# Patient Record
Sex: FEMALE | Race: WHITE | NOT HISPANIC OR LATINO | URBAN - METROPOLITAN AREA
[De-identification: names, ages, dates, MRNs, and addresses within clinical notes are randomized per-mention and may not be internally consistent; named-entity substitution may affect disease eponyms.]

---

## 2017-05-03 ENCOUNTER — OUTPATIENT (OUTPATIENT)
Dept: OUTPATIENT SERVICES | Facility: HOSPITAL | Age: 77
LOS: 1 days | Discharge: HOME | End: 2017-05-03

## 2017-06-28 DIAGNOSIS — N83.299 OTHER OVARIAN CYST, UNSPECIFIED SIDE: ICD-10-CM

## 2017-07-07 ENCOUNTER — APPOINTMENT (OUTPATIENT)
Dept: GYNECOLOGIC ONCOLOGY | Facility: CLINIC | Age: 77
End: 2017-07-07

## 2018-11-05 ENCOUNTER — OUTPATIENT (OUTPATIENT)
Dept: OUTPATIENT SERVICES | Facility: HOSPITAL | Age: 78
LOS: 1 days | Discharge: HOME | End: 2018-11-05

## 2018-11-05 DIAGNOSIS — E55.9 VITAMIN D DEFICIENCY, UNSPECIFIED: ICD-10-CM

## 2018-12-25 ENCOUNTER — OUTPATIENT (OUTPATIENT)
Dept: OUTPATIENT SERVICES | Facility: HOSPITAL | Age: 78
LOS: 1 days | Discharge: HOME | End: 2018-12-25

## 2018-12-26 DIAGNOSIS — D64.9 ANEMIA, UNSPECIFIED: ICD-10-CM

## 2018-12-31 ENCOUNTER — OUTPATIENT (OUTPATIENT)
Dept: OUTPATIENT SERVICES | Facility: HOSPITAL | Age: 78
LOS: 1 days | Discharge: HOME | End: 2018-12-31

## 2018-12-31 DIAGNOSIS — E55.9 VITAMIN D DEFICIENCY, UNSPECIFIED: ICD-10-CM

## 2019-03-06 ENCOUNTER — INPATIENT (INPATIENT)
Facility: HOSPITAL | Age: 79
LOS: 12 days | Discharge: HOME | End: 2019-03-19
Attending: INTERNAL MEDICINE | Admitting: INTERNAL MEDICINE
Payer: MEDICARE

## 2019-03-06 VITALS
OXYGEN SATURATION: 97 % | DIASTOLIC BLOOD PRESSURE: 65 MMHG | HEART RATE: 87 BPM | SYSTOLIC BLOOD PRESSURE: 122 MMHG | TEMPERATURE: 98 F | RESPIRATION RATE: 18 BRPM

## 2019-03-06 LAB
ALBUMIN SERPL ELPH-MCNC: 4.2 G/DL — SIGNIFICANT CHANGE UP (ref 3.5–5.2)
ALP SERPL-CCNC: 68 U/L — SIGNIFICANT CHANGE UP (ref 30–115)
ALT FLD-CCNC: 5 U/L — SIGNIFICANT CHANGE UP (ref 0–41)
ANION GAP SERPL CALC-SCNC: 13 MMOL/L — SIGNIFICANT CHANGE UP (ref 7–14)
APTT BLD: 21.9 SEC — CRITICAL LOW (ref 27–39.2)
AST SERPL-CCNC: 19 U/L — SIGNIFICANT CHANGE UP (ref 0–41)
BASE EXCESS BLDV CALC-SCNC: 0.3 MMOL/L — SIGNIFICANT CHANGE UP (ref -2–2)
BASOPHILS # BLD AUTO: 0.05 K/UL — SIGNIFICANT CHANGE UP (ref 0–0.2)
BASOPHILS NFR BLD AUTO: 0.4 % — SIGNIFICANT CHANGE UP (ref 0–1)
BILIRUB SERPL-MCNC: 0.2 MG/DL — SIGNIFICANT CHANGE UP (ref 0.2–1.2)
BUN SERPL-MCNC: 28 MG/DL — HIGH (ref 10–20)
CA-I SERPL-SCNC: 1.2 MMOL/L — SIGNIFICANT CHANGE UP (ref 1.12–1.3)
CALCIUM SERPL-MCNC: 10.2 MG/DL — HIGH (ref 8.5–10.1)
CHLORIDE SERPL-SCNC: 97 MMOL/L — LOW (ref 98–110)
CO2 SERPL-SCNC: 25 MMOL/L — SIGNIFICANT CHANGE UP (ref 17–32)
CREAT SERPL-MCNC: 1.4 MG/DL — SIGNIFICANT CHANGE UP (ref 0.7–1.5)
EOSINOPHIL # BLD AUTO: 0.23 K/UL — SIGNIFICANT CHANGE UP (ref 0–0.7)
EOSINOPHIL NFR BLD AUTO: 1.7 % — SIGNIFICANT CHANGE UP (ref 0–8)
GAS PNL BLDV: 136 MMOL/L — SIGNIFICANT CHANGE UP (ref 136–145)
GAS PNL BLDV: SIGNIFICANT CHANGE UP
GLUCOSE SERPL-MCNC: 104 MG/DL — HIGH (ref 70–99)
HCO3 BLDV-SCNC: 26 MMOL/L — SIGNIFICANT CHANGE UP (ref 22–29)
HCT VFR BLD CALC: 40.6 % — SIGNIFICANT CHANGE UP (ref 37–47)
HCT VFR BLDA CALC: 38 % — SIGNIFICANT CHANGE UP (ref 34–44)
HGB BLD CALC-MCNC: 12.4 G/DL — LOW (ref 14–18)
HGB BLD-MCNC: 13 G/DL — SIGNIFICANT CHANGE UP (ref 12–16)
IMM GRANULOCYTES NFR BLD AUTO: 0.4 % — HIGH (ref 0.1–0.3)
INR BLD: 0.88 RATIO — SIGNIFICANT CHANGE UP (ref 0.65–1.3)
LACTATE BLDV-MCNC: 1.3 MMOL/L — SIGNIFICANT CHANGE UP (ref 0.5–1.6)
LACTATE SERPL-SCNC: 2.7 MMOL/L — HIGH (ref 0.5–2.2)
LIDOCAIN IGE QN: 29 U/L — SIGNIFICANT CHANGE UP (ref 7–60)
LYMPHOCYTES # BLD AUTO: 14.5 % — LOW (ref 20.5–51.1)
LYMPHOCYTES # BLD AUTO: 2.02 K/UL — SIGNIFICANT CHANGE UP (ref 1.2–3.4)
MCHC RBC-ENTMCNC: 30.4 PG — SIGNIFICANT CHANGE UP (ref 27–31)
MCHC RBC-ENTMCNC: 32 G/DL — SIGNIFICANT CHANGE UP (ref 32–37)
MCV RBC AUTO: 94.9 FL — SIGNIFICANT CHANGE UP (ref 81–99)
MONOCYTES # BLD AUTO: 0.78 K/UL — HIGH (ref 0.1–0.6)
MONOCYTES NFR BLD AUTO: 5.6 % — SIGNIFICANT CHANGE UP (ref 1.7–9.3)
NEUTROPHILS # BLD AUTO: 10.76 K/UL — HIGH (ref 1.4–6.5)
NEUTROPHILS NFR BLD AUTO: 77.4 % — HIGH (ref 42.2–75.2)
NRBC # BLD: 0 /100 WBCS — SIGNIFICANT CHANGE UP (ref 0–0)
PCO2 BLDV: 45 MMHG — SIGNIFICANT CHANGE UP (ref 41–51)
PH BLDV: 7.37 — SIGNIFICANT CHANGE UP (ref 7.26–7.43)
PLATELET # BLD AUTO: 300 K/UL — SIGNIFICANT CHANGE UP (ref 130–400)
PO2 BLDV: 36 MMHG — SIGNIFICANT CHANGE UP (ref 20–40)
POTASSIUM BLDV-SCNC: 4.6 MMOL/L — SIGNIFICANT CHANGE UP (ref 3.3–5.6)
POTASSIUM SERPL-MCNC: 6.1 MMOL/L — CRITICAL HIGH (ref 3.5–5)
POTASSIUM SERPL-SCNC: 6.1 MMOL/L — CRITICAL HIGH (ref 3.5–5)
PROT SERPL-MCNC: 7.2 G/DL — SIGNIFICANT CHANGE UP (ref 6–8)
PROTHROM AB SERPL-ACNC: 10.1 SEC — SIGNIFICANT CHANGE UP (ref 9.95–12.87)
RBC # BLD: 4.28 M/UL — SIGNIFICANT CHANGE UP (ref 4.2–5.4)
RBC # FLD: 14 % — SIGNIFICANT CHANGE UP (ref 11.5–14.5)
SAO2 % BLDV: 63 % — SIGNIFICANT CHANGE UP
SODIUM SERPL-SCNC: 135 MMOL/L — SIGNIFICANT CHANGE UP (ref 135–146)
TROPONIN T SERPL-MCNC: <0.01 NG/ML — SIGNIFICANT CHANGE UP
WBC # BLD: 13.89 K/UL — HIGH (ref 4.8–10.8)
WBC # FLD AUTO: 13.89 K/UL — HIGH (ref 4.8–10.8)

## 2019-03-06 RX ORDER — IOHEXOL 300 MG/ML
30 INJECTION, SOLUTION INTRAVENOUS ONCE
Qty: 0 | Refills: 0 | Status: COMPLETED | OUTPATIENT
Start: 2019-03-06 | End: 2019-03-06

## 2019-03-06 RX ORDER — ONDANSETRON 8 MG/1
4 TABLET, FILM COATED ORAL ONCE
Qty: 0 | Refills: 0 | Status: COMPLETED | OUTPATIENT
Start: 2019-03-06 | End: 2019-03-06

## 2019-03-06 RX ORDER — SODIUM CHLORIDE 9 MG/ML
1000 INJECTION INTRAMUSCULAR; INTRAVENOUS; SUBCUTANEOUS ONCE
Qty: 0 | Refills: 0 | Status: COMPLETED | OUTPATIENT
Start: 2019-03-06 | End: 2019-03-06

## 2019-03-06 RX ORDER — SODIUM CHLORIDE 9 MG/ML
3 INJECTION INTRAMUSCULAR; INTRAVENOUS; SUBCUTANEOUS ONCE
Qty: 0 | Refills: 0 | Status: COMPLETED | OUTPATIENT
Start: 2019-03-06 | End: 2019-03-06

## 2019-03-06 RX ADMIN — SODIUM CHLORIDE 1000 MILLILITER(S): 9 INJECTION INTRAMUSCULAR; INTRAVENOUS; SUBCUTANEOUS at 16:39

## 2019-03-06 RX ADMIN — SODIUM CHLORIDE 3 MILLILITER(S): 9 INJECTION INTRAMUSCULAR; INTRAVENOUS; SUBCUTANEOUS at 16:07

## 2019-03-06 RX ADMIN — ONDANSETRON 4 MILLIGRAM(S): 8 TABLET, FILM COATED ORAL at 16:07

## 2019-03-06 RX ADMIN — IOHEXOL 30 MILLILITER(S): 300 INJECTION, SOLUTION INTRAVENOUS at 16:07

## 2019-03-06 NOTE — ED ADULT NURSE NOTE - OBJECTIVE STATEMENT
Pt c/o abdominal pain, distention, and nausea x1 day. Pt states she has a hx of chronic constipation and hx of colon resection 2 years ago. Denies fevers, chills, vomiting, diarrhea, chest pain, shortness of breath, back pain, or urinary symptoms.

## 2019-03-06 NOTE — ED PROVIDER NOTE - PHYSICAL EXAMINATION
GEN: Alert & Oriented x 2 at baseline, No acute distress. Calm, appropriate.  HEENT: normocephalic, atraumatic.  Eyes: PERRL. No conjunctival injection. No scleral icterus.   RESP: Lungs clear to auscult bilat. no wheezes, rhonchi or rales. No retractions. Equal air entry.  CARDIO: regular rate and rhythm, no murmurs, rubs or gallops. Normal S1, S2. Radial pulses 2+ bilaterally. No lower extremity edema.  ABD: Soft, distended. hypoactive BS +4Q. No rebound tenderness/guarding. No pulsatile mass. Tenderness with palpation RLQ> LLQ. No Tenderness with palpation RUQ/LUQ.   SKIN: no rashes/lesions, no petechiae, no ecchymosis.  NEURO: CN II-XII grossly intact. Speech and cognition normal.

## 2019-03-06 NOTE — ED PROVIDER NOTE - ATTENDING CONTRIBUTION TO CARE
77 y/o female NHR, with h/o dementia, R breast CA ~ 20 yrs ago, s/p R mastectomy/chemo/radiation, partial colon resection ~ 2 yrs ago secondary to large polyp (per son was pre-cancerous), sent to ER for eval of abd pain.  started last night, crampy type pain, abd seemed bloated earlier, per son is better now.  +N, no vomiting.  + chronic constipation, received laxative last night at NH, passed some stool today.  no cp/sob.  no f/c.  no ha/dizziness/loc.    PE - nad, nc/at, eomi, perrl, op - clear, cta b/l, no w/r/r, rrr, abd- soft, mild diffuse tenderness, small open wound sup to umbilicus (per son, has been present since surgery 2 yrs ago, no recent change), no le tenderness or swelling, awake and alert, follow commands, moves all extremities.

## 2019-03-06 NOTE — ED PROVIDER NOTE - PROGRESS NOTE DETAILS
I have personally evaluated the patient myself and agree with fellow's plan. pt seen and eval by surgery, they do not feel pt is obstructed.  no vomiting in ER initially, but now with V x 1.  to place NGT.  per surgery, can admit pt to medicine and they will follow.

## 2019-03-06 NOTE — ED PROVIDER NOTE - CLINICAL SUMMARY MEDICAL DECISION MAKING FREE TEXT BOX
77 y/o female NHR, s/p R mastectomy for breast CA 20 yrs ago, s/p partial small bowel resection 2 yrs ago for precancerous polyp, in ER for abd pain and N x 1 day.  given laxative last night by NH, had BM today.  labs reviewed, K+ hemolyzed, ok on vbg.  initial lactate 2.7, was 1.3 on VBG.  CT scan as above - dilated SB loops, possible ileitis with assoc partial SBO.  V x 1 in ER.  seen by surgery, do not feel pt has SBO.  NGT placed, and pt admitted to medicine for continued evaluation.  surgery will follow.

## 2019-03-06 NOTE — ED PROVIDER NOTE - OBJECTIVE STATEMENT
The patient is a 78y Female with PMH of Dementia, hx breast Ca and hx of colon resection 2yrs ago is presenting to ED from [facility] with abd pain x 1d. She describes it as generalized with associated nausea and abdominal distension. She has a hx of chronic constipation and states received a laxative yesterday and passed stool this am. She denies f/c/v/d, dysuria, urinary urgency/frequency, back pain, cp, sob.

## 2019-03-06 NOTE — ED ADULT NURSE NOTE - NS ED NOTE ABUSE SUSPICION NEGLECT YN
Verbal order and read back per Michael Torres NP  The INR is stable and therapeutic. Continue same dose of coumadin and recheck in 1 month.   Continue Coumadin 5mg daily except 2.5mg on Tues, Thur, and Sat  Patient informed of instructions, read back and verbalized understanding Verena Mcclure LPN
No

## 2019-03-06 NOTE — ED PROVIDER NOTE - NS ED ROS FT
GEN: (-) fever, (-) chills, (-) malaise  HEENT: (-) vision changes, (-) HA  CV: (-) chest pain, (-) palpitations, (-) edema  PULM: (-) cough, (-) wheezing, (-) dyspnea, (-) orthopnea, (-) hemoptysis   GI: (+) abdominal pain,(+) Nausea, (-) Vomiting, (-) Diarrhea, (-) Melena  NEURO: (-) weakness, (-) paresthesias, (-) syncope  : (-) dysuria, (-) frequency, (-) urgency, (-) incontinence   MS: (-) back pain, (-) joint pain, (-)myalgias, (-) swelling  SKIN: (-) rashes, (-) new lesions, (-) pruritus, (-) jaundice  HEME: (-) bleeding, (-) ecchymosis

## 2019-03-07 DIAGNOSIS — Z90.49 ACQUIRED ABSENCE OF OTHER SPECIFIED PARTS OF DIGESTIVE TRACT: Chronic | ICD-10-CM

## 2019-03-07 LAB
ALBUMIN SERPL ELPH-MCNC: 3.5 G/DL — SIGNIFICANT CHANGE UP (ref 3.5–5.2)
ALP SERPL-CCNC: 58 U/L — SIGNIFICANT CHANGE UP (ref 30–115)
ALT FLD-CCNC: 5 U/L — SIGNIFICANT CHANGE UP (ref 0–41)
ANION GAP SERPL CALC-SCNC: 11 MMOL/L — SIGNIFICANT CHANGE UP (ref 7–14)
AST SERPL-CCNC: 12 U/L — SIGNIFICANT CHANGE UP (ref 0–41)
BASOPHILS # BLD AUTO: 0.04 K/UL — SIGNIFICANT CHANGE UP (ref 0–0.2)
BASOPHILS NFR BLD AUTO: 0.3 % — SIGNIFICANT CHANGE UP (ref 0–1)
BILIRUB SERPL-MCNC: 0.3 MG/DL — SIGNIFICANT CHANGE UP (ref 0.2–1.2)
BUN SERPL-MCNC: 29 MG/DL — HIGH (ref 10–20)
CALCIUM SERPL-MCNC: 8.9 MG/DL — SIGNIFICANT CHANGE UP (ref 8.5–10.1)
CHLORIDE SERPL-SCNC: 102 MMOL/L — SIGNIFICANT CHANGE UP (ref 98–110)
CO2 SERPL-SCNC: 24 MMOL/L — SIGNIFICANT CHANGE UP (ref 17–32)
CREAT SERPL-MCNC: 1.4 MG/DL — SIGNIFICANT CHANGE UP (ref 0.7–1.5)
EOSINOPHIL # BLD AUTO: 0.11 K/UL — SIGNIFICANT CHANGE UP (ref 0–0.7)
EOSINOPHIL NFR BLD AUTO: 0.9 % — SIGNIFICANT CHANGE UP (ref 0–8)
GLUCOSE BLDC GLUCOMTR-MCNC: 104 MG/DL — HIGH (ref 70–99)
GLUCOSE BLDC GLUCOMTR-MCNC: 99 MG/DL — SIGNIFICANT CHANGE UP (ref 70–99)
GLUCOSE SERPL-MCNC: 99 MG/DL — SIGNIFICANT CHANGE UP (ref 70–99)
HCT VFR BLD CALC: 37.1 % — SIGNIFICANT CHANGE UP (ref 37–47)
HGB BLD-MCNC: 11.7 G/DL — LOW (ref 12–16)
IMM GRANULOCYTES NFR BLD AUTO: 0.3 % — SIGNIFICANT CHANGE UP (ref 0.1–0.3)
LYMPHOCYTES # BLD AUTO: 19 % — LOW (ref 20.5–51.1)
LYMPHOCYTES # BLD AUTO: 2.38 K/UL — SIGNIFICANT CHANGE UP (ref 1.2–3.4)
MCHC RBC-ENTMCNC: 30.5 PG — SIGNIFICANT CHANGE UP (ref 27–31)
MCHC RBC-ENTMCNC: 31.5 G/DL — LOW (ref 32–37)
MCV RBC AUTO: 96.6 FL — SIGNIFICANT CHANGE UP (ref 81–99)
MONOCYTES # BLD AUTO: 1.11 K/UL — HIGH (ref 0.1–0.6)
MONOCYTES NFR BLD AUTO: 8.9 % — SIGNIFICANT CHANGE UP (ref 1.7–9.3)
NEUTROPHILS # BLD AUTO: 8.82 K/UL — HIGH (ref 1.4–6.5)
NEUTROPHILS NFR BLD AUTO: 70.6 % — SIGNIFICANT CHANGE UP (ref 42.2–75.2)
NRBC # BLD: 0 /100 WBCS — SIGNIFICANT CHANGE UP (ref 0–0)
PLATELET # BLD AUTO: 273 K/UL — SIGNIFICANT CHANGE UP (ref 130–400)
POTASSIUM SERPL-MCNC: 4.6 MMOL/L — SIGNIFICANT CHANGE UP (ref 3.5–5)
POTASSIUM SERPL-SCNC: 4.6 MMOL/L — SIGNIFICANT CHANGE UP (ref 3.5–5)
PROT SERPL-MCNC: 5.9 G/DL — LOW (ref 6–8)
RBC # BLD: 3.84 M/UL — LOW (ref 4.2–5.4)
RBC # FLD: 14.1 % — SIGNIFICANT CHANGE UP (ref 11.5–14.5)
SODIUM SERPL-SCNC: 137 MMOL/L — SIGNIFICANT CHANGE UP (ref 135–146)
WBC # BLD: 12.5 K/UL — HIGH (ref 4.8–10.8)
WBC # FLD AUTO: 12.5 K/UL — HIGH (ref 4.8–10.8)

## 2019-03-07 PROCEDURE — 99233 SBSQ HOSP IP/OBS HIGH 50: CPT

## 2019-03-07 RX ORDER — SODIUM CHLORIDE 9 MG/ML
1000 INJECTION INTRAMUSCULAR; INTRAVENOUS; SUBCUTANEOUS
Qty: 0 | Refills: 0 | Status: DISCONTINUED | OUTPATIENT
Start: 2019-03-07 | End: 2019-03-10

## 2019-03-07 RX ORDER — LACTULOSE 10 G/15ML
200 SOLUTION ORAL THREE TIMES A DAY
Qty: 0 | Refills: 0 | Status: DISCONTINUED | OUTPATIENT
Start: 2019-03-07 | End: 2019-03-08

## 2019-03-07 RX ORDER — SERTRALINE 25 MG/1
75 TABLET, FILM COATED ORAL DAILY
Qty: 0 | Refills: 0 | Status: DISCONTINUED | OUTPATIENT
Start: 2019-03-07 | End: 2019-03-19

## 2019-03-07 RX ORDER — GABAPENTIN 400 MG/1
600 CAPSULE ORAL THREE TIMES A DAY
Qty: 0 | Refills: 0 | Status: DISCONTINUED | OUTPATIENT
Start: 2019-03-07 | End: 2019-03-19

## 2019-03-07 RX ORDER — HEPARIN SODIUM 5000 [USP'U]/ML
5000 INJECTION INTRAVENOUS; SUBCUTANEOUS EVERY 8 HOURS
Qty: 0 | Refills: 0 | Status: DISCONTINUED | OUTPATIENT
Start: 2019-03-07 | End: 2019-03-19

## 2019-03-07 RX ORDER — ATORVASTATIN CALCIUM 80 MG/1
10 TABLET, FILM COATED ORAL AT BEDTIME
Qty: 0 | Refills: 0 | Status: DISCONTINUED | OUTPATIENT
Start: 2019-03-07 | End: 2019-03-19

## 2019-03-07 RX ORDER — LACTULOSE 10 G/15ML
200 SOLUTION ORAL ONCE
Qty: 0 | Refills: 0 | Status: COMPLETED | OUTPATIENT
Start: 2019-03-07 | End: 2019-03-07

## 2019-03-07 RX ORDER — BENZOCAINE 10 %
1 GEL (GRAM) MUCOUS MEMBRANE ONCE
Qty: 0 | Refills: 0 | Status: COMPLETED | OUTPATIENT
Start: 2019-03-07 | End: 2019-03-07

## 2019-03-07 RX ORDER — TIMOLOL 0.5 %
1 DROPS OPHTHALMIC (EYE)
Qty: 0 | Refills: 0 | Status: DISCONTINUED | OUTPATIENT
Start: 2019-03-07 | End: 2019-03-19

## 2019-03-07 RX ORDER — LEVOTHYROXINE SODIUM 125 MCG
50 TABLET ORAL DAILY
Qty: 0 | Refills: 0 | Status: DISCONTINUED | OUTPATIENT
Start: 2019-03-07 | End: 2019-03-19

## 2019-03-07 RX ORDER — LATANOPROST 0.05 MG/ML
1 SOLUTION/ DROPS OPHTHALMIC; TOPICAL AT BEDTIME
Qty: 0 | Refills: 0 | Status: DISCONTINUED | OUTPATIENT
Start: 2019-03-07 | End: 2019-03-19

## 2019-03-07 RX ORDER — ASPIRIN/CALCIUM CARB/MAGNESIUM 324 MG
81 TABLET ORAL DAILY
Qty: 0 | Refills: 0 | Status: DISCONTINUED | OUTPATIENT
Start: 2019-03-07 | End: 2019-03-19

## 2019-03-07 RX ORDER — CARBIDOPA AND LEVODOPA 25; 100 MG/1; MG/1
1 TABLET ORAL THREE TIMES A DAY
Qty: 0 | Refills: 0 | Status: DISCONTINUED | OUTPATIENT
Start: 2019-03-07 | End: 2019-03-19

## 2019-03-07 RX ADMIN — Medication 81 MILLIGRAM(S): at 11:21

## 2019-03-07 RX ADMIN — CARBIDOPA AND LEVODOPA 1 TABLET(S): 25; 100 TABLET ORAL at 22:45

## 2019-03-07 RX ADMIN — SERTRALINE 75 MILLIGRAM(S): 25 TABLET, FILM COATED ORAL at 11:21

## 2019-03-07 RX ADMIN — GABAPENTIN 600 MILLIGRAM(S): 400 CAPSULE ORAL at 14:15

## 2019-03-07 RX ADMIN — HEPARIN SODIUM 5000 UNIT(S): 5000 INJECTION INTRAVENOUS; SUBCUTANEOUS at 14:15

## 2019-03-07 RX ADMIN — HEPARIN SODIUM 5000 UNIT(S): 5000 INJECTION INTRAVENOUS; SUBCUTANEOUS at 22:45

## 2019-03-07 RX ADMIN — Medication 1 SPRAY(S): at 02:43

## 2019-03-07 RX ADMIN — GABAPENTIN 600 MILLIGRAM(S): 400 CAPSULE ORAL at 22:45

## 2019-03-07 RX ADMIN — ATORVASTATIN CALCIUM 10 MILLIGRAM(S): 80 TABLET, FILM COATED ORAL at 22:43

## 2019-03-07 RX ADMIN — CARBIDOPA AND LEVODOPA 1 TABLET(S): 25; 100 TABLET ORAL at 14:15

## 2019-03-07 RX ADMIN — LACTULOSE 200 GRAM(S): 10 SOLUTION ORAL at 10:49

## 2019-03-07 RX ADMIN — SODIUM CHLORIDE 75 MILLILITER(S): 9 INJECTION INTRAMUSCULAR; INTRAVENOUS; SUBCUTANEOUS at 11:22

## 2019-03-07 NOTE — H&P ADULT - ASSESSMENT
The patient is a 78y Female with PMH of Lewy Body Dementia, hx breast Ca and hx of colon resection 2yrs ago is presenting to ED from [facility] with abd pain x 1d. She describes it as generalized with associated nausea and abdominal distension. She has a hx of chronic constipation and states received a laxative yesterday and passed stool this am. She denies dysuria, urinary urgency/frequency, back pain, cp, sob. Patient had episode of vomiting in ED and an NG tube was placed.     1) Abdominal Pain Likely secondary to SBO   Surgery team evaluated and do not think this is SBO   NG tube in place draining 400 cc   Patients  at bedside states she does not want any surgical procedure to be done even if a candidate.  NPO FOR NOW   Continue with IV Fluids.     2) Lewy Body Dementia   Stable at this time     3) DVT Prophylaxis   Heparin Sub q     DISPOSITION   Patient to be discharged once medically stable and no further workup required.

## 2019-03-07 NOTE — H&P ADULT - NSHPLABSRESULTS_GEN_ALL_CORE
13.0   13.89 )-----------( 300      ( 06 Mar 2019 15:30 )             40.6       03-06    135  |  97<L>  |  28<H>  ----------------------------<  104<H>  6.1<HH>   |  25  |  1.4    Ca    10.2<H>      06 Mar 2019 15:30    TPro  7.2  /  Alb  4.2  /  TBili  0.2  /  DBili  x   /  AST  19  /  ALT  5   /  AlkPhos  68  03-06      CARDIAC MARKERS ( 06 Mar 2019 15:30 )  x     / <0.01 ng/mL / x     / x     / x          < from: CT Abdomen and Pelvis w/ Oral Cont and w/ IV Cont (03.06.19 @ 21:33) >    IMPRESSION:     Dilated small bowel to 3.7 cm with distally collapsed ileal and colonic   loops. While complete bowel obstruction is not excluded, findings are   favored to reflect ileitis with associated partial small bowel   obstruction. Trace left lower quadrant ascites. No pneumoperitoneum or   pneumatosis.    Interval enlargement of left ovarian cyst, now 7.8 cm. Continued   outpatient ultrasound follow-up can be obtained.    < end of copied text >

## 2019-03-07 NOTE — PROGRESS NOTE ADULT - SUBJECTIVE AND OBJECTIVE BOX
IMELDA ROLLE  78y, Female  Allergy: antichlolinergics (Unknown)  benzodiazepines (Unknown)  ciprofloxacin (Unknown)  Originally Entered as [Unknown] reaction to [green pepper] (Unknown)  Originally Entered as [Unknown] reaction to [neuroleptics] (Unknown)  Originally Entered as [Unknown] reaction to [pepper] (Unknown)  Originally Entered as [Unknown] reaction to [red pepper] (Unknown)  penicillin (Anaphylaxis)    Hospital Day:     Patient seen and examined earlier today.     PMH/PSH:  PAST MEDICAL & SURGICAL HISTORY:  Lewy body dementia without behavioral disturbance  Constipation  History of breast cancer  Dementia  History of colon resection      LAST 24-Hr EVENTS:    VITALS:  T(F): 97.6 (03-07-19 @ 16:41), Max: 98.1 (03-07-19 @ 07:44)  HR: 94 (03-07-19 @ 16:41)  BP: 119/55 (03-07-19 @ 16:41) (119/55 - 132/73)  RR: 19 (03-07-19 @ 16:41)  SpO2: 98% (03-07-19 @ 16:41)    TESTS & MEASUREMENTS:  Weight (Kg):                             11.7   12.50 )-----------( 273      ( 07 Mar 2019 08:03 )             37.1     PT/INR - ( 06 Mar 2019 15:30 )   PT: 10.10 sec;   INR: 0.88 ratio         PTT - ( 06 Mar 2019 15:30 )  PTT:21.9 sec  03-07    137  |  102  |  29<H>  ----------------------------<  99  4.6   |  24  |  1.4    Ca    8.9      07 Mar 2019 08:03    TPro  5.9<L>  /  Alb  3.5  /  TBili  0.3  /  DBili  x   /  AST  12  /  ALT  5   /  AlkPhos  58  03-07    LIVER FUNCTIONS - ( 07 Mar 2019 08:03 )  Alb: 3.5 g/dL / Pro: 5.9 g/dL / ALK PHOS: 58 U/L / ALT: 5 U/L / AST: 12 U/L / GGT: x           CARDIAC MARKERS ( 06 Mar 2019 15:30 )  x     / <0.01 ng/mL / x     / x     / x          RADIOLOGY & ADDITIONAL TESTS:  < from: CT Abdomen and Pelvis w/ Oral Cont and w/ IV Cont (03.06.19 @ 21:33) >  IMPRESSION:     Dilated small bowel to 3.7 cm with distally collapsed ileal and colonic   loops. While complete bowel obstruction is not excluded, findings are   favored to reflect ileitis with associated partial small bowel   obstruction. Trace left lowerquadrant ascites. No pneumoperitoneum or   pneumatosis.    Interval enlargement of left ovarian cyst, now 7.8 cm. Continued   outpatient ultrasound follow-up can be obtained.    < end of copied text >      MEDICATIONS:  MEDICATIONS  (STANDING):  aspirin  chewable 81 milliGRAM(s) Oral daily  atorvastatin 10 milliGRAM(s) Oral at bedtime  carbidopa/levodopa  25/100 1 Tablet(s) Oral three times a day  gabapentin 600 milliGRAM(s) Oral three times a day  heparin  Injectable 5000 Unit(s) SubCutaneous every 8 hours  lactulose Retention Enema 200 Gram(s) Rectal three times a day  latanoprost 0.005% Ophthalmic Solution 1 Drop(s) Both EYES at bedtime  levothyroxine 50 MICROGram(s) Oral daily  sertraline 75 milliGRAM(s) Oral daily  sodium chloride 0.9%. 1000 milliLiter(s) (75 mL/Hr) IV Continuous <Continuous>  timolol 0.5% Solution 1 Drop(s) Both EYES two times a day    MEDICATIONS  (PRN):      HOME MEDICATIONS:      PHYSICAL EXAM:  GENERAL: A&O x3,  in NAD/P  HEENT: No Swelling, dry MM  CHEST/LUNG: Good air entry, No wheezing  HEART: RRR, No murmurs  ABDOMEN: distended, +bs, no further vomiting  EXTREMITIES:  No clubbing, trace edema  neuro:  non-focal

## 2019-03-07 NOTE — H&P ADULT - PMH
Constipation    Dementia    History of breast cancer Constipation    Dementia    History of breast cancer    Lewy body dementia without behavioral disturbance

## 2019-03-07 NOTE — H&P ADULT - HISTORY OF PRESENT ILLNESS
The patient is a 78y Female with PMH of Dementia, hx breast Ca and hx of colon resection 2yrs ago is presenting to ED from [facility] with abd pain x 1d. She describes it as generalized with associated nausea and abdominal distension. She has a hx of chronic constipation and states received a laxative yesterday and passed stool this am. She denies dysuria, urinary urgency/frequency, back pain, cp, sob. Patient had episode of vomitting in ED and an NG tube was placed.

## 2019-03-07 NOTE — CHART NOTE - NSCHARTNOTEFT_GEN_A_CORE
Senior Note  I have personally examined and evaluated the patient  Pt is passing gas having bm  bowel regiment   hydration   correct electrolytes  I agree with the above plan and note  Surgical Attending aware and agrees with plan

## 2019-03-07 NOTE — H&P ADULT - NSHPPHYSICALEXAM_GEN_ALL_CORE
PHYSICAL EXAM:  GENERAL: NAD, speaks in full sentences, no signs of respiratory distress  HEAD:  Atraumatic, Normocephalic  EYES: EOMI, PERRLA, conjunctiva and sclera clear  NECK: Supple, No JVD  CHEST/LUNG: Clear to auscultation bilaterally; No wheeze; No crackles; No accessory muscles used  HEART: Regular rate and rhythm; No murmurs;   ABDOMEN: Soft, Nontender, Nondistended; Bowel sounds present; No guarding  EXTREMITIES:  2+ Peripheral Pulses, No cyanosis or edema  PSYCH: AAOx3  NEUROLOGY: non-focal  SKIN: No rashes or lesions PHYSICAL EXAM:  GENERAL: NAD  HEAD:  Atraumatic, Normocephalic  EYES: EOMI, PERRLA, conjunctiva and sclera clear  NECK: Supple, No JVD  CHEST/LUNG: Clear to auscultation bilaterally; No wheeze; No crackles; No accessory muscles used  HEART: Regular rate and rhythm; No murmurs;   ABDOMEN: Soft, Nontender, Nondistended; Bowel sounds present; No guarding  EXTREMITIES:  2+ Peripheral Pulses, No cyanosis or edema  PSYCH: AAOX2   NEUROLOGY: non-focal

## 2019-03-07 NOTE — CONSULT NOTE ADULT - ASSESSMENT
Assessment: 78F with abdominal distension, CT shows possible SBO v. ileus but also a large stool burden and given her history of constipation and laxative use it si likely that she is suffering from constipation and will benefit from IV hydration and enemas to relieve her stool burden     Plan:  - NPO, IVF  - Will likely benefit from enema   - If she vomits, place an NGT  - No surgical intervention at this time

## 2019-03-07 NOTE — CONSULT NOTE ADULT - ATTENDING COMMENTS
no clinical evidence of perforation or peritonitis   no surgical intervention \continue current care

## 2019-03-07 NOTE — CONSULT NOTE ADULT - SUBJECTIVE AND OBJECTIVE BOX
IMELDA ROLLE [id number]  78y Female    HPI:  Patient is a 78 year old female with a PMH of Lewy body dementia c/b autonomic dysfunction, s/p colon resection for large polyp 2 years ago. She presented to the ED after being sent in by her NH with increasing abdominal distention and pain along with decreased BMs. patient states that she does have irregular BMs at baseline due to her autonomic dysfunction and intermittently requires laxatives to go. She got a laxative yesterday and did have a small BM but it did not relieve her distention. The nursing home obtained a KUB that was inconclusive and sent her in for evaluation.     PAST MEDICAL & SURGICAL HISTORY:  Constipation  History of breast cancer  Dementia    Allergies  antichlolinergics (Unknown)  benzodiazepines (Unknown)  ciprofloxacin (Unknown)  Originally Entered as [Unknown] reaction to [green pepper] (Unknown)  Originally Entered as [Unknown] reaction to [neuroleptics] (Unknown)  Originally Entered as [Unknown] reaction to [pepper] (Unknown)  Originally Entered as [Unknown] reaction to [red pepper] (Unknown)  penicillin (Anaphylaxis)    REVIEW OF SYSTEMS    [x] A ten-point review of systems was otherwise negative except as noted.    Vital Signs Last 24 Hrs  T(C): 36.1 (06 Mar 2019 17:04), Max: 36.7 (06 Mar 2019 13:18)  T(F): 96.9 (06 Mar 2019 17:04), Max: 98 (06 Mar 2019 13:18)  HR: 88 (06 Mar 2019 17:04) (87 - 88)  BP: 112/59 (06 Mar 2019 17:04) (112/59 - 122/65)  BP(mean): --  RR: 19 (06 Mar 2019 17:04) (18 - 19)  SpO2: 97% (06 Mar 2019 13:18) (97% - 97%)    PHYSICAL EXAM:  GENERAL: NAD, well-appearing, difficultly answering some questions at baseline due to dementia   CHEST/LUNG: Clear to auscultation bilaterally  HEART: Regular rate and rhythm  ABDOMEN: Soft, mildly tender, distended;     LABS:                     13.0   13.89 )-----------( 300      ( 06 Mar 2019 15:30 )             40.6       Auto Immature Granulocyte %: 0.4 % (03-06-19 @ 15:30)  Auto Neutrophil %: 77.4 % (03-06-19 @ 15:30)    03-06    135  |  97<L>  |  28<H>  ----------------------------<  104<H>  6.1<HH>   |  25  |  1.4      Calcium, Total Serum: 10.2 mg/dL (03-06-19 @ 15:30)      LFTs:             7.2  | 0.2  | 19       ------------------[68      ( 06 Mar 2019 15:30 )  4.2  | x    | 5           Lipase:29         Blood Gas Venous - Lactate: 1.3 mmoL/L (03-06-19 @ 20:20)  Lactate, Blood: 2.7 mmol/L (03-06-19 @ 15:30)      Coags:  10.10  ----< 0.88    ( 06 Mar 2019 15:30 )     21.9      CARDIAC MARKERS ( 06 Mar 2019 15:30 )  x     / <0.01 ng/mL / x     / x     / x        RADIOLOGY & ADDITIONAL STUDIES:    < from: Xray Chest 1 View- PORTABLE-Urgent (03.06.19 @ 15:08) >  Impression:      No airspace consolidation, effusion or pneumothorax.    < end of copied text >    < from: CT Abdomen and Pelvis w/ Oral Cont and w/ IV Cont (03.06.19 @ 21:33) >  IMPRESSION:     Dilated small bowel to 3.7 cm with distally collapsed ileal and colonic   loops. While complete bowel obstruction is not excluded, findings are   favored to reflect ileitis with associated partial small bowel   obstruction. Trace left lowerquadrant ascites. No pneumoperitoneum or   pneumatosis.    Interval enlargement of left ovarian cyst, now 7.8 cm. Continued   outpatient ultrasound follow-up can be obtained.    < end of copied text >

## 2019-03-07 NOTE — PROGRESS NOTE ADULT - ASSESSMENT
The patient is a 78y Female with PMH of Lewy Body Dementia, hx breast Ca and hx of colon resection 2yrs ago is presenting to ED from [facility] with abd pain x 1d. She describes it as generalized with associated nausea and abdominal distension. She has a hx of chronic constipation and states received a laxative yesterday and passed stool this am. She denies dysuria, urinary urgency/frequency, back pain, cp, sob. Patient had episode of vomiting in ED and an NG tube was placed.     #Abdominal Pain Likely secondary to pSBO   s/p NGT placement with 400cc out; pt pulled out NGT, no further vomiting and she feels better  enema given without much result  surgery following, no surgery at this time  continue IVF, trial of clears  serial abd exams  enemas    #hypothyroidism   #Lewy Body Dementia     resume home meds when able to take PO    #PPx - HSQ

## 2019-03-08 ENCOUNTER — TRANSCRIPTION ENCOUNTER (OUTPATIENT)
Age: 79
End: 2019-03-08

## 2019-03-08 LAB
ANION GAP SERPL CALC-SCNC: 12 MMOL/L — SIGNIFICANT CHANGE UP (ref 7–14)
BASOPHILS # BLD AUTO: 0.03 K/UL — SIGNIFICANT CHANGE UP (ref 0–0.2)
BASOPHILS NFR BLD AUTO: 0.3 % — SIGNIFICANT CHANGE UP (ref 0–1)
BUN SERPL-MCNC: 22 MG/DL — HIGH (ref 10–20)
CALCIUM SERPL-MCNC: 8.5 MG/DL — SIGNIFICANT CHANGE UP (ref 8.5–10.1)
CHLORIDE SERPL-SCNC: 106 MMOL/L — SIGNIFICANT CHANGE UP (ref 98–110)
CO2 SERPL-SCNC: 22 MMOL/L — SIGNIFICANT CHANGE UP (ref 17–32)
CREAT SERPL-MCNC: 0.9 MG/DL — SIGNIFICANT CHANGE UP (ref 0.7–1.5)
EOSINOPHIL # BLD AUTO: 0.34 K/UL — SIGNIFICANT CHANGE UP (ref 0–0.7)
EOSINOPHIL NFR BLD AUTO: 3.8 % — SIGNIFICANT CHANGE UP (ref 0–8)
GLUCOSE BLDC GLUCOMTR-MCNC: 88 MG/DL — SIGNIFICANT CHANGE UP (ref 70–99)
GLUCOSE BLDC GLUCOMTR-MCNC: 90 MG/DL — SIGNIFICANT CHANGE UP (ref 70–99)
GLUCOSE BLDC GLUCOMTR-MCNC: 92 MG/DL — SIGNIFICANT CHANGE UP (ref 70–99)
GLUCOSE BLDC GLUCOMTR-MCNC: 98 MG/DL — SIGNIFICANT CHANGE UP (ref 70–99)
GLUCOSE SERPL-MCNC: 93 MG/DL — SIGNIFICANT CHANGE UP (ref 70–99)
HCT VFR BLD CALC: 34.1 % — LOW (ref 37–47)
HGB BLD-MCNC: 10.6 G/DL — LOW (ref 12–16)
IMM GRANULOCYTES NFR BLD AUTO: 0.4 % — HIGH (ref 0.1–0.3)
LYMPHOCYTES # BLD AUTO: 1.67 K/UL — SIGNIFICANT CHANGE UP (ref 1.2–3.4)
LYMPHOCYTES # BLD AUTO: 18.5 % — LOW (ref 20.5–51.1)
MCHC RBC-ENTMCNC: 30.4 PG — SIGNIFICANT CHANGE UP (ref 27–31)
MCHC RBC-ENTMCNC: 31.1 G/DL — LOW (ref 32–37)
MCV RBC AUTO: 97.7 FL — SIGNIFICANT CHANGE UP (ref 81–99)
MONOCYTES # BLD AUTO: 0.65 K/UL — HIGH (ref 0.1–0.6)
MONOCYTES NFR BLD AUTO: 7.2 % — SIGNIFICANT CHANGE UP (ref 1.7–9.3)
NEUTROPHILS # BLD AUTO: 6.32 K/UL — SIGNIFICANT CHANGE UP (ref 1.4–6.5)
NEUTROPHILS NFR BLD AUTO: 69.8 % — SIGNIFICANT CHANGE UP (ref 42.2–75.2)
NRBC # BLD: 0 /100 WBCS — SIGNIFICANT CHANGE UP (ref 0–0)
PLATELET # BLD AUTO: 222 K/UL — SIGNIFICANT CHANGE UP (ref 130–400)
POTASSIUM SERPL-MCNC: 4.4 MMOL/L — SIGNIFICANT CHANGE UP (ref 3.5–5)
POTASSIUM SERPL-SCNC: 4.4 MMOL/L — SIGNIFICANT CHANGE UP (ref 3.5–5)
RBC # BLD: 3.49 M/UL — LOW (ref 4.2–5.4)
RBC # FLD: 14.3 % — SIGNIFICANT CHANGE UP (ref 11.5–14.5)
SODIUM SERPL-SCNC: 140 MMOL/L — SIGNIFICANT CHANGE UP (ref 135–146)
WBC # BLD: 9.05 K/UL — SIGNIFICANT CHANGE UP (ref 4.8–10.8)
WBC # FLD AUTO: 9.05 K/UL — SIGNIFICANT CHANGE UP (ref 4.8–10.8)

## 2019-03-08 PROCEDURE — 99233 SBSQ HOSP IP/OBS HIGH 50: CPT

## 2019-03-08 RX ORDER — DOCUSATE SODIUM 100 MG
1 CAPSULE ORAL
Qty: 0 | Refills: 0 | DISCHARGE
Start: 2019-03-08

## 2019-03-08 RX ORDER — POLYETHYLENE GLYCOL 3350 17 G/17G
17 POWDER, FOR SOLUTION ORAL DAILY
Qty: 0 | Refills: 0 | Status: DISCONTINUED | OUTPATIENT
Start: 2019-03-08 | End: 2019-03-19

## 2019-03-08 RX ORDER — DOCUSATE SODIUM 100 MG
100 CAPSULE ORAL
Qty: 0 | Refills: 0 | Status: DISCONTINUED | OUTPATIENT
Start: 2019-03-08 | End: 2019-03-14

## 2019-03-08 RX ORDER — ACETAMINOPHEN 500 MG
650 TABLET ORAL EVERY 6 HOURS
Qty: 0 | Refills: 0 | Status: DISCONTINUED | OUTPATIENT
Start: 2019-03-08 | End: 2019-03-19

## 2019-03-08 RX ORDER — POLYETHYLENE GLYCOL 3350 17 G/17G
17 POWDER, FOR SOLUTION ORAL
Qty: 0 | Refills: 0 | DISCHARGE
Start: 2019-03-08

## 2019-03-08 RX ADMIN — HEPARIN SODIUM 5000 UNIT(S): 5000 INJECTION INTRAVENOUS; SUBCUTANEOUS at 05:58

## 2019-03-08 RX ADMIN — Medication 81 MILLIGRAM(S): at 13:30

## 2019-03-08 RX ADMIN — GABAPENTIN 600 MILLIGRAM(S): 400 CAPSULE ORAL at 05:57

## 2019-03-08 RX ADMIN — CARBIDOPA AND LEVODOPA 1 TABLET(S): 25; 100 TABLET ORAL at 05:57

## 2019-03-08 RX ADMIN — Medication 100 MILLIGRAM(S): at 17:37

## 2019-03-08 RX ADMIN — LACTULOSE 200 GRAM(S): 10 SOLUTION ORAL at 01:43

## 2019-03-08 RX ADMIN — Medication 1 DROP(S): at 05:58

## 2019-03-08 RX ADMIN — Medication 50 MICROGRAM(S): at 05:57

## 2019-03-08 RX ADMIN — HEPARIN SODIUM 5000 UNIT(S): 5000 INJECTION INTRAVENOUS; SUBCUTANEOUS at 13:31

## 2019-03-08 RX ADMIN — Medication 1 DROP(S): at 17:37

## 2019-03-08 RX ADMIN — SERTRALINE 75 MILLIGRAM(S): 25 TABLET, FILM COATED ORAL at 13:30

## 2019-03-08 RX ADMIN — CARBIDOPA AND LEVODOPA 1 TABLET(S): 25; 100 TABLET ORAL at 13:30

## 2019-03-08 RX ADMIN — LATANOPROST 1 DROP(S): 0.05 SOLUTION/ DROPS OPHTHALMIC; TOPICAL at 01:22

## 2019-03-08 RX ADMIN — GABAPENTIN 600 MILLIGRAM(S): 400 CAPSULE ORAL at 13:30

## 2019-03-08 NOTE — PROGRESS NOTE ADULT - SUBJECTIVE AND OBJECTIVE BOX
SUBJECTIVE:    Patient is a 78y old Female who presents with a chief complaint of Abdominal pain with associated nausea and vomiting. (08 Mar 2019 09:13)    Currently admitted to medicine with the primary diagnosis of Abdominal pain     Today is hospital day 1d. Patient and  spoken to at bedside. Patient feeling well today (though sleepy but easily arousable). Had one large bowel movement after second enema yesterday. Has not tried any Clear liquids     PAST MEDICAL & SURGICAL HISTORY  Lewy body dementia without behavioral disturbance  Constipation  History of breast cancer  Dementia  History of colon resection    SOCIAL HISTORY:  Negative for smoking/alcohol/drug use.     ALLERGIES:  antichlolinergics (Unknown)  benzodiazepines (Unknown)  ciprofloxacin (Unknown)  Originally Entered as [Unknown] reaction to [green pepper] (Unknown)  Originally Entered as [Unknown] reaction to [neuroleptics] (Unknown)  Originally Entered as [Unknown] reaction to [pepper] (Unknown)  Originally Entered as [Unknown] reaction to [red pepper] (Unknown)  penicillin (Anaphylaxis)    MEDICATIONS:  STANDING MEDICATIONS  aspirin  chewable 81 milliGRAM(s) Oral daily  atorvastatin 10 milliGRAM(s) Oral at bedtime  carbidopa/levodopa  25/100 1 Tablet(s) Oral three times a day  gabapentin 600 milliGRAM(s) Oral three times a day  heparin  Injectable 5000 Unit(s) SubCutaneous every 8 hours  lactulose Retention Enema 200 Gram(s) Rectal three times a day  latanoprost 0.005% Ophthalmic Solution 1 Drop(s) Both EYES at bedtime  levothyroxine 50 MICROGram(s) Oral daily  sertraline 75 milliGRAM(s) Oral daily  sodium chloride 0.9%. 1000 milliLiter(s) IV Continuous <Continuous>  timolol 0.5% Solution 1 Drop(s) Both EYES two times a day    PRN MEDICATIONS    VITALS:   T(F): 96.1  HR: 87  BP: 111/55  RR: 18  SpO2: 96%    General: well developed, well nourished, NAD  Neuro: alert and oriented, no focal deficits, moves all extremities spontaneously  HEENT: NCAT, EOMI, anicteric, mucosa moist  Respiratory: CTABL  CVS: regular rate and rhythm  Abdomen: soft, nontender, distended (obese), +BS  Extremities: no edema, sensation and movement grossly intact  Skin: warm, dry, appropriate color    LABS:                        10.6   9.05  )-----------( 222      ( 08 Mar 2019 08:58 )             34.1     03-08    140  |  106  |  22<H>  ----------------------------<  93  4.4   |  22  |  0.9    Ca    8.5      08 Mar 2019 08:58    TPro  5.9<L>  /  Alb  3.5  /  TBili  0.3  /  DBili  x   /  AST  12  /  ALT  5   /  AlkPhos  58  03-07    PT/INR - ( 06 Mar 2019 15:30 )   PT: 10.10 sec;   INR: 0.88 ratio         PTT - ( 06 Mar 2019 15:30 )  PTT:21.9 sec          CARDIAC MARKERS ( 06 Mar 2019 15:30 )  x     / <0.01 ng/mL / x     / x     / x          RADIOLOGY:    < from: CT Abdomen and Pelvis w/ Oral Cont and w/ IV Cont (03.06.19 @ 21:33) >  IMPRESSION:     Dilated small bowel to 3.7 cm with distally collapsed ileal and colonic   loops. While complete bowel obstruction is not excluded, findings are   favored to reflect ileitis with associated partial small bowel   obstruction. Trace left lowerquadrant ascites. No pneumoperitoneum or   pneumatosis.    Interval enlargement of left ovarian cyst, now 7.8 cm. Continued   outpatient ultrasound follow-up can be obtained.    < end of copied text >

## 2019-03-08 NOTE — PROGRESS NOTE ADULT - ASSESSMENT
Assessment:  78y Female patient admitted S/P admitted with constipation vs ileus    Plan:  -home meds  -pain control  -GI/DVT ppx  -encourage ambulation  -incentive spirometer  -advance diet as tolerated  -continue enemas, bowl regimen     Date/Time: 03-08-19 @ 09:13

## 2019-03-08 NOTE — DISCHARGE NOTE ADULT - MEDICATION SUMMARY - MEDICATIONS TO TAKE
I will START or STAY ON the medications listed below when I get home from the hospital:    acetaminophen 325 mg oral tablet  -- 2 tab(s) by mouth every 12 hours  -- Indication: For Pain Control     aspirin 81 mg oral tablet, chewable  -- 1 tab(s) by mouth once a day  -- Indication: For Prophylaxis     gabapentin 600 mg oral tablet  -- 1 tab(s) by mouth 3 times a day  -- Indication: For Neuropathic Pain     sertraline 50 mg oral tablet  -- 1.5 tab(s) by mouth once a day  -- Indication: For Dementia    atorvastatin 10 mg oral tablet  -- 1 tab(s) by mouth Monday, Wednesday, and Friday at bedtime   -- Indication: For Cholesterol     carbidopa-levodopa 25 mg-100 mg oral tablet  -- 1 tab(s) by mouth 2 times a day  -- Indication: For Lewy body dementia without behavioral disturbance    lidocaine 5% topical film  -- Apply on skin to affected area once a day (to left big toe)   -- Indication: For Pain Control     Colace 100 mg oral capsule  -- 1 cap(s) by mouth 2 times a day  -- Indication: For Constipation    polyethylene glycol 3350 oral powder for reconstitution  -- 17 gram(s) by mouth once a day  -- Indication: For Constipation    lactulose 10 g/15 mL oral syrup  -- 45 milliliter(s) by mouth 4 times a week on Monday, Wednesday, Friday, and Saturday at bedtime  -- Indication: For Constipation    Senna 8.6 mg oral tablet  -- 2 tab(s) by mouth once a day (at bedtime)  -- Indication: For Constipation    latanoprost 0.005% ophthalmic solution  -- 1 drop(s) to each affected eye once a day (at bedtime)  -- Indication: For Eye Health    Artificial Tears ophthalmic solution  -- 1 drop(s) to each affected eye 3 times a day  -- Indication: For Eye Health    timolol hemihydrate 0.5% ophthalmic solution  -- 1 drop(s) to each affected eye 3 times a day  -- Indication: For Eye Health     omeprazole 20 mg oral delayed release capsule  -- 1 cap(s) by mouth once a day  -- Indication: For GERD    levothyroxine 50 mcg (0.05 mg) oral tablet  -- 1 tab(s) by mouth once a day  -- Indication: For Hypthyroidism     Vitamin C 500 mg oral tablet  -- 1 tab(s) by mouth 2 times a day  -- Indication: For Vitamin     Vitamin D3 1000 intl units oral tablet  -- 2 tab(s) by mouth once a day  -- Indication: For Vitamin I will START or STAY ON the medications listed below when I get home from the hospital:    acetaminophen 325 mg oral tablet  -- 2 tab(s) by mouth every 12 hours  -- Indication: For Pain Control     aspirin 81 mg oral tablet, chewable  -- 1 tab(s) by mouth once a day  -- Indication: For Prophylaxis     gabapentin 600 mg oral tablet  -- 1 tab(s) by mouth 3 times a day  -- Indication: For Neuropathic Pain     sertraline 50 mg oral tablet  -- 1.5 tab(s) by mouth once a day  -- Indication: For Dementia    atorvastatin 10 mg oral tablet  -- 1 tab(s) by mouth Monday, Wednesday, and Friday at bedtime   -- Indication: For Cholesterol     carbidopa-levodopa 25 mg-100 mg oral tablet  -- 1 tab(s) by mouth 2 times a day  -- Indication: For Lewy body dementia without behavioral disturbance    lidocaine 5% topical film  -- Apply on skin to affected area once a day (to left big toe)   -- Indication: For Pain Control     Senna 8.6 mg oral tablet  -- 2 tab(s) by mouth once a day (at bedtime)  -- Indication: For Constipation    lactulose 10 g/15 mL oral syrup  -- 45 milliliter(s) by mouth 4 times a week on Monday, Wednesday, Friday, and Saturday at bedtime  -- Indication: For Constipation    Colace 100 mg oral capsule  -- 1 cap(s) by mouth 2 times a day  -- Indication: For Constipation    polyethylene glycol 3350 oral powder for reconstitution  -- 17 gram(s) by mouth once a day  -- Indication: For Constipation    magnesium oxide 400 mg (241.3 mg elemental magnesium) oral tablet  -- 1 tab(s) by mouth 3 times a day (with meals)  -- Indication: For supplement    potassium chloride 20 mEq oral tablet, extended release  -- 1 tab(s) by mouth once a day  -- Indication: For supplement    timolol hemihydrate 0.5% ophthalmic solution  -- 1 drop(s) to each affected eye 3 times a day  -- Indication: For Eye Health     latanoprost 0.005% ophthalmic solution  -- 1 drop(s) to each affected eye once a day (at bedtime)  -- Indication: For Eye Health    Artificial Tears ophthalmic solution  -- 1 drop(s) to each affected eye 3 times a day  -- Indication: For Eye Health    omeprazole 20 mg oral delayed release capsule  -- 1 cap(s) by mouth once a day  -- Indication: For GERD    levothyroxine 50 mcg (0.05 mg) oral tablet  -- 1 tab(s) by mouth once a day  -- Indication: For Hypthyroidism     Vitamin C 500 mg oral tablet  -- 1 tab(s) by mouth 2 times a day  -- Indication: For Vitamin     Vitamin D3 1000 intl units oral tablet  -- 2 tab(s) by mouth once a day  -- Indication: For Vitamin

## 2019-03-08 NOTE — PROGRESS NOTE ADULT - ASSESSMENT
The patient is a 78y Female with PMH of Lewy Body Dementia, hx breast Ca and hx of colon resection 2yrs ago is presenting to ED from [facility] with abd pain x 1d. CT scan of the abdomen revealed partial small bowel obstruction     #) Abdominal Pain Likely secondary to SBO   - Patient with NG tube insertion on admission, with 400cc of output  - Tube pulled out yesterday AM but patient had no additional output, no vomiting  - Trial of CLD (patient was advanced yesterday but did not eat much)  - Serial Abdominal examination  - Enemas ATC for today     #) Lewy Body Dementia   - Stable  - Cont with Sinemet      #) Hypothyroidism  - Cont with Levothyroxine     #Diet: CLD, advance as tolerated   #Activity: OOBTC  #DVT PPx: Heparin subQ  #GI PPx: Protonix PO  #Dispo: Pending, per facility can go back on Sunday     DISPOSITION   Patient to be discharged once medically stable and no further workup required. The patient is a 78y Female with PMH of Lewy Body Dementia, hx breast Ca and hx of colon resection 2yrs ago is presenting to ED from [facility] with abd pain x 1d. CT scan of the abdomen revealed partial small bowel obstruction     #) Abdominal Pain Likely secondary to pSBO   - Patient with NG tube insertion on admission, with 400cc of output  - Tube pulled out yesterday AM but patient had no additional output, no vomiting  - Trial of CLD (patient was advanced yesterday but did not eat much)  - Serial Abdominal examination  - Enemas ATC for today     #) Lewy Body Dementia   - Stable  - Cont with Sinemet      #) Hypothyroidism  - Cont with Levothyroxine     #Diet: CLD, advance as tolerated   #Activity: OOBTC  #DVT PPx: Heparin subQ  #GI PPx: Protonix PO  #Dispo: Pending, per facility can go back on Sunday     DISPOSITION   Patient to be discharged once medically stable and no further workup required.

## 2019-03-08 NOTE — PROGRESS NOTE ADULT - SUBJECTIVE AND OBJECTIVE BOX
Progress Note: General Surgery  Patient: IMELDA ROLLE , 78y (1940)Female   MRN: [id number]  Location: [facility] 044 A  Visit: 03-07-19 Inpatient  Date: 03-08-19 @ 09:13  Hospital Day: 2  Post-op Day:     Procedure/Diagnosis: admitted with constipation vs ileus  Events over 24h: Patient removed NG tube yesterday, was started on clears, states she is tolerating with no nausea or vomiting.  Patient states she is passing gas and BM.  Denies fever, chills, chest pain, shortness of breath, abdominal pain.    Vitals: T(F): 96.1 (03-08-19 @ 07:22), Max: 98 (03-08-19 @ 05:53)  HR: 87 (03-08-19 @ 07:22)  BP: 111/55 (03-08-19 @ 07:22) (106/57 - 133/55)  RR: 18 (03-08-19 @ 07:22)  SpO2: 96% (03-08-19 @ 07:22)      Diet: Diet, Clear Liquid (03-07-19 @ 18:09)    IV Fluids: yes , Type: sodium chloride 0.9%. 1000 milliLiter(s) (75 mL/Hr) IV Continuous <Continuous>    Physical Examination:  General Appearance: NAD, alert and cooperative  HEENT: NCAT, WNL  Heart: S1 and S2. No murmurs. Rhythm is regular  Lungs: Clear to auscultation BL   Abdomen:  Positive bowel sounds. Soft, distended, nontender    Medications: [Standing]  aspirin  chewable 81 milliGRAM(s) Oral daily  atorvastatin 10 milliGRAM(s) Oral at bedtime  carbidopa/levodopa  25/100 1 Tablet(s) Oral three times a day  gabapentin 600 milliGRAM(s) Oral three times a day  heparin  Injectable 5000 Unit(s) SubCutaneous every 8 hours  lactulose Retention Enema 200 Gram(s) Rectal three times a day  latanoprost 0.005% Ophthalmic Solution 1 Drop(s) Both EYES at bedtime  levothyroxine 50 MICROGram(s) Oral daily  sertraline 75 milliGRAM(s) Oral daily  sodium chloride 0.9%. 1000 milliLiter(s) (75 mL/Hr) IV Continuous <Continuous>  timolol 0.5% Solution 1 Drop(s) Both EYES two times a day    DVT Prophylaxis: heparin  Injectable 5000 Unit(s) SubCutaneous every 8 hours    GI Prophylaxis:   Antibiotics:   Anticoagulation:   Medications:[PRN]    Labs:                        11.7   12.50 )-----------( 273      ( 07 Mar 2019 08:03 )             37.1     03-07    137  |  102  |  29<H>  ----------------------------<  99  4.6   |  24  |  1.4    Ca    8.9      07 Mar 2019 08:03    TPro  5.9<L>  /  Alb  3.5  /  TBili  0.3  /  DBili  x   /  AST  12  /  ALT  5   /  AlkPhos  58  03-07    LIVER FUNCTIONS - ( 07 Mar 2019 08:03 )  Alb: 3.5 g/dL / Pro: 5.9 g/dL / ALK PHOS: 58 U/L / ALT: 5 U/L / AST: 12 U/L / GGT: x           PT/INR - ( 06 Mar 2019 15:30 )   PT: 10.10 sec;   INR: 0.88 ratio         PTT - ( 06 Mar 2019 15:30 )  PTT:21.9 sec    CARDIAC MARKERS ( 06 Mar 2019 15:30 )  x     / <0.01 ng/mL / x     / x     / x

## 2019-03-08 NOTE — DISCHARGE NOTE ADULT - PLAN OF CARE
Monitor and treat adequately Please ensure you continue an aggressive bowel regimen to make sure you have adequately bowel movements

## 2019-03-08 NOTE — DISCHARGE NOTE ADULT - CARE PLAN
Principal Discharge DX:	Partial small bowel obstruction  Goal:	Monitor and treat adequately  Assessment and plan of treatment:	Please ensure you continue an aggressive bowel regimen to make sure you have adequately bowel movements

## 2019-03-08 NOTE — DISCHARGE NOTE ADULT - PATIENT PORTAL LINK FT
You can access the [facility] Patient Portal, offered by [facility], by registering with the following website: [email or web address]

## 2019-03-08 NOTE — DISCHARGE NOTE ADULT - HOSPITAL COURSE
Patient is a 78y Female with PMH of Lewy Body Dementia, hx breast Ca and hx of colon resection 2yrs ago is presenting to ED from [facility] with abd pain x 1d. CT scan of the abdomen revealed partial small bowel obstruction. NG Tube was placed on admission and Patient is a 78y Female with PMH of Lewy Body Dementia, hx breast Ca and hx of colon resection 2yrs ago is presenting to ED from [facility] with abd pain x 1d. CT scan of the abdomen revealed partial small bowel obstruction. NG Tube was placed on admission and drained 400cc of fluid. No additional output noted and patient eventually pulled NG tube out. Patient was monitored and had no further vomiting. Abdominal pain improved. She was advanced to Clear liquid diet. Patient is a 78y Female with PMH of Lewy Body Dementia, hx breast Ca and hx of colon resection 2yrs ago is presenting to ED from [facility] with abd pain x 1d. CT scan of the abdomen revealed partial small bowel obstruction. Patient is not a candidate for surgery. NG Tube was placed on admission and drained 400cc of fluid, output continued to be high for about a liter daily. NGT output improved,  diet was advanced and she tolerated.   Patient is stable for d/c Patient is a 78y Female with PMH of Lewy Body Dementia, hx breast Ca and hx of colon resection 2yrs ago is presenting to ED from [facility] with abd pain x 1d. CT scan of the abdomen revealed partial small bowel obstruction. Patient is not a candidate for surgery. NG Tube was placed on admission and drained 400cc of fluid, output continued to be high for about a liter daily. NGT output improved,  diet was advanced and she tolerated well. Now patient  is having regular BM and passing flatus.   Patient is stable for d/c back to nursing facility.    Attending Attestation:  Patient was seen independently. Latest vital signs and labs were reviewed. Case was discussed with resident in morning rounds for assessment and plan.  Patient is medically stable for discharge to home. About 36 mins spent on discharge disposition

## 2019-03-09 LAB
ANION GAP SERPL CALC-SCNC: 12 MMOL/L — SIGNIFICANT CHANGE UP (ref 7–14)
BUN SERPL-MCNC: 16 MG/DL — SIGNIFICANT CHANGE UP (ref 10–20)
CALCIUM SERPL-MCNC: 8.6 MG/DL — SIGNIFICANT CHANGE UP (ref 8.5–10.1)
CHLORIDE SERPL-SCNC: 104 MMOL/L — SIGNIFICANT CHANGE UP (ref 98–110)
CO2 SERPL-SCNC: 24 MMOL/L — SIGNIFICANT CHANGE UP (ref 17–32)
CREAT SERPL-MCNC: 0.8 MG/DL — SIGNIFICANT CHANGE UP (ref 0.7–1.5)
GLUCOSE BLDC GLUCOMTR-MCNC: 91 MG/DL — SIGNIFICANT CHANGE UP (ref 70–99)
GLUCOSE BLDC GLUCOMTR-MCNC: 98 MG/DL — SIGNIFICANT CHANGE UP (ref 70–99)
GLUCOSE SERPL-MCNC: 88 MG/DL — SIGNIFICANT CHANGE UP (ref 70–99)
HCT VFR BLD CALC: 32.8 % — LOW (ref 37–47)
HCT VFR BLD CALC: 33.2 % — LOW (ref 37–47)
HGB BLD-MCNC: 10.3 G/DL — LOW (ref 12–16)
HGB BLD-MCNC: 10.4 G/DL — LOW (ref 12–16)
MAGNESIUM SERPL-MCNC: 1.9 MG/DL — SIGNIFICANT CHANGE UP (ref 1.8–2.4)
MCHC RBC-ENTMCNC: 30.1 PG — SIGNIFICANT CHANGE UP (ref 27–31)
MCHC RBC-ENTMCNC: 30.2 PG — SIGNIFICANT CHANGE UP (ref 27–31)
MCHC RBC-ENTMCNC: 31.3 G/DL — LOW (ref 32–37)
MCHC RBC-ENTMCNC: 31.4 G/DL — LOW (ref 32–37)
MCV RBC AUTO: 95.9 FL — SIGNIFICANT CHANGE UP (ref 81–99)
MCV RBC AUTO: 96.5 FL — SIGNIFICANT CHANGE UP (ref 81–99)
NRBC # BLD: 0 /100 WBCS — SIGNIFICANT CHANGE UP (ref 0–0)
NRBC # BLD: 0 /100 WBCS — SIGNIFICANT CHANGE UP (ref 0–0)
PLATELET # BLD AUTO: 226 K/UL — SIGNIFICANT CHANGE UP (ref 130–400)
PLATELET # BLD AUTO: 233 K/UL — SIGNIFICANT CHANGE UP (ref 130–400)
POTASSIUM SERPL-MCNC: 4 MMOL/L — SIGNIFICANT CHANGE UP (ref 3.5–5)
POTASSIUM SERPL-SCNC: 4 MMOL/L — SIGNIFICANT CHANGE UP (ref 3.5–5)
RBC # BLD: 3.42 M/UL — LOW (ref 4.2–5.4)
RBC # BLD: 3.44 M/UL — LOW (ref 4.2–5.4)
RBC # FLD: 13.5 % — SIGNIFICANT CHANGE UP (ref 11.5–14.5)
RBC # FLD: 13.6 % — SIGNIFICANT CHANGE UP (ref 11.5–14.5)
SODIUM SERPL-SCNC: 140 MMOL/L — SIGNIFICANT CHANGE UP (ref 135–146)
TSH SERPL-MCNC: 3.26 UIU/ML — SIGNIFICANT CHANGE UP (ref 0.27–4.2)
WBC # BLD: 9.22 K/UL — SIGNIFICANT CHANGE UP (ref 4.8–10.8)
WBC # BLD: 9.52 K/UL — SIGNIFICANT CHANGE UP (ref 4.8–10.8)
WBC # FLD AUTO: 9.22 K/UL — SIGNIFICANT CHANGE UP (ref 4.8–10.8)
WBC # FLD AUTO: 9.52 K/UL — SIGNIFICANT CHANGE UP (ref 4.8–10.8)

## 2019-03-09 RX ORDER — ACETAMINOPHEN 500 MG
650 TABLET ORAL EVERY 6 HOURS
Qty: 0 | Refills: 0 | Status: DISCONTINUED | OUTPATIENT
Start: 2019-03-09 | End: 2019-03-19

## 2019-03-09 RX ORDER — ONDANSETRON 8 MG/1
4 TABLET, FILM COATED ORAL EVERY 6 HOURS
Qty: 0 | Refills: 0 | Status: DISCONTINUED | OUTPATIENT
Start: 2019-03-09 | End: 2019-03-19

## 2019-03-09 RX ADMIN — Medication 1 DROP(S): at 17:42

## 2019-03-09 RX ADMIN — CARBIDOPA AND LEVODOPA 1 TABLET(S): 25; 100 TABLET ORAL at 21:56

## 2019-03-09 RX ADMIN — ONDANSETRON 4 MILLIGRAM(S): 8 TABLET, FILM COATED ORAL at 08:07

## 2019-03-09 RX ADMIN — Medication 650 MILLIGRAM(S): at 01:00

## 2019-03-09 RX ADMIN — HEPARIN SODIUM 5000 UNIT(S): 5000 INJECTION INTRAVENOUS; SUBCUTANEOUS at 05:37

## 2019-03-09 RX ADMIN — Medication 1 DROP(S): at 05:38

## 2019-03-09 RX ADMIN — SODIUM CHLORIDE 75 MILLILITER(S): 9 INJECTION INTRAMUSCULAR; INTRAVENOUS; SUBCUTANEOUS at 05:38

## 2019-03-09 RX ADMIN — Medication 650 MILLIGRAM(S): at 00:37

## 2019-03-09 RX ADMIN — HEPARIN SODIUM 5000 UNIT(S): 5000 INJECTION INTRAVENOUS; SUBCUTANEOUS at 21:55

## 2019-03-09 RX ADMIN — GABAPENTIN 600 MILLIGRAM(S): 400 CAPSULE ORAL at 14:26

## 2019-03-09 RX ADMIN — CARBIDOPA AND LEVODOPA 1 TABLET(S): 25; 100 TABLET ORAL at 14:26

## 2019-03-09 RX ADMIN — ATORVASTATIN CALCIUM 10 MILLIGRAM(S): 80 TABLET, FILM COATED ORAL at 21:55

## 2019-03-09 RX ADMIN — LATANOPROST 1 DROP(S): 0.05 SOLUTION/ DROPS OPHTHALMIC; TOPICAL at 21:55

## 2019-03-09 RX ADMIN — GABAPENTIN 600 MILLIGRAM(S): 400 CAPSULE ORAL at 21:55

## 2019-03-09 RX ADMIN — HEPARIN SODIUM 5000 UNIT(S): 5000 INJECTION INTRAVENOUS; SUBCUTANEOUS at 14:26

## 2019-03-09 NOTE — PROGRESS NOTE ADULT - SUBJECTIVE AND OBJECTIVE BOX
FLOIMELDA LUJAN  78y, Female  Allergy: antichlolinergics (Unknown)  benzodiazepines (Unknown)  ciprofloxacin (Unknown)  Originally Entered as [Unknown] reaction to [green pepper] (Unknown)  Originally Entered as [Unknown] reaction to [neuroleptics] (Unknown)  Originally Entered as [Unknown] reaction to [pepper] (Unknown)  Originally Entered as [Unknown] reaction to [red pepper] (Unknown)  penicillin (Anaphylaxis)    Hospital Day: 2d    Patient seen and examined earlier today.  unfortunately around 5am, pt started having nausea and vomiting multiple times.  Pt getting prepped for NGT placement.    PMH/PSH:  PAST MEDICAL & SURGICAL HISTORY:  Lewy body dementia without behavioral disturbance  Constipation  History of breast cancer  Dementia  History of colon resection      VITALS:  T(F): 97.9 (03-09-19 @ 08:18), Max: 98.7 (03-09-19 @ 04:00)  HR: 82 (03-09-19 @ 08:18)  BP: 149/68 (03-09-19 @ 08:18) (125/64 - 149/72)  RR: 18 (03-09-19 @ 08:18)  SpO2: --    TESTS & MEASUREMENTS:  Weight (Kg): 98.5 (03-08-19 @ 16:50)  BMI (kg/m2): 35 (03-08)    03-08-19 @ 07:01  -  03-09-19 @ 07:00  --------------------------------------------------------  IN: 225 mL / OUT: 0 mL / NET: 225 mL                            10.6   9.05  )-----------( 222      ( 08 Mar 2019 08:58 )             34.1       03-08    140  |  106  |  22<H>  ----------------------------<  93  4.4   |  22  |  0.9    Ca    8.5      08 Mar 2019 08:58      RADIOLOGY & ADDITIONAL TESTS:  < from: CT Abdomen and Pelvis w/ Oral Cont and w/ IV Cont (03.06.19 @ 21:33) >  IMPRESSION:     Dilated small bowel to 3.7 cm with distally collapsed ileal and colonic   loops. While complete bowel obstruction is not excluded, findings are   favored to reflect ileitis with associated partial small bowel   obstruction. Trace left lowerquadrant ascites. No pneumoperitoneum or   pneumatosis.    Interval enlargement of left ovarian cyst, now 7.8 cm. Continued   outpatient ultrasound follow-up can be obtained.    < end of copied text >      MEDICATIONS:  MEDICATIONS  (STANDING):  aspirin  chewable 81 milliGRAM(s) Oral daily  atorvastatin 10 milliGRAM(s) Oral at bedtime  carbidopa/levodopa  25/100 1 Tablet(s) Oral three times a day  docusate sodium 100 milliGRAM(s) Oral two times a day  gabapentin 600 milliGRAM(s) Oral three times a day  heparin  Injectable 5000 Unit(s) SubCutaneous every 8 hours  latanoprost 0.005% Ophthalmic Solution 1 Drop(s) Both EYES at bedtime  levothyroxine 50 MICROGram(s) Oral daily  polyethylene glycol 3350 17 Gram(s) Oral daily  sertraline 75 milliGRAM(s) Oral daily  sodium chloride 0.9%. 1000 milliLiter(s) (75 mL/Hr) IV Continuous <Continuous>  timolol 0.5% Solution 1 Drop(s) Both EYES two times a day    MEDICATIONS  (PRN):  acetaminophen   Tablet .. 650 milliGRAM(s) Oral every 6 hours PRN Mild Pain (1 - 3)  ondansetron Injectable 4 milliGRAM(s) IV Push every 6 hours PRN Nausea and/or Vomiting      HOME MEDICATIONS:  acetaminophen 325 mg oral tablet (03-08)  Artificial Tears ophthalmic solution (03-08)  aspirin 81 mg oral tablet, chewable (03-08)  atorvastatin 10 mg oral tablet (03-08)  carbidopa-levodopa 25 mg-100 mg oral tablet (03-08)  Colace 100 mg oral capsule (03-08)  gabapentin 600 mg oral tablet (03-08)  lactulose 10 g/15 mL oral syrup (03-08)  latanoprost 0.005% ophthalmic solution (03-08)  levothyroxine 50 mcg (0.05 mg) oral tablet (03-08)  lidocaine 5% topical film (03-08)  omeprazole 20 mg oral delayed release capsule (03-08)  polyethylene glycol 3350 oral powder for reconstitution (03-08)  Senna 8.6 mg oral tablet (03-08)  sertraline 50 mg oral tablet (03-08)  timolol hemihydrate 0.5% ophthalmic solution (03-08)  Vitamin C 500 mg oral tablet (03-08)  Vitamin D3 1000 intl units oral tablet (03-08)      PHYSICAL EXAM:  GENERAL: mild distress and dry heaving after vomiting  HEENT:  dry MM  CHEST/LUNG: Good air entry, No wheezing, no rales  HEART: RRR, No murmurs  ABDOMEN: +distended, high pitched bs on Rt, +small dressing from non healed wound from prior surgery  EXTREMITIES:  No clubbing, no edema  neuro:  awake, alert

## 2019-03-09 NOTE — PROGRESS NOTE ADULT - SUBJECTIVE AND OBJECTIVE BOX
patient see and examined   vomited few times overnight  abdomen still distended   passing flatus and having BM     Rec NPO ,NGT   IV hydration   repeat flat abdominal plate   will f/u

## 2019-03-09 NOTE — PROGRESS NOTE ADULT - ASSESSMENT
The patient is a 78y Female with PMH of Lewy Body Dementia, hx breast Ca and hx of colon resection 2yrs ago is presenting to ED from [facility] with abd pain x 1d. She describes it as generalized with associated nausea and abdominal distension. She has a hx of chronic constipation and states received a laxative yesterday and passed stool this am. She denies dysuria, urinary urgency/frequency, back pain, cp, sob. Patient had episode of vomiting in ED and an NG tube was placed.     #Abdominal Pain Likely secondary to pSBO   started vomiting again, place NGT  check AXR  NPO, IVF  serial abdominal exams  suppositories/enemas  surgery f/u appreciated    #hypothyroidism - check TSH, continue synthroid  #Lewy Body Dementia - resume home meds when able to take PO    #PPx - HSQ    Progress Note Handoff  Pending:  clinical improvement  Patient/Family discussion: d/w patient  Disposition: [facility] when clinically improves

## 2019-03-09 NOTE — PROGRESS NOTE ADULT - SUBJECTIVE AND OBJECTIVE BOX
SUBJECTIVE:    Patient is a 78y old Female who presents with a chief complaint of Abdominal pain with associated nausea and vomiting. (08 Mar 2019 13:49)    This morning patient had multiple episodes of vomiting. Currently patient denies abdominal pain, SOB, chest pain. Endorse mild nausea.    PAST MEDICAL & SURGICAL HISTORY  Lewy body dementia without behavioral disturbance  Constipation  History of breast cancer  Dementia  History of colon resection       ALLERGIES:  antichlolinergics (Unknown)  benzodiazepines (Unknown)  ciprofloxacin (Unknown)  Originally Entered as [Unknown] reaction to [green pepper] (Unknown)  Originally Entered as [Unknown] reaction to [neuroleptics] (Unknown)  Originally Entered as [Unknown] reaction to [pepper] (Unknown)  Originally Entered as [Unknown] reaction to [red pepper] (Unknown)  penicillin (Anaphylaxis)    MEDICATIONS:  STANDING MEDICATIONS  aspirin  chewable 81 milliGRAM(s) Oral daily  atorvastatin 10 milliGRAM(s) Oral at bedtime  carbidopa/levodopa  25/100 1 Tablet(s) Oral three times a day  docusate sodium 100 milliGRAM(s) Oral two times a day  gabapentin 600 milliGRAM(s) Oral three times a day  heparin  Injectable 5000 Unit(s) SubCutaneous every 8 hours  latanoprost 0.005% Ophthalmic Solution 1 Drop(s) Both EYES at bedtime  levothyroxine 50 MICROGram(s) Oral daily  polyethylene glycol 3350 17 Gram(s) Oral daily  sertraline 75 milliGRAM(s) Oral daily  sodium chloride 0.9%. 1000 milliLiter(s) IV Continuous <Continuous>  timolol 0.5% Solution 1 Drop(s) Both EYES two times a day    PRN MEDICATIONS  acetaminophen   Tablet .. 650 milliGRAM(s) Oral every 6 hours PRN  ondansetron Injectable 4 milliGRAM(s) IV Push every 6 hours PRN    VITALS:   T(F): 97.9  HR: 82  BP: 149/68  RR: 18  SpO2: --    LABS:                        10.6   9.05  )-----------( 222      ( 08 Mar 2019 08:58 )             34.1     03-08    140  |  106  |  22<H>  ----------------------------<  93  4.4   |  22  |  0.9    Ca    8.5      08 Mar 2019 08:58                        03-08-19 @ 07:01  -  03-09-19 @ 07:00  --------------------------------------------------------  IN: 225 mL / OUT: 0 mL / NET: 225 mL          PHYSICAL EXAM:  GEN: NAD, comfortable  LUNGS: CTAB, no w/r/r  HEART: RRR, no m/r/g  ABD: hyperactive bowel sounds, grossly distended, non tender to palpation  EXT: no edema  NEURO: awake and alert, patient responds appropriately

## 2019-03-09 NOTE — PROGRESS NOTE ADULT - ASSESSMENT
#) Abdominal Pain Likely secondary to pSBO   - Previously had NG tube but patient removed and diet was advanced to soft diet however seems patient not tolerating due to vomiting  - Will put NG tube back in  - Serial Abdominal examination    #) Lewy Body Dementia   - Stable  - Cont with Sinemet      #) Hypothyroidism  - Cont with Levothyroxine     #Diet: CLD, advance as tolerated   #Activity: OOBTC  #DVT PPx: Heparin subQ  #GI PPx: Protonix PO  #Dispo: Pending, per facility can go back on Sunday     DISPOSITION   Patient to be discharged once medically stable and no further workup required.

## 2019-03-10 LAB
ANION GAP SERPL CALC-SCNC: 14 MMOL/L — SIGNIFICANT CHANGE UP (ref 7–14)
BASOPHILS # BLD AUTO: 0.03 K/UL — SIGNIFICANT CHANGE UP (ref 0–0.2)
BASOPHILS NFR BLD AUTO: 0.3 % — SIGNIFICANT CHANGE UP (ref 0–1)
BUN SERPL-MCNC: 15 MG/DL — SIGNIFICANT CHANGE UP (ref 10–20)
CALCIUM SERPL-MCNC: 8.7 MG/DL — SIGNIFICANT CHANGE UP (ref 8.5–10.1)
CHLORIDE SERPL-SCNC: 104 MMOL/L — SIGNIFICANT CHANGE UP (ref 98–110)
CO2 SERPL-SCNC: 22 MMOL/L — SIGNIFICANT CHANGE UP (ref 17–32)
CREAT SERPL-MCNC: 0.8 MG/DL — SIGNIFICANT CHANGE UP (ref 0.7–1.5)
EOSINOPHIL # BLD AUTO: 0.24 K/UL — SIGNIFICANT CHANGE UP (ref 0–0.7)
EOSINOPHIL NFR BLD AUTO: 2.6 % — SIGNIFICANT CHANGE UP (ref 0–8)
GLUCOSE BLDC GLUCOMTR-MCNC: 74 MG/DL — SIGNIFICANT CHANGE UP (ref 70–99)
GLUCOSE BLDC GLUCOMTR-MCNC: 80 MG/DL — SIGNIFICANT CHANGE UP (ref 70–99)
GLUCOSE BLDC GLUCOMTR-MCNC: 84 MG/DL — SIGNIFICANT CHANGE UP (ref 70–99)
GLUCOSE SERPL-MCNC: 76 MG/DL — SIGNIFICANT CHANGE UP (ref 70–99)
HCT VFR BLD CALC: 34.2 % — LOW (ref 37–47)
HGB BLD-MCNC: 10.8 G/DL — LOW (ref 12–16)
IMM GRANULOCYTES NFR BLD AUTO: 0.4 % — HIGH (ref 0.1–0.3)
LYMPHOCYTES # BLD AUTO: 2.34 K/UL — SIGNIFICANT CHANGE UP (ref 1.2–3.4)
LYMPHOCYTES # BLD AUTO: 25.3 % — SIGNIFICANT CHANGE UP (ref 20.5–51.1)
MAGNESIUM SERPL-MCNC: 1.9 MG/DL — SIGNIFICANT CHANGE UP (ref 1.8–2.4)
MCHC RBC-ENTMCNC: 30.3 PG — SIGNIFICANT CHANGE UP (ref 27–31)
MCHC RBC-ENTMCNC: 31.6 G/DL — LOW (ref 32–37)
MCV RBC AUTO: 96.1 FL — SIGNIFICANT CHANGE UP (ref 81–99)
MONOCYTES # BLD AUTO: 0.64 K/UL — HIGH (ref 0.1–0.6)
MONOCYTES NFR BLD AUTO: 6.9 % — SIGNIFICANT CHANGE UP (ref 1.7–9.3)
NEUTROPHILS # BLD AUTO: 5.97 K/UL — SIGNIFICANT CHANGE UP (ref 1.4–6.5)
NEUTROPHILS NFR BLD AUTO: 64.5 % — SIGNIFICANT CHANGE UP (ref 42.2–75.2)
NRBC # BLD: 0 /100 WBCS — SIGNIFICANT CHANGE UP (ref 0–0)
PLATELET # BLD AUTO: 235 K/UL — SIGNIFICANT CHANGE UP (ref 130–400)
POTASSIUM SERPL-MCNC: 4 MMOL/L — SIGNIFICANT CHANGE UP (ref 3.5–5)
POTASSIUM SERPL-SCNC: 4 MMOL/L — SIGNIFICANT CHANGE UP (ref 3.5–5)
RBC # BLD: 3.56 M/UL — LOW (ref 4.2–5.4)
RBC # FLD: 13.5 % — SIGNIFICANT CHANGE UP (ref 11.5–14.5)
SODIUM SERPL-SCNC: 140 MMOL/L — SIGNIFICANT CHANGE UP (ref 135–146)
WBC # BLD: 9.26 K/UL — SIGNIFICANT CHANGE UP (ref 4.8–10.8)
WBC # FLD AUTO: 9.26 K/UL — SIGNIFICANT CHANGE UP (ref 4.8–10.8)

## 2019-03-10 RX ORDER — SODIUM CHLORIDE 9 MG/ML
1000 INJECTION, SOLUTION INTRAVENOUS
Qty: 0 | Refills: 0 | Status: DISCONTINUED | OUTPATIENT
Start: 2019-03-10 | End: 2019-03-17

## 2019-03-10 RX ORDER — LACTULOSE 10 G/15ML
200 SOLUTION ORAL ONCE
Qty: 0 | Refills: 0 | Status: COMPLETED | OUTPATIENT
Start: 2019-03-10 | End: 2019-03-10

## 2019-03-10 RX ADMIN — SODIUM CHLORIDE 75 MILLILITER(S): 9 INJECTION, SOLUTION INTRAVENOUS at 19:16

## 2019-03-10 RX ADMIN — GABAPENTIN 600 MILLIGRAM(S): 400 CAPSULE ORAL at 22:54

## 2019-03-10 RX ADMIN — CARBIDOPA AND LEVODOPA 1 TABLET(S): 25; 100 TABLET ORAL at 22:54

## 2019-03-10 RX ADMIN — LATANOPROST 1 DROP(S): 0.05 SOLUTION/ DROPS OPHTHALMIC; TOPICAL at 22:54

## 2019-03-10 RX ADMIN — ATORVASTATIN CALCIUM 10 MILLIGRAM(S): 80 TABLET, FILM COATED ORAL at 22:54

## 2019-03-10 RX ADMIN — Medication 1 DROP(S): at 06:00

## 2019-03-10 RX ADMIN — HEPARIN SODIUM 5000 UNIT(S): 5000 INJECTION INTRAVENOUS; SUBCUTANEOUS at 14:57

## 2019-03-10 RX ADMIN — Medication 1 DROP(S): at 05:58

## 2019-03-10 RX ADMIN — LACTULOSE 200 GRAM(S): 10 SOLUTION ORAL at 19:16

## 2019-03-10 RX ADMIN — GABAPENTIN 600 MILLIGRAM(S): 400 CAPSULE ORAL at 05:57

## 2019-03-10 RX ADMIN — CARBIDOPA AND LEVODOPA 1 TABLET(S): 25; 100 TABLET ORAL at 05:57

## 2019-03-10 RX ADMIN — HEPARIN SODIUM 5000 UNIT(S): 5000 INJECTION INTRAVENOUS; SUBCUTANEOUS at 22:53

## 2019-03-10 RX ADMIN — Medication 50 MICROGRAM(S): at 05:57

## 2019-03-10 RX ADMIN — HEPARIN SODIUM 5000 UNIT(S): 5000 INJECTION INTRAVENOUS; SUBCUTANEOUS at 05:57

## 2019-03-10 RX ADMIN — Medication 1 DROP(S): at 18:24

## 2019-03-10 NOTE — PROGRESS NOTE ADULT - ASSESSMENT
The patient is a 78y Female with PMH of Lewy Body Dementia, hx breast Ca and hx of colon resection 2yrs ago is presenting to ED from [facility] with abd pain x 1d. She describes it as generalized with associated nausea and abdominal distension. She has a hx of chronic constipation and states received a laxative yesterday and passed stool this am. She denies dysuria, urinary urgency/frequency, back pain, cp, sob. Patient had episode of vomiting in ED and an NG tube was placed.     #Abdominal Pain Likely secondary to pSBO/ileus  NGT placed for second time on 3/9 for vomiting  AXR non-obstructive   surgical f/u  continue NPO, IVF  serial abdominal exams  suppositories/enemas    #hypothyroidism - TSH wnl, continue synthroid  #Lewy Body Dementia - resume home meds when able to take PO    #PPx - HSQ    Progress Note Handoff  Pending:  clinical improvement  Patient/Family discussion: d/w patient  Disposition: [facility] when clinically improves

## 2019-03-10 NOTE — PROGRESS NOTE ADULT - SUBJECTIVE AND OBJECTIVE BOX
FLOIMELDA LUJAN  78y, Female  Allergy: antichlolinergics (Unknown)  benzodiazepines (Unknown)  ciprofloxacin (Unknown)  Originally Entered as [Unknown] reaction to [green pepper] (Unknown)  Originally Entered as [Unknown] reaction to [neuroleptics] (Unknown)  Originally Entered as [Unknown] reaction to [pepper] (Unknown)  Originally Entered as [Unknown] reaction to [red pepper] (Unknown)  penicillin (Anaphylaxis)    Hospital Day: 3d    Patient seen and examined earlier today.  she is much more awake and alert.   at bedside.  she had about  1200 out of her NGT since yesterday morning.  per RN, no BM    PMH/PSH:  PAST MEDICAL & SURGICAL HISTORY:  Lewy body dementia without behavioral disturbance  Constipation  History of breast cancer  Dementia  History of colon resection    VITALS:  T(F): 97.8 (03-10-19 @ 08:00), Max: 100.3 (03-09-19 @ 17:00)  HR: 78 (03-10-19 @ 08:00)  BP: 118/56 (03-10-19 @ 08:00) (118/56 - 150/69)  RR: 18 (03-10-19 @ 08:00)  SpO2: 94% (03-09-19 @ 17:48)    TESTS & MEASUREMENTS:  Weight (Kg): 98.5 (03-08-19 @ 16:50)  BMI (kg/m2): 35 (03-08)    03-08-19 @ 07:01  -  03-09-19 @ 07:00  --------------------------------------------------------  IN: 225 mL / OUT: 0 mL / NET: 225 mL    03-09-19 @ 06:01  -  03-10-19 @ 07:00  --------------------------------------------------------  IN: 1500 mL / OUT: 1200 mL / NET: 300 mL    03-10-19 @ 07:01  -  03-10-19 @ 11:48  --------------------------------------------------------  IN: 225 mL / OUT: 0 mL / NET: 225 mL                            10.8   9.26  )-----------( 235      ( 10 Mar 2019 07:04 )             34.2       03-10    140  |  104  |  15  ----------------------------<  76  4.0   |  22  |  0.8    Ca    8.7      10 Mar 2019 07:04  Mg     1.9     03-10        RADIOLOGY & ADDITIONAL TESTS:    < from: Xray Abdomen 1 View Portable, IMMEDIATE (03.09.19 @ 12:57) >    Findings:  There is no evidence of small bowel dilatation. Air and stool is seen   throughout the colon. Contrast is seen within the rectum. Coarse pelvic   calcification corresponds to a degenerating fibroid. There are   degenerative changes of the spine.    Impression:  Nonobstructive bowel gas pattern.    < end of copied text >  < from: Xray Abdomen 1 View Portable, IMMEDIATE (03.09.19 @ 12:57) >    MEDICATIONS:  MEDICATIONS  (STANDING):  aspirin  chewable 81 milliGRAM(s) Oral daily  atorvastatin 10 milliGRAM(s) Oral at bedtime  carbidopa/levodopa  25/100 1 Tablet(s) Oral three times a day  docusate sodium 100 milliGRAM(s) Oral two times a day  gabapentin 600 milliGRAM(s) Oral three times a day  heparin  Injectable 5000 Unit(s) SubCutaneous every 8 hours  lactulose Retention Enema 200 Gram(s) Rectal once  latanoprost 0.005% Ophthalmic Solution 1 Drop(s) Both EYES at bedtime  levothyroxine 50 MICROGram(s) Oral daily  polyethylene glycol 3350 17 Gram(s) Oral daily  sertraline 75 milliGRAM(s) Oral daily  sodium chloride 0.9%. 1000 milliLiter(s) (75 mL/Hr) IV Continuous <Continuous>  timolol 0.5% Solution 1 Drop(s) Both EYES two times a day    MEDICATIONS  (PRN):  acetaminophen   Tablet .. 650 milliGRAM(s) Oral every 6 hours PRN Mild Pain (1 - 3)  acetaminophen   Tablet .. 650 milliGRAM(s) Oral every 6 hours PRN Temp greater or equal to 38C (100.4F)  ondansetron Injectable 4 milliGRAM(s) IV Push every 6 hours PRN Nausea and/or Vomiting      HOME MEDICATIONS:  acetaminophen 325 mg oral tablet (03-08)  Artificial Tears ophthalmic solution (03-08)  aspirin 81 mg oral tablet, chewable (03-08)  atorvastatin 10 mg oral tablet (03-08)  carbidopa-levodopa 25 mg-100 mg oral tablet (03-08)  Colace 100 mg oral capsule (03-08)  gabapentin 600 mg oral tablet (03-08)  lactulose 10 g/15 mL oral syrup (03-08)  latanoprost 0.005% ophthalmic solution (03-08)  levothyroxine 50 mcg (0.05 mg) oral tablet (03-08)  lidocaine 5% topical film (03-08)  omeprazole 20 mg oral delayed release capsule (03-08)  polyethylene glycol 3350 oral powder for reconstitution (03-08)  Senna 8.6 mg oral tablet (03-08)  sertraline 50 mg oral tablet (03-08)  timolol hemihydrate 0.5% ophthalmic solution (03-08)  Vitamin C 500 mg oral tablet (03-08)  Vitamin D3 1000 intl units oral tablet (03-08)      PHYSICAL EXAM:  GENERAL: A&O,  in NAD/P  HEENT:  NGT in place  CHEST/LUNG: Good air entry, No wheezing  HEART: RRR, No murmurs  ABDOMEN: belly much softer, hypoactive bowel sounds  EXTREMITIES:  No clubbing, no edema

## 2019-03-11 LAB
ALBUMIN SERPL ELPH-MCNC: 3.5 G/DL — SIGNIFICANT CHANGE UP (ref 3.5–5.2)
ALP SERPL-CCNC: 58 U/L — SIGNIFICANT CHANGE UP (ref 30–115)
ALT FLD-CCNC: 10 U/L — SIGNIFICANT CHANGE UP (ref 0–41)
ANION GAP SERPL CALC-SCNC: 16 MMOL/L — HIGH (ref 7–14)
AST SERPL-CCNC: 20 U/L — SIGNIFICANT CHANGE UP (ref 0–41)
BASOPHILS # BLD AUTO: 0.03 K/UL — SIGNIFICANT CHANGE UP (ref 0–0.2)
BASOPHILS NFR BLD AUTO: 0.3 % — SIGNIFICANT CHANGE UP (ref 0–1)
BILIRUB SERPL-MCNC: 0.4 MG/DL — SIGNIFICANT CHANGE UP (ref 0.2–1.2)
BUN SERPL-MCNC: 14 MG/DL — SIGNIFICANT CHANGE UP (ref 10–20)
CALCIUM SERPL-MCNC: 8.7 MG/DL — SIGNIFICANT CHANGE UP (ref 8.5–10.1)
CHLORIDE SERPL-SCNC: 100 MMOL/L — SIGNIFICANT CHANGE UP (ref 98–110)
CO2 SERPL-SCNC: 24 MMOL/L — SIGNIFICANT CHANGE UP (ref 17–32)
CREAT SERPL-MCNC: 0.8 MG/DL — SIGNIFICANT CHANGE UP (ref 0.7–1.5)
EOSINOPHIL # BLD AUTO: 0.27 K/UL — SIGNIFICANT CHANGE UP (ref 0–0.7)
EOSINOPHIL NFR BLD AUTO: 2.6 % — SIGNIFICANT CHANGE UP (ref 0–8)
GLUCOSE BLDC GLUCOMTR-MCNC: 102 MG/DL — HIGH (ref 70–99)
GLUCOSE BLDC GLUCOMTR-MCNC: 107 MG/DL — HIGH (ref 70–99)
GLUCOSE BLDC GLUCOMTR-MCNC: 114 MG/DL — HIGH (ref 70–99)
GLUCOSE SERPL-MCNC: 102 MG/DL — HIGH (ref 70–99)
HCT VFR BLD CALC: 35.8 % — LOW (ref 37–47)
HGB BLD-MCNC: 11.4 G/DL — LOW (ref 12–16)
IMM GRANULOCYTES NFR BLD AUTO: 0.4 % — HIGH (ref 0.1–0.3)
LYMPHOCYTES # BLD AUTO: 2.11 K/UL — SIGNIFICANT CHANGE UP (ref 1.2–3.4)
LYMPHOCYTES # BLD AUTO: 20.2 % — LOW (ref 20.5–51.1)
MAGNESIUM SERPL-MCNC: 1.7 MG/DL — LOW (ref 1.8–2.4)
MCHC RBC-ENTMCNC: 30 PG — SIGNIFICANT CHANGE UP (ref 27–31)
MCHC RBC-ENTMCNC: 31.8 G/DL — LOW (ref 32–37)
MCV RBC AUTO: 94.2 FL — SIGNIFICANT CHANGE UP (ref 81–99)
MONOCYTES # BLD AUTO: 0.83 K/UL — HIGH (ref 0.1–0.6)
MONOCYTES NFR BLD AUTO: 7.9 % — SIGNIFICANT CHANGE UP (ref 1.7–9.3)
NEUTROPHILS # BLD AUTO: 7.18 K/UL — HIGH (ref 1.4–6.5)
NEUTROPHILS NFR BLD AUTO: 68.6 % — SIGNIFICANT CHANGE UP (ref 42.2–75.2)
NRBC # BLD: 0 /100 WBCS — SIGNIFICANT CHANGE UP (ref 0–0)
PLATELET # BLD AUTO: 229 K/UL — SIGNIFICANT CHANGE UP (ref 130–400)
POTASSIUM SERPL-MCNC: 3.5 MMOL/L — SIGNIFICANT CHANGE UP (ref 3.5–5)
POTASSIUM SERPL-SCNC: 3.5 MMOL/L — SIGNIFICANT CHANGE UP (ref 3.5–5)
PROT SERPL-MCNC: 6 G/DL — SIGNIFICANT CHANGE UP (ref 6–8)
RBC # BLD: 3.8 M/UL — LOW (ref 4.2–5.4)
RBC # FLD: 13.2 % — SIGNIFICANT CHANGE UP (ref 11.5–14.5)
SODIUM SERPL-SCNC: 140 MMOL/L — SIGNIFICANT CHANGE UP (ref 135–146)
WBC # BLD: 10.46 K/UL — SIGNIFICANT CHANGE UP (ref 4.8–10.8)
WBC # FLD AUTO: 10.46 K/UL — SIGNIFICANT CHANGE UP (ref 4.8–10.8)

## 2019-03-11 RX ORDER — MAGNESIUM SULFATE 500 MG/ML
2 VIAL (ML) INJECTION ONCE
Qty: 0 | Refills: 0 | Status: COMPLETED | OUTPATIENT
Start: 2019-03-11 | End: 2019-03-11

## 2019-03-11 RX ADMIN — Medication 50 MICROGRAM(S): at 05:47

## 2019-03-11 RX ADMIN — GABAPENTIN 600 MILLIGRAM(S): 400 CAPSULE ORAL at 05:46

## 2019-03-11 RX ADMIN — Medication 1 DROP(S): at 05:47

## 2019-03-11 RX ADMIN — LATANOPROST 1 DROP(S): 0.05 SOLUTION/ DROPS OPHTHALMIC; TOPICAL at 21:18

## 2019-03-11 RX ADMIN — ATORVASTATIN CALCIUM 10 MILLIGRAM(S): 80 TABLET, FILM COATED ORAL at 21:19

## 2019-03-11 RX ADMIN — Medication 81 MILLIGRAM(S): at 12:23

## 2019-03-11 RX ADMIN — Medication 1 DROP(S): at 17:44

## 2019-03-11 RX ADMIN — CARBIDOPA AND LEVODOPA 1 TABLET(S): 25; 100 TABLET ORAL at 21:19

## 2019-03-11 RX ADMIN — GABAPENTIN 600 MILLIGRAM(S): 400 CAPSULE ORAL at 13:54

## 2019-03-11 RX ADMIN — CARBIDOPA AND LEVODOPA 1 TABLET(S): 25; 100 TABLET ORAL at 14:18

## 2019-03-11 RX ADMIN — Medication 50 GRAM(S): at 14:18

## 2019-03-11 RX ADMIN — HEPARIN SODIUM 5000 UNIT(S): 5000 INJECTION INTRAVENOUS; SUBCUTANEOUS at 05:46

## 2019-03-11 RX ADMIN — GABAPENTIN 600 MILLIGRAM(S): 400 CAPSULE ORAL at 21:17

## 2019-03-11 RX ADMIN — HEPARIN SODIUM 5000 UNIT(S): 5000 INJECTION INTRAVENOUS; SUBCUTANEOUS at 13:54

## 2019-03-11 RX ADMIN — HEPARIN SODIUM 5000 UNIT(S): 5000 INJECTION INTRAVENOUS; SUBCUTANEOUS at 21:18

## 2019-03-11 RX ADMIN — CARBIDOPA AND LEVODOPA 1 TABLET(S): 25; 100 TABLET ORAL at 05:47

## 2019-03-11 RX ADMIN — SERTRALINE 75 MILLIGRAM(S): 25 TABLET, FILM COATED ORAL at 12:24

## 2019-03-11 NOTE — PROGRESS NOTE ADULT - SUBJECTIVE AND OBJECTIVE BOX
Patient is a 78y old  Female who presents with a chief complaint of Abdominal pain with associated nausea and vomiting. (10 Mar 2019 11:48)  Found to have Partial SBO, patient refusing Surgical intervention     Interval events: None    Today is hop day 5  Patient is resting comfortably in bed, with NGT in place  No vomiting for 2 days, Abdominal pain Resolved  Passing Gas and having BM  Plan: get patient eating again before discharge to NH    PAST MEDICAL & SURGICAL HISTORY:  Lewy body dementia without behavioral disturbance  Constipation  History of breast cancer  Dementia  History of colon resection      MEDICATIONS  (STANDING):  aspirin  chewable 81 milliGRAM(s) Oral daily  atorvastatin 10 milliGRAM(s) Oral at bedtime  carbidopa/levodopa  25/100 1 Tablet(s) Oral three times a day  dextrose 5% + sodium chloride 0.9%. 1000 milliLiter(s) (75 mL/Hr) IV Continuous <Continuous>  docusate sodium 100 milliGRAM(s) Oral two times a day  gabapentin 600 milliGRAM(s) Oral three times a day  heparin  Injectable 5000 Unit(s) SubCutaneous every 8 hours  latanoprost 0.005% Ophthalmic Solution 1 Drop(s) Both EYES at bedtime  levothyroxine 50 MICROGram(s) Oral daily  polyethylene glycol 3350 17 Gram(s) Oral daily  sertraline 75 milliGRAM(s) Oral daily  timolol 0.5% Solution 1 Drop(s) Both EYES two times a day    MEDICATIONS  (PRN):  acetaminophen   Tablet .. 650 milliGRAM(s) Oral every 6 hours PRN Mild Pain (1 - 3)  acetaminophen   Tablet .. 650 milliGRAM(s) Oral every 6 hours PRN Temp greater or equal to 38C (100.4F)  bisacodyl Suppository 10 milliGRAM(s) Rectal daily PRN Constipation  ondansetron Injectable 4 milliGRAM(s) IV Push every 6 hours PRN Nausea and/or Vomiting          Vital Signs Last 24 Hrs  T(C): 35.8 (11 Mar 2019 11:00), Max: 37.2 (11 Mar 2019 01:00)  T(F): 96.5 (11 Mar 2019 11:00), Max: 99 (11 Mar 2019 03:00)  HR: 70 (11 Mar 2019 11:00) (69 - 86)  BP: 142/68 (11 Mar 2019 11:00) (118/58 - 158/67)  BP(mean): --  RR: 18 (11 Mar 2019 11:00) (18 - 20)  SpO2: --  CAPILLARY BLOOD GLUCOSE      POCT Blood Glucose.: 114 mg/dL (11 Mar 2019 12:07)  POCT Blood Glucose.: 102 mg/dL (11 Mar 2019 06:01)  POCT Blood Glucose.: 107 mg/dL (11 Mar 2019 00:52)  POCT Blood Glucose.: 74 mg/dL (10 Mar 2019 18:33)    I&O's Summary    10 Mar 2019 07:01  -  11 Mar 2019 07:00  --------------------------------------------------------  IN: 375 mL / OUT: 600 mL / NET: -225 mL        Physical Exam:    -     General : NAD, NGT in Place draining bilious material    -      Cardiac: S1/S2 appreciated RRR    -      Pulm: CTA b/L, no adventitious sounds    -      GI: Soft, Mildly Distended, + bowel sounds    -      Musculoskeletal: Atraumatic, No edema, no skin color changes    -      Neuro:        Labs:                        11.4   10.46 )-----------( 229      ( 11 Mar 2019 05:47 )             35.8             03-11    140  |  100  |  14  ----------------------------<  102<H>  3.5   |  24  |  0.8    Ca    8.7      11 Mar 2019 05:47  Mg     1.7     03-11    TPro  6.0  /  Alb  3.5  /  TBili  0.4  /  DBili  x   /  AST  20  /  ALT  10  /  AlkPhos  58  03-11    LIVER FUNCTIONS - ( 11 Mar 2019 05:47 )  Alb: 3.5 g/dL / Pro: 6.0 g/dL / ALK PHOS: 58 U/L / ALT: 10 U/L / AST: 20 U/L / GGT: x                     Culture - Blood (collected 09 Mar 2019 22:20)  Source: .Blood None  Preliminary Report (11 Mar 2019 09:01):    No growth to date.      Imaging:  < from: CT Abdomen and Pelvis w/ Oral Cont and w/ IV Cont (03.06.19 @ 21:33) >  Dilated small bowel to 3.7 cm with distally collapsed ileal and colonic   loops. While complete bowel obstruction is not excluded, findings are   favored to reflect ileitis with associated partial small bowel   obstruction. Trace left lowerquadrant ascites. No pneumoperitoneum or   pneumatosis.    Interval enlargement of left ovarian cyst, now 7.8 cm. Continued   outpatient ultrasound follow-up can be obtained.      < end of copied text >      < from: Xray Abdomen 1 View Portable, IMMEDIATE (03.09.19 @ 12:57) >  Impression:  Nonobstructive bowel gas pattern.        < end of copied text >    ECG:

## 2019-03-11 NOTE — DIETITIAN INITIAL EVALUATION ADULT. - OTHER INFO
p/w abd pain a/w with n/v. hx of colon resection 2 years ago came from [facility]. suspecting 2/2 pSBO/ ileus. NGT place for the 2nd time on 3/9 for vomiting. C/w NPO- IVF, serial abd exam. suppositories and enemas.

## 2019-03-11 NOTE — DIETITIAN INITIAL EVALUATION ADULT. - ORAL INTAKE PTA
good/PTA at NH pt on a Regular diet with thins and 1500 calories for weight maintenance + Prune juice for constipation but pt doesn't drink that. Pt on Vitamin C and Vitamin D. Allergic to ALL peppers (vegetables). Gets stomach ache and vomit

## 2019-03-11 NOTE — DIETITIAN INITIAL EVALUATION ADULT. - PHYSICAL APPEARANCE
BMI is 35.1 (pt admits to weight gain recently because she has been sedentary and likely with underlying medical condition a/w constipation vs other etiologies./obese

## 2019-03-11 NOTE — PROGRESS NOTE ADULT - ASSESSMENT
The patient is a 78y Female with PMH of Lewy Body Dementia, hx breast Ca and hx of colon resection 2yrs ago, Hx of chronic constipation  is presenting to ED from [facility] with abd pain x 1d.  She h Patient had episode of vomiting in ED and an NG tube was placed.     Abdominal Pain Likely secondary to Partial SBO/ileus  -CT A/P: Dilated small bowel, ileitis with associated Partial SBO, complete SBO is not excluded   -Patient pulled out NGT 3/8: Diet was tried but pt kept vomiting   -NGT placed again on 3/9, No episode of vomiting for 2 days  -Patient placed on Restraints: given dementia and pulling out NGT  -Abdominal X-ray from 3/9/19: showed  non-obstructive gas pattern  -c/w NPO, IVF for now: will try feeding again  -serial abdominal exams, suppositories/enemas    Interval Enlargement Of Left ovarian Cyst  -Outpatient follow-up    Hypothyriosism  -TSH WNL, c/w synthroid    Lewy Body Dementia  -C/W sinemet    DVT PPx: Heparin subQ    GI PPx: Protonix     Dispo: From NH  -D/C when able to tolerate diet

## 2019-03-11 NOTE — DIETITIAN INITIAL EVALUATION ADULT. - PT NOT SOURCE
other (specify)/NPo/Clear - pt is from NH. NH note reviewed. No edema. wound to skin. Constipation chronically. LBM PTA. No oral issue, but  noted that pt with lewy body parkinson's and sometimes affect chewing ability.

## 2019-03-12 LAB
ALBUMIN SERPL ELPH-MCNC: 3.6 G/DL — SIGNIFICANT CHANGE UP (ref 3.5–5.2)
ALP SERPL-CCNC: 62 U/L — SIGNIFICANT CHANGE UP (ref 30–115)
ALT FLD-CCNC: 10 U/L — SIGNIFICANT CHANGE UP (ref 0–41)
ANION GAP SERPL CALC-SCNC: 14 MMOL/L — SIGNIFICANT CHANGE UP (ref 7–14)
AST SERPL-CCNC: 21 U/L — SIGNIFICANT CHANGE UP (ref 0–41)
BASOPHILS # BLD AUTO: 0.04 K/UL — SIGNIFICANT CHANGE UP (ref 0–0.2)
BASOPHILS NFR BLD AUTO: 0.4 % — SIGNIFICANT CHANGE UP (ref 0–1)
BILIRUB SERPL-MCNC: 0.4 MG/DL — SIGNIFICANT CHANGE UP (ref 0.2–1.2)
BUN SERPL-MCNC: 10 MG/DL — SIGNIFICANT CHANGE UP (ref 10–20)
CALCIUM SERPL-MCNC: 8.6 MG/DL — SIGNIFICANT CHANGE UP (ref 8.5–10.1)
CHLORIDE SERPL-SCNC: 101 MMOL/L — SIGNIFICANT CHANGE UP (ref 98–110)
CO2 SERPL-SCNC: 25 MMOL/L — SIGNIFICANT CHANGE UP (ref 17–32)
CREAT SERPL-MCNC: 0.7 MG/DL — SIGNIFICANT CHANGE UP (ref 0.7–1.5)
EOSINOPHIL # BLD AUTO: 0.42 K/UL — SIGNIFICANT CHANGE UP (ref 0–0.7)
EOSINOPHIL NFR BLD AUTO: 3.9 % — SIGNIFICANT CHANGE UP (ref 0–8)
GLUCOSE BLDC GLUCOMTR-MCNC: 103 MG/DL — HIGH (ref 70–99)
GLUCOSE SERPL-MCNC: 100 MG/DL — HIGH (ref 70–99)
HCT VFR BLD CALC: 36.5 % — LOW (ref 37–47)
HGB BLD-MCNC: 11.7 G/DL — LOW (ref 12–16)
IMM GRANULOCYTES NFR BLD AUTO: 0.6 % — HIGH (ref 0.1–0.3)
LYMPHOCYTES # BLD AUTO: 2.42 K/UL — SIGNIFICANT CHANGE UP (ref 1.2–3.4)
LYMPHOCYTES # BLD AUTO: 22.3 % — SIGNIFICANT CHANGE UP (ref 20.5–51.1)
MAGNESIUM SERPL-MCNC: 2.1 MG/DL — SIGNIFICANT CHANGE UP (ref 1.8–2.4)
MCHC RBC-ENTMCNC: 30.3 PG — SIGNIFICANT CHANGE UP (ref 27–31)
MCHC RBC-ENTMCNC: 32.1 G/DL — SIGNIFICANT CHANGE UP (ref 32–37)
MCV RBC AUTO: 94.6 FL — SIGNIFICANT CHANGE UP (ref 81–99)
MONOCYTES # BLD AUTO: 0.91 K/UL — HIGH (ref 0.1–0.6)
MONOCYTES NFR BLD AUTO: 8.4 % — SIGNIFICANT CHANGE UP (ref 1.7–9.3)
NEUTROPHILS # BLD AUTO: 7 K/UL — HIGH (ref 1.4–6.5)
NEUTROPHILS NFR BLD AUTO: 64.4 % — SIGNIFICANT CHANGE UP (ref 42.2–75.2)
NRBC # BLD: 0 /100 WBCS — SIGNIFICANT CHANGE UP (ref 0–0)
PHOSPHATE SERPL-MCNC: 3 MG/DL — SIGNIFICANT CHANGE UP (ref 2.1–4.9)
PLATELET # BLD AUTO: 240 K/UL — SIGNIFICANT CHANGE UP (ref 130–400)
POTASSIUM SERPL-MCNC: 3.2 MMOL/L — LOW (ref 3.5–5)
POTASSIUM SERPL-SCNC: 3.2 MMOL/L — LOW (ref 3.5–5)
PROT SERPL-MCNC: 6 G/DL — SIGNIFICANT CHANGE UP (ref 6–8)
RBC # BLD: 3.86 M/UL — LOW (ref 4.2–5.4)
RBC # FLD: 13.4 % — SIGNIFICANT CHANGE UP (ref 11.5–14.5)
SODIUM SERPL-SCNC: 140 MMOL/L — SIGNIFICANT CHANGE UP (ref 135–146)
WBC # BLD: 10.85 K/UL — HIGH (ref 4.8–10.8)
WBC # FLD AUTO: 10.85 K/UL — HIGH (ref 4.8–10.8)

## 2019-03-12 RX ORDER — POTASSIUM CHLORIDE 20 MEQ
20 PACKET (EA) ORAL ONCE
Qty: 0 | Refills: 0 | Status: COMPLETED | OUTPATIENT
Start: 2019-03-12 | End: 2019-03-12

## 2019-03-12 RX ADMIN — LATANOPROST 1 DROP(S): 0.05 SOLUTION/ DROPS OPHTHALMIC; TOPICAL at 21:18

## 2019-03-12 RX ADMIN — ATORVASTATIN CALCIUM 10 MILLIGRAM(S): 80 TABLET, FILM COATED ORAL at 21:18

## 2019-03-12 RX ADMIN — Medication 1 DROP(S): at 07:00

## 2019-03-12 RX ADMIN — HEPARIN SODIUM 5000 UNIT(S): 5000 INJECTION INTRAVENOUS; SUBCUTANEOUS at 07:00

## 2019-03-12 RX ADMIN — Medication 50 MICROGRAM(S): at 06:59

## 2019-03-12 RX ADMIN — CARBIDOPA AND LEVODOPA 1 TABLET(S): 25; 100 TABLET ORAL at 06:59

## 2019-03-12 RX ADMIN — GABAPENTIN 600 MILLIGRAM(S): 400 CAPSULE ORAL at 21:18

## 2019-03-12 RX ADMIN — Medication 100 MILLIGRAM(S): at 06:59

## 2019-03-12 RX ADMIN — Medication 1 DROP(S): at 17:18

## 2019-03-12 RX ADMIN — Medication 50 MILLIEQUIVALENT(S): at 16:30

## 2019-03-12 RX ADMIN — CARBIDOPA AND LEVODOPA 1 TABLET(S): 25; 100 TABLET ORAL at 21:18

## 2019-03-12 RX ADMIN — SERTRALINE 75 MILLIGRAM(S): 25 TABLET, FILM COATED ORAL at 12:33

## 2019-03-12 RX ADMIN — GABAPENTIN 600 MILLIGRAM(S): 400 CAPSULE ORAL at 13:19

## 2019-03-12 RX ADMIN — SODIUM CHLORIDE 75 MILLILITER(S): 9 INJECTION, SOLUTION INTRAVENOUS at 21:15

## 2019-03-12 RX ADMIN — Medication 81 MILLIGRAM(S): at 12:33

## 2019-03-12 RX ADMIN — CARBIDOPA AND LEVODOPA 1 TABLET(S): 25; 100 TABLET ORAL at 13:19

## 2019-03-12 RX ADMIN — GABAPENTIN 600 MILLIGRAM(S): 400 CAPSULE ORAL at 06:59

## 2019-03-12 RX ADMIN — HEPARIN SODIUM 5000 UNIT(S): 5000 INJECTION INTRAVENOUS; SUBCUTANEOUS at 21:18

## 2019-03-12 RX ADMIN — HEPARIN SODIUM 5000 UNIT(S): 5000 INJECTION INTRAVENOUS; SUBCUTANEOUS at 13:19

## 2019-03-12 NOTE — PROVIDER CONTACT NOTE (OTHER) - ASSESSMENT
md cagle [id number] made aware upon hourly rounding and assessment pt with ng tube out. pt complaining of pain to lue with iv access in place. Arm swolllen and red. arm elevated and heating pack in place. will continue to monitor.

## 2019-03-12 NOTE — PROGRESS NOTE ADULT - ASSESSMENT
The patient is a 78y Female with PMH of Lewy Body Dementia, hx breast Ca and hx of colon resection 2yrs ago, Hx of chronic constipation  is presenting to ED from [facility] with abd pain x 1d.  She h Patient had episode of vomiting in ED and an NG tube was placed.     Abdominal Pain Likely secondary to Partial SBO/ileus  -CT A/P: Dilated small bowel, ileitis with associated Partial SBO, complete SBO is not excluded   -Patient pulled out NGT 3/8: Diet was tried but pt kept vomiting   -NGT placed again on 3/9, No episode of vomiting for 2 days  -Patient placed on Restraints: given dementia and pulling out NGT  -Abdominal X-ray from 3/9/19: showed  non-obstructive gas pattern  -F/Up New ABD x-ray  -c/w NPO, IVF for now: will try feeding when NGT output decreases  -serial abdominal exams, suppositories/enemas    Electrolyte abnormalities 2/2 NPO status  Hypomagnesemia repleted 3/11/19  Hypokalemia: 3/12/19: repleted    Interval Enlargement Of Left ovarian Cyst  -Outpatient follow-up    Hypothyroidism  -TSH WNL, c/w synthroid    Lewy Body Dementia  -C/W sinemet    DVT PPx: Heparin subQ    GI PPx: Protonix     Dispo: From NH  -D/C when able to tolerate diet

## 2019-03-12 NOTE — PROGRESS NOTE ADULT - SUBJECTIVE AND OBJECTIVE BOX
Patient is a 78y old  Female who presents with a chief complaint of Abdominal pain with associated nausea and vomiting. (11 Mar 2019 13:03)  Found to have Partial SBO, patient refusing Surgical intervention     Interval events: none    Today is hop day 6  Patient is resting comfortably in bed, with NGT in place  No vomiting for 3 days, denies Abdominal pain  Bowel sounds present, no BM since yesterday: suppository ordered  Plan: Another KUB Ordered, will try feeding tomorrow, as NGT output is high  -Get patient eating again before discharge to NH            PAST MEDICAL & SURGICAL HISTORY:  Lewy body dementia without behavioral disturbance  Constipation  History of breast cancer  Dementia  History of colon resection      MEDICATIONS  (STANDING):  aspirin  chewable 81 milliGRAM(s) Oral daily  atorvastatin 10 milliGRAM(s) Oral at bedtime  carbidopa/levodopa  25/100 1 Tablet(s) Oral three times a day  dextrose 5% + sodium chloride 0.9%. 1000 milliLiter(s) (75 mL/Hr) IV Continuous <Continuous>  docusate sodium 100 milliGRAM(s) Oral two times a day  gabapentin 600 milliGRAM(s) Oral three times a day  heparin  Injectable 5000 Unit(s) SubCutaneous every 8 hours  latanoprost 0.005% Ophthalmic Solution 1 Drop(s) Both EYES at bedtime  levothyroxine 50 MICROGram(s) Oral daily  polyethylene glycol 3350 17 Gram(s) Oral daily  sertraline 75 milliGRAM(s) Oral daily  timolol 0.5% Solution 1 Drop(s) Both EYES two times a day    MEDICATIONS  (PRN):  acetaminophen   Tablet .. 650 milliGRAM(s) Oral every 6 hours PRN Mild Pain (1 - 3)  acetaminophen   Tablet .. 650 milliGRAM(s) Oral every 6 hours PRN Temp greater or equal to 38C (100.4F)  bisacodyl Suppository 10 milliGRAM(s) Rectal daily PRN Constipation  ondansetron Injectable 4 milliGRAM(s) IV Push every 6 hours PRN Nausea and/or Vomiting          Vital Signs Last 24 Hrs  T(C): 36 (12 Mar 2019 10:08), Max: 37 (11 Mar 2019 21:00)  T(F): 96.8 (12 Mar 2019 10:08), Max: 98.6 (11 Mar 2019 21:00)  HR: 75 (12 Mar 2019 10:08) (67 - 88)  BP: 153/67 (12 Mar 2019 10:08) (119/58 - 166/74)  BP(mean): --  RR: 18 (12 Mar 2019 08:00) (18 - 20)  SpO2: 96% (11 Mar 2019 13:00) (96% - 96%)  CAPILLARY BLOOD GLUCOSE      POCT Blood Glucose.: 103 mg/dL (12 Mar 2019 01:14)  POCT Blood Glucose.: 114 mg/dL (11 Mar 2019 12:07)    I&O's Summary    11 Mar 2019 07:01  -  12 Mar 2019 07:00  --------------------------------------------------------  IN: 600 mL / OUT: 1150 mL / NET: -550 mL        Physical Exam:    -        General : NAD, NGT in Place draining clear mucus material    -      Cardiac: S1/S2 appreciated RRR    -      Pulm: CTA b/L, no adventitious sounds    -      GI: Soft, Mildly Distended, + bowel sounds    -      Musculoskeletal: Atraumatic, No edema, no skin color changes    -      Neuro: AOx3    Labs:                        11.7   10.85 )-----------( 240      ( 12 Mar 2019 05:24 )             36.5             03-12    140  |  101  |  10  ----------------------------<  100<H>  3.2<L>   |  25  |  0.7    Ca    8.6      12 Mar 2019 05:24  Phos  3.0     03-12  Mg     2.1     03-12    TPro  6.0  /  Alb  3.6  /  TBili  0.4  /  DBili  x   /  AST  21  /  ALT  10  /  AlkPhos  62  03-12    LIVER FUNCTIONS - ( 12 Mar 2019 05:24 )  Alb: 3.6 g/dL / Pro: 6.0 g/dL / ALK PHOS: 62 U/L / ALT: 10 U/L / AST: 21 U/L / GGT: x               Culture - Blood (collected 09 Mar 2019 22:20)  Source: .Blood None  Preliminary Report (11 Mar 2019 09:01):    No growth to date.        Imaging:    ECG: Patient is a 78y old  Female who presents with a chief complaint of Abdominal pain with associated nausea and vomiting. (11 Mar 2019 13:03)  Found to have Partial SBO, patient refusing Surgical intervention     Interval events: none    Today is hop day 6  Patient is resting comfortably in bed, with NGT in place  No vomiting for 3 days, denies Abdominal pain  Bowel sounds present, no BM since yesterday: suppository ordered  Plan: Another KUB Ordered, will try feeding tomorrow, as NGT output is still high  -Get patient eating again before discharge to NH            PAST MEDICAL & SURGICAL HISTORY:  Lewy body dementia without behavioral disturbance  Constipation  History of breast cancer  Dementia  History of colon resection      MEDICATIONS  (STANDING):  aspirin  chewable 81 milliGRAM(s) Oral daily  atorvastatin 10 milliGRAM(s) Oral at bedtime  carbidopa/levodopa  25/100 1 Tablet(s) Oral three times a day  dextrose 5% + sodium chloride 0.9%. 1000 milliLiter(s) (75 mL/Hr) IV Continuous <Continuous>  docusate sodium 100 milliGRAM(s) Oral two times a day  gabapentin 600 milliGRAM(s) Oral three times a day  heparin  Injectable 5000 Unit(s) SubCutaneous every 8 hours  latanoprost 0.005% Ophthalmic Solution 1 Drop(s) Both EYES at bedtime  levothyroxine 50 MICROGram(s) Oral daily  polyethylene glycol 3350 17 Gram(s) Oral daily  sertraline 75 milliGRAM(s) Oral daily  timolol 0.5% Solution 1 Drop(s) Both EYES two times a day    MEDICATIONS  (PRN):  acetaminophen   Tablet .. 650 milliGRAM(s) Oral every 6 hours PRN Mild Pain (1 - 3)  acetaminophen   Tablet .. 650 milliGRAM(s) Oral every 6 hours PRN Temp greater or equal to 38C (100.4F)  bisacodyl Suppository 10 milliGRAM(s) Rectal daily PRN Constipation  ondansetron Injectable 4 milliGRAM(s) IV Push every 6 hours PRN Nausea and/or Vomiting          Vital Signs Last 24 Hrs  T(C): 36 (12 Mar 2019 10:08), Max: 37 (11 Mar 2019 21:00)  T(F): 96.8 (12 Mar 2019 10:08), Max: 98.6 (11 Mar 2019 21:00)  HR: 75 (12 Mar 2019 10:08) (67 - 88)  BP: 153/67 (12 Mar 2019 10:08) (119/58 - 166/74)  BP(mean): --  RR: 18 (12 Mar 2019 08:00) (18 - 20)  SpO2: 96% (11 Mar 2019 13:00) (96% - 96%)  CAPILLARY BLOOD GLUCOSE      POCT Blood Glucose.: 103 mg/dL (12 Mar 2019 01:14)  POCT Blood Glucose.: 114 mg/dL (11 Mar 2019 12:07)    I&O's Summary    11 Mar 2019 07:01  -  12 Mar 2019 07:00  --------------------------------------------------------  IN: 600 mL / OUT: 1150 mL / NET: -550 mL        Physical Exam:    -        General : NAD, NGT in Place draining clear mucus material    -      Cardiac: S1/S2 appreciated RRR    -      Pulm: CTA b/L, no adventitious sounds    -      GI: Soft, Mildly Distended, + bowel sounds tympanitic     -      Musculoskeletal: Atraumatic, No edema, no skin color changes    -      Neuro: AOx3    Labs:                        11.7   10.85 )-----------( 240      ( 12 Mar 2019 05:24 )             36.5             03-12    140  |  101  |  10  ----------------------------<  100<H>  3.2<L>   |  25  |  0.7    Ca    8.6      12 Mar 2019 05:24  Phos  3.0     03-12  Mg     2.1     03-12    TPro  6.0  /  Alb  3.6  /  TBili  0.4  /  DBili  x   /  AST  21  /  ALT  10  /  AlkPhos  62  03-12    LIVER FUNCTIONS - ( 12 Mar 2019 05:24 )  Alb: 3.6 g/dL / Pro: 6.0 g/dL / ALK PHOS: 62 U/L / ALT: 10 U/L / AST: 21 U/L / GGT: x               Culture - Blood (collected 09 Mar 2019 22:20)  Source: .Blood None  Preliminary Report (11 Mar 2019 09:01):    No growth to date.

## 2019-03-13 LAB
GLUCOSE BLDC GLUCOMTR-MCNC: 91 MG/DL — SIGNIFICANT CHANGE UP (ref 70–99)
GLUCOSE BLDC GLUCOMTR-MCNC: 98 MG/DL — SIGNIFICANT CHANGE UP (ref 70–99)

## 2019-03-13 PROCEDURE — 93971 EXTREMITY STUDY: CPT | Mod: 26

## 2019-03-13 RX ADMIN — CARBIDOPA AND LEVODOPA 1 TABLET(S): 25; 100 TABLET ORAL at 21:35

## 2019-03-13 RX ADMIN — HEPARIN SODIUM 5000 UNIT(S): 5000 INJECTION INTRAVENOUS; SUBCUTANEOUS at 21:35

## 2019-03-13 RX ADMIN — LATANOPROST 1 DROP(S): 0.05 SOLUTION/ DROPS OPHTHALMIC; TOPICAL at 21:34

## 2019-03-13 RX ADMIN — Medication 81 MILLIGRAM(S): at 12:25

## 2019-03-13 RX ADMIN — POLYETHYLENE GLYCOL 3350 17 GRAM(S): 17 POWDER, FOR SOLUTION ORAL at 12:25

## 2019-03-13 RX ADMIN — ATORVASTATIN CALCIUM 10 MILLIGRAM(S): 80 TABLET, FILM COATED ORAL at 21:35

## 2019-03-13 RX ADMIN — SODIUM CHLORIDE 75 MILLILITER(S): 9 INJECTION, SOLUTION INTRAVENOUS at 16:26

## 2019-03-13 RX ADMIN — CARBIDOPA AND LEVODOPA 1 TABLET(S): 25; 100 TABLET ORAL at 13:28

## 2019-03-13 RX ADMIN — GABAPENTIN 600 MILLIGRAM(S): 400 CAPSULE ORAL at 21:35

## 2019-03-13 RX ADMIN — GABAPENTIN 600 MILLIGRAM(S): 400 CAPSULE ORAL at 13:28

## 2019-03-13 RX ADMIN — HEPARIN SODIUM 5000 UNIT(S): 5000 INJECTION INTRAVENOUS; SUBCUTANEOUS at 13:28

## 2019-03-13 RX ADMIN — Medication 100 MILLIGRAM(S): at 17:59

## 2019-03-13 RX ADMIN — Medication 1 DROP(S): at 05:08

## 2019-03-13 RX ADMIN — SERTRALINE 75 MILLIGRAM(S): 25 TABLET, FILM COATED ORAL at 12:25

## 2019-03-13 RX ADMIN — CARBIDOPA AND LEVODOPA 1 TABLET(S): 25; 100 TABLET ORAL at 05:07

## 2019-03-13 RX ADMIN — Medication 1 DROP(S): at 17:59

## 2019-03-13 RX ADMIN — Medication 50 MICROGRAM(S): at 05:07

## 2019-03-13 RX ADMIN — Medication 100 MILLIGRAM(S): at 05:07

## 2019-03-13 RX ADMIN — HEPARIN SODIUM 5000 UNIT(S): 5000 INJECTION INTRAVENOUS; SUBCUTANEOUS at 05:08

## 2019-03-13 RX ADMIN — GABAPENTIN 600 MILLIGRAM(S): 400 CAPSULE ORAL at 05:07

## 2019-03-13 NOTE — PROGRESS NOTE ADULT - ASSESSMENT
The patient is a 78y Female with PMH of Lewy Body Dementia, hx breast Ca and hx of colon resection 2yrs ago, Hx of chronic constipation  is presenting to ED from [facility] with abd pain x 1d.  She h Patient had episode of vomiting in ED and an NG tube was placed.     Abdominal Pain Likely secondary to Partial SBO/ileus  -CT A/P: Dilated small bowel, ileitis with associated Partial SBO, complete SBO is not excluded   -Patient pulled out NGT 3/8: Diet was tried but pt kept vomiting   -NGT placed again on 3/9, No episode of vomiting for 4 days  -Patient placed on Restraints: given dementia and pulling out NGT  -Abdominal X-ray from 3/9/19: showed  non-obstructive gas pattern  -F/Up New ABD x-ray:  performed not Read  -c/w NPO, IVF for now: will try feeding when NGT output decreases: still at 1L/24hrs  -will follow-up with surgery  -serial abdominal exams, suppositories/enemas    Electrolyte abnormalities 2/2 NPO status  Hypomagnesemia repleted 3/11/19  Hypokalemia: 3/12/19: repleted    Interval Enlargement Of Left ovarian Cyst  -Outpatient follow-up    Hypothyroidism  -TSH WNL, c/w synthroid    Lewy Body Dementia  -C/W sinemet    DVT PPx: Heparin subQ    GI PPx: Protonix     Dispo: From NH  -D/C when able to tolerate diet The patient is a 78y Female with PMH of Lewy Body Dementia, hx breast Ca and hx of colon resection 2yrs ago, Hx of chronic constipation  is presenting to ED from [facility] with abd pain x 1d.  She h Patient had episode of vomiting in ED and an NG tube was placed.     Abdominal Pain Likely secondary to Partial SBO/ileus  -CT A/P: Dilated small bowel, ileitis with associated Partial SBO, complete SBO is not excluded   -Patient pulled out NGT 3/8: Diet was tried but pt kept vomiting   -NGT placed again on 3/9, No episode of vomiting for 4 days  -Patient placed on Restraints: given dementia and pulling out NGT  -Abdominal X-ray from 3/9/19: showed  non-obstructive gas pattern  -F/Up New ABD x-ray:  performed not Read  -c/w NPO, IVF for now: will try feeding when NGT output decreases: still at 1L/24hrs  -will follow-up with surgery  -serial abdominal exams, suppositories/enemas    Electrolyte abnormalities 2/2 NPO status  Hypomagnesemia repleted 3/11/19  Hypokalemia: 3/12/19: repleted    Interval Enlargement Of Left ovarian Cyst  -Outpatient follow-up    Hypothyroidism  -TSH WNL, c/w synthroid    Lewy Body Dementia  -C/W sinemet    DVT PPx: Heparin subQ    GI PPx: Protonix     Dispo: From NH  -D/C when able to tolerate diet, still significant out put ia the NGT.

## 2019-03-13 NOTE — PROGRESS NOTE ADULT - SUBJECTIVE AND OBJECTIVE BOX
Patient is a 78y old  Female who presents with a chief complaint of Abdominal pain with associated nausea and vomiting. (12 Mar 2019 10:48)  Found to have Partial SBO, patient refusing Surgical intervention     Interval events: Pulled out vs  Sneezed out NGT    Today is hop day 7  Patient is resting comfortably in bed, with NGT in place  No vomiting for 4 days, denies Abdominal pain  Bowel sounds present, still no BM with ordered regimen  Plan: f/up with surgery recs      PAST MEDICAL & SURGICAL HISTORY:  Lewy body dementia without behavioral disturbance  Constipation  History of breast cancer  Dementia  History of colon resection      MEDICATIONS  (STANDING):  aspirin  chewable 81 milliGRAM(s) Oral daily  atorvastatin 10 milliGRAM(s) Oral at bedtime  carbidopa/levodopa  25/100 1 Tablet(s) Oral three times a day  dextrose 5% + sodium chloride 0.9%. 1000 milliLiter(s) (75 mL/Hr) IV Continuous <Continuous>  docusate sodium 100 milliGRAM(s) Oral two times a day  gabapentin 600 milliGRAM(s) Oral three times a day  heparin  Injectable 5000 Unit(s) SubCutaneous every 8 hours  latanoprost 0.005% Ophthalmic Solution 1 Drop(s) Both EYES at bedtime  levothyroxine 50 MICROGram(s) Oral daily  polyethylene glycol 3350 17 Gram(s) Oral daily  sertraline 75 milliGRAM(s) Oral daily  timolol 0.5% Solution 1 Drop(s) Both EYES two times a day    MEDICATIONS  (PRN):  acetaminophen   Tablet .. 650 milliGRAM(s) Oral every 6 hours PRN Mild Pain (1 - 3)  acetaminophen   Tablet .. 650 milliGRAM(s) Oral every 6 hours PRN Temp greater or equal to 38C (100.4F)  bisacodyl Suppository 10 milliGRAM(s) Rectal daily PRN Constipation  ondansetron Injectable 4 milliGRAM(s) IV Push every 6 hours PRN Nausea and/or Vomiting          Vital Signs Last 24 Hrs  T(C): 36.7 (13 Mar 2019 08:58), Max: 36.8 (13 Mar 2019 01:30)  T(F): 98 (13 Mar 2019 08:58), Max: 98.3 (13 Mar 2019 01:30)  HR: 84 (13 Mar 2019 08:58) (74 - 89)  BP: 138/66 (13 Mar 2019 08:58) (129/63 - 152/61)  BP(mean): --  RR: 18 (13 Mar 2019 08:58) (18 - 18)  SpO2: --  CAPILLARY BLOOD GLUCOSE        I&O's Summary    12 Mar 2019 07:01  -  13 Mar 2019 07:00  --------------------------------------------------------  IN: 675 mL / OUT: 1250 mL / NET: -575 mL    13 Mar 2019 07:01  -  13 Mar 2019 10:50  --------------------------------------------------------  IN: 0 mL / OUT: 0 mL / NET: 0 mL        Physical Exam:    -    General : NAD, NGT in Place draining mixed mucousy/billous  material    -      Cardiac: S1/S2 appreciated RRR    -      Pulm: CTA b/L, no adventitious sounds    -      GI: Soft, Mildly Distended, + bowel sounds tympanitic     -      Musculoskeletal: Atraumatic, No edema, no skin color changes    -      Neuro: AOx3        Labs:                        11.7   10.85 )-----------( 240      ( 12 Mar 2019 05:24 )             36.5             03-12    140  |  101  |  10  ----------------------------<  100<H>  3.2<L>   |  25  |  0.7    Ca    8.6      12 Mar 2019 05:24  Phos  3.0     03-12  Mg     2.1     03-12    TPro  6.0  /  Alb  3.6  /  TBili  0.4  /  DBili  x   /  AST  21  /  ALT  10  /  AlkPhos  62  03-12    LIVER FUNCTIONS - ( 12 Mar 2019 05:24 )  Alb: 3.6 g/dL / Pro: 6.0 g/dL / ALK PHOS: 62 U/L / ALT: 10 U/L / AST: 21 U/L / GGT: x                   Imaging:    ECG: Patient is a 78y old  Female who presents with a chief complaint of Abdominal pain with associated nausea and vomiting. (12 Mar 2019 10:48)  Found to have Partial SBO, patient refusing Surgical intervention     Interval events: Pulled out vs  Sneezed out NGT    Today is hop day 7  Patient is resting comfortably in bed, with NGT in place  No vomiting for 4 days, denies Abdominal pain  Bowel sounds present, still no BM with ordered regimen  Plan: f/up with surgery recs    ADDENDUM: 2: 40pm  -Positive DVT ob Left Brachial davon Per TEch  -Official read pending    PAST MEDICAL & SURGICAL HISTORY:  Lewy body dementia without behavioral disturbance  Constipation  History of breast cancer  Dementia  History of colon resection      MEDICATIONS  (STANDING):  aspirin  chewable 81 milliGRAM(s) Oral daily  atorvastatin 10 milliGRAM(s) Oral at bedtime  carbidopa/levodopa  25/100 1 Tablet(s) Oral three times a day  dextrose 5% + sodium chloride 0.9%. 1000 milliLiter(s) (75 mL/Hr) IV Continuous <Continuous>  docusate sodium 100 milliGRAM(s) Oral two times a day  gabapentin 600 milliGRAM(s) Oral three times a day  heparin  Injectable 5000 Unit(s) SubCutaneous every 8 hours  latanoprost 0.005% Ophthalmic Solution 1 Drop(s) Both EYES at bedtime  levothyroxine 50 MICROGram(s) Oral daily  polyethylene glycol 3350 17 Gram(s) Oral daily  sertraline 75 milliGRAM(s) Oral daily  timolol 0.5% Solution 1 Drop(s) Both EYES two times a day    MEDICATIONS  (PRN):  acetaminophen   Tablet .. 650 milliGRAM(s) Oral every 6 hours PRN Mild Pain (1 - 3)  acetaminophen   Tablet .. 650 milliGRAM(s) Oral every 6 hours PRN Temp greater or equal to 38C (100.4F)  bisacodyl Suppository 10 milliGRAM(s) Rectal daily PRN Constipation  ondansetron Injectable 4 milliGRAM(s) IV Push every 6 hours PRN Nausea and/or Vomiting          Vital Signs Last 24 Hrs  T(C): 36.7 (13 Mar 2019 08:58), Max: 36.8 (13 Mar 2019 01:30)  T(F): 98 (13 Mar 2019 08:58), Max: 98.3 (13 Mar 2019 01:30)  HR: 84 (13 Mar 2019 08:58) (74 - 89)  BP: 138/66 (13 Mar 2019 08:58) (129/63 - 152/61)  BP(mean): --  RR: 18 (13 Mar 2019 08:58) (18 - 18)  SpO2: --  CAPILLARY BLOOD GLUCOSE        I&O's Summary    12 Mar 2019 07:01  -  13 Mar 2019 07:00  --------------------------------------------------------  IN: 675 mL / OUT: 1250 mL / NET: -575 mL    13 Mar 2019 07:01  -  13 Mar 2019 10:50  --------------------------------------------------------  IN: 0 mL / OUT: 0 mL / NET: 0 mL        Physical Exam:    -    General : NAD, NGT in Place draining mixed mucousy/billous  material    -      Cardiac: S1/S2 appreciated RRR    -      Pulm: CTA b/L, no adventitious sounds    -      GI: Soft, Mildly Distended, + bowel sounds tympanitic     -      Musculoskeletal: Atraumatic, No edema, no skin color changes    -      Neuro: AOx3        Labs:                        11.7   10.85 )-----------( 240      ( 12 Mar 2019 05:24 )             36.5             03-12    140  |  101  |  10  ----------------------------<  100<H>  3.2<L>   |  25  |  0.7    Ca    8.6      12 Mar 2019 05:24  Phos  3.0     03-12  Mg     2.1     03-12    TPro  6.0  /  Alb  3.6  /  TBili  0.4  /  DBili  x   /  AST  21  /  ALT  10  /  AlkPhos  62  03-12    LIVER FUNCTIONS - ( 12 Mar 2019 05:24 )  Alb: 3.6 g/dL / Pro: 6.0 g/dL / ALK PHOS: 62 U/L / ALT: 10 U/L / AST: 21 U/L / GGT: x                   Imaging:    ECG:

## 2019-03-14 LAB
ANION GAP SERPL CALC-SCNC: 13 MMOL/L — SIGNIFICANT CHANGE UP (ref 7–14)
BASOPHILS # BLD AUTO: 0.04 K/UL — SIGNIFICANT CHANGE UP (ref 0–0.2)
BASOPHILS NFR BLD AUTO: 0.4 % — SIGNIFICANT CHANGE UP (ref 0–1)
BUN SERPL-MCNC: 15 MG/DL — SIGNIFICANT CHANGE UP (ref 10–20)
CALCIUM SERPL-MCNC: 9.2 MG/DL — SIGNIFICANT CHANGE UP (ref 8.5–10.1)
CHLORIDE SERPL-SCNC: 101 MMOL/L — SIGNIFICANT CHANGE UP (ref 98–110)
CO2 SERPL-SCNC: 30 MMOL/L — SIGNIFICANT CHANGE UP (ref 17–32)
CREAT SERPL-MCNC: 1 MG/DL — SIGNIFICANT CHANGE UP (ref 0.7–1.5)
EOSINOPHIL # BLD AUTO: 0.34 K/UL — SIGNIFICANT CHANGE UP (ref 0–0.7)
EOSINOPHIL NFR BLD AUTO: 3.2 % — SIGNIFICANT CHANGE UP (ref 0–8)
GLUCOSE BLDC GLUCOMTR-MCNC: 102 MG/DL — HIGH (ref 70–99)
GLUCOSE BLDC GLUCOMTR-MCNC: 104 MG/DL — HIGH (ref 70–99)
GLUCOSE BLDC GLUCOMTR-MCNC: 108 MG/DL — HIGH (ref 70–99)
GLUCOSE BLDC GLUCOMTR-MCNC: 115 MG/DL — HIGH (ref 70–99)
GLUCOSE BLDC GLUCOMTR-MCNC: 132 MG/DL — HIGH (ref 70–99)
GLUCOSE SERPL-MCNC: 111 MG/DL — HIGH (ref 70–99)
HCT VFR BLD CALC: 38.6 % — SIGNIFICANT CHANGE UP (ref 37–47)
HGB BLD-MCNC: 12 G/DL — SIGNIFICANT CHANGE UP (ref 12–16)
IMM GRANULOCYTES NFR BLD AUTO: 0.4 % — HIGH (ref 0.1–0.3)
LYMPHOCYTES # BLD AUTO: 2.22 K/UL — SIGNIFICANT CHANGE UP (ref 1.2–3.4)
LYMPHOCYTES # BLD AUTO: 21 % — SIGNIFICANT CHANGE UP (ref 20.5–51.1)
MAGNESIUM SERPL-MCNC: 2 MG/DL — SIGNIFICANT CHANGE UP (ref 1.8–2.4)
MCHC RBC-ENTMCNC: 30.4 PG — SIGNIFICANT CHANGE UP (ref 27–31)
MCHC RBC-ENTMCNC: 31.1 G/DL — LOW (ref 32–37)
MCV RBC AUTO: 97.7 FL — SIGNIFICANT CHANGE UP (ref 81–99)
MONOCYTES # BLD AUTO: 0.89 K/UL — HIGH (ref 0.1–0.6)
MONOCYTES NFR BLD AUTO: 8.4 % — SIGNIFICANT CHANGE UP (ref 1.7–9.3)
NEUTROPHILS # BLD AUTO: 7.03 K/UL — HIGH (ref 1.4–6.5)
NEUTROPHILS NFR BLD AUTO: 66.6 % — SIGNIFICANT CHANGE UP (ref 42.2–75.2)
NRBC # BLD: 0 /100 WBCS — SIGNIFICANT CHANGE UP (ref 0–0)
PLATELET # BLD AUTO: 261 K/UL — SIGNIFICANT CHANGE UP (ref 130–400)
POTASSIUM SERPL-MCNC: 3.3 MMOL/L — LOW (ref 3.5–5)
POTASSIUM SERPL-SCNC: 3.3 MMOL/L — LOW (ref 3.5–5)
RBC # BLD: 3.95 M/UL — LOW (ref 4.2–5.4)
RBC # FLD: 13.3 % — SIGNIFICANT CHANGE UP (ref 11.5–14.5)
SODIUM SERPL-SCNC: 144 MMOL/L — SIGNIFICANT CHANGE UP (ref 135–146)
WBC # BLD: 10.56 K/UL — SIGNIFICANT CHANGE UP (ref 4.8–10.8)
WBC # FLD AUTO: 10.56 K/UL — SIGNIFICANT CHANGE UP (ref 4.8–10.8)

## 2019-03-14 RX ORDER — DOCUSATE SODIUM 100 MG
100 CAPSULE ORAL
Qty: 0 | Refills: 0 | Status: DISCONTINUED | OUTPATIENT
Start: 2019-03-14 | End: 2019-03-19

## 2019-03-14 RX ORDER — POTASSIUM CHLORIDE 20 MEQ
40 PACKET (EA) ORAL ONCE
Qty: 0 | Refills: 0 | Status: COMPLETED | OUTPATIENT
Start: 2019-03-14 | End: 2019-03-14

## 2019-03-14 RX ORDER — POTASSIUM CHLORIDE 20 MEQ
20 PACKET (EA) ORAL ONCE
Qty: 0 | Refills: 0 | Status: COMPLETED | OUTPATIENT
Start: 2019-03-14 | End: 2019-03-14

## 2019-03-14 RX ADMIN — SODIUM CHLORIDE 75 MILLILITER(S): 9 INJECTION, SOLUTION INTRAVENOUS at 16:10

## 2019-03-14 RX ADMIN — CARBIDOPA AND LEVODOPA 1 TABLET(S): 25; 100 TABLET ORAL at 05:40

## 2019-03-14 RX ADMIN — LATANOPROST 1 DROP(S): 0.05 SOLUTION/ DROPS OPHTHALMIC; TOPICAL at 21:15

## 2019-03-14 RX ADMIN — Medication 100 MILLIGRAM(S): at 08:46

## 2019-03-14 RX ADMIN — GABAPENTIN 600 MILLIGRAM(S): 400 CAPSULE ORAL at 14:13

## 2019-03-14 RX ADMIN — HEPARIN SODIUM 5000 UNIT(S): 5000 INJECTION INTRAVENOUS; SUBCUTANEOUS at 05:40

## 2019-03-14 RX ADMIN — Medication 50 MICROGRAM(S): at 05:40

## 2019-03-14 RX ADMIN — HEPARIN SODIUM 5000 UNIT(S): 5000 INJECTION INTRAVENOUS; SUBCUTANEOUS at 21:14

## 2019-03-14 RX ADMIN — Medication 100 MILLIGRAM(S): at 17:32

## 2019-03-14 RX ADMIN — POLYETHYLENE GLYCOL 3350 17 GRAM(S): 17 POWDER, FOR SOLUTION ORAL at 14:16

## 2019-03-14 RX ADMIN — Medication 50 MILLIEQUIVALENT(S): at 16:09

## 2019-03-14 RX ADMIN — ATORVASTATIN CALCIUM 10 MILLIGRAM(S): 80 TABLET, FILM COATED ORAL at 21:16

## 2019-03-14 RX ADMIN — SERTRALINE 75 MILLIGRAM(S): 25 TABLET, FILM COATED ORAL at 12:13

## 2019-03-14 RX ADMIN — Medication 40 MILLIEQUIVALENT(S): at 15:10

## 2019-03-14 RX ADMIN — CARBIDOPA AND LEVODOPA 1 TABLET(S): 25; 100 TABLET ORAL at 21:15

## 2019-03-14 RX ADMIN — GABAPENTIN 600 MILLIGRAM(S): 400 CAPSULE ORAL at 05:40

## 2019-03-14 RX ADMIN — GABAPENTIN 600 MILLIGRAM(S): 400 CAPSULE ORAL at 21:15

## 2019-03-14 RX ADMIN — Medication 1 DROP(S): at 05:41

## 2019-03-14 RX ADMIN — HEPARIN SODIUM 5000 UNIT(S): 5000 INJECTION INTRAVENOUS; SUBCUTANEOUS at 14:13

## 2019-03-14 RX ADMIN — CARBIDOPA AND LEVODOPA 1 TABLET(S): 25; 100 TABLET ORAL at 14:13

## 2019-03-14 RX ADMIN — Medication 81 MILLIGRAM(S): at 12:13

## 2019-03-14 RX ADMIN — Medication 1 DROP(S): at 17:32

## 2019-03-14 NOTE — CHART NOTE - NSCHARTNOTEFT_GEN_A_CORE
Registered Dietitian Follow-Up (F4-18a)    ***Scroll to the bottom for RD recommendation***    Patient Profile Reviewed                           Yes [x]   No []  Nutrition History Previously Obtained        Yes [x]  No []  -  at bedside. Pt self pulled or sneezed out NGT but reinserted, still NPO, no surgical intervention, able to drink some juices. Will contact surgery for adding Ensure Clear and ProSource Gelatin plus q12hr.       PERTINENT MEDICAL INFORMATIONS:  (1) p/w abd pain w/ a/w n/v. Pulled out vs sneezed out NGT 3/13. Pt refusing surgical intervention for partial SBO/ileus.  (2) c/w NPO. IVF. Monitoring output. serial abd exam. suppository/ enemas.  (3) Hypokalemia, hypomag repleted       PERTINENT SUBJECTIVE INFORMATION:  (1) see above      DIET ORDER:  NPO        ANTHROPOMETRICS:  - Ht.  167.64cm  - Wt.  (3/8): 98.5kg - no new weight since  - BMI. 35.1  - IBW. 59.1kg       PERTINENT LAB DATA:  3/14: RBC 3.95, K 3.3, glucose 111  PERTINENT MEDS:  aspirin, heparin, d5w, docusate, bisacodyl, acetaminophen, ondansetron, Miralax, acetaminophen, atorvastatin, levothyroxine, timolol       PHYSICAL FINDINGS  - APPEARANCE:        alert and oriented. No edema.  - GI FUNCTION:        LBM NONE still, per pt, but has flatus  - TUBES:                     NGT for suction   - ORAL/MOUTH:      NPO  - SKIN:                        ecchymosis         NUTRITION REQUIREMENTS  WEIGHT USED:                           ABW is 98.5kg, IBW is 59.1kg  ESTIMATED ENERGY NEEDS:       CONTINUE [ x ]      ADJUST [  ] - from admission note    ESTIMATED ENERGY NEEDS:         6475-5692 kcal/day (MSJ x 1.0-1.2) BMI considered  ESTIMATED PROTEIN NEEDS:        59-70 g/day (1.0-1.2 g/kg of IBW)  ESTIMATED FLUID NEEDS:             1ml/kcal    CURRENT NUTRIENT NEEDS:    NPO          [ x ] PREVIOUS NUTRITION DIAGNOSIS:    (1)inadequate protein-energy intake - remains            [ x ] ONGOING        [  ] RESOLVED        PATIENT INTERVENTION:    [ x ] ORAL        [ ] EN/TF     GOAL/EXPECTED OUTCOME:     pt to consume and tolerate >50% of estimated kcal/protein when diet advances upon f/u in 4 days.   INDICATOR/MONITORING:       RD to monitor diet order, energy intake, body composition, nutrition focused physical findings  NUTRITION INTERVENTION:        Meals and snacks. medical food supplement    RECS: (1) If patient to be allowed to drink/eat clear liquid items, please order ENSURE CLEAR q12hr and ProSource Gelatin Plus q12hr to go along with. (2) if patient to be expected to be NPO for 2-3 days, please consult NST for temporary PPN/TPN. Registered Dietitian Follow-Up (F4-18a)    ***Scroll to the bottom for RD recommendation***    Patient Profile Reviewed                           Yes [x]   No []  Nutrition History Previously Obtained        Yes [x]  No []  -  at bedside. Pt self pulled or sneezed out NGT but reinserted, still NPO, no surgical intervention, able to drink some juices. Will contact surgery for adding Ensure Clear and ProSource Gelatin plus q12hr.       PERTINENT MEDICAL INFORMATIONS:  (1) p/w abd pain w/ a/w n/v. Pulled out vs sneezed out NGT 3/13. Pt refusing surgical intervention for partial SBO/ileus.  (2) c/w NPO. IVF. Monitoring output. serial abd exam. suppository/ enemas.  (3) Hypokalemia, hypomag repleted       PERTINENT SUBJECTIVE INFORMATION:  (1) see above      DIET ORDER:  NPO        ANTHROPOMETRICS:  - Ht.  167.64cm  - Wt.  (3/8): 98.5kg - no new weight since, pt to have 1+   - BMI. 35.1  - IBW. 59.1kg       PERTINENT LAB DATA:  3/14: RBC 3.95, K 3.3, glucose 111  PERTINENT MEDS:  aspirin, heparin, d5w, docusate, bisacodyl, acetaminophen, ondansetron, Miralax, acetaminophen, atorvastatin, levothyroxine, timolol       PHYSICAL FINDINGS  - APPEARANCE:        alert and oriented. 1+ edema to L arm.  - GI FUNCTION:        LBM NONE still, per pt, but has flatus  - TUBES:                     NGT for suction   - ORAL/MOUTH:      NPO  - SKIN:                        ecchymosis         NUTRITION REQUIREMENTS  WEIGHT USED:                           ABW is 98.5kg, IBW is 59.1kg  ESTIMATED ENERGY NEEDS:       CONTINUE [ x ]      ADJUST [  ] - from admission note    ESTIMATED ENERGY NEEDS:         6628-6112 kcal/day (MSJ x 1.0-1.2) BMI considered  ESTIMATED PROTEIN NEEDS:        59-70 g/day (1.0-1.2 g/kg of IBW)  ESTIMATED FLUID NEEDS:             1ml/kcal    CURRENT NUTRIENT NEEDS:    NPO          [ x ] PREVIOUS NUTRITION DIAGNOSIS:    (1)inadequate protein-energy intake - remains            [ x ] ONGOING        [  ] RESOLVED        PATIENT INTERVENTION:    [ x ] ORAL        [ ] EN/TF     GOAL/EXPECTED OUTCOME:     pt to consume and tolerate >50% of estimated kcal/protein when diet advances upon f/u in 4 days.   INDICATOR/MONITORING:       RD to monitor diet order, energy intake, body composition, nutrition focused physical findings  NUTRITION INTERVENTION:        Meals and snacks. medical food supplement    RECS: (1) If patient to be allowed to drink/eat clear liquid items, please order ENSURE CLEAR q12hr and ProSource Gelatin Plus q12hr to go along with. (2) if patient to be expected to be NPO for 2-3 days, please consult NST for temporary PPN/TPN.

## 2019-03-14 NOTE — PROGRESS NOTE ADULT - ASSESSMENT
The patient is a 78y Female with PMH of Lewy Body Dementia, hx breast Ca and hx of colon resection 2yrs ago, Hx of chronic constipation  is presenting to ED from [facility] with abd pain x 1d.  She h Patient had episode of vomiting in ED and an NG tube was placed.     Abdominal Pain Likely secondary to Partial SBO/ileus  -CT A/P: Dilated small bowel, ileitis with associated Partial SBO, complete SBO is not excluded   -Patient pulled out NGT 3/8: Diet was tried but pt kept vomiting   -NGT placed again on 3/9, No episode of vomiting for 5 days  -Patient placed on Restraints: given dementia and pulling out NGT  -Abdominal X-ray from 3/9/19: showed  non-obstructive gas pattern  -F/Up New ABD x-ray:  performed not Read  -c/w NPO, IVF for now: will try feeding when NGT output decreases:   -serial abdominal exams, suppositories/enemas  -  -PT asking to drink, tolerated 2 cups for apple juice  -will advance diet to clears    Electrolyte abnormalities 2/2 NPO status  Hypomagnesemia repleted 3/11/19  Hypokalemia: 3/12/ and , 3/14 : repleted    Interval Enlargement Of Left ovarian Cyst  -Outpatient follow-up    Hypothyroidism  -TSH WNL, c/w synthroid    Lewy Body Dementia  -C/W sinemet    DVT PPx: Heparin subQ    GI PPx: Protonix     Dispo: From NH  -D/C when able to tolerate diet, still significant out put ia the NGT. The patient is a 78y Female with PMH of Lewy Body Dementia, hx breast Ca and hx of colon resection 2yrs ago, Hx of chronic constipation  is presenting to ED from [facility] with abd pain x 1d.  She h Patient had episode of vomiting in ED and an NG tube was placed.     Abdominal Pain Likely secondary to Partial SBO/ileus  -CT A/P: Dilated small bowel, ileitis with associated Partial SBO, complete SBO is not excluded   -Patient pulled out NGT 3/8: Diet was tried but pt kept vomiting   -NGT placed again on 3/9, No episode of vomiting for 5 days  -Patient placed on Restraints: given dementia and pulling out NGT  -Abdominal X-ray from 3/9/19: showed  non-obstructive gas pattern  -F/Up New ABD x-ray:  performed not Read  -c/w NPO, IVF for now: will try feeding when NGT output decreases:   -serial abdominal exams, suppositories/enemas  -  -PT asking to drink, tolerated 2 cups for apple juice  -will advance diet to clears    Electrolyte abnormalities 2/2 NPO status  Hypomagnesemia repleted 3/11/19  Hypokalemia: 3/12/ and , 3/14 : repleted    Interval Enlargement Of Left ovarian Cyst  -Outpatient follow-up    Hypothyroidism  -TSH WNL, c/w synthroid    Lewy Body Dementia  -C/W sinemet    DVT PPx: Heparin subQ    GI PPx: Protonix     Dispo: From NH  -D/C when able to tolerate diet, still significant out put ia the NGT.       #Progress Note Handoff  Pending : Still acute  Disposition:  NH when stable   Discussion: Discussed with patient &  on bedside The patient is a 78y Female with PMH of Lewy Body Dementia, hx breast Ca and hx of colon resection 2yrs ago, Hx of chronic constipation  is presenting to ED from [facility] with abd pain x 1d.  She h Patient had episode of vomiting in ED and an NG tube was placed.     Abdominal Pain Likely secondary to Partial SBO/ileus  -CT A/P: Dilated small bowel, ileitis with associated Partial SBO, complete SBO is not excluded   -Patient pulled out NGT 3/8: Diet was tried but pt kept vomiting   -NGT placed again on 3/9, No episode of vomiting for 5 days  -Patient placed on Restraints: given dementia and pulling out NGT  -Abdominal X-ray from 3/9/19: showed  non-obstructive gas pattern  -F/Up New ABD x-ray: 3/12:              Dilated left mid abdominal small bowel loop, measuring up to 4.2 cm.                  Small bowel obstruction is a consideration in appropriate clinical                   setting  -PT asking to drink, tolerated 2 cups for apple juice  -will advance diet to clears  -serial abdominal exams, suppositories/enemas  -No documented BM    Electrolyte abnormalities 2/2 NPO status  Hypomagnesemia repleted 3/11/19  Hypokalemia: 3/12/ and , 3/14 : repleted    Interval Enlargement Of Left ovarian Cyst  -Outpatient follow-up    Hypothyroidism  -TSH WNL, c/w synthroid    Lewy Body Dementia  -C/W sinemet    DVT PPx: Heparin subQ    GI PPx: Protonix     Dispo: From NH  -D/C when able to tolerate diet, still significant out put ia the NGT.       #Progress Note Handoff  Pending : Still acute  Disposition:  NH when stable   Discussion: Discussed with patient &  on bedside

## 2019-03-14 NOTE — PROGRESS NOTE ADULT - SUBJECTIVE AND OBJECTIVE BOX
Patient is a 78y old  Female who presents with a chief complaint of Abdominal pain with associated nausea and vomiting. (13 Mar 2019 10:49)  Found to have Partial SBO, patient refusing Surgical intervention     Interval events: Positive  Brachial DVT: Prelim, Official read pending    Today is hop day 8  Patient is resting comfortably in bed, with NGT in place  No vomiting for 5 days, denies Abdominal pain  Bowel sounds present, still no BM with ordered regimen  Patient was asking to drink this AM, Tolerated two cups of apple juice  will advance to liquid diet today   Plan: D/C back to NH when pt ia able to tolerate Diet      PAST MEDICAL & SURGICAL HISTORY:  Lewy body dementia without behavioral disturbance  Constipation  History of breast cancer  Dementia  History of colon resection      MEDICATIONS  (STANDING):  aspirin  chewable 81 milliGRAM(s) Oral daily  atorvastatin 10 milliGRAM(s) Oral at bedtime  carbidopa/levodopa  25/100 1 Tablet(s) Oral three times a day  dextrose 5% + sodium chloride 0.9%. 1000 milliLiter(s) (75 mL/Hr) IV Continuous <Continuous>  docusate sodium Liquid 100 milliGRAM(s) Oral two times a day  gabapentin 600 milliGRAM(s) Oral three times a day  heparin  Injectable 5000 Unit(s) SubCutaneous every 8 hours  latanoprost 0.005% Ophthalmic Solution 1 Drop(s) Both EYES at bedtime  levothyroxine 50 MICROGram(s) Oral daily  polyethylene glycol 3350 17 Gram(s) Oral daily  sertraline 75 milliGRAM(s) Oral daily  timolol 0.5% Solution 1 Drop(s) Both EYES two times a day    MEDICATIONS  (PRN):  acetaminophen   Tablet .. 650 milliGRAM(s) Oral every 6 hours PRN Mild Pain (1 - 3)  acetaminophen   Tablet .. 650 milliGRAM(s) Oral every 6 hours PRN Temp greater or equal to 38C (100.4F)  bisacodyl Suppository 10 milliGRAM(s) Rectal daily PRN Constipation  ondansetron Injectable 4 milliGRAM(s) IV Push every 6 hours PRN Nausea and/or Vomiting          Vital Signs Last 24 Hrs  T(C): 36.4 (14 Mar 2019 10:05), Max: 36.8 (13 Mar 2019 17:41)  T(F): 97.5 (14 Mar 2019 10:05), Max: 98.2 (13 Mar 2019 17:41)  HR: 79 (14 Mar 2019 10:05) (64 - 91)  BP: 147/74 (14 Mar 2019 10:05) (127/62 - 181/77)  BP(mean): --  RR: 18 (14 Mar 2019 10:05) (18 - 18)  SpO2: --  CAPILLARY BLOOD GLUCOSE      POCT Blood Glucose.: 104 mg/dL (14 Mar 2019 02:23)  POCT Blood Glucose.: 98 mg/dL (13 Mar 2019 17:36)  POCT Blood Glucose.: 91 mg/dL (13 Mar 2019 11:44)    I&O's Summary    13 Mar 2019 07:01  -  14 Mar 2019 07:00  --------------------------------------------------------  IN: 0 mL / OUT: 1200 mL / NET: -1200 mL    14 Mar 2019 07:01  -  14 Mar 2019 11:27  --------------------------------------------------------  IN: 0 mL / OUT: 1 mL / NET: -1 mL        Physical Exam:    -   General : NAD, NGT in Place draining clear/mucosy  material    -      Cardiac: S1/S2 appreciated RRR    -      Pulm: CTA b/L, no adventitious sounds    -      GI: Soft, Distension improved, + bowel sounds tympanitic     -      Musculoskeletal: Atraumatic, No edema, no skin color changes    -      Neuro: AOx3      Labs:                        12.0   10.56 )-----------( 261      ( 14 Mar 2019 06:51 )             38.6             03-14    144  |  101  |  15  ----------------------------<  111<H>  3.3<L>   |  30  |  1.0    Ca    9.2      14 Mar 2019 06:51  Mg     2.0     03-14                            Imaging:    ECG: Patient is a 78y old  Female who presents with a chief complaint of Abdominal pain with associated nausea and vomiting. (13 Mar 2019 10:49)  Found to have Partial SBO, patient refusing Surgical intervention     Interval events: Positive  Brachial DVT: Prelim, Official read pending    Today is hop day 8  Patient is resting comfortably in bed, with NGT in place  No vomiting for 5 days, denies Abdominal pain  Bowel sounds present, still no BM with ordered regimen  Patient was asking to drink this AM, Tolerated two cups of apple juice  will advance to liquid diet today   Plan: D/C back to NH when pt ia able to tolerate Diet      Addendum: 1pm  -NO DVT, superficial Brachial thombophlebitisis  -Patent cephalic davon    PAST MEDICAL & SURGICAL HISTORY:  Lewy body dementia without behavioral disturbance  Constipation  History of breast cancer  Dementia  History of colon resection      MEDICATIONS  (STANDING):  aspirin  chewable 81 milliGRAM(s) Oral daily  atorvastatin 10 milliGRAM(s) Oral at bedtime  carbidopa/levodopa  25/100 1 Tablet(s) Oral three times a day  dextrose 5% + sodium chloride 0.9%. 1000 milliLiter(s) (75 mL/Hr) IV Continuous <Continuous>  docusate sodium Liquid 100 milliGRAM(s) Oral two times a day  gabapentin 600 milliGRAM(s) Oral three times a day  heparin  Injectable 5000 Unit(s) SubCutaneous every 8 hours  latanoprost 0.005% Ophthalmic Solution 1 Drop(s) Both EYES at bedtime  levothyroxine 50 MICROGram(s) Oral daily  polyethylene glycol 3350 17 Gram(s) Oral daily  sertraline 75 milliGRAM(s) Oral daily  timolol 0.5% Solution 1 Drop(s) Both EYES two times a day    MEDICATIONS  (PRN):  acetaminophen   Tablet .. 650 milliGRAM(s) Oral every 6 hours PRN Mild Pain (1 - 3)  acetaminophen   Tablet .. 650 milliGRAM(s) Oral every 6 hours PRN Temp greater or equal to 38C (100.4F)  bisacodyl Suppository 10 milliGRAM(s) Rectal daily PRN Constipation  ondansetron Injectable 4 milliGRAM(s) IV Push every 6 hours PRN Nausea and/or Vomiting          Vital Signs Last 24 Hrs  T(C): 36.4 (14 Mar 2019 10:05), Max: 36.8 (13 Mar 2019 17:41)  T(F): 97.5 (14 Mar 2019 10:05), Max: 98.2 (13 Mar 2019 17:41)  HR: 79 (14 Mar 2019 10:05) (64 - 91)  BP: 147/74 (14 Mar 2019 10:05) (127/62 - 181/77)  BP(mean): --  RR: 18 (14 Mar 2019 10:05) (18 - 18)  SpO2: --  CAPILLARY BLOOD GLUCOSE      POCT Blood Glucose.: 104 mg/dL (14 Mar 2019 02:23)  POCT Blood Glucose.: 98 mg/dL (13 Mar 2019 17:36)  POCT Blood Glucose.: 91 mg/dL (13 Mar 2019 11:44)    I&O's Summary    13 Mar 2019 07:01  -  14 Mar 2019 07:00  --------------------------------------------------------  IN: 0 mL / OUT: 1200 mL / NET: -1200 mL    14 Mar 2019 07:01  -  14 Mar 2019 11:27  --------------------------------------------------------  IN: 0 mL / OUT: 1 mL / NET: -1 mL        Physical Exam:    -   General : NAD, NGT in Place draining clear/mucosy  material    -      Cardiac: S1/S2 appreciated RRR    -      Pulm: CTA b/L, no adventitious sounds    -      GI: Soft, Distension improved, + bowel sounds tympanitic     -      Musculoskeletal: Atraumatic, No edema, no skin color changes    -      Neuro: AOx3      Labs:                        12.0   10.56 )-----------( 261      ( 14 Mar 2019 06:51 )             38.6             03-14    144  |  101  |  15  ----------------------------<  111<H>  3.3<L>   |  30  |  1.0    Ca    9.2      14 Mar 2019 06:51  Mg     2.0     03-14                            Imaging:    ECG:

## 2019-03-15 LAB
ANION GAP SERPL CALC-SCNC: 15 MMOL/L — HIGH (ref 7–14)
BASOPHILS # BLD AUTO: 0.05 K/UL — SIGNIFICANT CHANGE UP (ref 0–0.2)
BASOPHILS NFR BLD AUTO: 0.5 % — SIGNIFICANT CHANGE UP (ref 0–1)
BUN SERPL-MCNC: 15 MG/DL — SIGNIFICANT CHANGE UP (ref 10–20)
CALCIUM SERPL-MCNC: 8.8 MG/DL — SIGNIFICANT CHANGE UP (ref 8.5–10.1)
CHLORIDE SERPL-SCNC: 104 MMOL/L — SIGNIFICANT CHANGE UP (ref 98–110)
CO2 SERPL-SCNC: 26 MMOL/L — SIGNIFICANT CHANGE UP (ref 17–32)
CREAT SERPL-MCNC: 0.9 MG/DL — SIGNIFICANT CHANGE UP (ref 0.7–1.5)
CULTURE RESULTS: SIGNIFICANT CHANGE UP
EOSINOPHIL # BLD AUTO: 0.46 K/UL — SIGNIFICANT CHANGE UP (ref 0–0.7)
EOSINOPHIL NFR BLD AUTO: 4.4 % — SIGNIFICANT CHANGE UP (ref 0–8)
GLUCOSE BLDC GLUCOMTR-MCNC: 115 MG/DL — HIGH (ref 70–99)
GLUCOSE BLDC GLUCOMTR-MCNC: 117 MG/DL — HIGH (ref 70–99)
GLUCOSE BLDC GLUCOMTR-MCNC: 93 MG/DL — SIGNIFICANT CHANGE UP (ref 70–99)
GLUCOSE SERPL-MCNC: 104 MG/DL — HIGH (ref 70–99)
HCT VFR BLD CALC: 36.4 % — LOW (ref 37–47)
HGB BLD-MCNC: 11.2 G/DL — LOW (ref 12–16)
IMM GRANULOCYTES NFR BLD AUTO: 0.5 % — HIGH (ref 0.1–0.3)
LYMPHOCYTES # BLD AUTO: 2.86 K/UL — SIGNIFICANT CHANGE UP (ref 1.2–3.4)
LYMPHOCYTES # BLD AUTO: 27.2 % — SIGNIFICANT CHANGE UP (ref 20.5–51.1)
MAGNESIUM SERPL-MCNC: 1.8 MG/DL — SIGNIFICANT CHANGE UP (ref 1.8–2.4)
MCHC RBC-ENTMCNC: 30.1 PG — SIGNIFICANT CHANGE UP (ref 27–31)
MCHC RBC-ENTMCNC: 30.8 G/DL — LOW (ref 32–37)
MCV RBC AUTO: 97.8 FL — SIGNIFICANT CHANGE UP (ref 81–99)
MONOCYTES # BLD AUTO: 0.94 K/UL — HIGH (ref 0.1–0.6)
MONOCYTES NFR BLD AUTO: 8.9 % — SIGNIFICANT CHANGE UP (ref 1.7–9.3)
NEUTROPHILS # BLD AUTO: 6.15 K/UL — SIGNIFICANT CHANGE UP (ref 1.4–6.5)
NEUTROPHILS NFR BLD AUTO: 58.5 % — SIGNIFICANT CHANGE UP (ref 42.2–75.2)
NRBC # BLD: 0 /100 WBCS — SIGNIFICANT CHANGE UP (ref 0–0)
PLATELET # BLD AUTO: 261 K/UL — SIGNIFICANT CHANGE UP (ref 130–400)
POTASSIUM SERPL-MCNC: 3.8 MMOL/L — SIGNIFICANT CHANGE UP (ref 3.5–5)
POTASSIUM SERPL-SCNC: 3.8 MMOL/L — SIGNIFICANT CHANGE UP (ref 3.5–5)
RBC # BLD: 3.72 M/UL — LOW (ref 4.2–5.4)
RBC # FLD: 13.7 % — SIGNIFICANT CHANGE UP (ref 11.5–14.5)
SODIUM SERPL-SCNC: 145 MMOL/L — SIGNIFICANT CHANGE UP (ref 135–146)
SPECIMEN SOURCE: SIGNIFICANT CHANGE UP
WBC # BLD: 10.51 K/UL — SIGNIFICANT CHANGE UP (ref 4.8–10.8)
WBC # FLD AUTO: 10.51 K/UL — SIGNIFICANT CHANGE UP (ref 4.8–10.8)

## 2019-03-15 RX ADMIN — ATORVASTATIN CALCIUM 10 MILLIGRAM(S): 80 TABLET, FILM COATED ORAL at 21:40

## 2019-03-15 RX ADMIN — GABAPENTIN 600 MILLIGRAM(S): 400 CAPSULE ORAL at 21:40

## 2019-03-15 RX ADMIN — POLYETHYLENE GLYCOL 3350 17 GRAM(S): 17 POWDER, FOR SOLUTION ORAL at 12:09

## 2019-03-15 RX ADMIN — HEPARIN SODIUM 5000 UNIT(S): 5000 INJECTION INTRAVENOUS; SUBCUTANEOUS at 21:41

## 2019-03-15 RX ADMIN — CARBIDOPA AND LEVODOPA 1 TABLET(S): 25; 100 TABLET ORAL at 14:14

## 2019-03-15 RX ADMIN — LATANOPROST 1 DROP(S): 0.05 SOLUTION/ DROPS OPHTHALMIC; TOPICAL at 21:41

## 2019-03-15 RX ADMIN — CARBIDOPA AND LEVODOPA 1 TABLET(S): 25; 100 TABLET ORAL at 21:40

## 2019-03-15 RX ADMIN — Medication 50 MICROGRAM(S): at 05:20

## 2019-03-15 RX ADMIN — SODIUM CHLORIDE 75 MILLILITER(S): 9 INJECTION, SOLUTION INTRAVENOUS at 21:56

## 2019-03-15 RX ADMIN — Medication 100 MILLIGRAM(S): at 05:16

## 2019-03-15 RX ADMIN — SODIUM CHLORIDE 75 MILLILITER(S): 9 INJECTION, SOLUTION INTRAVENOUS at 05:16

## 2019-03-15 RX ADMIN — Medication 81 MILLIGRAM(S): at 12:09

## 2019-03-15 RX ADMIN — GABAPENTIN 600 MILLIGRAM(S): 400 CAPSULE ORAL at 14:14

## 2019-03-15 RX ADMIN — CARBIDOPA AND LEVODOPA 1 TABLET(S): 25; 100 TABLET ORAL at 05:19

## 2019-03-15 RX ADMIN — Medication 1 DROP(S): at 05:18

## 2019-03-15 RX ADMIN — HEPARIN SODIUM 5000 UNIT(S): 5000 INJECTION INTRAVENOUS; SUBCUTANEOUS at 05:18

## 2019-03-15 RX ADMIN — HEPARIN SODIUM 5000 UNIT(S): 5000 INJECTION INTRAVENOUS; SUBCUTANEOUS at 14:14

## 2019-03-15 RX ADMIN — GABAPENTIN 600 MILLIGRAM(S): 400 CAPSULE ORAL at 05:16

## 2019-03-15 RX ADMIN — SERTRALINE 75 MILLIGRAM(S): 25 TABLET, FILM COATED ORAL at 12:09

## 2019-03-15 NOTE — PROGRESS NOTE ADULT - ASSESSMENT
The patient is a 78y Female with PMH of Lewy Body Dementia, hx breast Ca and hx of colon resection 2yrs ago, Hx of chronic constipation  is presenting to ED from [facility] with abd pain x 1d.  She h Patient had episode of vomiting in ED and an NG tube was placed.     Abdominal Pain Likely secondary to Partial SBO/ileus  -CT A/P: Dilated small bowel, ileitis with associated Partial SBO, complete SBO is not excluded   -Patient pulled out NGT 3/8: Diet was tried but pt kept vomiting   -NGT placed again on 3/9, No episode of vomiting for 6 days  -Patient placed on Restraints: given dementia and pulling out NGT  -Tolerated Clears, Advanced to full liquids 3/15/19  -Passing gas, no BM yet  -Abdominal X-ray from 3/9/19: showed  non-obstructive gas pattern  -F/Up New ABD x-ray: 3/12              Dilated left mid abdominal small bowel loop, measuring up to 4.2 cm.                  Small bowel obstruction is a consideration in appropriate clinical                   setting  -F/Up KUB from 3/15/19  -PT asking to drink, tolerated 2 cups for apple juice  -will advance diet to clears  -serial abdominal exams, suppositories/enemas  -No documented BM    Electrolyte abnormalities 2/2 NPO status  Hypomagnesemia repleted 3/11/19  Hypokalemia: 3/12/ and , 3/14 : repleted    Interval Enlargement Of Left ovarian Cyst  -Outpatient follow-up    Hypothyroidism  -TSH WNL, c/w synthroid    Lewy Body Dementia  -C/W sinemet    DVT PPx: Heparin subQ    GI PPx: Protonix     Dispo: From NH  -D/C when able to tolerate diet  -NGT output significantly decreased The patient is a 78y Female with PMH of Lewy Body Dementia, hx breast Ca and hx of colon resection 2yrs ago, Hx of chronic constipation  is presenting to ED from [facility] with abd pain x 1d.  She h Patient had episode of vomiting in ED and an NG tube was placed.     Abdominal Pain Likely secondary to Partial SBO/ileus  -CT A/P: Dilated small bowel, ileitis with associated Partial SBO, complete SBO is not excluded   -Patient pulled out NGT 3/8: Diet was tried but pt kept vomiting   -NGT placed again on 3/9, No episode of vomiting for 6 days  -Patient placed on Restraints: given dementia and pulling out NGT  -Tolerated Clears, Advanced to full liquids 3/15/19  -Passing gas, no BM yet  -Abdominal X-ray from 3/9/19: showed  non-obstructive gas pattern  -F/Up New ABD x-ray: 3/12              Dilated left mid abdominal small bowel loop, measuring up to 4.2 cm.                  Small bowel obstruction is a consideration in appropriate clinical                   setting  -F/Up KUB from 3/15/19  -PT asking to drink, tolerated 2 cups for apple juice  -will advance diet to clears  -serial abdominal exams, suppositories/enemas  -No documented BM    Electrolyte abnormalities 2/2 NPO status  Hypomagnesemia repleted 3/11/19  Hypokalemia: 3/12/ and , 3/14 : repleted    Interval Enlargement Of Left ovarian Cyst  -Outpatient follow-up    Hypothyroidism  -TSH WNL, c/w synthroid    Lewy Body Dementia  -C/W sinemet    DVT PPx: Heparin subQ    GI PPx: Protonix     Dispo: From NH  -D/C when able to tolerate diet  -NGT output significantly decreased    #Progress Note Handoff  Pending : Still acute  Disposition:  NH when stable   Discussion: Discussed with patient &  on bedside

## 2019-03-15 NOTE — PROGRESS NOTE ADULT - ATTENDING COMMENTS
#Progress Note Handoff    Pending (specify):  Advancing diet   Family discussion:    Disposition: SNF
remains distended   but soft   passing flatus and ? BM   tolerating clears   avoid narcotics   continue stool softener   will f/u
Patient was seen independently. Latest vital signs and labs were reviewed. Case was discussed with resident in morning rounds.  Agree with resident's assessment & plan which was reviewed by me and modified where necessary.
Patient was seen independently. Latest vital signs and labs were reviewed. Case was discussed with resident in morning rounds.  Agree with resident's assessment & plan which was reviewed by me and modified where necessary.
Pending (specify): Still NGT for the SBO  d/w  and explained. understands   Disposition: Unknown at this time
pt seen and examined independently.      pt sleepy this morning, pt's  at bedside  she denies any abdominal pain, no vomiting overnight  pt had BM yesterday after 2nd enema given     HEENT:  dry MM  abd:  soft +bs +small dressing on abdomen from non-healing wound from colon resection 2 years ago    #Abdominal Pain Likely secondary to pSBO   d/c enemas  start on bowel regimen  advance diet to soft  continue IVF    #hypothyroidism - check TSH, continue synthroid  #Lewy Body Dementia - resume home meds    #PPx - HSQ    Progress Note Handoff  Pending:  advance diet  Patient/Family discussion: d/w pt's  and housestaff  Disposition: [facility], hopefully by tomorrow
Pending (specify): Significant out put via the NGT. Recalled surgery if any suggestions   Family discussion:  aware and discussed with     Disposition : SNF

## 2019-03-15 NOTE — PROGRESS NOTE ADULT - SUBJECTIVE AND OBJECTIVE BOX
Patient is a 78y old  Female who presents with a chief complaint of Abdominal pain with associated nausea and vomiting. (14 Mar 2019 11:27)  Found to have Partial SBO, patient refusing Surgical intervention     Interval events: Tolerated clear liquid diet    Today is hop day 9  Patient is resting comfortably in bed, with NGT in place  No vomiting for 6 days, denies Abdominal pain  Bowel sounds present, passing gas, still no BM with ordered regimen  Tolerated clears, will advance to full liquid diet  Plan: D/C back to NH when pt ia able to tolerate Diet      PAST MEDICAL & SURGICAL HISTORY:  Lewy body dementia without behavioral disturbance  Constipation  History of breast cancer  Dementia  History of colon resection      MEDICATIONS  (STANDING):  aspirin  chewable 81 milliGRAM(s) Oral daily  atorvastatin 10 milliGRAM(s) Oral at bedtime  carbidopa/levodopa  25/100 1 Tablet(s) Oral three times a day  dextrose 5% + sodium chloride 0.9%. 1000 milliLiter(s) (75 mL/Hr) IV Continuous <Continuous>  docusate sodium Liquid 100 milliGRAM(s) Oral two times a day  gabapentin 600 milliGRAM(s) Oral three times a day  heparin  Injectable 5000 Unit(s) SubCutaneous every 8 hours  latanoprost 0.005% Ophthalmic Solution 1 Drop(s) Both EYES at bedtime  levothyroxine 50 MICROGram(s) Oral daily  polyethylene glycol 3350 17 Gram(s) Oral daily  sertraline 75 milliGRAM(s) Oral daily  timolol 0.5% Solution 1 Drop(s) Both EYES two times a day    MEDICATIONS  (PRN):  acetaminophen   Tablet .. 650 milliGRAM(s) Oral every 6 hours PRN Mild Pain (1 - 3)  acetaminophen   Tablet .. 650 milliGRAM(s) Oral every 6 hours PRN Temp greater or equal to 38C (100.4F)  bisacodyl Suppository 10 milliGRAM(s) Rectal daily PRN Constipation  ondansetron Injectable 4 milliGRAM(s) IV Push every 6 hours PRN Nausea and/or Vomiting          Vital Signs Last 24 Hrs  T(C): 36.2 (15 Mar 2019 09:00), Max: 36.3 (14 Mar 2019 13:16)  T(F): 97.2 (15 Mar 2019 09:00), Max: 97.4 (14 Mar 2019 13:16)  HR: 79 (15 Mar 2019 09:00) (68 - 79)  BP: 146/63 (15 Mar 2019 09:00) (119/58 - 146/63)  BP(mean): --  RR: 18 (15 Mar 2019 09:00) (18 - 18)  SpO2: --  CAPILLARY BLOOD GLUCOSE      POCT Blood Glucose.: 108 mg/dL (14 Mar 2019 23:38)  POCT Blood Glucose.: 102 mg/dL (14 Mar 2019 20:56)  POCT Blood Glucose.: 115 mg/dL (14 Mar 2019 16:51)  POCT Blood Glucose.: 132 mg/dL (14 Mar 2019 11:36)    I&O's Summary    14 Mar 2019 07:01  -  15 Mar 2019 07:00  --------------------------------------------------------  IN: 1200 mL / OUT: 201 mL / NET: 999 mL    15 Mar 2019 07:01  -  15 Mar 2019 10:14  --------------------------------------------------------  IN: 120 mL / OUT: 0 mL / NET: 120 mL        Physical Exam:    -      General : NAD, NGT in Place draining clear/mucosy  material    -      Cardiac: S1/S2 appreciated RRR    -      Pulm: CTA b/L, no adventitious sounds    -      GI: Soft, Distension improving, + bowel sounds borborygmi    -      Musculoskeletal: Atraumatic, No edema, no skin color changes    -      Neuro: AOx3        Labs:                        11.2   10.51 )-----------( 261      ( 15 Mar 2019 05:22 )             36.4             03-15    145  |  104  |  15  ----------------------------<  104<H>  3.8   |  26  |  0.9    Ca    8.8      15 Mar 2019 05:22  Mg     1.8     03-15                            Imaging:    ECG: Patient is a 78y old  Female who presents with a chief complaint of Abdominal pain with associated nausea and vomiting. (14 Mar 2019 11:27)  Found to have Partial SBO, patient refusing Surgical intervention     Interval events: Tolerated clear liquid diet    Today is hop day 9  Patient is resting comfortably in bed, with NGT in place  No vomiting for 6 days, denies Abdominal pain  Bowel sounds present, passing gas, still no BM with ordered regimen  Tolerated clears, will advance to full liquid diet    PAST MEDICAL & SURGICAL HISTORY:  Lewy body dementia without behavioral disturbance  Constipation  History of breast cancer  Dementia  History of colon resection      MEDICATIONS  (STANDING):  aspirin  chewable 81 milliGRAM(s) Oral daily  atorvastatin 10 milliGRAM(s) Oral at bedtime  carbidopa/levodopa  25/100 1 Tablet(s) Oral three times a day  dextrose 5% + sodium chloride 0.9%. 1000 milliLiter(s) (75 mL/Hr) IV Continuous <Continuous>  docusate sodium Liquid 100 milliGRAM(s) Oral two times a day  gabapentin 600 milliGRAM(s) Oral three times a day  heparin  Injectable 5000 Unit(s) SubCutaneous every 8 hours  latanoprost 0.005% Ophthalmic Solution 1 Drop(s) Both EYES at bedtime  levothyroxine 50 MICROGram(s) Oral daily  polyethylene glycol 3350 17 Gram(s) Oral daily  sertraline 75 milliGRAM(s) Oral daily  timolol 0.5% Solution 1 Drop(s) Both EYES two times a day    MEDICATIONS  (PRN):  acetaminophen   Tablet .. 650 milliGRAM(s) Oral every 6 hours PRN Mild Pain (1 - 3)  acetaminophen   Tablet .. 650 milliGRAM(s) Oral every 6 hours PRN Temp greater or equal to 38C (100.4F)  bisacodyl Suppository 10 milliGRAM(s) Rectal daily PRN Constipation  ondansetron Injectable 4 milliGRAM(s) IV Push every 6 hours PRN Nausea and/or Vomiting          Vital Signs Last 24 Hrs  T(C): 36.2 (15 Mar 2019 09:00), Max: 36.3 (14 Mar 2019 13:16)  T(F): 97.2 (15 Mar 2019 09:00), Max: 97.4 (14 Mar 2019 13:16)  HR: 79 (15 Mar 2019 09:00) (68 - 79)  BP: 146/63 (15 Mar 2019 09:00) (119/58 - 146/63)  BP(mean): --  RR: 18 (15 Mar 2019 09:00) (18 - 18)  SpO2: --  CAPILLARY BLOOD GLUCOSE      POCT Blood Glucose.: 108 mg/dL (14 Mar 2019 23:38)  POCT Blood Glucose.: 102 mg/dL (14 Mar 2019 20:56)  POCT Blood Glucose.: 115 mg/dL (14 Mar 2019 16:51)  POCT Blood Glucose.: 132 mg/dL (14 Mar 2019 11:36)    I&O's Summary    14 Mar 2019 07:01  -  15 Mar 2019 07:00  --------------------------------------------------------  IN: 1200 mL / OUT: 201 mL / NET: 999 mL    15 Mar 2019 07:01  -  15 Mar 2019 10:14  --------------------------------------------------------  IN: 120 mL / OUT: 0 mL / NET: 120 mL        Physical Exam:    -      General : NAD, NGT in Place draining clear/mucosy  material    -      Cardiac: S1/S2 appreciated RRR    -      Pulm: CTA b/L, no adventitious sounds    -      GI: Soft, Distension improving, + bowel sounds borborygmi    -      Musculoskeletal: Atraumatic, No edema, no skin color changes    -      Neuro: AOx3        Labs:                        11.2   10.51 )-----------( 261      ( 15 Mar 2019 05:22 )             36.4             03-15    145  |  104  |  15  ----------------------------<  104<H>  3.8   |  26  |  0.9    Ca    8.8      15 Mar 2019 05:22  Mg     1.8     03-15                            Imaging:    ECG:

## 2019-03-15 NOTE — PROVIDER CONTACT NOTE (MEDICATION) - SITUATION
patient is refusing colace despite teaching and education about SBO and refused timolol eye drops despite teaching and education and encouragement

## 2019-03-16 LAB
ANION GAP SERPL CALC-SCNC: 11 MMOL/L — SIGNIFICANT CHANGE UP (ref 7–14)
BASOPHILS # BLD AUTO: 0.04 K/UL — SIGNIFICANT CHANGE UP (ref 0–0.2)
BASOPHILS NFR BLD AUTO: 0.4 % — SIGNIFICANT CHANGE UP (ref 0–1)
BUN SERPL-MCNC: 15 MG/DL — SIGNIFICANT CHANGE UP (ref 10–20)
CALCIUM SERPL-MCNC: 8.5 MG/DL — SIGNIFICANT CHANGE UP (ref 8.5–10.1)
CHLORIDE SERPL-SCNC: 106 MMOL/L — SIGNIFICANT CHANGE UP (ref 98–110)
CO2 SERPL-SCNC: 24 MMOL/L — SIGNIFICANT CHANGE UP (ref 17–32)
CREAT SERPL-MCNC: 0.8 MG/DL — SIGNIFICANT CHANGE UP (ref 0.7–1.5)
EOSINOPHIL # BLD AUTO: 0.45 K/UL — SIGNIFICANT CHANGE UP (ref 0–0.7)
EOSINOPHIL NFR BLD AUTO: 4.6 % — SIGNIFICANT CHANGE UP (ref 0–8)
GLUCOSE BLDC GLUCOMTR-MCNC: 102 MG/DL — HIGH (ref 70–99)
GLUCOSE BLDC GLUCOMTR-MCNC: 115 MG/DL — HIGH (ref 70–99)
GLUCOSE SERPL-MCNC: 93 MG/DL — SIGNIFICANT CHANGE UP (ref 70–99)
HCT VFR BLD CALC: 34.2 % — LOW (ref 37–47)
HGB BLD-MCNC: 10.4 G/DL — LOW (ref 12–16)
IMM GRANULOCYTES NFR BLD AUTO: 0.4 % — HIGH (ref 0.1–0.3)
LYMPHOCYTES # BLD AUTO: 2.88 K/UL — SIGNIFICANT CHANGE UP (ref 1.2–3.4)
LYMPHOCYTES # BLD AUTO: 29.1 % — SIGNIFICANT CHANGE UP (ref 20.5–51.1)
MAGNESIUM SERPL-MCNC: 1.7 MG/DL — LOW (ref 1.8–2.4)
MCHC RBC-ENTMCNC: 30 PG — SIGNIFICANT CHANGE UP (ref 27–31)
MCHC RBC-ENTMCNC: 30.4 G/DL — LOW (ref 32–37)
MCV RBC AUTO: 98.6 FL — SIGNIFICANT CHANGE UP (ref 81–99)
MONOCYTES # BLD AUTO: 0.71 K/UL — HIGH (ref 0.1–0.6)
MONOCYTES NFR BLD AUTO: 7.2 % — SIGNIFICANT CHANGE UP (ref 1.7–9.3)
NEUTROPHILS # BLD AUTO: 5.76 K/UL — SIGNIFICANT CHANGE UP (ref 1.4–6.5)
NEUTROPHILS NFR BLD AUTO: 58.3 % — SIGNIFICANT CHANGE UP (ref 42.2–75.2)
NRBC # BLD: 0 /100 WBCS — SIGNIFICANT CHANGE UP (ref 0–0)
PLATELET # BLD AUTO: 257 K/UL — SIGNIFICANT CHANGE UP (ref 130–400)
POTASSIUM SERPL-MCNC: 3.5 MMOL/L — SIGNIFICANT CHANGE UP (ref 3.5–5)
POTASSIUM SERPL-SCNC: 3.5 MMOL/L — SIGNIFICANT CHANGE UP (ref 3.5–5)
RBC # BLD: 3.47 M/UL — LOW (ref 4.2–5.4)
RBC # FLD: 13.7 % — SIGNIFICANT CHANGE UP (ref 11.5–14.5)
SODIUM SERPL-SCNC: 141 MMOL/L — SIGNIFICANT CHANGE UP (ref 135–146)
WBC # BLD: 9.88 K/UL — SIGNIFICANT CHANGE UP (ref 4.8–10.8)
WBC # FLD AUTO: 9.88 K/UL — SIGNIFICANT CHANGE UP (ref 4.8–10.8)

## 2019-03-16 RX ORDER — LACTULOSE 10 G/15ML
SOLUTION ORAL
Qty: 0 | Refills: 0 | Status: DISCONTINUED | OUTPATIENT
Start: 2019-03-16 | End: 2019-03-19

## 2019-03-16 RX ORDER — LACTULOSE 10 G/15ML
15 SOLUTION ORAL ONCE
Qty: 0 | Refills: 0 | Status: COMPLETED | OUTPATIENT
Start: 2019-03-16 | End: 2019-03-16

## 2019-03-16 RX ORDER — LACTULOSE 10 G/15ML
10 SOLUTION ORAL AT BEDTIME
Qty: 0 | Refills: 0 | Status: DISCONTINUED | OUTPATIENT
Start: 2019-03-16 | End: 2019-03-19

## 2019-03-16 RX ADMIN — Medication 100 MILLIGRAM(S): at 05:22

## 2019-03-16 RX ADMIN — LATANOPROST 1 DROP(S): 0.05 SOLUTION/ DROPS OPHTHALMIC; TOPICAL at 21:20

## 2019-03-16 RX ADMIN — CARBIDOPA AND LEVODOPA 1 TABLET(S): 25; 100 TABLET ORAL at 14:07

## 2019-03-16 RX ADMIN — Medication 50 MICROGRAM(S): at 05:22

## 2019-03-16 RX ADMIN — HEPARIN SODIUM 5000 UNIT(S): 5000 INJECTION INTRAVENOUS; SUBCUTANEOUS at 21:20

## 2019-03-16 RX ADMIN — HEPARIN SODIUM 5000 UNIT(S): 5000 INJECTION INTRAVENOUS; SUBCUTANEOUS at 14:08

## 2019-03-16 RX ADMIN — CARBIDOPA AND LEVODOPA 1 TABLET(S): 25; 100 TABLET ORAL at 05:22

## 2019-03-16 RX ADMIN — LACTULOSE 15 GRAM(S): 10 SOLUTION ORAL at 14:07

## 2019-03-16 RX ADMIN — GABAPENTIN 600 MILLIGRAM(S): 400 CAPSULE ORAL at 05:22

## 2019-03-16 RX ADMIN — Medication 100 MILLIGRAM(S): at 17:29

## 2019-03-16 RX ADMIN — ATORVASTATIN CALCIUM 10 MILLIGRAM(S): 80 TABLET, FILM COATED ORAL at 21:20

## 2019-03-16 RX ADMIN — LACTULOSE 10 GRAM(S): 10 SOLUTION ORAL at 21:20

## 2019-03-16 RX ADMIN — Medication 1 DROP(S): at 05:23

## 2019-03-16 RX ADMIN — GABAPENTIN 600 MILLIGRAM(S): 400 CAPSULE ORAL at 14:08

## 2019-03-16 RX ADMIN — Medication 10 MILLIGRAM(S): at 14:07

## 2019-03-16 RX ADMIN — Medication 81 MILLIGRAM(S): at 12:08

## 2019-03-16 RX ADMIN — CARBIDOPA AND LEVODOPA 1 TABLET(S): 25; 100 TABLET ORAL at 21:20

## 2019-03-16 RX ADMIN — SODIUM CHLORIDE 75 MILLILITER(S): 9 INJECTION, SOLUTION INTRAVENOUS at 21:21

## 2019-03-16 RX ADMIN — SERTRALINE 75 MILLIGRAM(S): 25 TABLET, FILM COATED ORAL at 12:08

## 2019-03-16 RX ADMIN — Medication 1 DROP(S): at 17:27

## 2019-03-16 RX ADMIN — GABAPENTIN 600 MILLIGRAM(S): 400 CAPSULE ORAL at 21:20

## 2019-03-16 RX ADMIN — HEPARIN SODIUM 5000 UNIT(S): 5000 INJECTION INTRAVENOUS; SUBCUTANEOUS at 05:22

## 2019-03-16 NOTE — PROVIDER CONTACT NOTE (OTHER) - ACTION/TREATMENT ORDERED:
MD Mcclain was made aware, Duplex will be ordered. Safety and comfort maintained, will continue to monitor.
MD cagle notified and to place IV with ultrasound  Pt to be on bedrest. will place order and will renew b/l hand restraints order
MD here to assess patient. will order an xray
MD montero.
MD to come and place IV.
MD to renew.
Ok
Ok, I will be there soon. TY
Provider stated will put in actvity order
Provider stated he will change the fluids.

## 2019-03-16 NOTE — PROVIDER CONTACT NOTE (OTHER) - SITUATION
pt present with ng tube which is now out and upper arm swelling.
MD notified that pts IV is no longer patent. RN unable to place another IV.
Pt  requesting update on plan of care.
Pt assessed, noted IV infiltrated, IVF discontinued, IV removed, arm elevated, warm compresses applied,
Pt due for Miralax, unable to give because pt is NPO and pt would have to drink 8 oz of water with it
Pt has no IV access/  Left hand IV infiltrated overnight and is swollen and arm weeping  NGT intact  Pt has no activity order, can you place order?
Pt has no activity order and no order for SQS or foot pumps but has heparin for DVT proph.
Pt is NPO and has NGT, pt's FS is 74.
Pt received lactulose and an suppository had a very loose bowel movement.
Requested that b/l wrist restraint order be renewed.

## 2019-03-16 NOTE — PROGRESS NOTE ADULT - SUBJECTIVE AND OBJECTIVE BOX
Patient is a 78y old  Female who presents with a chief complaint of Abdominal pain with associated nausea and vomiting. (15 Mar 2019 10:14)  Found to have Partial SBO, patient refusing Surgical intervention   Interval events: NGT removed, Diet Advanced    Today is hop day 10  Patient is resting comfortably in bed  Bowel sounds present, passing gas, still no BM with ordered regimen  Tolerated clears and  full liquid diet, but Abdomen is less soft and more distended  PLan: will monitor over the weekend    PAST MEDICAL & SURGICAL HISTORY:  Lewy body dementia without behavioral disturbance  Constipation  History of breast cancer  Dementia  History of colon resection      MEDICATIONS  (STANDING):  aspirin  chewable 81 milliGRAM(s) Oral daily  atorvastatin 10 milliGRAM(s) Oral at bedtime  carbidopa/levodopa  25/100 1 Tablet(s) Oral three times a day  dextrose 5% + sodium chloride 0.9%. 1000 milliLiter(s) (75 mL/Hr) IV Continuous <Continuous>  docusate sodium Liquid 100 milliGRAM(s) Oral two times a day  gabapentin 600 milliGRAM(s) Oral three times a day  heparin  Injectable 5000 Unit(s) SubCutaneous every 8 hours  latanoprost 0.005% Ophthalmic Solution 1 Drop(s) Both EYES at bedtime  levothyroxine 50 MICROGram(s) Oral daily  polyethylene glycol 3350 17 Gram(s) Oral daily  sertraline 75 milliGRAM(s) Oral daily  timolol 0.5% Solution 1 Drop(s) Both EYES two times a day    MEDICATIONS  (PRN):  acetaminophen   Tablet .. 650 milliGRAM(s) Oral every 6 hours PRN Mild Pain (1 - 3)  acetaminophen   Tablet .. 650 milliGRAM(s) Oral every 6 hours PRN Temp greater or equal to 38C (100.4F)  bisacodyl Suppository 10 milliGRAM(s) Rectal daily PRN Constipation  ondansetron Injectable 4 milliGRAM(s) IV Push every 6 hours PRN Nausea and/or Vomiting          Vital Signs Last 24 Hrs  T(C): 35.9 (16 Mar 2019 07:25), Max: 36 (16 Mar 2019 00:00)  T(F): 96.7 (16 Mar 2019 07:25), Max: 96.8 (16 Mar 2019 00:00)  HR: 76 (16 Mar 2019 07:25) (70 - 80)  BP: 120/58 (16 Mar 2019 07:25) (120/58 - 136/70)  BP(mean): --  RR: 18 (16 Mar 2019 07:25) (16 - 18)  SpO2: --  CAPILLARY BLOOD GLUCOSE      POCT Blood Glucose.: 102 mg/dL (16 Mar 2019 07:18)  POCT Blood Glucose.: 93 mg/dL (15 Mar 2019 23:18)  POCT Blood Glucose.: 117 mg/dL (15 Mar 2019 18:27)  POCT Blood Glucose.: 115 mg/dL (15 Mar 2019 11:54)    I&O's Summary    15 Mar 2019 07:01  -  16 Mar 2019 07:00  --------------------------------------------------------  IN: 1020 mL / OUT: 0 mL / NET: 1020 mL    16 Mar 2019 07:01  -  16 Mar 2019 11:06  --------------------------------------------------------  IN: 210 mL / OUT: 0 mL / NET: 210 mL        Physical Exam:   General : NAD, NGT in Place draining clear/mucosy  material    -      Cardiac: S1/S2 appreciated RRR    -      Pulm: CTA b/L, no adventitious sounds    -      GI:  less Soft but not rigid , Mildly distended, + bowel sounds     -      Musculoskeletal: Atraumatic, No edema, no skin color changes    -      Neuro: AOx 2          Labs:                        10.4   9.88  )-----------( 257      ( 16 Mar 2019 05:42 )             34.2             03-16    141  |  106  |  15  ----------------------------<  93  3.5   |  24  |  0.8    Ca    8.5      16 Mar 2019 05:42  Mg     1.7     03-16                            Imaging:    ECG:

## 2019-03-16 NOTE — PROGRESS NOTE ADULT - ASSESSMENT
The patient is a 78y Female with PMH of Lewy Body Dementia, hx breast Ca and hx of colon resection 2yrs ago, Hx of chronic constipation  is presenting to ED from [facility] with abd pain x 1d.  She h Patient had episode of vomiting in ED and an NG tube was placed.     Abdominal Pain Likely secondary to Partial SBO/ileus  -CT A/P: Dilated small bowel, ileitis with associated Partial SBO, complete SBO is not excluded   -Patient pulled out NGT 3/8: Diet was tried but pt kept vomiting   -NGT placed again on 3/9, No episode of vomiting for 6 days  -Patient placed on Restraints: given dementia and pulling out NGT  -Tolerated Clears, and full liquids , but abdomen got more distended  -Passing gas, no BM yet  -Abdominal X-ray from 3/9/19: showed  non-obstructive gas pattern  -F/Up New ABD x-ray: 3/12              Dilated left mid abdominal small bowel loop, measuring up to 4.2 cm.                  Small bowel obstruction is a consideration in appropriate clinical                   setting  -KUB from 3/15/19: < from: Xray Kidney Ureter Bladder (03.15.19 @ 13:11) >  No significant interval change in diffuse gaseous distended large and   small bowel; may represent ileus or partial small bowel obstruction.  -serial abdominal exams, suppositories/enemas  -No documented BM    Electrolyte abnormalities 2/2 NPO status  Hypomagnesemia repleted 3/11/19, repleted  again: 3/16/19  Hypokalemia: 3/12/ and , 3/14 : repleted    Interval Enlargement Of Left ovarian Cyst  -Outpatient follow-up    Hypothyroidism  -TSH WNL, c/w synthroid    Lewy Body Dementia  -C/W sinemet    DVT PPx: Heparin subQ    GI PPx: Protonix     Dispo: From NH  -D/C when able to tolerate diet  Tolerated diet but Abdomen got more distended  will monitor over the weekend The patient is a 78y Female with PMH of Lewy Body Dementia, hx breast Ca and hx of colon resection 2yrs ago, Hx of chronic constipation  is presenting to ED from [facility] with abd pain x 1d.  She h Patient had episode of vomiting in ED and an NG tube was placed.     Abdominal Pain Likely secondary to Partial SBO/ileus  -CT A/P: Dilated small bowel, ileitis with associated Partial SBO, complete SBO is not excluded   -Patient pulled out NGT 3/8: Diet was tried but pt kept vomiting   -NGT placed again on 3/9, No episode of vomiting for 6 days  -Patient placed on Restraints: given dementia and pulling out NGT  -Tolerated Clears, and full liquids , but abdomen got more distended  -Passing gas, no BM yet  -Abdominal X-ray from 3/9/19: showed  non-obstructive gas pattern  -F/Up New ABD x-ray: 3/12              Dilated left mid abdominal small bowel loop, measuring up to 4.2 cm.                  Small bowel obstruction is a consideration in appropriate clinical                   setting  -KUB from 3/15/19: < from: Xray Kidney Ureter Bladder (03.15.19 @ 13:11) >  No significant interval change in diffuse gaseous distended large and   small bowel; may represent ileus or partial small bowel obstruction.  -serial abdominal exams, suppositories/enemas  -No documented BM    Electrolyte abnormalities 2/2 NPO status  Hypomagnesemia repleted 3/11/19, repleted  again: 3/16/19  Hypokalemia: 3/12/ and , 3/14 : repleted    Interval Enlargement Of Left ovarian Cyst  -Outpatient follow-up    Hypothyroidism  -TSH WNL, c/w synthroid    Lewy Body Dementia  -C/W sinemet    DVT PPx: Heparin subQ    GI PPx: Protonix     Dispo: From NH  -D/C when able to tolerate diet  Tolerated diet but Abdomen got more distended  will monitor over the weekend      #Progress Note Handoff  Pending : Still acute  Disposition:  NH when stable   Discussion: Discussed with patient &  on bedside

## 2019-03-17 LAB
ANION GAP SERPL CALC-SCNC: 13 MMOL/L — SIGNIFICANT CHANGE UP (ref 7–14)
BASOPHILS # BLD AUTO: 0.04 K/UL — SIGNIFICANT CHANGE UP (ref 0–0.2)
BASOPHILS NFR BLD AUTO: 0.4 % — SIGNIFICANT CHANGE UP (ref 0–1)
BUN SERPL-MCNC: 9 MG/DL — LOW (ref 10–20)
CALCIUM SERPL-MCNC: 8.3 MG/DL — LOW (ref 8.5–10.1)
CHLORIDE SERPL-SCNC: 108 MMOL/L — SIGNIFICANT CHANGE UP (ref 98–110)
CO2 SERPL-SCNC: 22 MMOL/L — SIGNIFICANT CHANGE UP (ref 17–32)
CREAT SERPL-MCNC: 0.8 MG/DL — SIGNIFICANT CHANGE UP (ref 0.7–1.5)
EOSINOPHIL # BLD AUTO: 0.48 K/UL — SIGNIFICANT CHANGE UP (ref 0–0.7)
EOSINOPHIL NFR BLD AUTO: 5.2 % — SIGNIFICANT CHANGE UP (ref 0–8)
GLUCOSE BLDC GLUCOMTR-MCNC: 93 MG/DL — SIGNIFICANT CHANGE UP (ref 70–99)
GLUCOSE BLDC GLUCOMTR-MCNC: 98 MG/DL — SIGNIFICANT CHANGE UP (ref 70–99)
GLUCOSE SERPL-MCNC: 96 MG/DL — SIGNIFICANT CHANGE UP (ref 70–99)
HCT VFR BLD CALC: 32.7 % — LOW (ref 37–47)
HGB BLD-MCNC: 10.2 G/DL — LOW (ref 12–16)
IMM GRANULOCYTES NFR BLD AUTO: 0.4 % — HIGH (ref 0.1–0.3)
LYMPHOCYTES # BLD AUTO: 2.22 K/UL — SIGNIFICANT CHANGE UP (ref 1.2–3.4)
LYMPHOCYTES # BLD AUTO: 24.2 % — SIGNIFICANT CHANGE UP (ref 20.5–51.1)
MAGNESIUM SERPL-MCNC: 1.5 MG/DL — LOW (ref 1.8–2.4)
MCHC RBC-ENTMCNC: 30.3 PG — SIGNIFICANT CHANGE UP (ref 27–31)
MCHC RBC-ENTMCNC: 31.2 G/DL — LOW (ref 32–37)
MCV RBC AUTO: 97 FL — SIGNIFICANT CHANGE UP (ref 81–99)
MONOCYTES # BLD AUTO: 0.63 K/UL — HIGH (ref 0.1–0.6)
MONOCYTES NFR BLD AUTO: 6.9 % — SIGNIFICANT CHANGE UP (ref 1.7–9.3)
NEUTROPHILS # BLD AUTO: 5.77 K/UL — SIGNIFICANT CHANGE UP (ref 1.4–6.5)
NEUTROPHILS NFR BLD AUTO: 62.9 % — SIGNIFICANT CHANGE UP (ref 42.2–75.2)
NRBC # BLD: 0 /100 WBCS — SIGNIFICANT CHANGE UP (ref 0–0)
PHOSPHATE SERPL-MCNC: 3.3 MG/DL — SIGNIFICANT CHANGE UP (ref 2.1–4.9)
PLATELET # BLD AUTO: 255 K/UL — SIGNIFICANT CHANGE UP (ref 130–400)
POTASSIUM SERPL-MCNC: 3.4 MMOL/L — LOW (ref 3.5–5)
POTASSIUM SERPL-SCNC: 3.4 MMOL/L — LOW (ref 3.5–5)
RBC # BLD: 3.37 M/UL — LOW (ref 4.2–5.4)
RBC # FLD: 13.5 % — SIGNIFICANT CHANGE UP (ref 11.5–14.5)
SODIUM SERPL-SCNC: 143 MMOL/L — SIGNIFICANT CHANGE UP (ref 135–146)
WBC # BLD: 9.18 K/UL — SIGNIFICANT CHANGE UP (ref 4.8–10.8)
WBC # FLD AUTO: 9.18 K/UL — SIGNIFICANT CHANGE UP (ref 4.8–10.8)

## 2019-03-17 RX ORDER — MAGNESIUM SULFATE 500 MG/ML
2 VIAL (ML) INJECTION ONCE
Qty: 0 | Refills: 0 | Status: COMPLETED | OUTPATIENT
Start: 2019-03-17 | End: 2019-03-17

## 2019-03-17 RX ADMIN — CARBIDOPA AND LEVODOPA 1 TABLET(S): 25; 100 TABLET ORAL at 14:34

## 2019-03-17 RX ADMIN — Medication 81 MILLIGRAM(S): at 11:24

## 2019-03-17 RX ADMIN — LACTULOSE 10 GRAM(S): 10 SOLUTION ORAL at 22:06

## 2019-03-17 RX ADMIN — POLYETHYLENE GLYCOL 3350 17 GRAM(S): 17 POWDER, FOR SOLUTION ORAL at 11:24

## 2019-03-17 RX ADMIN — SERTRALINE 75 MILLIGRAM(S): 25 TABLET, FILM COATED ORAL at 11:24

## 2019-03-17 RX ADMIN — Medication 50 GRAM(S): at 10:17

## 2019-03-17 RX ADMIN — GABAPENTIN 600 MILLIGRAM(S): 400 CAPSULE ORAL at 05:22

## 2019-03-17 RX ADMIN — GABAPENTIN 600 MILLIGRAM(S): 400 CAPSULE ORAL at 13:08

## 2019-03-17 RX ADMIN — HEPARIN SODIUM 5000 UNIT(S): 5000 INJECTION INTRAVENOUS; SUBCUTANEOUS at 22:07

## 2019-03-17 RX ADMIN — Medication 100 MILLIGRAM(S): at 17:07

## 2019-03-17 RX ADMIN — CARBIDOPA AND LEVODOPA 1 TABLET(S): 25; 100 TABLET ORAL at 22:06

## 2019-03-17 RX ADMIN — HEPARIN SODIUM 5000 UNIT(S): 5000 INJECTION INTRAVENOUS; SUBCUTANEOUS at 05:23

## 2019-03-17 RX ADMIN — HEPARIN SODIUM 5000 UNIT(S): 5000 INJECTION INTRAVENOUS; SUBCUTANEOUS at 13:08

## 2019-03-17 RX ADMIN — ATORVASTATIN CALCIUM 10 MILLIGRAM(S): 80 TABLET, FILM COATED ORAL at 22:06

## 2019-03-17 RX ADMIN — CARBIDOPA AND LEVODOPA 1 TABLET(S): 25; 100 TABLET ORAL at 05:22

## 2019-03-17 RX ADMIN — Medication 1 DROP(S): at 05:23

## 2019-03-17 RX ADMIN — LATANOPROST 1 DROP(S): 0.05 SOLUTION/ DROPS OPHTHALMIC; TOPICAL at 22:07

## 2019-03-17 RX ADMIN — GABAPENTIN 600 MILLIGRAM(S): 400 CAPSULE ORAL at 22:06

## 2019-03-17 RX ADMIN — Medication 100 MILLIGRAM(S): at 05:22

## 2019-03-17 RX ADMIN — Medication 1 DROP(S): at 17:07

## 2019-03-17 RX ADMIN — Medication 50 MICROGRAM(S): at 05:22

## 2019-03-17 NOTE — PROGRESS NOTE ADULT - SUBJECTIVE AND OBJECTIVE BOX
Progress Note:  Provider Speciality                            Hospitalist      IMELDA ROLLE [id number] 78y Female     CHIEF PRESENTING COMPLAINT:  Patient is a 78y old  Female who presents with a chief complaint of Abdominal pain with associated nausea and vomiting. (16 Mar 2019 11:06)        SUBJECTIVE:  Patient was seen and examined at bedside. One reported BM last night.  No significant overnight events reported.     HISTORY OF PRESENTING ILLNESS:  HPI:  The patient is a 78y Female with PMH of Dementia, hx breast Ca and hx of colon resection 2yrs ago is presenting to ED from [facility] with abd pain x 1d. She describes it as generalized with associated nausea and abdominal distension. She has a hx of chronic constipation and states received a laxative yesterday and passed stool this am. She denies dysuria, urinary urgency/frequency, back pain, cp, sob. Patient had episode of vomitting in ED and an NG tube was placed. (07 Mar 2019 01:57)      PAST MEDICAL & SURGICAL HISTORY:  PAST MEDICAL & SURGICAL HISTORY:  Lewy body dementia without behavioral disturbance  Constipation  History of breast cancer  Dementia  History of colon resection      VITAL SIGNS:  Vital Signs Last 24 Hrs  T(C): 35.8 (17 Mar 2019 07:58), Max: 36.7 (16 Mar 2019 23:45)  T(F): 96.4 (17 Mar 2019 07:58), Max: 98.1 (16 Mar 2019 23:45)  HR: 91 (17 Mar 2019 07:58) (75 - 91)  BP: 142/65 (17 Mar 2019 07:58) (142/65 - 158/70)  BP(mean): --  RR: 18 (17 Mar 2019 07:58) (18 - 18)  SpO2: --    PHYSICAL EXAMINATION:  Not in acute distress  General: No pallor, or icterus, afebrile  HEENT:  LUCIA, EOMI, no JVD, no Bruit.  Heart: S1+S2 audible, no murmur  Lungs: bilateral  fair air entry, no wheezing, no crepitations.  Abdomen: Soft, non-tender, +distended , no  rigidity or guarding.  CNS: dementia,  AAOx2, CN  grossly intact.  Extremities:  No edema          REVIEW OF SYSTEMS:  Patient denies any headache, any vision complaints, runny nose, fever, chills, sore throat. Denies chest pain, shortness of breath, palpitation. Denies nausea, vomiting, abdominal pain, diarrhoea, Denies urinary burning, urgency, frequency, dysuria. Denies weakness in any part of the body or numbness.   At least 10 systems were reviewed in ROS. All systems reviewed  are within normal limits except for the complaints as described in Subjective.    CONSULTS:  Consultant(s) Notes Reviewed by me.   Care Discussed with Consultants/Other Providers where required.        MEDICATIONS:  MEDICATIONS  (STANDING):  aspirin  chewable 81 milliGRAM(s) Oral daily  atorvastatin 10 milliGRAM(s) Oral at bedtime  carbidopa/levodopa  25/100 1 Tablet(s) Oral three times a day  docusate sodium Liquid 100 milliGRAM(s) Oral two times a day  gabapentin 600 milliGRAM(s) Oral three times a day  heparin  Injectable 5000 Unit(s) SubCutaneous every 8 hours  lactulose Syrup      lactulose Syrup 10 Gram(s) Oral at bedtime  latanoprost 0.005% Ophthalmic Solution 1 Drop(s) Both EYES at bedtime  levothyroxine 50 MICROGram(s) Oral daily  polyethylene glycol 3350 17 Gram(s) Oral daily  sertraline 75 milliGRAM(s) Oral daily  timolol 0.5% Solution 1 Drop(s) Both EYES two times a day    MEDICATIONS  (PRN):  acetaminophen   Tablet .. 650 milliGRAM(s) Oral every 6 hours PRN Mild Pain (1 - 3)  acetaminophen   Tablet .. 650 milliGRAM(s) Oral every 6 hours PRN Temp greater or equal to 38C (100.4F)  bisacodyl Suppository 10 milliGRAM(s) Rectal daily PRN Constipation  ondansetron Injectable 4 milliGRAM(s) IV Push every 6 hours PRN Nausea and/or Vomiting      [facility]/I & O's:                        10.2   9.18  )-----------( 255      ( 17 Mar 2019 05:45 )             32.7     03-17    143  |  108  |  9<L>  ----------------------------<  96  3.4<L>   |  22  |  0.8    Ca    8.3<L>      17 Mar 2019 05:45  Phos  3.3     03-17  Mg     1.5     03-17          CAPILLARY BLOOD GLUCOSE      POCT Blood Glucose.: 93 mg/dL (17 Mar 2019 06:39)  POCT Blood Glucose.: 98 mg/dL (17 Mar 2019 00:46)  POCT Blood Glucose.: 115 mg/dL (16 Mar 2019 11:58)                03-16-19 @ 07:01  -  03-17-19 @ 07:00  --------------------------------------------------------  IN: 450 mL / OUT: 0 mL / NET: 450 mL              ASSESSMENT:    The patient is a 78y Female with PMH of Lewy Body Dementia, hx breast Ca and hx of colon resection 2yrs ago, Hx of chronic constipation  is presenting to ED from [facility] with abd pain x 1d.  She h Patient had episode of vomiting in ED and an NG tube was placed.     Partial SBO/ileus  One documented BM last night.  Adfvance diet to soft, currently tolerating full liquids  Electrolyte abnormalities-Electrolyte supplementation and follow up with BMP. Keep K+>4, Mg>2   Interval Enlargement Of Left ovarian Cyst-Outpatient follow-up  Hypothyroidism-TSH WNL, c/w synthroid  Lewy Body Dementia-C/W sinemet  DVT PPx: Heparin subQ  GI PPx: Protonix     #Progress Note Handoff  Pending : Still acute  Disposition:  NH when stable   Discussion: Discussed with patient &

## 2019-03-18 LAB
GLUCOSE BLDC GLUCOMTR-MCNC: 103 MG/DL — HIGH (ref 70–99)
GLUCOSE BLDC GLUCOMTR-MCNC: 105 MG/DL — HIGH (ref 70–99)
GLUCOSE BLDC GLUCOMTR-MCNC: 116 MG/DL — HIGH (ref 70–99)
GLUCOSE BLDC GLUCOMTR-MCNC: 139 MG/DL — HIGH (ref 70–99)

## 2019-03-18 RX ORDER — POTASSIUM CHLORIDE 20 MEQ
20 PACKET (EA) ORAL DAILY
Qty: 0 | Refills: 0 | Status: DISCONTINUED | OUTPATIENT
Start: 2019-03-18 | End: 2019-03-19

## 2019-03-18 RX ORDER — MAGNESIUM OXIDE 400 MG ORAL TABLET 241.3 MG
400 TABLET ORAL
Qty: 0 | Refills: 0 | Status: DISCONTINUED | OUTPATIENT
Start: 2019-03-18 | End: 2019-03-19

## 2019-03-18 RX ADMIN — MAGNESIUM OXIDE 400 MG ORAL TABLET 400 MILLIGRAM(S): 241.3 TABLET ORAL at 13:34

## 2019-03-18 RX ADMIN — MAGNESIUM OXIDE 400 MG ORAL TABLET 400 MILLIGRAM(S): 241.3 TABLET ORAL at 11:17

## 2019-03-18 RX ADMIN — Medication 100 MILLIGRAM(S): at 05:22

## 2019-03-18 RX ADMIN — CARBIDOPA AND LEVODOPA 1 TABLET(S): 25; 100 TABLET ORAL at 21:39

## 2019-03-18 RX ADMIN — Medication 50 MICROGRAM(S): at 05:22

## 2019-03-18 RX ADMIN — HEPARIN SODIUM 5000 UNIT(S): 5000 INJECTION INTRAVENOUS; SUBCUTANEOUS at 13:17

## 2019-03-18 RX ADMIN — ATORVASTATIN CALCIUM 10 MILLIGRAM(S): 80 TABLET, FILM COATED ORAL at 21:39

## 2019-03-18 RX ADMIN — Medication 20 MILLIEQUIVALENT(S): at 17:05

## 2019-03-18 RX ADMIN — Medication 81 MILLIGRAM(S): at 11:18

## 2019-03-18 RX ADMIN — MAGNESIUM OXIDE 400 MG ORAL TABLET 400 MILLIGRAM(S): 241.3 TABLET ORAL at 17:05

## 2019-03-18 RX ADMIN — HEPARIN SODIUM 5000 UNIT(S): 5000 INJECTION INTRAVENOUS; SUBCUTANEOUS at 05:22

## 2019-03-18 RX ADMIN — POLYETHYLENE GLYCOL 3350 17 GRAM(S): 17 POWDER, FOR SOLUTION ORAL at 11:18

## 2019-03-18 RX ADMIN — CARBIDOPA AND LEVODOPA 1 TABLET(S): 25; 100 TABLET ORAL at 13:17

## 2019-03-18 RX ADMIN — Medication 1 DROP(S): at 17:06

## 2019-03-18 RX ADMIN — Medication 100 MILLIGRAM(S): at 17:06

## 2019-03-18 RX ADMIN — SERTRALINE 75 MILLIGRAM(S): 25 TABLET, FILM COATED ORAL at 11:18

## 2019-03-18 RX ADMIN — LACTULOSE 10 GRAM(S): 10 SOLUTION ORAL at 21:40

## 2019-03-18 RX ADMIN — GABAPENTIN 600 MILLIGRAM(S): 400 CAPSULE ORAL at 13:18

## 2019-03-18 RX ADMIN — CARBIDOPA AND LEVODOPA 1 TABLET(S): 25; 100 TABLET ORAL at 05:21

## 2019-03-18 RX ADMIN — GABAPENTIN 600 MILLIGRAM(S): 400 CAPSULE ORAL at 05:22

## 2019-03-18 RX ADMIN — Medication 1 DROP(S): at 05:22

## 2019-03-18 RX ADMIN — HEPARIN SODIUM 5000 UNIT(S): 5000 INJECTION INTRAVENOUS; SUBCUTANEOUS at 21:41

## 2019-03-18 RX ADMIN — GABAPENTIN 600 MILLIGRAM(S): 400 CAPSULE ORAL at 21:39

## 2019-03-18 RX ADMIN — LATANOPROST 1 DROP(S): 0.05 SOLUTION/ DROPS OPHTHALMIC; TOPICAL at 21:40

## 2019-03-18 NOTE — PROGRESS NOTE ADULT - SUBJECTIVE AND OBJECTIVE BOX
Patient is a 78y old  Female who presents with a chief complaint of Abdominal pain with associated nausea and vomiting. (18 Mar 2019 11:01)  Found to have Partial SBO, patient is not a surgical candidate     Interval events: Advanced to Regular diet today    Today is hop day 12  Patient is resting comfortably in bed  Abdominal pain, Nausea, Vomiting resolved  Bowel sounds present, passing gas, Constipation resolved over the weekend  Tolerated clears and  full liquid diet, advanced to regular diet today  Plan:  D/C to NH if Regular diet is tolerated    PAST MEDICAL & SURGICAL HISTORY:  Lewy body dementia without behavioral disturbance  Constipation  History of breast cancer  Dementia  History of colon resection      MEDICATIONS  (STANDING):  aspirin  chewable 81 milliGRAM(s) Oral daily  atorvastatin 10 milliGRAM(s) Oral at bedtime  carbidopa/levodopa  25/100 1 Tablet(s) Oral three times a day  docusate sodium Liquid 100 milliGRAM(s) Oral two times a day  gabapentin 600 milliGRAM(s) Oral three times a day  heparin  Injectable 5000 Unit(s) SubCutaneous every 8 hours  lactulose Syrup      lactulose Syrup 10 Gram(s) Oral at bedtime  latanoprost 0.005% Ophthalmic Solution 1 Drop(s) Both EYES at bedtime  levothyroxine 50 MICROGram(s) Oral daily  magnesium oxide 400 milliGRAM(s) Oral three times a day with meals  polyethylene glycol 3350 17 Gram(s) Oral daily  potassium chloride    Tablet ER 20 milliEquivalent(s) Oral daily  sertraline 75 milliGRAM(s) Oral daily  timolol 0.5% Solution 1 Drop(s) Both EYES two times a day    MEDICATIONS  (PRN):  acetaminophen   Tablet .. 650 milliGRAM(s) Oral every 6 hours PRN Mild Pain (1 - 3)  acetaminophen   Tablet .. 650 milliGRAM(s) Oral every 6 hours PRN Temp greater or equal to 38C (100.4F)  bisacodyl Suppository 10 milliGRAM(s) Rectal daily PRN Constipation  ondansetron Injectable 4 milliGRAM(s) IV Push every 6 hours PRN Nausea and/or Vomiting          Vital Signs Last 24 Hrs  T(C): 36.3 (18 Mar 2019 07:02), Max: 36.7 (17 Mar 2019 15:34)  T(F): 97.3 (18 Mar 2019 07:02), Max: 98 (17 Mar 2019 15:34)  HR: 74 (18 Mar 2019 07:02) (74 - 95)  BP: 145/65 (18 Mar 2019 07:02) (119/59 - 145/65)  BP(mean): --  RR: 18 (18 Mar 2019 07:02) (18 - 18)  SpO2: --  CAPILLARY BLOOD GLUCOSE      POCT Blood Glucose.: 103 mg/dL (18 Mar 2019 07:52)    I&O's Summary      Physical Exam:    -      General : NAD, Resting comfortably in bed    -      Cardiac: S1/S2 appreciated RRR    -      Pulm: CTA b/L, no adventitious sounds    -      GI:   Soft, distension improving, + bowel sounds     -      Musculoskeletal: Atraumatic, No edema, no skin color changes    -      Neuro: AOx 2        Labs:                        10.2   9.18  )-----------( 255      ( 17 Mar 2019 05:45 )             32.7             03-17    143  |  108  |  9<L>  ----------------------------<  96  3.4<L>   |  22  |  0.8    Ca    8.3<L>      17 Mar 2019 05:45  Phos  3.3     03-17  Mg     1.5     03-17                            Imaging:    ECG:

## 2019-03-18 NOTE — PROGRESS NOTE ADULT - ASSESSMENT
The patient is a 78y Female with PMH of Lewy Body Dementia, hx breast Ca and hx of colon resection 2yrs ago, Hx of chronic constipation  is presenting to ED from [facility] with abd pain x 1d.  She h Patient had episode of vomiting in ED and an NG tube was placed.     Abdominal Pain Likely secondary to Partial SBO/ileus  -CT A/P: Dilated small bowel, ileitis with associated Partial SBO, complete SBO is not excluded    -Abdominal X-ray from 3/9/19: showed  non-obstructive gas pattern  -F/Up New ABD x-ray: 3/12 : Dilated left mid abdominal small bowel loop, measuring up to 4.2 cm.                  Small bowel obstruction is a consideration in appropriate clinical setting  -KUB from 3/15/19: No significant interval change in diffuse gaseous distended large and   small bowel; may represent ileus or partial small bowel obstruction.  -Tolerated Clears, and full liquids, Advanced to Regular diet today  -Constipation Resolved over the weekend  -serial abdominal exams    Electrolyte abnormalities   Hypomagnesemia repleted 3/11/19, 3/16/19,   Hypokalemia: repeleted  3/12/ and 3/14  -PO mag and potasium started daily    Interval Enlargement Of Left ovarian Cyst  -Outpatient follow-up    Hypothyroidism  -TSH WNL, c/w synthroid    Lewy Body Dementia  -C/W sinemet    DVT PPx: Heparin subQ    GI PPx: Protonix     Dispo: From NH  -D/C when able to tolerate regular  diet

## 2019-03-18 NOTE — PROGRESS NOTE ADULT - SUBJECTIVE AND OBJECTIVE BOX
Progress Note:  Provider Speciality                            Hospitalist      IMELDA ROLLE MRN-[id number] 78y Female     CHIEF PRESENTING COMPLAINT:  Patient is a 78y old  Female who presents with a chief complaint of Abdominal pain with associated nausea and vomiting. (17 Mar 2019 11:33)        SUBJECTIVE:  Patient was seen and examined at bedside. Reported BM last yesterday. No significant overnight events reported.     HISTORY OF PRESENTING ILLNESS:  HPI:  The patient is a 78y Female with PMH of Dementia, hx breast Ca and hx of colon resection 2yrs ago is presenting to ED from [facility] with abd pain x 1d. She describes it as generalized with associated nausea and abdominal distension. She has a hx of chronic constipation and states received a laxative yesterday and passed stool this am. She denies dysuria, urinary urgency/frequency, back pain, cp, sob. Patient had episode of vomitting in ED and an NG tube was placed. (07 Mar 2019 01:57)      PAST MEDICAL & SURGICAL HISTORY:  PAST MEDICAL & SURGICAL HISTORY:  Lewy body dementia without behavioral disturbance  Constipation  History of breast cancer  Dementia  History of colon resection      VITAL SIGNS:  Vital Signs Last 24 Hrs  T(C): 36.3 (18 Mar 2019 07:02), Max: 36.7 (17 Mar 2019 15:34)  T(F): 97.3 (18 Mar 2019 07:02), Max: 98 (17 Mar 2019 15:34)  HR: 74 (18 Mar 2019 07:02) (74 - 95)  BP: 145/65 (18 Mar 2019 07:02) (119/59 - 145/65)  BP(mean): --  RR: 18 (18 Mar 2019 07:02) (18 - 18)  SpO2: --    PHYSICAL EXAMINATION:  Not in acute distress  General: No pallor, or icterus, afebrile  HEENT:  LUCIA, EOMI, no JVD, no Bruit.  Heart: S1+S2 audible, no murmur  Lungs: bilateral  fair air entry, no wheezing, no crepitations.  Abdomen: Soft, non-tender, distended , no  rigidity or guarding.  CNS: AAOx2, dementia, CN  grossly intact.  Extremities:  No edema          REVIEW OF SYSTEMS:  Patient denies any headache, any vision complaints, runny nose, fever, chills, sore throat. Denies chest pain, shortness of breath, palpitation. Denies nausea, vomiting, abdominal pain, diarrhoea, Denies urinary burning, urgency, frequency, dysuria. Denies weakness in any part of the body or numbness.   At least 10 systems were reviewed in ROS. All systems reviewed  are within normal limits except for the complaints as described in Subjective.    CONSULTS:  Consultant(s) Notes Reviewed by me.   Care Discussed with Consultants/Other Providers where required.        MEDICATIONS:  MEDICATIONS  (STANDING):  aspirin  chewable 81 milliGRAM(s) Oral daily  atorvastatin 10 milliGRAM(s) Oral at bedtime  carbidopa/levodopa  25/100 1 Tablet(s) Oral three times a day  docusate sodium Liquid 100 milliGRAM(s) Oral two times a day  gabapentin 600 milliGRAM(s) Oral three times a day  heparin  Injectable 5000 Unit(s) SubCutaneous every 8 hours  lactulose Syrup      lactulose Syrup 10 Gram(s) Oral at bedtime  latanoprost 0.005% Ophthalmic Solution 1 Drop(s) Both EYES at bedtime  levothyroxine 50 MICROGram(s) Oral daily  magnesium oxide 400 milliGRAM(s) Oral three times a day with meals  polyethylene glycol 3350 17 Gram(s) Oral daily  sertraline 75 milliGRAM(s) Oral daily  timolol 0.5% Solution 1 Drop(s) Both EYES two times a day    MEDICATIONS  (PRN):  acetaminophen   Tablet .. 650 milliGRAM(s) Oral every 6 hours PRN Mild Pain (1 - 3)  acetaminophen   Tablet .. 650 milliGRAM(s) Oral every 6 hours PRN Temp greater or equal to 38C (100.4F)  bisacodyl Suppository 10 milliGRAM(s) Rectal daily PRN Constipation  ondansetron Injectable 4 milliGRAM(s) IV Push every 6 hours PRN Nausea and/or Vomiting      LABOROTORY DATA/MICROBIOLOGY/I & O's:                        10.2   9.18  )-----------( 255      ( 17 Mar 2019 05:45 )             32.7     03-17    143  |  108  |  9<L>  ----------------------------<  96  3.4<L>   |  22  |  0.8    Ca    8.3<L>      17 Mar 2019 05:45  Phos  3.3     03-17  Mg     1.5     03-17          CAPILLARY BLOOD GLUCOSE      POCT Blood Glucose.: 103 mg/dL (18 Mar 2019 07:52)                        ASSESSMENT:      The patient is a 78y Female with PMH of Lewy Body Dementia, hx breast Ca and hx of colon resection 2yrs ago, Hx of chronic constipation  is presenting to ED from [facility] with abd pain x 1d.  She h Patient had episode of vomiting in ED and an NG tube was placed.     Partial SBO/ileus  Pt reported to be having BM  Adfvance diet to regular  Electrolyte abnormalities-Electrolyte supplementation and follow up with BMP. Keep K+>4, Mg>2   Interval Enlargement Of Left ovarian Cyst-Outpatient follow-up  Hypothyroidism-TSH WNL, c/w synthroid  Lewy Body Dementia-C/W sinemet  DVT PPx: Heparin subQ  GI PPx: Protonix     #Progress Note Handoff  Pending : Resolving SBO  Disposition:  NH when stable , anticipate for tomorrow  Discussion: Discussed with patient &

## 2019-03-18 NOTE — CHART NOTE - NSCHARTNOTEFT_GEN_A_CORE
Registered Dietitian Follow-Up (F4-10a)    ***Scroll to the bottom for RD recommendation***    Patient Profile Reviewed                           Yes [x]   No []  Nutrition History Previously Obtained        Yes [x]  No []  -  at bedside. pt's with lewy body dementia. Pt has poor appetite at this time. Not consuming much of the soft diet.       PERTINENT MEDICAL INFORMATIONS:  (1) p/w abd pain w/ a/w n/v. Pulled out vs sneezed out NGT 3/13. Pt refusing surgical intervention for partial SBO/ileus.  (2) adv diet to soft, toelrated full liquids. electrolytes abnormalities- repleting. c/w home meds  (3) NH when stable      PERTINENT SUBJECTIVE INFORMATION:  (1) see above      DIET ORDER:  SOFT + Prosource gelatin plus q12hr + Ensure Clear q12hr        ANTHROPOMETRICS:  - Ht.  167.64cm  - Wt.  (3/8): 98.5kg - no new weight (had edema previously)    - BMI. 35.1  - IBW. 59.1kg       PERTINENT LAB DATA:  3/17: /h 10.2/32.7, K 3.4, BUN 9, Ca 8.3, mag 1.5  PERTINENT MEDS:  aspirin, heparin, lactulose, ondansetron, acetaminophen, miralax, atorvastatin, levothyroxine, timolol       PHYSICAL FINDINGS  - APPEARANCE:        alert and oriented.   - GI FUNCTION:        LBM 3/17  - TUBES:                       - ORAL/MOUTH:      NPO  - SKIN:                        ecchymosis         NUTRITION REQUIREMENTS  WEIGHT USED:                           ABW is 98.5kg, IBW is 59.1kg  ESTIMATED ENERGY NEEDS:       CONTINUE [ x ]      ADJUST [  ] - from admission note    ESTIMATED ENERGY NEEDS:         0840-0403 kcal/day (MSJ x 1.0-1.2) BMI considered  ESTIMATED PROTEIN NEEDS:        59-70 g/day (1.0-1.2 g/kg of IBW)  ESTIMATED FLUID NEEDS:             1ml/kcal    CURRENT NUTRIENT NEEDS:    not meeting low appetite          [ x ] PREVIOUS NUTRITION DIAGNOSIS:    (1)inadequate protein-energy intake - remains            [ x ] ONGOING        [  ] RESOLVED        PATIENT INTERVENTION:    [ x ] ORAL        [ ] EN/TF     GOAL/EXPECTED OUTCOME:     pt to consume and tolerate >75% of all meals and snacks and supplements upon f/u in 3 days.   INDICATOR/MONITORING:       RD to monitor diet order, energy intake, body composition, nutrition focused physical findings (appetite)  NUTRITION INTERVENTION:        Meals and snacks.    RECS: (1) Continue current SOFT diet with supplements. no other intervention

## 2019-03-19 VITALS
HEART RATE: 77 BPM | DIASTOLIC BLOOD PRESSURE: 57 MMHG | TEMPERATURE: 97 F | RESPIRATION RATE: 17 BRPM | SYSTOLIC BLOOD PRESSURE: 111 MMHG

## 2019-03-19 LAB
ANION GAP SERPL CALC-SCNC: 13 MMOL/L — SIGNIFICANT CHANGE UP (ref 7–14)
BUN SERPL-MCNC: 16 MG/DL — SIGNIFICANT CHANGE UP (ref 10–20)
CALCIUM SERPL-MCNC: 9 MG/DL — SIGNIFICANT CHANGE UP (ref 8.5–10.1)
CHLORIDE SERPL-SCNC: 106 MMOL/L — SIGNIFICANT CHANGE UP (ref 98–110)
CO2 SERPL-SCNC: 24 MMOL/L — SIGNIFICANT CHANGE UP (ref 17–32)
CREAT SERPL-MCNC: 0.9 MG/DL — SIGNIFICANT CHANGE UP (ref 0.7–1.5)
GLUCOSE BLDC GLUCOMTR-MCNC: 160 MG/DL — HIGH (ref 70–99)
GLUCOSE BLDC GLUCOMTR-MCNC: 89 MG/DL — SIGNIFICANT CHANGE UP (ref 70–99)
GLUCOSE SERPL-MCNC: 168 MG/DL — HIGH (ref 70–99)
HCT VFR BLD CALC: 36.4 % — LOW (ref 37–47)
HGB BLD-MCNC: 11.3 G/DL — LOW (ref 12–16)
MAGNESIUM SERPL-MCNC: 1.7 MG/DL — LOW (ref 1.8–2.4)
MCHC RBC-ENTMCNC: 30.5 PG — SIGNIFICANT CHANGE UP (ref 27–31)
MCHC RBC-ENTMCNC: 31 G/DL — LOW (ref 32–37)
MCV RBC AUTO: 98.1 FL — SIGNIFICANT CHANGE UP (ref 81–99)
PLATELET # BLD AUTO: 129 K/UL — LOW (ref 130–400)
POTASSIUM SERPL-MCNC: 3.7 MMOL/L — SIGNIFICANT CHANGE UP (ref 3.5–5)
POTASSIUM SERPL-SCNC: 3.7 MMOL/L — SIGNIFICANT CHANGE UP (ref 3.5–5)
RBC # BLD: 3.71 M/UL — LOW (ref 4.2–5.4)
RBC # FLD: 13.6 % — SIGNIFICANT CHANGE UP (ref 11.5–14.5)
SODIUM SERPL-SCNC: 143 MMOL/L — SIGNIFICANT CHANGE UP (ref 135–146)
WBC # BLD: 9.4 K/UL — SIGNIFICANT CHANGE UP (ref 4.8–10.8)
WBC # FLD AUTO: 9.4 K/UL — SIGNIFICANT CHANGE UP (ref 4.8–10.8)

## 2019-03-19 RX ORDER — POTASSIUM CHLORIDE 20 MEQ
1 PACKET (EA) ORAL
Qty: 0 | Refills: 0 | DISCHARGE
Start: 2019-03-19

## 2019-03-19 RX ORDER — MAGNESIUM OXIDE 400 MG ORAL TABLET 241.3 MG
1 TABLET ORAL
Qty: 0 | Refills: 0 | DISCHARGE
Start: 2019-03-19

## 2019-03-19 RX ADMIN — Medication 1 DROP(S): at 05:27

## 2019-03-19 RX ADMIN — Medication 20 MILLIEQUIVALENT(S): at 11:43

## 2019-03-19 RX ADMIN — CARBIDOPA AND LEVODOPA 1 TABLET(S): 25; 100 TABLET ORAL at 05:27

## 2019-03-19 RX ADMIN — GABAPENTIN 600 MILLIGRAM(S): 400 CAPSULE ORAL at 13:08

## 2019-03-19 RX ADMIN — POLYETHYLENE GLYCOL 3350 17 GRAM(S): 17 POWDER, FOR SOLUTION ORAL at 11:43

## 2019-03-19 RX ADMIN — Medication 100 MILLIGRAM(S): at 05:27

## 2019-03-19 RX ADMIN — HEPARIN SODIUM 5000 UNIT(S): 5000 INJECTION INTRAVENOUS; SUBCUTANEOUS at 13:08

## 2019-03-19 RX ADMIN — CARBIDOPA AND LEVODOPA 1 TABLET(S): 25; 100 TABLET ORAL at 13:08

## 2019-03-19 RX ADMIN — MAGNESIUM OXIDE 400 MG ORAL TABLET 400 MILLIGRAM(S): 241.3 TABLET ORAL at 11:42

## 2019-03-19 RX ADMIN — Medication 50 MICROGRAM(S): at 05:27

## 2019-03-19 RX ADMIN — Medication 81 MILLIGRAM(S): at 11:43

## 2019-03-19 RX ADMIN — HEPARIN SODIUM 5000 UNIT(S): 5000 INJECTION INTRAVENOUS; SUBCUTANEOUS at 05:28

## 2019-03-19 RX ADMIN — GABAPENTIN 600 MILLIGRAM(S): 400 CAPSULE ORAL at 05:27

## 2019-03-19 RX ADMIN — SERTRALINE 75 MILLIGRAM(S): 25 TABLET, FILM COATED ORAL at 11:44

## 2019-03-19 NOTE — PROGRESS NOTE ADULT - PROVIDER SPECIALTY LIST ADULT
Hospitalist
Internal Medicine
Surgery
Trauma Surgery
Internal Medicine
Internal Medicine

## 2019-03-19 NOTE — PROGRESS NOTE ADULT - SUBJECTIVE AND OBJECTIVE BOX
Patient is a 78y old  Female who presents with a chief complaint of Abdominal pain with associated nausea and vomiting. (18 Mar 2019 11:26)  Found to have Partial SBO, patient is not a surgical candidate     Interval events: none    Today is hop day 13  Patient is resting comfortably in bed  Abdominal pain, Nausea, Vomiting resolved  Bowel sounds present, passing gas, Constipation resolved over the weekend  Tolerated clears,  full liquid and regular diet today   D/C to [facility] today      PAST MEDICAL & SURGICAL HISTORY:  Lewy body dementia without behavioral disturbance  Constipation  History of breast cancer  Dementia  History of colon resection      MEDICATIONS  (STANDING):  aspirin  chewable 81 milliGRAM(s) Oral daily  atorvastatin 10 milliGRAM(s) Oral at bedtime  carbidopa/levodopa  25/100 1 Tablet(s) Oral three times a day  docusate sodium Liquid 100 milliGRAM(s) Oral two times a day  gabapentin 600 milliGRAM(s) Oral three times a day  heparin  Injectable 5000 Unit(s) SubCutaneous every 8 hours  lactulose Syrup      lactulose Syrup 10 Gram(s) Oral at bedtime  latanoprost 0.005% Ophthalmic Solution 1 Drop(s) Both EYES at bedtime  levothyroxine 50 MICROGram(s) Oral daily  magnesium oxide 400 milliGRAM(s) Oral three times a day with meals  polyethylene glycol 3350 17 Gram(s) Oral daily  potassium chloride    Tablet ER 20 milliEquivalent(s) Oral daily  sertraline 75 milliGRAM(s) Oral daily  timolol 0.5% Solution 1 Drop(s) Both EYES two times a day    MEDICATIONS  (PRN):  acetaminophen   Tablet .. 650 milliGRAM(s) Oral every 6 hours PRN Mild Pain (1 - 3)  acetaminophen   Tablet .. 650 milliGRAM(s) Oral every 6 hours PRN Temp greater or equal to 38C (100.4F)  bisacodyl Suppository 10 milliGRAM(s) Rectal daily PRN Constipation  ondansetron Injectable 4 milliGRAM(s) IV Push every 6 hours PRN Nausea and/or Vomiting          Vital Signs Last 24 Hrs  T(C): 36.4 (18 Mar 2019 23:30), Max: 36.4 (18 Mar 2019 23:30)  T(F): 97.5 (18 Mar 2019 23:30), Max: 97.5 (18 Mar 2019 23:30)  HR: 74 (18 Mar 2019 23:30) (74 - 78)  BP: 123/67 (18 Mar 2019 23:30) (123/67 - 138/72)  BP(mean): --  RR: 18 (18 Mar 2019 23:30) (18 - 18)  SpO2: --  CAPILLARY BLOOD GLUCOSE      POCT Blood Glucose.: 89 mg/dL (19 Mar 2019 07:14)  POCT Blood Glucose.: 116 mg/dL (18 Mar 2019 20:54)  POCT Blood Glucose.: 105 mg/dL (18 Mar 2019 17:08)  POCT Blood Glucose.: 139 mg/dL (18 Mar 2019 11:53)    I&O's Summary    18 Mar 2019 07:01  -  19 Mar 2019 07:00  --------------------------------------------------------  IN: 1320 mL / OUT: 0 mL / NET: 1320 mL  Physical Exam:    -     General : NAD, Resting comfortably in bed    -      Cardiac: S1/S2 appreciated RRR    -      Pulm: CTA b/L, no adventitious sounds    -      GI:   Soft, distension improved, + bowel sounds     -      Musculoskeletal: Atraumatic, No edema, no skin color changes    -      Neuro: AOx 2         Labs:            Imaging:    ECG:

## 2019-03-19 NOTE — PROGRESS NOTE ADULT - ASSESSMENT
The patient is a 78y Female with PMH of Lewy Body Dementia, hx breast Ca and hx of colon resection 2yrs ago, Hx of chronic constipation  is presenting to ED from [facility] with abd pain x 1d.  She h Patient had episode of vomiting in ED and an NG tube was placed.     Abdominal Pain Likely secondary to Partial SBO/ileus  -CT A/P: Dilated small bowel, ileitis with associated Partial SBO, complete SBO is not excluded    -Abdominal X-ray from 3/9/19: showed  non-obstructive gas pattern  -F/Up New ABD x-ray: 3/12  and 3/15: Dilated small bowel may represent ileus or partial small bowel obstruction.  -tolerated Clears, full liquids, and Regular diet   -Constipation Resolved over the weekend  -Abdomen remains soft  -Stable for d/c today     Electrolyte abnormalities   Hypomagnesemia repleted 3/11/19, 3/16/19,   Hypokalemia: repeleted  3/12/ and 3/14  -PO mag and potasium started daily    Interval Enlargement Of Left ovarian Cyst  -Outpatient follow-up    Hypothyroidism  -TSH WNL, c/w synthroid    Lewy Body Dementia  -C/W sinemet    DVT PPx: Heparin subQ    GI PPx: Protonix     Dispo: From NH  -D/C back to NH today

## 2019-03-19 NOTE — PROGRESS NOTE ADULT - REASON FOR ADMISSION
Abdominal pain with associated nausea and vomiting.

## 2019-03-20 ENCOUNTER — OUTPATIENT (OUTPATIENT)
Dept: OUTPATIENT SERVICES | Facility: HOSPITAL | Age: 79
LOS: 1 days | Discharge: HOME | End: 2019-03-20

## 2019-03-20 DIAGNOSIS — Z79.4 LONG TERM (CURRENT) USE OF INSULIN: ICD-10-CM

## 2019-03-20 DIAGNOSIS — Z90.49 ACQUIRED ABSENCE OF OTHER SPECIFIED PARTS OF DIGESTIVE TRACT: Chronic | ICD-10-CM

## 2019-03-20 DIAGNOSIS — D64.9 ANEMIA, UNSPECIFIED: ICD-10-CM

## 2019-03-20 PROBLEM — F03.90 UNSPECIFIED DEMENTIA, UNSPECIFIED SEVERITY, WITHOUT BEHAVIORAL DISTURBANCE, PSYCHOTIC DISTURBANCE, MOOD DISTURBANCE, AND ANXIETY: Chronic | Status: ACTIVE | Noted: 2019-03-06

## 2019-03-20 PROBLEM — K59.00 CONSTIPATION, UNSPECIFIED: Chronic | Status: ACTIVE | Noted: 2019-03-06

## 2019-03-20 PROBLEM — G31.83 NEUROCOGNITIVE DISORDER WITH LEWY BODIES: Chronic | Status: ACTIVE | Noted: 2019-03-07

## 2019-03-20 PROBLEM — F03.90 UNSPECIFIED DEMENTIA WITHOUT BEHAVIORAL DISTURBANCE: Chronic | Status: ACTIVE | Noted: 2019-03-06

## 2019-03-20 PROBLEM — Z85.3 PERSONAL HISTORY OF MALIGNANT NEOPLASM OF BREAST: Chronic | Status: ACTIVE | Noted: 2019-03-06

## 2019-03-25 DIAGNOSIS — Z78.1 PHYSICAL RESTRAINT STATUS: ICD-10-CM

## 2019-03-25 DIAGNOSIS — Z92.3 PERSONAL HISTORY OF IRRADIATION: ICD-10-CM

## 2019-03-25 DIAGNOSIS — K56.600 PARTIAL INTESTINAL OBSTRUCTION, UNSPECIFIED AS TO CAUSE: ICD-10-CM

## 2019-03-25 DIAGNOSIS — K52.9 NONINFECTIVE GASTROENTERITIS AND COLITIS, UNSPECIFIED: ICD-10-CM

## 2019-03-25 DIAGNOSIS — E83.42 HYPOMAGNESEMIA: ICD-10-CM

## 2019-03-25 DIAGNOSIS — Z88.0 ALLERGY STATUS TO PENICILLIN: ICD-10-CM

## 2019-03-25 DIAGNOSIS — E03.9 HYPOTHYROIDISM, UNSPECIFIED: ICD-10-CM

## 2019-03-25 DIAGNOSIS — E87.6 HYPOKALEMIA: ICD-10-CM

## 2019-03-25 DIAGNOSIS — Z90.49 ACQUIRED ABSENCE OF OTHER SPECIFIED PARTS OF DIGESTIVE TRACT: ICD-10-CM

## 2019-03-25 DIAGNOSIS — G31.83 NEUROCOGNITIVE DISORDER WITH LEWY BODIES: ICD-10-CM

## 2019-03-25 DIAGNOSIS — Z85.3 PERSONAL HISTORY OF MALIGNANT NEOPLASM OF BREAST: ICD-10-CM

## 2019-03-25 DIAGNOSIS — Z90.11 ACQUIRED ABSENCE OF RIGHT BREAST AND NIPPLE: ICD-10-CM

## 2019-03-25 DIAGNOSIS — I80.8 PHLEBITIS AND THROMBOPHLEBITIS OF OTHER SITES: ICD-10-CM

## 2019-03-25 DIAGNOSIS — Z88.8 ALLERGY STATUS TO OTHER DRUGS, MEDICAMENTS AND BIOLOGICAL SUBSTANCES: ICD-10-CM

## 2019-03-25 DIAGNOSIS — Z92.21 PERSONAL HISTORY OF ANTINEOPLASTIC CHEMOTHERAPY: ICD-10-CM

## 2019-03-25 DIAGNOSIS — Z88.1 ALLERGY STATUS TO OTHER ANTIBIOTIC AGENTS STATUS: ICD-10-CM

## 2019-03-25 DIAGNOSIS — F02.80 DEMENTIA IN OTHER DISEASES CLASSIFIED ELSEWHERE, UNSPECIFIED SEVERITY, WITHOUT BEHAVIORAL DISTURBANCE, PSYCHOTIC DISTURBANCE, MOOD DISTURBANCE, AND ANXIETY: ICD-10-CM

## 2019-06-26 ENCOUNTER — OUTPATIENT (OUTPATIENT)
Dept: OUTPATIENT SERVICES | Facility: HOSPITAL | Age: 79
LOS: 1 days | Discharge: HOME | End: 2019-06-26

## 2019-06-26 DIAGNOSIS — Z90.49 ACQUIRED ABSENCE OF OTHER SPECIFIED PARTS OF DIGESTIVE TRACT: Chronic | ICD-10-CM

## 2019-06-26 DIAGNOSIS — D64.9 ANEMIA, UNSPECIFIED: ICD-10-CM

## 2019-06-26 DIAGNOSIS — Z79.4 LONG TERM (CURRENT) USE OF INSULIN: ICD-10-CM

## 2019-07-03 ENCOUNTER — INPATIENT (INPATIENT)
Facility: HOSPITAL | Age: 79
LOS: 6 days | Discharge: NURSING, REHAB OR LONG-TERM CARE | End: 2019-07-10
Attending: SURGERY | Admitting: SURGERY
Payer: MEDICARE

## 2019-07-03 VITALS
TEMPERATURE: 98 F | DIASTOLIC BLOOD PRESSURE: 88 MMHG | HEART RATE: 65 BPM | RESPIRATION RATE: 19 BRPM | OXYGEN SATURATION: 97 % | SYSTOLIC BLOOD PRESSURE: 145 MMHG

## 2019-07-03 DIAGNOSIS — Z90.49 ACQUIRED ABSENCE OF OTHER SPECIFIED PARTS OF DIGESTIVE TRACT: Chronic | ICD-10-CM

## 2019-07-03 DIAGNOSIS — Z90.11 ACQUIRED ABSENCE OF RIGHT BREAST AND NIPPLE: Chronic | ICD-10-CM

## 2019-07-03 LAB
ALBUMIN SERPL ELPH-MCNC: 3.9 G/DL — SIGNIFICANT CHANGE UP (ref 3.5–5.2)
ALP SERPL-CCNC: 65 U/L — SIGNIFICANT CHANGE UP (ref 30–115)
ALT FLD-CCNC: 7 U/L — SIGNIFICANT CHANGE UP (ref 0–41)
ANION GAP SERPL CALC-SCNC: 15 MMOL/L — HIGH (ref 7–14)
APPEARANCE UR: ABNORMAL
AST SERPL-CCNC: 15 U/L — SIGNIFICANT CHANGE UP (ref 0–41)
BACTERIA # UR AUTO: ABNORMAL /HPF
BASOPHILS # BLD AUTO: 0.07 K/UL — SIGNIFICANT CHANGE UP (ref 0–0.2)
BASOPHILS NFR BLD AUTO: 0.7 % — SIGNIFICANT CHANGE UP (ref 0–1)
BILIRUB SERPL-MCNC: 0.3 MG/DL — SIGNIFICANT CHANGE UP (ref 0.2–1.2)
BILIRUB UR-MCNC: NEGATIVE — SIGNIFICANT CHANGE UP
BUN SERPL-MCNC: 14 MG/DL — SIGNIFICANT CHANGE UP (ref 10–20)
CALCIUM SERPL-MCNC: 9.6 MG/DL — SIGNIFICANT CHANGE UP (ref 8.5–10.1)
CHLORIDE SERPL-SCNC: 99 MMOL/L — SIGNIFICANT CHANGE UP (ref 98–110)
CO2 SERPL-SCNC: 22 MMOL/L — SIGNIFICANT CHANGE UP (ref 17–32)
COLOR SPEC: YELLOW — SIGNIFICANT CHANGE UP
CREAT SERPL-MCNC: 0.9 MG/DL — SIGNIFICANT CHANGE UP (ref 0.7–1.5)
DIFF PNL FLD: ABNORMAL
EOSINOPHIL # BLD AUTO: 0.2 K/UL — SIGNIFICANT CHANGE UP (ref 0–0.7)
EOSINOPHIL NFR BLD AUTO: 2 % — SIGNIFICANT CHANGE UP (ref 0–8)
EPI CELLS # UR: ABNORMAL /HPF
GLUCOSE SERPL-MCNC: 102 MG/DL — HIGH (ref 70–99)
GLUCOSE UR QL: NEGATIVE — SIGNIFICANT CHANGE UP
HCT VFR BLD CALC: 36.8 % — LOW (ref 37–47)
HGB BLD-MCNC: 11.9 G/DL — LOW (ref 12–16)
IMM GRANULOCYTES NFR BLD AUTO: 0.6 % — HIGH (ref 0.1–0.3)
KETONES UR-MCNC: NEGATIVE — SIGNIFICANT CHANGE UP
LACTATE SERPL-SCNC: 1.5 MMOL/L — SIGNIFICANT CHANGE UP (ref 0.5–2.2)
LEUKOCYTE ESTERASE UR-ACNC: ABNORMAL
LIDOCAIN IGE QN: 26 U/L — SIGNIFICANT CHANGE UP (ref 7–60)
LYMPHOCYTES # BLD AUTO: 2.43 K/UL — SIGNIFICANT CHANGE UP (ref 1.2–3.4)
LYMPHOCYTES # BLD AUTO: 24 % — SIGNIFICANT CHANGE UP (ref 20.5–51.1)
MCHC RBC-ENTMCNC: 30.9 PG — SIGNIFICANT CHANGE UP (ref 27–31)
MCHC RBC-ENTMCNC: 32.3 G/DL — SIGNIFICANT CHANGE UP (ref 32–37)
MCV RBC AUTO: 95.6 FL — SIGNIFICANT CHANGE UP (ref 81–99)
MONOCYTES # BLD AUTO: 0.83 K/UL — HIGH (ref 0.1–0.6)
MONOCYTES NFR BLD AUTO: 8.2 % — SIGNIFICANT CHANGE UP (ref 1.7–9.3)
NEUTROPHILS # BLD AUTO: 6.52 K/UL — HIGH (ref 1.4–6.5)
NEUTROPHILS NFR BLD AUTO: 64.5 % — SIGNIFICANT CHANGE UP (ref 42.2–75.2)
NITRITE UR-MCNC: NEGATIVE — SIGNIFICANT CHANGE UP
NRBC # BLD: 0 /100 WBCS — SIGNIFICANT CHANGE UP (ref 0–0)
PH UR: 6.5 — SIGNIFICANT CHANGE UP (ref 5–8)
PLATELET # BLD AUTO: 291 K/UL — SIGNIFICANT CHANGE UP (ref 130–400)
POTASSIUM SERPL-MCNC: 4.3 MMOL/L — SIGNIFICANT CHANGE UP (ref 3.5–5)
POTASSIUM SERPL-SCNC: 4.3 MMOL/L — SIGNIFICANT CHANGE UP (ref 3.5–5)
PROT SERPL-MCNC: 6.8 G/DL — SIGNIFICANT CHANGE UP (ref 6–8)
PROT UR-MCNC: NEGATIVE — SIGNIFICANT CHANGE UP
RBC # BLD: 3.85 M/UL — LOW (ref 4.2–5.4)
RBC # FLD: 13.5 % — SIGNIFICANT CHANGE UP (ref 11.5–14.5)
RBC CASTS # UR COMP ASSIST: >50 /HPF
SODIUM SERPL-SCNC: 136 MMOL/L — SIGNIFICANT CHANGE UP (ref 135–146)
SP GR SPEC: >=1.03 — SIGNIFICANT CHANGE UP (ref 1.01–1.03)
UROBILINOGEN FLD QL: 0.2 — SIGNIFICANT CHANGE UP (ref 0.2–0.2)
WBC # BLD: 10.11 K/UL — SIGNIFICANT CHANGE UP (ref 4.8–10.8)
WBC # FLD AUTO: 10.11 K/UL — SIGNIFICANT CHANGE UP (ref 4.8–10.8)
WBC UR QL: >50 /HPF

## 2019-07-03 PROCEDURE — 74019 RADEX ABDOMEN 2 VIEWS: CPT

## 2019-07-03 PROCEDURE — 74018 RADEX ABDOMEN 1 VIEW: CPT | Mod: 26

## 2019-07-03 PROCEDURE — 99285 EMERGENCY DEPT VISIT HI MDM: CPT

## 2019-07-03 PROCEDURE — 74177 CT ABD & PELVIS W/CONTRAST: CPT | Mod: 26

## 2019-07-03 RX ORDER — POTASSIUM CHLORIDE 20 MEQ
20 PACKET (EA) ORAL DAILY
Refills: 0 | Status: DISCONTINUED | OUTPATIENT
Start: 2019-07-03 | End: 2019-07-04

## 2019-07-03 RX ORDER — LEVOTHYROXINE SODIUM 125 MCG
50 TABLET ORAL DAILY
Refills: 0 | Status: DISCONTINUED | OUTPATIENT
Start: 2019-07-03 | End: 2019-07-10

## 2019-07-03 RX ORDER — MAGNESIUM OXIDE 400 MG ORAL TABLET 241.3 MG
400 TABLET ORAL
Refills: 0 | Status: DISCONTINUED | OUTPATIENT
Start: 2019-07-03 | End: 2019-07-04

## 2019-07-03 RX ORDER — ASPIRIN/CALCIUM CARB/MAGNESIUM 324 MG
81 TABLET ORAL DAILY
Refills: 0 | Status: DISCONTINUED | OUTPATIENT
Start: 2019-07-03 | End: 2019-07-10

## 2019-07-03 RX ORDER — IOHEXOL 300 MG/ML
30 INJECTION, SOLUTION INTRAVENOUS ONCE
Refills: 0 | Status: COMPLETED | OUTPATIENT
Start: 2019-07-03 | End: 2019-07-03

## 2019-07-03 RX ORDER — SERTRALINE 25 MG/1
50 TABLET, FILM COATED ORAL DAILY
Refills: 0 | Status: DISCONTINUED | OUTPATIENT
Start: 2019-07-03 | End: 2019-07-10

## 2019-07-03 RX ORDER — SODIUM CHLORIDE 9 MG/ML
1000 INJECTION INTRAMUSCULAR; INTRAVENOUS; SUBCUTANEOUS ONCE
Refills: 0 | Status: COMPLETED | OUTPATIENT
Start: 2019-07-03 | End: 2019-07-03

## 2019-07-03 RX ORDER — ATORVASTATIN CALCIUM 80 MG/1
10 TABLET, FILM COATED ORAL AT BEDTIME
Refills: 0 | Status: DISCONTINUED | OUTPATIENT
Start: 2019-07-03 | End: 2019-07-10

## 2019-07-03 RX ORDER — CARBIDOPA AND LEVODOPA 25; 100 MG/1; MG/1
1 TABLET ORAL ONCE
Refills: 0 | Status: COMPLETED | OUTPATIENT
Start: 2019-07-03 | End: 2019-07-03

## 2019-07-03 RX ORDER — PANTOPRAZOLE SODIUM 20 MG/1
40 TABLET, DELAYED RELEASE ORAL
Refills: 0 | Status: DISCONTINUED | OUTPATIENT
Start: 2019-07-03 | End: 2019-07-10

## 2019-07-03 RX ORDER — AZTREONAM 2 G
1000 VIAL (EA) INJECTION ONCE
Refills: 0 | Status: COMPLETED | OUTPATIENT
Start: 2019-07-03 | End: 2019-07-03

## 2019-07-03 RX ORDER — ASCORBIC ACID 60 MG
500 TABLET,CHEWABLE ORAL
Refills: 0 | Status: DISCONTINUED | OUTPATIENT
Start: 2019-07-03 | End: 2019-07-04

## 2019-07-03 RX ORDER — GABAPENTIN 400 MG/1
600 CAPSULE ORAL THREE TIMES A DAY
Refills: 0 | Status: DISCONTINUED | OUTPATIENT
Start: 2019-07-03 | End: 2019-07-10

## 2019-07-03 RX ORDER — CHOLECALCIFEROL (VITAMIN D3) 125 MCG
1000 CAPSULE ORAL DAILY
Refills: 0 | Status: DISCONTINUED | OUTPATIENT
Start: 2019-07-03 | End: 2019-07-04

## 2019-07-03 RX ADMIN — SODIUM CHLORIDE 1000 MILLILITER(S): 9 INJECTION INTRAMUSCULAR; INTRAVENOUS; SUBCUTANEOUS at 14:29

## 2019-07-03 RX ADMIN — IOHEXOL 30 MILLILITER(S): 300 INJECTION, SOLUTION INTRAVENOUS at 14:29

## 2019-07-03 NOTE — H&P ADULT - HISTORY OF PRESENT ILLNESS
AMPARO SIMPSON [id number]  78y Female    HPI:  Amparo Simpson is a 79 yo F w/PMHx of colitis, colon polyp s/p colectomy who presents from a nursing home to the ED w/2 days of abdominal distension and one day of nausea and vomiting w/fever. Per patient's , her last bowel movement was yesterday. Patient had a recent hospitalization with similar presentation of abdominal distension. NG tube was placed and symptoms resolved. Patient is poor historian, history obtained from . 78F PMHx below notable for colitis, and colectomy 2/2 unresectable colonic polyp (from CT scan appears to be right hemicolectomy with ileocolic anastomosis) presents from a nursing home with  to the ED w/2 days of abdominal distension and one day of nausea and vomiting w/ possible fever. Per patient and , her last bowel movement was yesterday, however questionable reliability due to lewy body dementia. Patient had a hospitalization with similar presentation of abdominal distension resolved with non operative management, NG tube was placed and symptoms resolved. CT scan performed in ED with full results below showed signs of SBO vs ileitis

## 2019-07-03 NOTE — ED ADULT TRIAGE NOTE - CHIEF COMPLAINT QUOTE
Patient BIBA from [facility] for evaluation of abdominal distention. Patient has dementia and is alert and oriented x 2 at baseline.

## 2019-07-03 NOTE — ED PROVIDER NOTE - PHYSICAL EXAMINATION
CONST: Well appearing in NAD  EYES: PERRL, EOMI, Sclera and conjunctiva clear.   ENT: No nasal discharge. TM's clear. Oropharynx normal appearing, no erythema or exudates.   NECK: Non-tender, no meningeal signs. normal ROM. supple   CARD: Normal S1 S2; Normal rate and rhythm  RESP: Equal BS B/L, No wheezes, rhonchi or rales. No distress  GI:  abdomen distended. no cva tenderness. n  MS: Normal ROM in all extremities. No midline spinal tenderness. pulses 2 +. no calf tenderness or swelling  SKIN: Warm, dry, no acute rashes. Good turgor  NEURO: No focal deficits. Strength 5/5 with no sensory deficits.

## 2019-07-03 NOTE — CONSULT NOTE ADULT - ASSESSMENT
77 yo F w/PMHx of colitis, colon polyp s/p colectomy who presents to the ED with abdominal distension with imaging suspicious for SBO vs ileitis     Recommendations:   -NG tube placement for decompression   -Admission for close observation and conservative management   -Strict I&Os   -NPO Patient admitted to surgical service Please see H&P

## 2019-07-03 NOTE — CONSULT NOTE ADULT - SUBJECTIVE AND OBJECTIVE BOX
AMPARO SIMPSON [id number]  78y Female    HPI:    Amparo Simpson is a 79 yo F w/PMHx of colitis, colon polyp s/p colectomy who presents from nursing home to the ED w/two days abdominal distension and one day of nausea and vomiting w/fever. Per patient's , her last bowel movement was yesterday and was tolerating PO. Patient has recent hospitalization with similar presentation, NG tube was placed and no surgical intervention was necessary. Patient is poor historian, history obtained from .     PAST MEDICAL HX:    Lewy body dementia without behavioral disturbance  History of breast cancer  History of colon polyp   Constipation    PAST SURGICAL HX:   -Colectomy   -R Mastectomy w/TRAM flap  -D&C     MEDICATIONS  (STANDING):  aztreonam  IVPB 1000 milliGRAM(s) IV Intermittent once    Allergies    antichlolinergics (Unknown)  benzodiazepines (Unknown)  ciprofloxacin (Unknown)  Originally Entered as [Unknown] reaction to [green pepper] (Unknown)  Originally Entered as [Unknown] reaction to [neuroleptics] (Unknown)  Originally Entered as [Unknown] reaction to [pepper] (Unknown)  Originally Entered as [Unknown] reaction to [red pepper] (Unknown)  penicillin (Anaphylaxis)      Vital Signs Last 24 Hrs  T(C): 36.2 (03 Jul 2019 17:05), Max: 36.8 (03 Jul 2019 12:49)  T(F): 97.2 (03 Jul 2019 17:05), Max: 98.2 (03 Jul 2019 12:49)  HR: 82 (03 Jul 2019 17:05) (65 - 82)  BP: 133/69 (03 Jul 2019 17:05) (133/69 - 145/88)  RR: 18 (03 Jul 2019 17:05) (18 - 19)  SpO2: 96% (03 Jul 2019 17:05) (96% - 97%)    PHYSICAL EXAM:  GENERAL: NAD, well-appearing  CHEST/LUNG: Clear to auscultation bilaterally  HEART: Regular rate and rhythm  ABDOMEN: Abdomen distended, no tenderness to palpation   EXTREMITIES:  No clubbing, cyanosis, or edema      LABS:               11.9   10.11 )-----------( 291      ( 03 Jul 2019 14:28 )             36.8       Auto Neutrophil %: 64.5 % (07-03-19 @ 14:28)  Auto Immature Granulocyte %: 0.6 % (07-03-19 @ 14:28)    07-03    136  |  99  |  14  ----------------------------<  102<H>  4.3   |  22  |  0.9      Calcium, Total Serum: 9.6 mg/dL (07-03-19 @ 14:28)      LFTs:             6.8  | 0.3  | 15       ------------------[65      ( 03 Jul 2019 14:28 )  3.9  | x    | 7           Lipase:26       Lactate, Blood: 1.5 mmol/L (07-03-19 @ 14:28)    Urinalysis Basic - ( 03 Jul 2019 19:04 )    Color: Yellow / Appearance: Cloudy / SG: >=1.030 / pH: x  Gluc: x / Ketone: Negative  / Bili: Negative / Urobili: 0.2   Blood: x / Protein: Negative / Nitrite: Negative   Leuk Esterase: Moderate / RBC: >50 /HPF / WBC >50 /HPF   Sq Epi: x / Non Sq Epi: Few /HPF / Bacteria: Few /HPF      RADIOLOGY & ADDITIONAL STUDIES:    < from: CT Abdomen and Pelvis w/ Oral Cont and w/ IV Cont (07.03.19 @ 17:13) >  Multiple dilated loops of small bowel in the left lower quadrant without   discrete transition point. Findings suggestive of localized ileitis   versus small bowel obstruction.    Right ovarian cyst measuring up to 7.8 cm. Consider   nonemergent/outpatient pelvic ultrasound for follow-up to demonstrate   stability.     < from: Xray Abdomen 1 View Portable, IMMEDIATE (07.03.19 @ 14:53) >  Dilated loops of large and small bowel, may represent ileus   pattern. Recommend follow-up CT scan abdomen and pelvis for complete   evaluation    No free intraperitoneal air

## 2019-07-03 NOTE — H&P ADULT - NSHPPHYSICALEXAM_GEN_ALL_CORE
PHYSICAL EXAM:  GENERAL: NAD, well-appearing  CHEST/LUNG: Clear to auscultation bilaterally  HEART: Regular rate and rhythm  ABDOMEN: Abdomen distended, no tenderness to palpation   EXTREMITIES:  No clubbing, cyanosis, or edema

## 2019-07-03 NOTE — ED PROVIDER NOTE - ATTENDING CONTRIBUTION TO CARE
79 yo f with pmh of lewy body dementia, colitis with colon resection, hld, sent in by NH for abd distention.  pt is poor historian.   told PA that pt had vomiting last night.  NH has been giving laxatives to pt.  exam: nad, ncat, perrl, eomi, mmm, rrr, ctab, +distended, nontender. aox1,  imp; pt with abd distention with vomiting last night, r/o sbo, labs, xr, ct,

## 2019-07-03 NOTE — ED PROVIDER NOTE - OBJECTIVE STATEMENT
78 year old female with pmhx of dementia, colon resection, colitis, and HLD sent in by NH for abdominal distention over last few days. as per pts , pt spiked fever last night and has two episodes of vomiting. pt has been having normal BM, because NH has been giving laxatives to pt. decreased PO in last 24 hours.

## 2019-07-03 NOTE — ED ADULT NURSE NOTE - OBJECTIVE STATEMENT
pt sent in from [facility] for abdominal distension and pain to touch. pts  unsure of when last bm was

## 2019-07-03 NOTE — H&P ADULT - ASSESSMENT
77 yo F w/PMHx of colitis, colon polyp s/p colectomy who presents to the ED with abdominal distension with imaging suspicious for SBO vs ileitis     Recommendations:   -NG tube placement for decompression   -Admission for close observation and conservative management   -Strict I&Os   -NPO except for home meds 79 yo F w/PMHx of colitis, colon polyp s/p colectomy who presents to the ED with abdominal distension with imaging concerning for SBO vs ileitis     Plan:  Admit to surgical service  NPO, IVF  NG tube placement for decompression if nausea worsens   Strict I&Os   Resume home medications  Monitor for signs of bowel function return  AM kub to assess if contrast progressed into large bowel

## 2019-07-03 NOTE — H&P ADULT - NSICDXPASTMEDICALHX_GEN_ALL_CORE_FT
PAST MEDICAL HISTORY:  Colon polyp     Constipation     Dementia     History of breast cancer     Lewy body dementia without behavioral disturbance

## 2019-07-03 NOTE — ED ADULT NURSE NOTE - CHIEF COMPLAINT QUOTE
Patient BIBA from [facility] for evaluation of abdominal distention. Patient has dementia and is alert and oriented x 2 at baseline.

## 2019-07-03 NOTE — ED PROVIDER NOTE - PMH
Constipation    Dementia    History of breast cancer    Lewy body dementia without behavioral disturbance

## 2019-07-03 NOTE — H&P ADULT - NSHPLABSRESULTS_GEN_ALL_CORE
LABS:               11.9   10.11 )-----------( 291      ( 03 Jul 2019 14:28 )             36.8       Auto Neutrophil %: 64.5 % (07-03-19 @ 14:28)  Auto Immature Granulocyte %: 0.6 % (07-03-19 @ 14:28)    07-03    136  |  99  |  14  ----------------------------<  102<H>  4.3   |  22  |  0.9      Calcium, Total Serum: 9.6 mg/dL (07-03-19 @ 14:28)      LFTs:             6.8  | 0.3  | 15       ------------------[65      ( 03 Jul 2019 14:28 )  3.9  | x    | 7           Lipase:26       Lactate, Blood: 1.5 mmol/L (07-03-19 @ 14:28)    Urinalysis Basic - ( 03 Jul 2019 19:04 )    Color: Yellow / Appearance: Cloudy / SG: >=1.030 / pH: x  Gluc: x / Ketone: Negative  / Bili: Negative / Urobili: 0.2   Blood: x / Protein: Negative / Nitrite: Negative   Leuk Esterase: Moderate / RBC: >50 /HPF / WBC >50 /HPF   Sq Epi: x / Non Sq Epi: Few /HPF / Bacteria: Few /HPF      RADIOLOGY & ADDITIONAL STUDIES:    < from: CT Abdomen and Pelvis w/ Oral Cont and w/ IV Cont (07.03.19 @ 17:13) >  Multiple dilated loops of small bowel in the left lower quadrant without   discrete transition point. Findings suggestive of localized ileitis   versus small bowel obstruction.    Right ovarian cyst measuring up to 7.8 cm. Consider   nonemergent/outpatient pelvic ultrasound for follow-up to demonstrate   stability.     < from: Xray Abdomen 1 View Portable, IMMEDIATE (07.03.19 @ 14:53) >  Dilated loops of large and small bowel, may represent ileus   pattern. Recommend follow-up CT scan abdomen and pelvis for complete   evaluation    No free intraperitoneal air Vital Signs Last 24 Hrs  T(C): 37.1 (03 Jul 2019 23:56), Max: 37.1 (03 Jul 2019 23:56)  T(F): 98.7 (03 Jul 2019 23:56), Max: 98.7 (03 Jul 2019 23:56)  HR: 82 (03 Jul 2019 23:56) (65 - 82)  BP: 133/63 (03 Jul 2019 23:56) (133/63 - 145/88)  BP(mean): --  RR: 18 (03 Jul 2019 23:56) (18 - 19)  SpO2: 95% (03 Jul 2019 23:56) (95% - 97%)    LABS:               11.9   10.11 )-----------( 291      ( 03 Jul 2019 14:28 )             36.8       Auto Neutrophil %: 64.5 % (07-03-19 @ 14:28)  Auto Immature Granulocyte %: 0.6 % (07-03-19 @ 14:28)    07-03    136  |  99  |  14  ----------------------------<  102<H>  4.3   |  22  |  0.9      Calcium, Total Serum: 9.6 mg/dL (07-03-19 @ 14:28)      LFTs:             6.8  | 0.3  | 15       ------------------[65      ( 03 Jul 2019 14:28 )  3.9  | x    | 7           Lipase:26       Lactate, Blood: 1.5 mmol/L (07-03-19 @ 14:28)    Urinalysis Basic - ( 03 Jul 2019 19:04 )    Color: Yellow / Appearance: Cloudy / SG: >=1.030 / pH: x  Gluc: x / Ketone: Negative  / Bili: Negative / Urobili: 0.2   Blood: x / Protein: Negative / Nitrite: Negative   Leuk Esterase: Moderate / RBC: >50 /HPF / WBC >50 /HPF   Sq Epi: x / Non Sq Epi: Few /HPF / Bacteria: Few /HPF      RADIOLOGY & ADDITIONAL STUDIES:    < from: CT Abdomen and Pelvis w/ Oral Cont and w/ IV Cont (07.03.19 @ 17:13) >  Multiple dilated loops of small bowel in the left lower quadrant without   discrete transition point. Findings suggestive of localized ileitis   versus small bowel obstruction.    Right ovarian cyst measuring up to 7.8 cm. Consider   nonemergent/outpatient pelvic ultrasound for follow-up to demonstrate   stability.     < from: Xray Abdomen 1 View Portable, IMMEDIATE (07.03.19 @ 14:53) >  Dilated loops of large and small bowel, may represent ileus   pattern. Recommend follow-up CT scan abdomen and pelvis for complete   evaluation    No free intraperitoneal air

## 2019-07-04 LAB
-  COAGULASE NEGATIVE STAPHYLOCOCCUS: SIGNIFICANT CHANGE UP
ANION GAP SERPL CALC-SCNC: 15 MMOL/L — HIGH (ref 7–14)
BASOPHILS # BLD AUTO: 0.04 K/UL — SIGNIFICANT CHANGE UP (ref 0–0.2)
BASOPHILS NFR BLD AUTO: 0.4 % — SIGNIFICANT CHANGE UP (ref 0–1)
BUN SERPL-MCNC: 14 MG/DL — SIGNIFICANT CHANGE UP (ref 10–20)
CALCIUM SERPL-MCNC: 9 MG/DL — SIGNIFICANT CHANGE UP (ref 8.5–10.1)
CHLORIDE SERPL-SCNC: 101 MMOL/L — SIGNIFICANT CHANGE UP (ref 98–110)
CO2 SERPL-SCNC: 20 MMOL/L — SIGNIFICANT CHANGE UP (ref 17–32)
CREAT SERPL-MCNC: 0.8 MG/DL — SIGNIFICANT CHANGE UP (ref 0.7–1.5)
EOSINOPHIL # BLD AUTO: 0.28 K/UL — SIGNIFICANT CHANGE UP (ref 0–0.7)
EOSINOPHIL NFR BLD AUTO: 2.8 % — SIGNIFICANT CHANGE UP (ref 0–8)
GLUCOSE SERPL-MCNC: 79 MG/DL — SIGNIFICANT CHANGE UP (ref 70–99)
GRAM STN FLD: SIGNIFICANT CHANGE UP
HCT VFR BLD CALC: 37.2 % — SIGNIFICANT CHANGE UP (ref 37–47)
HGB BLD-MCNC: 11.7 G/DL — LOW (ref 12–16)
IMM GRANULOCYTES NFR BLD AUTO: 0.3 % — SIGNIFICANT CHANGE UP (ref 0.1–0.3)
LYMPHOCYTES # BLD AUTO: 1.93 K/UL — SIGNIFICANT CHANGE UP (ref 1.2–3.4)
LYMPHOCYTES # BLD AUTO: 19 % — LOW (ref 20.5–51.1)
MAGNESIUM SERPL-MCNC: 1.8 MG/DL — SIGNIFICANT CHANGE UP (ref 1.8–2.4)
MCHC RBC-ENTMCNC: 30.2 PG — SIGNIFICANT CHANGE UP (ref 27–31)
MCHC RBC-ENTMCNC: 31.5 G/DL — LOW (ref 32–37)
MCV RBC AUTO: 96.1 FL — SIGNIFICANT CHANGE UP (ref 81–99)
METHOD TYPE: SIGNIFICANT CHANGE UP
MONOCYTES # BLD AUTO: 0.84 K/UL — HIGH (ref 0.1–0.6)
MONOCYTES NFR BLD AUTO: 8.3 % — SIGNIFICANT CHANGE UP (ref 1.7–9.3)
NEUTROPHILS # BLD AUTO: 7.06 K/UL — HIGH (ref 1.4–6.5)
NEUTROPHILS NFR BLD AUTO: 69.2 % — SIGNIFICANT CHANGE UP (ref 42.2–75.2)
NRBC # BLD: 0 /100 WBCS — SIGNIFICANT CHANGE UP (ref 0–0)
PHOSPHATE SERPL-MCNC: 3.4 MG/DL — SIGNIFICANT CHANGE UP (ref 2.1–4.9)
PLATELET # BLD AUTO: 244 K/UL — SIGNIFICANT CHANGE UP (ref 130–400)
POTASSIUM SERPL-MCNC: 4.2 MMOL/L — SIGNIFICANT CHANGE UP (ref 3.5–5)
POTASSIUM SERPL-SCNC: 4.2 MMOL/L — SIGNIFICANT CHANGE UP (ref 3.5–5)
RBC # BLD: 3.87 M/UL — LOW (ref 4.2–5.4)
RBC # FLD: 13.5 % — SIGNIFICANT CHANGE UP (ref 11.5–14.5)
SODIUM SERPL-SCNC: 136 MMOL/L — SIGNIFICANT CHANGE UP (ref 135–146)
SPECIMEN SOURCE: SIGNIFICANT CHANGE UP
WBC # BLD: 10.18 K/UL — SIGNIFICANT CHANGE UP (ref 4.8–10.8)
WBC # FLD AUTO: 10.18 K/UL — SIGNIFICANT CHANGE UP (ref 4.8–10.8)

## 2019-07-04 PROCEDURE — 74018 RADEX ABDOMEN 1 VIEW: CPT | Mod: 26

## 2019-07-04 PROCEDURE — 99223 1ST HOSP IP/OBS HIGH 75: CPT

## 2019-07-04 RX ORDER — CHLORHEXIDINE GLUCONATE 213 G/1000ML
1 SOLUTION TOPICAL
Refills: 0 | Status: DISCONTINUED | OUTPATIENT
Start: 2019-07-04 | End: 2019-07-10

## 2019-07-04 RX ORDER — LIDOCAINE 4 G/100G
1 CREAM TOPICAL DAILY
Refills: 0 | Status: DISCONTINUED | OUTPATIENT
Start: 2019-07-04 | End: 2019-07-10

## 2019-07-04 RX ORDER — SODIUM CHLORIDE 9 MG/ML
1000 INJECTION, SOLUTION INTRAVENOUS
Refills: 0 | Status: DISCONTINUED | OUTPATIENT
Start: 2019-07-04 | End: 2019-07-07

## 2019-07-04 RX ORDER — MAGNESIUM SULFATE 500 MG/ML
2 VIAL (ML) INJECTION ONCE
Refills: 0 | Status: COMPLETED | OUTPATIENT
Start: 2019-07-04 | End: 2019-07-04

## 2019-07-04 RX ORDER — HEPARIN SODIUM 5000 [USP'U]/ML
5000 INJECTION INTRAVENOUS; SUBCUTANEOUS EVERY 8 HOURS
Refills: 0 | Status: DISCONTINUED | OUTPATIENT
Start: 2019-07-04 | End: 2019-07-10

## 2019-07-04 RX ORDER — LATANOPROST 0.05 MG/ML
1 SOLUTION/ DROPS OPHTHALMIC; TOPICAL AT BEDTIME
Refills: 0 | Status: DISCONTINUED | OUTPATIENT
Start: 2019-07-04 | End: 2019-07-10

## 2019-07-04 RX ORDER — TIMOLOL 0.5 %
1 DROPS OPHTHALMIC (EYE) DAILY
Refills: 0 | Status: DISCONTINUED | OUTPATIENT
Start: 2019-07-04 | End: 2019-07-10

## 2019-07-04 RX ADMIN — GABAPENTIN 600 MILLIGRAM(S): 400 CAPSULE ORAL at 13:47

## 2019-07-04 RX ADMIN — ATORVASTATIN CALCIUM 10 MILLIGRAM(S): 80 TABLET, FILM COATED ORAL at 22:11

## 2019-07-04 RX ADMIN — Medication 1 DROP(S): at 13:47

## 2019-07-04 RX ADMIN — PANTOPRAZOLE SODIUM 40 MILLIGRAM(S): 20 TABLET, DELAYED RELEASE ORAL at 09:46

## 2019-07-04 RX ADMIN — LATANOPROST 1 DROP(S): 0.05 SOLUTION/ DROPS OPHTHALMIC; TOPICAL at 22:07

## 2019-07-04 RX ADMIN — SODIUM CHLORIDE 125 MILLILITER(S): 9 INJECTION, SOLUTION INTRAVENOUS at 13:48

## 2019-07-04 RX ADMIN — ATORVASTATIN CALCIUM 10 MILLIGRAM(S): 80 TABLET, FILM COATED ORAL at 00:58

## 2019-07-04 RX ADMIN — Medication 1 DROP(S): at 22:11

## 2019-07-04 RX ADMIN — Medication 1 DROP(S): at 05:03

## 2019-07-04 RX ADMIN — HEPARIN SODIUM 5000 UNIT(S): 5000 INJECTION INTRAVENOUS; SUBCUTANEOUS at 13:47

## 2019-07-04 RX ADMIN — SODIUM CHLORIDE 125 MILLILITER(S): 9 INJECTION, SOLUTION INTRAVENOUS at 22:11

## 2019-07-04 RX ADMIN — Medication 50 MILLIGRAM(S): at 00:57

## 2019-07-04 RX ADMIN — Medication 10 MILLIGRAM(S): at 12:00

## 2019-07-04 RX ADMIN — SODIUM CHLORIDE 125 MILLILITER(S): 9 INJECTION, SOLUTION INTRAVENOUS at 01:36

## 2019-07-04 RX ADMIN — SERTRALINE 50 MILLIGRAM(S): 25 TABLET, FILM COATED ORAL at 00:58

## 2019-07-04 RX ADMIN — Medication 500 MILLIGRAM(S): at 05:02

## 2019-07-04 RX ADMIN — SODIUM CHLORIDE 125 MILLILITER(S): 9 INJECTION, SOLUTION INTRAVENOUS at 04:17

## 2019-07-04 RX ADMIN — GABAPENTIN 600 MILLIGRAM(S): 400 CAPSULE ORAL at 22:11

## 2019-07-04 RX ADMIN — Medication 50 MICROGRAM(S): at 00:58

## 2019-07-04 RX ADMIN — Medication 81 MILLIGRAM(S): at 00:57

## 2019-07-04 RX ADMIN — CHLORHEXIDINE GLUCONATE 1 APPLICATION(S): 213 SOLUTION TOPICAL at 04:24

## 2019-07-04 RX ADMIN — SERTRALINE 50 MILLIGRAM(S): 25 TABLET, FILM COATED ORAL at 12:00

## 2019-07-04 RX ADMIN — HEPARIN SODIUM 5000 UNIT(S): 5000 INJECTION INTRAVENOUS; SUBCUTANEOUS at 05:02

## 2019-07-04 RX ADMIN — HEPARIN SODIUM 5000 UNIT(S): 5000 INJECTION INTRAVENOUS; SUBCUTANEOUS at 22:08

## 2019-07-04 RX ADMIN — GABAPENTIN 600 MILLIGRAM(S): 400 CAPSULE ORAL at 05:03

## 2019-07-04 RX ADMIN — CARBIDOPA AND LEVODOPA 1 TABLET(S): 25; 100 TABLET ORAL at 00:58

## 2019-07-04 RX ADMIN — Medication 81 MILLIGRAM(S): at 12:00

## 2019-07-04 RX ADMIN — Medication 1 DROP(S): at 12:00

## 2019-07-04 RX ADMIN — GABAPENTIN 600 MILLIGRAM(S): 400 CAPSULE ORAL at 00:58

## 2019-07-04 NOTE — PROVIDER CONTACT NOTE (MEDICATION) - SITUATION
Notified MD of pts refusal of lidocaine patch administration. Pt denied pain as well. MD obregon aware and stated to hold dose.

## 2019-07-04 NOTE — PROGRESS NOTE ADULT - ATTENDING COMMENTS
doing well PO   d/c NGT   Sips of clears   OOB and ambulate   f/u cultures abdomen less distended   clears   bowel regimen   OOB and ambulate

## 2019-07-04 NOTE — PROGRESS NOTE ADULT - SUBJECTIVE AND OBJECTIVE BOX
GENERAL SURGERY PROGRESS NOTE     IMELDA ROLLE  78y  Female  Hospital day :1d  OVERNIGHT EVENTS: Last night patient presents from a nursing home to the ED w/2 days of abdominal distension and one day of nausea and vomiting w/ possible fever. XR in ED showed dilated loops of small and large bowel, f/u CT was performed and showed concern for SBO vs. ileitis. Patient in no distress and does not endorse any pain, though she is demented and a poor historian.     T(F): 98.7 (07-03-19 @ 23:56), Max: 98.7 (07-03-19 @ 23:56)  HR: 82 (07-03-19 @ 23:56) (65 - 82)  BP: 133/63 (07-03-19 @ 23:56) (133/63 - 145/88)  RR: 18 (07-03-19 @ 23:56) (18 - 19)  SpO2: 95% (07-03-19 @ 23:56) (95% - 97%)    DIET/FLUIDS: ascorbic acid  Oral Tab/Cap - Peds 500 milliGRAM(s) Oral two times a day  cholecalciferol 1000 Unit(s) Oral daily  lactated ringers. 1000 milliLiter(s) IV Continuous <Continuous>  magnesium oxide 400 milliGRAM(s) Oral three times a day with meals  potassium chloride    Tablet ER 20 milliEquivalent(s) Oral daily    GI proph:  pantoprazole    Tablet 40 milliGRAM(s) Oral before breakfast    AC/ proph: aspirin  chewable 81 milliGRAM(s) Oral daily  heparin  Injectable 5000 Unit(s) SubCutaneous every 8 hours    PHYSICAL EXAM:  GENERAL: NAD, well-appearing  CHEST/LUNG: Clear to auscultation bilaterally  HEART: Regular rate and rhythm  ABDOMEN: Distended, not tender to palpation   EXTREMITIES:  No clubbing, cyanosis, or edema      LABS                        11.9   10.11 )-----------( 291      ( 03 Jul 2019 14:28 )             36.8       Auto Neutrophil %: 64.5 % (07-03-19 @ 14:28)  Auto Immature Granulocyte %: 0.6 % (07-03-19 @ 14:28)    07-03    136  |  99  |  14  ----------------------------<  102<H>  4.3   |  22  |  0.9      Calcium, Total Serum: 9.6 mg/dL (07-03-19 @ 14:28)      LFTs:             6.8  | 0.3  | 15       ------------------[65      ( 03 Jul 2019 14:28 )  3.9  | x    | 7           Lipase:26        Lactate, Blood: 1.5 mmol/L (07-03-19 @ 14:28)    Urinalysis Basic - ( 03 Jul 2019 19:04 )    Color: Yellow / Appearance: Cloudy / SG: >=1.030 / pH: x  Gluc: x / Ketone: Negative  / Bili: Negative / Urobili: 0.2   Blood: x / Protein: Negative / Nitrite: Negative   Leuk Esterase: Moderate / RBC: >50 /HPF / WBC >50 /HPF   Sq Epi: x / Non Sq Epi: Few /HPF / Bacteria: Few /HPF    RADIOLOGY & ADDITIONAL TESTS:    < from: CT Abdomen and Pelvis w/ Oral Cont and w/ IV Cont (07.03.19 @ 17:13) >  Multiple dilated loops of small bowel in the left lower quadrant without   discrete transition point. Findings suggestive of localized ileitis   versus small bowel obstruction.    Right ovarian cyst measuring up to 7.8 cm. Consider   nonemergent/outpatient pelvic ultrasound for follow-up to demonstrate   stability.     < from: Xray Abdomen 1 View Portable, IMMEDIATE (07.03.19 @ 14:53) >  Dilated loops of large and small bowel, may represent ileus   pattern. Recommend follow-up CT scan abdomen and pelvis for complete   evaluation    No free intraperitoneal air

## 2019-07-05 LAB
CULTURE RESULTS: SIGNIFICANT CHANGE UP
ORGANISM # SPEC MICROSCOPIC CNT: SIGNIFICANT CHANGE UP
ORGANISM # SPEC MICROSCOPIC CNT: SIGNIFICANT CHANGE UP
SPECIMEN SOURCE: SIGNIFICANT CHANGE UP

## 2019-07-05 PROCEDURE — 99233 SBSQ HOSP IP/OBS HIGH 50: CPT

## 2019-07-05 PROCEDURE — 93971 EXTREMITY STUDY: CPT | Mod: 26,LT

## 2019-07-05 RX ORDER — MINERAL OIL
30 OIL (ML) MISCELLANEOUS EVERY 8 HOURS
Refills: 0 | Status: DISCONTINUED | OUTPATIENT
Start: 2019-07-05 | End: 2019-07-05

## 2019-07-05 RX ADMIN — Medication 1 DROP(S): at 21:50

## 2019-07-05 RX ADMIN — Medication 1 DROP(S): at 11:34

## 2019-07-05 RX ADMIN — LIDOCAINE 1 PATCH: 4 CREAM TOPICAL at 23:11

## 2019-07-05 RX ADMIN — PANTOPRAZOLE SODIUM 40 MILLIGRAM(S): 20 TABLET, DELAYED RELEASE ORAL at 06:42

## 2019-07-05 RX ADMIN — CHLORHEXIDINE GLUCONATE 1 APPLICATION(S): 213 SOLUTION TOPICAL at 06:43

## 2019-07-05 RX ADMIN — Medication 30 MILLILITER(S): at 14:30

## 2019-07-05 RX ADMIN — LIDOCAINE 1 PATCH: 4 CREAM TOPICAL at 11:34

## 2019-07-05 RX ADMIN — Medication 1 DROP(S): at 14:16

## 2019-07-05 RX ADMIN — Medication 10 MILLIGRAM(S): at 14:33

## 2019-07-05 RX ADMIN — LATANOPROST 1 DROP(S): 0.05 SOLUTION/ DROPS OPHTHALMIC; TOPICAL at 21:49

## 2019-07-05 RX ADMIN — HEPARIN SODIUM 5000 UNIT(S): 5000 INJECTION INTRAVENOUS; SUBCUTANEOUS at 14:06

## 2019-07-05 RX ADMIN — Medication 1 DROP(S): at 06:48

## 2019-07-05 RX ADMIN — GABAPENTIN 600 MILLIGRAM(S): 400 CAPSULE ORAL at 21:48

## 2019-07-05 RX ADMIN — GABAPENTIN 600 MILLIGRAM(S): 400 CAPSULE ORAL at 14:06

## 2019-07-05 RX ADMIN — LIDOCAINE 1 PATCH: 4 CREAM TOPICAL at 11:48

## 2019-07-05 RX ADMIN — HEPARIN SODIUM 5000 UNIT(S): 5000 INJECTION INTRAVENOUS; SUBCUTANEOUS at 06:43

## 2019-07-05 RX ADMIN — Medication 50 MICROGRAM(S): at 06:42

## 2019-07-05 RX ADMIN — Medication 50 GRAM(S): at 03:49

## 2019-07-05 RX ADMIN — HEPARIN SODIUM 5000 UNIT(S): 5000 INJECTION INTRAVENOUS; SUBCUTANEOUS at 21:48

## 2019-07-05 RX ADMIN — Medication 81 MILLIGRAM(S): at 11:33

## 2019-07-05 RX ADMIN — ATORVASTATIN CALCIUM 10 MILLIGRAM(S): 80 TABLET, FILM COATED ORAL at 21:48

## 2019-07-05 RX ADMIN — GABAPENTIN 600 MILLIGRAM(S): 400 CAPSULE ORAL at 06:42

## 2019-07-05 RX ADMIN — SERTRALINE 50 MILLIGRAM(S): 25 TABLET, FILM COATED ORAL at 11:33

## 2019-07-05 NOTE — CONSULT NOTE ADULT - SUBJECTIVE AND OBJECTIVE BOX
FLOIMELDA LUJAN  78y, Female  Allergy: antichlolinergics (Unknown)  benzodiazepines (Unknown)  ciprofloxacin (Unknown)  Originally Entered as [Unknown] reaction to [green pepper] (Unknown)  Originally Entered as [Unknown] reaction to [neuroleptics] (Unknown)  Originally Entered as [Unknown] reaction to [pepper] (Unknown)  Originally Entered as [Unknown] reaction to [red pepper] (Unknown)  penicillin (Anaphylaxis)      CHIEF COMPLAINT: Abdominal distension (05 Jul 2019 04:03)      HPI:  78F PMHx below notable for colitis, and colectomy 2/2 unresectable colonic polyp (from CT scan appears to be right hemicolectomy with ileocolic anastomosis) presents from a nursing home with  to the ED w/2 days of abdominal distension and one day of nausea and vomiting w/ possible fever. Per patient and , her last bowel movement was yesterday, however questionable reliability due to lewy body dementia. Patient had a hospitalization with similar presentation of abdominal distension resolved with non operative management, NG tube was placed and symptoms resolved. CT scan performed in ED with full results below showed signs of SBO vs ileitis (03 Jul 2019 23:05)    PAST MEDICAL & SURGICAL HISTORY:  Colon polyp  Lewy body dementia without behavioral disturbance  Constipation  History of breast cancer  Dementia  H/O mastectomy, right  History of colon resection      FAMILY HISTORY  No pertinent family history in first degree relatives      SOCIAL HISTORY    Substance Use (  x) never used  (  ) IVDU (  ) Other:  Tobacco Usage:  (  x ) never smoked   (   ) former smoker   (   ) current smoker   Alcohol Usage: (   ) social  (   ) daily use ( x  ) denies  Sexual History: na      ROS  General: Denies fevers, chills, nightsweats, weight loss  HEENT: Denies headache, rhinorrhea, sore throat, eye pain  CV: Denies CP, palpitations  PULM: Denies SOB, cough  GI: as noted above   : Denies dysuria, hematuria  MSK: Denies arthralgias, myalgias  SKIN: Denies rash, lesions  NEURO: Denies paresthesias, weakness  PSYCH: Denies depression, anxiety    VITALS:  T(F): 97.9, Max: 97.9 (07-05-19 @ 16:00)  HR: 85  BP: 133/66  RR: 16Vital Signs Last 24 Hrs  T(C): 36.6 (05 Jul 2019 16:00), Max: 36.6 (05 Jul 2019 16:00)  T(F): 97.9 (05 Jul 2019 16:00), Max: 97.9 (05 Jul 2019 16:00)  HR: 85 (05 Jul 2019 16:00) (63 - 85)  BP: 133/66 (05 Jul 2019 16:00) (129/63 - 141/64)  BP(mean): --  RR: 16 (05 Jul 2019 16:00) (16 - 18)  SpO2: --    PHYSICAL EXAM:  Gen: Obese NAD, resting in bed  HEENT: Normocephalic, atraumatic  Neck: supple, no lymphadenopathy  CV: Regular rate & regular rhythm  Lungs: decreased BS at bases, no fremitus  Abdomen: distended Soft, BS present  Ext: Warm, well perfused  Neuro: non focal, awake  Skin: no rash, no erythema  Lines: no phlebitis    TESTS & MEASUREMENTS:                        11.7   10.18 )-----------( 244      ( 04 Jul 2019 20:00 )             37.2     07-04    136  |  101  |  14  ----------------------------<  79  4.2   |  20  |  0.8    Ca    9.0      04 Jul 2019 20:00  Phos  3.4     07-04  Mg     1.8     07-04      eGFR if Non African American: 71 mL/min/1.73M2 (07-04-19 @ 20:00)  eGFR if : 82 mL/min/1.73M2 (07-04-19 @ 20:00)      Urinalysis Basic - ( 03 Jul 2019 19:04 )    Color: Yellow / Appearance: Cloudy / SG: >=1.030 / pH: x  Gluc: x / Ketone: Negative  / Bili: Negative / Urobili: 0.2   Blood: x / Protein: Negative / Nitrite: Negative   Leuk Esterase: Moderate / RBC: >50 /HPF / WBC >50 /HPF   Sq Epi: x / Non Sq Epi: Few /HPF / Bacteria: Few /HPF        Culture - Blood (collected 07-03-19 @ 13:33)  Source: .Blood Blood  Preliminary Report (07-04-19 @ 23:01):    No growth to date.    Culture - Blood (collected 07-03-19 @ 13:33)  Source: .Blood Blood  Gram Stain (07-04-19 @ 16:54):    Growth in anaerobic bottle: Gram Positive Cocci in Clusters  Preliminary Report (07-04-19 @ 16:54):    Growth in anaerobic bottle: Gram Positive Cocci in Clusters    "Due to technical problems, Proteus sp. will Not be reported as part of    the BCID panel until further notice"    ***Blood Panel PCR results on this specimen are available    approximately 3 hours after the Gram stain result.***    Gram stain, PCR, and/or culture results may not always    correspond due to difference in methodologies.    ************************************************************    This PCR assay was performed using [employer].    The following targets are tested for: Enterococcus,    vancomycin resistant enterococci, Listeria monocytogenes,    coagulase negative staphylococci, S. aureus,    methicillin resistant S. aureus, Streptococcus agalactiae    (Group B), S. pneumoniae, S. pyogenes (Group A),    Acinetobacter baumannii, Enterobacter cloacae, E. coli,    Klebsiella oxytoca, K. pneumoniae, Proteus sp.,    Serratia marcescens, Haemophilus influenzae,    Neisseria meningitidis, Pseudomonas aeruginosa, Candida    albicans, C. glabrata, C krusei, C parapsilosis,    C. tropicalis and the KPC resistance gene.  Organism: Blood Culture PCR (07-04-19 @ 18:17)  Organism: Blood Culture PCR (07-04-19 @ 18:17)      -  Coagulase negative Staphylococcus: Detec      Method Type: PCR        Lactate, Blood: 1.5 mmol/L (07-03-19 @ 14:28)      INFECTIOUS DISEASES TESTING      RADIOLOGY & ADDITIONAL TESTS:  I have personally reviewed the last Chest xray  CXR      CT  CT Abdomen and Pelvis w/ Oral Cont and w/ IV Cont:   EXAM:  CT ABDOMEN AND PELVIS OC IC            PROCEDURE DATE:  07/03/2019            INTERPRETATION:  CLINICAL HISTORY / REASON FOR EXAM: Abdominal pain.    TECHNIQUE: Contiguous axial CT images were obtained from the lower chest   to the pubic symphysis following administration of 100 mL Optiray 320   intravenous contrast. Oral contrast was administered. Reformatted images   in the coronal and sagittal planes were acquired. There is streak   artifact from the overlying extremity.    COMPARISONCT: CT abdomen and pelvis from March 6, 2019    OTHER STUDIES USED FOR CORRELATION: None.       FINDINGS:    LOWER CHEST: The heart is normal in size. Coronary, aortic valvular and   mitral annular calcifications.    HEPATOBILIARY: Hepatic steatosis.    SPLEEN: Unremarkable.    PANCREAS: Unremarkable.    ADRENAL GLANDS: Stable adrenal nodule; right adrenal nodule measures   approximately 6 mm (series 5, image 88). Left adrenal nodule measures   approximately 1.1 cm (series 5, image 101).    KIDNEYS: Bilateral atrophic kidneys. Symmetric enhancement bilaterally.   No evidence of hydronephrosis.    ABDOMINOPELVIC NODES: Unremarkable.    PELVIC ORGANS: Stable bilateral ovarian cysts. Left ovarian cysts   measures up to 7.8 cm (series 5, image 301). Right ovarian cyst measures   approximately 5.5 centers (series 5, image 348). Calcified fibroid uterus.    PERITONEUM/MESENTERY/BOWEL: Status post partial right colectomy with   ileal colic anastomosis. Oral contrast reaches the mid jejunum in the   left lower quadrant. Dilated loops of small bowel in the left lower   quadrant without discrete transition point. No pneumoperitoneum or   ascites.    BONES/SOFT TISSUES: Diffuse osteopenia. Degenerative changes of   thoracolumbar spine. Stable calcified densities right chest wall   measuring up to 2.3 cm.    VASCULAR: Aorta is normal in caliber containing atherosclerotic   calcifications.      IMPRESSION:    Multiple dilated loops of small bowel in the left lower quadrant without   discrete transition point. Findings suggestive of localized ileitis   versus small bowel obstruction.    Right ovarian cyst measuring up to 7.8 cm. Consider   nonemergent/outpatient pelvic ultrasound for follow-up to demonstrate   stability.               JOHNNY ASH M.D., RESIDENT RADIOLOGIST  This document has been electronically signed.  LUIS A ACOSTA M.D., ATTENDING RADIOLOGIST  This document has been electronically signed. Jul  3 2019  5:55PM             (07-03-19 @ 17:13)      CARDIOLOGY TESTING      MEDICATIONS  aspirin  chewable 81  atorvastatin 10  chlorhexidine 4% Liquid 1  gabapentin 600  heparin  Injectable 5000  lactated ringers. 1000  latanoprost 0.005% Ophthalmic Solution 1  levothyroxine 50  lidocaine   Patch 1  mineral oil 30  pantoprazole    Tablet 40  polyvinyl alcohol 1.4%/povidone 0.6% Ophthalmic Solution - Peds 1  sertraline 50  timolol 0.25% Solution 1      ANTIBIOTICS:      antichlolinergics (Unknown)  benzodiazepines (Unknown)  ciprofloxacin (Unknown)  Originally Entered as [Unknown] reaction to [green pepper] (Unknown)  Originally Entered as [Unknown] reaction to [neuroleptics] (Unknown)  Originally Entered as [Unknown] reaction to [pepper] (Unknown)  Originally Entered as [Unknown] reaction to [red pepper] (Unknown)  penicillin (Anaphylaxis)

## 2019-07-05 NOTE — PROGRESS NOTE ADULT - SUBJECTIVE AND OBJECTIVE BOX
GENERAL SURGERY PROGRESS NOTE     IMELDA ROLLE  78y  Female  Hospital day :2d  POD:  Procedure: Insertion, midline catheter 7/5    OVERNIGHT EVENTS:  Notified at night that pt's L arm was swollen and erythematous d/t occlusive kerlex. Kerlex removed and IV lost. Midline catheter in L arm placed.    T(F): 97.6 (07-05-19 @ 00:00), Max: 98 (07-04-19 @ 08:12)  HR: 63 (07-05-19 @ 00:00) (63 - 76)  BP: 129/63 (07-05-19 @ 00:00) (115/55 - 129/63)  ABP: --  ABP(mean): --  RR: 18 (07-05-19 @ 00:00) (18 - 18)  SpO2: --    DIET/FLUIDS: lactated ringers. 1000 milliLiter(s) IV Continuous <Continuous>    NG:                                                                                DRAINS:     BM:     EMESIS:     URINE:      GI proph:  pantoprazole    Tablet 40 milliGRAM(s) Oral before breakfast    AC/ proph: aspirin  chewable 81 milliGRAM(s) Oral daily  heparin  Injectable 5000 Unit(s) SubCutaneous every 8 hours    ABx:     PHYSICAL EXAM:  GENERAL: NAD, well-appearing, mildly confused and forgetful (baseline)  HEART: Regular rate and rhythm  ABDOMEN: Soft, non TTP, nondistended  EXTREMITIES:  No clubbing, cyanosis, or edema      LABS  Labs:  CAPILLARY BLOOD GLUCOSE                              11.7   10.18 )-----------( 244      ( 04 Jul 2019 20:00 )             37.2       Auto Neutrophil %: 69.2 % (07-04-19 @ 20:00)  Auto Immature Granulocyte %: 0.3 % (07-04-19 @ 20:00)    07-04    136  |  101  |  14  ----------------------------<  79  4.2   |  20  |  0.8      Calcium, Total Serum: 9.0 mg/dL (07-04-19 @ 20:00)      LFTs:             6.8  | 0.3  | 15       ------------------[65      ( 03 Jul 2019 14:28 )  3.9  | x    | 7           Lipase:26     Amylase:x         Lactate, Blood: 1.5 mmol/L (07-03-19 @ 14:28)      Coags:            Urinalysis Basic - ( 03 Jul 2019 19:04 )    Color: Yellow / Appearance: Cloudy / SG: >=1.030 / pH: x  Gluc: x / Ketone: Negative  / Bili: Negative / Urobili: 0.2   Blood: x / Protein: Negative / Nitrite: Negative   Leuk Esterase: Moderate / RBC: >50 /HPF / WBC >50 /HPF   Sq Epi: x / Non Sq Epi: Few /HPF / Bacteria: Few /HPF        Culture - Blood (collected 03 Jul 2019 13:33)  Source: .Blood Blood  Preliminary Report (04 Jul 2019 23:01):    No growth to date.    Culture - Blood (collected 03 Jul 2019 13:33)  Source: .Blood Blood  Gram Stain (04 Jul 2019 16:54):    Growth in anaerobic bottle: Gram Positive Cocci in Clusters  Preliminary Report (04 Jul 2019 16:54):    Growth in anaerobic bottle: Gram Positive Cocci in Clusters    "Due to technical problems, Proteus sp. will Not be reported as part of    the BCID panel until further notice"    ***Blood Panel PCR results on this specimen are available    approximately 3 hours after the Gram stain result.***    Gram stain, PCR, and/or culture results may not always    correspond due to difference in methodologies.    ************************************************************    This PCR assay was performed using [employer].    The following targets are tested for: Enterococcus,    vancomycin resistant enterococci, Listeria monocytogenes,    coagulase negative staphylococci, S. aureus,    methicillin resistant S. aureus, Streptococcus agalactiae    (Group B), S. pneumoniae, S. pyogenes (Group A),    Acinetobacter baumannii, Enterobacter cloacae, E. coli,    Klebsiella oxytoca, K. pneumoniae, Proteus sp.,    Serratia marcescens, Haemophilus influenzae,    Neisseria meningitidis, Pseudomonas aeruginosa, Candida    albicans, C. glabrata, C krusei, C parapsilosis,    C. tropicalis and the KPC resistance gene.  Organism: Blood Culture PCR (04 Jul 2019 18:17)  Organism: Blood Culture PCR (04 Jul 2019 18:17)

## 2019-07-05 NOTE — CONSULT NOTE ADULT - ASSESSMENT
ASSESSMENT  78y F admitted with SMALL BOWEL OBSTRUCTION    Colon polyp  Lewy body dementia without behavioral disturbance  Constipation  History of breast cancer  Dementia      PROBLEMS  #coNS BSI is a contaminant     Sepsis ruled out on admission     RECOMMENDATIONS  -montior off abx    Spectra [phone]

## 2019-07-06 LAB
-  AMPICILLIN: SIGNIFICANT CHANGE UP
-  CIPROFLOXACIN: SIGNIFICANT CHANGE UP
-  DAPTOMYCIN: SIGNIFICANT CHANGE UP
-  LEVOFLOXACIN: SIGNIFICANT CHANGE UP
-  LINEZOLID: SIGNIFICANT CHANGE UP
-  NITROFURANTOIN: SIGNIFICANT CHANGE UP
-  TETRACYCLINE: SIGNIFICANT CHANGE UP
-  VANCOMYCIN: SIGNIFICANT CHANGE UP
ANION GAP SERPL CALC-SCNC: 18 MMOL/L — HIGH (ref 7–14)
BUN SERPL-MCNC: 9 MG/DL — LOW (ref 10–20)
CALCIUM SERPL-MCNC: 9 MG/DL — SIGNIFICANT CHANGE UP (ref 8.5–10.1)
CHLORIDE SERPL-SCNC: 101 MMOL/L — SIGNIFICANT CHANGE UP (ref 98–110)
CO2 SERPL-SCNC: 19 MMOL/L — SIGNIFICANT CHANGE UP (ref 17–32)
CREAT SERPL-MCNC: 0.8 MG/DL — SIGNIFICANT CHANGE UP (ref 0.7–1.5)
CULTURE RESULTS: SIGNIFICANT CHANGE UP
GLUCOSE SERPL-MCNC: 96 MG/DL — SIGNIFICANT CHANGE UP (ref 70–99)
MAGNESIUM SERPL-MCNC: 1.8 MG/DL — SIGNIFICANT CHANGE UP (ref 1.8–2.4)
METHOD TYPE: SIGNIFICANT CHANGE UP
ORGANISM # SPEC MICROSCOPIC CNT: SIGNIFICANT CHANGE UP
ORGANISM # SPEC MICROSCOPIC CNT: SIGNIFICANT CHANGE UP
PHOSPHATE SERPL-MCNC: 3.3 MG/DL — SIGNIFICANT CHANGE UP (ref 2.1–4.9)
POTASSIUM SERPL-MCNC: 4.3 MMOL/L — SIGNIFICANT CHANGE UP (ref 3.5–5)
POTASSIUM SERPL-SCNC: 4.3 MMOL/L — SIGNIFICANT CHANGE UP (ref 3.5–5)
SODIUM SERPL-SCNC: 138 MMOL/L — SIGNIFICANT CHANGE UP (ref 135–146)
SPECIMEN SOURCE: SIGNIFICANT CHANGE UP

## 2019-07-06 PROCEDURE — 99233 SBSQ HOSP IP/OBS HIGH 50: CPT

## 2019-07-06 PROCEDURE — 74022 RADEX COMPL AQT ABD SERIES: CPT | Mod: 26

## 2019-07-06 RX ORDER — LACTULOSE 10 G/15ML
20 SOLUTION ORAL
Refills: 0 | Status: DISCONTINUED | OUTPATIENT
Start: 2019-07-06 | End: 2019-07-08

## 2019-07-06 RX ADMIN — LACTULOSE 20 GRAM(S): 10 SOLUTION ORAL at 17:26

## 2019-07-06 RX ADMIN — Medication 1 DROP(S): at 21:07

## 2019-07-06 RX ADMIN — LATANOPROST 1 DROP(S): 0.05 SOLUTION/ DROPS OPHTHALMIC; TOPICAL at 21:07

## 2019-07-06 RX ADMIN — Medication 81 MILLIGRAM(S): at 11:32

## 2019-07-06 RX ADMIN — LIDOCAINE 1 PATCH: 4 CREAM TOPICAL at 11:33

## 2019-07-06 RX ADMIN — Medication 1 DROP(S): at 05:37

## 2019-07-06 RX ADMIN — CHLORHEXIDINE GLUCONATE 1 APPLICATION(S): 213 SOLUTION TOPICAL at 05:35

## 2019-07-06 RX ADMIN — Medication 1 DROP(S): at 13:49

## 2019-07-06 RX ADMIN — PANTOPRAZOLE SODIUM 40 MILLIGRAM(S): 20 TABLET, DELAYED RELEASE ORAL at 05:35

## 2019-07-06 RX ADMIN — Medication 50 MICROGRAM(S): at 05:35

## 2019-07-06 RX ADMIN — ATORVASTATIN CALCIUM 10 MILLIGRAM(S): 80 TABLET, FILM COATED ORAL at 21:06

## 2019-07-06 RX ADMIN — GABAPENTIN 600 MILLIGRAM(S): 400 CAPSULE ORAL at 05:35

## 2019-07-06 RX ADMIN — GABAPENTIN 600 MILLIGRAM(S): 400 CAPSULE ORAL at 13:44

## 2019-07-06 RX ADMIN — Medication 1 DROP(S): at 11:33

## 2019-07-06 RX ADMIN — HEPARIN SODIUM 5000 UNIT(S): 5000 INJECTION INTRAVENOUS; SUBCUTANEOUS at 21:06

## 2019-07-06 RX ADMIN — HEPARIN SODIUM 5000 UNIT(S): 5000 INJECTION INTRAVENOUS; SUBCUTANEOUS at 05:35

## 2019-07-06 RX ADMIN — GABAPENTIN 600 MILLIGRAM(S): 400 CAPSULE ORAL at 21:06

## 2019-07-06 RX ADMIN — SERTRALINE 50 MILLIGRAM(S): 25 TABLET, FILM COATED ORAL at 11:32

## 2019-07-06 RX ADMIN — HEPARIN SODIUM 5000 UNIT(S): 5000 INJECTION INTRAVENOUS; SUBCUTANEOUS at 13:42

## 2019-07-06 NOTE — PROGRESS NOTE ADULT - SUBJECTIVE AND OBJECTIVE BOX
GENERAL SURGERY PROGRESS NOTE     IMELDA ROLLE 78y Female admitted to service with SBO.     Patient doing well and is less distended. Doculax suppository x 1 was administered yesterday and diet was advanced to full liquid. Midline was inserted yesterday. ID was consulted and did not recommend and Abx for + blood cultures. LUE doppler was ordered for erythematous/blistered skin.     OVERNIGHT EVENTS:  No acute overnight events.     T(F): 97.6 (07-05-19 @ 23:00), Max: 97.9 (07-05-19 @ 16:00)  HR: 79 (07-05-19 @ 23:00) (77 - 85)  BP: 156/76 (07-05-19 @ 23:00) (133/66 - 156/76)  ABP: --  ABP(mean): --  RR: 18 (07-05-19 @ 23:00) (16 - 18)  SpO2: --    DIET/FLUIDS: lactated ringers. 1000 milliLiter(s) IV Continuous <Continuous>      GI proph:  pantoprazole    Tablet 40 milliGRAM(s) Oral before breakfast    AC/ proph: aspirin  chewable 81 milliGRAM(s) Oral daily  heparin  Injectable 5000 Unit(s) SubCutaneous every 8 hours    ABx: none     PHYSICAL EXAM:  GENERAL: NAD, well-appearing  CHEST/LUNG: Clear to auscultation bilaterally  HEART: Regular rate and rhythm  ABDOMEN: Soft, Nontender, distended abdomen    EXTREMITIES:  No clubbing, cyanosis, or edema      LABS  CAPILLARY BLOOD GLUCOSE                              11.7   10.18 )-----------( 244      ( 04 Jul 2019 20:00 )             37.2         07-04    136  |  101  |  14  ----------------------------<  79  4.2   |  20  |  0.8          LFTs:     Lactate, Blood: 1.5 mmol/L (07-03-19 @ 14:28)      Culture - Urine (collected 03 Jul 2019 19:04)  Source: .Urine Clean Catch (Midstream)  Preliminary Report (05 Jul 2019 17:39):    >100,000 CFU/ml Enterococcus species    <10,000 CFU/ml Normal Urogenital greyson present    Culture - Blood (collected 03 Jul 2019 13:33)  Source: .Blood Blood  Preliminary Report (04 Jul 2019 23:01):    No growth to date.    Culture - Blood (collected 03 Jul 2019 13:33)  Source: .Blood Blood  Gram Stain (04 Jul 2019 16:54):    Growth in anaerobic bottle: Gram Positive Cocci in Clusters  Final Report (05 Jul 2019 17:01):    Growth in anaerobic bottle: Coag Negative Staphylococcus    Single set isolate, possible contaminant. Contact    Microbiology if susceptibility testing clinically    indicated.    "Due to technical problems, Proteus sp. will Not be reported as part of    theBCID panel until further notice"    ***Blood Panel PCR results on this specimen are available    approximately 3 hours after the Gram stain result.***    Gram stain, PCR, and/or culture results may not always    correspond due to difference in methodologies.    ************************************************************    This PCR assay was performed using [employer].    The following targets are tested for: Enterococcus,    vancomycin resistant enterococci, Listeria monocytogenes,    coagulase negative staphylococci, S. aureus,    methicillin resistant S. aureus, Streptococcus agalactiae    (Group B), S. pneumoniae, S. pyogenes (Group A),    Acinetobacter baumannii, Enterobacter cloacae, E. coli,    Klebsiella oxytoca, K. pneumoniae, Proteus sp.,    Serratia marcescens, Haemophilus influenzae,    Neisseria meningitidis, Pseudomonas aeruginosa, Candida    albicans, C. glabrata, C krusei, C parapsilosis,    C. tropicalis and the KPC resistance gene.  Organism: Blood Culture PCR (05 Jul 2019 17:01)  Organism: Blood Culture PCR (05 Jul 2019 17:01)          RADIOLOGY & ADDITIONAL TESTS:  < from: Xray Kidney Ureter Bladder (07.04.19 @ 06:52) >  Impression:    Contrast reaching sigmoid colon with persistent dilatation of small bowel   loops is compatible with sequela of partial small bowel obstruction.    < end of copied text >

## 2019-07-07 LAB
ALBUMIN SERPL ELPH-MCNC: 3.4 G/DL — LOW (ref 3.5–5.2)
ALP SERPL-CCNC: 58 U/L — SIGNIFICANT CHANGE UP (ref 30–115)
ALT FLD-CCNC: 9 U/L — SIGNIFICANT CHANGE UP (ref 0–41)
ANION GAP SERPL CALC-SCNC: 14 MMOL/L — SIGNIFICANT CHANGE UP (ref 7–14)
ANION GAP SERPL CALC-SCNC: 14 MMOL/L — SIGNIFICANT CHANGE UP (ref 7–14)
AST SERPL-CCNC: 16 U/L — SIGNIFICANT CHANGE UP (ref 0–41)
BASOPHILS # BLD AUTO: 0.03 K/UL — SIGNIFICANT CHANGE UP (ref 0–0.2)
BASOPHILS # BLD AUTO: 0.04 K/UL — SIGNIFICANT CHANGE UP (ref 0–0.2)
BASOPHILS NFR BLD AUTO: 0.4 % — SIGNIFICANT CHANGE UP (ref 0–1)
BASOPHILS NFR BLD AUTO: 0.4 % — SIGNIFICANT CHANGE UP (ref 0–1)
BILIRUB DIRECT SERPL-MCNC: <0.2 MG/DL — SIGNIFICANT CHANGE UP (ref 0–0.2)
BILIRUB INDIRECT FLD-MCNC: >0.1 MG/DL — LOW (ref 0.2–1.2)
BILIRUB SERPL-MCNC: 0.3 MG/DL — SIGNIFICANT CHANGE UP (ref 0.2–1.2)
BUN SERPL-MCNC: 10 MG/DL — SIGNIFICANT CHANGE UP (ref 10–20)
BUN SERPL-MCNC: 12 MG/DL — SIGNIFICANT CHANGE UP (ref 10–20)
CALCIUM SERPL-MCNC: 9 MG/DL — SIGNIFICANT CHANGE UP (ref 8.5–10.1)
CALCIUM SERPL-MCNC: 9.1 MG/DL — SIGNIFICANT CHANGE UP (ref 8.5–10.1)
CHLORIDE SERPL-SCNC: 100 MMOL/L — SIGNIFICANT CHANGE UP (ref 98–110)
CHLORIDE SERPL-SCNC: 102 MMOL/L — SIGNIFICANT CHANGE UP (ref 98–110)
CO2 SERPL-SCNC: 22 MMOL/L — SIGNIFICANT CHANGE UP (ref 17–32)
CO2 SERPL-SCNC: 24 MMOL/L — SIGNIFICANT CHANGE UP (ref 17–32)
CREAT SERPL-MCNC: 0.8 MG/DL — SIGNIFICANT CHANGE UP (ref 0.7–1.5)
CREAT SERPL-MCNC: 0.8 MG/DL — SIGNIFICANT CHANGE UP (ref 0.7–1.5)
EOSINOPHIL # BLD AUTO: 0.36 K/UL — SIGNIFICANT CHANGE UP (ref 0–0.7)
EOSINOPHIL # BLD AUTO: 0.45 K/UL — SIGNIFICANT CHANGE UP (ref 0–0.7)
EOSINOPHIL NFR BLD AUTO: 4.3 % — SIGNIFICANT CHANGE UP (ref 0–8)
EOSINOPHIL NFR BLD AUTO: 4.8 % — SIGNIFICANT CHANGE UP (ref 0–8)
GLUCOSE SERPL-MCNC: 108 MG/DL — HIGH (ref 70–99)
GLUCOSE SERPL-MCNC: 95 MG/DL — SIGNIFICANT CHANGE UP (ref 70–99)
HCT VFR BLD CALC: 34.8 % — LOW (ref 37–47)
HCT VFR BLD CALC: 35.7 % — LOW (ref 37–47)
HGB BLD-MCNC: 11.2 G/DL — LOW (ref 12–16)
HGB BLD-MCNC: 11.2 G/DL — LOW (ref 12–16)
IMM GRANULOCYTES NFR BLD AUTO: 0.3 % — SIGNIFICANT CHANGE UP (ref 0.1–0.3)
IMM GRANULOCYTES NFR BLD AUTO: 0.5 % — HIGH (ref 0.1–0.3)
LYMPHOCYTES # BLD AUTO: 2.11 K/UL — SIGNIFICANT CHANGE UP (ref 1.2–3.4)
LYMPHOCYTES # BLD AUTO: 2.17 K/UL — SIGNIFICANT CHANGE UP (ref 1.2–3.4)
LYMPHOCYTES # BLD AUTO: 23.3 % — SIGNIFICANT CHANGE UP (ref 20.5–51.1)
LYMPHOCYTES # BLD AUTO: 25.3 % — SIGNIFICANT CHANGE UP (ref 20.5–51.1)
MAGNESIUM SERPL-MCNC: 1.5 MG/DL — LOW (ref 1.8–2.4)
MAGNESIUM SERPL-MCNC: 1.6 MG/DL — LOW (ref 1.8–2.4)
MCHC RBC-ENTMCNC: 29.5 PG — SIGNIFICANT CHANGE UP (ref 27–31)
MCHC RBC-ENTMCNC: 30.4 PG — SIGNIFICANT CHANGE UP (ref 27–31)
MCHC RBC-ENTMCNC: 31.4 G/DL — LOW (ref 32–37)
MCHC RBC-ENTMCNC: 32.2 G/DL — SIGNIFICANT CHANGE UP (ref 32–37)
MCV RBC AUTO: 93.9 FL — SIGNIFICANT CHANGE UP (ref 81–99)
MCV RBC AUTO: 94.3 FL — SIGNIFICANT CHANGE UP (ref 81–99)
MONOCYTES # BLD AUTO: 0.52 K/UL — SIGNIFICANT CHANGE UP (ref 0.1–0.6)
MONOCYTES # BLD AUTO: 0.65 K/UL — HIGH (ref 0.1–0.6)
MONOCYTES NFR BLD AUTO: 6.2 % — SIGNIFICANT CHANGE UP (ref 1.7–9.3)
MONOCYTES NFR BLD AUTO: 7 % — SIGNIFICANT CHANGE UP (ref 1.7–9.3)
NEUTROPHILS # BLD AUTO: 5.27 K/UL — SIGNIFICANT CHANGE UP (ref 1.4–6.5)
NEUTROPHILS # BLD AUTO: 5.97 K/UL — SIGNIFICANT CHANGE UP (ref 1.4–6.5)
NEUTROPHILS NFR BLD AUTO: 63.3 % — SIGNIFICANT CHANGE UP (ref 42.2–75.2)
NEUTROPHILS NFR BLD AUTO: 64.2 % — SIGNIFICANT CHANGE UP (ref 42.2–75.2)
NRBC # BLD: 0 /100 WBCS — SIGNIFICANT CHANGE UP (ref 0–0)
NRBC # BLD: 0 /100 WBCS — SIGNIFICANT CHANGE UP (ref 0–0)
PHOSPHATE SERPL-MCNC: 3.7 MG/DL — SIGNIFICANT CHANGE UP (ref 2.1–4.9)
PHOSPHATE SERPL-MCNC: 3.8 MG/DL — SIGNIFICANT CHANGE UP (ref 2.1–4.9)
PLATELET # BLD AUTO: 221 K/UL — SIGNIFICANT CHANGE UP (ref 130–400)
PLATELET # BLD AUTO: 263 K/UL — SIGNIFICANT CHANGE UP (ref 130–400)
POTASSIUM SERPL-MCNC: 4 MMOL/L — SIGNIFICANT CHANGE UP (ref 3.5–5)
POTASSIUM SERPL-MCNC: 4.3 MMOL/L — SIGNIFICANT CHANGE UP (ref 3.5–5)
POTASSIUM SERPL-SCNC: 4 MMOL/L — SIGNIFICANT CHANGE UP (ref 3.5–5)
POTASSIUM SERPL-SCNC: 4.3 MMOL/L — SIGNIFICANT CHANGE UP (ref 3.5–5)
PROT SERPL-MCNC: 6 G/DL — SIGNIFICANT CHANGE UP (ref 6–8)
RBC # BLD: 3.69 M/UL — LOW (ref 4.2–5.4)
RBC # BLD: 3.8 M/UL — LOW (ref 4.2–5.4)
RBC # FLD: 13.3 % — SIGNIFICANT CHANGE UP (ref 11.5–14.5)
RBC # FLD: 13.3 % — SIGNIFICANT CHANGE UP (ref 11.5–14.5)
SODIUM SERPL-SCNC: 138 MMOL/L — SIGNIFICANT CHANGE UP (ref 135–146)
SODIUM SERPL-SCNC: 138 MMOL/L — SIGNIFICANT CHANGE UP (ref 135–146)
WBC # BLD: 8.33 K/UL — SIGNIFICANT CHANGE UP (ref 4.8–10.8)
WBC # BLD: 9.31 K/UL — SIGNIFICANT CHANGE UP (ref 4.8–10.8)
WBC # FLD AUTO: 8.33 K/UL — SIGNIFICANT CHANGE UP (ref 4.8–10.8)
WBC # FLD AUTO: 9.31 K/UL — SIGNIFICANT CHANGE UP (ref 4.8–10.8)

## 2019-07-07 PROCEDURE — 99233 SBSQ HOSP IP/OBS HIGH 50: CPT

## 2019-07-07 RX ORDER — LINEZOLID 600 MG/300ML
600 INJECTION, SOLUTION INTRAVENOUS EVERY 12 HOURS
Refills: 0 | Status: DISCONTINUED | OUTPATIENT
Start: 2019-07-07 | End: 2019-07-07

## 2019-07-07 RX ORDER — LINEZOLID 600 MG/300ML
600 INJECTION, SOLUTION INTRAVENOUS EVERY 12 HOURS
Refills: 0 | Status: DISCONTINUED | OUTPATIENT
Start: 2019-07-07 | End: 2019-07-08

## 2019-07-07 RX ADMIN — GABAPENTIN 600 MILLIGRAM(S): 400 CAPSULE ORAL at 13:53

## 2019-07-07 RX ADMIN — Medication 1 DROP(S): at 05:04

## 2019-07-07 RX ADMIN — Medication 1 DROP(S): at 11:55

## 2019-07-07 RX ADMIN — Medication 1 DROP(S): at 13:53

## 2019-07-07 RX ADMIN — Medication 1 DROP(S): at 21:05

## 2019-07-07 RX ADMIN — HEPARIN SODIUM 5000 UNIT(S): 5000 INJECTION INTRAVENOUS; SUBCUTANEOUS at 21:05

## 2019-07-07 RX ADMIN — PANTOPRAZOLE SODIUM 40 MILLIGRAM(S): 20 TABLET, DELAYED RELEASE ORAL at 05:03

## 2019-07-07 RX ADMIN — CHLORHEXIDINE GLUCONATE 1 APPLICATION(S): 213 SOLUTION TOPICAL at 05:03

## 2019-07-07 RX ADMIN — ATORVASTATIN CALCIUM 10 MILLIGRAM(S): 80 TABLET, FILM COATED ORAL at 21:05

## 2019-07-07 RX ADMIN — LACTULOSE 20 GRAM(S): 10 SOLUTION ORAL at 17:18

## 2019-07-07 RX ADMIN — HEPARIN SODIUM 5000 UNIT(S): 5000 INJECTION INTRAVENOUS; SUBCUTANEOUS at 05:03

## 2019-07-07 RX ADMIN — LACTULOSE 20 GRAM(S): 10 SOLUTION ORAL at 05:03

## 2019-07-07 RX ADMIN — Medication 50 MICROGRAM(S): at 05:03

## 2019-07-07 RX ADMIN — HEPARIN SODIUM 5000 UNIT(S): 5000 INJECTION INTRAVENOUS; SUBCUTANEOUS at 13:53

## 2019-07-07 RX ADMIN — Medication 81 MILLIGRAM(S): at 11:55

## 2019-07-07 RX ADMIN — LINEZOLID 300 MILLIGRAM(S): 600 INJECTION, SOLUTION INTRAVENOUS at 17:18

## 2019-07-07 RX ADMIN — SERTRALINE 50 MILLIGRAM(S): 25 TABLET, FILM COATED ORAL at 11:54

## 2019-07-07 RX ADMIN — LIDOCAINE 1 PATCH: 4 CREAM TOPICAL at 11:55

## 2019-07-07 RX ADMIN — LATANOPROST 1 DROP(S): 0.05 SOLUTION/ DROPS OPHTHALMIC; TOPICAL at 21:05

## 2019-07-07 RX ADMIN — GABAPENTIN 600 MILLIGRAM(S): 400 CAPSULE ORAL at 05:03

## 2019-07-07 RX ADMIN — GABAPENTIN 600 MILLIGRAM(S): 400 CAPSULE ORAL at 21:05

## 2019-07-07 NOTE — PROCEDURE NOTE - NSPROCDETAILS_GEN_ALL_CORE
location identified, draped/prepped, sterile technique used/sterile dressing applied/ultrasound guidance/sterile technique, catheter placed
sterile dressing applied/sterile technique, catheter placed/location identified, draped/prepped, sterile technique used

## 2019-07-07 NOTE — PROGRESS NOTE ADULT - SUBJECTIVE AND OBJECTIVE BOX
GENERAL SURGERY PROGRESS NOTE     IMELDA ROLLE  [address] [place] [address] day :4d        Surgical Attending: Micha Gave    Overnight events: Patient resting comfortably in bed. Endorses improvement of pain. Received call from the lab the lab stating results of urine culture - Enterococcus facialis (vancomycin resistant     T(F): 97.9 (07-07-19 @ 00:00), Max: 97.9 (07-07-19 @ 00:00)  HR: 80 (07-07-19 @ 00:00) (76 - 80)  BP: 146/64 (07-07-19 @ 00:00) (125/65 - 146/64)  ABP: --  ABP(mean): --  RR: 18 (07-07-19 @ 00:00) (18 - 18)  SpO2: --      07-06-19 @ 07:01  -  07-07-19 @ 04:44  --------------------------------------------------------  IN:    Oral Fluid: 480 mL  Total IN: 480 mL    OUT:  Total OUT: 0 mL    Total NET: 480 mL        DIET/FLUIDS: lactated ringers. 1000 milliLiter(s) IV Continuous <Continuous>       GI proph:  pantoprazole    Tablet 40 milliGRAM(s) Oral before breakfast    AC/ proph: aspirin  chewable 81 milliGRAM(s) Oral daily  heparin  Injectable 5000 Unit(s) SubCutaneous every 8 hours    ABx:     PHYSICAL EXAM:  GENERAL: NAD, well-appearing  CHEST/LUNG: Clear to auscultation bilaterally  HEART: Regular rate and rhythm  ABDOMEN: Obese Soft, Nontender, mildly distended   EXTREMITIES:  No clubbing, cyanosis, or edema      LABS  Labs:  CAPILLARY BLOOD GLUCOSE                              11.2   8.33  )-----------( 221      ( 07 Jul 2019 01:33 )             34.8       Auto Neutrophil %: 63.3 % (07-07-19 @ 01:33)  Auto Immature Granulocyte %: 0.5 % (07-07-19 @ 01:33)    07-07    138  |  102  |  10  ----------------------------<  95  4.3   |  22  |  0.8      Calcium, Total Serum: 9.1 mg/dL (07-07-19 @ 01:33)          Culture - Blood (collected 05 Jul 2019 03:10)  Source: .Blood Blood  Preliminary Report (06 Jul 2019 09:01):    No growth to date.      RADIOLOGY & ADDITIONAL TESTS:  < from: Xray Abdomen 3 Position w/Chest (07.06.19 @ 10:28) >    Impression:      Interval decrease in bowel gaseous distention.    < end of copied text >

## 2019-07-08 LAB
ANION GAP SERPL CALC-SCNC: 13 MMOL/L — SIGNIFICANT CHANGE UP (ref 7–14)
ANION GAP SERPL CALC-SCNC: 16 MMOL/L — HIGH (ref 7–14)
BASOPHILS # BLD AUTO: 0.04 K/UL — SIGNIFICANT CHANGE UP (ref 0–0.2)
BASOPHILS NFR BLD AUTO: 0.3 % — SIGNIFICANT CHANGE UP (ref 0–1)
BUN SERPL-MCNC: 14 MG/DL — SIGNIFICANT CHANGE UP (ref 10–20)
BUN SERPL-MCNC: 17 MG/DL — SIGNIFICANT CHANGE UP (ref 10–20)
CALCIUM SERPL-MCNC: 9 MG/DL — SIGNIFICANT CHANGE UP (ref 8.5–10.1)
CALCIUM SERPL-MCNC: 9 MG/DL — SIGNIFICANT CHANGE UP (ref 8.5–10.1)
CHLORIDE SERPL-SCNC: 100 MMOL/L — SIGNIFICANT CHANGE UP (ref 98–110)
CHLORIDE SERPL-SCNC: 99 MMOL/L — SIGNIFICANT CHANGE UP (ref 98–110)
CO2 SERPL-SCNC: 20 MMOL/L — SIGNIFICANT CHANGE UP (ref 17–32)
CO2 SERPL-SCNC: 24 MMOL/L — SIGNIFICANT CHANGE UP (ref 17–32)
CREAT SERPL-MCNC: 0.8 MG/DL — SIGNIFICANT CHANGE UP (ref 0.7–1.5)
CREAT SERPL-MCNC: 1.1 MG/DL — SIGNIFICANT CHANGE UP (ref 0.7–1.5)
CULTURE RESULTS: SIGNIFICANT CHANGE UP
EOSINOPHIL # BLD AUTO: 0.52 K/UL — SIGNIFICANT CHANGE UP (ref 0–0.7)
EOSINOPHIL NFR BLD AUTO: 3.9 % — SIGNIFICANT CHANGE UP (ref 0–8)
GLUCOSE SERPL-MCNC: 103 MG/DL — HIGH (ref 70–99)
GLUCOSE SERPL-MCNC: 84 MG/DL — SIGNIFICANT CHANGE UP (ref 70–99)
HCT VFR BLD CALC: 34.4 % — LOW (ref 37–47)
HGB BLD-MCNC: 11.1 G/DL — LOW (ref 12–16)
IMM GRANULOCYTES NFR BLD AUTO: 0.4 % — HIGH (ref 0.1–0.3)
LYMPHOCYTES # BLD AUTO: 23.9 % — SIGNIFICANT CHANGE UP (ref 20.5–51.1)
LYMPHOCYTES # BLD AUTO: 3.22 K/UL — SIGNIFICANT CHANGE UP (ref 1.2–3.4)
MAGNESIUM SERPL-MCNC: 2 MG/DL — SIGNIFICANT CHANGE UP (ref 1.8–2.4)
MCHC RBC-ENTMCNC: 31.2 PG — HIGH (ref 27–31)
MCHC RBC-ENTMCNC: 32.3 G/DL — SIGNIFICANT CHANGE UP (ref 32–37)
MCV RBC AUTO: 96.6 FL — SIGNIFICANT CHANGE UP (ref 81–99)
MONOCYTES # BLD AUTO: 0.93 K/UL — HIGH (ref 0.1–0.6)
MONOCYTES NFR BLD AUTO: 6.9 % — SIGNIFICANT CHANGE UP (ref 1.7–9.3)
NEUTROPHILS # BLD AUTO: 8.68 K/UL — HIGH (ref 1.4–6.5)
NEUTROPHILS NFR BLD AUTO: 64.6 % — SIGNIFICANT CHANGE UP (ref 42.2–75.2)
NRBC # BLD: 0 /100 WBCS — SIGNIFICANT CHANGE UP (ref 0–0)
PHOSPHATE SERPL-MCNC: 4.6 MG/DL — SIGNIFICANT CHANGE UP (ref 2.1–4.9)
PLATELET # BLD AUTO: 263 K/UL — SIGNIFICANT CHANGE UP (ref 130–400)
POTASSIUM SERPL-MCNC: 4.2 MMOL/L — SIGNIFICANT CHANGE UP (ref 3.5–5)
POTASSIUM SERPL-MCNC: 4.3 MMOL/L — SIGNIFICANT CHANGE UP (ref 3.5–5)
POTASSIUM SERPL-SCNC: 4.2 MMOL/L — SIGNIFICANT CHANGE UP (ref 3.5–5)
POTASSIUM SERPL-SCNC: 4.3 MMOL/L — SIGNIFICANT CHANGE UP (ref 3.5–5)
RBC # BLD: 3.56 M/UL — LOW (ref 4.2–5.4)
RBC # FLD: 13.7 % — SIGNIFICANT CHANGE UP (ref 11.5–14.5)
SODIUM SERPL-SCNC: 135 MMOL/L — SIGNIFICANT CHANGE UP (ref 135–146)
SODIUM SERPL-SCNC: 137 MMOL/L — SIGNIFICANT CHANGE UP (ref 135–146)
SPECIMEN SOURCE: SIGNIFICANT CHANGE UP
WBC # BLD: 13.45 K/UL — HIGH (ref 4.8–10.8)
WBC # FLD AUTO: 13.45 K/UL — HIGH (ref 4.8–10.8)

## 2019-07-08 PROCEDURE — 99233 SBSQ HOSP IP/OBS HIGH 50: CPT

## 2019-07-08 PROCEDURE — 74022 RADEX COMPL AQT ABD SERIES: CPT | Mod: 26

## 2019-07-08 RX ORDER — MAGNESIUM SULFATE 500 MG/ML
2 VIAL (ML) INJECTION ONCE
Refills: 0 | Status: COMPLETED | OUTPATIENT
Start: 2019-07-08 | End: 2019-07-08

## 2019-07-08 RX ORDER — METOCLOPRAMIDE HCL 10 MG
5 TABLET ORAL DAILY
Refills: 0 | Status: DISCONTINUED | OUTPATIENT
Start: 2019-07-08 | End: 2019-07-10

## 2019-07-08 RX ORDER — LACTULOSE 10 G/15ML
30 SOLUTION ORAL EVERY 8 HOURS
Refills: 0 | Status: DISCONTINUED | OUTPATIENT
Start: 2019-07-08 | End: 2019-07-10

## 2019-07-08 RX ADMIN — Medication 1 DROP(S): at 13:48

## 2019-07-08 RX ADMIN — GABAPENTIN 600 MILLIGRAM(S): 400 CAPSULE ORAL at 13:44

## 2019-07-08 RX ADMIN — LINEZOLID 300 MILLIGRAM(S): 600 INJECTION, SOLUTION INTRAVENOUS at 05:19

## 2019-07-08 RX ADMIN — GABAPENTIN 600 MILLIGRAM(S): 400 CAPSULE ORAL at 21:32

## 2019-07-08 RX ADMIN — GABAPENTIN 600 MILLIGRAM(S): 400 CAPSULE ORAL at 05:18

## 2019-07-08 RX ADMIN — HEPARIN SODIUM 5000 UNIT(S): 5000 INJECTION INTRAVENOUS; SUBCUTANEOUS at 13:47

## 2019-07-08 RX ADMIN — PANTOPRAZOLE SODIUM 40 MILLIGRAM(S): 20 TABLET, DELAYED RELEASE ORAL at 05:20

## 2019-07-08 RX ADMIN — HEPARIN SODIUM 5000 UNIT(S): 5000 INJECTION INTRAVENOUS; SUBCUTANEOUS at 05:18

## 2019-07-08 RX ADMIN — Medication 81 MILLIGRAM(S): at 11:33

## 2019-07-08 RX ADMIN — HEPARIN SODIUM 5000 UNIT(S): 5000 INJECTION INTRAVENOUS; SUBCUTANEOUS at 21:33

## 2019-07-08 RX ADMIN — Medication 1 DROP(S): at 11:33

## 2019-07-08 RX ADMIN — Medication 50 MICROGRAM(S): at 05:18

## 2019-07-08 RX ADMIN — Medication 1 DROP(S): at 21:34

## 2019-07-08 RX ADMIN — Medication 5 MILLIGRAM(S): at 14:48

## 2019-07-08 RX ADMIN — Medication 25 GRAM(S): at 09:45

## 2019-07-08 RX ADMIN — ATORVASTATIN CALCIUM 10 MILLIGRAM(S): 80 TABLET, FILM COATED ORAL at 21:33

## 2019-07-08 RX ADMIN — LIDOCAINE 1 PATCH: 4 CREAM TOPICAL at 11:33

## 2019-07-08 RX ADMIN — LACTULOSE 30 GRAM(S): 10 SOLUTION ORAL at 14:49

## 2019-07-08 RX ADMIN — Medication 1 DROP(S): at 05:20

## 2019-07-08 RX ADMIN — CHLORHEXIDINE GLUCONATE 1 APPLICATION(S): 213 SOLUTION TOPICAL at 05:18

## 2019-07-08 RX ADMIN — LATANOPROST 1 DROP(S): 0.05 SOLUTION/ DROPS OPHTHALMIC; TOPICAL at 21:33

## 2019-07-08 RX ADMIN — SERTRALINE 50 MILLIGRAM(S): 25 TABLET, FILM COATED ORAL at 11:33

## 2019-07-08 RX ADMIN — LIDOCAINE 1 PATCH: 4 CREAM TOPICAL at 20:21

## 2019-07-08 RX ADMIN — LACTULOSE 20 GRAM(S): 10 SOLUTION ORAL at 05:18

## 2019-07-08 RX ADMIN — LACTULOSE 30 GRAM(S): 10 SOLUTION ORAL at 21:32

## 2019-07-08 NOTE — CDI QUERY NOTE - NSCDIOTHERTXTBX_GEN_ALL_CORE_HH
Lab indicates that this patient has:  Magnesium, Serum: 1.5 mg/dL (07-07-19 @ 22:23)  Magnesium, Serum: 1.6 mg/dL (07-07-19 @ 01:33)    he received the following medication:  Medical orders show that Pt received magnesium sulfate:  magnesium sulfate  IVPB 2 Gram(s) IV Intermittent once  magnesium sulfate  IVPB 2 Gram(s) IV Intermittent once     In order to accurately capture the diagnosis to the greatest degree of specificity reflecting the patient’s actual severity of illness, the documentation in this patient’s medical record requires additional clarification.  Please include more specific diagnosis in your Progress Note and/or Discharge Summary.    • Non significant lab findings.  • Hypomagnesemia.  • Other (please specify)  • Unable  to determine    Present on Admission:  Was the severity of the condition present on admission?  If so, please document in the chart that “(the condition) was present on admission.”    In responding to this request, please exercise your independent professional judgment.  The fact that a question is asked does not imply that any particular answer is desired or expected. Documentation clarification is required for compliance, accuracy in coding and billing, and reporting severity of illness, quality data   and risk of mortality.

## 2019-07-08 NOTE — DIETITIAN INITIAL EVALUATION ADULT. - FACTORS AFF FOOD INTAKE
spoke with  who states that pt has no difficulty swallowing/chewing; no GI distress noted, LBM 7/6/other (specify)

## 2019-07-08 NOTE — DIETITIAN INITIAL EVALUATION ADULT. - ENERGY NEEDS
Calories: 2623-7698 kcal/day (25-30 kcal/kg IBW)--decreased needs d/t obese BMI and advanced age considered  Protein: 59-71 gm/day (1-1.2 gm/kg CBW)  Fluid: 1 ml/kcal

## 2019-07-08 NOTE — PROGRESS NOTE ADULT - SUBJECTIVE AND OBJECTIVE BOX
GENERAL SURGERY PROGRESS NOTE     IMELDA ROLLE  78y  Female  Hospital day :5d  Procedure: Insertion, midline catheter    OVERNIGHT EVENTS: midline insertion yesterday    T(F): 98 (07-07-19 @ 23:21), Max: 98 (07-07-19 @ 08:05)  HR: 83 (07-07-19 @ 23:21) (78 - 93)  BP: 149/65 (07-07-19 @ 23:21) (140/65 - 149/65)  RR: 18 (07-07-19 @ 23:21) (18 - 18)     GI proph:  pantoprazole    Tablet 40 milliGRAM(s) Oral before breakfast    AC/ proph: aspirin  chewable 81 milliGRAM(s) Oral daily  heparin  Injectable 5000 Unit(s) SubCutaneous every 8 hours    ABx: linezolid  IVPB 600 milliGRAM(s) IV Intermittent every 12 hours    PHYSICAL EXAM:  GENERAL: NAD, well-appearing  CHEST/LUNG: Clear to auscultation bilaterally  HEART: Regular rate and rhythm  ABDOMEN: obese, nontender, distended   EXTREMITIES:  No clubbing, cyanosis, or edema    Labs:  CAPILLARY BLOOD GLUCOSE                        11.2   9.31  )-----------( 263      ( 07 Jul 2019 22:23 )             35.7       Auto Neutrophil %: 64.2 % (07-07-19 @ 22:23)  Auto Immature Granulocyte %: 0.3 % (07-07-19 @ 22:23)    07-07    138  |  100  |  12  ----------------------------<  108<H>  4.0   |  24  |  0.8    Calcium, Total Serum: 9.0 mg/dL (07-07-19 @ 22:23)    LFTs:             6.0  | 0.3  | 16       ------------------[58      ( 07 Jul 2019 22:23 )  3.4  | <0.2 | 9

## 2019-07-08 NOTE — DIETITIAN INITIAL EVALUATION ADULT. - OTHER INFO
Nutrition Hx PTA: Per , pt is a very fussy eater, but has a good appetite and consumes 3 meals daily. Pt has tried Ensure in the past and only likes chocolate flavor. Pt dislikes poultry and oatmeal and will not eat it. Pt also doesn't like any seafood except shrimp. Pt is allergic to bell peppers (green and red). Beef and pork is OKAY.     Pt presents from a nursing home with  to the ED w/2 days of abdominal distension and one day of nausea and vomiting w/ possible fever. Pt with SBO and UTI--passing flatus and having small BM. Continue regular diet. Calorie count ordered and hung today by RD; results due 7/11.

## 2019-07-08 NOTE — PROGRESS NOTE ADULT - SUBJECTIVE AND OBJECTIVE BOX
IMELDA ROLLE  78y, Female  Allergy: antichlolinergics (Unknown)  benzodiazepines (Unknown)  ciprofloxacin (Unknown)  Originally Entered as [Unknown] reaction to [green pepper] (Unknown)  Originally Entered as [Unknown] reaction to [neuroleptics] (Unknown)  Originally Entered as [Unknown] reaction to [pepper] (Unknown)  Originally Entered as [Unknown] reaction to [red pepper] (Unknown)  penicillin (Anaphylaxis)      CHIEF COMPLAINT: Abdominal distension (08 Jul 2019 05:40)      INTERVAL EVENTS/HPI  - No acute events overnight  - T(F): , Max: 98 (07-07-19 @ 23:21)  - Denies any worsening symptoms  - Tolerating medication  - WBC Count: 9.31 K/uL (07-07-19 @ 22:23)      ROS  General: Denies fevers, chills, nightsweats, weight loss  HEENT: Denies headache, rhinorrhea, sore throat, eye pain  CV: Denies CP, palpitations  PULM: Denies SOB, cough  GI: Denies abdominal pain, hematochezia/melena  : Denies dysuria, hematuria  MSK: Denies arthralgias, myalgias  SKIN: Denies rash, lesions  NEURO: Denies paresthesias, weakness  PSYCH: Denies depression, anxiety    FH noncontributory    VITALS:  T(F): 98, Max: 98 (07-07-19 @ 23:21)  HR: 82  BP: 133/69  RR: 18Vital Signs Last 24 Hrs  T(C): 36.7 (08 Jul 2019 07:17), Max: 36.7 (07 Jul 2019 23:21)  T(F): 98 (08 Jul 2019 07:17), Max: 98 (07 Jul 2019 23:21)  HR: 82 (08 Jul 2019 07:17) (82 - 93)  BP: 133/69 (08 Jul 2019 07:17) (133/69 - 149/65)  BP(mean): --  RR: 18 (08 Jul 2019 07:17) (18 - 18)  SpO2: --    PHYSICAL EXAM:  Gen: Obese NAD, resting in bed  HEENT: Normocephalic, atraumatic  Neck: supple, no lymphadenopathy  CV: Regular rate & regular rhythm  Lungs: decreased BS at bases, no fremitus  Abdomen: distended Soft, BS present  Ext: Warm, well perfused  Neuro: non focal, awake  Skin: no rash, no erythema  Lines: no phlebitis    FAMILY HISTORY  No pertinent family history in first degree relatives    SOCIAL HISTORY  Substance Use (  x) never used  (  ) IVDU (  ) Other:  Tobacco Usage:  (  x ) never smoked   (   ) former smoker   (   ) current smoker   Alcohol Usage: (   ) social  (   ) daily use ( x  ) denies  Sexual History: na      TESTS & MEASUREMENTS:                        11.2   9.31  )-----------( 263      ( 07 Jul 2019 22:23 )             35.7     07-08    135  |  99  |  14  ----------------------------<  103<H>  4.3   |  20  |  0.8    Ca    9.0      08 Jul 2019 12:05  Phos  3.8     07-07  Mg     2.2     07-08    TPro  6.0  /  Alb  3.4<L>  /  TBili  0.3  /  DBili  <0.2  /  AST  16  /  ALT  9   /  AlkPhos  58  07-07    eGFR if Non African American: 71 mL/min/1.73M2 (07-08-19 @ 12:05)  eGFR if African American: 82 mL/min/1.73M2 (07-08-19 @ 12:05)  eGFR if Non African American: 71 mL/min/1.73M2 (07-07-19 @ 22:23)  eGFR if : 82 mL/min/1.73M2 (07-07-19 @ 22:23)    LIVER FUNCTIONS - ( 07 Jul 2019 22:23 )  Alb: 3.4 g/dL / Pro: 6.0 g/dL / ALK PHOS: 58 U/L / ALT: 9 U/L / AST: 16 U/L / GGT: x               Culture - Blood (collected 07-05-19 @ 03:10)  Source: .Blood Blood  Preliminary Report (07-06-19 @ 09:01):    No growth to date.    Culture - Urine (collected 07-03-19 @ 19:04)  Source: .Urine Clean Catch (Midstream)  Final Report (07-06-19 @ 17:06):    >100,000 CFU/ml Enterococcus faecalis (vancomycin resistant)    <10,000 CFU/ml Normal Urogenital greyson present  Organism: Enterococcus faecalis (vancomycin resistant) (07-06-19 @ 17:06)  Organism: Enterococcus faecalis (vancomycin resistant) (07-06-19 @ 17:06)      -  Ampicillin: S <=2 Predicts results to ampicillin/sulbactam, amoxacillin-clavulanate and  piperacillin-tazobactam.      -  Ciprofloxacin: S <=1      -  Daptomycin: S 1      -  Levofloxacin: S <=1      -  Linezolid: S <=1      -  Nitrofurantoin: S <=32 Should not be used to treat pyelonephritis.      -  Tetra/Doxy: R >8      -  Vancomycin: R >16      Method Type: JANNETH    Culture - Blood (collected 07-03-19 @ 13:33)  Source: .Blood Blood  Preliminary Report (07-04-19 @ 23:01):    No growth to date.    Culture - Blood (collected 07-03-19 @ 13:33)  Source: .Blood Blood  Gram Stain (07-04-19 @ 16:54):    Growth in anaerobic bottle: Gram Positive Cocci in Clusters  Final Report (07-05-19 @ 17:01):    Growth in anaerobic bottle: Coag Negative Staphylococcus    Single set isolate, possible contaminant. Contact    Microbiology if susceptibility testing clinically    indicated.    "Due to technical problems, Proteus sp. will Not be reported as part of    theBCID panel until further notice"    ***Blood Panel PCR results on this specimen are available    approximately 3 hours after the Gram stain result.***    Gram stain, PCR, and/or culture results may not always    correspond due to difference in methodologies.    ************************************************************    This PCR assay was performed using [employer].    The following targets are tested for: Enterococcus,    vancomycin resistant enterococci, Listeria monocytogenes,    coagulase negative staphylococci, S. aureus,    methicillin resistant S. aureus, Streptococcus agalactiae    (Group B), S. pneumoniae, S. pyogenes (Group A),    Acinetobacter baumannii, Enterobacter cloacae, E. coli,    Klebsiella oxytoca, K. pneumoniae, Proteus sp.,    Serratia marcescens, Haemophilus influenzae,    Neisseria meningitidis, Pseudomonas aeruginosa, Candida    albicans, C. glabrata, C krusei, C parapsilosis,    C. tropicalis and the KPC resistance gene.  Organism: Blood Culture PCR (07-05-19 @ 17:01)  Organism: Blood Culture PCR (07-05-19 @ 17:01)      -  Coagulase negative Staphylococcus: Detec      Method Type: PCR            INFECTIOUS DISEASES TESTING      RADIOLOGY & ADDITIONAL TESTS:  I have personally reviewed the last Chest xray  CXR      CT      CARDIOLOGY TESTING      MEDICATIONS  aspirin  chewable 81  atorvastatin 10  chlorhexidine 4% Liquid 1  gabapentin 600  heparin  Injectable 5000  lactulose Syrup 30  latanoprost 0.005% Ophthalmic Solution 1  levothyroxine 50  lidocaine   Patch 1  linezolid  IVPB 600  metoclopramide 5  pantoprazole    Tablet 40  polyvinyl alcohol 1.4%/povidone 0.6% Ophthalmic Solution - Peds 1  sertraline 50  timolol 0.25% Solution 1      ANTIBIOTICS:  linezolid  IVPB 600 milliGRAM(s) IV Intermittent every 12 hours

## 2019-07-08 NOTE — DIETITIAN INITIAL EVALUATION ADULT. - PHYSICAL APPEARANCE
obese/confused, disoriented. BMI: 33.9, IBW: 130#, UBW: ~210#, per  pt's wt has been stable. no edema noted, excoriation.

## 2019-07-09 LAB
ANION GAP SERPL CALC-SCNC: 13 MMOL/L — SIGNIFICANT CHANGE UP (ref 7–14)
BASOPHILS # BLD AUTO: 0.04 K/UL — SIGNIFICANT CHANGE UP (ref 0–0.2)
BASOPHILS NFR BLD AUTO: 0.3 % — SIGNIFICANT CHANGE UP (ref 0–1)
BUN SERPL-MCNC: 17 MG/DL — SIGNIFICANT CHANGE UP (ref 10–20)
CALCIUM SERPL-MCNC: 8.5 MG/DL — SIGNIFICANT CHANGE UP (ref 8.5–10.1)
CHLORIDE SERPL-SCNC: 98 MMOL/L — SIGNIFICANT CHANGE UP (ref 98–110)
CO2 SERPL-SCNC: 24 MMOL/L — SIGNIFICANT CHANGE UP (ref 17–32)
CREAT SERPL-MCNC: 0.9 MG/DL — SIGNIFICANT CHANGE UP (ref 0.7–1.5)
EOSINOPHIL # BLD AUTO: 0.31 K/UL — SIGNIFICANT CHANGE UP (ref 0–0.7)
EOSINOPHIL NFR BLD AUTO: 2.5 % — SIGNIFICANT CHANGE UP (ref 0–8)
GLUCOSE BLDC GLUCOMTR-MCNC: 105 MG/DL — HIGH (ref 70–99)
GLUCOSE BLDC GLUCOMTR-MCNC: 116 MG/DL — HIGH (ref 70–99)
GLUCOSE BLDC GLUCOMTR-MCNC: 124 MG/DL — HIGH (ref 70–99)
GLUCOSE BLDC GLUCOMTR-MCNC: 91 MG/DL — SIGNIFICANT CHANGE UP (ref 70–99)
GLUCOSE SERPL-MCNC: 107 MG/DL — HIGH (ref 70–99)
HCT VFR BLD CALC: 34.2 % — LOW (ref 37–47)
HGB BLD-MCNC: 10.8 G/DL — LOW (ref 12–16)
IMM GRANULOCYTES NFR BLD AUTO: 0.4 % — HIGH (ref 0.1–0.3)
LYMPHOCYTES # BLD AUTO: 25.4 % — SIGNIFICANT CHANGE UP (ref 20.5–51.1)
LYMPHOCYTES # BLD AUTO: 3.18 K/UL — SIGNIFICANT CHANGE UP (ref 1.2–3.4)
MAGNESIUM SERPL-MCNC: 1.6 MG/DL — LOW (ref 1.8–2.4)
MCHC RBC-ENTMCNC: 30.3 PG — SIGNIFICANT CHANGE UP (ref 27–31)
MCHC RBC-ENTMCNC: 31.6 G/DL — LOW (ref 32–37)
MCV RBC AUTO: 96.1 FL — SIGNIFICANT CHANGE UP (ref 81–99)
MONOCYTES # BLD AUTO: 0.72 K/UL — HIGH (ref 0.1–0.6)
MONOCYTES NFR BLD AUTO: 5.7 % — SIGNIFICANT CHANGE UP (ref 1.7–9.3)
NEUTROPHILS # BLD AUTO: 8.24 K/UL — HIGH (ref 1.4–6.5)
NEUTROPHILS NFR BLD AUTO: 65.7 % — SIGNIFICANT CHANGE UP (ref 42.2–75.2)
NRBC # BLD: 0 /100 WBCS — SIGNIFICANT CHANGE UP (ref 0–0)
PHOSPHATE SERPL-MCNC: 3 MG/DL — SIGNIFICANT CHANGE UP (ref 2.1–4.9)
PLATELET # BLD AUTO: 263 K/UL — SIGNIFICANT CHANGE UP (ref 130–400)
POTASSIUM SERPL-MCNC: 4.3 MMOL/L — SIGNIFICANT CHANGE UP (ref 3.5–5)
POTASSIUM SERPL-SCNC: 4.3 MMOL/L — SIGNIFICANT CHANGE UP (ref 3.5–5)
RBC # BLD: 3.56 M/UL — LOW (ref 4.2–5.4)
RBC # FLD: 13.8 % — SIGNIFICANT CHANGE UP (ref 11.5–14.5)
SODIUM SERPL-SCNC: 135 MMOL/L — SIGNIFICANT CHANGE UP (ref 135–146)
WBC # BLD: 12.54 K/UL — HIGH (ref 4.8–10.8)
WBC # FLD AUTO: 12.54 K/UL — HIGH (ref 4.8–10.8)

## 2019-07-09 PROCEDURE — 99233 SBSQ HOSP IP/OBS HIGH 50: CPT

## 2019-07-09 PROCEDURE — 99222 1ST HOSP IP/OBS MODERATE 55: CPT

## 2019-07-09 RX ORDER — ERYTHROMYCIN ETHYLSUCCINATE 400 MG
125 TABLET ORAL EVERY 8 HOURS
Refills: 0 | Status: DISCONTINUED | OUTPATIENT
Start: 2019-07-09 | End: 2019-07-09

## 2019-07-09 RX ORDER — ERYTHROMYCIN ETHYLSUCCINATE 400 MG
125 TABLET ORAL EVERY 8 HOURS
Refills: 0 | Status: DISCONTINUED | OUTPATIENT
Start: 2019-07-09 | End: 2019-07-10

## 2019-07-09 RX ORDER — CARBIDOPA AND LEVODOPA 25; 100 MG/1; MG/1
1 TABLET ORAL
Refills: 0 | Status: DISCONTINUED | OUTPATIENT
Start: 2019-07-09 | End: 2019-07-10

## 2019-07-09 RX ORDER — ACETAMINOPHEN 500 MG
650 TABLET ORAL EVERY 6 HOURS
Refills: 0 | Status: DISCONTINUED | OUTPATIENT
Start: 2019-07-09 | End: 2019-07-10

## 2019-07-09 RX ADMIN — PANTOPRAZOLE SODIUM 40 MILLIGRAM(S): 20 TABLET, DELAYED RELEASE ORAL at 05:54

## 2019-07-09 RX ADMIN — LIDOCAINE 1 PATCH: 4 CREAM TOPICAL at 11:24

## 2019-07-09 RX ADMIN — Medication 1 DROP(S): at 14:49

## 2019-07-09 RX ADMIN — HEPARIN SODIUM 5000 UNIT(S): 5000 INJECTION INTRAVENOUS; SUBCUTANEOUS at 21:57

## 2019-07-09 RX ADMIN — LACTULOSE 30 GRAM(S): 10 SOLUTION ORAL at 14:49

## 2019-07-09 RX ADMIN — SERTRALINE 50 MILLIGRAM(S): 25 TABLET, FILM COATED ORAL at 11:24

## 2019-07-09 RX ADMIN — LIDOCAINE 1 PATCH: 4 CREAM TOPICAL at 19:00

## 2019-07-09 RX ADMIN — Medication 5 MILLIGRAM(S): at 11:24

## 2019-07-09 RX ADMIN — Medication 1 DROP(S): at 11:25

## 2019-07-09 RX ADMIN — HEPARIN SODIUM 5000 UNIT(S): 5000 INJECTION INTRAVENOUS; SUBCUTANEOUS at 05:54

## 2019-07-09 RX ADMIN — Medication 50 MICROGRAM(S): at 05:54

## 2019-07-09 RX ADMIN — GABAPENTIN 600 MILLIGRAM(S): 400 CAPSULE ORAL at 14:47

## 2019-07-09 RX ADMIN — LATANOPROST 1 DROP(S): 0.05 SOLUTION/ DROPS OPHTHALMIC; TOPICAL at 22:00

## 2019-07-09 RX ADMIN — LIDOCAINE 1 PATCH: 4 CREAM TOPICAL at 00:06

## 2019-07-09 RX ADMIN — Medication 1 DROP(S): at 21:59

## 2019-07-09 RX ADMIN — GABAPENTIN 600 MILLIGRAM(S): 400 CAPSULE ORAL at 05:54

## 2019-07-09 RX ADMIN — CHLORHEXIDINE GLUCONATE 1 APPLICATION(S): 213 SOLUTION TOPICAL at 05:53

## 2019-07-09 RX ADMIN — ATORVASTATIN CALCIUM 10 MILLIGRAM(S): 80 TABLET, FILM COATED ORAL at 21:57

## 2019-07-09 RX ADMIN — LACTULOSE 30 GRAM(S): 10 SOLUTION ORAL at 21:58

## 2019-07-09 RX ADMIN — Medication 125 MILLIGRAM(S): at 21:57

## 2019-07-09 RX ADMIN — GABAPENTIN 600 MILLIGRAM(S): 400 CAPSULE ORAL at 21:57

## 2019-07-09 RX ADMIN — LACTULOSE 30 GRAM(S): 10 SOLUTION ORAL at 05:54

## 2019-07-09 RX ADMIN — Medication 81 MILLIGRAM(S): at 11:24

## 2019-07-09 RX ADMIN — CARBIDOPA AND LEVODOPA 1 TABLET(S): 25; 100 TABLET ORAL at 14:48

## 2019-07-09 RX ADMIN — LIDOCAINE 1 PATCH: 4 CREAM TOPICAL at 23:33

## 2019-07-09 RX ADMIN — HEPARIN SODIUM 5000 UNIT(S): 5000 INJECTION INTRAVENOUS; SUBCUTANEOUS at 14:48

## 2019-07-09 RX ADMIN — Medication 1 DROP(S): at 05:55

## 2019-07-09 NOTE — PROGRESS NOTE ADULT - SUBJECTIVE AND OBJECTIVE BOX
GENERAL SURGERY PROGRESS NOTE     IMELDA ROLLE  78y  Female  Hospital day :6d  POD:  Procedure: Insertion, midline catheter    OVERNIGHT EVENTS:  DC'd linezolid per ID recs. No acute overnight events.    T(F): 97.1 (07-09-19 @ 00:02), Max: 98 (07-08-19 @ 07:17)  HR: 85 (07-09-19 @ 00:02) (76 - 85)  BP: 135/65 (07-09-19 @ 00:02) (86/51 - 135/65)  ABP: --  ABP(mean): --  RR: 18 (07-09-19 @ 00:02) (18 - 19)  SpO2: --    DIET/FLUIDS:    GI proph:  pantoprazole    Tablet 40 milliGRAM(s) Oral before breakfast    AC/ proph: aspirin  chewable 81 milliGRAM(s) Oral daily  heparin  Injectable 5000 Unit(s) SubCutaneous every 8 hours    ABx: none    PHYSICAL EXAM:  GENERAL: NAD, well-appearing  CHEST/LUNG: Clear to auscultation bilaterally  HEART: Regular rate and rhythm  ABDOMEN: Soft, Nontender, mildly distended  EXTREMITIES:  No clubbing, cyanosis, or edema      LABS  Labs:  CAPILLARY BLOOD GLUCOSE                              11.1   13.45 )-----------( 263      ( 08 Jul 2019 20:00 )             34.4       Auto Neutrophil %: 64.6 % (07-08-19 @ 20:00)  Auto Immature Granulocyte %: 0.4 % (07-08-19 @ 20:00)    07-08    137  |  100  |  17  ----------------------------<  84  4.2   |  24  |  1.1      Calcium, Total Serum: 9.0 mg/dL (07-08-19 @ 20:00)      LFTs:             6.0  | 0.3  | 16       ------------------[58      ( 07 Jul 2019 22:23 )  3.4  | <0.2 | 9

## 2019-07-09 NOTE — CONSULT NOTE ADULT - ASSESSMENT
78F PMHx below notable for colitis, and colectomy 2/2 unresectable colonic polyp (from CT scan appears to be right hemicolectomy with ileocolic anastomosis) presents with 2 days of abdominal distension and  vomiting. In the Ed she was hemodynamically stable. Her WBC is 13.75, lactate normal, CT abdomen shows Multiple dilated loops of small bowel suggestive of localized ileitis versus small bowel obstruction.    # SBO obstruction vs ileitis:  Please keep NPO  C/W IVF, NG tube  Daily KUB 78F PMHx below notable for colitis, and colectomy 2/2 unresectable colonic polyp (from CT scan appears to be right hemicolectomy with ileocolic anastomosis) presents with 2 days of abdominal distension and  vomiting. In the Ed she was hemodynamically stable. Her WBC is 13.75, lactate normal, CT abdomen shows Multiple dilated loops of small bowel suggestive of localized ileitis versus small bowel obstruction.    # SBO obstruction vs ileitis:  C/W IVF, NG tube  Daily KUB  Keep NPO  Monitor electrolytes. 78F PMHx below notable for colitis, and colectomy 2/2 unresectable colonic polyp (from CT scan appears to be right hemicolectomy with ileocolic anastomosis) presents with 2 days of abdominal distension and  vomiting. In the Ed she was hemodynamically stable. Her WBC is 13.75, lactate normal, CT abdomen shows Multiple dilated loops of small bowel suggestive of localized ileitis versus small bowel obstruction.    # SBO obstruction vs ileitis:  Patient is on regular diet and tolerating  Had a large brown soft BM today afternoon  C/W IVF  Daily KUB  Monitor electrolytes. 78F PMHx below notable for Lewy Body dementia, and colectomy 2/2 unresectable colonic polyp (from CT scan appears to be right hemicolectomy with ileocolic anastomosis) presents with 2 days of abdominal distension and  vomiting. In the Ed she was hemodynamically stable. Her WBC is 13.75, lactate normal, CT abdomen shows Multiple dilated loops of small bowel suggestive of localized ileitis versus small bowel obstruction. Patient had a colonoscopy 2 yrs ago and she took longer to get back to her normal mental status because of Lewy body dementia presumably.    # SBO obstruction vs ileitis as shown in CT scan.  Had a large brown soft BM today afternoon  c/w management according to the surgical team  Minimize opioid use.  No need of colonoscopy as inpatient  can follow with her GI doctor at [facility].  Consider non invasive colon cancer screening in concern with her reaction to sedation 2yrs back (Lewy body dementia). 78F PMHx below notable for Lewy Body dementia, and colectomy 2/2 unresectable colonic polyp (from CT scan appears to be right hemicolectomy with ileocolic anastomosis) presents with 2 days of abdominal distension and  vomiting. In the Ed she was hemodynamically stable. Her WBC is 13.75, lactate normal, CT abdomen shows Multiple dilated loops of small bowel suggestive of localized ileitis versus small bowel obstruction. Patient had a colonoscopy 2 yrs ago and she took longer to get back to her normal mental status because of Lewy body dementia presumably.      # SBO obstruction vs ileitis as shown in CT scan.  Had a large brown soft BM today afternoon  c/w management according to the surgical team  Minimize opioid use.  No need of colonoscopy as inpatient  can follow with her GI doctor at [facility].  Consider non invasive colon cancer screening as outpatient  given her reaction to sedation 2yrs back (Lewy body dementia).

## 2019-07-09 NOTE — PROGRESS NOTE ADULT - ATTENDING COMMENTS
abdomen is more distended , non tender , no peritoneal signs   NPO, NGT   Rectal tube  GI consult for possible decompression  May need surgery if no improvement in 24 hours

## 2019-07-09 NOTE — CONSULT NOTE ADULT - SUBJECTIVE AND OBJECTIVE BOX
Please contact me with any questions on my pager [phone].  Hospital Day # 6d     HPI:   78F PMHx below notable for colitis, and colectomy 2/2 unresectable colonic polyp (from CT scan appears to be right hemicolectomy with ileocolic anastomosis) presents from a nursing home with  to the ED w/2 days of abdominal distension and one day of nausea and vomiting w/ possible fever. Per patient and , her last bowel movement was yesterday, however questionable reliability due to lewy body dementia. Patient had a hospitalization with similar presentation of abdominal distension resolved with non operative management, NG tube was placed and symptoms resolved. CT scan performed in ED with full results below showed signs of SBO vs ileitis.         PAST MEDICAL & SURGICAL HISTORY  Colon polyp  Lewy body dementia without behavioral disturbance  Constipation  History of breast cancer  Dementia  H/O mastectomy, right  History of colon resection      FAMILY HISTORY:      SOCIAL HISTORY:  smoker:   Alcohol:   Drug:     ALLERGIES:  antichlolinergics (Unknown)  benzodiazepines (Unknown)  ciprofloxacin (Unknown)  Originally Entered as [Unknown] reaction to [green pepper] (Unknown)  Originally Entered as [Unknown] reaction to [neuroleptics] (Unknown)  Originally Entered as [Unknown] reaction to [pepper] (Unknown)  Originally Entered as [Unknown] reaction to [red pepper] (Unknown)  penicillin (Anaphylaxis)      MEDICATIONS:  MEDICATIONS  (STANDING):  aspirin  chewable 81 milliGRAM(s) Oral daily  atorvastatin 10 milliGRAM(s) Oral at bedtime  carbidopa/levodopa  25/100 1 Tablet(s) Oral <User Schedule>  chlorhexidine 4% Liquid 1 Application(s) Topical <User Schedule>  erythromycin     base Tablet 125 milliGRAM(s) Oral every 8 hours  gabapentin 600 milliGRAM(s) Oral three times a day  heparin  Injectable 5000 Unit(s) SubCutaneous every 8 hours  lactulose Syrup 30 Gram(s) Oral every 8 hours  latanoprost 0.005% Ophthalmic Solution 1 Drop(s) Both EYES at bedtime  levothyroxine 50 MICROGram(s) Oral daily  lidocaine   Patch 1 Patch Transdermal daily  metoclopramide 5 milliGRAM(s) Oral daily  pantoprazole    Tablet 40 milliGRAM(s) Oral before breakfast  polyvinyl alcohol 1.4%/povidone 0.6% Ophthalmic Solution - Peds 1 Drop(s) Both EYES three times a day  sertraline 50 milliGRAM(s) Oral daily  timolol 0.25% Solution 1 Drop(s) Both EYES daily    MEDICATIONS  (PRN):  acetaminophen   Tablet .. 650 milliGRAM(s) Oral every 6 hours PRN Mild Pain (1 - 3), Moderate Pain (4 - 6)      HOME MEDICATIONS:  Home Medications:  acetaminophen 325 mg oral tablet: 2 tab(s) orally every 12 hours (09 Jul 2019 11:13)  Artificial Tears ophthalmic solution: 1 drop(s) to each affected eye 3 times a day (09 Jul 2019 11:13)  aspirin 81 mg oral tablet, chewable: 1 tab(s) orally once a day (09 Jul 2019 11:13)  atorvastatin 10 mg oral tablet: 1 tab(s) orally Monday, Wednesday, and Friday at bedtime  (09 Jul 2019 11:13)  carbidopa-levodopa 25 mg-100 mg oral tablet: 1 tab(s) orally 2 times a day (09 Jul 2019 11:13)  Colace 100 mg oral capsule: 1 cap(s) orally 2 times a day (09 Jul 2019 11:13)  gabapentin 600 mg oral tablet: 1 tab(s) orally 3 times a day (09 Jul 2019 11:13)  lactulose 10 g/15 mL oral syrup: 45 milliliter(s) orally 4 times a week on Monday, Wednesday, Friday, and Saturday at bedtime (08 Mar 2019 10:51)  latanoprost 0.005% ophthalmic solution: 1 drop(s) to each affected eye once a day (at bedtime) (08 Mar 2019 10:52)  levothyroxine 50 mcg (0.05 mg) oral tablet: 1 tab(s) orally once a day (08 Mar 2019 10:52)  lidocaine 5% topical film: Apply topically to affected area once a day (to left big toe)  (08 Mar 2019 10:53)  magnesium oxide 400 mg (241.3 mg elemental magnesium) oral tablet: 1 tab(s) orally 3 times a day (with meals) (19 Mar 2019 09:03)  omeprazole 20 mg oral delayed release capsule: 1 cap(s) orally once a day (08 Mar 2019 10:54)  polyethylene glycol 3350 oral powder for reconstitution: 17 gram(s) orally once a day (08 Mar 2019 13:50)  potassium chloride 20 mEq oral tablet, extended release: 1 tab(s) orally once a day (19 Mar 2019 09:03)  Senna 8.6 mg oral tablet: 2 tab(s) orally once a day (at bedtime) (08 Mar 2019 10:54)  sertraline 50 mg oral tablet: 1.5 tab(s) orally once a day (08 Mar 2019 10:54)  timolol hemihydrate 0.5% ophthalmic solution: 1 drop(s) to each affected eye 3 times a day (08 Mar 2019 10:55)  Vitamin C 500 mg oral tablet: 1 tab(s) orally 2 times a day (08 Mar 2019 10:55)  Vitamin D3 1000 intl units oral tablet: 2 tab(s) orally once a day (08 Mar 2019 10:55)      REVIEW OF SYSTEMS:    CONSTITUTIONAL: No fever  EYES/ENT: No scleral icterus  RESPIRATORY: No shortness of breath  CARDIOVASCULAR: No chest pain   GASTROINTESTINAL:   GENITOURINARY: No dysuria  NEUROLOGICAL: No dizziness  SKIN: No cyanosis      REVIEW OF SYSTEMS:    CONSTITUTIONAL: No fever  EYES/ENT: No scleral icterus  RESPIRATORY: No shortness of breath  CARDIOVASCULAR: No chest pain   GASTROINTESTINAL:   GENITOURINARY: No dysuria  NEUROLOGICAL: No dizziness  SKIN: No cyanosis      VITALS:   T(F): 96.8 (07-09 @ 08:00), Max: 98 (07-07 @ 08:05)  HR: 101 (07-09 @ 08:00) (76 - 101)  BP: 110/53 (07-09 @ 08:00) (86/51 - 149/65)  BP(mean): --  RR: 18 (07-09 @ 08:00) (18 - 19)  SpO2: --    I&O's Summary      Physical Exam:  GENERAL: NAD, well-developed  HEAD:  Atraumatic, Normocephalic  EYES: EOMI, PERRLA, conjunctiva and sclera clear  NECK: No thyromegaly  CHEST/LUNG: No accessory use of muscle  HEART: S1S2 Normal  ABDOMEN: Soft, Nontender, Nondistended; Bowel sounds present, no guarding or rigidity.  EXTREMITIES:  2+ Peripheral Pulses, No cyanosis  PSYCH: AAOx3  NEUROLOGY: non-focal      LABS:                        11.1   13.45 )-----------( 263      ( 08 Jul 2019 20:00 )             34.4         LIVER FUNCTIONS - ( 07 Jul 2019 22:23 )  Alb: 3.4 g/dL / Pro: 6.0 g/dL / ALK PHOS: 58 U/L / ALT: 9 U/L / AST: 16 U/L / GGT: x           LIVER FUNCTIONS - ( 07 Jul 2019 22:23 )  Alb: 3.4 [3.5 - 5.2] / Pro: 6.0 [6.0 - 8.0] / ALK PHOS: 58 [30 - 115] / ALT: 9 [0 - 41] / AST: 16 [0 - 41] / GGT: x     LIVER FUNCTIONS - ( 03 Jul 2019 14:28 )  Alb: 3.9 [3.5 - 5.2] / Pro: 6.8 [6.0 - 8.0] / ALK PHOS: 65 [30 - 115] / ALT: 7 [0 - 41] / AST: 15 [0 - 41] / GGT: x       07-08    137  |  100  |  17  ----------------------------<  84  4.2   |  24  |  1.1    Ca    9.0      08 Jul 2019 20:00  Phos  4.6     07-08  Mg     2.0     07-08              Previous EGD:    Previous colonoscopy:       RADIOLOGY:    CT Abdomen and Pelvis:    USG Abdomen Please contact me with any questions on my pager [phone].  Hospital Day # 6d     HPI:   78F PMHx below notable for colitis, and colectomy 2/2 unresectable colonic polyp (from CT scan appears to be right hemicolectomy with ileocolic anastomosis) presents from a nursing home with  to the ED w/2 days of abdominal distension and one day of nausea and vomiting. Per patient and , her last bowel movement was yesterday, however questionable reliability due to lewy body dementia. Patient had a hospitalization with similar presentation of abdominal distension resolved with non operative management, NG tube was placed and symptoms resolved. CT scan performed in ED with full results below showed signs of SBO vs ileitis.         PAST MEDICAL & SURGICAL HISTORY  Colon polyp  Lewy body dementia without behavioral disturbance  Constipation  History of breast cancer  Dementia  H/O mastectomy, right  History of colon resection      FAMILY HISTORY:      SOCIAL HISTORY:  smoker:   Alcohol:   Drug:     ALLERGIES:  antichlolinergics (Unknown)  benzodiazepines (Unknown)  ciprofloxacin (Unknown)  Originally Entered as [Unknown] reaction to [green pepper] (Unknown)  Originally Entered as [Unknown] reaction to [neuroleptics] (Unknown)  Originally Entered as [Unknown] reaction to [pepper] (Unknown)  Originally Entered as [Unknown] reaction to [red pepper] (Unknown)  penicillin (Anaphylaxis)      MEDICATIONS:  MEDICATIONS  (STANDING):  aspirin  chewable 81 milliGRAM(s) Oral daily  atorvastatin 10 milliGRAM(s) Oral at bedtime  carbidopa/levodopa  25/100 1 Tablet(s) Oral <User Schedule>  chlorhexidine 4% Liquid 1 Application(s) Topical <User Schedule>  erythromycin     base Tablet 125 milliGRAM(s) Oral every 8 hours  gabapentin 600 milliGRAM(s) Oral three times a day  heparin  Injectable 5000 Unit(s) SubCutaneous every 8 hours  lactulose Syrup 30 Gram(s) Oral every 8 hours  latanoprost 0.005% Ophthalmic Solution 1 Drop(s) Both EYES at bedtime  levothyroxine 50 MICROGram(s) Oral daily  lidocaine   Patch 1 Patch Transdermal daily  metoclopramide 5 milliGRAM(s) Oral daily  pantoprazole    Tablet 40 milliGRAM(s) Oral before breakfast  polyvinyl alcohol 1.4%/povidone 0.6% Ophthalmic Solution - Peds 1 Drop(s) Both EYES three times a day  sertraline 50 milliGRAM(s) Oral daily  timolol 0.25% Solution 1 Drop(s) Both EYES daily    MEDICATIONS  (PRN):  acetaminophen   Tablet .. 650 milliGRAM(s) Oral every 6 hours PRN Mild Pain (1 - 3), Moderate Pain (4 - 6)      HOME MEDICATIONS:  Home Medications:  acetaminophen 325 mg oral tablet: 2 tab(s) orally every 12 hours (09 Jul 2019 11:13)  Artificial Tears ophthalmic solution: 1 drop(s) to each affected eye 3 times a day (09 Jul 2019 11:13)  aspirin 81 mg oral tablet, chewable: 1 tab(s) orally once a day (09 Jul 2019 11:13)  atorvastatin 10 mg oral tablet: 1 tab(s) orally Monday, Wednesday, and Friday at bedtime  (09 Jul 2019 11:13)  carbidopa-levodopa 25 mg-100 mg oral tablet: 1 tab(s) orally 2 times a day (09 Jul 2019 11:13)  Colace 100 mg oral capsule: 1 cap(s) orally 2 times a day (09 Jul 2019 11:13)  gabapentin 600 mg oral tablet: 1 tab(s) orally 3 times a day (09 Jul 2019 11:13)  lactulose 10 g/15 mL oral syrup: 45 milliliter(s) orally 4 times a week on Monday, Wednesday, Friday, and Saturday at bedtime (08 Mar 2019 10:51)  latanoprost 0.005% ophthalmic solution: 1 drop(s) to each affected eye once a day (at bedtime) (08 Mar 2019 10:52)  levothyroxine 50 mcg (0.05 mg) oral tablet: 1 tab(s) orally once a day (08 Mar 2019 10:52)  lidocaine 5% topical film: Apply topically to affected area once a day (to left big toe)  (08 Mar 2019 10:53)  magnesium oxide 400 mg (241.3 mg elemental magnesium) oral tablet: 1 tab(s) orally 3 times a day (with meals) (19 Mar 2019 09:03)  omeprazole 20 mg oral delayed release capsule: 1 cap(s) orally once a day (08 Mar 2019 10:54)  polyethylene glycol 3350 oral powder for reconstitution: 17 gram(s) orally once a day (08 Mar 2019 13:50)  potassium chloride 20 mEq oral tablet, extended release: 1 tab(s) orally once a day (19 Mar 2019 09:03)  Senna 8.6 mg oral tablet: 2 tab(s) orally once a day (at bedtime) (08 Mar 2019 10:54)  sertraline 50 mg oral tablet: 1.5 tab(s) orally once a day (08 Mar 2019 10:54)  timolol hemihydrate 0.5% ophthalmic solution: 1 drop(s) to each affected eye 3 times a day (08 Mar 2019 10:55)  Vitamin C 500 mg oral tablet: 1 tab(s) orally 2 times a day (08 Mar 2019 10:55)  Vitamin D3 1000 intl units oral tablet: 2 tab(s) orally once a day (08 Mar 2019 10:55)      REVIEW OF SYSTEMS:    CONSTITUTIONAL: No fever  EYES/ENT: No scleral icterus  RESPIRATORY: No shortness of breath  CARDIOVASCULAR: No chest pain   GASTROINTESTINAL:   GENITOURINARY: No dysuria  NEUROLOGICAL: No dizziness  SKIN: No cyanosis      REVIEW OF SYSTEMS:    CONSTITUTIONAL: No fever  EYES/ENT: No scleral icterus  RESPIRATORY: No shortness of breath  CARDIOVASCULAR: No chest pain   GASTROINTESTINAL:   GENITOURINARY: No dysuria  NEUROLOGICAL: No dizziness  SKIN: No cyanosis      VITALS:   T(F): 96.8 (07-09 @ 08:00), Max: 98 (07-07 @ 08:05)  HR: 101 (07-09 @ 08:00) (76 - 101)  BP: 110/53 (07-09 @ 08:00) (86/51 - 149/65)  BP(mean): --  RR: 18 (07-09 @ 08:00) (18 - 19)  SpO2: --    I&O's Summary      Physical Exam:  GENERAL: NAD, well-developed  HEAD:  Atraumatic, Normocephalic  EYES: EOMI, PERRLA, conjunctiva and sclera clear  NECK: No thyromegaly  CHEST/LUNG: No accessory use of muscle  HEART: S1S2 Normal  ABDOMEN: Soft, Nontender, Nondistended; Bowel sounds present, no guarding or rigidity.  EXTREMITIES:  2+ Peripheral Pulses, No cyanosis  PSYCH: AAOx3  NEUROLOGY: non-focal      LABS:                        11.1   13.45 )-----------( 263      ( 08 Jul 2019 20:00 )             34.4         LIVER FUNCTIONS - ( 07 Jul 2019 22:23 )  Alb: 3.4 g/dL / Pro: 6.0 g/dL / ALK PHOS: 58 U/L / ALT: 9 U/L / AST: 16 U/L / GGT: x           LIVER FUNCTIONS - ( 07 Jul 2019 22:23 )  Alb: 3.4 [3.5 - 5.2] / Pro: 6.0 [6.0 - 8.0] / ALK PHOS: 58 [30 - 115] / ALT: 9 [0 - 41] / AST: 16 [0 - 41] / GGT: x     LIVER FUNCTIONS - ( 03 Jul 2019 14:28 )  Alb: 3.9 [3.5 - 5.2] / Pro: 6.8 [6.0 - 8.0] / ALK PHOS: 65 [30 - 115] / ALT: 7 [0 - 41] / AST: 15 [0 - 41] / GGT: x       07-08    137  |  100  |  17  ----------------------------<  84  4.2   |  24  |  1.1    Ca    9.0      08 Jul 2019 20:00  Phos  4.6     07-08  Mg     2.0     07-08              Previous EGD:    Previous colonoscopy:       RADIOLOGY:    CT Abdomen and Pelvis with contrast: Multiple dilated loops of small bowel in the left lower quadrant without   discrete transition point. Findings suggestive of localized ileitis   versus small bowel obstruction.    Right ovarian cyst measuring up to 7.8 cm. Consider   nonemergent/outpatient pelvic ultrasound for follow-up to demonstrate   stability. Please contact me with any questions on my pager [phone].  Hospital Day # 6d     HPI:   78F PMHx below notable for colitis, and colectomy 2/2 unresectable colonic polyp (from CT scan appears to be right hemicolectomy with ileocolic anastomosis) presents from a nursing home with  to the ED w/2 days of abdominal distension and one day of nausea and vomiting. Per patient and , her last bowel movement was yesterday, however questionable reliability due to lewy body dementia. Patient had a hospitalization with similar presentation of abdominal distension resolved with non operative management, NG tube was placed and symptoms resolved. CT scan performed in ED with full results below showed signs of SBO vs ileitis.         PAST MEDICAL & SURGICAL HISTORY  Colon polyp  Lewy body dementia without behavioral disturbance  Constipation  History of breast cancer  Dementia  H/O mastectomy, right  History of colon resection    ALLERGIES:  antichlolinergics (Unknown)  benzodiazepines (Unknown)  ciprofloxacin (Unknown)  Originally Entered as [Unknown] reaction to [green pepper] (Unknown)  Originally Entered as [Unknown] reaction to [neuroleptics] (Unknown)  Originally Entered as [Unknown] reaction to [pepper] (Unknown)  Originally Entered as [Unknown] reaction to [red pepper] (Unknown)  penicillin (Anaphylaxis)      MEDICATIONS:  MEDICATIONS  (STANDING):  aspirin  chewable 81 milliGRAM(s) Oral daily  atorvastatin 10 milliGRAM(s) Oral at bedtime  carbidopa/levodopa  25/100 1 Tablet(s) Oral <User Schedule>  chlorhexidine 4% Liquid 1 Application(s) Topical <User Schedule>  erythromycin     base Tablet 125 milliGRAM(s) Oral every 8 hours  gabapentin 600 milliGRAM(s) Oral three times a day  heparin  Injectable 5000 Unit(s) SubCutaneous every 8 hours  lactulose Syrup 30 Gram(s) Oral every 8 hours  latanoprost 0.005% Ophthalmic Solution 1 Drop(s) Both EYES at bedtime  levothyroxine 50 MICROGram(s) Oral daily  lidocaine   Patch 1 Patch Transdermal daily  metoclopramide 5 milliGRAM(s) Oral daily  pantoprazole    Tablet 40 milliGRAM(s) Oral before breakfast  polyvinyl alcohol 1.4%/povidone 0.6% Ophthalmic Solution - Peds 1 Drop(s) Both EYES three times a day  sertraline 50 milliGRAM(s) Oral daily  timolol 0.25% Solution 1 Drop(s) Both EYES daily    MEDICATIONS  (PRN):  acetaminophen   Tablet .. 650 milliGRAM(s) Oral every 6 hours PRN Mild Pain (1 - 3), Moderate Pain (4 - 6)      HOME MEDICATIONS:  Home Medications:  acetaminophen 325 mg oral tablet: 2 tab(s) orally every 12 hours (09 Jul 2019 11:13)  Artificial Tears ophthalmic solution: 1 drop(s) to each affected eye 3 times a day (09 Jul 2019 11:13)  aspirin 81 mg oral tablet, chewable: 1 tab(s) orally once a day (09 Jul 2019 11:13)  atorvastatin 10 mg oral tablet: 1 tab(s) orally Monday, Wednesday, and Friday at bedtime  (09 Jul 2019 11:13)  carbidopa-levodopa 25 mg-100 mg oral tablet: 1 tab(s) orally 2 times a day (09 Jul 2019 11:13)  Colace 100 mg oral capsule: 1 cap(s) orally 2 times a day (09 Jul 2019 11:13)  gabapentin 600 mg oral tablet: 1 tab(s) orally 3 times a day (09 Jul 2019 11:13)  lactulose 10 g/15 mL oral syrup: 45 milliliter(s) orally 4 times a week on Monday, Wednesday, Friday, and Saturday at bedtime (08 Mar 2019 10:51)  latanoprost 0.005% ophthalmic solution: 1 drop(s) to each affected eye once a day (at bedtime) (08 Mar 2019 10:52)  levothyroxine 50 mcg (0.05 mg) oral tablet: 1 tab(s) orally once a day (08 Mar 2019 10:52)  lidocaine 5% topical film: Apply topically to affected area once a day (to left big toe)  (08 Mar 2019 10:53)  magnesium oxide 400 mg (241.3 mg elemental magnesium) oral tablet: 1 tab(s) orally 3 times a day (with meals) (19 Mar 2019 09:03)  omeprazole 20 mg oral delayed release capsule: 1 cap(s) orally once a day (08 Mar 2019 10:54)  polyethylene glycol 3350 oral powder for reconstitution: 17 gram(s) orally once a day (08 Mar 2019 13:50)  potassium chloride 20 mEq oral tablet, extended release: 1 tab(s) orally once a day (19 Mar 2019 09:03)  Senna 8.6 mg oral tablet: 2 tab(s) orally once a day (at bedtime) (08 Mar 2019 10:54)  sertraline 50 mg oral tablet: 1.5 tab(s) orally once a day (08 Mar 2019 10:54)  timolol hemihydrate 0.5% ophthalmic solution: 1 drop(s) to each affected eye 3 times a day (08 Mar 2019 10:55)  Vitamin C 500 mg oral tablet: 1 tab(s) orally 2 times a day (08 Mar 2019 10:55)  Vitamin D3 1000 intl units oral tablet: 2 tab(s) orally once a day (08 Mar 2019 10:55)      REVIEW OF SYSTEMS:    CONSTITUTIONAL: No fever  EYES/ENT: No scleral icterus  RESPIRATORY: No shortness of breath  CARDIOVASCULAR: No chest pain   GASTROINTESTINAL: Abdominal distension  GENITOURINARY: No dysuria  NEUROLOGICAL: No dizziness  SKIN: No rash.        VITALS:   T(F): 96.8 (07-09 @ 08:00), Max: 98 (07-07 @ 08:05)  HR: 101 (07-09 @ 08:00) (76 - 101)  BP: 110/53 (07-09 @ 08:00) (86/51 - 149/65)  BP(mean): --  RR: 18 (07-09 @ 08:00) (18 - 19)  SpO2: --    I&O's Summary      Physical Exam:  GENERAL: NAD, well-developed  EYES: EOMI, PERRLA, conjunctiva and sclera clear  NECK: No thyromegaly  CHEST/LUNG: No accessory use of muscle  HEART: S1S2 Normal  ABDOMEN: Soft, Nontender, mildly distended; Bowel sounds present, no guarding or rigidity.  EXTREMITIES: No cyanosis  NEUROLOGY: moving all her extremities.      LABS:                        11.1   13.45 )-----------( 263      ( 08 Jul 2019 20:00 )             34.4         LIVER FUNCTIONS - ( 07 Jul 2019 22:23 )  Alb: 3.4 g/dL / Pro: 6.0 g/dL / ALK PHOS: 58 U/L / ALT: 9 U/L / AST: 16 U/L / GGT: x           LIVER FUNCTIONS - ( 07 Jul 2019 22:23 )  Alb: 3.4 [3.5 - 5.2] / Pro: 6.0 [6.0 - 8.0] / ALK PHOS: 58 [30 - 115] / ALT: 9 [0 - 41] / AST: 16 [0 - 41] / GGT: x     LIVER FUNCTIONS - ( 03 Jul 2019 14:28 )  Alb: 3.9 [3.5 - 5.2] / Pro: 6.8 [6.0 - 8.0] / ALK PHOS: 65 [30 - 115] / ALT: 7 [0 - 41] / AST: 15 [0 - 41] / GGT: x       07-08    137  |  100  |  17  ----------------------------<  84  4.2   |  24  |  1.1    Ca    9.0      08 Jul 2019 20:00  Phos  4.6     07-08  Mg     2.0     07-08        RADIOLOGY:    CT Abdomen and Pelvis with contrast: Multiple dilated loops of small bowel in the left lower quadrant without   discrete transition point. Findings suggestive of localized ileitis   versus small bowel obstruction.    Right ovarian cyst measuring up to 7.8 cm. Consider   nonemergent/outpatient pelvic ultrasound for follow-up to demonstrate   stability. Please contact me with any questions on my pager [phone].  Hospital Day # 6d     HPI:   78F PMHx below notable for colitis, and colectomy 2/2 unresectable colonic polyp (from CT scan appears to be right hemicolectomy with ileocolic anastomosis) presents from a nursing home with  to the ED w/2 days of abdominal distension and one day of nausea and vomiting. Per patient and , her last bowel movement was yesterday, however questionable reliability due to lewy body dementia. Patient had a hospitalization with similar presentation of abdominal distension resolved with non operative management, NG tube was placed and symptoms resolved. CT scan performed in ED with full results below showed signs of SBO vs ileitis.         PAST MEDICAL & SURGICAL HISTORY  Colon polyp  Lewy body dementia without behavioral disturbance  Constipation  History of breast cancer  Dementia  H/O mastectomy, right  History of colon resection    ALLERGIES:  antichlolinergics (Unknown)  benzodiazepines (Unknown)  ciprofloxacin (Unknown)  penicillin (Anaphylaxis)      MEDICATIONS:  MEDICATIONS  (STANDING):  aspirin  chewable 81 milliGRAM(s) Oral daily  atorvastatin 10 milliGRAM(s) Oral at bedtime  carbidopa/levodopa  25/100 1 Tablet(s) Oral <User Schedule>  chlorhexidine 4% Liquid 1 Application(s) Topical <User Schedule>  erythromycin     base Tablet 125 milliGRAM(s) Oral every 8 hours  gabapentin 600 milliGRAM(s) Oral three times a day  heparin  Injectable 5000 Unit(s) SubCutaneous every 8 hours  lactulose Syrup 30 Gram(s) Oral every 8 hours  latanoprost 0.005% Ophthalmic Solution 1 Drop(s) Both EYES at bedtime  levothyroxine 50 MICROGram(s) Oral daily  lidocaine   Patch 1 Patch Transdermal daily  metoclopramide 5 milliGRAM(s) Oral daily  pantoprazole    Tablet 40 milliGRAM(s) Oral before breakfast  polyvinyl alcohol 1.4%/povidone 0.6% Ophthalmic Solution - Peds 1 Drop(s) Both EYES three times a day  sertraline 50 milliGRAM(s) Oral daily  timolol 0.25% Solution 1 Drop(s) Both EYES daily    MEDICATIONS  (PRN):  acetaminophen   Tablet .. 650 milliGRAM(s) Oral every 6 hours PRN Mild Pain (1 - 3), Moderate Pain (4 - 6)      HOME MEDICATIONS:  Home Medications:  acetaminophen 325 mg oral tablet: 2 tab(s) orally every 12 hours (09 Jul 2019 11:13)  Artificial Tears ophthalmic solution: 1 drop(s) to each affected eye 3 times a day (09 Jul 2019 11:13)  aspirin 81 mg oral tablet, chewable: 1 tab(s) orally once a day (09 Jul 2019 11:13)  atorvastatin 10 mg oral tablet: 1 tab(s) orally Monday, Wednesday, and Friday at bedtime  (09 Jul 2019 11:13)  carbidopa-levodopa 25 mg-100 mg oral tablet: 1 tab(s) orally 2 times a day (09 Jul 2019 11:13)  Colace 100 mg oral capsule: 1 cap(s) orally 2 times a day (09 Jul 2019 11:13)  gabapentin 600 mg oral tablet: 1 tab(s) orally 3 times a day (09 Jul 2019 11:13)  lactulose 10 g/15 mL oral syrup: 45 milliliter(s) orally 4 times a week on Monday, Wednesday, Friday, and Saturday at bedtime (08 Mar 2019 10:51)  latanoprost 0.005% ophthalmic solution: 1 drop(s) to each affected eye once a day (at bedtime) (08 Mar 2019 10:52)  levothyroxine 50 mcg (0.05 mg) oral tablet: 1 tab(s) orally once a day (08 Mar 2019 10:52)  lidocaine 5% topical film: Apply topically to affected area once a day (to left big toe)  (08 Mar 2019 10:53)  magnesium oxide 400 mg (241.3 mg elemental magnesium) oral tablet: 1 tab(s) orally 3 times a day (with meals) (19 Mar 2019 09:03)  omeprazole 20 mg oral delayed release capsule: 1 cap(s) orally once a day (08 Mar 2019 10:54)  polyethylene glycol 3350 oral powder for reconstitution: 17 gram(s) orally once a day (08 Mar 2019 13:50)  potassium chloride 20 mEq oral tablet, extended release: 1 tab(s) orally once a day (19 Mar 2019 09:03)  Senna 8.6 mg oral tablet: 2 tab(s) orally once a day (at bedtime) (08 Mar 2019 10:54)  sertraline 50 mg oral tablet: 1.5 tab(s) orally once a day (08 Mar 2019 10:54)  timolol hemihydrate 0.5% ophthalmic solution: 1 drop(s) to each affected eye 3 times a day (08 Mar 2019 10:55)  Vitamin C 500 mg oral tablet: 1 tab(s) orally 2 times a day (08 Mar 2019 10:55)  Vitamin D3 1000 intl units oral tablet: 2 tab(s) orally once a day (08 Mar 2019 10:55)      REVIEW OF SYSTEMS:    CONSTITUTIONAL: No fever  EYES/ENT: No scleral icterus  RESPIRATORY: No shortness of breath  CARDIOVASCULAR: No chest pain   GASTROINTESTINAL: Abdominal distension  GENITOURINARY: No dysuria  NEUROLOGICAL: No dizziness  SKIN: No rash.        VITALS:   T(F): 96.8 (07-09 @ 08:00), Max: 98 (07-07 @ 08:05)  HR: 101 (07-09 @ 08:00) (76 - 101)  BP: 110/53 (07-09 @ 08:00) (86/51 - 149/65)  BP(mean): --  RR: 18 (07-09 @ 08:00) (18 - 19)  SpO2: --    I&O's Summary      Physical Exam:  GENERAL: NAD, well-developed  EYES: EOMI, PERRLA, conjunctiva and sclera clear  NECK: No thyromegaly  CHEST/LUNG: No accessory use of muscle  HEART: S1S2 Normal  ABDOMEN: Soft, Nontender, mildly distended; Bowel sounds present, no guarding or rigidity.  EXTREMITIES: No cyanosis  NEUROLOGY: moving all her extremities.      LABS:                        11.1   13.45 )-----------( 263      ( 08 Jul 2019 20:00 )             34.4         LIVER FUNCTIONS - ( 07 Jul 2019 22:23 )  Alb: 3.4 g/dL / Pro: 6.0 g/dL / ALK PHOS: 58 U/L / ALT: 9 U/L / AST: 16 U/L / GGT: x           LIVER FUNCTIONS - ( 07 Jul 2019 22:23 )  Alb: 3.4 [3.5 - 5.2] / Pro: 6.0 [6.0 - 8.0] / ALK PHOS: 58 [30 - 115] / ALT: 9 [0 - 41] / AST: 16 [0 - 41] / GGT: x     LIVER FUNCTIONS - ( 03 Jul 2019 14:28 )  Alb: 3.9 [3.5 - 5.2] / Pro: 6.8 [6.0 - 8.0] / ALK PHOS: 65 [30 - 115] / ALT: 7 [0 - 41] / AST: 15 [0 - 41] / GGT: x       07-08    137  |  100  |  17  ----------------------------<  84  4.2   |  24  |  1.1    Ca    9.0      08 Jul 2019 20:00  Phos  4.6     07-08  Mg     2.0     07-08        RADIOLOGY:    CT Abdomen and Pelvis with contrast: Multiple dilated loops of small bowel in the left lower quadrant without   discrete transition point. Findings suggestive of localized ileitis   versus small bowel obstruction.    Right ovarian cyst measuring up to 7.8 cm. Consider   nonemergent/outpatient pelvic ultrasound for follow-up to demonstrate   stability.

## 2019-07-10 ENCOUNTER — TRANSCRIPTION ENCOUNTER (OUTPATIENT)
Age: 79
End: 2019-07-10

## 2019-07-10 VITALS
HEART RATE: 92 BPM | RESPIRATION RATE: 18 BRPM | SYSTOLIC BLOOD PRESSURE: 127 MMHG | DIASTOLIC BLOOD PRESSURE: 77 MMHG | TEMPERATURE: 98 F

## 2019-07-10 LAB
CULTURE RESULTS: SIGNIFICANT CHANGE UP
SPECIMEN SOURCE: SIGNIFICANT CHANGE UP

## 2019-07-10 PROCEDURE — 99233 SBSQ HOSP IP/OBS HIGH 50: CPT

## 2019-07-10 RX ORDER — MAGNESIUM SULFATE 500 MG/ML
2 VIAL (ML) INJECTION ONCE
Refills: 0 | Status: COMPLETED | OUTPATIENT
Start: 2019-07-10 | End: 2019-07-10

## 2019-07-10 RX ORDER — ERYTHROMYCIN ETHYLSUCCINATE 400 MG
3 TABLET ORAL
Qty: 0 | Refills: 0 | DISCHARGE
Start: 2019-07-10

## 2019-07-10 RX ORDER — METOCLOPRAMIDE HCL 10 MG
1 TABLET ORAL
Qty: 0 | Refills: 0 | DISCHARGE
Start: 2019-07-10

## 2019-07-10 RX ORDER — METOCLOPRAMIDE HCL 10 MG
10 TABLET ORAL EVERY 8 HOURS
Refills: 0 | Status: DISCONTINUED | OUTPATIENT
Start: 2019-07-10 | End: 2019-07-10

## 2019-07-10 RX ADMIN — Medication 81 MILLIGRAM(S): at 11:11

## 2019-07-10 RX ADMIN — CHLORHEXIDINE GLUCONATE 1 APPLICATION(S): 213 SOLUTION TOPICAL at 06:20

## 2019-07-10 RX ADMIN — Medication 1 DROP(S): at 11:12

## 2019-07-10 RX ADMIN — Medication 1 DROP(S): at 13:16

## 2019-07-10 RX ADMIN — Medication 10 MILLIGRAM(S): at 13:12

## 2019-07-10 RX ADMIN — Medication 50 MICROGRAM(S): at 06:21

## 2019-07-10 RX ADMIN — HEPARIN SODIUM 5000 UNIT(S): 5000 INJECTION INTRAVENOUS; SUBCUTANEOUS at 06:21

## 2019-07-10 RX ADMIN — Medication 50 GRAM(S): at 13:13

## 2019-07-10 RX ADMIN — Medication 125 MILLIGRAM(S): at 15:12

## 2019-07-10 RX ADMIN — GABAPENTIN 600 MILLIGRAM(S): 400 CAPSULE ORAL at 13:13

## 2019-07-10 RX ADMIN — CARBIDOPA AND LEVODOPA 1 TABLET(S): 25; 100 TABLET ORAL at 01:31

## 2019-07-10 RX ADMIN — PANTOPRAZOLE SODIUM 40 MILLIGRAM(S): 20 TABLET, DELAYED RELEASE ORAL at 06:22

## 2019-07-10 RX ADMIN — LIDOCAINE 1 PATCH: 4 CREAM TOPICAL at 11:12

## 2019-07-10 RX ADMIN — LACTULOSE 30 GRAM(S): 10 SOLUTION ORAL at 06:21

## 2019-07-10 RX ADMIN — HEPARIN SODIUM 5000 UNIT(S): 5000 INJECTION INTRAVENOUS; SUBCUTANEOUS at 13:12

## 2019-07-10 RX ADMIN — GABAPENTIN 600 MILLIGRAM(S): 400 CAPSULE ORAL at 06:21

## 2019-07-10 RX ADMIN — SERTRALINE 50 MILLIGRAM(S): 25 TABLET, FILM COATED ORAL at 11:11

## 2019-07-10 RX ADMIN — LACTULOSE 30 GRAM(S): 10 SOLUTION ORAL at 13:11

## 2019-07-10 RX ADMIN — Medication 1 DROP(S): at 06:22

## 2019-07-10 RX ADMIN — Medication 125 MILLIGRAM(S): at 06:20

## 2019-07-10 RX ADMIN — CARBIDOPA AND LEVODOPA 1 TABLET(S): 25; 100 TABLET ORAL at 13:11

## 2019-07-10 NOTE — DISCHARGE NOTE NURSING/CASE MANAGEMENT/SOCIAL WORK - NSDCDPATPORTLINK_GEN_ALL_CORE
You can access the [facility] Patient Portal, offered by [facility], by registering with the following website: [email or web address]

## 2019-07-10 NOTE — DISCHARGE NOTE PROVIDER - HOSPITAL COURSE
Ms. Simpson, a 77 y/o female was admitted on July 2nd with abdominal distension.         Ms. Simpson can be discharged to SNF and does not need to follow up with Dr. Azul in the outpatient clinic. Ms. Simpson, a 79 y/o female was admitted on July 2nd with abdominal distension. CT scan revealed findings suggestive of an SBO and patient was managed non-operatively with IV fluids and a bowel regiment of metoclopramide, lactulose, and erythromycin. Ms. Simpson's symptoms improved, she is passing gas, and had 3 large bowel movements over the past 24 hours. Ms. Simpson can be discharged to SNF and does not need to follow up with Dr. Azul in the outpatient clinic. Ms. Simpson, a 77 y/o female was admitted on July 3rd with abdominal distension. CT scan revealed findings suggestive of an SBO and patient was managed non-operatively with IV fluids and a bowel regiment of metoclopramide, lactulose, and erythromycin. Ms. Simpson's symptoms improved, she is passing gas, and had 3 large bowel movements over the past 24 hours. Ms. Simpson can be discharged to SNF and does not need to follow up with Dr. Azul in the outpatient clinic. Ms. Simpson, a 79 y/o female was admitted on July 3rd with abdominal distension. CT scan revealed findings suggestive of an SBO and patient was managed non-operatively with IV fluids and a bowel regiment of metoclopramide, lactulose, and erythromycin. Ms. Simpson's symptoms improved, she is passing gas, and had 3 large bowel movements over the past 24 hours. Ms. Simpson can be discharged to SNF and does not need to follow up with Dr. Azul in the outpatient clinic. Please follow up with your GI doctor at [facility] [place] within two weeks.        If you have worsening of symptoms such as abdominal pain or distended abdomen, please seek medical attention.

## 2019-07-10 NOTE — DISCHARGE NOTE PROVIDER - CARE PROVIDER_API CALL
Micha Azul)  Surgery; Surgical Critical Care  [address] [place] [address], [address] [place] [address]  Phone: [phone]  Fax: [phone]  Follow Up Time:

## 2019-07-10 NOTE — PROGRESS NOTE ADULT - ASSESSMENT
78F w/PMHx of colitis, colon polyp s/p colectomy who presents to the ED with abdominal distension with imaging concerning for SBO vs ileitis     Plan:   -Admit to surgical service  -NPO, IVF  -NG tube placement for decompression if nausea worsens   -Strict I&Os   -Resume home medications  -Monitor for signs of bowel function return  -AM kub to assess if contrast progressed into large bowel
- monitor bowel function  - GI consult placed yesterday; FU
- redrew blood cultures this AM 7/5, FU reads  - continue CLD  - monitor bowel function
A/P  IMELDA ROLLE 78y Female admitted to service with partial SBO. Tolerating regular diet.    P:  - follow gas/BM movements    - encourage ambulation   - f/u Antibiotic sensitivities / specificities for Urine Cx
ASSESSMENT  78y F admitted with SMALL BOWEL OBSTRUCTION    RECOMMENDATIONS  #coNS BSI is a contaminant     Sepsis ruled out on admission     #UCX VRE- asymptomatic bacteriuria   - D/C linezolid as contraindicated if pt on ssri     Spectra [id number]
Assessment:  77yo F with SBO and UTI    Plan:  -follow gas/BM  -encourage ambulation  -continue regular diet
Assessment:  79yo F with SBO and UTI    Plan:  -Minimize opioid use  -F/U with GI at [facility] as outpatient  -Consider noninvasive colon cancer screening as outpatient  -OOB and ambulate  -Continue bowel regimen  -follow flatus/bm
IMELDA ROLLE 78y Female admitted to service with partial SBO.     A + P:   - ensure diet advance to full liquid is tolerated   - follow gas/BM movements    - f/u LUE doppler   - monitor LUE for changes in skin  - encourage ambulation

## 2019-07-10 NOTE — PROGRESS NOTE ADULT - SUBJECTIVE AND OBJECTIVE BOX
GENERAL SURGERY PROGRESS NOTE     IMELDA ROLLE  78y  Female  Hospital day :8d  OVERNIGHT EVENTS:    T(F): 98.2 (07-10-19 @ 00:45), Max: 98.8 (07-09-19 @ 15:30)  HR: 79 (07-10-19 @ 00:45) (79 - 101)  BP: 136/62 (07-10-19 @ 00:45) (110/53 - 136/62)  RR: 18 (07-10-19 @ 00:45) (18 - 18)    DIET: Regular diet  BM: 2  GI proph:  pantoprazole    Tablet 40 milliGRAM(s) Oral before breakfast    AC/ proph: aspirin  chewable 81 milliGRAM(s) Oral daily  heparin  Injectable 5000 Unit(s) SubCutaneous every 8 hours    ABx: erythromycin    ethylsuccinate Suspension 40 mG/mL 125 milliGRAM(s) Oral every 8 hours    PHYSICAL EXAM:  GENERAL: NAD, well-appearing  CHEST/LUNG: Clear to auscultation bilaterally  HEART: Regular rate and rhythm  ABDOMEN: Soft, Nontender, Nondistended;   EXTREMITIES:  No clubbing, cyanosis, or edema    Labs:  CAPILLARY BLOOD GLUCOSE    POCT Blood Glucose.: 105 mg/dL (09 Jul 2019 21:50)  POCT Blood Glucose.: 116 mg/dL (09 Jul 2019 16:41)  POCT Blood Glucose.: 124 mg/dL (09 Jul 2019 12:14)  POCT Blood Glucose.: 91 mg/dL (09 Jul 2019 08:27)                        10.8   12.54 )-----------( 263      ( 09 Jul 2019 21:29 )             34.2       Auto Neutrophil %: 65.7 % (07-09-19 @ 21:29)  Auto Immature Granulocyte %: 0.4 % (07-09-19 @ 21:29)    07-09    135  |  98  |  17  ----------------------------<  107<H>  4.3   |  24  |  0.9    Calcium, Total Serum: 8.5 mg/dL (07-09-19 @ 21:29) GENERAL SURGERY PROGRESS NOTE     IMELDA ROLLE  78y  Female  Hospital day :8d  OVERNIGHT EVENTS: patient had 2 large and 1 small bowel movement    T(F): 98.2 (07-10-19 @ 00:45), Max: 98.8 (07-09-19 @ 15:30)  HR: 79 (07-10-19 @ 00:45) (79 - 101)  BP: 136/62 (07-10-19 @ 00:45) (110/53 - 136/62)  RR: 18 (07-10-19 @ 00:45) (18 - 18)    DIET: Regular diet  BM: 2  GI proph:  pantoprazole    Tablet 40 milliGRAM(s) Oral before breakfast    AC/ proph: aspirin  chewable 81 milliGRAM(s) Oral daily  heparin  Injectable 5000 Unit(s) SubCutaneous every 8 hours    ABx: erythromycin    ethylsuccinate Suspension 40 mG/mL 125 milliGRAM(s) Oral every 8 hours    PHYSICAL EXAM:  GENERAL: NAD, well-appearing  CHEST/LUNG: Clear to auscultation bilaterally  HEART: Regular rate and rhythm  ABDOMEN: Soft, Nontender, obese  EXTREMITIES:  No clubbing, cyanosis, or edema    Labs:  CAPILLARY BLOOD GLUCOSE    POCT Blood Glucose.: 105 mg/dL (09 Jul 2019 21:50)  POCT Blood Glucose.: 116 mg/dL (09 Jul 2019 16:41)  POCT Blood Glucose.: 124 mg/dL (09 Jul 2019 12:14)  POCT Blood Glucose.: 91 mg/dL (09 Jul 2019 08:27)                        10.8   12.54 )-----------( 263      ( 09 Jul 2019 21:29 )             34.2       Auto Neutrophil %: 65.7 % (07-09-19 @ 21:29)  Auto Immature Granulocyte %: 0.4 % (07-09-19 @ 21:29)    07-09    135  |  98  |  17  ----------------------------<  107<H>  4.3   |  24  |  0.9    Calcium, Total Serum: 8.5 mg/dL (07-09-19 @ 21:29)

## 2019-07-10 NOTE — PROGRESS NOTE ADULT - ATTENDING COMMENTS
feeling better   passing flatus and had 3 BM's   Tolerating diet   abdomen less distended , non tender   continue bowel regimen   Discharge planning   OOB and ambulate   f/u with GI .

## 2019-07-10 NOTE — DISCHARGE NOTE PROVIDER - NSDCCPCAREPLAN_GEN_ALL_CORE_FT
PRINCIPAL DISCHARGE DIAGNOSIS  Diagnosis: Small bowel obstruction  Assessment and Plan of Treatment: Continue current bowel regiment. No follow up in outpatient clinic with Dr. Azul. PRINCIPAL DISCHARGE DIAGNOSIS  Diagnosis: Small bowel obstruction  Assessment and Plan of Treatment: Continue current bowel regiment. No follow up in outpatient clinic with Dr. Azul.      SECONDARY DISCHARGE DIAGNOSES  Diagnosis: Hypomagnesemia  Assessment and Plan of Treatment: repleted with IV magnesium sulfate

## 2019-07-10 NOTE — PROGRESS NOTE ADULT - REASON FOR ADMISSION
Abdominal distension

## 2019-07-18 DIAGNOSIS — G31.83 NEUROCOGNITIVE DISORDER WITH LEWY BODIES: ICD-10-CM

## 2019-07-18 DIAGNOSIS — E83.42 HYPOMAGNESEMIA: ICD-10-CM

## 2019-07-18 DIAGNOSIS — Z88.8 ALLERGY STATUS TO OTHER DRUGS, MEDICAMENTS AND BIOLOGICAL SUBSTANCES: ICD-10-CM

## 2019-07-18 DIAGNOSIS — K56.609 UNSPECIFIED INTESTINAL OBSTRUCTION, UNSPECIFIED AS TO PARTIAL VERSUS COMPLETE OBSTRUCTION: ICD-10-CM

## 2019-07-18 DIAGNOSIS — K52.9 NONINFECTIVE GASTROENTERITIS AND COLITIS, UNSPECIFIED: ICD-10-CM

## 2019-07-18 DIAGNOSIS — E78.5 HYPERLIPIDEMIA, UNSPECIFIED: ICD-10-CM

## 2019-07-18 DIAGNOSIS — Z16.21 RESISTANCE TO VANCOMYCIN: ICD-10-CM

## 2019-07-18 DIAGNOSIS — Z90.49 ACQUIRED ABSENCE OF OTHER SPECIFIED PARTS OF DIGESTIVE TRACT: ICD-10-CM

## 2019-07-18 DIAGNOSIS — Z88.1 ALLERGY STATUS TO OTHER ANTIBIOTIC AGENTS STATUS: ICD-10-CM

## 2019-07-18 DIAGNOSIS — B95.2 ENTEROCOCCUS AS THE CAUSE OF DISEASES CLASSIFIED ELSEWHERE: ICD-10-CM

## 2019-07-18 DIAGNOSIS — Z86.010 PERSONAL HISTORY OF COLONIC POLYPS: ICD-10-CM

## 2019-07-18 DIAGNOSIS — K56.600 PARTIAL INTESTINAL OBSTRUCTION, UNSPECIFIED AS TO CAUSE: ICD-10-CM

## 2019-07-18 DIAGNOSIS — Z79.82 LONG TERM (CURRENT) USE OF ASPIRIN: ICD-10-CM

## 2019-07-18 DIAGNOSIS — Z88.5 ALLERGY STATUS TO NARCOTIC AGENT: ICD-10-CM

## 2019-07-18 DIAGNOSIS — R23.8 OTHER SKIN CHANGES: ICD-10-CM

## 2019-07-18 DIAGNOSIS — Z88.0 ALLERGY STATUS TO PENICILLIN: ICD-10-CM

## 2019-07-18 DIAGNOSIS — K59.00 CONSTIPATION, UNSPECIFIED: ICD-10-CM

## 2019-07-18 DIAGNOSIS — F02.80 DEMENTIA IN OTHER DISEASES CLASSIFIED ELSEWHERE, UNSPECIFIED SEVERITY, WITHOUT BEHAVIORAL DISTURBANCE, PSYCHOTIC DISTURBANCE, MOOD DISTURBANCE, AND ANXIETY: ICD-10-CM

## 2019-07-18 DIAGNOSIS — R82.71 BACTERIURIA: ICD-10-CM

## 2019-07-28 ENCOUNTER — OUTPATIENT (OUTPATIENT)
Dept: OUTPATIENT SERVICES | Facility: HOSPITAL | Age: 79
LOS: 1 days | Discharge: HOME | End: 2019-07-28

## 2019-07-28 DIAGNOSIS — Z90.49 ACQUIRED ABSENCE OF OTHER SPECIFIED PARTS OF DIGESTIVE TRACT: Chronic | ICD-10-CM

## 2019-07-28 DIAGNOSIS — Z90.11 ACQUIRED ABSENCE OF RIGHT BREAST AND NIPPLE: Chronic | ICD-10-CM

## 2019-07-28 PROBLEM — K63.5 POLYP OF COLON: Chronic | Status: ACTIVE | Noted: 2019-07-03

## 2019-07-29 DIAGNOSIS — D64.9 ANEMIA, UNSPECIFIED: ICD-10-CM

## 2019-07-29 DIAGNOSIS — E11.9 TYPE 2 DIABETES MELLITUS WITHOUT COMPLICATIONS: ICD-10-CM

## 2019-08-14 NOTE — ED ADULT TRIAGE NOTE - SPO2 (%)
97 Melolabial Transposition Flap Text: The defect edges were debeveled with a #15 scalpel blade.  Given the location of the defect and the proximity to free margins a melolabial flap was deemed most appropriate.  Using a sterile surgical marker, an appropriate melolabial transposition flap was drawn incorporating the defect.    The area thus outlined was incised deep to adipose tissue with a #15 scalpel blade.  The skin margins were undermined to an appropriate distance in all directions utilizing iris scissors.

## 2019-08-26 ENCOUNTER — OUTPATIENT (OUTPATIENT)
Dept: OUTPATIENT SERVICES | Facility: HOSPITAL | Age: 79
LOS: 1 days | Discharge: HOME | End: 2019-08-26

## 2019-08-26 DIAGNOSIS — Z90.49 ACQUIRED ABSENCE OF OTHER SPECIFIED PARTS OF DIGESTIVE TRACT: Chronic | ICD-10-CM

## 2019-08-26 DIAGNOSIS — D64.9 ANEMIA, UNSPECIFIED: ICD-10-CM

## 2019-08-26 DIAGNOSIS — Z90.11 ACQUIRED ABSENCE OF RIGHT BREAST AND NIPPLE: Chronic | ICD-10-CM

## 2019-08-26 DIAGNOSIS — E11.9 TYPE 2 DIABETES MELLITUS WITHOUT COMPLICATIONS: ICD-10-CM

## 2019-09-10 ENCOUNTER — APPOINTMENT (OUTPATIENT)
Dept: GASTROENTEROLOGY | Facility: CLINIC | Age: 79
End: 2019-09-10
Payer: MEDICARE

## 2019-09-10 VITALS — HEART RATE: 66 BPM | DIASTOLIC BLOOD PRESSURE: 67 MMHG | SYSTOLIC BLOOD PRESSURE: 100 MMHG

## 2019-09-10 PROCEDURE — 99213 OFFICE O/P EST LOW 20 MIN: CPT

## 2019-09-10 NOTE — PHYSICAL EXAM
[General Appearance - Alert] : alert [General Appearance - In No Acute Distress] : in no acute distress [General Appearance - Well Nourished] : well nourished [General Appearance - Well Developed] : well developed [Outer Ear] : the ears and nose were normal in appearance [Sclera] : the sclera and conjunctiva were normal [Neck Appearance] : the appearance of the neck was normal [Neck Cervical Mass (___cm)] : no neck mass was observed [Thyroid Diffuse Enlargement] : the thyroid was not enlarged [Jugular Venous Distention Increased] : there was no jugular-venous distention [Thyroid Nodule] : there were no palpable thyroid nodules [Apical Impulse] : the apical impulse was normal [Auscultation Breath Sounds / Voice Sounds] : lungs were clear to auscultation bilaterally [Heart Rate And Rhythm] : heart rate was normal and rhythm regular [Heart Sounds] : normal S1 and S2 [Murmurs] : no murmurs [Heart Sounds Gallop] : no gallops [Heart Sounds Pericardial Friction Rub] : no pericardial rub [Bowel Sounds] : normal bowel sounds [Abdomen Tenderness] : non-tender [Abdomen Soft] : soft [Abdomen Mass (___ Cm)] : no abdominal mass palpated [] : no hepato-splenomegaly

## 2019-09-10 NOTE — HISTORY OF PRESENT ILLNESS
[Nausea] : denies nausea [Heartburn] : denies heartburn [Vomiting] : denies vomiting [Diarrhea] : denies diarrhea [Constipation] : denies constipation [Yellow Skin Or Eyes (Jaundice)] : denies jaundice [Abdominal Pain] : denies abdominal pain [Abdominal Swelling] : denies abdominal swelling [de-identified] : 71-year-old female with Lewy body dementia.  The patient was admitted with distention of the abdomen, given laxative and discharged.  The patient had a hemicolectomy it [facility] 3 years ago for a large polyp, which was benign.  The patient and her  have been told to not have a colonoscopy again due to prolonged anesthetic recovery time related to her Lewy body dementia.  The patient is able to defecate every 2nd to 3rd day with an aggressive laxative regimen.  She denies nausea vomiting or black or bloody stools. [Rectal Pain] : denies rectal pain

## 2019-10-16 ENCOUNTER — OUTPATIENT (OUTPATIENT)
Dept: OUTPATIENT SERVICES | Facility: HOSPITAL | Age: 79
LOS: 1 days | Discharge: HOME | End: 2019-10-16

## 2019-10-16 DIAGNOSIS — Z90.49 ACQUIRED ABSENCE OF OTHER SPECIFIED PARTS OF DIGESTIVE TRACT: Chronic | ICD-10-CM

## 2019-10-16 DIAGNOSIS — Z90.11 ACQUIRED ABSENCE OF RIGHT BREAST AND NIPPLE: Chronic | ICD-10-CM

## 2019-10-16 DIAGNOSIS — D64.9 ANEMIA, UNSPECIFIED: ICD-10-CM

## 2019-10-16 DIAGNOSIS — E11.9 TYPE 2 DIABETES MELLITUS WITHOUT COMPLICATIONS: ICD-10-CM

## 2019-12-02 NOTE — PATIENT PROFILE ADULT - NSPROCHRONICPAIN_GEN_A_NUR
[facility]  Heart Transplant  Progress Note    Patient Name: Bessie Davis  MRN: [id number]  Admission Date: 11/30/2019  Hospital Length of Stay: 0 days  Attending Physician: Elenita Garcia MD  Primary Care Provider: Priscilla Frankel MD  Principal Problem:SBO (small bowel obstruction)    Subjective:     Interval History: Patient was placed on Moy due to inability to take oral pulm HTN meds.  She did well overnight.  She had 4 BM and gastrograffin made it to the colon; therefore, surgery cleared patient for diet and NG tube removal.  Patient reports feeling fine with no new complaints.  Oral Adempas, Uptravi and Opsumit ordered.  Waiting for patients family to bring Uptravi supply in.     Continuous Infusions:   nitric oxide gas       Scheduled Meds:   budesonide-formoterol 160-4.5 mcg  2 puff Inhalation Q12H    fluticasone propionate  1 spray Each Nostril Daily    heparin (porcine)  5,000 Units Subcutaneous Q8H    irbesartan  150 mg Oral Daily    macitentan  10 mg Oral Daily    pantoprazole  40 mg Oral Daily    riociguat  2.5 mg Oral TID    selexipag  1,600 mcg Oral BID     PRN Meds:acetaminophen, albuterol-ipratropium, Dextrose 10% Bolus, glucagon (human recombinant), insulin aspart U-100, ondansetron, sodium chloride 0.9%    Review of patient's allergies indicates:   Allergen Reactions    Penicillins Itching    Sulfa (sulfonamide antibiotics) Itching     Objective:     Vital Signs (Most Recent):  Temp: 98.4 °F (36.9 °C) (12/02/19 0700)  Pulse: 78 (12/02/19 1000)  Resp: (!) 25 (12/02/19 1000)  BP: (!) 149/81 (12/02/19 1000)  SpO2: (!) 93 % (12/02/19 1000) Vital Signs (24h Range):  Temp:  [97.8 °F (36.6 °C)-98.8 °F (37.1 °C)] 98.4 °F (36.9 °C)  Pulse:  [75-84] 78  Resp:  [16-40] 25  SpO2:  [89 %-99 %] 93 %  BP: (113-163)/(57-81) 149/81     Patient Vitals for the past 72 hrs (Last 3 readings):   Weight   12/01/19 0021 92.5 kg (204 lb)   11/30/19 1544 93.4 kg (206 lb)     Body mass index is  32.93 kg/m².      Intake/Output Summary (Last 24 hours) at 12/2/2019 1128  Last data filed at 12/2/2019 1000  Gross per 24 hour   Intake 1029 ml   Output --   Net 1029 ml       Hemodynamic Parameters:       Telemetry: reviewed  Physical Exam  Constitutional: sitting in chair NAD  Neck: Normal range of motion. No JVD elevation; neck veins appear just above the clavicle with patient sitting in chair.   Cardiovascular: Normal rate and regular rhythm.   Pulmonary/Chest: Effort normal and breath sounds normal.   Abdominal: Soft. Bowel sounds are are active. Tenderness has improved.   Musculoskeletal: Normal range of motion. She exhibits no edema.   Neurological: She is alert and oriented to person, place, and time.   Skin: Skin is warm and dry. Capillary refill takes less than 2 seconds.       Significant Labs:  CBC:  Recent Labs   Lab 11/30/19  1610 12/01/19 0433 12/02/19  0250   WBC 6.14 6.42 5.04   RBC 4.21 4.03 3.95*   HGB 11.2* 10.5* 10.4*   HCT 35.8* 34.8* 34.5*    206 186   MCV 85 86 87   MCH 26.6* 26.1* 26.3*   MCHC 31.3* 30.2* 30.1*     BNP:  Recent Labs   Lab 11/30/19  1610   BNP 20     CMP:  Recent Labs   Lab 11/30/19  1610 12/01/19 0433 12/02/19  0250   * 77 66*   CALCIUM 9.8 9.2 9.1   ALBUMIN 4.2 3.7 3.7   PROT 7.7 6.8 6.8    145 144   K 4.8 5.0 4.6   CO2 25 29 28    108 108   BUN 23 26* 24*   CREATININE 2.1* 2.0* 1.6*   ALKPHOS 64 59 59   ALT 17 17 21   AST 14 18 28   BILITOT 0.6 0.5 0.6      Coagulation:   Recent Labs   Lab 12/01/19 0433 12/02/19  0250   INR 1.0 1.0     LDH:  No results for input(s): LDH in the last 72 hours.  Microbiology:  Microbiology Results (last 7 days)     ** No results found for the last 168 hours. **          I have reviewed all pertinent labs within the past 24 hours.    Estimated Creatinine Clearance: 38.6 mL/min (A) (based on SCr of 1.6 mg/dL (H)).    Diagnostic Results:  I have reviewed all pertinent imaging results/findings within the past 24  hours.    Assessment and Plan:     68 y.o. 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 She  underwent a RHC that showed severe PH with normal PCWP and was started on Tyvaso. A RHC while on Tyvaso 12 breaths 4x/day, opsumit and adempas 2.0 tid she had severe PA pressures and Tyvaso was discontinued in favor of Uptravi on 7/8/19. Lives alone but her niece lives close by and she and her son keep an eye on her.    Admitted to the surgical team with acute onset abdominal pain, nausea and vomiting for 1 day prior to admission. CT showed small bowel obstruction and is NPO with NGT to low suction. On day of admission she only took her PH medications until noon. Denies any shortness of breath, cough, chest pain, lightheadedness. The cardiology team was consulted to address PH medication in the setting of NPO status.       TTE 4/2019  · Normal left ventricular systolic function. The estimated ejection fraction is 60%  · Mild right ventricular enlargement.  · Low normal right ventricular systolic function. TAPSE 2.2  · Normal LV diastolic function.  · Moderate right atrial enlargement.  · The estimated PA systolic pressure is 92 mm Hg  · Elevated central venous pressure (15 mm Hg).     RHC 9/18  RA: 16/16 (14)  RV: 65/4  RVEDP: 11   PW: 15/15 (16)  PA: 60/19 (37)  AO: 105/66 (79)  AO_SAT: 100  PA_SAT: 72    CONDITION 1 (9/17/2018 11:28:21):  BP: 110/65  HR: 78  FICKCI: 2.9400  FICKCO: 6.0500  PVR: 331.0000      TTE 2/9/18  The right ventricle is upper limit of normal measuring 4.2 cm at the base in the apical right ventricle-focused view. Global right ventricular systolic function appears low normal. Tricuspid annular plane systolic excursion (TAPSE) is   2.1 cm. Tissue Doppler-derived tricuspid annular peak systolic velocity (S prime) is 14.0 cm/s.  The estimated PA systolic pressure is 53 mmHg    1 - Normal left ventricular systolic function (EF 60-65%).     2 - Wall motion abnormalities.     3 - Biatrial enlargement.     4 - Impaired LV relaxation, elevated LAP (grade 2 diastolic dysfunction).     5 - Right ventricle is upper limit of normal in size with low normal systolic function.     6 - Trivial tricuspid regurgitation.     7 - Trivial pericardial effusion.     8 - Pulmonary hypertension. The estimated PA systolic pressure is 53 mmHg.    RHC 7/8/19:  · Severe Pulmonary hypertension (pt did not take any of her meds this am).  · Increased right and left-sided filling pressures.  · Normal Cardiac output and index by José method.  RA: 22/ 15/ 17 RV: 90/ 10/ 20 PA: 90/ 30/ 50 PWP: 25/ 25/ 25 . Cardiac output was 6.94 by José. Cardiac index is 3.4 L/min/m2. O2 Sat: PA 69%. Pulmonary vascular resistance: 3.6 CARLIN.    * SBO (small bowel obstruction)  -Initial Conservative management with bowel rest and NGT  -Gastrograffin study done and gastrograffin made it to the colon  -NGT removed as per general surgery  -ok for oral meds and clears.  Will advance diet per surgery recommendations  -Appreciate General surgery consultation and assistance     Pulmonary hypertension  -PAH (WHO group 1) with most recent RHC 7/2019 - RA: 22/ 15/ 17 RV: 90/ 10/ 20 PA: 90/ 30/ 50 PWP: 25/ 25/ 25.  -In the setting of severe PH we would prefer to not withhold therapy  -Was transferred to ICU for Moy as she was unable to take po PH meds  -Weaned Moy to 10 this am and administered PH meds.  Will wean to 5 this afternoon and hopefully off this evening.  -Continue home therapy: Adempas 2.5, Uptravi 1600 mcg, Opsumit 10 mg       TREVER (acute kidney injury)  -Likely pre-renal in the setting of emesis, dehydration and diuresis at home  -She was given genntle IVF and diuretic on hold.  Renal function better  -Avoid further nephrotoxins     Chronic diastolic heart failure  -Last 2D Echo 4/15/19:  LVEF 60%, LVEDD 5.3 cm  -Euvolemic on examination today  -Was on Bumex at home, holding again today as patient euvolemic and oral intake is low.  Just advanced diet to clear liquids   -GDMT with Irbesartan  -2g Na dietary restriction, 1500 mL fluid restriction, strict I/Os      Chronic respiratory failure with hypoxia  -O2 sats stable  -Patient on 2L of O2 at home    Hypertension  -BP slightly elevated this am.  Restarted home dose of Irbesartan and PH meds.  Will monitor response.       Uninterrupted Critical Care/Counseling Time (not including procedures): 40 minutes      MARIBEL Larkin  Heart Transplant  [facility]   no

## 2019-12-20 ENCOUNTER — OUTPATIENT (OUTPATIENT)
Dept: OUTPATIENT SERVICES | Facility: HOSPITAL | Age: 79
LOS: 1 days | Discharge: HOME | End: 2019-12-20

## 2019-12-20 DIAGNOSIS — E10.9 TYPE 1 DIABETES MELLITUS WITHOUT COMPLICATIONS: ICD-10-CM

## 2019-12-20 DIAGNOSIS — Z90.49 ACQUIRED ABSENCE OF OTHER SPECIFIED PARTS OF DIGESTIVE TRACT: Chronic | ICD-10-CM

## 2019-12-20 DIAGNOSIS — Z90.11 ACQUIRED ABSENCE OF RIGHT BREAST AND NIPPLE: Chronic | ICD-10-CM

## 2020-01-14 ENCOUNTER — APPOINTMENT (OUTPATIENT)
Dept: GASTROENTEROLOGY | Facility: CLINIC | Age: 80
End: 2020-01-14
Payer: MEDICARE

## 2020-01-14 VITALS — HEIGHT: 64 IN | BODY MASS INDEX: 35.17 KG/M2 | WEIGHT: 206 LBS

## 2020-01-14 DIAGNOSIS — I10 ESSENTIAL (PRIMARY) HYPERTENSION: ICD-10-CM

## 2020-01-14 DIAGNOSIS — G31.83 DEMENTIA WITH LEWY BODIES: ICD-10-CM

## 2020-01-14 DIAGNOSIS — E78.5 HYPERLIPIDEMIA, UNSPECIFIED: ICD-10-CM

## 2020-01-14 DIAGNOSIS — K59.00 CONSTIPATION, UNSPECIFIED: ICD-10-CM

## 2020-01-14 DIAGNOSIS — E03.9 HYPOTHYROIDISM, UNSPECIFIED: ICD-10-CM

## 2020-01-14 DIAGNOSIS — K63.5 POLYP OF COLON: ICD-10-CM

## 2020-01-14 DIAGNOSIS — C50.911 MALIGNANT NEOPLASM OF UNSPECIFIED SITE OF RIGHT FEMALE BREAST: ICD-10-CM

## 2020-01-14 DIAGNOSIS — S06.5X9A TRAUMATIC SUBDURAL HEMORRHAGE WITH LOSS OF CONSCIOUSNESS OF UNSPECIFIED DURATION, INITIAL ENCOUNTER: ICD-10-CM

## 2020-01-14 DIAGNOSIS — F02.80 DEMENTIA WITH LEWY BODIES: ICD-10-CM

## 2020-01-14 DIAGNOSIS — D64.9 ANEMIA, UNSPECIFIED: ICD-10-CM

## 2020-01-14 PROCEDURE — 99214 OFFICE O/P EST MOD 30 MIN: CPT

## 2020-01-14 NOTE — HISTORY OF PRESENT ILLNESS
[de-identified] : Followup for 80 yo f with Lewy body neuropathy s/p right hemicolectomy , with recurrent distension of bowel.  Pt now doing well on lactulose, miralax and enemas.   and patient prefer not to do colonoscopy due to risks of anaesthesia and previous reactions.\par She has not had rectal bleeding, and bowel movements are frequent.\par She could not have ct colography because of her body habitus and inability to cooperate with the test.

## 2020-01-14 NOTE — PHYSICAL EXAM
[General Appearance - Alert] : alert [General Appearance - In No Acute Distress] : in no acute distress [Sclera] : the sclera and conjunctiva were normal [Outer Ear] : the ears and nose were normal in appearance [] : the neck was supple [Neck Appearance] : the appearance of the neck was normal [Heart Rate And Rhythm] : heart rate was normal and rhythm regular [Apical Impulse] : the apical impulse was normal [Bowel Sounds] : normal bowel sounds [Heart Sounds] : normal S1 and S2 [Abdomen Tenderness] : non-tender [Abdomen Soft] : soft [Musculoskeletal - Swelling] : no joint swelling seen [Abnormal Walk] : normal gait [Oriented To Time, Place, And Person] : oriented to person, place, and time [Skin Color & Pigmentation] : normal skin color and pigmentation [Impaired Insight] : insight and judgment were intact

## 2021-12-13 NOTE — ED PROCEDURE NOTE - CPROC ED INDICATIONS1
[address] Mr. Hector Gaona regarding follow-up for Covid pneumonia after hospital discharge. He was discharged from the hospital on 12/12/21. Review of the After Visit Summary from the recent hospitalization indicates that the patient needs to follow up with Mortimer Reams in 1 week. He feels that he is doing well at home. His diet concern is none. Overall, the patient is eating well. Ambulation: improved  Fever: is not present  Pain: none  Activities of Daily Living (global): Self-care   Patient states that he does have sufficient family support. He feels that he is able to ask for assistance when needed. Additional patient/family concerns: asked about repeating chest Xray. Discharge medications were verified with the patient. He is fully compliant with the medication regimen prescribed at the time of discharge. He reports that he is not experiencing any medication side effects. Upon discharge, the patient was to receive no additional services. Advance Directive: The patient has the following documents:  Power of  for Health Care    Patient has an appointment on 12/17/21 with Bryson JACOME Mr. Hector Gaona was reminded about the importance of keeping this appointment. This appointment was changed to a video visit/ still under quarantine.
Patient calling to schedule TCM. Patient states discharge notes state to have a repeat chest xray with PCP. Patient would like to know if this can be ordered prior to his appointment so he can have this done before coming in to see Romero Torres on 12/17/21. Please call.
emergency venous access
intestinal obstruction

## 2022-12-23 ENCOUNTER — INPATIENT (INPATIENT)
Facility: HOSPITAL | Age: 82
LOS: 4 days | Discharge: NURSING, REHAB OR LONG-TERM CARE | End: 2022-12-28
Attending: INTERNAL MEDICINE | Admitting: INTERNAL MEDICINE
Payer: MEDICARE

## 2022-12-23 VITALS
OXYGEN SATURATION: 96 % | SYSTOLIC BLOOD PRESSURE: 115 MMHG | DIASTOLIC BLOOD PRESSURE: 80 MMHG | RESPIRATION RATE: 20 BRPM | HEART RATE: 80 BPM

## 2022-12-23 DIAGNOSIS — G31.83 NEUROCOGNITIVE DISORDER WITH LEWY BODIES: ICD-10-CM

## 2022-12-23 DIAGNOSIS — Z90.11 ACQUIRED ABSENCE OF RIGHT BREAST AND NIPPLE: Chronic | ICD-10-CM

## 2022-12-23 DIAGNOSIS — F02.C0 DEMENTIA IN OTHER DISEASES CLASSIFIED ELSEWHERE, SEVERE, WITHOUT BEHAVIORAL DISTURBANCE, PSYCHOTIC DISTURBANCE, MOOD DISTURBANCE, AND ANXIETY: ICD-10-CM

## 2022-12-23 DIAGNOSIS — R79.89 OTHER SPECIFIED ABNORMAL FINDINGS OF BLOOD CHEMISTRY: ICD-10-CM

## 2022-12-23 DIAGNOSIS — G20 PARKINSON'S DISEASE: ICD-10-CM

## 2022-12-23 DIAGNOSIS — Z90.11 ACQUIRED ABSENCE OF RIGHT BREAST AND NIPPLE: ICD-10-CM

## 2022-12-23 DIAGNOSIS — R14.0 ABDOMINAL DISTENSION (GASEOUS): ICD-10-CM

## 2022-12-23 DIAGNOSIS — Z79.82 LONG TERM (CURRENT) USE OF ASPIRIN: ICD-10-CM

## 2022-12-23 DIAGNOSIS — Z90.49 ACQUIRED ABSENCE OF OTHER SPECIFIED PARTS OF DIGESTIVE TRACT: Chronic | ICD-10-CM

## 2022-12-23 DIAGNOSIS — R82.71 BACTERIURIA: ICD-10-CM

## 2022-12-23 DIAGNOSIS — E03.9 HYPOTHYROIDISM, UNSPECIFIED: ICD-10-CM

## 2022-12-23 DIAGNOSIS — N18.30 CHRONIC KIDNEY DISEASE, STAGE 3 UNSPECIFIED: ICD-10-CM

## 2022-12-23 DIAGNOSIS — Z79.890 HORMONE REPLACEMENT THERAPY: ICD-10-CM

## 2022-12-23 DIAGNOSIS — J12.82 PNEUMONIA DUE TO CORONAVIRUS DISEASE 2019: ICD-10-CM

## 2022-12-23 DIAGNOSIS — U07.1 COVID-19: ICD-10-CM

## 2022-12-23 DIAGNOSIS — Z85.3 PERSONAL HISTORY OF MALIGNANT NEOPLASM OF BREAST: ICD-10-CM

## 2022-12-23 DIAGNOSIS — Z88.8 ALLERGY STATUS TO OTHER DRUGS, MEDICAMENTS AND BIOLOGICAL SUBSTANCES STATUS: ICD-10-CM

## 2022-12-23 DIAGNOSIS — Z88.1 ALLERGY STATUS TO OTHER ANTIBIOTIC AGENTS STATUS: ICD-10-CM

## 2022-12-23 DIAGNOSIS — Z88.0 ALLERGY STATUS TO PENICILLIN: ICD-10-CM

## 2022-12-23 DIAGNOSIS — Z79.899 OTHER LONG TERM (CURRENT) DRUG THERAPY: ICD-10-CM

## 2022-12-23 DIAGNOSIS — K59.09 OTHER CONSTIPATION: ICD-10-CM

## 2022-12-23 DIAGNOSIS — N83.8 OTHER NONINFLAMMATORY DISORDERS OF OVARY, FALLOPIAN TUBE AND BROAD LIGAMENT: ICD-10-CM

## 2022-12-23 DIAGNOSIS — Z90.49 ACQUIRED ABSENCE OF OTHER SPECIFIED PARTS OF DIGESTIVE TRACT: ICD-10-CM

## 2022-12-23 DIAGNOSIS — R09.02 HYPOXEMIA: ICD-10-CM

## 2022-12-23 DIAGNOSIS — Z86.010 PERSONAL HISTORY OF COLONIC POLYPS: ICD-10-CM

## 2022-12-23 LAB
ALBUMIN SERPL ELPH-MCNC: 3.8 G/DL — SIGNIFICANT CHANGE UP (ref 3.5–5.2)
ALP SERPL-CCNC: 69 U/L — SIGNIFICANT CHANGE UP (ref 30–115)
ANION GAP SERPL CALC-SCNC: 13 MMOL/L — SIGNIFICANT CHANGE UP (ref 7–14)
AST SERPL-CCNC: 18 U/L — SIGNIFICANT CHANGE UP (ref 0–41)
BASOPHILS # BLD AUTO: 0.07 K/UL — SIGNIFICANT CHANGE UP (ref 0–0.2)
BASOPHILS NFR BLD AUTO: 0.7 % — SIGNIFICANT CHANGE UP (ref 0–1)
BILIRUB SERPL-MCNC: 0.3 MG/DL — SIGNIFICANT CHANGE UP (ref 0.2–1.2)
BUN SERPL-MCNC: 29 MG/DL — HIGH (ref 10–20)
CALCIUM SERPL-MCNC: 9.4 MG/DL — SIGNIFICANT CHANGE UP (ref 8.4–10.5)
CHLORIDE SERPL-SCNC: 102 MMOL/L — SIGNIFICANT CHANGE UP (ref 98–110)
CO2 SERPL-SCNC: 24 MMOL/L — SIGNIFICANT CHANGE UP (ref 17–32)
CREAT SERPL-MCNC: 1 MG/DL — SIGNIFICANT CHANGE UP (ref 0.7–1.5)
EOSINOPHIL # BLD AUTO: 0.3 K/UL — SIGNIFICANT CHANGE UP (ref 0–0.7)
EOSINOPHIL NFR BLD AUTO: 2.9 % — SIGNIFICANT CHANGE UP (ref 0–8)
GLUCOSE SERPL-MCNC: 94 MG/DL — SIGNIFICANT CHANGE UP (ref 70–99)
HCT VFR BLD CALC: 39.8 % — SIGNIFICANT CHANGE UP (ref 37–47)
HGB BLD-MCNC: 12.6 G/DL — SIGNIFICANT CHANGE UP (ref 12–16)
IMM GRANULOCYTES NFR BLD AUTO: 0.5 % — HIGH (ref 0.1–0.3)
LIDOCAIN IGE QN: 38 U/L — SIGNIFICANT CHANGE UP (ref 7–60)
LYMPHOCYTES # BLD AUTO: 29.2 % — SIGNIFICANT CHANGE UP (ref 20.5–51.1)
LYMPHOCYTES # BLD AUTO: 3.03 K/UL — SIGNIFICANT CHANGE UP (ref 1.2–3.4)
MCHC RBC-ENTMCNC: 30.4 PG — SIGNIFICANT CHANGE UP (ref 27–31)
MCHC RBC-ENTMCNC: 31.7 G/DL — LOW (ref 32–37)
MCV RBC AUTO: 96.1 FL — SIGNIFICANT CHANGE UP (ref 81–99)
MONOCYTES # BLD AUTO: 1.2 K/UL — HIGH (ref 0.1–0.6)
MONOCYTES NFR BLD AUTO: 11.6 % — HIGH (ref 1.7–9.3)
NEUTROPHILS # BLD AUTO: 5.72 K/UL — SIGNIFICANT CHANGE UP (ref 1.4–6.5)
NEUTROPHILS NFR BLD AUTO: 55.1 % — SIGNIFICANT CHANGE UP (ref 42.2–75.2)
NRBC # BLD: 0 /100 WBCS — SIGNIFICANT CHANGE UP (ref 0–0)
PLATELET # BLD AUTO: 271 K/UL — SIGNIFICANT CHANGE UP (ref 130–400)
POTASSIUM SERPL-MCNC: 4.4 MMOL/L — SIGNIFICANT CHANGE UP (ref 3.5–5)
POTASSIUM SERPL-SCNC: 4.4 MMOL/L — SIGNIFICANT CHANGE UP (ref 3.5–5)
PROT SERPL-MCNC: 6.4 G/DL — SIGNIFICANT CHANGE UP (ref 6–8)
RBC # BLD: 4.14 M/UL — LOW (ref 4.2–5.4)
RBC # FLD: 14.7 % — HIGH (ref 11.5–14.5)
SODIUM SERPL-SCNC: 139 MMOL/L — SIGNIFICANT CHANGE UP (ref 135–146)
WBC # BLD: 10.37 K/UL — SIGNIFICANT CHANGE UP (ref 4.8–10.8)
WBC # FLD AUTO: 10.37 K/UL — SIGNIFICANT CHANGE UP (ref 4.8–10.8)

## 2022-12-23 PROCEDURE — 99285 EMERGENCY DEPT VISIT HI MDM: CPT | Mod: FS,CS

## 2022-12-23 RX ORDER — SODIUM CHLORIDE 9 MG/ML
1000 INJECTION INTRAMUSCULAR; INTRAVENOUS; SUBCUTANEOUS ONCE
Refills: 0 | Status: COMPLETED | OUTPATIENT
Start: 2022-12-23 | End: 2022-12-23

## 2022-12-23 RX ADMIN — SODIUM CHLORIDE 1000 MILLILITER(S): 9 INJECTION INTRAMUSCULAR; INTRAVENOUS; SUBCUTANEOUS at 23:40

## 2022-12-23 NOTE — ED ADULT TRIAGE NOTE - CHIEF COMPLAINT QUOTE
Pt BIBA from [facility] for abnormal KUB, r/o bowel obstruction   pt COVID+  pt A&Ox4 Pt BIBA from [facility] for abnormal KUB, r/o bowel obstruction   pt COVID+  pt A&Ox1 - baseline

## 2022-12-23 NOTE — ED PROVIDER NOTE - ATTENDING APP SHARED VISIT CONTRIBUTION OF CARE
Patient is sent from NH for evaluation of abdominal distension, also has been more sleepy and weak than her normal, is diagnosed with COVID-19 and was started on Palxlovid.   Vitals reviewed.   Lungs: B/L decreased air entry, no crackles,   Abd: +BS, +distended abd, soft, no rebound, no guarding.   A/P: Abdominal distension,   Generalized weakness,   labs, CT, reevaluation.

## 2022-12-23 NOTE — ED PROVIDER NOTE - PHYSICAL EXAMINATION
Gen: NAD  Head: NCAT  HEENT: PERRL, oral mucosa moist, normal conjunctiva, oropharynx clear without exudate or erythema  Lung: CTAB, no respiratory distress, no wheezing, rales, rhonchi  CV: normal s1/s2, rrr, Normal perfusion, pulses 2+ throughout  Abd: soft, NT, distention no CVA tenderness  Genitourinary: no pelvic tenderness  MSK: No edema, no visible deformities, full range of motion in all 4 extremities  Neuro: No focal neurologic deficits  Skin: No rash   Psych: normal affect

## 2022-12-23 NOTE — ED PROVIDER NOTE - OBJECTIVE STATEMENT
83 yo female with a pmh of HLD and dementia present from nursing home for abdominal distention for 1 day. pt is minimally verbal at baseline. pt tested covid+ yesterday. no diarrhea, nausea, vomiting, cough.

## 2022-12-24 LAB
ALT FLD-CCNC: <5 U/L — SIGNIFICANT CHANGE UP (ref 0–41)
APPEARANCE UR: ABNORMAL
BACTERIA # UR AUTO: ABNORMAL
BILIRUB UR-MCNC: NEGATIVE — SIGNIFICANT CHANGE UP
COLOR SPEC: YELLOW — SIGNIFICANT CHANGE UP
DIFF PNL FLD: ABNORMAL
EGFR: 57 ML/MIN/1.73M2 — LOW
EPI CELLS # UR: 1 /HPF — SIGNIFICANT CHANGE UP (ref 0–5)
FLUAV AG NPH QL: SIGNIFICANT CHANGE UP
FLUBV AG NPH QL: SIGNIFICANT CHANGE UP
GLUCOSE UR QL: NEGATIVE — SIGNIFICANT CHANGE UP
HYALINE CASTS # UR AUTO: 1 /LPF — SIGNIFICANT CHANGE UP (ref 0–7)
KETONES UR-MCNC: SIGNIFICANT CHANGE UP
LACTATE SERPL-SCNC: 1.4 MMOL/L — SIGNIFICANT CHANGE UP (ref 0.7–2)
LEUKOCYTE ESTERASE UR-ACNC: NEGATIVE — SIGNIFICANT CHANGE UP
NITRITE UR-MCNC: NEGATIVE — SIGNIFICANT CHANGE UP
PH UR: 6 — SIGNIFICANT CHANGE UP (ref 5–8)
PROT UR-MCNC: ABNORMAL
RBC CASTS # UR COMP ASSIST: 30 /HPF — HIGH (ref 0–4)
RSV RNA NPH QL NAA+NON-PROBE: SIGNIFICANT CHANGE UP
SARS-COV-2 RNA SPEC QL NAA+PROBE: DETECTED
SP GR SPEC: >1.05 (ref 1.01–1.03)
TROPONIN T SERPL-MCNC: 0.02 NG/ML — HIGH
TROPONIN T SERPL-MCNC: 0.02 NG/ML — HIGH
UROBILINOGEN FLD QL: SIGNIFICANT CHANGE UP
WBC UR QL: 23 /HPF — HIGH (ref 0–5)

## 2022-12-24 PROCEDURE — 99223 1ST HOSP IP/OBS HIGH 75: CPT

## 2022-12-24 PROCEDURE — 99233 SBSQ HOSP IP/OBS HIGH 50: CPT

## 2022-12-24 PROCEDURE — 74177 CT ABD & PELVIS W/CONTRAST: CPT | Mod: 26,MA

## 2022-12-24 PROCEDURE — 71045 X-RAY EXAM CHEST 1 VIEW: CPT | Mod: 26

## 2022-12-24 PROCEDURE — 70450 CT HEAD/BRAIN W/O DYE: CPT | Mod: 26,MA

## 2022-12-24 PROCEDURE — 93010 ELECTROCARDIOGRAM REPORT: CPT

## 2022-12-24 RX ORDER — CHOLECALCIFEROL (VITAMIN D3) 125 MCG
1000 CAPSULE ORAL DAILY
Refills: 0 | Status: DISCONTINUED | OUTPATIENT
Start: 2022-12-24 | End: 2022-12-28

## 2022-12-24 RX ORDER — SENNA PLUS 8.6 MG/1
2 TABLET ORAL AT BEDTIME
Refills: 0 | Status: DISCONTINUED | OUTPATIENT
Start: 2022-12-24 | End: 2022-12-28

## 2022-12-24 RX ORDER — LIDOCAINE 4 G/100G
1 CREAM TOPICAL DAILY
Refills: 0 | Status: DISCONTINUED | OUTPATIENT
Start: 2022-12-24 | End: 2022-12-28

## 2022-12-24 RX ORDER — POLYETHYLENE GLYCOL 3350 17 G/17G
17 POWDER, FOR SOLUTION ORAL DAILY
Refills: 0 | Status: DISCONTINUED | OUTPATIENT
Start: 2022-12-24 | End: 2022-12-28

## 2022-12-24 RX ORDER — CHOLECALCIFEROL (VITAMIN D3) 125 MCG
2 CAPSULE ORAL
Qty: 0 | Refills: 0 | DISCHARGE

## 2022-12-24 RX ORDER — GABAPENTIN 400 MG/1
600 CAPSULE ORAL THREE TIMES A DAY
Refills: 0 | Status: DISCONTINUED | OUTPATIENT
Start: 2022-12-24 | End: 2022-12-28

## 2022-12-24 RX ORDER — SENNA PLUS 8.6 MG/1
2 TABLET ORAL AT BEDTIME
Refills: 0 | Status: DISCONTINUED | OUTPATIENT
Start: 2022-12-24 | End: 2022-12-24

## 2022-12-24 RX ORDER — LIDOCAINE 4 G/100G
1 CREAM TOPICAL
Qty: 0 | Refills: 0 | DISCHARGE

## 2022-12-24 RX ORDER — ONDANSETRON 8 MG/1
4 TABLET, FILM COATED ORAL EVERY 8 HOURS
Refills: 0 | Status: DISCONTINUED | OUTPATIENT
Start: 2022-12-24 | End: 2022-12-28

## 2022-12-24 RX ORDER — CARBIDOPA AND LEVODOPA 25; 100 MG/1; MG/1
1 TABLET ORAL
Refills: 0 | Status: DISCONTINUED | OUTPATIENT
Start: 2022-12-24 | End: 2022-12-28

## 2022-12-24 RX ORDER — GABAPENTIN 400 MG/1
1 CAPSULE ORAL
Qty: 0 | Refills: 0 | DISCHARGE

## 2022-12-24 RX ORDER — TIMOLOL 0.5 %
1 DROPS OPHTHALMIC (EYE)
Qty: 0 | Refills: 0 | DISCHARGE

## 2022-12-24 RX ORDER — CARBIDOPA AND LEVODOPA 25; 100 MG/1; MG/1
1 TABLET ORAL
Qty: 0 | Refills: 0 | DISCHARGE

## 2022-12-24 RX ORDER — ASPIRIN/CALCIUM CARB/MAGNESIUM 324 MG
1 TABLET ORAL
Qty: 0 | Refills: 0 | DISCHARGE

## 2022-12-24 RX ORDER — POLYETHYLENE GLYCOL 3350 17 G/17G
17 POWDER, FOR SOLUTION ORAL ONCE
Refills: 0 | Status: COMPLETED | OUTPATIENT
Start: 2022-12-24 | End: 2022-12-27

## 2022-12-24 RX ORDER — ACETAMINOPHEN 500 MG
650 TABLET ORAL EVERY 6 HOURS
Refills: 0 | Status: DISCONTINUED | OUTPATIENT
Start: 2022-12-24 | End: 2022-12-28

## 2022-12-24 RX ORDER — LANOLIN ALCOHOL/MO/W.PET/CERES
3 CREAM (GRAM) TOPICAL AT BEDTIME
Refills: 0 | Status: DISCONTINUED | OUTPATIENT
Start: 2022-12-24 | End: 2022-12-28

## 2022-12-24 RX ORDER — LACTULOSE 10 G/15ML
45 SOLUTION ORAL
Qty: 0 | Refills: 0 | DISCHARGE

## 2022-12-24 RX ORDER — LATANOPROST 0.05 MG/ML
1 SOLUTION/ DROPS OPHTHALMIC; TOPICAL
Qty: 0 | Refills: 0 | DISCHARGE

## 2022-12-24 RX ORDER — ACETAMINOPHEN 500 MG
2 TABLET ORAL
Qty: 0 | Refills: 0 | DISCHARGE

## 2022-12-24 RX ORDER — ASCORBIC ACID 60 MG
500 TABLET,CHEWABLE ORAL DAILY
Refills: 0 | Status: DISCONTINUED | OUTPATIENT
Start: 2022-12-24 | End: 2022-12-28

## 2022-12-24 RX ORDER — SODIUM CHLORIDE 9 MG/ML
1000 INJECTION, SOLUTION INTRAVENOUS
Refills: 0 | Status: DISCONTINUED | OUTPATIENT
Start: 2022-12-24 | End: 2022-12-28

## 2022-12-24 RX ORDER — CHOLECALCIFEROL (VITAMIN D3) 125 MCG
1 CAPSULE ORAL
Qty: 0 | Refills: 0 | DISCHARGE

## 2022-12-24 RX ORDER — LACTULOSE 10 G/15ML
10 SOLUTION ORAL ONCE
Refills: 0 | Status: DISCONTINUED | OUTPATIENT
Start: 2022-12-24 | End: 2022-12-28

## 2022-12-24 RX ORDER — ASCORBIC ACID 60 MG
1 TABLET,CHEWABLE ORAL
Qty: 0 | Refills: 0 | DISCHARGE

## 2022-12-24 RX ORDER — SENNA PLUS 8.6 MG/1
2 TABLET ORAL
Qty: 0 | Refills: 0 | DISCHARGE

## 2022-12-24 RX ORDER — LEVOTHYROXINE SODIUM 125 MCG
50 TABLET ORAL
Refills: 0 | Status: DISCONTINUED | OUTPATIENT
Start: 2022-12-24 | End: 2022-12-28

## 2022-12-24 RX ORDER — LEVOTHYROXINE SODIUM 125 MCG
1 TABLET ORAL
Qty: 0 | Refills: 0 | DISCHARGE

## 2022-12-24 RX ORDER — ASPIRIN/CALCIUM CARB/MAGNESIUM 324 MG
81 TABLET ORAL DAILY
Refills: 0 | Status: DISCONTINUED | OUTPATIENT
Start: 2022-12-24 | End: 2022-12-28

## 2022-12-24 RX ADMIN — SENNA PLUS 2 TABLET(S): 8.6 TABLET ORAL at 22:06

## 2022-12-24 RX ADMIN — SODIUM CHLORIDE 60 MILLILITER(S): 9 INJECTION, SOLUTION INTRAVENOUS at 16:39

## 2022-12-24 RX ADMIN — GABAPENTIN 600 MILLIGRAM(S): 400 CAPSULE ORAL at 22:06

## 2022-12-24 NOTE — PATIENT PROFILE ADULT - FUNCTIONAL ASSESSMENT - BASIC MOBILITY 6.
1-calculated by average/Not able to assess (calculate score using [facility] averaging method)

## 2022-12-24 NOTE — CONSULT NOTE ADULT - ASSESSMENT
ASSESSMENT:  82yF w/ above PMHx admitted for elevated troponin and possible SBO  Physical exam findings, imaging, and labs as documented above.   pt has + flatus and BM  PLAN:  -clear liquid diet advance as tolerated  -bowel regimen and suppository PRN   - Monitor for further bowel function  - Monitor for nausea/vomiting  - no surgical intervention  - Seen with attending Dr. Ludwig  12-24-22 @ 16:26 ASSESSMENT:  82yF w/ above PMHx admitted for elevated troponin and possible SBO  Physical exam findings, imaging, and labs as documented above.   pt has + flatus and BM  PLAN:  -CTAP similar to prior CT scan in 2019 with some mild distension  -clear liquid diet advance as tolerated  -bowel regimen and suppository PRN   - Monitor for further bowel function  - Monitor for nausea/vomiting  - no surgical intervention  - Seen with attending Dr. Ludwig  12-24-22 @ 16:26

## 2022-12-24 NOTE — PATIENT PROFILE ADULT - FALL HARM RISK - HARM RISK INTERVENTIONS
Assistance with ambulation/Assistance OOB with selected safe patient handling equipment/Communicate Risk of Fall with Harm to all staff/Discuss with provider need for PT consult/Monitor gait and stability/Reinforce activity limits and safety measures with patient and family/Tailored Fall Risk Interventions/Use of alarms - bed, chair and/or voice tab/Visual Cue: Yellow wristband and red socks/Bed in lowest position, wheels locked, appropriate side rails in place/Call bell, personal items and telephone in reach/Instruct patient to call for assistance before getting out of bed or chair/Non-slip footwear when patient is out of bed/[place] to call system/Physically safe environment - no spills, clutter or unnecessary equipment/Purposeful Proactive Rounding/Room/bathroom lighting operational, light cord in reach

## 2022-12-24 NOTE — CONSULT NOTE ADULT - SUBJECTIVE AND OBJECTIVE BOX
GENERAL SURGERY CONSULT NOTE    Patient: IMELDA ROLLE , 82y ([birth year])Female   MRN: [id number]  Location: [facility] 007 A  Visit: 12-24-22 Inpatient  Date: 12-24-22 @ 16:26    HPI:  82 year old female with pmhx of Lewy body dementia/Parkinsonism, chronic constipation, HO breast cancer, and colon polyp s/p colon resection presents for abdominal distension. Surgery called to evaluate for SBO  Pt came from [facility] due to abnormal KUB findings that showed gaseous distended bowel loops. her  Lev  who states pt likely had a bm within the last week. Has not been symptomatic (no vomiting, no abd pain).    In the ED, pt vitals were stable, RR 20 and /80, HR 80, on RA, and afebrile  CT ab/pel IVC showed   Mild gaseous distention of the large bowel and small bowel with air-fluid   levels. No definite evidence of small bowel obstruction at this time.   Incidental finding of R enlarged ovary.      PAST MEDICAL & SURGICAL HISTORY:  Dementia    hx of partial SBO     History of breast cancer      Constipation      Lewy body dementia without behavioral disturbance      Colon polyp      History of colon resection      H/O mastectomy, right          Home Medications:  acetaminophen 325 mg oral tablet: 2 tab(s) orally every 12 hours (24 Dec 2022 15:18)  Artificial Tears ophthalmic solution: 1 drop(s) to each affected eye 3 times a day (24 Dec 2022 15:18)  Aspercreme with Lidocaine 4% topical cream: Apply topically to affected area 3 times a day to left knee (24 Dec 2022 15:18)  aspirin 81 mg oral tablet, chewable: 1 tab(s) orally once a day (24 Dec 2022 15:18)  atorvastatin 10 mg oral tablet: 1 tab(s) orally Monday, Wednesday, and Friday at bedtime  (24 Dec 2022 15:18)  carbidopa-levodopa 25 mg-100 mg oral tablet: 1 tab(s) orally 2 times a day (24 Dec 2022 15:18)  Colace 100 mg oral capsule: 1 cap(s) orally 2 times a day (24 Dec 2022 15:18)  gabapentin 600 mg oral tablet: 1 tab(s) orally 3 times a day (24 Dec 2022 15:18)  latanoprost 0.005% ophthalmic solution: 1 drop(s) to each affected eye once a day (at bedtime) (24 Dec 2022 15:18)  levothyroxine 50 mcg (0.05 mg) oral tablet: 1 tab(s) orally once a day (24 Dec 2022 15:18)  omeprazole 20 mg oral delayed release capsule: 1 cap(s) orally once a day (24 Dec 2022 15:18)  polyethylene glycol 3350 oral powder for reconstitution: 17 gram(s) orally once a day (24 Dec 2022 15:18)  Senna 8.6 mg oral tablet: 2 tab(s) orally once a day (at bedtime) (24 Dec 2022 15:18)  sertraline 50 mg oral tablet: 1.5 tab(s) orally once a day (24 Dec 2022 15:18)  timolol hemihydrate 0.5% ophthalmic solution: 1 drop(s) to each affected eye 3 times a day (24 Dec 2022 15:18)  Vitamin C 500 mg oral tablet: 1 tab(s) orally 2 times a day (24 Dec 2022 15:18)  Vitamin D3 1000 intl units oral tablet: 1 tab(s) orally once a day (24 Dec 2022 15:23)        VITALS:  T(F): 96.8 (12-24-22 @ 15:34), Max: 99.2 (12-24-22 @ 02:46)  HR: 85 (12-24-22 @ 15:34) (73 - 85)  BP: 107/58 (12-24-22 @ 15:34) (107/58 - 115/80)  RR: 17 (12-24-22 @ 15:34) (16 - 20)  SpO2: 97% (12-24-22 @ 15:34) (96% - 99%)    PHYSICAL EXAM:  General: NAD  Cardiac: RRR S1, S2,   Respiratory: + air entry bilat  Abdomen: Soft, distended, non-tender, no rebound, no guarding. +BS.  ext: perfused  Neuro: alert     MEDICATIONS  (STANDING):  artificial  tears Solution 1 Drop(s) Both EYES daily  ascorbic acid 500 milliGRAM(s) Oral daily  aspirin  chewable 81 milliGRAM(s) Oral daily  carbidopa/levodopa  25/100 1 Tablet(s) Oral <User Schedule>  cholecalciferol 1000 Unit(s) Oral daily  dextrose 5% + sodium chloride 0.45%. 1000 milliLiter(s) (60 mL/Hr) IV Continuous <Continuous>  gabapentin 600 milliGRAM(s) Oral three times a day  levothyroxine 50 MICROGram(s) Oral <User Schedule>  lidocaine 4% Cream 1 Application(s) Topical daily  polyethylene glycol 3350 17 Gram(s) Oral daily  senna 2 Tablet(s) Oral at bedtime    MEDICATIONS  (PRN):  acetaminophen     Tablet .. 650 milliGRAM(s) Oral every 6 hours PRN Temp greater or equal to 38C (100.4F), Mild Pain (1 - 3)  aluminum hydroxide/magnesium hydroxide/simethicone Suspension 30 milliLiter(s) Oral every 4 hours PRN Dyspepsia  melatonin 3 milliGRAM(s) Oral at bedtime PRN Insomnia  ondansetron Injectable 4 milliGRAM(s) IV Push every 8 hours PRN Nausea and/or Vomiting      LAB/STUDIES:                        12.6   10.37 )-----------( 271      ( 23 Dec 2022 23:20 )             39.8     12-23    139  |  102  |  29<H>  ----------------------------<  94  4.4   |  24  |  1.0    Ca    9.4      23 Dec 2022 23:20    TPro  6.4  /  Alb  3.8  /  TBili  0.3  /  DBili  x   /  AST  18  /  ALT  <5  /  AlkPhos  69  12-23      LIVER FUNCTIONS - ( 23 Dec 2022 23:20 )  Alb: 3.8 g/dL / Pro: 6.4 g/dL / ALK PHOS: 69 U/L / ALT: <5 U/L / AST: 18 U/L / GGT: x             CARDIAC MARKERS ( 24 Dec 2022 06:00 )  x     / 0.02 ng/mL / x     / x     / x      CARDIAC MARKERS ( 23 Dec 2022 23:20 )  x     / 0.02 ng/mL / x     / x     / x        IMAGING:  < from: CT Abdomen and Pelvis w/ IV Cont (12.24.22 @ 00:29) >  IMPRESSION:    Mild gaseous distention of the large bowel and small bowel with air-fluid   levels. No definite evidence of small bowel obstruction at this time.   Short-term follow-up post oral contrast administration can be obtained if   symptoms persist to re-evaluate for developing small bowel obstruction.    7 cm enlarged right ovary, out of proportion for age. Nonemergent   ultrasound evaluation is recommended.    < end of copied text >      ACCESS DEVICES:  [ ] Peripheral IV  [ ] Central Venous Line	[ ] R	[ ] L	[ ] IJ	[ ] Fem	[ ] SC	Placed:   [ ] Arterial Line		[ ] R	[ ] L	[ ] Fem	[ ] Rad	[ ] Ax	Placed:   [ ] PICC:					[ ] Mediport  [ ] Urinary Catheter, Date Placed:     ASSESSMENT:  82yF w/ above PMHx admitted for elevated troponin and possible SBO  Physical exam findings, imaging, and labs as documented above.   pt has + flatus and BM  PLAN:  -clear liquid diet advance as tolerated  - no surgical intervention @ this time  - pt seen and examined with Dr Jo  - surgery team will follow  12-24-22 @ 16:26   GENERAL SURGERY CONSULT NOTE    Patient: IMELDA ROLLE , 82y ([birth year])Female   MRN: [id number]  Location: [facility] 007 A  Visit: 12-24-22 Inpatient  Date: 12-24-22 @ 16:26    HPI:  82 year old female with pmhx of Lewy body dementia/Parkinsonism, chronic constipation, HO breast cancer, and colon polyp s/p colon resection presents for abdominal distension. Surgery called to evaluate for SBO  Pt came from [facility] due to abnormal KUB findings that showed gaseous distended bowel loops. her  Lev  who states pt likely had a bm within the last week. Has not been symptomatic (no vomiting, no abd pain).    In the ED, pt vitals were stable, RR 20 and /80, HR 80, on RA, and afebrile  CT ab/pel IVC showed   Mild gaseous distention of the large bowel and small bowel with air-fluid   levels. No definite evidence of small bowel obstruction at this time.   Incidental finding of R enlarged ovary.      PAST MEDICAL & SURGICAL HISTORY:  Dementia    hx of partial SBO     History of breast cancer      Constipation      Lewy body dementia without behavioral disturbance      Colon polyp      History of colon resection      H/O mastectomy, right          Home Medications:  acetaminophen 325 mg oral tablet: 2 tab(s) orally every 12 hours (24 Dec 2022 15:18)  Artificial Tears ophthalmic solution: 1 drop(s) to each affected eye 3 times a day (24 Dec 2022 15:18)  Aspercreme with Lidocaine 4% topical cream: Apply topically to affected area 3 times a day to left knee (24 Dec 2022 15:18)  aspirin 81 mg oral tablet, chewable: 1 tab(s) orally once a day (24 Dec 2022 15:18)  atorvastatin 10 mg oral tablet: 1 tab(s) orally Monday, Wednesday, and Friday at bedtime  (24 Dec 2022 15:18)  carbidopa-levodopa 25 mg-100 mg oral tablet: 1 tab(s) orally 2 times a day (24 Dec 2022 15:18)  Colace 100 mg oral capsule: 1 cap(s) orally 2 times a day (24 Dec 2022 15:18)  gabapentin 600 mg oral tablet: 1 tab(s) orally 3 times a day (24 Dec 2022 15:18)  latanoprost 0.005% ophthalmic solution: 1 drop(s) to each affected eye once a day (at bedtime) (24 Dec 2022 15:18)  levothyroxine 50 mcg (0.05 mg) oral tablet: 1 tab(s) orally once a day (24 Dec 2022 15:18)  omeprazole 20 mg oral delayed release capsule: 1 cap(s) orally once a day (24 Dec 2022 15:18)  polyethylene glycol 3350 oral powder for reconstitution: 17 gram(s) orally once a day (24 Dec 2022 15:18)  Senna 8.6 mg oral tablet: 2 tab(s) orally once a day (at bedtime) (24 Dec 2022 15:18)  sertraline 50 mg oral tablet: 1.5 tab(s) orally once a day (24 Dec 2022 15:18)  timolol hemihydrate 0.5% ophthalmic solution: 1 drop(s) to each affected eye 3 times a day (24 Dec 2022 15:18)  Vitamin C 500 mg oral tablet: 1 tab(s) orally 2 times a day (24 Dec 2022 15:18)  Vitamin D3 1000 intl units oral tablet: 1 tab(s) orally once a day (24 Dec 2022 15:23)        VITALS:  T(F): 96.8 (12-24-22 @ 15:34), Max: 99.2 (12-24-22 @ 02:46)  HR: 85 (12-24-22 @ 15:34) (73 - 85)  BP: 107/58 (12-24-22 @ 15:34) (107/58 - 115/80)  RR: 17 (12-24-22 @ 15:34) (16 - 20)  SpO2: 97% (12-24-22 @ 15:34) (96% - 99%)    PHYSICAL EXAM:  General: NAD  Cardiac: RRR S1, S2,   Respiratory: + air entry bilat  Abdomen: Soft, distended, non-tender, no rebound, no guarding. +BS.  ext: perfused  Neuro: alert     MEDICATIONS  (STANDING):  artificial  tears Solution 1 Drop(s) Both EYES daily  ascorbic acid 500 milliGRAM(s) Oral daily  aspirin  chewable 81 milliGRAM(s) Oral daily  carbidopa/levodopa  25/100 1 Tablet(s) Oral <User Schedule>  cholecalciferol 1000 Unit(s) Oral daily  dextrose 5% + sodium chloride 0.45%. 1000 milliLiter(s) (60 mL/Hr) IV Continuous <Continuous>  gabapentin 600 milliGRAM(s) Oral three times a day  levothyroxine 50 MICROGram(s) Oral <User Schedule>  lidocaine 4% Cream 1 Application(s) Topical daily  polyethylene glycol 3350 17 Gram(s) Oral daily  senna 2 Tablet(s) Oral at bedtime    MEDICATIONS  (PRN):  acetaminophen     Tablet .. 650 milliGRAM(s) Oral every 6 hours PRN Temp greater or equal to 38C (100.4F), Mild Pain (1 - 3)  aluminum hydroxide/magnesium hydroxide/simethicone Suspension 30 milliLiter(s) Oral every 4 hours PRN Dyspepsia  melatonin 3 milliGRAM(s) Oral at bedtime PRN Insomnia  ondansetron Injectable 4 milliGRAM(s) IV Push every 8 hours PRN Nausea and/or Vomiting      LAB/STUDIES:                        12.6   10.37 )-----------( 271      ( 23 Dec 2022 23:20 )             39.8     12-23    139  |  102  |  29<H>  ----------------------------<  94  4.4   |  24  |  1.0    Ca    9.4      23 Dec 2022 23:20    TPro  6.4  /  Alb  3.8  /  TBili  0.3  /  DBili  x   /  AST  18  /  ALT  <5  /  AlkPhos  69  12-23      LIVER FUNCTIONS - ( 23 Dec 2022 23:20 )  Alb: 3.8 g/dL / Pro: 6.4 g/dL / ALK PHOS: 69 U/L / ALT: <5 U/L / AST: 18 U/L / GGT: x             CARDIAC MARKERS ( 24 Dec 2022 06:00 )  x     / 0.02 ng/mL / x     / x     / x      CARDIAC MARKERS ( 23 Dec 2022 23:20 )  x     / 0.02 ng/mL / x     / x     / x        IMAGING:  < from: CT Abdomen and Pelvis w/ IV Cont (12.24.22 @ 00:29) >  IMPRESSION:    Mild gaseous distention of the large bowel and small bowel with air-fluid   levels. No definite evidence of small bowel obstruction at this time.   Short-term follow-up post oral contrast administration can be obtained if   symptoms persist to re-evaluate for developing small bowel obstruction.    7 cm enlarged right ovary, out of proportion for age. Nonemergent   ultrasound evaluation is recommended.    < end of copied text >      ACCESS DEVICES:  [ ] Peripheral IV  [ ] Central Venous Line	[ ] R	[ ] L	[ ] IJ	[ ] Fem	[ ] SC	Placed:   [ ] Arterial Line		[ ] R	[ ] L	[ ] Fem	[ ] Rad	[ ] Ax	Placed:   [ ] PICC:					[ ] Mediport  [ ] Urinary Catheter, Date Placed:     ASSESSMENT:  82yF w/ above PMHx admitted for elevated troponin and possible SBO  Physical exam findings, imaging, and labs as documented above.   pt has + flatus and BM  PLAN:  -clear liquid diet advance as tolerated  -bowel regimen and suppository PRN   - no surgical intervention  - pt seen and examined with Dr Jo  - surgery team will follow  12-24-22 @ 16:26   GENERAL SURGERY CONSULT NOTE    Patient: IMELDA ROLLE , 82y ([birth year])Female   MRN: [id number]  [facility]: [facility] 007 A  Visit: 12-24-22 Inpatient  Date: 12-24-22 @ 16:26    HPI:  82 year old female with pmhx of Lewy body dementia/Parkinsonism, chronic constipation, HO breast cancer, and colon polyp s/p colon resection presents for abdominal distension. Surgery called to evaluate for SBO  [facility] due to abnormal KUB findings that showed gaseous distended bowel loops. Patient and patient's  deny any nausea/vomiting, any decreased PO intake. patient's last bowel movement 12/23 PM, passing gas. Denies any fevers/chills, diarrhea, abdominal pain, dark or tarry stools, bloody stools, nausea/vomiting.    In the ED, pt vitals were stable, RR 20 and /80, HR 80, on RA, and afebrile  CT ab/pel IVC showed   Mild gaseous distention of the large bowel and small bowel with air-fluid   levels. No definite evidence of small bowel obstruction at this time.   Incidental finding of R enlarged ovary similar to prior CT from 2019      PAST MEDICAL & SURGICAL HISTORY:  Dementia    hx of partial SBO     History of breast cancer      Constipation      Lewy body dementia without behavioral disturbance      Colon polyp      History of colon resection      H/O mastectomy, right          Home Medications:  acetaminophen 325 mg oral tablet: 2 tab(s) orally every 12 hours (24 Dec 2022 15:18)  Artificial Tears ophthalmic solution: 1 drop(s) to each affected eye 3 times a day (24 Dec 2022 15:18)  Aspercreme with Lidocaine 4% topical cream: Apply topically to affected area 3 times a day to left knee (24 Dec 2022 15:18)  aspirin 81 mg oral tablet, chewable: 1 tab(s) orally once a day (24 Dec 2022 15:18)  atorvastatin 10 mg oral tablet: 1 tab(s) orally Monday, Wednesday, and Friday at bedtime  (24 Dec 2022 15:18)  carbidopa-levodopa 25 mg-100 mg oral tablet: 1 tab(s) orally 2 times a day (24 Dec 2022 15:18)  Colace 100 mg oral capsule: 1 cap(s) orally 2 times a day (24 Dec 2022 15:18)  gabapentin 600 mg oral tablet: 1 tab(s) orally 3 times a day (24 Dec 2022 15:18)  latanoprost 0.005% ophthalmic solution: 1 drop(s) to each affected eye once a day (at bedtime) (24 Dec 2022 15:18)  levothyroxine 50 mcg (0.05 mg) oral tablet: 1 tab(s) orally once a day (24 Dec 2022 15:18)  omeprazole 20 mg oral delayed release capsule: 1 cap(s) orally once a day (24 Dec 2022 15:18)  polyethylene glycol 3350 oral powder for reconstitution: 17 gram(s) orally once a day (24 Dec 2022 15:18)  Senna 8.6 mg oral tablet: 2 tab(s) orally once a day (at bedtime) (24 Dec 2022 15:18)  sertraline 50 mg oral tablet: 1.5 tab(s) orally once a day (24 Dec 2022 15:18)  timolol hemihydrate 0.5% ophthalmic solution: 1 drop(s) to each affected eye 3 times a day (24 Dec 2022 15:18)  Vitamin C 500 mg oral tablet: 1 tab(s) orally 2 times a day (24 Dec 2022 15:18)  Vitamin D3 1000 intl units oral tablet: 1 tab(s) orally once a day (24 Dec 2022 15:23)        VITALS:  T(F): 96.8 (12-24-22 @ 15:34), Max: 99.2 (12-24-22 @ 02:46)  HR: 85 (12-24-22 @ 15:34) (73 - 85)  BP: 107/58 (12-24-22 @ 15:34) (107/58 - 115/80)  RR: 17 (12-24-22 @ 15:34) (16 - 20)  SpO2: 97% (12-24-22 @ 15:34) (96% - 99%)    PHYSICAL EXAM:  General: NAD  Cardiac: RRR S1, S2,   Respiratory: + air entry bilat  Abdomen: Soft, distended, non-tender, no rebound, no guarding. +BS.  ext: perfused  Neuro: alert     MEDICATIONS  (STANDING):  artificial  tears Solution 1 Drop(s) Both EYES daily  ascorbic acid 500 milliGRAM(s) Oral daily  aspirin  chewable 81 milliGRAM(s) Oral daily  carbidopa/levodopa  25/100 1 Tablet(s) Oral <User Schedule>  cholecalciferol 1000 Unit(s) Oral daily  dextrose 5% + sodium chloride 0.45%. 1000 milliLiter(s) (60 mL/Hr) IV Continuous <Continuous>  gabapentin 600 milliGRAM(s) Oral three times a day  levothyroxine 50 MICROGram(s) Oral <User Schedule>  lidocaine 4% Cream 1 Application(s) Topical daily  polyethylene glycol 3350 17 Gram(s) Oral daily  senna 2 Tablet(s) Oral at bedtime    MEDICATIONS  (PRN):  acetaminophen     Tablet .. 650 milliGRAM(s) Oral every 6 hours PRN Temp greater or equal to 38C (100.4F), Mild Pain (1 - 3)  aluminum hydroxide/magnesium hydroxide/simethicone Suspension 30 milliLiter(s) Oral every 4 hours PRN Dyspepsia  melatonin 3 milliGRAM(s) Oral at bedtime PRN Insomnia  ondansetron Injectable 4 milliGRAM(s) IV Push every 8 hours PRN Nausea and/or Vomiting      LAB/STUDIES:                        12.6   10.37 )-----------( 271      ( 23 Dec 2022 23:20 )             39.8     12-23    139  |  102  |  29<H>  ----------------------------<  94  4.4   |  24  |  1.0    Ca    9.4      23 Dec 2022 23:20    TPro  6.4  /  Alb  3.8  /  TBili  0.3  /  DBili  x   /  AST  18  /  ALT  <5  /  AlkPhos  69  12-23      LIVER FUNCTIONS - ( 23 Dec 2022 23:20 )  Alb: 3.8 g/dL / Pro: 6.4 g/dL / ALK PHOS: 69 U/L / ALT: <5 U/L / AST: 18 U/L / GGT: x             CARDIAC MARKERS ( 24 Dec 2022 06:00 )  x     / 0.02 ng/mL / x     / x     / x      CARDIAC MARKERS ( 23 Dec 2022 23:20 )  x     / 0.02 ng/mL / x     / x     / x        IMAGING:  < from: CT Abdomen and Pelvis w/ IV Cont (12.24.22 @ 00:29) >  IMPRESSION:    Mild gaseous distention of the large bowel and small bowel with air-fluid   levels. No definite evidence of small bowel obstruction at this time.   Short-term follow-up post oral contrast administration can be obtained if   symptoms persist to re-evaluate for developing small bowel obstruction.    7 cm enlarged right ovary, out of proportion for age. Nonemergent   ultrasound evaluation is recommended.    < end of copied text >      ACCESS DEVICES:  [X] Peripheral IV

## 2022-12-24 NOTE — H&P ADULT - NSHPPHYSICALEXAM_GEN_ALL_CORE
PHYSICAL EXAM:  GENERAL: NAD, AAO x 4, 82y F  HEAD:  Atraumatic, Normocephalic  EYES: EOMI, conjunctiva and sclera white  NECK: Supple, No JVD  CHEST/LUNG: Clear to auscultation bilaterally; No wheeze; No crackles; No accessory muscles used  HEART: Regular rate and rhythm; No murmurs;   ABDOMEN: Soft, Nontender, Nondistended; Bowel sounds present; No guarding/rigidity  EXTREMITIES:  2+ Peripheral Pulses, No cyanosis or edema  NEUROLOGY: follows commands/ no dysarthria/ no facial asymmetry/ normal strength and sensation PHYSICAL EXAM:  GENERAL: NAD, alert/ oriented but slow to respond  HEAD:  Atraumatic, Normocephalic  EYES: EOMI, conjunctiva and sclera white  NECK: Supple, No JVD  CHEST/LUNG: ctabl  HEART: Regular rate and rhythm; No murmurs;   ABDOMEN: Nontender,distended but soft to touch  EXTREMITIES:  2+ Peripheral Pulses, No cyanosis or edema  NEUROLOGY: follows commands/ weakness L >R side UE (baseline per family)

## 2022-12-24 NOTE — H&P ADULT - ATTENDING COMMENTS
82 year old female with pmhx of constipation, and cancer of breast and severe dementia presents with abd distension. Possible sbo on imaging.     # distended bowel loops/ air fluid levels r/o bowel obstruction/ileus  # HO chronic constipation/ # HO colon polyp s/p resection  # Lewy body dementia/parkinsonism  -CT: Mild gaseous distention of the large bowel and small bowel with air-fluid levels.  -unclear whether passing gas or BM  -no pain on exam  Abdo distended but Bowel sounds heard.   -npo for today.  -IVF  Suppository ok.   -surgery consult noted  Will start CLD tomorrow after maybe an AXR in AM     # Covid  -94% on RA/ 98% on 2L  -CXR clear  -consider steroids if o2 sats do not improve/ holding for now  -remdesivir if ok with ID/ has taken at least two days of paxlovid    # trops elevation  # CKD 3  -no baseline  -no ekg changes  -likely secondary to CKD or covid  -monitor on tele 24 hours and d/c tele if no events    # R enlarged ovary  # HO breast cancer  -check TVUS  -check ca-125    # hypothyroiod  -check tsh  -c/w synthroid 50mcg    dvt ppx  gi ppx not indicated  diet NPO for now  activity bedbound  admit to tele  full code    HANDOFF:  f/u ID, AXR, advance diet

## 2022-12-24 NOTE — H&P ADULT - NSHPLABSRESULTS_GEN_ALL_CORE
LABS: Personally reviewed labs, imaging, and ECG                          12.6   10.37 )-----------( 271      ( 23 Dec 2022 23:20 )             39.8       12-23    139  |  102  |  29<H>  ----------------------------<  94  4.4   |  24  |  1.0    Ca    9.4      23 Dec 2022 23:20    TPro  6.4  /  Alb  3.8  /  TBili  0.3  /  DBili  x   /  AST  18  /  ALT  <5  /  AlkPhos  69  12-23       LIVER FUNCTIONS - ( 23 Dec 2022 23:20 )  Alb: 3.8 g/dL / Pro: 6.4 g/dL / ALK PHOS: 69 U/L / ALT: <5 U/L / AST: 18 U/L / GGT: x                            Lactate Trend  12-23 @ 23:20 Lactate:1.4       CARDIAC MARKERS ( 24 Dec 2022 06:00 )  x     / 0.02 ng/mL / x     / x     / x      CARDIAC MARKERS ( 23 Dec 2022 23:20 )  x     / 0.02 ng/mL / x     / x     / x            CAPILLARY BLOOD GLUCOSE            RADIOLOGY & ADDITIONAL TESTS:

## 2022-12-24 NOTE — H&P ADULT - ASSESSMENT
# constipation r/o sbo/ileus  # HO chronic constipation/ # HO colon resection  # Lewy body dementia  -    # trops elevation  -no baseline  -no ekg changes  -likely secondary to CKD  -monitor on tele 24 hours and d/c tele if no events    # R enlarged ovary  # HO breast cancer  -check US  -check ca-125    # ?HO hypothyroid  -check tsh    dvt ppx  gi ppx  diet NPO  activity As tolerated  admit to tele 78 year old female with pmhx of constipation, and cancer of breast and severe dementia presents with abd distension. Possible sbo on imaging.       # constipation r/o sbo/ileus  # HO chronic constipation/ # HO colon resection  # Lewy body dementia  -    # Covid  -not hypoxic  -no fevers  -CXR clear  -monitor off steroids  -remdesivir if ok with ID    # trops elevation  # CKD 3  -no baseline  -no ekg changes  -likely secondary to CKD or covid  -monitor on tele 24 hours and d/c tele if no events    # R enlarged ovary  # HO breast cancer  -check US  -check ca-125    # ?HO hypothyroid  -check tsh    dvt ppx  gi ppx  diet NPO  activity As tolerated  admit to tele 78 year old female with pmhx of constipation, and cancer of breast and severe dementia presents with abd distension. Possible sbo on imaging.     # distended bowel loops/ air fluid levels r/o bowel obstruction/ileus  # HO chronic constipation/ # HO colon polyp s/p resection  # Lewy body dementia/parkinsonism  -CT: Mild gaseous distention of the large bowel and small bowel with air-fluid levels.  -unclear whether passing gas or BM  -no pain on exam  -npo for now  -IVF  -surgery consult pending  -pt stable/ pending sx recs for managment of obstruction    # Covid  -94% on RA/ 98% on 2L  -CXR clear  -consider steroids if o2 sats do not improve/ holding for now  -remdesivir if ok with ID/ has taken at least two days of paxlovid    # trops elevation  # CKD 3  -no baseline  -no ekg changes  -likely secondary to CKD or covid  -monitor on tele 24 hours and d/c tele if no events    # R enlarged ovary  # HO breast cancer  -check TVUS  -check ca-125    # hypothyroiod  -check tsh  -c/w synthroid 50mcg    dvt ppx  gi ppx not indicated  diet NPO for now  activity bedbound  admit to tele  full code 82 year old female with pmhx of constipation, and cancer of breast and severe dementia presents with abd distension. Possible sbo on imaging.     # distended bowel loops/ air fluid levels r/o bowel obstruction/ileus  # HO chronic constipation/ # HO colon polyp s/p resection  # Lewy body dementia/parkinsonism  -CT: Mild gaseous distention of the large bowel and small bowel with air-fluid levels.  -unclear whether passing gas or BM  -no pain on exam  -npo for now  -IVF  -surgery consult pending  -pt stable/ pending sx recs for managment of obstruction    # Covid  -94% on RA/ 98% on 2L  -CXR clear  -consider steroids if o2 sats do not improve/ holding for now  -remdesivir if ok with ID/ has taken at least two days of paxlovid    # trops elevation  # CKD 3  -no baseline  -no ekg changes  -likely secondary to CKD or covid  -monitor on tele 24 hours and d/c tele if no events    # R enlarged ovary  # HO breast cancer  -check TVUS  -check ca-125    # hypothyroiod  -check tsh  -c/w synthroid 50mcg    dvt ppx  gi ppx not indicated  diet NPO for now  activity bedbound  admit to tele  full code

## 2022-12-24 NOTE — CONSULT NOTE ADULT - ATTENDING COMMENTS
Consulted for possible sbo. CT stable from prior, only mildly dilated- reviewed  Abd is soft and benign  Pt having flatus and bms    Plan: Not likely sbo, ok for clears and adv as tolerated by primary team

## 2022-12-24 NOTE — H&P ADULT - HISTORY OF PRESENT ILLNESS
78 year old female with pmhx of Lewy body dementia, chronic constipation, HO breast cancer, and colon resection presents for abdominal distension.  Pt came from [facility]. She has been treated at [facility] multiple times for constipation and suspected SBO/ileus and managed medically.   In the ED, pt vitals were stable, RR 20 and /80, HR 80, on RA, and afebrile.  Labs unremarkable, except for elevated trop to 0.02 x2 (no bl), tested positive for COVID.  CXR clear. EKG no ischemia  CT ab/pel IVC showed   Mild gaseous distention of the large bowel and small bowel with air-fluid   levels. No definite evidence of small bowel obstruction at this time.   Incidental finding of R enlarged ovary.  Pt received 1L NS in ED.  To be admitted to tele d/t elevated trop and for further work up/ management constipation 78 year old female with pmhx of Lewy body dementia/Parkinsonism, chronic constipation, HO breast cancer, and colon polyp s/p colon resection presents for abdominal distension.  Pt came from [facility] due to abnormal KUB findings that showed gaseous distended bowel loops. S/w  Lev at bedside who states pt likely had a bm within the last week. Has not been symptomatic (no vomiting, no abd pain). The patient had a colon resection about ten years ago for findings of a colon polyp. She was also worked up for a parkinsonism type disease starting about ten years ago. She sees an eminent specialist in [facility] who believes she likely has Lewy body dementia. She has had several admissions for management of parital bowel obstruction, at least twice here at [facility] and managed by surgery with nonsurgical management as pt is not surgery candidate. Of note, pt had low grade fever earlier in the week, found to have covid at NH, asymptomatic as per NH charts, was started on paxlovid. Pt's , states that since starting paxlovid, she has had significant hair loss.   On my exam pt is alert, and able to have minimal conversation. She is slow to respond but knows who she is, where she lives, and knows approximate date. She can verbalize if she has any symptoms and whether in distress. But does have short term memory issues. Due to this ROS may be unreliable.  In the ED, pt vitals were stable, RR 20 and /80, HR 80, on RA, and afebrile.  Labs unremarkable, except for elevated trop to 0.02 x2 (no bl), tested positive for COVID.  CXR clear. EKG no ischemia  CT ab/pel IVC showed   Mild gaseous distention of the large bowel and small bowel with air-fluid   levels. No definite evidence of small bowel obstruction at this time.   Incidental finding of R enlarged ovary.  Pt received 1L NS in ED.  To be admitted to [facility] d/t elevated trop and for further work up/management of possible SBO.  82 year old female with pmhx of Lewy body dementia/Parkinsonism, chronic constipation, HO breast cancer, and colon polyp s/p colon resection presents for abdominal distension.  Pt came from [facility] due to abnormal KUB findings that showed gaseous distended bowel loops. S/w  Lev at bedside who states pt likely had a bm within the last week. Has not been symptomatic (no vomiting, no abd pain). The patient had a colon resection about ten years ago for findings of a colon polyp. She was also worked up for a parkinsonism type disease starting about ten years ago. She sees an eminent specialist in [facility] who believes she likely has Lewy body dementia. She has had several admissions for management of parital bowel obstruction, at least twice here at [facility] and managed by surgery with nonsurgical management as pt is not surgery candidate. Of note, pt had low grade fever earlier in the week, found to have covid at NH, asymptomatic as per NH charts, was started on paxlovid. Pt's , states that since starting paxlovid, she has had significant hair loss.   On my exam pt is alert, and able to have minimal conversation. She is slow to respond but knows who she is, where she lives, and knows approximate date. She can verbalize if she has any symptoms and whether in distress. But does have short term memory issues. Due to this ROS may be unreliable.  In the ED, pt vitals were stable, RR 20 and /80, HR 80, on RA, and afebrile.  Labs unremarkable, except for elevated trop to 0.02 x2 (no bl), tested positive for COVID.  CXR clear. EKG no ischemia  CT ab/pel IVC showed   Mild gaseous distention of the large bowel and small bowel with air-fluid   levels. No definite evidence of small bowel obstruction at this time.   Incidental finding of R enlarged ovary.  Pt received 1L NS in ED.  To be admitted to [facility] d/t elevated trop and for further work up/management of possible SBO.

## 2022-12-24 NOTE — H&P ADULT - NSHPREVIEWOFSYSTEMS_GEN_ALL_CORE
REVIEW OF SYSTEMS:    CONSTITUTIONAL: No weakness, fevers or chills  EYES/ENT: No visual changes;  No vertigo or throat pain   NECK: No pain or stiffness  RESPIRATORY: No cough, wheezing, hemoptysis; No shortness of breath  CARDIOVASCULAR: No chest pain or palpitations  GASTROINTESTINAL: No abdominal or epigastric pain. No nausea, vomiting, or hematemesis;   GENITOURINARY: No dysuria, frequency or hematuria  NEUROLOGICAL: No numbness or weakness  SKIN: No itching, rashes

## 2022-12-24 NOTE — ED ADULT NURSE NOTE - CHIEF COMPLAINT QUOTE
Pt BIBA from [facility] for abnormal KUB, r/o bowel obstruction   pt COVID+  pt A&Ox1 - baseline

## 2022-12-25 LAB
ALBUMIN SERPL ELPH-MCNC: 3.8 G/DL — SIGNIFICANT CHANGE UP (ref 3.5–5.2)
ALP SERPL-CCNC: 67 U/L — SIGNIFICANT CHANGE UP (ref 30–115)
ALT FLD-CCNC: <5 U/L — SIGNIFICANT CHANGE UP (ref 0–41)
ANION GAP SERPL CALC-SCNC: 15 MMOL/L — HIGH (ref 7–14)
AST SERPL-CCNC: 15 U/L — SIGNIFICANT CHANGE UP (ref 0–41)
BASOPHILS # BLD AUTO: 0.05 K/UL — SIGNIFICANT CHANGE UP (ref 0–0.2)
BASOPHILS NFR BLD AUTO: 0.5 % — SIGNIFICANT CHANGE UP (ref 0–1)
BILIRUB SERPL-MCNC: 0.3 MG/DL — SIGNIFICANT CHANGE UP (ref 0.2–1.2)
BUN SERPL-MCNC: 23 MG/DL — HIGH (ref 10–20)
CALCIUM SERPL-MCNC: 8.4 MG/DL — SIGNIFICANT CHANGE UP (ref 8.4–10.5)
CHLORIDE SERPL-SCNC: 106 MMOL/L — SIGNIFICANT CHANGE UP (ref 98–110)
CHOLEST SERPL-MCNC: 147 MG/DL — SIGNIFICANT CHANGE UP
CO2 SERPL-SCNC: 20 MMOL/L — SIGNIFICANT CHANGE UP (ref 17–32)
CREAT SERPL-MCNC: 0.8 MG/DL — SIGNIFICANT CHANGE UP (ref 0.7–1.5)
EGFR: 74 ML/MIN/1.73M2 — SIGNIFICANT CHANGE UP
EOSINOPHIL # BLD AUTO: 0.25 K/UL — SIGNIFICANT CHANGE UP (ref 0–0.7)
EOSINOPHIL NFR BLD AUTO: 2.6 % — SIGNIFICANT CHANGE UP (ref 0–8)
GLUCOSE SERPL-MCNC: 93 MG/DL — SIGNIFICANT CHANGE UP (ref 70–99)
HCT VFR BLD CALC: 40.2 % — SIGNIFICANT CHANGE UP (ref 37–47)
HDLC SERPL-MCNC: 34 MG/DL — LOW
HGB BLD-MCNC: 12.6 G/DL — SIGNIFICANT CHANGE UP (ref 12–16)
IMM GRANULOCYTES NFR BLD AUTO: 0.5 % — HIGH (ref 0.1–0.3)
LIPID PNL WITH DIRECT LDL SERPL: 90 MG/DL — SIGNIFICANT CHANGE UP
LYMPHOCYTES # BLD AUTO: 2.95 K/UL — SIGNIFICANT CHANGE UP (ref 1.2–3.4)
LYMPHOCYTES # BLD AUTO: 30.2 % — SIGNIFICANT CHANGE UP (ref 20.5–51.1)
MCHC RBC-ENTMCNC: 30.2 PG — SIGNIFICANT CHANGE UP (ref 27–31)
MCHC RBC-ENTMCNC: 31.3 G/DL — LOW (ref 32–37)
MCV RBC AUTO: 96.4 FL — SIGNIFICANT CHANGE UP (ref 81–99)
MONOCYTES # BLD AUTO: 0.83 K/UL — HIGH (ref 0.1–0.6)
MONOCYTES NFR BLD AUTO: 8.5 % — SIGNIFICANT CHANGE UP (ref 1.7–9.3)
NEUTROPHILS # BLD AUTO: 5.65 K/UL — SIGNIFICANT CHANGE UP (ref 1.4–6.5)
NEUTROPHILS NFR BLD AUTO: 57.7 % — SIGNIFICANT CHANGE UP (ref 42.2–75.2)
NON HDL CHOLESTEROL: 113 MG/DL — SIGNIFICANT CHANGE UP
NRBC # BLD: 0 /100 WBCS — SIGNIFICANT CHANGE UP (ref 0–0)
PLATELET # BLD AUTO: 236 K/UL — SIGNIFICANT CHANGE UP (ref 130–400)
POTASSIUM SERPL-MCNC: 4.2 MMOL/L — SIGNIFICANT CHANGE UP (ref 3.5–5)
POTASSIUM SERPL-SCNC: 4.2 MMOL/L — SIGNIFICANT CHANGE UP (ref 3.5–5)
PROT SERPL-MCNC: 6.1 G/DL — SIGNIFICANT CHANGE UP (ref 6–8)
RBC # BLD: 4.17 M/UL — LOW (ref 4.2–5.4)
RBC # FLD: 14.5 % — SIGNIFICANT CHANGE UP (ref 11.5–14.5)
SODIUM SERPL-SCNC: 141 MMOL/L — SIGNIFICANT CHANGE UP (ref 135–146)
TRIGL SERPL-MCNC: 119 MG/DL — SIGNIFICANT CHANGE UP
WBC # BLD: 9.78 K/UL — SIGNIFICANT CHANGE UP (ref 4.8–10.8)
WBC # FLD AUTO: 9.78 K/UL — SIGNIFICANT CHANGE UP (ref 4.8–10.8)

## 2022-12-25 PROCEDURE — 99231 SBSQ HOSP IP/OBS SF/LOW 25: CPT | Mod: GC

## 2022-12-25 PROCEDURE — 99233 SBSQ HOSP IP/OBS HIGH 50: CPT

## 2022-12-25 PROCEDURE — 74018 RADEX ABDOMEN 1 VIEW: CPT | Mod: 26

## 2022-12-25 RX ORDER — DEXAMETHASONE 0.5 MG/5ML
6 ELIXIR ORAL DAILY
Refills: 0 | Status: DISCONTINUED | OUTPATIENT
Start: 2022-12-25 | End: 2022-12-28

## 2022-12-25 RX ORDER — ENOXAPARIN SODIUM 100 MG/ML
40 INJECTION SUBCUTANEOUS EVERY 24 HOURS
Refills: 0 | Status: DISCONTINUED | OUTPATIENT
Start: 2022-12-25 | End: 2022-12-28

## 2022-12-25 RX ORDER — REMDESIVIR 5 MG/ML
100 INJECTION INTRAVENOUS EVERY 24 HOURS
Refills: 0 | Status: COMPLETED | OUTPATIENT
Start: 2022-12-26 | End: 2022-12-27

## 2022-12-25 RX ORDER — REMDESIVIR 5 MG/ML
200 INJECTION INTRAVENOUS EVERY 24 HOURS
Refills: 0 | Status: COMPLETED | OUTPATIENT
Start: 2022-12-25 | End: 2022-12-25

## 2022-12-25 RX ORDER — REMDESIVIR 5 MG/ML
INJECTION INTRAVENOUS
Refills: 0 | Status: COMPLETED | OUTPATIENT
Start: 2022-12-25 | End: 2022-12-27

## 2022-12-25 RX ADMIN — SENNA PLUS 2 TABLET(S): 8.6 TABLET ORAL at 21:51

## 2022-12-25 RX ADMIN — Medication 500 MILLIGRAM(S): at 11:34

## 2022-12-25 RX ADMIN — GABAPENTIN 600 MILLIGRAM(S): 400 CAPSULE ORAL at 21:51

## 2022-12-25 RX ADMIN — REMDESIVIR 200 MILLIGRAM(S): 5 INJECTION INTRAVENOUS at 17:04

## 2022-12-25 RX ADMIN — GABAPENTIN 600 MILLIGRAM(S): 400 CAPSULE ORAL at 14:31

## 2022-12-25 RX ADMIN — Medication 1 DROP(S): at 11:33

## 2022-12-25 RX ADMIN — Medication 50 MICROGRAM(S): at 05:31

## 2022-12-25 RX ADMIN — CARBIDOPA AND LEVODOPA 1 TABLET(S): 25; 100 TABLET ORAL at 17:05

## 2022-12-25 RX ADMIN — Medication 6 MILLIGRAM(S): at 17:04

## 2022-12-25 RX ADMIN — Medication 81 MILLIGRAM(S): at 11:34

## 2022-12-25 RX ADMIN — Medication 1000 UNIT(S): at 11:33

## 2022-12-25 RX ADMIN — GABAPENTIN 600 MILLIGRAM(S): 400 CAPSULE ORAL at 05:31

## 2022-12-25 RX ADMIN — LIDOCAINE 1 APPLICATION(S): 4 CREAM TOPICAL at 11:34

## 2022-12-25 NOTE — PROGRESS NOTE ADULT - SUBJECTIVE AND OBJECTIVE BOX
FLOLEIMELDA    82y Female    Patient is a 82y old  Female who presents with a chief complaint of abd distension (25 Dec 2022 08:47)    OVERNIGHT EVENTS: improving no acute events on NC 2L comfortable /  at bedside     PAST MEDICAL & SURGICAL HISTORY:    Dementia    History of breast cancer    Constipation    Lewy body dementia without behavioral disturbance    Colon polyp    History of colon resection    H/O mastectomy, right    VITALS:   T(F): 98.1 (12-25-22 @ 14:02), Max: 98.1 (12-25-22 @ 14:02)  HR: 84 (12-25-22 @ 14:02) (82 - 85)  BP: 121/64 (12-25-22 @ 14:02) (107/58 - 141/66)  RR: 17 (12-24-22 @ 15:34) (17 - 17)  SpO2: 98% (12-24-22 @ 18:30) (97% - 100%)    PHYSICAL EXAM:  GENERAL: NAD, well-developed, [place],  she is improving in her Mental Status   HEAD:  Atraumatic, Normocephalic  EYES: EOMI, PERRLA, conjunctiva and sclera clear  NECK: Supple, No JVD  CHEST/LUNG: decreased BS B/L   HEART: Regular rate and rhythm; No murmurs, rubs, or gallops  ABDOMEN: Firm, Nontender, ++ distended; Bowel sounds present  EXTREMITIES:  2+ Peripheral Pulses, No clubbing, cyanosis, or edema  PSYCH: AAOx2 / at close to baseline per    NEUROLOGY: non-focal  SKIN: No rashes or lesions    LABS:                        12.6   9.78  )-----------( 236      ( 25 Dec 2022 07:20 )             40.2       12-25    141  |  106  |  23  ------------------------<  93  4.2   |  20  |  0.8    Ca    8.4      25 Dec 2022 07:20    TPro  6.1  /  Alb  3.8  /  TBili  0.3  /  DBili  x   /  AST  15  /  ALT  <5  /  AlkPhos  67  12-25    Urinalysis Basic - ( 24 Dec 2022 17:24 )    Color: Yellow / Appearance: Slightly Turbid / SG: >1.050 / pH: x  Gluc: x / Ketone: Trace  / Bili: Negative / Urobili: <2 mg/dL   Blood: x / Protein: 30 mg/dL / Nitrite: Negative   Leuk Esterase: Negative / RBC: 30 /HPF / WBC 23 /HPF   Sq Epi: x / Non Sq Epi: 1 /HPF / Bacteria: Many    MICROBIOLOGY: reviewed     RADIOLOGY & ADDITIONAL TESTS: reviewed     MEDICATIONS  (STANDING):  artificial  tears Solution 1 Drop(s) Both EYES daily  ascorbic acid 500 milliGRAM(s) Oral daily  aspirin  chewable 81 milliGRAM(s) Oral daily  carbidopa/levodopa  25/100 1 Tablet(s) Oral <User Schedule>  cholecalciferol 1000 Unit(s) Oral daily  dexAMETHasone     Tablet 6 milliGRAM(s) Oral daily  dextrose 5% + sodium chloride 0.45%. 1000 milliLiter(s) (60 mL/Hr) IV Continuous <Continuous>  gabapentin 600 milliGRAM(s) Oral three times a day  levothyroxine 50 MICROGram(s) Oral <User Schedule>  lidocaine 4% Cream 1 Application(s) Topical daily  polyethylene glycol 3350 17 Gram(s) Oral daily  remdesivir  IVPB   IV Intermittent   remdesivir  IVPB 200 milliGRAM(s) IV Intermittent every 24 hours  senna 2 Tablet(s) Oral at bedtime    MEDICATIONS  (PRN):  acetaminophen     Tablet .. 650 milliGRAM(s) Oral every 6 hours PRN Temp greater or equal to 38C (100.4F), Mild Pain (1 - 3)  aluminum hydroxide/magnesium hydroxide/simethicone Suspension 30 milliLiter(s) Oral every 4 hours PRN Dyspepsia  lactulose Syrup 10 Gram(s) Oral once PRN constipation  melatonin 3 milliGRAM(s) Oral at bedtime PRN Insomnia  ondansetron Injectable 4 milliGRAM(s) IV Push every 8 hours PRN Nausea and/or Vomiting  polyethylene glycol 3350 17 Gram(s) Oral once PRN Constipation

## 2022-12-25 NOTE — PROGRESS NOTE ADULT - SUBJECTIVE AND OBJECTIVE BOX
GENERAL SURGERY PROGRESS NOTE    Patient: IMELDA ROLLE , 82y ([birth year])Female   MRN: [id number]  Location: [facility] T6-3C 017 B  Visit: 12-24-22 Inpatient  Date: 12-25-22 @ 01:30    Hospital Day #: 2    Procedure/Dx/Injuries: C/S for SBO    Events of past 24 hours: Patient was laying in fecal matter. Nurse and PCA informed. Patient awake but did not respond to questions. Per the nurse, she had two bowel movements 12/24.    PAST MEDICAL & SURGICAL HISTORY:  Dementia      History of breast cancer      Constipation      Lewy body dementia without behavioral disturbance      Colon polyp      History of colon resection      H/O mastectomy, right          Vitals:   T(F): 97.3 (12-24-22 @ 20:29), Max: 99.2 (12-24-22 @ 02:46)  HR: 83 (12-24-22 @ 20:29)  BP: 141/66 (12-24-22 @ 20:29)  RR: 17 (12-24-22 @ 15:34)  SpO2: 98% (12-24-22 @ 18:30)      Diet, Clear Liquid      Fluids: dextrose 5% + sodium chloride 0.45%.: Solution, 1000 milliLiter(s) infuse at 60 mL/Hr      I & O's:      PHYSICAL EXAM:  General: Non-responsive, soiled, disheveled  HEENT: NCAT  Cardiac: RRR  Respiratory: Normal respiratory effort  Abdomen: Distended  -- Fecal matter present on patient from hands to lower extremities    MEDICATIONS  (STANDING):  artificial  tears Solution 1 Drop(s) Both EYES daily  ascorbic acid 500 milliGRAM(s) Oral daily  aspirin  chewable 81 milliGRAM(s) Oral daily  carbidopa/levodopa  25/100 1 Tablet(s) Oral <User Schedule>  cholecalciferol 1000 Unit(s) Oral daily  dextrose 5% + sodium chloride 0.45%. 1000 milliLiter(s) (60 mL/Hr) IV Continuous <Continuous>  gabapentin 600 milliGRAM(s) Oral three times a day  levothyroxine 50 MICROGram(s) Oral <User Schedule>  lidocaine 4% Cream 1 Application(s) Topical daily  polyethylene glycol 3350 17 Gram(s) Oral daily  senna 2 Tablet(s) Oral at bedtime    MEDICATIONS  (PRN):  acetaminophen     Tablet .. 650 milliGRAM(s) Oral every 6 hours PRN Temp greater or equal to 38C (100.4F), Mild Pain (1 - 3)  aluminum hydroxide/magnesium hydroxide/simethicone Suspension 30 milliLiter(s) Oral every 4 hours PRN Dyspepsia  lactulose Syrup 10 Gram(s) Oral once PRN constipation  melatonin 3 milliGRAM(s) Oral at bedtime PRN Insomnia  ondansetron Injectable 4 milliGRAM(s) IV Push every 8 hours PRN Nausea and/or Vomiting  polyethylene glycol 3350 17 Gram(s) Oral once PRN Constipation      DVT PROPHYLAXIS:   GI PROPHYLAXIS:   ANTICOAGULATION:   ANTIBIOTICS:        LAB/STUDIES:  Labs:  CAPILLARY BLOOD GLUCOSE                              12.6   10.37 )-----------( 271      ( 23 Dec 2022 23:20 )             39.8         12-23    139  |  102  |  29<H>  ----------------------------<  94  4.4   |  24  |  1.0          LFTs:             6.4  | 0.3  | 18       ------------------[69      ( 23 Dec 2022 23:20 )  3.8  | x    | <5          Lipase:38     Amylase:x         Lactate, Blood: 1.4 mmol/L (12-23-22 @ 23:20)      Coags:    CARDIAC MARKERS ( 24 Dec 2022 06:00 )  x     / 0.02 ng/mL / x     / x     / x      CARDIAC MARKERS ( 23 Dec 2022 23:20 )  x     / 0.02 ng/mL / x     / x     / x              Urinalysis Basic - ( 24 Dec 2022 17:24 )    Color: Yellow / Appearance: Slightly Turbid / SG: >1.050 / pH: x  Gluc: x / Ketone: Trace  / Bili: Negative / Urobili: <2 mg/dL   Blood: x / Protein: 30 mg/dL / Nitrite: Negative   Leuk Esterase: Negative / RBC: 30 /HPF / WBC 23 /HPF   Sq Epi: x / Non Sq Epi: 1 /HPF / Bacteria: Many    IMAGING:  < from: Xray Chest 1 View- PORTABLE-Urgent (12.24.22 @ 00:44) >  Impression:    No radiographic evidence of acute cardiopulmonary disease.    --- End of Report ---  < end of copied text >    < from: CT Abdomen and Pelvis w/ IV Cont (12.24.22 @ 00:29) >  IMPRESSION:    Mild gaseous distention of the large bowel and small bowel with air-fluid   levels. No definite evidence of small bowel obstruction at this time.   Short-term follow-up post oral contrast administration can be obtained if   symptoms persist to re-evaluate for developing small bowel obstruction.    7 cm enlarged right ovary, out of proportion for age. Nonemergent   ultrasound evaluation is recommended.      --- End of Report ---    < end of copied text >  < from: CT Head No Cont (12.24.22 @ 00:24) >  IMPRESSION:    No evidence of acute intracranial hemorrhage or large territorial infarct.    --- End of Report ---  < end of copied text >    · Assessment	  82yF w/ above PMHx admitted for elevated troponin and possible SBO  Physical exam findings, imaging, and labs as documented above.   pt has + flatus and BM    PLAN:  - CTAP similar to prior CT scan in 2019 with some mild distension  - Clear liquid diet advance as tolerated  - Bowel regimen and suppository PRN   - Monitor for further bowel function  - Monitor for nausea/vomiting  - No surgical intervention    [id number] GENERAL SURGERY PROGRESS NOTE    Patient: IMELDA ROLLE , 82y ([birth year])Female   MRN: [id number]  Location: [facility] T6-3C 017 B  Visit: 12-24-22 Inpatient  Date: 12-25-22 @ 01:30    Hospital Day #: 2    Procedure/Dx/Injuries: C/S for SBO    Events of past 24 hours: Patient was laying in fecal matter. Nurse and PCA informed. Patient awake but did not respond to questions. Per the nurse, she had two bowel movements 12/24.    PAST MEDICAL & SURGICAL HISTORY:  Dementia      History of breast cancer      Constipation      Lewy body dementia without behavioral disturbance      Colon polyp      History of colon resection      H/O mastectomy, right          Vitals:   T(F): 97.3 (12-24-22 @ 20:29), Max: 99.2 (12-24-22 @ 02:46)  HR: 83 (12-24-22 @ 20:29)  BP: 141/66 (12-24-22 @ 20:29)  RR: 17 (12-24-22 @ 15:34)  SpO2: 98% (12-24-22 @ 18:30)      Diet, Clear Liquid      Fluids: dextrose 5% + sodium chloride 0.45%.: Solution, 1000 milliLiter(s) infuse at 60 mL/Hr      I & O's:      PHYSICAL EXAM:  General: Non-responsive, soiled, disheveled  HEENT: NCAT  Cardiac: RRR  Respiratory: Normal respiratory effort  Abdomen: Distended  -- Fecal matter present on patient from hands to lower extremities    MEDICATIONS  (STANDING):  artificial  tears Solution 1 Drop(s) Both EYES daily  ascorbic acid 500 milliGRAM(s) Oral daily  aspirin  chewable 81 milliGRAM(s) Oral daily  carbidopa/levodopa  25/100 1 Tablet(s) Oral <User Schedule>  cholecalciferol 1000 Unit(s) Oral daily  dextrose 5% + sodium chloride 0.45%. 1000 milliLiter(s) (60 mL/Hr) IV Continuous <Continuous>  gabapentin 600 milliGRAM(s) Oral three times a day  levothyroxine 50 MICROGram(s) Oral <User Schedule>  lidocaine 4% Cream 1 Application(s) Topical daily  polyethylene glycol 3350 17 Gram(s) Oral daily  senna 2 Tablet(s) Oral at bedtime    MEDICATIONS  (PRN):  acetaminophen     Tablet .. 650 milliGRAM(s) Oral every 6 hours PRN Temp greater or equal to 38C (100.4F), Mild Pain (1 - 3)  aluminum hydroxide/magnesium hydroxide/simethicone Suspension 30 milliLiter(s) Oral every 4 hours PRN Dyspepsia  lactulose Syrup 10 Gram(s) Oral once PRN constipation  melatonin 3 milliGRAM(s) Oral at bedtime PRN Insomnia  ondansetron Injectable 4 milliGRAM(s) IV Push every 8 hours PRN Nausea and/or Vomiting  polyethylene glycol 3350 17 Gram(s) Oral once PRN Constipation      DVT PROPHYLAXIS:   GI PROPHYLAXIS:   ANTICOAGULATION:   ANTIBIOTICS:        LAB/STUDIES:  Labs:  CAPILLARY BLOOD GLUCOSE                              12.6   10.37 )-----------( 271      ( 23 Dec 2022 23:20 )             39.8         12-23    139  |  102  |  29<H>  ----------------------------<  94  4.4   |  24  |  1.0          LFTs:             6.4  | 0.3  | 18       ------------------[69      ( 23 Dec 2022 23:20 )  3.8  | x    | <5          Lipase:38     Amylase:x         Lactate, Blood: 1.4 mmol/L (12-23-22 @ 23:20)      Coags:    CARDIAC MARKERS ( 24 Dec 2022 06:00 )  x     / 0.02 ng/mL / x     / x     / x      CARDIAC MARKERS ( 23 Dec 2022 23:20 )  x     / 0.02 ng/mL / x     / x     / x              Urinalysis Basic - ( 24 Dec 2022 17:24 )    Color: Yellow / Appearance: Slightly Turbid / SG: >1.050 / pH: x  Gluc: x / Ketone: Trace  / Bili: Negative / Urobili: <2 mg/dL   Blood: x / Protein: 30 mg/dL / Nitrite: Negative   Leuk Esterase: Negative / RBC: 30 /HPF / WBC 23 /HPF   Sq Epi: x / Non Sq Epi: 1 /HPF / Bacteria: Many    IMAGING:  < from: Xray Chest 1 View- PORTABLE-Urgent (12.24.22 @ 00:44) >  Impression:    No radiographic evidence of acute cardiopulmonary disease.    --- End of Report ---  < end of copied text >    < from: CT Abdomen and Pelvis w/ IV Cont (12.24.22 @ 00:29) >  IMPRESSION:    Mild gaseous distention of the large bowel and small bowel with air-fluid   levels. No definite evidence of small bowel obstruction at this time.   Short-term follow-up post oral contrast administration can be obtained if   symptoms persist to re-evaluate for developing small bowel obstruction.    7 cm enlarged right ovary, out of proportion for age. Nonemergent   ultrasound evaluation is recommended.      --- End of Report ---    < end of copied text >  < from: CT Head No Cont (12.24.22 @ 00:24) >  IMPRESSION:    No evidence of acute intracranial hemorrhage or large territorial infarct.    --- End of Report ---  < end of copied text >    · Assessment	  82yF w/ above PMHx admitted for elevated troponin and possible SBO  Physical exam findings, imaging, and labs as documented above.   pt has + flatus and BM    PLAN:  - CTAP similar to prior CT scan in 2019 with some mild distension  - Clear liquid diet advance as tolerated  - Bowel regimen and suppository PRN   - Monitor for further bowel function  - Monitor for nausea/vomiting  - No surgical intervention/Blue team surgery signing off 12/25    [id number]

## 2022-12-25 NOTE — PROGRESS NOTE ADULT - ASSESSMENT
82 year old female with pmhx of constipation, and cancer of breast and moderate dementia presents with abdominal distention from the nursing home.  # Pt w/ h/x CT A/P the same now as in 2019 per Surgery no intervention likely Partial SBO  # distended bowel loops/ air fluid levels r/o bowel obstruction/ileus  # HO chronic constipation/ # HO colon polyp s/p resection  # Lewy body dementia and parkinsons disease   -CT: Mild gaseous distention of the large bowel and small bowel with air-fluid levels.  -PT +++ LARGE BM overnight and this am per RN   -Abdomen still distended but NO N/V/Tenderness   -Clear Liquid diet today c/w it will try to advance tomorrow   -c/w IVF gentle hydration     # Sever Covid 19 PNA w/ Mild Hypoxia   -94% on RA/ 98% on 2L  -Last fever was 3days ago at NH and there was given Paxolovid (had 2days of it)   -CXR clear b/l   -ID recs appreciated: RDV x3d and Dexamethasone 6mg po qd x10d   -Negative Pressure Isolation     # Troponemia ruled out very level in face of infection 0.02 x2  - d/c telemetry   - f/u electrolytes and supplement   - tx to med floor    # CKD 3  -no baseline  -no ekg changes  -likely secondary to CKD or covid  -monitor on tele 24 hours and d/c tele if no events    # R enlarged ovary  # HO breast cancer  - f/u GYN in clinic     # hypothyroiod  -check tsh  -c/w synthroid 50mcg    GI/DVT Proph    Activity: AT     FULL CODE    Family: Case and Plan discussed with  at bedside in detail this morning  Pending: Distention improvement and RDV IV 3days   Dispo: NHR will return when ready  82 year old female with pmhx of constipation, and cancer of breast and moderate dementia presents with abdominal distention from the nursing home.  # Pt w/ h/x CT A/P the same now as in 2019 per Surgery no intervention likely Partial SBO  # distended bowel loops/ air fluid levels r/o bowel obstruction/ileus  # HO chronic constipation/ # HO colon polyp s/p resection  # Lewy body dementia and parkinsons disease   -CT: Mild gaseous distention of the large bowel and small bowel with air-fluid levels.  -PT +++ LARGE BM overnight and this am per RN   -Abdomen still distended but NO N/V/Tenderness   -Clear Liquid diet today c/w it will try to advance tomorrow   -c/w IVF gentle hydration   -c/w Miralax / Senna / Lactulose     # Sever Covid 19 PNA w/ Mild Hypoxia   -94% on RA/ 98% on 2L  -Last fever was 3days ago at NH and there was given Paxolovid (had 2days of it)   -CXR clear b/l   -ID recs appreciated: RDV x3d and Dexamethasone 6mg po qd x10d   -Negative Pressure Isolation     # Troponemia ruled out very level in face of infection 0.02 x2  - d/c telemetry   - f/u electrolytes and supplement   - tx to med floor    # CKD 3  -no baseline  -no ekg changes  -likely secondary to CKD or covid  -monitor on tele 24 hours and d/c tele if no events    # R enlarged ovary  # HO breast cancer  - f/u GYN in clinic     # hypothyroiod  -check tsh  -c/w synthroid 50mcg    GI/DVT Proph    Activity: AT     FULL CODE    Family: Case and Plan discussed with  at bedside in detail this morning  Pending: Distention improvement and RDV IV 3days   Dispo: NHR will return when ready

## 2022-12-25 NOTE — CONSULT NOTE ADULT - ASSESSMENT
ASSESSMENT  82 year old female with pmhx of Lewy body dementia/Parkinsonism, chronic constipation, HO breast cancer, and colon polyp s/p colon resection presents for abdominal distension. COVID19 dx at NH    IMPRESSION  #COVID19 severe, requiring supplemental O2     CXR No radiographic evidence of acute cardiopulmonary disease.  #Lewy Body dementia  #Abx allergy: ciprofloxacin (Unknown)  penicillin (Anaphylaxis)      RECOMMENDATIONS  This is an incomplete consult note. All final recommendations to follow after interview and examination of the patient. Please follow recommendations noted below.    If any questions, please call or send a message on [employer] Teams  Please continue to update ID with any pertinent new laboratory or radiographic findings  #[id number]   ASSESSMENT  82 year old female with pmhx of Lewy body dementia/Parkinsonism, chronic constipation, HO breast cancer, and colon polyp s/p colon resection presents for abdominal distension. COVID19 dx at NH    IMPRESSION  #COVID19 severe, requiring supplemental O2     CXR No radiographic evidence of acute cardiopulmonary disease.  #Lewy Body dementia  #Abx allergy: ciprofloxacin (Unknown)  penicillin (Anaphylaxis)      RECOMMENDATIONS  - As requiring NC ok for RDV x 3 days (s/p 2 days paxlovid)   and Dex 6mg PO daily  - If no longer on O2, would hold off further therapy    If any questions, please call or send a message on [employer] Teams  Please continue to update ID with any pertinent new laboratory or radiographic findings  #[id number]

## 2022-12-25 NOTE — CONSULT NOTE ADULT - SUBJECTIVE AND OBJECTIVE BOX
IMELDA ROLLE  82y, Female  Allergy: antichlolinergics (Unknown)  benzodiazepines (Unknown)  ciprofloxacin (Unknown)  Originally Entered as [Unknown] reaction to [green pepper] (Unknown)  Originally Entered as [Unknown] reaction to [neuroleptics] (Unknown)  Originally Entered as [Unknown] reaction to [pepper] (Unknown)  Originally Entered as [Unknown] reaction to [red pepper] (Unknown)  penicillin (Anaphylaxis)      CHIEF COMPLAINT:   abd distension (25 Dec 2022 01:29)      LOS  1d    HPI  HPI:  82 year old female with pmhx of Lewy body dementia/Parkinsonism, chronic constipation, HO breast cancer, and colon polyp s/p colon resection presents for abdominal distension.  Pt came from [facility] due to abnormal KUB findings that showed gaseous distended bowel loops. S/w  Lev at bedside who states pt likely had a bm within the last week. Has not been symptomatic (no vomiting, no abd pain). The patient had a colon resection about ten years ago for findings of a colon polyp. She was also worked up for a parkinsonism type disease starting about ten years ago. She sees an eminent specialist in [facility] who believes she likely has Lewy body dementia. She has had several admissions for management of parital bowel obstruction, at least twice here at [facility] and managed by surgery with nonsurgical management as pt is not surgery candidate. Of note, pt had low grade fever earlier in the week, found to have covid at NH, asymptomatic as per NH charts, was started on paxlovid. Pt's , states that since starting paxlovid, she has had significant hair loss.   On my exam pt is alert, and able to have minimal conversation. She is slow to respond but knows who she is, where she lives, and knows approximate date. She can verbalize if she has any symptoms and whether in distress. But does have short term memory issues. Due to this ROS may be unreliable.  In the ED, pt vitals were stable, RR 20 and /80, HR 80, on RA, and afebrile.  Labs unremarkable, except for elevated trop to 0.02 x2 (no bl), tested positive for COVID.  CXR clear. EKG no ischemia  CT ab/pel IVC showed   Mild gaseous distention of the large bowel and small bowel with air-fluid   levels. No definite evidence of small bowel obstruction at this time.   Incidental finding of R enlarged ovary.  Pt received 1L NS in ED.  To be admitted to [facility] d/t elevated trop and for further work up/management of possible SBO.  (24 Dec 2022 10:30)      INFECTIOUS DISEASE HISTORY:  ID consulted for COVID19  s/p 2 days paxlovid as outpatient     Currently ordered for: benjamin      [facility]  PAST MEDICAL & SURGICAL HISTORY:  Dementia      History of breast cancer      Constipation      Lewy body dementia without behavioral disturbance      Colon polyp      History of colon resection      H/O mastectomy, right          FAMILY HISTORY  No pertinent family history in first degree relatives        SOCIAL HISTORY  Social History:        ROS  ***    VITALS:  T(F): 96.5, Max: 98 (12-24-22 @ 18:29)  HR: 82  BP: 122/66  RR: 17Vital Signs Last 24 Hrs  T(C): 35.8 (25 Dec 2022 05:10), Max: 36.7 (24 Dec 2022 18:29)  T(F): 96.5 (25 Dec 2022 05:10), Max: 98 (24 Dec 2022 18:29)  HR: 82 (25 Dec 2022 05:10) (82 - 85)  BP: 122/66 (25 Dec 2022 05:10) (107/58 - 141/66)  BP(mean): --  RR: 17 (24 Dec 2022 15:34) (17 - 17)  SpO2: 98% (24 Dec 2022 18:30) (97% - 100%)    Parameters below as of 24 Dec 2022 18:30  Patient On (Oxygen Delivery Method): nasal cannula  O2 Flow (L/min): 2      PHYSICAL EXAM:  ***    TESTS & MEASUREMENTS:                        12.6   10.37 )-----------( 271      ( 23 Dec 2022 23:20 )             39.8     12-23    139  |  102  |  29<H>  ----------------------------<  94  4.4   |  24  |  1.0    Ca    9.4      23 Dec 2022 23:20    TPro  6.4  /  Alb  3.8  /  TBili  0.3  /  DBili  x   /  AST  18  /  ALT  <5  /  AlkPhos  69  12-23      LIVER FUNCTIONS - ( 23 Dec 2022 23:20 )  Alb: 3.8 g/dL / Pro: 6.4 g/dL / ALK PHOS: 69 U/L / ALT: <5 U/L / AST: 18 U/L / GGT: x           Urinalysis Basic - ( 24 Dec 2022 17:24 )    Color: Yellow / Appearance: Slightly Turbid / SG: >1.050 / pH: x  Gluc: x / Ketone: Trace  / Bili: Negative / Urobili: <2 mg/dL   Blood: x / Protein: 30 mg/dL / Nitrite: Negative   Leuk Esterase: Negative / RBC: 30 /HPF / WBC 23 /HPF   Sq Epi: x / Non Sq Epi: 1 /HPF / Bacteria: Many          Lactate, Blood: 1.4 mmol/L (12-23-22 @ 23:20)      INFECTIOUS DISEASES TESTING      INFLAMMATORY MARKERS      RADIOLOGY & ADDITIONAL TESTS:  I have personally reviewed the last Chest xray  CXR  Xray Chest 1 View- PORTABLE-Urgent:   ACC: [id number] EXAM:  XR CHEST PORTABLE URGENT 1V                          PROCEDURE DATE:  12/24/2022          INTERPRETATION:  Clinical History / Reason for exam: Weakness    Comparison : Chest radiograph March 13, 2019.    Technique/Positioning: Frontal chest radiograph.    Findings:    Support devices: None.    Cardiac/mediastinum/hilum: Unremarkable.    Lung parenchyma/Pleura: Within normal limits.    Skeleton/soft tissues: Coarse calcification in the right breast.  Stable degenerative changes.    Impression:    No radiographic evidence of acute cardiopulmonary disease.        --- End of Report ---            CHRISTINE SUGGS MD; Attending Radiologist  This document has been electronically signed. Dec 24 2022  1:00PM (12-24-22 @ 00:44)      CT  CT Abdomen and Pelvis w/ IV Cont:   ACC: [id number] EXAM:  CT ABDOMEN AND PELVIS IC                          PROCEDURE DATE:  12/24/2022          INTERPRETATION:  CLINICAL STATEMENT: Abdominal distention    TECHNIQUE: Contiguous axial CT images were obtained from the lower chest   to the pubic symphysis following administration of intravenous contrast.    Oral contrast was not administered.  Reformatted images in the coronal   and sagittal planes were acquired. CONTRAST VOLUME: 100 cc. Omnipaque   350. .    COMPARISON CT: 7/3/2019    QUALITY STATEMENT: Patient upper extremities causing streak artifact   inherently degrades image quality and limits this exam.    FINDINGS:    LOWER CHEST: Lung bases without mass or consolidation. Mitral annular   calcifications. Multivessel coronary arterial calcifications.    HEPATOBILIARY: Unremarkable.    SPLEEN: Unremarkable.    PANCREAS: Unremarkable.    ADRENAL GLANDS: Subcentimeter left adrenal fatty nodule likely adenoma   versus myelolipoma, unchanged. Unchanged right adrenal nodule.    KIDNEYS: Symmetric enhancement. No hydronephrosis. Bilateral   subcentimeter hypodensities, too small to characterize.    ABDOMINOPELVIC NODES: Unremarkable.    PELVIC ORGANS: Unchanged calcified fibroid. 7 cm enlarged right ovary.    PERITONEUM/MESENTERY/BOWEL: Mild nonspecific gaseous distention of the   large bowel. Mild fluid-filled small bowel distention with air-fluid   levels without definite transition point. Minimal peritoneum or   pneumatosis. No ascites. Colonic anastomotic sutures without change.    BONES/SOFT TISSUES: No acute osseous abnormality. Bony degenerative   changes. Diastases recti.    OTHER: Normal caliber aorta with diffuse atherosclerotic disease.      IMPRESSION:    Mild gaseous distention of the large bowel and small bowel with air-fluid   levels. No definite evidence of small bowel obstruction at this time.   Short-term follow-up post oral contrast administration can be obtained if   symptoms persist to re-evaluate for developing small bowel obstruction.    7 cm enlarged right ovary, out of proportion for age. Nonemergent   ultrasound evaluation is recommended.      --- End of Report ---            AISHWARYA CHU MD; Attending Radiologist  This document has been electronically signed. Dec 24 2022 12:50AM (12-24-22 @ 00:29)      CARDIOLOGY TESTING      MEDICATIONS  artificial  tears Solution 1 Both EYES daily  ascorbic acid 500 Oral daily  aspirin  chewable 81 Oral daily  carbidopa/levodopa  25/100 1 Oral <User Schedule>  cholecalciferol 1000 Oral daily  dextrose 5% + sodium chloride 0.45%. 1000 IV Continuous <Continuous>  gabapentin 600 Oral three times a day  levothyroxine 50 Oral <User Schedule>  lidocaine 4% Cream 1 Topical daily  polyethylene glycol 3350 17 Oral daily  senna 2 Oral at bedtime        ANTIBIOTICS:      ALLERGIES:  antichlolinergics (Unknown)  benzodiazepines (Unknown)  ciprofloxacin (Unknown)  Originally Entered as [Unknown] reaction to [green pepper] (Unknown)  Originally Entered as [Unknown] reaction to [neuroleptics] (Unknown)  Originally Entered as [Unknown] reaction to [pepper] (Unknown)  Originally Entered as [Unknown] reaction to [red pepper] (Unknown)  penicillin (Anaphylaxis)         IMELDA ROLLE  82y, Female  Allergy: antichlolinergics (Unknown)  benzodiazepines (Unknown)  ciprofloxacin (Unknown)  Originally Entered as [Unknown] reaction to [green pepper] (Unknown)  Originally Entered as [Unknown] reaction to [neuroleptics] (Unknown)  Originally Entered as [Unknown] reaction to [pepper] (Unknown)  Originally Entered as [Unknown] reaction to [red pepper] (Unknown)  penicillin (Anaphylaxis)      CHIEF COMPLAINT:   abd distension (25 Dec 2022 01:29)      LOS  1d    HPI  HPI:  82 year old female with pmhx of Lewy body dementia/Parkinsonism, chronic constipation, HO breast cancer, and colon polyp s/p colon resection presents for abdominal distension.  Pt came from [facility] due to abnormal KUB findings that showed gaseous distended bowel loops. S/w  Lev at bedside who states pt likely had a bm within the last week. Has not been symptomatic (no vomiting, no abd pain). The patient had a colon resection about ten years ago for findings of a colon polyp. She was also worked up for a parkinsonism type disease starting about ten years ago. She sees an eminent specialist in [facility] who believes she likely has Lewy body dementia. She has had several admissions for management of parital bowel obstruction, at least twice here at [facility] and managed by surgery with nonsurgical management as pt is not surgery candidate. Of note, pt had low grade fever earlier in the week, found to have covid at NH, asymptomatic as per NH charts, was started on paxlovid. Pt's , states that since starting paxlovid, she has had significant hair loss.   On my exam pt is alert, and able to have minimal conversation. She is slow to respond but knows who she is, where she lives, and knows approximate date. She can verbalize if she has any symptoms and whether in distress. But does have short term memory issues. Due to this ROS may be unreliable.  In the ED, pt vitals were stable, RR 20 and /80, HR 80, on RA, and afebrile.  Labs unremarkable, except for elevated trop to 0.02 x2 (no bl), tested positive for COVID.  CXR clear. EKG no ischemia  CT ab/pel IVC showed   Mild gaseous distention of the large bowel and small bowel with air-fluid   levels. No definite evidence of small bowel obstruction at this time.   Incidental finding of R enlarged ovary.  Pt received 1L NS in ED.  To be admitted to [facility] d/t elevated trop and for further work up/management of possible SBO.  (24 Dec 2022 10:30)      INFECTIOUS DISEASE HISTORY:  ID consulted for COVID19  s/p 2 days paxlovid as outpatient   Per  at bedside pt no longer complaining of covid symptoms     Currently ordered for: na      [facility]  PAST MEDICAL & SURGICAL HISTORY:  Dementia      History of breast cancer      Constipation      Lewy body dementia without behavioral disturbance      Colon polyp      History of colon resection      H/O mastectomy, right          FAMILY HISTORY  No pertinent family history in first degree relatives        SOCIAL HISTORY  Social History:        ROS  unable to obtain history secondary to patient's mental status and/or sedation     VITALS:  T(F): 96.5, Max: 98 (12-24-22 @ 18:29)  HR: 82  BP: 122/66  RR: 17Vital Signs Last 24 Hrs  T(C): 35.8 (25 Dec 2022 05:10), Max: 36.7 (24 Dec 2022 18:29)  T(F): 96.5 (25 Dec 2022 05:10), Max: 98 (24 Dec 2022 18:29)  HR: 82 (25 Dec 2022 05:10) (82 - 85)  BP: 122/66 (25 Dec 2022 05:10) (107/58 - 141/66)  BP(mean): --  RR: 17 (24 Dec 2022 15:34) (17 - 17)  SpO2: 98% (24 Dec 2022 18:30) (97% - 100%)    Parameters below as of 24 Dec 2022 18:30  Patient On (Oxygen Delivery Method): nasal cannula  O2 Flow (L/min): 2      PHYSICAL EXAM:  Gen: NAD, resting in bed  HEENT: Normocephalic, atraumatic  Neck: supple, no lymphadenopathy  CV: Regular rate & regular rhythm  Lungs: decreased BS at bases, no fremitus  Abdomen: Soft, BS present  Ext: Warm, well perfused  Neuro: non focal, awake  Skin: no rash, no erythema  Lines: no phlebitis     TESTS & MEASUREMENTS:                        12.6   10.37 )-----------( 271      ( 23 Dec 2022 23:20 )             39.8     12-23    139  |  102  |  29<H>  ----------------------------<  94  4.4   |  24  |  1.0    Ca    9.4      23 Dec 2022 23:20    TPro  6.4  /  Alb  3.8  /  TBili  0.3  /  DBili  x   /  AST  18  /  ALT  <5  /  AlkPhos  69  12-23      LIVER FUNCTIONS - ( 23 Dec 2022 23:20 )  Alb: 3.8 g/dL / Pro: 6.4 g/dL / ALK PHOS: 69 U/L / ALT: <5 U/L / AST: 18 U/L / GGT: x           Urinalysis Basic - ( 24 Dec 2022 17:24 )    Color: Yellow / Appearance: Slightly Turbid / SG: >1.050 / pH: x  Gluc: x / Ketone: Trace  / Bili: Negative / Urobili: <2 mg/dL   Blood: x / Protein: 30 mg/dL / Nitrite: Negative   Leuk Esterase: Negative / RBC: 30 /HPF / WBC 23 /HPF   Sq Epi: x / Non Sq Epi: 1 /HPF / Bacteria: Many          Lactate, Blood: 1.4 mmol/L (12-23-22 @ 23:20)      INFECTIOUS DISEASES TESTING      INFLAMMATORY MARKERS      RADIOLOGY & ADDITIONAL TESTS:  I have personally reviewed the last Chest xray  CXR  Xray Chest 1 View- PORTABLE-Urgent:   ACC: [id number] EXAM:  XR CHEST PORTABLE URGENT 1V                          PROCEDURE DATE:  12/24/2022          INTERPRETATION:  Clinical History / Reason for exam: Weakness    Comparison : Chest radiograph March 13, 2019.    Technique/Positioning: Frontal chest radiograph.    Findings:    Support devices: None.    Cardiac/mediastinum/hilum: Unremarkable.    Lung parenchyma/Pleura: Within normal limits.    Skeleton/soft tissues: Coarse calcification in the right breast.  Stable degenerative changes.    Impression:    No radiographic evidence of acute cardiopulmonary disease.        --- End of Report ---            CHRISTINE SUGGS MD; Attending Radiologist  This document has been electronically signed. Dec 24 2022  1:00PM (12-24-22 @ 00:44)      CT  CT Abdomen and Pelvis w/ IV Cont:   ACC: [id number] EXAM:  CT ABDOMEN AND PELVIS IC                          PROCEDURE DATE:  12/24/2022          INTERPRETATION:  CLINICAL STATEMENT: Abdominal distention    TECHNIQUE: Contiguous axial CT images were obtained from the lower chest   to the pubic symphysis following administration of intravenous contrast.    Oral contrast was not administered.  Reformatted images in the coronal   and sagittal planes were acquired. CONTRAST VOLUME: 100 cc. Omnipaque   350. .    COMPARISON CT: 7/3/2019    QUALITY STATEMENT: Patient upper extremities causing streak artifact   inherently degrades image quality and limits this exam.    FINDINGS:    LOWER CHEST: Lung bases without mass or consolidation. Mitral annular   calcifications. Multivessel coronary arterial calcifications.    HEPATOBILIARY: Unremarkable.    SPLEEN: Unremarkable.    PANCREAS: Unremarkable.    ADRENAL GLANDS: Subcentimeter left adrenal fatty nodule likely adenoma   versus myelolipoma, unchanged. Unchanged right adrenal nodule.    KIDNEYS: Symmetric enhancement. No hydronephrosis. Bilateral   subcentimeter hypodensities, too small to characterize.    ABDOMINOPELVIC NODES: Unremarkable.    PELVIC ORGANS: Unchanged calcified fibroid. 7 cm enlarged right ovary.    PERITONEUM/MESENTERY/BOWEL: Mild nonspecific gaseous distention of the   large bowel. Mild fluid-filled small bowel distention with air-fluid   levels without definite transition point. Minimal peritoneum or   pneumatosis. No ascites. Colonic anastomotic sutures without change.    BONES/SOFT TISSUES: No acute osseous abnormality. Bony degenerative   changes. Diastases recti.    OTHER: Normal caliber aorta with diffuse atherosclerotic disease.      IMPRESSION:    Mild gaseous distention of the large bowel and small bowel with air-fluid   levels. No definite evidence of small bowel obstruction at this time.   Short-term follow-up post oral contrast administration can be obtained if   symptoms persist to re-evaluate for developing small bowel obstruction.    7 cm enlarged right ovary, out of proportion for age. Nonemergent   ultrasound evaluation is recommended.      --- End of Report ---            AISHWARYA CHU MD; Attending Radiologist  This document has been electronically signed. Dec 24 2022 12:50AM (12-24-22 @ 00:29)      CARDIOLOGY TESTING      MEDICATIONS  artificial  tears Solution 1 Both EYES daily  ascorbic acid 500 Oral daily  aspirin  chewable 81 Oral daily  carbidopa/levodopa  25/100 1 Oral <User Schedule>  cholecalciferol 1000 Oral daily  dextrose 5% + sodium chloride 0.45%. 1000 IV Continuous <Continuous>  gabapentin 600 Oral three times a day  levothyroxine 50 Oral <User Schedule>  lidocaine 4% Cream 1 Topical daily  polyethylene glycol 3350 17 Oral daily  senna 2 Oral at bedtime        ANTIBIOTICS:      ALLERGIES:  antichlolinergics (Unknown)  benzodiazepines (Unknown)  ciprofloxacin (Unknown)  Originally Entered as [Unknown] reaction to [green pepper] (Unknown)  Originally Entered as [Unknown] reaction to [neuroleptics] (Unknown)  Originally Entered as [Unknown] reaction to [pepper] (Unknown)  Originally Entered as [Unknown] reaction to [red pepper] (Unknown)  penicillin (Anaphylaxis)

## 2022-12-26 LAB
A1C WITH ESTIMATED AVERAGE GLUCOSE RESULT: 5.1 % — SIGNIFICANT CHANGE UP (ref 4–5.6)
ALBUMIN SERPL ELPH-MCNC: 3.7 G/DL — SIGNIFICANT CHANGE UP (ref 3.5–5.2)
ALP SERPL-CCNC: 72 U/L — SIGNIFICANT CHANGE UP (ref 30–115)
ALT FLD-CCNC: <5 U/L — SIGNIFICANT CHANGE UP (ref 0–41)
ANION GAP SERPL CALC-SCNC: 12 MMOL/L — SIGNIFICANT CHANGE UP (ref 7–14)
AST SERPL-CCNC: 13 U/L — SIGNIFICANT CHANGE UP (ref 0–41)
BILIRUB DIRECT SERPL-MCNC: <0.2 MG/DL — SIGNIFICANT CHANGE UP (ref 0–0.3)
BILIRUB INDIRECT FLD-MCNC: SIGNIFICANT CHANGE UP MG/DL (ref 0.2–1.2)
BILIRUB SERPL-MCNC: <0.2 MG/DL — SIGNIFICANT CHANGE UP (ref 0.2–1.2)
BUN SERPL-MCNC: 17 MG/DL — SIGNIFICANT CHANGE UP (ref 10–20)
CALCIUM SERPL-MCNC: 8.7 MG/DL — SIGNIFICANT CHANGE UP (ref 8.4–10.5)
CANCER AG125 SERPL-ACNC: 28 U/ML — SIGNIFICANT CHANGE UP
CHLORIDE SERPL-SCNC: 106 MMOL/L — SIGNIFICANT CHANGE UP (ref 98–110)
CO2 SERPL-SCNC: 22 MMOL/L — SIGNIFICANT CHANGE UP (ref 17–32)
CREAT SERPL-MCNC: 0.8 MG/DL — SIGNIFICANT CHANGE UP (ref 0.7–1.5)
CULTURE RESULTS: SIGNIFICANT CHANGE UP
EGFR: 74 ML/MIN/1.73M2 — SIGNIFICANT CHANGE UP
ESTIMATED AVERAGE GLUCOSE: 100 MG/DL — SIGNIFICANT CHANGE UP (ref 68–114)
GLUCOSE SERPL-MCNC: 177 MG/DL — HIGH (ref 70–99)
HCT VFR BLD CALC: 37.5 % — SIGNIFICANT CHANGE UP (ref 37–47)
HGB BLD-MCNC: 12 G/DL — SIGNIFICANT CHANGE UP (ref 12–16)
INR BLD: 0.94 RATIO — SIGNIFICANT CHANGE UP (ref 0.65–1.3)
MCHC RBC-ENTMCNC: 30.4 PG — SIGNIFICANT CHANGE UP (ref 27–31)
MCHC RBC-ENTMCNC: 32 G/DL — SIGNIFICANT CHANGE UP (ref 32–37)
MCV RBC AUTO: 94.9 FL — SIGNIFICANT CHANGE UP (ref 81–99)
NRBC # BLD: 0 /100 WBCS — SIGNIFICANT CHANGE UP (ref 0–0)
PLATELET # BLD AUTO: 246 K/UL — SIGNIFICANT CHANGE UP (ref 130–400)
POTASSIUM SERPL-MCNC: 4.4 MMOL/L — SIGNIFICANT CHANGE UP (ref 3.5–5)
POTASSIUM SERPL-SCNC: 4.4 MMOL/L — SIGNIFICANT CHANGE UP (ref 3.5–5)
PROT SERPL-MCNC: 6 G/DL — SIGNIFICANT CHANGE UP (ref 6–8)
PROTHROM AB SERPL-ACNC: 10.7 SEC — SIGNIFICANT CHANGE UP (ref 9.95–12.87)
RBC # BLD: 3.95 M/UL — LOW (ref 4.2–5.4)
RBC # FLD: 14 % — SIGNIFICANT CHANGE UP (ref 11.5–14.5)
SODIUM SERPL-SCNC: 140 MMOL/L — SIGNIFICANT CHANGE UP (ref 135–146)
SPECIMEN SOURCE: SIGNIFICANT CHANGE UP
TSH SERPL-MCNC: 3.08 UIU/ML — SIGNIFICANT CHANGE UP (ref 0.27–4.2)
WBC # BLD: 7.8 K/UL — SIGNIFICANT CHANGE UP (ref 4.8–10.8)
WBC # FLD AUTO: 7.8 K/UL — SIGNIFICANT CHANGE UP (ref 4.8–10.8)

## 2022-12-26 PROCEDURE — 99233 SBSQ HOSP IP/OBS HIGH 50: CPT

## 2022-12-26 RX ADMIN — Medication 81 MILLIGRAM(S): at 11:08

## 2022-12-26 RX ADMIN — CARBIDOPA AND LEVODOPA 1 TABLET(S): 25; 100 TABLET ORAL at 18:14

## 2022-12-26 RX ADMIN — ENOXAPARIN SODIUM 40 MILLIGRAM(S): 100 INJECTION SUBCUTANEOUS at 05:46

## 2022-12-26 RX ADMIN — Medication 500 MILLIGRAM(S): at 11:08

## 2022-12-26 RX ADMIN — GABAPENTIN 600 MILLIGRAM(S): 400 CAPSULE ORAL at 14:37

## 2022-12-26 RX ADMIN — CARBIDOPA AND LEVODOPA 1 TABLET(S): 25; 100 TABLET ORAL at 05:46

## 2022-12-26 RX ADMIN — GABAPENTIN 600 MILLIGRAM(S): 400 CAPSULE ORAL at 05:48

## 2022-12-26 RX ADMIN — SENNA PLUS 2 TABLET(S): 8.6 TABLET ORAL at 22:48

## 2022-12-26 RX ADMIN — POLYETHYLENE GLYCOL 3350 17 GRAM(S): 17 POWDER, FOR SOLUTION ORAL at 11:06

## 2022-12-26 RX ADMIN — Medication 1000 UNIT(S): at 11:08

## 2022-12-26 RX ADMIN — Medication 1 DROP(S): at 13:56

## 2022-12-26 RX ADMIN — Medication 6 MILLIGRAM(S): at 05:47

## 2022-12-26 RX ADMIN — REMDESIVIR 200 MILLIGRAM(S): 5 INJECTION INTRAVENOUS at 18:13

## 2022-12-26 RX ADMIN — GABAPENTIN 600 MILLIGRAM(S): 400 CAPSULE ORAL at 22:48

## 2022-12-26 RX ADMIN — Medication 50 MICROGRAM(S): at 05:54

## 2022-12-26 RX ADMIN — LIDOCAINE 1 APPLICATION(S): 4 CREAM TOPICAL at 11:09

## 2022-12-26 NOTE — PROGRESS NOTE ADULT - ASSESSMENT
82 year old female with pmhx of constipation, and cancer of breast and moderate dementia presents with abdominal distention from the nursing home.  # Pt w/ h/x CT A/P the same now as in 2019 per Surgery no intervention likely Partial SBO  # distended bowel loops/ air fluid levels r/o bowel obstruction/ileus  # HO chronic constipation/ # HO colon polyp s/p resection  # Lewy body dementia and parkinsons disease   -CT: Mild gaseous distention of the large bowel and small bowel with air-fluid levels.  -continues to have good BMs   -Abdomen less distended and now softer and NO N/V/Tenderness   -Clear Liquid diet today c/w it will try to advance tomorrow   -c/w IVF gentle hydration   -c/w Miralax / Senna / Lactulose     # Sever Covid 19 PNA w/ Mild Hypoxia   - RA 95%   -Last fever was 3days ago at NH and there was given Paxolovid (had 2days of it)   -CXR clear b/l   -ID recs appreciated: RDV x3d and Dexamethasone 6mg po qd x10d   -Negative Pressure Isolation     # Troponemia ruled out very level in face of infection 0.02 x2  - d/c telemetry   - f/u electrolytes and supplement   - tx to med floor    # CKD 3  -no baseline  -no ekg changes  -likely secondary to CKD or covid  -monitor on tele 24 hours and d/c tele if no events    # R enlarged ovary  # HO breast cancer  - f/u GYN in clinic     # hypothyroiod  -check tsh  -c/w synthroid 50mcg    GI/DVT Proph    Activity: AT     FULL CODE    Family: Case and Plan discussed with  at bedside in detail this morning  Pending:DVR 3rd day 12/27 am then d/c to NH   Dispo: NHR will return when ready / anticipated

## 2022-12-26 NOTE — PROGRESS NOTE ADULT - SUBJECTIVE AND OBJECTIVE BOX
----------Daily Progress Note----------    HISTORY OF PRESENT ILLNESS:  Patient is a 82y old Female who presents with a chief complaint of abd distension (25 Dec 2022 14:47)    Currently admitted to medicine with the primary diagnosis of Abdominal distension       Today is hospital day 2d.     INTERVAL HOSPITAL COURSE / OVERNIGHT EVENTS:    No overnight events    Review of Systems: Otherwise unremarkable     <<<<<PAST MEDICAL & SURGICAL HISTORY>>>>>  Dementia    History of breast cancer    Constipation    Lewy body dementia without behavioral disturbance    Colon polyp    History of colon resection    H/O mastectomy, right      ALLERGIES  antichlolinergics (Unknown)  benzodiazepines (Unknown)  ciprofloxacin (Unknown)  Originally Entered as [Unknown] reaction to [green pepper] (Unknown)  Originally Entered as [Unknown] reaction to [neuroleptics] (Unknown)  Originally Entered as [Unknown] reaction to [pepper] (Unknown)  Originally Entered as [Unknown] reaction to [red pepper] (Unknown)  penicillin (Anaphylaxis)      Home Medications:  acetaminophen 325 mg oral tablet: 2 tab(s) orally every 12 hours (24 Dec 2022 15:18)  Artificial Tears ophthalmic solution: 1 drop(s) to each affected eye 3 times a day (24 Dec 2022 15:18)  Aspercreme with Lidocaine 4% topical cream: Apply topically to affected area 3 times a day to left knee (24 Dec 2022 15:18)  aspirin 81 mg oral tablet, chewable: 1 tab(s) orally once a day (24 Dec 2022 15:18)  atorvastatin 10 mg oral tablet: 1 tab(s) orally Monday, Wednesday, and Friday at bedtime  (24 Dec 2022 15:18)  carbidopa-levodopa 25 mg-100 mg oral tablet: 1 tab(s) orally 2 times a day (24 Dec 2022 15:18)  Colace 100 mg oral capsule: 1 cap(s) orally 2 times a day (24 Dec 2022 15:18)  gabapentin 600 mg oral tablet: 1 tab(s) orally 3 times a day (24 Dec 2022 15:18)  latanoprost 0.005% ophthalmic solution: 1 drop(s) to each affected eye once a day (at bedtime) (24 Dec 2022 15:18)  levothyroxine 50 mcg (0.05 mg) oral tablet: 1 tab(s) orally once a day (24 Dec 2022 15:18)  omeprazole 20 mg oral delayed release capsule: 1 cap(s) orally once a day (24 Dec 2022 15:18)  polyethylene glycol 3350 oral powder for reconstitution: 17 gram(s) orally once a day (24 Dec 2022 15:18)  Senna 8.6 mg oral tablet: 2 tab(s) orally once a day (at bedtime) (24 Dec 2022 15:18)  sertraline 50 mg oral tablet: 1.5 tab(s) orally once a day (24 Dec 2022 15:18)  timolol hemihydrate 0.5% ophthalmic solution: 1 drop(s) to each affected eye 3 times a day (24 Dec 2022 15:18)  Vitamin C 500 mg oral tablet: 1 tab(s) orally 2 times a day (24 Dec 2022 15:18)  Vitamin D3 1000 intl units oral tablet: 1 tab(s) orally once a day (24 Dec 2022 15:23)        MEDICATIONS  STANDING MEDICATIONS  artificial  tears Solution 1 Drop(s) Both EYES daily  ascorbic acid 500 milliGRAM(s) Oral daily  aspirin  chewable 81 milliGRAM(s) Oral daily  carbidopa/levodopa  25/100 1 Tablet(s) Oral <User Schedule>  cholecalciferol 1000 Unit(s) Oral daily  dexAMETHasone     Tablet 6 milliGRAM(s) Oral daily  dextrose 5% + sodium chloride 0.45%. 1000 milliLiter(s) IV Continuous <Continuous>  enoxaparin Injectable 40 milliGRAM(s) SubCutaneous every 24 hours  gabapentin 600 milliGRAM(s) Oral three times a day  levothyroxine 50 MICROGram(s) Oral <User Schedule>  lidocaine 4% Cream 1 Application(s) Topical daily  polyethylene glycol 3350 17 Gram(s) Oral daily  remdesivir  IVPB   IV Intermittent   remdesivir  IVPB 100 milliGRAM(s) IV Intermittent every 24 hours  senna 2 Tablet(s) Oral at bedtime    PRN MEDICATIONS  acetaminophen     Tablet .. 650 milliGRAM(s) Oral every 6 hours PRN  aluminum hydroxide/magnesium hydroxide/simethicone Suspension 30 milliLiter(s) Oral every 4 hours PRN  lactulose Syrup 10 Gram(s) Oral once PRN  melatonin 3 milliGRAM(s) Oral at bedtime PRN  ondansetron Injectable 4 milliGRAM(s) IV Push every 8 hours PRN  polyethylene glycol 3350 17 Gram(s) Oral once PRN    VITALS:  T(F): 96.6  HR: 80  BP: 116/60  RR: 18  SpO2: 95%    <<<<<LABS>>>>>                        12.0   7.80  )-----------( 246      ( 26 Dec 2022 06:48 )             37.5     12-26    140  |  106  |  17  ----------------------------<  177<H>  4.4   |  22  |  0.8    Ca    8.7      26 Dec 2022 06:48    TPro  6.0  /  Alb  3.7  /  TBili  <0.2  /  DBili  <0.2  /  AST  13  /  ALT  <5  /  AlkPhos  72  12-26    PT/INR - ( 26 Dec 2022 06:48 )   PT: 10.70 sec;   INR: 0.94 ratio           Urinalysis Basic - ( 24 Dec 2022 17:24 )    Color: Yellow / Appearance: Slightly Turbid / SG: >1.050 / pH: x  Gluc: x / Ketone: Trace  / Bili: Negative / Urobili: <2 mg/dL   Blood: x / Protein: 30 mg/dL / Nitrite: Negative   Leuk Esterase: Negative / RBC: 30 /HPF / WBC 23 /HPF   Sq Epi: x / Non Sq Epi: 1 /HPF / Bacteria: Many          [id number]        <<<<<RADIOLOGY>>>>>    < from: Xray Abdomen 1 View PORTABLE -Routine (Xray Abdomen 1 View PORTABLE -Routine in AM.) (12.25.22 @ 08:12) >  PROCEDURE DATE:  12/25/2022          INTERPRETATION:  Clinical History / Reason for exam: Distention.    TECHNIQUE: 2 AP views of the abdomen.    COMPARISON: July 8, 2019.Correlation is made with CT abdomen and pelvis   December 24, 2022.    FINDINGS/  IMPRESSION:    Diffuse distention of bowel loops without definite evidence for   obstruction..    Calcified uterine fibroid.    Degenerative changes of the spine.    < end of copied text >      <<<<<PHYSICAL EXAM>>>>>  GENERAL: NAD, resting comfortably in bed  PULMONARY: decreased bibasilar breath sounds  CARDIOVASCULAR: Regular rate and rhythm, S1-S2, no murmurs  GASTROINTESTINAL: firm, nontender to palpation  NEUROLOGIC: AAOX2      -----------------------------------------------------------------------------------------------------------------------------------------------------------------------------------------------

## 2022-12-26 NOTE — PROGRESS NOTE ADULT - SUBJECTIVE AND OBJECTIVE BOX
FLOLEIMELDA    82y Female    Patient is a 82y old  Female who presents with a chief complaint of abd distension (25 Dec 2022 08:47)    OVERNIGHT EVENTS: improving no acute events on NC 2L comfortable /  at bedside     PAST MEDICAL & SURGICAL HISTORY:    Dementia    History of breast cancer    Constipation    Lewy body dementia without behavioral disturbance    Colon polyp    History of colon resection    H/O mastectomy, right    Vital Signs Last 24 Hrs  T(C): 35.9 (26 Dec 2022 14:00), Max: 35.9 (26 Dec 2022 05:07)  T(F): 96.7 (26 Dec 2022 14:00), Max: 96.7 (26 Dec 2022 14:00)  HR: 78 (26 Dec 2022 14:00) (76 - 80)  BP: 121/66 (26 Dec 2022 14:00) (111/59 - 121/66)  BP(mean): 80 (25 Dec 2022 20:23) (80 - 80)  RR: 18 (26 Dec 2022 14:00) (17 - 18)  SpO2: 95% (26 Dec 2022 05:07) (95% - 97%)    Parameters below as of 26 Dec 2022 05:07  Patient On (Oxygen Delivery Method): room air      PHYSICAL EXAM:  GENERAL: NAD, well-developed, [place],  she is improving in her Mental Status   HEAD:  Atraumatic, Normocephalic  EYES: EOMI, PERRLA, conjunctiva and sclera clear  NECK: Supple, No JVD  CHEST/LUNG: decreased BS B/L   HEART: Regular rate and rhythm; No murmurs, rubs, or gallops  ABDOMEN: Firm, Nontender, ++ distended; Bowel sounds present  EXTREMITIES:  2+ Peripheral Pulses, No clubbing, cyanosis, or edema  PSYCH: AAOx2 / at close to baseline per    NEUROLOGY: non-focal  SKIN: No rashes or lesions    LABS:                        12.0   7.80  )-----------( 246      ( 26 Dec 2022 06:48 )             37.5     12-26    140  |  106  |  17  ----------------------------<  177<H>  4.4   |  22  |  0.8    Ca    8.7      26 Dec 2022 06:48    TPro  6.0  /  Alb  3.7  /  TBili  <0.2  /  DBili  <0.2  /  AST  13  /  ALT  <5  /  AlkPhos  72  12-26                          12.6   9.78  )-----------( 236      ( 25 Dec 2022 07:20 )             40.2       12-25    141  |  106  |  23  ------------------------<  93  4.2   |  20  |  0.8    Ca    8.4      25 Dec 2022 07:20    TPro  6.1  /  Alb  3.8  /  TBili  0.3  /  DBili  x   /  AST  15  /  ALT  <5  /  AlkPhos  67  12-25    Urinalysis Basic - ( 24 Dec 2022 17:24 )    Color: Yellow / Appearance: Slightly Turbid / SG: >1.050 / pH: x  Gluc: x / Ketone: Trace  / Bili: Negative / Urobili: <2 mg/dL   Blood: x / Protein: 30 mg/dL / Nitrite: Negative   Leuk Esterase: Negative / RBC: 30 /HPF / WBC 23 /HPF   Sq Epi: x / Non Sq Epi: 1 /HPF / Bacteria: Many    MICROBIOLOGY: reviewed     RADIOLOGY & ADDITIONAL TESTS: reviewed     MEDICATIONS  (STANDING):  artificial  tears Solution 1 Drop(s) Both EYES daily  ascorbic acid 500 milliGRAM(s) Oral daily  aspirin  chewable 81 milliGRAM(s) Oral daily  carbidopa/levodopa  25/100 1 Tablet(s) Oral <User Schedule>  cholecalciferol 1000 Unit(s) Oral daily  dexAMETHasone     Tablet 6 milliGRAM(s) Oral daily  dextrose 5% + sodium chloride 0.45%. 1000 milliLiter(s) (60 mL/Hr) IV Continuous <Continuous>  gabapentin 600 milliGRAM(s) Oral three times a day  levothyroxine 50 MICROGram(s) Oral <User Schedule>  lidocaine 4% Cream 1 Application(s) Topical daily  polyethylene glycol 3350 17 Gram(s) Oral daily  remdesivir  IVPB   IV Intermittent   remdesivir  IVPB 200 milliGRAM(s) IV Intermittent every 24 hours  senna 2 Tablet(s) Oral at bedtime    MEDICATIONS  (PRN):  acetaminophen     Tablet .. 650 milliGRAM(s) Oral every 6 hours PRN Temp greater or equal to 38C (100.4F), Mild Pain (1 - 3)  aluminum hydroxide/magnesium hydroxide/simethicone Suspension 30 milliLiter(s) Oral every 4 hours PRN Dyspepsia  lactulose Syrup 10 Gram(s) Oral once PRN constipation  melatonin 3 milliGRAM(s) Oral at bedtime PRN Insomnia  ondansetron Injectable 4 milliGRAM(s) IV Push every 8 hours PRN Nausea and/or Vomiting  polyethylene glycol 3350 17 Gram(s) Oral once PRN Constipation

## 2022-12-26 NOTE — PROGRESS NOTE ADULT - ASSESSMENT
82 year old female with pmhx of constipation, and cancer of breast and severe dementia presents with abd distension. Possible sbo on imaging.     # Distended bowel loops/ air fluid levels r/o bowel obstruction/ileus  # HO chronic constipation/ # HO colon polyp s/p resection  # Lewy body dementia/parkinsonism  -CT: Mild gaseous distention of the large bowel and small bowel with air-fluid levels.  -large BM 2 nights ago; will continue to monitor BM  -on clear liquid diet currently  -IVF  -no intervention as per surgery      # Covid  -on RA  -CXR clear  - continue RDV; can consider dc'ing decadron as pt on RA now      # R enlarged ovary  # HO breast cancer  -outpatient gyn f/u    # Hypothyroiod  - continue synthroid  - f/u TSH    DVT ppx: lovenos  GI ppx: none  Diet: clear liquids  Full code

## 2022-12-27 ENCOUNTER — TRANSCRIPTION ENCOUNTER (OUTPATIENT)
Age: 82
End: 2022-12-27

## 2022-12-27 LAB
ALBUMIN SERPL ELPH-MCNC: 3.6 G/DL — SIGNIFICANT CHANGE UP (ref 3.5–5.2)
ALBUMIN SERPL ELPH-MCNC: 3.8 G/DL — SIGNIFICANT CHANGE UP (ref 3.5–5.2)
ALP SERPL-CCNC: 70 U/L — SIGNIFICANT CHANGE UP (ref 30–115)
ALP SERPL-CCNC: 71 U/L — SIGNIFICANT CHANGE UP (ref 30–115)
ALT FLD-CCNC: <5 U/L — SIGNIFICANT CHANGE UP (ref 0–41)
ALT FLD-CCNC: <5 U/L — SIGNIFICANT CHANGE UP (ref 0–41)
ANION GAP SERPL CALC-SCNC: 10 MMOL/L — SIGNIFICANT CHANGE UP (ref 7–14)
AST SERPL-CCNC: 12 U/L — SIGNIFICANT CHANGE UP (ref 0–41)
AST SERPL-CCNC: 12 U/L — SIGNIFICANT CHANGE UP (ref 0–41)
BASOPHILS # BLD AUTO: 0.01 K/UL — SIGNIFICANT CHANGE UP (ref 0–0.2)
BASOPHILS NFR BLD AUTO: 0.1 % — SIGNIFICANT CHANGE UP (ref 0–1)
BILIRUB DIRECT SERPL-MCNC: <0.2 MG/DL — SIGNIFICANT CHANGE UP (ref 0–0.3)
BILIRUB INDIRECT FLD-MCNC: >0.1 MG/DL — LOW (ref 0.2–1.2)
BILIRUB SERPL-MCNC: 0.3 MG/DL — SIGNIFICANT CHANGE UP (ref 0.2–1.2)
BILIRUB SERPL-MCNC: 0.3 MG/DL — SIGNIFICANT CHANGE UP (ref 0.2–1.2)
BUN SERPL-MCNC: 22 MG/DL — HIGH (ref 10–20)
CALCIUM SERPL-MCNC: 8.9 MG/DL — SIGNIFICANT CHANGE UP (ref 8.4–10.4)
CHLORIDE SERPL-SCNC: 106 MMOL/L — SIGNIFICANT CHANGE UP (ref 98–110)
CO2 SERPL-SCNC: 22 MMOL/L — SIGNIFICANT CHANGE UP (ref 17–32)
CREAT SERPL-MCNC: 0.9 MG/DL — SIGNIFICANT CHANGE UP (ref 0.7–1.5)
EGFR: 64 ML/MIN/1.73M2 — SIGNIFICANT CHANGE UP
EOSINOPHIL # BLD AUTO: 0 K/UL — SIGNIFICANT CHANGE UP (ref 0–0.7)
EOSINOPHIL NFR BLD AUTO: 0 % — SIGNIFICANT CHANGE UP (ref 0–8)
GLUCOSE SERPL-MCNC: 147 MG/DL — HIGH (ref 70–99)
HCT VFR BLD CALC: 39.2 % — SIGNIFICANT CHANGE UP (ref 37–47)
HGB BLD-MCNC: 12.7 G/DL — SIGNIFICANT CHANGE UP (ref 12–16)
IMM GRANULOCYTES NFR BLD AUTO: 0.5 % — HIGH (ref 0.1–0.3)
INR BLD: 1.09 RATIO — SIGNIFICANT CHANGE UP (ref 0.65–1.3)
LYMPHOCYTES # BLD AUTO: 1.1 K/UL — LOW (ref 1.2–3.4)
LYMPHOCYTES # BLD AUTO: 9 % — LOW (ref 20.5–51.1)
MAGNESIUM SERPL-MCNC: 2.1 MG/DL — SIGNIFICANT CHANGE UP (ref 1.8–2.4)
MCHC RBC-ENTMCNC: 30.4 PG — SIGNIFICANT CHANGE UP (ref 27–31)
MCHC RBC-ENTMCNC: 32.4 G/DL — SIGNIFICANT CHANGE UP (ref 32–37)
MCV RBC AUTO: 93.8 FL — SIGNIFICANT CHANGE UP (ref 81–99)
MONOCYTES # BLD AUTO: 0.27 K/UL — SIGNIFICANT CHANGE UP (ref 0.1–0.6)
MONOCYTES NFR BLD AUTO: 2.2 % — SIGNIFICANT CHANGE UP (ref 1.7–9.3)
NEUTROPHILS # BLD AUTO: 10.76 K/UL — HIGH (ref 1.4–6.5)
NEUTROPHILS NFR BLD AUTO: 88.2 % — HIGH (ref 42.2–75.2)
NRBC # BLD: 0 /100 WBCS — SIGNIFICANT CHANGE UP (ref 0–0)
PLATELET # BLD AUTO: 283 K/UL — SIGNIFICANT CHANGE UP (ref 130–400)
POTASSIUM SERPL-MCNC: 4.2 MMOL/L — SIGNIFICANT CHANGE UP (ref 3.5–5)
POTASSIUM SERPL-SCNC: 4.2 MMOL/L — SIGNIFICANT CHANGE UP (ref 3.5–5)
PROT SERPL-MCNC: 6.3 G/DL — SIGNIFICANT CHANGE UP (ref 6–8)
PROT SERPL-MCNC: 6.4 G/DL — SIGNIFICANT CHANGE UP (ref 6–8)
PROTHROM AB SERPL-ACNC: 12.5 SEC — SIGNIFICANT CHANGE UP (ref 9.95–12.87)
RBC # BLD: 4.18 M/UL — LOW (ref 4.2–5.4)
RBC # FLD: 14 % — SIGNIFICANT CHANGE UP (ref 11.5–14.5)
SODIUM SERPL-SCNC: 138 MMOL/L — SIGNIFICANT CHANGE UP (ref 135–146)
WBC # BLD: 12.2 K/UL — HIGH (ref 4.8–10.8)
WBC # FLD AUTO: 12.2 K/UL — HIGH (ref 4.8–10.8)

## 2022-12-27 PROCEDURE — 99239 HOSP IP/OBS DSCHRG MGMT >30: CPT

## 2022-12-27 RX ORDER — LANOLIN ALCOHOL/MO/W.PET/CERES
1 CREAM (GRAM) TOPICAL
Qty: 0 | Refills: 0 | DISCHARGE
Start: 2022-12-27

## 2022-12-27 RX ORDER — DEXAMETHASONE 0.5 MG/5ML
1 ELIXIR ORAL
Qty: 0 | Refills: 0 | DISCHARGE
Start: 2022-12-27

## 2022-12-27 RX ADMIN — ENOXAPARIN SODIUM 40 MILLIGRAM(S): 100 INJECTION SUBCUTANEOUS at 06:18

## 2022-12-27 RX ADMIN — Medication 6 MILLIGRAM(S): at 06:17

## 2022-12-27 RX ADMIN — SODIUM CHLORIDE 60 MILLILITER(S): 9 INJECTION, SOLUTION INTRAVENOUS at 19:20

## 2022-12-27 RX ADMIN — REMDESIVIR 200 MILLIGRAM(S): 5 INJECTION INTRAVENOUS at 18:19

## 2022-12-27 RX ADMIN — SENNA PLUS 2 TABLET(S): 8.6 TABLET ORAL at 21:05

## 2022-12-27 RX ADMIN — Medication 81 MILLIGRAM(S): at 11:41

## 2022-12-27 RX ADMIN — Medication 50 MICROGRAM(S): at 06:16

## 2022-12-27 RX ADMIN — GABAPENTIN 600 MILLIGRAM(S): 400 CAPSULE ORAL at 13:30

## 2022-12-27 RX ADMIN — POLYETHYLENE GLYCOL 3350 17 GRAM(S): 17 POWDER, FOR SOLUTION ORAL at 11:49

## 2022-12-27 RX ADMIN — Medication 500 MILLIGRAM(S): at 11:41

## 2022-12-27 RX ADMIN — CARBIDOPA AND LEVODOPA 1 TABLET(S): 25; 100 TABLET ORAL at 06:16

## 2022-12-27 RX ADMIN — GABAPENTIN 600 MILLIGRAM(S): 400 CAPSULE ORAL at 21:05

## 2022-12-27 RX ADMIN — GABAPENTIN 600 MILLIGRAM(S): 400 CAPSULE ORAL at 06:16

## 2022-12-27 RX ADMIN — CARBIDOPA AND LEVODOPA 1 TABLET(S): 25; 100 TABLET ORAL at 18:19

## 2022-12-27 RX ADMIN — Medication 1000 UNIT(S): at 11:41

## 2022-12-27 RX ADMIN — POLYETHYLENE GLYCOL 3350 17 GRAM(S): 17 POWDER, FOR SOLUTION ORAL at 11:41

## 2022-12-27 RX ADMIN — LIDOCAINE 1 APPLICATION(S): 4 CREAM TOPICAL at 11:46

## 2022-12-27 RX ADMIN — Medication 1 DROP(S): at 12:48

## 2022-12-27 NOTE — DISCHARGE NOTE PROVIDER - PROVIDER TOKENS
PROVIDER:[TOKEN:[[id number]],FOLLOWUP:[1 week]] PROVIDER:[TOKEN:[[id number]],FOLLOWUP:[1 week]],PROVIDER:[TOKEN:[[id number]]]

## 2022-12-27 NOTE — PROGRESS NOTE ADULT - SUBJECTIVE AND OBJECTIVE BOX
----------Daily Progress Note----------    HISTORY OF PRESENT ILLNESS:  Patient is a 82y old Female who presents with a chief complaint of abd distension (26 Dec 2022 15:57)    Currently admitted to medicine with the primary diagnosis of Abdominal distension       Today is hospital day 3d.     INTERVAL HOSPITAL COURSE / OVERNIGHT EVENTS:    No overnight events    Review of Systems: Otherwise unremarkable     <<<<<PAST MEDICAL & SURGICAL HISTORY>>>>>  Dementia    History of breast cancer    Constipation    Lewy body dementia without behavioral disturbance    Colon polyp    History of colon resection    H/O mastectomy, right      ALLERGIES  antichlolinergics (Unknown)  benzodiazepines (Unknown)  ciprofloxacin (Unknown)  Originally Entered as [Unknown] reaction to [green pepper] (Unknown)  Originally Entered as [Unknown] reaction to [neuroleptics] (Unknown)  Originally Entered as [Unknown] reaction to [pepper] (Unknown)  Originally Entered as [Unknown] reaction to [red pepper] (Unknown)  penicillin (Anaphylaxis)      Home Medications:  acetaminophen 325 mg oral tablet: 2 tab(s) orally every 12 hours (24 Dec 2022 15:18)  Artificial Tears ophthalmic solution: 1 drop(s) to each affected eye 3 times a day (24 Dec 2022 15:18)  Aspercreme with Lidocaine 4% topical cream: Apply topically to affected area 3 times a day to left knee (24 Dec 2022 15:18)  aspirin 81 mg oral tablet, chewable: 1 tab(s) orally once a day (24 Dec 2022 15:18)  atorvastatin 10 mg oral tablet: 1 tab(s) orally Monday, Wednesday, and Friday at bedtime  (24 Dec 2022 15:18)  carbidopa-levodopa 25 mg-100 mg oral tablet: 1 tab(s) orally 2 times a day (24 Dec 2022 15:18)  Colace 100 mg oral capsule: 1 cap(s) orally 2 times a day (24 Dec 2022 15:18)  gabapentin 600 mg oral tablet: 1 tab(s) orally 3 times a day (24 Dec 2022 15:18)  latanoprost 0.005% ophthalmic solution: 1 drop(s) to each affected eye once a day (at bedtime) (24 Dec 2022 15:18)  levothyroxine 50 mcg (0.05 mg) oral tablet: 1 tab(s) orally once a day (24 Dec 2022 15:18)  omeprazole 20 mg oral delayed release capsule: 1 cap(s) orally once a day (24 Dec 2022 15:18)  polyethylene glycol 3350 oral powder for reconstitution: 17 gram(s) orally once a day (24 Dec 2022 15:18)  Senna 8.6 mg oral tablet: 2 tab(s) orally once a day (at bedtime) (24 Dec 2022 15:18)  sertraline 50 mg oral tablet: 1.5 tab(s) orally once a day (24 Dec 2022 15:18)  timolol hemihydrate 0.5% ophthalmic solution: 1 drop(s) to each affected eye 3 times a day (24 Dec 2022 15:18)  Vitamin C 500 mg oral tablet: 1 tab(s) orally 2 times a day (24 Dec 2022 15:18)  Vitamin D3 1000 intl units oral tablet: 1 tab(s) orally once a day (24 Dec 2022 15:23)        MEDICATIONS  STANDING MEDICATIONS  artificial  tears Solution 1 Drop(s) Both EYES daily  ascorbic acid 500 milliGRAM(s) Oral daily  aspirin  chewable 81 milliGRAM(s) Oral daily  carbidopa/levodopa  25/100 1 Tablet(s) Oral <User Schedule>  cholecalciferol 1000 Unit(s) Oral daily  dexAMETHasone     Tablet 6 milliGRAM(s) Oral daily  dextrose 5% + sodium chloride 0.45%. 1000 milliLiter(s) IV Continuous <Continuous>  enoxaparin Injectable 40 milliGRAM(s) SubCutaneous every 24 hours  gabapentin 600 milliGRAM(s) Oral three times a day  levothyroxine 50 MICROGram(s) Oral <User Schedule>  lidocaine 4% Cream 1 Application(s) Topical daily  polyethylene glycol 3350 17 Gram(s) Oral daily  remdesivir  IVPB   IV Intermittent   remdesivir  IVPB 100 milliGRAM(s) IV Intermittent every 24 hours  senna 2 Tablet(s) Oral at bedtime    PRN MEDICATIONS  acetaminophen     Tablet .. 650 milliGRAM(s) Oral every 6 hours PRN  aluminum hydroxide/magnesium hydroxide/simethicone Suspension 30 milliLiter(s) Oral every 4 hours PRN  lactulose Syrup 10 Gram(s) Oral once PRN  melatonin 3 milliGRAM(s) Oral at bedtime PRN  ondansetron Injectable 4 milliGRAM(s) IV Push every 8 hours PRN  polyethylene glycol 3350 17 Gram(s) Oral once PRN    VITALS:  T(F): 97.2  HR: 73  BP: 138/65  RR: 17  SpO2: 95%    <<<<<LABS>>>>>                        12.7   12.20 )-----------( 283      ( 27 Dec 2022 04:30 )             39.2     12-26    140  |  106  |  17  ----------------------------<  177<H>  4.4   |  22  |  0.8    Ca    8.7      26 Dec 2022 06:48    TPro  6.0  /  Alb  3.7  /  TBili  <0.2  /  DBili  <0.2  /  AST  13  /  ALT  <5  /  AlkPhos  72  12-26    PT/INR - ( 27 Dec 2022 04:30 )   PT: 12.50 sec;   INR: 1.09 ratio                   Culture - Urine (collected 25 Dec 2022 18:36)  Source: Clean Catch Clean Catch (Midstream)  Preliminary Report (27 Dec 2022 08:02):    >100,000 CFU/ml Gram Negative Rods    Culture - Urine (collected 24 Dec 2022 17:24)  Source: Catheterized Catheterized  Final Report (26 Dec 2022 11:44):    >=3 organisms. Probable collection contamination.    [id number]        <<<<<RADIOLOGY>>>>>    < from: Xray Abdomen 1 View PORTABLE -Routine (Xray Abdomen 1 View PORTABLE -Routine in AM.) (12.25.22 @ 08:12) >  PROCEDURE DATE:  12/25/2022          INTERPRETATION:  Clinical History / Reason for exam: Distention.    TECHNIQUE: 2 AP views of the abdomen.    COMPARISON: July 8, 2019.Correlation is made with CT abdomen and pelvis   December 24, 2022.    FINDINGS/  IMPRESSION:    Diffuse distention of bowel loops without definite evidence for   obstruction..    Calcified uterine fibroid.    Degenerative changes of the spine.    < end of copied text >      <<<<<PHYSICAL EXAM>>>>>  GENERAL: NAD, resting comfortably in bed  PULMONARY: decreased bibasilar breath sounds  CARDIOVASCULAR: Regular rate and rhythm, S1-S2, no murmurs  GASTROINTESTINAL: firm, nontender to palpation  NEUROLOGIC: AAOX1-2    -----------------------------------------------------------------------------------------------------------------------------------------------------------------------------------------------

## 2022-12-27 NOTE — DISCHARGE NOTE PROVIDER - NSDCMRMEDTOKEN_GEN_ALL_CORE_FT
acetaminophen 325 mg oral tablet: 2 tab(s) orally every 12 hours  aluminum hydroxide-magnesium hydroxide 200 mg-200 mg/5 mL oral suspension: 30 milliliter(s) orally every 4 hours, As needed, Dyspepsia  Artificial Tears ophthalmic solution: 1 drop(s) to each affected eye 3 times a day  Aspercreme with Lidocaine 4% topical cream: Apply topically to affected area 3 times a day to left knee  aspirin 81 mg oral tablet, chewable: 1 tab(s) orally once a day  atorvastatin 10 mg oral tablet: 1 tab(s) orally Monday, Wednesday, and Friday at bedtime   carbidopa-levodopa 25 mg-100 mg oral tablet: 1 tab(s) orally 2 times a day  Colace 100 mg oral capsule: 1 cap(s) orally 2 times a day  dexamethasone 6 mg oral tablet: 1 tab(s) orally once a day until 01/03/2023  gabapentin 600 mg oral tablet: 1 tab(s) orally 3 times a day  latanoprost 0.005% ophthalmic solution: 1 drop(s) to each affected eye once a day (at bedtime)  levothyroxine 50 mcg (0.05 mg) oral tablet: 1 tab(s) orally once a day  melatonin 3 mg oral tablet: 1 tab(s) orally once a day (at bedtime), As needed, Insomnia  omeprazole 20 mg oral delayed release capsule: 1 cap(s) orally once a day  polyethylene glycol 3350 oral powder for reconstitution: 17 gram(s) orally once a day  Senna 8.6 mg oral tablet: 2 tab(s) orally once a day (at bedtime)  sertraline 50 mg oral tablet: 1.5 tab(s) orally once a day  timolol hemihydrate 0.5% ophthalmic solution: 1 drop(s) to each affected eye 3 times a day  Vitamin C 500 mg oral tablet: 1 tab(s) orally 2 times a day  Vitamin D3 1000 intl units oral tablet: 1 tab(s) orally once a day

## 2022-12-27 NOTE — DISCHARGE NOTE PROVIDER - CARE PROVIDER_API CALL
LON MCKEON  Internal Medicine  [address]  [place] [address]  Phone: [phone]  Fax: [phone])-  Follow Up Time: 1 week   LON MCKEON  Internal Medicine  [address] [facility] [address]  [place] [address]  Phone: [phone]  Fax: ()-  Follow Up Time: 1 week    Linn Guillen)  Gastroenterology  [facility] [place] [address]  Phone: [phone]  Fax: [phone]  Follow Up Time:

## 2022-12-27 NOTE — DISCHARGE NOTE PROVIDER - NSDCCPCAREPLAN_GEN_ALL_CORE_FT
PRINCIPAL DISCHARGE DIAGNOSIS  Diagnosis: Abdominal distension  Assessment and Plan of Treatment: Your abdominal distension was due to constipation for which you received laxatives to help you have a bowel movement. Please followup with your primary care doctor for further care and management.   SEEK IMMEDIATE MEDICAL CARE IF YOU HAVE ANY OF THE FOLLOWING SYMPTOMS: chest pain, shortness of breath, abdominal pain, sweating, vomiting, blood in vomit/bowel movements/urine, dizziness/lightheadedness, weakness or numbness to face/arm/leg, trouble speaking or understanding, facial droop.         PRINCIPAL DISCHARGE DIAGNOSIS  Diagnosis: Abdominal distension  Assessment and Plan of Treatment: CHRONIC ABDOMINAL DISTENTION FROM 2019   CONSTIPATION - NOW PASSING STOOL WELL C/W BOWEL MEDS  - TOLERATING ORAL INTAKE   - FOLLOW WITH GI OUTPT   - NO SURGERY INTERVENTION   PARTIAL SBO - RESOLVED   AHRF 2/2 COVID 19 PNA   - RESOLVED NOW ON RA   - RDV X3DAYS   - DEXA X 10 DAYS TOTAL SINCE ADMISSION   - IMPROVING WELL   HYPOKALEMIA / HYPOMAGNESEMIA - CORRECTED   Your abdominal distension was due to constipation for which you received laxatives to help you have a bowel movement. Please followup with your primary care doctor for further care and management.   SEEK IMMEDIATE MEDICAL CARE IF YOU HAVE ANY OF THE FOLLOWING SYMPTOMS: chest pain, shortness of breath, abdominal pain, sweating, vomiting, blood in vomit/bowel movements/urine, dizziness/lightheadedness, weakness or numbness to face/arm/leg, trouble speaking or understanding, facial droop.         PRINCIPAL DISCHARGE DIAGNOSIS  Diagnosis: Abdominal distension  Assessment and Plan of Treatment: CHRONIC ABDOMINAL DISTENTION FROM 2019   CONSTIPATION - NOW PASSING STOOL WELL C/W BOWEL MEDS  - TOLERATING ORAL INTAKE   - FOLLOW WITH GI OUTPT   - NO SURGERY INTERVENTION   PARTIAL SBO - RESOLVED   AHRF 2/2 COVID 19 PNA   - RESOLVED NOW ON RA   - RDV X3DAYS   - DEXA X 10 DAYS TOTAL SINCE ADMISSION   - IMPROVING WELL   HYPOKALEMIA / HYPOMAGNESEMIA - CORRECTED   Your abdominal distension was due to constipation for which you received laxatives to help you have a bowel movement. Please followup with your primary care doctor for further care and management.   SEEK IMMEDIATE MEDICAL CARE IF YOU HAVE ANY OF THE FOLLOWING SYMPTOMS: chest pain, shortness of breath, abdominal pain, sweating, vomiting, blood in vomit/bowel movements/urine, dizziness/lightheadedness, weakness or numbness to face/arm/leg, trouble speaking or understanding, facial droop.        SECONDARY DISCHARGE DIAGNOSES  Diagnosis: Right ovarian enlargement  Assessment and Plan of Treatment: CT scan showed 7cm enlarged right ovary.  Please have ultrasound and follow up with gynecology for further evaluation.

## 2022-12-27 NOTE — DISCHARGE NOTE PROVIDER - ATTENDING DISCHARGE PHYSICAL EXAMINATION:
Vital Signs Last 24 Hrs  T(C): 35.9 (27 Dec 2022 14:00), Max: 36.2 (26 Dec 2022 20:00)  T(F): 96.6 (27 Dec 2022 14:00), Max: 97.2 (27 Dec 2022 04:57)  HR: 78 (27 Dec 2022 14:00) (73 - 92)  BP: 142/71 (27 Dec 2022 14:00) (121/66 - 142/71)  RR: 20 (27 Dec 2022 14:00) (17 - 20)  SpO2: 95% (26 Dec 2022 20:00) (95% - 95%)    GEN: NAD  RESP: CTA B/L   GI: +BS SOFT NT ND  EXT: NO E/C/C   MS: AT BASELINE PER  AOX2                        12.7   12.20 )-----------( 283      ( 27 Dec 2022 04:30 )             39.2     12-27    138  |  106  |  22<H>  ----------------------------<  147<H>  4.2   |  22  |  0.9    Ca    8.9      27 Dec 2022 04:30  Mg     2.1     12-27    TPro  6.3  /  Alb  3.8  /  TBili  0.3  /  DBili  <0.2  /  AST  12  /  ALT  <5  /  AlkPhos  70  12-27

## 2022-12-27 NOTE — PROGRESS NOTE ADULT - ASSESSMENT
82 year old female with pmhx of constipation, and cancer of breast and severe dementia presents with abd distension. Possible sbo on imaging.     # Distended bowel loops/ air fluid levels r/o bowel obstruction/ileus  # HO chronic constipation/ # HO colon polyp s/p resection  # Lewy body dementia/parkinsonism  -CT: Mild gaseous distention of the large bowel and small bowel with air-fluid levels.  - continue to monitor BM  -on clear liquid diet currently; can advance as tolerated  -IVF  -no intervention as per surgery      # Covid  -on RA  -CXR clear  - continue RDV and decadron      # R enlarged ovary  # HO breast cancer  -outpatient gyn f/u    # Hypothyroiod  - continue synthroid  - TSH wnl (3.08)    DVT ppx: lovenox  GI ppx: none  Diet: clear liquids  Full code

## 2022-12-27 NOTE — PROGRESS NOTE ADULT - TIME BILLING
direct pt care and interdisciplinary rounds / chart review and discussion with  / imaging reviewed
direct pt care and preparing for discharge documentation. d/w .
direct pt care and interdisciplinary rounds / chart review and discussion with  / imaging reviewed

## 2022-12-27 NOTE — DISCHARGE NOTE PROVIDER - HOSPITAL COURSE
82 year old female with pmhx of Lewy body dementia/Parkinsonism, chronic constipation, HO breast cancer, and colon polyp s/p colon resection presents for abdominal distension.  Pt came from [facility] due to abnormal KUB findings that showed gaseous distended bowel loops. S/w  Lev at bedside who states pt likely had a bm within the last week. Has not been symptomatic (no vomiting, no abd pain). The patient had a colon resection about ten years ago for findings of a colon polyp. She was also worked up for a parkinsonism type disease starting about ten years ago. She sees an eminent specialist in [facility] who believes she likely has Lewy body dementia. She has had several admissions for management of parital bowel obstruction, at least twice here at [facility] and managed by surgery with nonsurgical management as pt is not surgery candidate. Of note, pt had low grade fever earlier in the week, found to have covid at NH, asymptomatic as per NH charts, was started on paxlovid. Pt's , states that since starting paxlovid, she has had significant hair loss.   On my exam pt is alert, and able to have minimal conversation. She is slow to respond but knows who she is, where she lives, and knows approximate date. She can verbalize if she has any symptoms and whether in distress. But does have short term memory issues. Due to this ROS may be unreliable.  In the ED, pt vitals were stable, RR 20 and /80, HR 80, on RA, and afebrile.  Labs unremarkable, except for elevated trop to 0.02 x2 (no bl), tested positive for COVID.  CXR clear. EKG no ischemia  CT ab/pel IVC showed   Mild gaseous distention of the large bowel and small bowel with air-fluid   levels. No definite evidence of small bowel obstruction at this time.   Incidental finding of R enlarged ovary.  Pt received 1L NS in ED.      # Distended bowel loops/ air fluid levels r/o bowel obstruction/ileus  # HO chronic constipation/ # HO colon polyp s/p resection  # Lewy body dementia/parkinsonism  -CT: Mild gaseous distention of the large bowel and small bowel with air-fluid levels.  - continue to monitor BM  -on clear liquid diet currently; can advance as tolerated  -IVF  -no intervention as per surgery      # Covid  -on RA  -CXR clear  - continue RDV and decadron      # R enlarged ovary  # HO breast cancer  -outpatient gyn f/u    # Hypothyroiod  - continue synthroid  - TSH wnl (3.08)

## 2022-12-27 NOTE — DISCHARGE NOTE PROVIDER - CARE PROVIDERS DIRECT ADDRESSES
,DirectAddress_Unknown ,DirectAddress_Unknown,[email or web address]

## 2022-12-27 NOTE — PROGRESS NOTE ADULT - ASSESSMENT
82F PMH: constipation and cancer of breast and moderate dementia presents with abdominal distention from the nursing home.  # Pt w/ h/x CT A/P the same now as in 2019 per Surgery no intervention likely Partial SBO  # distended bowel loops/ air fluid levels r/o bowel obstruction/ileus  # HO chronic constipation/ # HO colon polyp s/p resection  # Lewy body dementia and parkinsons disease   -CT: Mild gaseous distention of the large bowel and small bowel with air-fluid levels.  -continues to have good BMs   -Abdomen less distended and now softer and NO N/V/Tenderness   -Clear Liquid diet today c/w it will try to advance tomorrow   -c/w IVF gentle hydration   -c/w Miralax / Senna / Lactulose     # Sever Covid 19 PNA w/ Mild Hypoxia   - RA 95%   -Last fever was 3days ago at NH and there was given Paxolovid (had 2days of it)   -CXR clear b/l   -ID recs appreciated: RDV x3d and Dexamethasone 6mg po qd x10d   -Negative Pressure Isolation     # Troponemia ruled out very level in face of infection 0.02 x2  - d/c telemetry   - f/u electrolytes and supplement   - tx to med floor    # CKD 3  -no baseline  -no ekg changes  -likely secondary to CKD or covid  -monitor on tele 24 hours and d/c tele if no events    # R enlarged ovary  # HO breast cancer  - f/u GYN in clinic     # hypothyroiod  -check tsh  -c/w synthroid 50mcg    GI/DVT Proph    Activity: AT     FULL CODE    Family: Case and Plan discussed with  at bedside in detail this morning  Pending: NH bed f/u CM  Dispo: NHR / anticipated for d/c today 12/27

## 2022-12-27 NOTE — PROGRESS NOTE ADULT - SUBJECTIVE AND OBJECTIVE BOX
IMELDA ROLLE    82y Female    Patient is a 82y old  Female who presents with a chief complaint of abd distension (25 Dec 2022 08:47)    OVERNIGHT EVENTS: improving no acute events on NC 2L comfortable /  at bedside     PAST MEDICAL & SURGICAL HISTORY:    Dementia    History of breast cancer    Constipation    Lewy body dementia without behavioral disturbance    Colon polyp    History of colon resection    H/O mastectomy, right    Vital Signs Last 24 Hrs  T(C): 36.2 (27 Dec 2022 04:57), Max: 36.2 (26 Dec 2022 20:00)  T(F): 97.2 (27 Dec 2022 04:57), Max: 97.2 (27 Dec 2022 04:57)  HR: 73 (27 Dec 2022 04:57) (73 - 92)  BP: 138/65 (27 Dec 2022 04:57) (121/66 - 138/65)  RR: 17 (27 Dec 2022 04:57) (17 - 18)  SpO2: 95% (26 Dec 2022 20:00) (95% - 95%)      Parameters below as of 26 Dec 2022 05:07  Patient On (Oxygen Delivery Method): room air      PHYSICAL EXAM:  GENERAL: NAD, well-developed, [place],  she is improving in her Mental Status   HEAD:  Atraumatic, Normocephalic  EYES: EOMI, PERRLA, conjunctiva and sclera clear  NECK: Supple, No JVD  CHEST/LUNG: decreased BS B/L   HEART: Regular rate and rhythm; No murmurs, rubs, or gallops  ABDOMEN: Firm, Nontender, ++ distended; Bowel sounds present  EXTREMITIES:  2+ Peripheral Pulses, No clubbing, cyanosis, or edema  PSYCH: AAOx2 / at close to baseline per    NEUROLOGY: non-focal  SKIN: No rashes or lesions     LABS:                        12.7   12.20 )-----------( 283      ( 27 Dec 2022 04:30 )             39.2     12-27    138  |  106  |  22<H>  ----------------------------<  147<H>  4.2   |  22  |  0.9    Ca    8.9      27 Dec 2022 04:30    Mg     2.1     12-27    TPro  6.3  /  Alb  3.8  /  TBili  0.3  /  DBili  <0.2  /  AST  12  /  ALT  <5  /  AlkPhos  70  12-27                          12.0   7.80  )-----------( 246      ( 26 Dec 2022 06:48 )             37.5     12-26    140  |  106  |  17  ----------------------------<  177<H>  4.4   |  22  |  0.8    Ca    8.7      26 Dec 2022 06:48    TPro  6.0  /  Alb  3.7  /  TBili  <0.2  /  DBili  <0.2  /  AST  13  /  ALT  <5  /  AlkPhos  72  12-26                          12.6   9.78  )-----------( 236      ( 25 Dec 2022 07:20 )             40.2       12-25    141  |  106  |  23  ------------------------<  93  4.2   |  20  |  0.8    Ca    8.4      25 Dec 2022 07:20    TPro  6.1  /  Alb  3.8  /  TBili  0.3  /  DBili  x   /  AST  15  /  ALT  <5  /  AlkPhos  67  12-25    Urinalysis Basic - ( 24 Dec 2022 17:24 )    Color: Yellow / Appearance: Slightly Turbid / SG: >1.050 / pH: x  Gluc: x / Ketone: Trace  / Bili: Negative / Urobili: <2 mg/dL   Blood: x / Protein: 30 mg/dL / Nitrite: Negative   Leuk Esterase: Negative / RBC: 30 /HPF / WBC 23 /HPF   Sq Epi: x / Non Sq Epi: 1 /HPF / Bacteria: Many    MICROBIOLOGY: reviewed     RADIOLOGY & ADDITIONAL TESTS: reviewed     MEDICATIONS  (STANDING):  artificial  tears Solution 1 Drop(s) Both EYES daily  ascorbic acid 500 milliGRAM(s) Oral daily  aspirin  chewable 81 milliGRAM(s) Oral daily  carbidopa/levodopa  25/100 1 Tablet(s) Oral <User Schedule>  cholecalciferol 1000 Unit(s) Oral daily  dexAMETHasone     Tablet 6 milliGRAM(s) Oral daily  dextrose 5% + sodium chloride 0.45%. 1000 milliLiter(s) (60 mL/Hr) IV Continuous <Continuous>  gabapentin 600 milliGRAM(s) Oral three times a day  levothyroxine 50 MICROGram(s) Oral <User Schedule>  lidocaine 4% Cream 1 Application(s) Topical daily  polyethylene glycol 3350 17 Gram(s) Oral daily  remdesivir  IVPB   IV Intermittent   remdesivir  IVPB 200 milliGRAM(s) IV Intermittent every 24 hours  senna 2 Tablet(s) Oral at bedtime    MEDICATIONS  (PRN):  acetaminophen     Tablet .. 650 milliGRAM(s) Oral every 6 hours PRN Temp greater or equal to 38C (100.4F), Mild Pain (1 - 3)  aluminum hydroxide/magnesium hydroxide/simethicone Suspension 30 milliLiter(s) Oral every 4 hours PRN Dyspepsia  lactulose Syrup 10 Gram(s) Oral once PRN constipation  melatonin 3 milliGRAM(s) Oral at bedtime PRN Insomnia  ondansetron Injectable 4 milliGRAM(s) IV Push every 8 hours PRN Nausea and/or Vomiting  polyethylene glycol 3350 17 Gram(s) Oral once PRN Constipation

## 2022-12-28 ENCOUNTER — TRANSCRIPTION ENCOUNTER (OUTPATIENT)
Age: 82
End: 2022-12-28

## 2022-12-28 VITALS
SYSTOLIC BLOOD PRESSURE: 99 MMHG | TEMPERATURE: 98 F | RESPIRATION RATE: 18 BRPM | HEART RATE: 120 BPM | DIASTOLIC BLOOD PRESSURE: 52 MMHG

## 2022-12-28 LAB
-  AMIKACIN: SIGNIFICANT CHANGE UP
-  AMIKACIN: SIGNIFICANT CHANGE UP
-  AMOXICILLIN/CLAVULANIC ACID: SIGNIFICANT CHANGE UP
-  AMOXICILLIN/CLAVULANIC ACID: SIGNIFICANT CHANGE UP
-  AMPICILLIN/SULBACTAM: SIGNIFICANT CHANGE UP
-  AMPICILLIN/SULBACTAM: SIGNIFICANT CHANGE UP
-  AMPICILLIN: SIGNIFICANT CHANGE UP
-  AMPICILLIN: SIGNIFICANT CHANGE UP
-  AZTREONAM: SIGNIFICANT CHANGE UP
-  AZTREONAM: SIGNIFICANT CHANGE UP
-  CEFAZOLIN: SIGNIFICANT CHANGE UP
-  CEFAZOLIN: SIGNIFICANT CHANGE UP
-  CEFEPIME: SIGNIFICANT CHANGE UP
-  CEFEPIME: SIGNIFICANT CHANGE UP
-  CEFOXITIN: SIGNIFICANT CHANGE UP
-  CEFTRIAXONE: SIGNIFICANT CHANGE UP
-  CEFTRIAXONE: SIGNIFICANT CHANGE UP
-  CEFUROXIME: SIGNIFICANT CHANGE UP
-  CEFUROXIME: SIGNIFICANT CHANGE UP
-  CIPROFLOXACIN: SIGNIFICANT CHANGE UP
-  CIPROFLOXACIN: SIGNIFICANT CHANGE UP
-  ERTAPENEM: SIGNIFICANT CHANGE UP
-  ERTAPENEM: SIGNIFICANT CHANGE UP
-  GENTAMICIN: SIGNIFICANT CHANGE UP
-  GENTAMICIN: SIGNIFICANT CHANGE UP
-  IMIPENEM: SIGNIFICANT CHANGE UP
-  LEVOFLOXACIN: SIGNIFICANT CHANGE UP
-  LEVOFLOXACIN: SIGNIFICANT CHANGE UP
-  MEROPENEM: SIGNIFICANT CHANGE UP
-  MEROPENEM: SIGNIFICANT CHANGE UP
-  NITROFURANTOIN: SIGNIFICANT CHANGE UP
-  NITROFURANTOIN: SIGNIFICANT CHANGE UP
-  PIPERACILLIN/TAZOBACTAM: SIGNIFICANT CHANGE UP
-  PIPERACILLIN/TAZOBACTAM: SIGNIFICANT CHANGE UP
-  TOBRAMYCIN: SIGNIFICANT CHANGE UP
-  TOBRAMYCIN: SIGNIFICANT CHANGE UP
-  TRIMETHOPRIM/SULFAMETHOXAZOLE: SIGNIFICANT CHANGE UP
-  TRIMETHOPRIM/SULFAMETHOXAZOLE: SIGNIFICANT CHANGE UP
CULTURE RESULTS: SIGNIFICANT CHANGE UP
METHOD TYPE: SIGNIFICANT CHANGE UP
METHOD TYPE: SIGNIFICANT CHANGE UP
ORGANISM # SPEC MICROSCOPIC CNT: SIGNIFICANT CHANGE UP
SPECIMEN SOURCE: SIGNIFICANT CHANGE UP

## 2022-12-28 PROCEDURE — 99239 HOSP IP/OBS DSCHRG MGMT >30: CPT

## 2022-12-28 RX ADMIN — Medication 1 DROP(S): at 11:21

## 2022-12-28 RX ADMIN — CARBIDOPA AND LEVODOPA 1 TABLET(S): 25; 100 TABLET ORAL at 05:36

## 2022-12-28 RX ADMIN — GABAPENTIN 600 MILLIGRAM(S): 400 CAPSULE ORAL at 13:00

## 2022-12-28 RX ADMIN — Medication 50 MICROGRAM(S): at 05:37

## 2022-12-28 RX ADMIN — ENOXAPARIN SODIUM 40 MILLIGRAM(S): 100 INJECTION SUBCUTANEOUS at 05:35

## 2022-12-28 RX ADMIN — POLYETHYLENE GLYCOL 3350 17 GRAM(S): 17 POWDER, FOR SOLUTION ORAL at 11:19

## 2022-12-28 RX ADMIN — GABAPENTIN 600 MILLIGRAM(S): 400 CAPSULE ORAL at 05:36

## 2022-12-28 RX ADMIN — Medication 500 MILLIGRAM(S): at 11:21

## 2022-12-28 RX ADMIN — LIDOCAINE 1 APPLICATION(S): 4 CREAM TOPICAL at 11:21

## 2022-12-28 RX ADMIN — Medication 6 MILLIGRAM(S): at 05:36

## 2022-12-28 RX ADMIN — Medication 1000 UNIT(S): at 11:20

## 2022-12-28 RX ADMIN — Medication 81 MILLIGRAM(S): at 11:20

## 2022-12-28 NOTE — DISCHARGE NOTE NURSING/CASE MANAGEMENT/SOCIAL WORK - PATIENT PORTAL LINK FT
You can access the FollowMyHealth Patient Portal offered by  by registering at the following website: [email or web address]. By joining [employer]’s FollowMyHealth portal, you will also be able to view your health information using other applications (apps) compatible with our system.

## 2022-12-28 NOTE — PROGRESS NOTE ADULT - SUBJECTIVE AND OBJECTIVE BOX
Discharge note    IMELDA ROLLE  82y Female    INTERVAL HPI/OVERNIGHT EVENTS:    pt feels OK   at bedside  has auth for SNF today  having BMs per RN  no pain or fever or SOB  stable on room air    T(F): 97.1 (12-28-22 @ 14:48), Max: 97.1 (12-28-22 @ 14:48)  HR: 77 (12-28-22 @ 14:48) (64 - 83)  BP: 127/61 (12-28-22 @ 14:48) (127/61 - 133/61)  RR: 18 (12-28-22 @ 14:48) (18 - 18)  SpO2: 94% (12-28-22 @ 05:00) (94% - 98%) on RA      I&O's Summary    27 Dec 2022 07:01  -  28 Dec 2022 07:00  --------------------------------------------------------  IN: 0 mL / OUT: 900 mL / NET: -900 mL      PHYSICAL EXAM:  GENERAL: NAD  HEAD:  Normocephalic  EYES:  conjunctiva and sclera clear  ENMT: Moist mucous membranes  NERVOUS SYSTEM:  Alert, awake, Good concentration  CHEST/LUNG: decreased BS b/l  HEART: Regular rate and rhythm  ABDOMEN: Soft, Nontender, distended; Bowel sounds present  EXTREMITIES:   No edema  SKIN: warm, dry    Consultant(s) Notes Reviewed:  [x ] YES  [ ] NO  Care Discussed with Consultants/Other Providers [ x] YES  [ ] NO    MEDICATIONS  (STANDING):  artificial  tears Solution 1 Drop(s) Both EYES daily  ascorbic acid 500 milliGRAM(s) Oral daily  aspirin  chewable 81 milliGRAM(s) Oral daily  carbidopa/levodopa  25/100 1 Tablet(s) Oral <User Schedule>  cholecalciferol 1000 Unit(s) Oral daily  dexAMETHasone     Tablet 6 milliGRAM(s) Oral daily  dextrose 5% + sodium chloride 0.45%. 1000 milliLiter(s) (60 mL/Hr) IV Continuous <Continuous>  enoxaparin Injectable 40 milliGRAM(s) SubCutaneous every 24 hours  gabapentin 600 milliGRAM(s) Oral three times a day  levothyroxine 50 MICROGram(s) Oral <User Schedule>  lidocaine 4% Cream 1 Application(s) Topical daily  polyethylene glycol 3350 17 Gram(s) Oral daily  senna 2 Tablet(s) Oral at bedtime    MEDICATIONS  (PRN):  acetaminophen     Tablet .. 650 milliGRAM(s) Oral every 6 hours PRN Temp greater or equal to 38C (100.4F), Mild Pain (1 - 3)  aluminum hydroxide/magnesium hydroxide/simethicone Suspension 30 milliLiter(s) Oral every 4 hours PRN Dyspepsia  lactulose Syrup 10 Gram(s) Oral once PRN constipation  melatonin 3 milliGRAM(s) Oral at bedtime PRN Insomnia  ondansetron Injectable 4 milliGRAM(s) IV Push every 8 hours PRN Nausea and/or Vomiting      LABS:                        12.7   12.20 )-----------( 283      ( 27 Dec 2022 04:30 )             39.2     12-27    138  |  106  |  22<H>  ----------------------------<  147<H>  4.2   |  22  |  0.9    Ca    8.9      27 Dec 2022 04:30  Mg     2.1     12-27    TPro  6.3  /  Alb  3.8  /  TBili  0.3  /  DBili  <0.2  /  AST  12  /  ALT  <5  /  AlkPhos  70  12-27    PT/INR - ( 27 Dec 2022 04:30 )   PT: 12.50 sec;   INR: 1.09 ratio             Culture - Urine (collected 25 Dec 2022 18:36)  Source: Clean Catch Clean Catch (Midstream)  Final Report (28 Dec 2022 08:13):    >100,000 CFU/ml Proteus mirabilis ESBL    >100,000 CFU/ml Escherichia coli  Organism: Escherichia coli  Proteus mirabilis ESBL (28 Dec 2022 08:13)  Organism: Proteus mirabilis ESBL (28 Dec 2022 08:13)  Organism: Escherichia coli (28 Dec 2022 08:13)                    RADIOLOGY & ADDITIONAL TESTS:    Imaging or report Personally Reviewed:  [x ] YES  [ ] NO    < from: Xray Abdomen 1 View PORTABLE -Routine (Xray Abdomen 1 View PORTABLE -Routine in AM.) (12.25.22 @ 08:12) >  FINDINGS/  IMPRESSION:    Diffuse distention of bowel loops without definite evidence for   obstruction..    Calcified uterine fibroid.    Degenerative changes of the spine.    < end of copied text >      < from: Xray Chest 1 View- PORTABLE-Urgent (12.24.22 @ 00:44) >  Impression:    No radiographic evidence of acute cardiopulmonary disease.    < end of copied text >      < from: CT Abdomen and Pelvis w/ IV Cont (12.24.22 @ 00:29) >  IMPRESSION:    Mild gaseous distention of the large bowel and small bowel with air-fluid   levels. No definite evidence of small bowel obstruction at this time.   Short-term follow-up post oral contrast administration can be obtained if   symptoms persist to re-evaluate for developing small bowel obstruction.    7 cm enlarged right ovary, out of proportion for age. Nonemergent   ultrasound evaluation is recommended.    < end of copied text >      < from: CT Head No Cont (12.24.22 @ 00:24) >  IMPRESSION:    No evidence of acute intracranial hemorrhage or large territorial infarct.      < end of copied text >            Case discussed with residents and RN on rounds today    Care discussed with pt and family

## 2022-12-28 NOTE — PROGRESS NOTE ADULT - ASSESSMENT
83 y/o woman with PMH of Lewy body dementia/Parkinsonism, chronic constipation, HO breast cancer, and colon polyp s/p colon resection presented with abdominal distention from the nursing home.    1. Abdominal distension, constipation  per surgery, CTAP similar to prior CT scan in 2019 with some mild distension - no surgical intervention  -CT: Mild gaseous distention of the large bowel and small bowel with air-fluid levels. No definite evidence of SBO at this time  pt having BMs and abdomen is softer now per   no pain, N/V, fever  diet advanced and pt is tolerating  c/w Miralax / Senna     2. COVID 19 with severe illness  was on O2 via NC earlier this admission -> now on RA  s/p 2 days of paxlovid and now s/p remdesivir    -Last fever was 3days ago at NH and there was given Paxolovid (had 2days of it)   -CXR clear b/l   -ID recs appreciated: RDV x3d and Dexamethasone 6mg po qd x10d   -Negative Pressure Isolation     # Troponemia ruled out very level in face of infection 0.02 x2  - d/c telemetry   - f/u electrolytes and supplement   - tx to med floor    # CKD 3  -no baseline  -no ekg changes  -likely secondary to CKD or covid  -monitor on tele 24 hours and d/c tele if no events    # R enlarged ovary  # HO breast cancer  - f/u GYN in clinic     # hypothyroiod  -check tsh  -c/w synthroid 50mcg    GI/DVT Proph    Activity: AT     FULL CODE    Family: Case and Plan discussed with  at bedside in detail this morning  Pending: NH bed f/u CM  Dispo: [facility] / anticipated for d/c today 12/27 81 y/o woman with PMH of Lewy body dementia/Parkinsonism, chronic constipation, H/O breast cancer, and colon polyp s/p colon resection presented with abdominal distention from the nursing home.    1. Abdominal distension, constipation  per surgery, CTAP similar to prior CT scan in 2019 with some mild distension - no surgical intervention  CT: Mild gaseous distention of the large bowel and small bowel with air-fluid levels. No definite evidence of SBO at this time  pt having BMs and abdomen is softer now per   no pain, N/V, fever  diet advanced and pt is tolerating  c/w Miralax / Senna     2. COVID 19 with severe illness  was on O2 via NC earlier this admission -> now on RA  s/p 2 days of paxlovid and now s/p remdesivir x 3 days  complete course of decadron - 5 more days  isolation per protocol    3. Continue current management for Parkinsonism, hypothyroid (TSH WNL)    4. Enlarged right ovary 7cm - outpt US for further evaluation    5. Asymptomatic bacteruria    6. DVT prophylaxis      pt for discharge to [facility]    med reconciliation and discharge papers reviewed  spent 35 minutes on the discharge process of this pt

## 2022-12-28 NOTE — PROGRESS NOTE ADULT - REASON FOR ADMISSION
abd distension

## 2022-12-28 NOTE — DISCHARGE NOTE NURSING/CASE MANAGEMENT/SOCIAL WORK - NSDCPEFALRISK_GEN_ALL_CORE
For information on Fall & Injury Prevention, visit: [email or web address] OR  [email or web address] OR  https://www.cdc.gov/steadi/patient.html

## 2023-02-28 ENCOUNTER — APPOINTMENT (OUTPATIENT)
Dept: GASTROENTEROLOGY | Facility: CLINIC | Age: 83
End: 2023-02-28

## 2023-04-05 ENCOUNTER — APPOINTMENT (OUTPATIENT)
Dept: GASTROENTEROLOGY | Facility: CLINIC | Age: 83
End: 2023-04-05

## 2023-05-16 ENCOUNTER — APPOINTMENT (OUTPATIENT)
Dept: GASTROENTEROLOGY | Facility: CLINIC | Age: 83
End: 2023-05-16

## 2023-11-10 ENCOUNTER — INPATIENT (INPATIENT)
Facility: HOSPITAL | Age: 83
LOS: 9 days | Discharge: NURSING, REHAB OR LONG-TERM CARE | DRG: 64 | End: 2023-11-20
Attending: STUDENT IN AN ORGANIZED HEALTH CARE EDUCATION/TRAINING PROGRAM | Admitting: STUDENT IN AN ORGANIZED HEALTH CARE EDUCATION/TRAINING PROGRAM
Payer: MEDICARE

## 2023-11-10 VITALS
SYSTOLIC BLOOD PRESSURE: 148 MMHG | DIASTOLIC BLOOD PRESSURE: 78 MMHG | HEART RATE: 86 BPM | RESPIRATION RATE: 15 BRPM | TEMPERATURE: 98 F | OXYGEN SATURATION: 98 %

## 2023-11-10 DIAGNOSIS — Z90.11 ACQUIRED ABSENCE OF RIGHT BREAST AND NIPPLE: Chronic | ICD-10-CM

## 2023-11-10 DIAGNOSIS — R41.0 DISORIENTATION, UNSPECIFIED: ICD-10-CM

## 2023-11-10 DIAGNOSIS — Z90.49 ACQUIRED ABSENCE OF OTHER SPECIFIED PARTS OF DIGESTIVE TRACT: Chronic | ICD-10-CM

## 2023-11-10 LAB
ALBUMIN SERPL ELPH-MCNC: 3.5 G/DL — SIGNIFICANT CHANGE UP (ref 3.5–5.2)
ALBUMIN SERPL ELPH-MCNC: 3.5 G/DL — SIGNIFICANT CHANGE UP (ref 3.5–5.2)
ALP SERPL-CCNC: 50 U/L — SIGNIFICANT CHANGE UP (ref 30–115)
ALP SERPL-CCNC: 50 U/L — SIGNIFICANT CHANGE UP (ref 30–115)
ALT FLD-CCNC: 6 U/L — SIGNIFICANT CHANGE UP (ref 0–41)
ALT FLD-CCNC: 6 U/L — SIGNIFICANT CHANGE UP (ref 0–41)
ANION GAP SERPL CALC-SCNC: 9 MMOL/L — SIGNIFICANT CHANGE UP (ref 7–14)
ANION GAP SERPL CALC-SCNC: 9 MMOL/L — SIGNIFICANT CHANGE UP (ref 7–14)
APPEARANCE UR: CLEAR — SIGNIFICANT CHANGE UP
APPEARANCE UR: CLEAR — SIGNIFICANT CHANGE UP
AST SERPL-CCNC: 16 U/L — SIGNIFICANT CHANGE UP (ref 0–41)
AST SERPL-CCNC: 16 U/L — SIGNIFICANT CHANGE UP (ref 0–41)
BASOPHILS # BLD AUTO: 0.08 K/UL — SIGNIFICANT CHANGE UP (ref 0–0.2)
BASOPHILS # BLD AUTO: 0.08 K/UL — SIGNIFICANT CHANGE UP (ref 0–0.2)
BASOPHILS NFR BLD AUTO: 0.4 % — SIGNIFICANT CHANGE UP (ref 0–1)
BASOPHILS NFR BLD AUTO: 0.4 % — SIGNIFICANT CHANGE UP (ref 0–1)
BILIRUB SERPL-MCNC: 0.5 MG/DL — SIGNIFICANT CHANGE UP (ref 0.2–1.2)
BILIRUB SERPL-MCNC: 0.5 MG/DL — SIGNIFICANT CHANGE UP (ref 0.2–1.2)
BILIRUB UR-MCNC: NEGATIVE — SIGNIFICANT CHANGE UP
BILIRUB UR-MCNC: NEGATIVE — SIGNIFICANT CHANGE UP
BUN SERPL-MCNC: 16 MG/DL — SIGNIFICANT CHANGE UP (ref 10–20)
BUN SERPL-MCNC: 16 MG/DL — SIGNIFICANT CHANGE UP (ref 10–20)
CALCIUM SERPL-MCNC: 8.6 MG/DL — SIGNIFICANT CHANGE UP (ref 8.4–10.4)
CALCIUM SERPL-MCNC: 8.6 MG/DL — SIGNIFICANT CHANGE UP (ref 8.4–10.4)
CHLORIDE SERPL-SCNC: 101 MMOL/L — SIGNIFICANT CHANGE UP (ref 98–110)
CHLORIDE SERPL-SCNC: 101 MMOL/L — SIGNIFICANT CHANGE UP (ref 98–110)
CO2 SERPL-SCNC: 24 MMOL/L — SIGNIFICANT CHANGE UP (ref 17–32)
CO2 SERPL-SCNC: 24 MMOL/L — SIGNIFICANT CHANGE UP (ref 17–32)
COLOR SPEC: YELLOW — SIGNIFICANT CHANGE UP
COLOR SPEC: YELLOW — SIGNIFICANT CHANGE UP
CREAT SERPL-MCNC: 0.7 MG/DL — SIGNIFICANT CHANGE UP (ref 0.7–1.5)
CREAT SERPL-MCNC: 0.7 MG/DL — SIGNIFICANT CHANGE UP (ref 0.7–1.5)
DIFF PNL FLD: ABNORMAL
DIFF PNL FLD: ABNORMAL
EGFR: 86 ML/MIN/1.73M2 — SIGNIFICANT CHANGE UP
EGFR: 86 ML/MIN/1.73M2 — SIGNIFICANT CHANGE UP
EOSINOPHIL # BLD AUTO: 0.4 K/UL — SIGNIFICANT CHANGE UP (ref 0–0.7)
EOSINOPHIL # BLD AUTO: 0.4 K/UL — SIGNIFICANT CHANGE UP (ref 0–0.7)
EOSINOPHIL NFR BLD AUTO: 2.2 % — SIGNIFICANT CHANGE UP (ref 0–8)
EOSINOPHIL NFR BLD AUTO: 2.2 % — SIGNIFICANT CHANGE UP (ref 0–8)
GAS PNL BLDV: SIGNIFICANT CHANGE UP
GAS PNL BLDV: SIGNIFICANT CHANGE UP
GLUCOSE SERPL-MCNC: 98 MG/DL — SIGNIFICANT CHANGE UP (ref 70–99)
GLUCOSE SERPL-MCNC: 98 MG/DL — SIGNIFICANT CHANGE UP (ref 70–99)
GLUCOSE UR QL: NEGATIVE MG/DL — SIGNIFICANT CHANGE UP
GLUCOSE UR QL: NEGATIVE MG/DL — SIGNIFICANT CHANGE UP
HCT VFR BLD CALC: 37.6 % — SIGNIFICANT CHANGE UP (ref 37–47)
HCT VFR BLD CALC: 37.6 % — SIGNIFICANT CHANGE UP (ref 37–47)
HGB BLD-MCNC: 12.1 G/DL — SIGNIFICANT CHANGE UP (ref 12–16)
HGB BLD-MCNC: 12.1 G/DL — SIGNIFICANT CHANGE UP (ref 12–16)
IMM GRANULOCYTES NFR BLD AUTO: 0.3 % — SIGNIFICANT CHANGE UP (ref 0.1–0.3)
IMM GRANULOCYTES NFR BLD AUTO: 0.3 % — SIGNIFICANT CHANGE UP (ref 0.1–0.3)
KETONES UR-MCNC: ABNORMAL MG/DL
KETONES UR-MCNC: ABNORMAL MG/DL
LEUKOCYTE ESTERASE UR-ACNC: ABNORMAL
LEUKOCYTE ESTERASE UR-ACNC: ABNORMAL
LYMPHOCYTES # BLD AUTO: 1.94 K/UL — SIGNIFICANT CHANGE UP (ref 1.2–3.4)
LYMPHOCYTES # BLD AUTO: 1.94 K/UL — SIGNIFICANT CHANGE UP (ref 1.2–3.4)
LYMPHOCYTES # BLD AUTO: 10.8 % — LOW (ref 20.5–51.1)
LYMPHOCYTES # BLD AUTO: 10.8 % — LOW (ref 20.5–51.1)
MAGNESIUM SERPL-MCNC: 1.7 MG/DL — LOW (ref 1.8–2.4)
MAGNESIUM SERPL-MCNC: 1.7 MG/DL — LOW (ref 1.8–2.4)
MCHC RBC-ENTMCNC: 31.2 PG — HIGH (ref 27–31)
MCHC RBC-ENTMCNC: 31.2 PG — HIGH (ref 27–31)
MCHC RBC-ENTMCNC: 32.2 G/DL — SIGNIFICANT CHANGE UP (ref 32–37)
MCHC RBC-ENTMCNC: 32.2 G/DL — SIGNIFICANT CHANGE UP (ref 32–37)
MCV RBC AUTO: 96.9 FL — SIGNIFICANT CHANGE UP (ref 81–99)
MCV RBC AUTO: 96.9 FL — SIGNIFICANT CHANGE UP (ref 81–99)
MONOCYTES # BLD AUTO: 1.14 K/UL — HIGH (ref 0.1–0.6)
MONOCYTES # BLD AUTO: 1.14 K/UL — HIGH (ref 0.1–0.6)
MONOCYTES NFR BLD AUTO: 6.4 % — SIGNIFICANT CHANGE UP (ref 1.7–9.3)
MONOCYTES NFR BLD AUTO: 6.4 % — SIGNIFICANT CHANGE UP (ref 1.7–9.3)
NEUTROPHILS # BLD AUTO: 14.32 K/UL — HIGH (ref 1.4–6.5)
NEUTROPHILS # BLD AUTO: 14.32 K/UL — HIGH (ref 1.4–6.5)
NEUTROPHILS NFR BLD AUTO: 79.9 % — HIGH (ref 42.2–75.2)
NEUTROPHILS NFR BLD AUTO: 79.9 % — HIGH (ref 42.2–75.2)
NITRITE UR-MCNC: NEGATIVE — SIGNIFICANT CHANGE UP
NITRITE UR-MCNC: NEGATIVE — SIGNIFICANT CHANGE UP
NRBC # BLD: 0 /100 WBCS — SIGNIFICANT CHANGE UP (ref 0–0)
NRBC # BLD: 0 /100 WBCS — SIGNIFICANT CHANGE UP (ref 0–0)
PH UR: 6 — SIGNIFICANT CHANGE UP (ref 5–8)
PH UR: 6 — SIGNIFICANT CHANGE UP (ref 5–8)
PLATELET # BLD AUTO: 266 K/UL — SIGNIFICANT CHANGE UP (ref 130–400)
PLATELET # BLD AUTO: 266 K/UL — SIGNIFICANT CHANGE UP (ref 130–400)
PMV BLD: 8.8 FL — SIGNIFICANT CHANGE UP (ref 7.4–10.4)
PMV BLD: 8.8 FL — SIGNIFICANT CHANGE UP (ref 7.4–10.4)
POTASSIUM SERPL-MCNC: 3.7 MMOL/L — SIGNIFICANT CHANGE UP (ref 3.5–5)
POTASSIUM SERPL-MCNC: 3.7 MMOL/L — SIGNIFICANT CHANGE UP (ref 3.5–5)
POTASSIUM SERPL-SCNC: 3.7 MMOL/L — SIGNIFICANT CHANGE UP (ref 3.5–5)
POTASSIUM SERPL-SCNC: 3.7 MMOL/L — SIGNIFICANT CHANGE UP (ref 3.5–5)
PROT SERPL-MCNC: 5.6 G/DL — LOW (ref 6–8)
PROT SERPL-MCNC: 5.6 G/DL — LOW (ref 6–8)
PROT UR-MCNC: NEGATIVE MG/DL — SIGNIFICANT CHANGE UP
PROT UR-MCNC: NEGATIVE MG/DL — SIGNIFICANT CHANGE UP
RBC # BLD: 3.88 M/UL — LOW (ref 4.2–5.4)
RBC # BLD: 3.88 M/UL — LOW (ref 4.2–5.4)
RBC # FLD: 14.1 % — SIGNIFICANT CHANGE UP (ref 11.5–14.5)
RBC # FLD: 14.1 % — SIGNIFICANT CHANGE UP (ref 11.5–14.5)
SODIUM SERPL-SCNC: 134 MMOL/L — LOW (ref 135–146)
SODIUM SERPL-SCNC: 134 MMOL/L — LOW (ref 135–146)
SP GR SPEC: 1.02 — SIGNIFICANT CHANGE UP (ref 1–1.03)
SP GR SPEC: 1.02 — SIGNIFICANT CHANGE UP (ref 1–1.03)
TROPONIN T SERPL-MCNC: 0.05 NG/ML — CRITICAL HIGH
TROPONIN T SERPL-MCNC: 0.05 NG/ML — CRITICAL HIGH
UROBILINOGEN FLD QL: 1 MG/DL — SIGNIFICANT CHANGE UP (ref 0.2–1)
UROBILINOGEN FLD QL: 1 MG/DL — SIGNIFICANT CHANGE UP (ref 0.2–1)
WBC # BLD: 17.94 K/UL — HIGH (ref 4.8–10.8)
WBC # BLD: 17.94 K/UL — HIGH (ref 4.8–10.8)
WBC # FLD AUTO: 17.94 K/UL — HIGH (ref 4.8–10.8)
WBC # FLD AUTO: 17.94 K/UL — HIGH (ref 4.8–10.8)

## 2023-11-10 PROCEDURE — 80053 COMPREHEN METABOLIC PANEL: CPT

## 2023-11-10 PROCEDURE — 92611 MOTION FLUOROSCOPY/SWALLOW: CPT | Mod: GN

## 2023-11-10 PROCEDURE — 87635 SARS-COV-2 COVID-19 AMP PRB: CPT

## 2023-11-10 PROCEDURE — 70450 CT HEAD/BRAIN W/O DYE: CPT | Mod: 26,MA

## 2023-11-10 PROCEDURE — 82962 GLUCOSE BLOOD TEST: CPT

## 2023-11-10 PROCEDURE — 87040 BLOOD CULTURE FOR BACTERIA: CPT

## 2023-11-10 PROCEDURE — 70496 CT ANGIOGRAPHY HEAD: CPT

## 2023-11-10 PROCEDURE — 84132 ASSAY OF SERUM POTASSIUM: CPT

## 2023-11-10 PROCEDURE — 99285 EMERGENCY DEPT VISIT HI MDM: CPT | Mod: FS

## 2023-11-10 PROCEDURE — 85025 COMPLETE CBC W/AUTO DIFF WBC: CPT

## 2023-11-10 PROCEDURE — 83735 ASSAY OF MAGNESIUM: CPT

## 2023-11-10 PROCEDURE — 93970 EXTREMITY STUDY: CPT

## 2023-11-10 PROCEDURE — 97163 PT EVAL HIGH COMPLEX 45 MIN: CPT | Mod: GP

## 2023-11-10 PROCEDURE — 99221 1ST HOSP IP/OBS SF/LOW 40: CPT

## 2023-11-10 PROCEDURE — 92526 ORAL FUNCTION THERAPY: CPT | Mod: GN

## 2023-11-10 PROCEDURE — 85014 HEMATOCRIT: CPT

## 2023-11-10 PROCEDURE — 83605 ASSAY OF LACTIC ACID: CPT

## 2023-11-10 PROCEDURE — 85027 COMPLETE CBC AUTOMATED: CPT

## 2023-11-10 PROCEDURE — 0225U NFCT DS DNA&RNA 21 SARSCOV2: CPT

## 2023-11-10 PROCEDURE — 70450 CT HEAD/BRAIN W/O DYE: CPT

## 2023-11-10 PROCEDURE — 94660 CPAP INITIATION&MGMT: CPT

## 2023-11-10 PROCEDURE — 87899 AGENT NOS ASSAY W/OPTIC: CPT

## 2023-11-10 PROCEDURE — 93010 ELECTROCARDIOGRAM REPORT: CPT

## 2023-11-10 PROCEDURE — 82803 BLOOD GASES ANY COMBINATION: CPT

## 2023-11-10 PROCEDURE — 82330 ASSAY OF CALCIUM: CPT

## 2023-11-10 PROCEDURE — 84145 PROCALCITONIN (PCT): CPT

## 2023-11-10 PROCEDURE — 93306 TTE W/DOPPLER COMPLETE: CPT

## 2023-11-10 PROCEDURE — 84295 ASSAY OF SERUM SODIUM: CPT

## 2023-11-10 PROCEDURE — 36415 COLL VENOUS BLD VENIPUNCTURE: CPT

## 2023-11-10 PROCEDURE — 87640 STAPH A DNA AMP PROBE: CPT

## 2023-11-10 PROCEDURE — 71045 X-RAY EXAM CHEST 1 VIEW: CPT

## 2023-11-10 PROCEDURE — 83880 ASSAY OF NATRIURETIC PEPTIDE: CPT

## 2023-11-10 PROCEDURE — 71045 X-RAY EXAM CHEST 1 VIEW: CPT | Mod: 26

## 2023-11-10 PROCEDURE — 87641 MR-STAPH DNA AMP PROBE: CPT

## 2023-11-10 PROCEDURE — 84484 ASSAY OF TROPONIN QUANT: CPT

## 2023-11-10 PROCEDURE — 84443 ASSAY THYROID STIM HORMONE: CPT

## 2023-11-10 PROCEDURE — 92610 EVALUATE SWALLOWING FUNCTION: CPT | Mod: GN

## 2023-11-10 PROCEDURE — 70498 CT ANGIOGRAPHY NECK: CPT

## 2023-11-10 PROCEDURE — 74230 X-RAY XM SWLNG FUNCJ C+: CPT

## 2023-11-10 PROCEDURE — 87449 NOS EACH ORGANISM AG IA: CPT

## 2023-11-10 PROCEDURE — 85018 HEMOGLOBIN: CPT

## 2023-11-10 PROCEDURE — 99223 1ST HOSP IP/OBS HIGH 75: CPT

## 2023-11-10 PROCEDURE — 95816 EEG AWAKE AND DROWSY: CPT

## 2023-11-10 RX ORDER — AZITHROMYCIN 500 MG/1
500 TABLET, FILM COATED ORAL ONCE
Refills: 0 | Status: COMPLETED | OUTPATIENT
Start: 2023-11-10 | End: 2023-11-10

## 2023-11-10 RX ORDER — AZTREONAM 2 G
2000 VIAL (EA) INJECTION ONCE
Refills: 0 | Status: COMPLETED | OUTPATIENT
Start: 2023-11-10 | End: 2023-11-10

## 2023-11-10 RX ORDER — LEVETIRACETAM 250 MG/1
1000 TABLET, FILM COATED ORAL ONCE
Refills: 0 | Status: COMPLETED | OUTPATIENT
Start: 2023-11-10 | End: 2023-11-10

## 2023-11-10 RX ORDER — SODIUM CHLORIDE 9 MG/ML
1000 INJECTION INTRAMUSCULAR; INTRAVENOUS; SUBCUTANEOUS ONCE
Refills: 0 | Status: COMPLETED | OUTPATIENT
Start: 2023-11-10 | End: 2023-11-10

## 2023-11-10 RX ADMIN — AZITHROMYCIN 255 MILLIGRAM(S): 500 TABLET, FILM COATED ORAL at 18:47

## 2023-11-10 RX ADMIN — LEVETIRACETAM 400 MILLIGRAM(S): 250 TABLET, FILM COATED ORAL at 18:47

## 2023-11-10 RX ADMIN — SODIUM CHLORIDE 2000 MILLILITER(S): 9 INJECTION INTRAMUSCULAR; INTRAVENOUS; SUBCUTANEOUS at 16:14

## 2023-11-10 RX ADMIN — Medication 100 MILLIGRAM(S): at 20:35

## 2023-11-10 NOTE — ED PROVIDER NOTE - HIV OFFER
"""Informed patient that their cataract(s) are not visually significant or do not meet the criteria for cataract surgery.  Recommended attention to common cataract symptoms
"""Start Artificial tears both eyes BID - TID ."" ""Start FML Fluorometholone both eyes twice a day
Previously Declined (within the last year)

## 2023-11-10 NOTE — ED PROVIDER NOTE - CLINICAL SUMMARY MEDICAL DECISION MAKING FREE TEXT BOX
83-year-old female, past medical history of Lewy body dementia and parkinsonism, history of breast cancer and colon polyps status post resection, presenting from nursing home due to 1 week of altered mental status.   states that she has been persistently altered for the last week.  Normally alert and oriented x2 at baseline but is x1 today.  Labs and EKG were ordered and reviewed.  Patient hypercapnic and placed on BiPAP. Mild hypomagnesemia. Imaging was ordered and reviewed by me.  Patient noted to have basilar opacity. Appropriate medications for patient's presenting complaints were ordered and effects were reassessed.  Patient's records (prior hospital) were reviewed.  Additional history was obtained from .  Escalation to admission was considered.  Patient requires inpatient hospitalization. 83-year-old female, past medical history of Lewy body dementia and parkinsonism, history of breast cancer and colon polyps status post resection, presenting from nursing home due to 1 week of altered mental status.   states that she has been persistently altered for the last week.  Normally alert and oriented x2 at baseline but is x1 today.  Labs and EKG were ordered and reviewed.  Patient hypercapnic and placed on BiPAP. Mild hypomagnesemia. Imaging was ordered and reviewed by me.  New left thalamic intraparynchemal bleed noted on CT. Neurosurgery consulted. Patient noted to have basilar opacity. Appropriate medications for patient's presenting complaints were ordered and effects were reassessed.  Patient's records (prior hospital) were reviewed.  Additional history was obtained from .  Escalation to admission was considered.  Patient requires inpatient hospitalization. 83-year-old female, past medical history of Lewy body dementia and parkinsonism, history of breast cancer and colon polyps status post resection, presenting from nursing home due to 1 week of altered mental status.   states that she has been persistently altered for the last week.  Normally alert and oriented x2 at baseline but is x1 today.  Labs and EKG were ordered and reviewed.  Patient hypercapnic and placed on BiPAP. Mild hypomagnesemia. Imaging was ordered and reviewed by me.  New left thalamic intraparynchemal bleed noted on CT. Neurosurgery, neurology, neurocritical care consulted. Patient noted to have basilar opacity. Appropriate medications for patient's presenting complaints were ordered and effects were reassessed.  Patient's records (prior hospital) were reviewed.  Additional history was obtained from .  Escalation to admission was considered.  Patient requires inpatient hospitalization - SDU.

## 2023-11-10 NOTE — ED PROVIDER NOTE - OBJECTIVE STATEMENT
83-year-old female with past medical history of dementia, Parkinson disease who was brought in by  for altered mental status.  Per , patient's baseline is bedbound and ANO x2 and is also able to have a normal conversation, but patient has been having worsening confusion and has not been able to have a conversation with the  for the past 1 week.  Denies recent trauma or injury to her head.  Per , he has not noticed any fever or other pain/discomfort from the patient.  Rest of HPI limited, as patient is not able to provide any information.

## 2023-11-10 NOTE — ED ADULT TRIAGE NOTE - CHIEF COMPLAINT QUOTE
Patient BIBA accompanied by  from [facility] for AMS x 1 week as well as decreased urine output. Hx dementia. Patient on 2L nasal cannula prn. Abdominal distention noted.

## 2023-11-10 NOTE — ED PROVIDER NOTE - CARE PLAN
1 Principal Discharge DX:	Confusion  Secondary Diagnosis:	Pneumonia  Secondary Diagnosis:	Brain bleed

## 2023-11-10 NOTE — CONSULT NOTE ADULT - NS ATTEST RISK PROBLEM GEN_ALL_CORE FT
encephalopathy in setting of hypercapnea, ?pulmonary vascular congestion, r/o PNA, and L basal ganglia small volume IPH    ICH score 1  GCS 13    Recs indicated as above

## 2023-11-10 NOTE — ED PROVIDER NOTE - ATTENDING APP SHARED VISIT CONTRIBUTION OF CARE
83-year-old female, past medical history of Lewy body dementia and parkinsonism, history of breast cancer and colon polyps status post resection, presenting from nursing home due to 1 week of altered mental status.   states that she has been persistently altered for the last week.  Normally alert and oriented x2 at baseline but is x1 today.  He also states that she has been receiving antibiotics for questionable spot on the lung that was noted earlier this week but patient has not been improving.  Patient also has a history of UTIs and her  says that nursing home did not check for UTI.    Elderly and ill-appearing  Very dry mucous membranes  Patient unable to really converse  Abdomen soft nondistended nontender  Diminished breath sounds bilaterally

## 2023-11-10 NOTE — CONSULT NOTE ADULT - SUBJECTIVE AND OBJECTIVE BOX
HPI:    83-year-old female, past medical history of Lewy body dementia and parkinsonism, history of breast cancer and colon polyps status post resection, presenting from nursing home due to 1 week of altered mental status.   states that she has been persistently altered for the last week.  Normally alert and oriented x2 at baseline but is x1 today.  He also states that she has been receiving antibiotics for questionable spot on the lung that was noted earlier this week but patient has not been improving.  Patient also has a history of UTIs and her  says that nursing home did not check for UTI.    PAST MEDICAL & SURGICAL HISTORY:  Dementia  History of breast cancer  Constipation  Lewy body dementia without behavioral disturbance  Colon polyp  History of colon resection  H/O mastectomy, right      Home Medications:  acetaminophen 325 mg oral tablet: 2 tab(s) orally every 12 hours (24 Dec 2022 15:18)  aluminum hydroxide-magnesium hydroxide 200 mg-200 mg/5 mL oral suspension: 30 milliliter(s) orally every 4 hours, As needed, Dyspepsia (27 Dec 2022 13:19)  Artificial Tears ophthalmic solution: 1 drop(s) to each affected eye 3 times a day (24 Dec 2022 15:18)  Aspercreme with Lidocaine 4% topical cream: Apply topically to affected area 3 times a day to left knee (24 Dec 2022 15:18)  aspirin 81 mg oral tablet, chewable: 1 tab(s) orally once a day (24 Dec 2022 15:18)  atorvastatin 10 mg oral tablet: 1 tab(s) orally Monday, Wednesday, and Friday at bedtime  (24 Dec 2022 15:18)  carbidopa-levodopa 25 mg-100 mg oral tablet: 1 tab(s) orally 2 times a day (24 Dec 2022 15:18)  Colace 100 mg oral capsule: 1 cap(s) orally 2 times a day (24 Dec 2022 15:18)  dexamethasone 6 mg oral tablet: 1 tab(s) orally once a day until 2023 (27 Dec 2022 13:19)  gabapentin 600 mg oral tablet: 1 tab(s) orally 3 times a day (24 Dec 2022 15:18)  latanoprost 0.005% ophthalmic solution: 1 drop(s) to each affected eye once a day (at bedtime) (24 Dec 2022 15:18)  levothyroxine 50 mcg (0.05 mg) oral tablet: 1 tab(s) orally once a day (24 Dec 2022 15:18)  melatonin 3 mg oral tablet: 1 tab(s) orally once a day (at bedtime), As needed, Insomnia (27 Dec 2022 13:19)  omeprazole 20 mg oral delayed release capsule: 1 cap(s) orally once a day (24 Dec 2022 15:18)  polyethylene glycol 3350 oral powder for reconstitution: 17 gram(s) orally once a day (24 Dec 2022 15:18)  Senna 8.6 mg oral tablet: 2 tab(s) orally once a day (at bedtime) (24 Dec 2022 15:18)  sertraline 50 mg oral tablet: 1.5 tab(s) orally once a day (24 Dec 2022 15:18)  timolol hemihydrate 0.5% ophthalmic solution: 1 drop(s) to each affected eye 3 times a day (24 Dec 2022 15:18)  Vitamin C 500 mg oral tablet: 1 tab(s) orally 2 times a day (24 Dec 2022 15:18)  Vitamin D3 1000 intl units oral tablet: 1 tab(s) orally once a day (24 Dec 2022 15:23)      Allergies    Originally Entered as [Unknown] reaction to [neuroleptics] (Unknown)  Originally Entered as [Unknown] reaction to [pepper] (Unknown)  Originally Entered as [Unknown] reaction to [red pepper] (Unknown)  penicillin (Anaphylaxis)  benzodiazepines (Unknown)  ciprofloxacin (Unknown)  Originally Entered as [Unknown] reaction to [green pepper] (Unknown)  antichlolinergics (Unknown)    Intolerances    ROS:  [  ] A ten-point review of systems is negative except as noted   [X] Due to altered mental status/intubation, subjective information were not able to be obtained from the patient. History was obtained, to the extent possible, from review of the chart and collateral sources of information    MEDICATIONS  (STANDING):  aztreonam  IVPB 2000 milliGRAM(s) IV Intermittent once    MEDICATIONS  (PRN):      ICU Vital Signs Last 24 Hrs  T(C): 37.8 (10 Nov 2023 15:35), Max: 37.8 (10 Nov 2023 15:35)  T(F): 100.1 (10 Nov 2023 15:35), Max: 100.1 (10 Nov 2023 15:35)  HR: 89 (10 Nov 2023 18:20) (86 - 89)  BP: 148/78 (10 Nov 2023 14:14) (148/78 - 148/78)  BP(mean): --  ABP: --  ABP(mean): --  RR: 15 (10 Nov 2023 14:14) (15 - 15)  SpO2: 99% (10 Nov 2023 18:20) (98% - 99%)    O2 Parameters below as of 10 Nov 2023 14:14  Patient On (Oxygen Delivery Method): nasal cannula  O2 Flow (L/min): 2    I&O's Detail                        12.1   17.94 )-----------( 266      ( 10 Nov 2023 15:11 )             37.6     11-10    134<L>  |  101  |  16  ----------------------------<  98  3.7   |  24  |  0.7    Ca    8.6      10 Nov 2023 15:11  Mg     1.7     11-10    TPro  5.6<L>  /  Alb  3.5  /  TBili  0.5  /  DBili  x   /  AST  16  /  ALT  6   /  AlkPhos  50  11-10    CARDIAC MARKERS ( 10 Nov 2023 15:11 )  x     / 0.05 ng/mL / x     / x     / x          Urinalysis Basic - ( 10 Nov 2023 15:33 )    Color: Yellow / Appearance: Clear / S.019 / pH: x  Gluc: x / Ketone: Trace mg/dL  / Bili: Negative / Urobili: 1.0 mg/dL   Blood: x / Protein: Negative mg/dL / Nitrite: Negative   Leuk Esterase: Trace / RBC: 5 /HPF / WBC 2 /HPF   Sq Epi: x / Non Sq Epi: 2 /HPF / Bacteria: Negative /HPF    On PE:    Awake and alert. No verbalizing answers to questions.  PERRL  tracks  No droop  Unable to extend tongue  CR - globally weak  Follows some commands      Radiology:  < from: CT Head No Cont (11.10.23 @ 17:33) >  New small intraparenchymal hemorrhage in the left thalamus since the   prior CT head from 2022. No acute territorial infarct or midline   shift.    Stable severe chronic microvascular ischemic changes.    < end of copied text >      Assessment:  As above    Plan:  No Neurosurgical Intervention  Recommend Neurology workup

## 2023-11-10 NOTE — H&P ADULT - CONVERSATION DETAILS
Spoke with pts  Contreras at bedside regarding goals of care. Says that because that at that baseline she can converse normally and he believes she will make a decent recovery ,he would like her to remain full code. Therefore pt full code for now.

## 2023-11-10 NOTE — ED PROVIDER NOTE - PROGRESS NOTE DETAILS
Spoke with neurosurgery team regarding to the CT finding who evaluated patient and cleared the patient.  I spoke with neuro critical care who recommended medicine stepdown admission. Spoke with ICU who agreed to take this patient to stepdown.  Spoke with neurology team who will also follow this patient.  Family is aware of the plan and the patient has been endorsed to ICU fellow and MAR.

## 2023-11-10 NOTE — H&P ADULT - NSHPPHYSICALEXAM_GEN_ALL_CORE
GENERAL: ill appearing, on bipap  HEENT: Normocephalic, atraumatic  PULMONARY: decreased breath sounds LLL  CARDIOVASCULAR: Regular rate and rhythm, S1-S2, no murmurs   GASTROINTESTINAL: Soft, non-tender, non-distended, no guarding.   SKIN/EXTREMITIES: No LE edema b/l  NEUROLOGIC: opens eyes to sternal rub, withdraws to painful stimuli; unable to assess mental status

## 2023-11-10 NOTE — H&P ADULT - NSHPPOAPULMEMBOLUS_GEN_A_CORE
Discussed frequency and duration of pumping. Discussed hand expression and hands on pumping. Discussed when to expect transitional milk. Pt states no other questions at this time. Encouraged Pt to call with any questions or to set up an outpatient appointment as needed. no

## 2023-11-10 NOTE — H&P ADULT - HISTORY OF PRESENT ILLNESS
83-year-old female with past medical history of Brent body dementia, Parkinson disease who was brought in from [facility] for AMS.  Per , pt is AAOX2 and able to have a normal conversation at baseline , but patient has been having worsening confusion for the past week. Pt's  also says that she has been having a cough for the past week as well, not sure if productive. Unable to get rest of hx given pts mental status.     In the ED, vitals wnl. Labs with wbc 17.94, 79.9% PMNs, Mg 1.7, trops 0.05, vbg 7.09, pCO2 72, Na 114 (possible error given BMP Na 134). UA negative. CXR with left basilar opacity. CTH with New small intraparenchymal hemorrhage in the left thalamus since the prior CT head from 12/24/2022. No acute territorial infarct or midline   shift 83-year-old female with past medical history of Brent body dementia, Parkinson disease who was brought in from [facility] for AMS.  Per , pt is AAOX2 and able to have a normal conversation at baseline , but patient has been having worsening confusion for the past week. Pt's  also says that she has been having a cough for the past week as well, not sure if productive. Unable to get rest of hx given pts mental status.     In the ED, vitals wnl. Labs with wbc 17.94, 79.9% PMNs, Mg 1.7, trops 0.05, vbg 7.09, pCO2 72, Na 114 (possible error given BMP Na 134). UA negative. CXR with left basilar opacity. CTH with New small intraparenchymal hemorrhage in the left thalamus since the prior CT head from 12/24/2022. No acute territorial infarct or midline   shift. Placed on bipap, admit to SDU. 83-year-old female with past medical history of Brent body dementia, Parkinson disease who was brought in from [facility] for AMS.  Per , pt is AAOX2 and able to have a normal conversation at baseline , but patient has been having worsening confusion for the past week. Pt's  also says that she has been having a cough for the past week as well, not sure if productive. Unable to get rest of hx given pts mental status.     In the ED, vitals wnl. Labs with wbc 17.94, 79.9% PMNs, Mg 1.7, trops 0.05, vbg pH 7.09, pCO2 72, Na 114 (possible error given BMP Na 134). UA negative. CXR with left basilar opacity. CTH with New small intraparenchymal hemorrhage in the left thalamus since the prior CT head from 12/24/2022. No acute territorial infarct or midline   shift. Placed on bipap, admit to SDU.

## 2023-11-10 NOTE — ED PROVIDER NOTE - PHYSICAL EXAMINATION
CONSTITUTIONAL: in no apparent distress.   HEAD: Normocephalic; atraumatic.   EYES: Pupils are round and reactive, extra-ocular muscles are intact. Eyelids are normal in appearance without swelling or lesions.   RESPIRATORY: No signs of respiratory distress. Lung sounds are clear in all lobes bilaterally without rales, rhonchi, or wheezes.  CARDIOVASCULAR: Regular rate and rhythm.   GI: Abdomen is soft, non-tender, and without distention.   NEURO: A & O x 1. Unable to perform neuro exam as person is not following the command.

## 2023-11-10 NOTE — ED ADULT NURSE NOTE - CHIEF COMPLAINT QUOTE
Patient BIBA accompanied by  from [facility] for AMS x 1 week as well as decreased urine output. Hx dementia. Patient on 2L nasal cannula prn. Abdominal distention noted.

## 2023-11-10 NOTE — CONSULT NOTE ADULT - ASSESSMENT
83-year-old female with past medical history of dementia, Parkinson disease who was brought in by  for altered mental status. found to have a small IPH    AMS  Hypercapnia encephalopathy  Leucocytosis    Plan    - Neuro Checks Q4  - Interval CTH 6 hrs from prior scan  - SBP goal 110-140  - Correct hypercapnia  - EEG if pt does not improve with resolution of hypercapnia  - Coag labs and trend cardiac enzymes  - C/w home sinemet  - RVP to r/o viral etiology  - Disposition to medical stepdown with stroke consult      Case discussed with NCC attending, Dr. Schwab 83-year-old female with past medical history of dementia, Parkinson disease who was brought in by  for altered mental status. found to have a small IPH    AMS  Hypercapnia encephalopathy  Leucocytosis    Plan    - Neuro Checks Q4  - Interval CTH 6 hrs from prior scan  - SBP goal 110-140  - Correct hypercapnia  - EEG if pt does not improve with resolution of hypercapnia  - Coag labs and trend troponin  - C/w home sinemet  - RVP to r/o viral etiology  - Disposition to medical stepdown with stroke consult      Case discussed with NCC attending, Dr. Schwab 83-year-old female with past medical history of dementia, Parkinson disease who was brought in by  for altered mental status. found to have a small IPH    AMS  Hypercapnia encephalopathy  Leucocytosis  L basal ganglia ICH    Plan    - Neuro Checks Q4  - Interval CTH 6 hrs from prior scan  - SBP goal 110-140  - Correct hypercapnia  - EEG if pt does not improve with resolution of hypercapnia  - Coag labs and trend troponin  - C/w home sinemet  - RVP to r/o viral etiology  - Disposition to medical stepdown with stroke consult      Case discussed with NCC attending, Dr. Schwab

## 2023-11-10 NOTE — H&P ADULT - ASSESSMENT
83-year-old female with past medical history of Brent body dementia, Parkinson disease who was brought in from [facility] for AMS      #AMS 2/2 LLL pna vs hypercapnic respiratory failure  - as per , pt confused for the past week; normally able to hold conversation but no longer can  - wbc ~17, afebrile otherwise  - CXR with LLL opacity  - vbg pH 7.09, pCO2 72, placed on bipap  - f/u repeat ABG, if CO2 improved can do 4hrs on/4 hrs off and qhs  - s/p aztreonam/azithromycin in ED; continue for now  - procal  - RVP, MRSA nares, urine strep/legionella  - UA negative; f/u cultures    #IPH  -  CTH with new small IPH in left thalamus, no midline shift  - no intervention as per NSx  - as per neurocrit, CTH 6 hrs from prior scan, SBP goal 110-140  - hydralazine 5mg q6 PRN for SBP >140 and HR <70      #Hypothyroidism  - continue home levothyroxine  - f/u TSH    #Lewy body dementia  #Parkinsons disease  - continue home carbidopa-levodopa    DVT ppx: SCD  GI ppx: pepcid  Diet: regular once off bipap  Full code 83-year-old female with past medical history of Brent body dementia, Parkinson disease who was brought in from [facility] for AMS      #AMS 2/2 LLL pna vs hypercapnic respiratory failure  - as per , pt confused for the past week; normally able to hold conversation but no longer can  - wbc ~17, afebrile otherwise  - CXR with LLL opacity  - vbg pH 7.09, pCO2 72, placed on bipap  - f/u repeat ABG, if CO2 improved can do 4hrs on/4 hrs off and qhs  - s/p aztreonam/azithromycin in ED; continue for now  - procal  - RVP, MRSA nares, urine strep/legionella  - UA negative; f/u cultures    #IPH  -  CTH with new small IPH in left thalamus, no midline shift  - no intervention as per NSx  - as per neurocrit, CTH 6 hrs from prior scan, SBP goal 110-140  - hydralazine 5mg q6 PRN for SBP >140 and HR <70      #Hypothyroidism  - continue home levothyroxine  - f/u TSH    #Lewy body dementia  #Parkinsons disease  - continue home carbidopa-levodopa    DVT ppx: SCD  GI ppx: pepcid  Diet: DASH once off bipap  Full code

## 2023-11-10 NOTE — H&P ADULT - NSHPLABSRESULTS_GEN_ALL_CORE
11-10    134<L>  |  101  |  16  ----------------------------<  98  3.7   |  24  |  0.7    Ca    8.6      10 Nov 2023 15:11  Mg     1.7     11-10    TPro  5.6<L>  /  Alb  3.5  /  TBili  0.5  /  DBili  x   /  AST  16  /  ALT  6   /  AlkPhos  50  11-10                          12.1   17.94 )-----------( 266      ( 10 Nov 2023 15:11 )             37.6

## 2023-11-10 NOTE — CONSULT NOTE ADULT - SUBJECTIVE AND OBJECTIVE BOX
83-year-old female with past medical history of dementia, Parkinson disease who was brought in by  for altered mental status.  Per , patient's baseline is bedbound and ANO x2 and is also able to have a normal conversation, but patient has been having worsening confusion and has not been able to have a conversation with the  for the past 1 week.  Denies recent trauma or injury to her head. CTH shows small intraparenchymal hemorrhage in the left thalamus. NCC consulting to evaluate pt for IPH    GCS 13  mRS 5  ICH 1    ICU Vital Signs Last 24 Hrs  T(C): 37.8 (10 Nov 2023 15:35), Max: 37.8 (10 Nov 2023 15:35)  T(F): 100.1 (10 Nov 2023 15:35), Max: 100.1 (10 Nov 2023 15:35)  HR: 84 (10 Nov 2023 19:50) (84 - 89)  BP: 148/78 (10 Nov 2023 14:14) (148/78 - 148/78)  BP(mean): --  ABP: --  ABP(mean): --  RR: 15 (10 Nov 2023 14:14) (15 - 15)  SpO2: 100% (10 Nov 2023 19:50) (98% - 100%)          LABS:  Na: 134 (11-10 @ 15:11)  K: 3.7 (11-10 @ 15:11)  Cl: 101 (11-10 @ 15:11)  CO2: 24 (11-10 @ 15:11)  BUN: 16 (11-10 @ 15:11)  Cr: 0.7 (11-10 @ 15:11)  Glu: 98(11-10 @ 15:11)    Hgb: 12.1 (11-10 @ 15:11)  Hct: 37.6 (11-10 @ 15:11)  WBC: 17.94 (11-10 @ 15:11)  Plt: 266 (11-10 @ 15:11)    INR:   PTT:           LIVER FUNCTIONS - ( 10 Nov 2023 15:11 )  Alb: 3.5 g/dL / Pro: 5.6 g/dL / ALK PHOS: 50 U/L / ALT: 6 U/L / AST: 16 U/L / GGT: x                 MEDICATIONS  (STANDING):    MEDICATIONS  (PRN):      Physical Exam:  General: No acute distress  HEENT: Anicteric sclerae  Cardiac: [id number]  Lungs: Clear, on BiPAP  Abdomen: Soft, non-tender, +BS  Extremities: No c/c/e  Skin/Incision Site: Clean, dry and intact  Neurologic:  Awake, alert, oriented x1, follows commands, PERRL, mod dysarthria, no gaze preference or dysconjugate, b/l UE some effort R>L, b/l LE WD to noxious stimuli, sensation intact

## 2023-11-10 NOTE — H&P ADULT - ATTENDING COMMENTS
Patient seen and examined at bedside independently of the residents. I read the resident's note and agree with the plan with the additions and corrections as noted below. My note supersedes the resident's note.     REVIEW OF SYSTEMS:  Negative except in HPI.     PMH: Parkinson disease, Lewy body dementia     FHx: Reviewed. No fhx of asthma/copd, No fhx of liver and pulmonary disease. No fhx of hematological disorder.     Physical Exam:  GEN: No acute distress. Awake, Alert and oriented x 3.   Head: Atraumatic, Normocephalic.   Eye: PEERLA. No sclera icterus. EOMI.   ENT: Normal oropharynx, no thyromegaly, no mass, no lymphadenopathy.   LUNGS: Clear to auscultation bilaterally. No wheeze/rales/crackles.   HEART: Normal. S1/S2 present. RRR. No murmur/gallops.   ABD: Soft, non-tender, non-distended. Bowel sounds present.   EXT: No pitting edema. No erythema. No tenderness.  Integumentary: No rash, No sore, No petechia.   NEURO: CN III-XII intact. Strength: 5/5 b/l ULE. Sensory intact b/l ULE.     Vital Signs Last 24 Hrs  T(C): 37.3 (2023 00:25), Max: 37.8 (10 Nov 2023 15:35)  T(F): 99.2 (2023 00:25), Max: 100.1 (10 Nov 2023 15:35)  HR: 77 (2023 00:25) (77 - 89)  BP: 128/56 (2023 00:25) (128/56 - 148/78)  BP(mean): --  RR: 20 (2023 00:25) (15 - 20)  SpO2: 100% (2023 01:36) (98% - 100%)    Parameters below as of 2023 00:25  Patient On (Oxygen Delivery Method): BiPAP/CPAP      Please see the above notes for Labs and radiology.     Assessment and Plan:     82 yo F with hx of Parkinson disease, Lewy body dementia presents to ED for AMS.     AMS   - Ddx: PNA vs. IPH   - CTH: New small intraparenchymal hemorrhage in the left thalamus since the prior CT head from 2022. No acute territorial infarct or midline shift. Stable severe chronic microvascular ischemic changes.  - s/p eval by neurocrit and neurosurgery in ED. --> no acute intervention currently.   - repeat CTH in 6 hours   - check REEG   - Keep SBP from 110-140  - neuro check   - f/u Neurosurgery   - will treat with abx for PNA.     Acute hypoxic and hypercapnic respiratory failure likely 2/2 Left PNA   - patient has WBC 17K with temperature of 100.1 in ED.   - CXR shows Suggestion of pulmonary vascular congestion. Question of left basilar opacity/effusion.  - patient clinically does not look overloaded.   - s/p azithro and aztreonam x 1 in ED.   - c/w Vanc and aztreonam and azithromycin for now.   - f/u BCx, procalcitonin, MRSA nares, Atypical PNA panel and RVP   - check TTE and proBNP     Hypothyroidism - check TSH. c/w synthroid.     Parkinson disease with lewy body dementia - c/w home med. follow up outpatient.     DVT ppx: SCD  GI ppx:  pepcid  Diet: NPO while on BiPAP. Speech and swallow eval.   Activity: as tolerated.     Date seen by the attendin2023  Total time spent: 75 minutes. Patient seen and examined at bedside independently of the residents. I read the resident's note and agree with the plan with the additions and corrections as noted below. My note supersedes the resident's note.     REVIEW OF SYSTEMS:  Negative except in HPI.     PMH: Parkinson disease, Lewy body dementia     FHx: Reviewed. No fhx of asthma/copd, No fhx of liver and pulmonary disease. No fhx of hematological disorder.     Physical Exam:  GEN: No acute distress. Lethargic but responsive to verbal stimuli. Able to follows simple partial command. A & O x 1.   Head: Atraumatic, Normocephalic.   Eye: PEERLA. No sclera icterus.   ENT: Normal oropharynx, no thyromegaly, no mass, no lymphadenopathy.   LUNGS: Clear to auscultation bilaterally. No wheeze/rales/crackles.   HEART: Normal. S1/S2 present. RRR. No murmur/gallops.   ABD: Soft, non-tender, + distended. Bowel sounds present. + abdominal hernia with large healed surgical scar.   EXT: No pitting edema. No erythema. No tenderness.  Integumentary: No rash, No sore, No petechia.   NEURO: CN III-XII intact. Moving all extremities.     Vital Signs Last 24 Hrs  T(C): 37.3 (2023 00:25), Max: 37.8 (10 Nov 2023 15:35)  T(F): 99.2 (2023 00:25), Max: 100.1 (10 Nov 2023 15:35)  HR: 77 (2023 00:25) (77 - 89)  BP: 128/56 (2023 00:25) (128/56 - 148/78)  BP(mean): --  RR: 20 (2023 00:25) (15 - 20)  SpO2: 100% (2023 01:36) (98% - 100%)    Parameters below as of 2023 00:25  Patient On (Oxygen Delivery Method): BiPAP/CPAP      Please see the above notes for Labs and radiology.     Assessment and Plan:     84 yo F with hx of Parkinson disease, Lewy body dementia presents to ED for AMS.     AMS   - Ddx: PNA vs. IPH   - CTH: New small intraparenchymal hemorrhage in the left thalamus since the prior CT head from 2022. No acute territorial infarct or midline shift. Stable severe chronic microvascular ischemic changes.  - s/p eval by neurocrit and neurosurgery in ED. --> no acute intervention currently.   - repeat CTH in 6 hours from previous CTH   - check REEG   - Keep SBP from 110-140  - neuro check   - f/u Neurosurgery   - will treat with abx for PNA.     Acute hypoxic and hypercapnic respiratory failure likely 2/2 Left PNA   - patient has WBC 17K with temperature of 100.1 in ED.   - CXR shows Suggestion of pulmonary vascular congestion. Question of left basilar opacity/effusion.  - patient clinically does not look overloaded.   - s/p azithro and aztreonam x 1 in ED.   - c/w Vanc and aztreonam and azithromycin for now.   - f/u BCx, procalcitonin, MRSA nares, Atypical PNA panel and RVP   - check TTE and proBNP   - NPO while on BiPAP   - speech and swallow eval.     Hypothyroidism - check TSH. c/w synthroid.     Parkinson disease with lewy body dementia - c/w home med. follow up outpatient.     DVT ppx: SCD  GI ppx:  pepcid  Diet: NPO while on BiPAP. Speech and swallow eval.   Activity: as tolerated.     Date seen by the attendin2023  Total time spent: 75 minutes. Patient seen and examined at bedside independently of the residents. I read the resident's note and agree with the plan with the additions and corrections as noted below. My note supersedes the resident's note.     REVIEW OF SYSTEMS:  Negative except in HPI.     PMH: Parkinson disease, Lewy body dementia     FHx: Reviewed. No fhx of asthma/copd, No fhx of liver and pulmonary disease. No fhx of hematological disorder.     Physical Exam:  GEN: No acute distress. Lethargic but responsive to verbal stimuli. Able to follows simple partial command. A & O x 1.   Head: Atraumatic, Normocephalic.   Eye: PEERLA. No sclera icterus.   ENT: Normal oropharynx, no thyromegaly, no mass, no lymphadenopathy.   LUNGS: Clear to auscultation bilaterally. No wheeze/rales/crackles.   HEART: Normal. S1/S2 present. RRR. No murmur/gallops.   ABD: Soft, non-tender, + distended. Bowel sounds present. + abdominal hernia with large healed surgical scar.   EXT: No pitting edema. No erythema. No tenderness.  Integumentary: No rash, No sore, No petechia.   NEURO: CN III-XII intact. Moving all extremities.     Vital Signs Last 24 Hrs  T(C): 37.3 (2023 00:25), Max: 37.8 (10 Nov 2023 15:35)  T(F): 99.2 (2023 00:25), Max: 100.1 (10 Nov 2023 15:35)  HR: 77 (2023 00:25) (77 - 89)  BP: 128/56 (2023 00:25) (128/56 - 148/78)  BP(mean): --  RR: 20 (2023 00:25) (15 - 20)  SpO2: 100% (2023 01:36) (98% - 100%)    Parameters below as of 2023 00:25  Patient On (Oxygen Delivery Method): BiPAP/CPAP      Please see the above notes for Labs and radiology.     Assessment and Plan:     84 yo F with hx of Parkinson disease, Lewy body dementia presents to ED for AMS.     AMS   - Ddx: PNA vs. IPH   - CTH: New small intraparenchymal hemorrhage in the left thalamus since the prior CT head from 2022. No acute territorial infarct or midline shift. Stable severe chronic microvascular ischemic changes.  - s/p eval by neurocrit and neurosurgery in ED. --> no acute intervention currently.   - repeat CTH in 6 hours from previous CTH   - check REEG   - Keep SBP from 110-140  - neuro check   - f/u Neurosurgery   - will treat with abx for PNA.     Acute hypoxic and hypercapnic respiratory failure likely 2/2 Left PNA   - patient has WBC 17K with temperature of 100.1 in ED.   - CXR shows Suggestion of pulmonary vascular congestion. Question of left basilar opacity/effusion.  - patient clinically does not look fluid overloaded. Check pro-BNP and TTE.   - s/p azithro and aztreonam x 1 in ED.   - c/w Vanc and aztreonam and azithromycin for now.   - f/u BCx, procalcitonin, MRSA nares, Atypical PNA panel and RVP   - NPO while on BiPAP   - speech and swallow eval.   - aspiration precaution.    Hypothyroidism - check TSH. c/w synthroid.     Parkinson disease with Lewy body dementia - c/w home med. follow up outpatient.     DVT ppx: SCD  GI ppx:  pepcid  Diet: NPO while on BiPAP. Speech and swallow eval.   Activity: as tolerated.     Date seen by the attendin2023  Total time spent: 75 minutes.

## 2023-11-11 LAB
ALBUMIN SERPL ELPH-MCNC: 3.2 G/DL — LOW (ref 3.5–5.2)
ALBUMIN SERPL ELPH-MCNC: 3.2 G/DL — LOW (ref 3.5–5.2)
ALP SERPL-CCNC: 53 U/L — SIGNIFICANT CHANGE UP (ref 30–115)
ALP SERPL-CCNC: 53 U/L — SIGNIFICANT CHANGE UP (ref 30–115)
ALT FLD-CCNC: <5 U/L — SIGNIFICANT CHANGE UP (ref 0–41)
ALT FLD-CCNC: <5 U/L — SIGNIFICANT CHANGE UP (ref 0–41)
ANION GAP SERPL CALC-SCNC: 11 MMOL/L — SIGNIFICANT CHANGE UP (ref 7–14)
ANION GAP SERPL CALC-SCNC: 11 MMOL/L — SIGNIFICANT CHANGE UP (ref 7–14)
AST SERPL-CCNC: 13 U/L — SIGNIFICANT CHANGE UP (ref 0–41)
AST SERPL-CCNC: 13 U/L — SIGNIFICANT CHANGE UP (ref 0–41)
BILIRUB SERPL-MCNC: 0.5 MG/DL — SIGNIFICANT CHANGE UP (ref 0.2–1.2)
BILIRUB SERPL-MCNC: 0.5 MG/DL — SIGNIFICANT CHANGE UP (ref 0.2–1.2)
BUN SERPL-MCNC: 16 MG/DL — SIGNIFICANT CHANGE UP (ref 10–20)
BUN SERPL-MCNC: 16 MG/DL — SIGNIFICANT CHANGE UP (ref 10–20)
CALCIUM SERPL-MCNC: 8.2 MG/DL — LOW (ref 8.4–10.5)
CALCIUM SERPL-MCNC: 8.2 MG/DL — LOW (ref 8.4–10.5)
CHLORIDE SERPL-SCNC: 99 MMOL/L — SIGNIFICANT CHANGE UP (ref 98–110)
CHLORIDE SERPL-SCNC: 99 MMOL/L — SIGNIFICANT CHANGE UP (ref 98–110)
CO2 SERPL-SCNC: 23 MMOL/L — SIGNIFICANT CHANGE UP (ref 17–32)
CO2 SERPL-SCNC: 23 MMOL/L — SIGNIFICANT CHANGE UP (ref 17–32)
CREAT SERPL-MCNC: 0.6 MG/DL — LOW (ref 0.7–1.5)
CREAT SERPL-MCNC: 0.6 MG/DL — LOW (ref 0.7–1.5)
EGFR: 90 ML/MIN/1.73M2 — SIGNIFICANT CHANGE UP
EGFR: 90 ML/MIN/1.73M2 — SIGNIFICANT CHANGE UP
GAS PNL BLDA: SIGNIFICANT CHANGE UP
GAS PNL BLDA: SIGNIFICANT CHANGE UP
GLUCOSE SERPL-MCNC: 175 MG/DL — HIGH (ref 70–99)
GLUCOSE SERPL-MCNC: 175 MG/DL — HIGH (ref 70–99)
HCT VFR BLD CALC: 37.1 % — SIGNIFICANT CHANGE UP (ref 37–47)
HCT VFR BLD CALC: 37.1 % — SIGNIFICANT CHANGE UP (ref 37–47)
HGB BLD-MCNC: 11.8 G/DL — LOW (ref 12–16)
HGB BLD-MCNC: 11.8 G/DL — LOW (ref 12–16)
MAGNESIUM SERPL-MCNC: 1.8 MG/DL — SIGNIFICANT CHANGE UP (ref 1.8–2.4)
MAGNESIUM SERPL-MCNC: 1.8 MG/DL — SIGNIFICANT CHANGE UP (ref 1.8–2.4)
MCHC RBC-ENTMCNC: 30.7 PG — SIGNIFICANT CHANGE UP (ref 27–31)
MCHC RBC-ENTMCNC: 30.7 PG — SIGNIFICANT CHANGE UP (ref 27–31)
MCHC RBC-ENTMCNC: 31.8 G/DL — LOW (ref 32–37)
MCHC RBC-ENTMCNC: 31.8 G/DL — LOW (ref 32–37)
MCV RBC AUTO: 96.6 FL — SIGNIFICANT CHANGE UP (ref 81–99)
MCV RBC AUTO: 96.6 FL — SIGNIFICANT CHANGE UP (ref 81–99)
MRSA PCR RESULT.: NEGATIVE — SIGNIFICANT CHANGE UP
MRSA PCR RESULT.: NEGATIVE — SIGNIFICANT CHANGE UP
NRBC # BLD: 0 /100 WBCS — SIGNIFICANT CHANGE UP (ref 0–0)
NRBC # BLD: 0 /100 WBCS — SIGNIFICANT CHANGE UP (ref 0–0)
PLATELET # BLD AUTO: 269 K/UL — SIGNIFICANT CHANGE UP (ref 130–400)
PLATELET # BLD AUTO: 269 K/UL — SIGNIFICANT CHANGE UP (ref 130–400)
PMV BLD: 9.3 FL — SIGNIFICANT CHANGE UP (ref 7.4–10.4)
PMV BLD: 9.3 FL — SIGNIFICANT CHANGE UP (ref 7.4–10.4)
POTASSIUM SERPL-MCNC: 3.6 MMOL/L — SIGNIFICANT CHANGE UP (ref 3.5–5)
POTASSIUM SERPL-MCNC: 3.6 MMOL/L — SIGNIFICANT CHANGE UP (ref 3.5–5)
POTASSIUM SERPL-SCNC: 3.6 MMOL/L — SIGNIFICANT CHANGE UP (ref 3.5–5)
POTASSIUM SERPL-SCNC: 3.6 MMOL/L — SIGNIFICANT CHANGE UP (ref 3.5–5)
PROCALCITONIN SERPL-MCNC: 0.1 NG/ML — SIGNIFICANT CHANGE UP (ref 0.02–0.1)
PROCALCITONIN SERPL-MCNC: 0.1 NG/ML — SIGNIFICANT CHANGE UP (ref 0.02–0.1)
PROT SERPL-MCNC: 5.9 G/DL — LOW (ref 6–8)
PROT SERPL-MCNC: 5.9 G/DL — LOW (ref 6–8)
RBC # BLD: 3.84 M/UL — LOW (ref 4.2–5.4)
RBC # BLD: 3.84 M/UL — LOW (ref 4.2–5.4)
RBC # FLD: 14.1 % — SIGNIFICANT CHANGE UP (ref 11.5–14.5)
RBC # FLD: 14.1 % — SIGNIFICANT CHANGE UP (ref 11.5–14.5)
SODIUM SERPL-SCNC: 133 MMOL/L — LOW (ref 135–146)
SODIUM SERPL-SCNC: 133 MMOL/L — LOW (ref 135–146)
TSH SERPL-MCNC: 1.9 UIU/ML — SIGNIFICANT CHANGE UP (ref 0.27–4.2)
TSH SERPL-MCNC: 1.9 UIU/ML — SIGNIFICANT CHANGE UP (ref 0.27–4.2)
WBC # BLD: 14.65 K/UL — HIGH (ref 4.8–10.8)
WBC # BLD: 14.65 K/UL — HIGH (ref 4.8–10.8)
WBC # FLD AUTO: 14.65 K/UL — HIGH (ref 4.8–10.8)
WBC # FLD AUTO: 14.65 K/UL — HIGH (ref 4.8–10.8)

## 2023-11-11 PROCEDURE — 71045 X-RAY EXAM CHEST 1 VIEW: CPT | Mod: 26

## 2023-11-11 PROCEDURE — 95816 EEG AWAKE AND DROWSY: CPT | Mod: 26

## 2023-11-11 PROCEDURE — 99223 1ST HOSP IP/OBS HIGH 75: CPT

## 2023-11-11 PROCEDURE — 93306 TTE W/DOPPLER COMPLETE: CPT | Mod: 26

## 2023-11-11 PROCEDURE — 70450 CT HEAD/BRAIN W/O DYE: CPT | Mod: 26

## 2023-11-11 RX ORDER — LATANOPROST 0.05 MG/ML
1 SOLUTION/ DROPS OPHTHALMIC; TOPICAL AT BEDTIME
Refills: 0 | Status: DISCONTINUED | OUTPATIENT
Start: 2023-11-11 | End: 2023-11-20

## 2023-11-11 RX ORDER — OMEPRAZOLE 10 MG/1
1 CAPSULE, DELAYED RELEASE ORAL
Qty: 0 | Refills: 0 | DISCHARGE

## 2023-11-11 RX ORDER — CARBIDOPA AND LEVODOPA 25; 100 MG/1; MG/1
1 TABLET ORAL
Refills: 0 | Status: DISCONTINUED | OUTPATIENT
Start: 2023-11-11 | End: 2023-11-20

## 2023-11-11 RX ORDER — ASCORBIC ACID 60 MG
500 TABLET,CHEWABLE ORAL DAILY
Refills: 0 | Status: DISCONTINUED | OUTPATIENT
Start: 2023-11-11 | End: 2023-11-20

## 2023-11-11 RX ORDER — ATORVASTATIN CALCIUM 80 MG/1
1 TABLET, FILM COATED ORAL
Qty: 0 | Refills: 0 | DISCHARGE

## 2023-11-11 RX ORDER — VANCOMYCIN HCL 1 G
1000 VIAL (EA) INTRAVENOUS ONCE
Refills: 0 | Status: COMPLETED | OUTPATIENT
Start: 2023-11-11 | End: 2023-11-11

## 2023-11-11 RX ORDER — LEVOTHYROXINE SODIUM 125 MCG
50 TABLET ORAL DAILY
Refills: 0 | Status: DISCONTINUED | OUTPATIENT
Start: 2023-11-11 | End: 2023-11-20

## 2023-11-11 RX ORDER — AMLODIPINE BESYLATE 2.5 MG/1
10 TABLET ORAL ONCE
Refills: 0 | Status: COMPLETED | OUTPATIENT
Start: 2023-11-11 | End: 2023-11-11

## 2023-11-11 RX ORDER — AZITHROMYCIN 500 MG/1
500 TABLET, FILM COATED ORAL EVERY 24 HOURS
Refills: 0 | Status: DISCONTINUED | OUTPATIENT
Start: 2023-11-11 | End: 2023-11-13

## 2023-11-11 RX ORDER — VANCOMYCIN HCL 1 G
VIAL (EA) INTRAVENOUS
Refills: 0 | Status: DISCONTINUED | OUTPATIENT
Start: 2023-11-11 | End: 2023-11-12

## 2023-11-11 RX ORDER — FUROSEMIDE 40 MG
60 TABLET ORAL DAILY
Refills: 0 | Status: DISCONTINUED | OUTPATIENT
Start: 2023-11-11 | End: 2023-11-12

## 2023-11-11 RX ORDER — GABAPENTIN 400 MG/1
600 CAPSULE ORAL THREE TIMES A DAY
Refills: 0 | Status: DISCONTINUED | OUTPATIENT
Start: 2023-11-11 | End: 2023-11-20

## 2023-11-11 RX ORDER — AZTREONAM 2 G
2000 VIAL (EA) INJECTION EVERY 8 HOURS
Refills: 0 | Status: DISCONTINUED | OUTPATIENT
Start: 2023-11-11 | End: 2023-11-13

## 2023-11-11 RX ORDER — VANCOMYCIN HCL 1 G
1000 VIAL (EA) INTRAVENOUS EVERY 12 HOURS
Refills: 0 | Status: DISCONTINUED | OUTPATIENT
Start: 2023-11-11 | End: 2023-11-12

## 2023-11-11 RX ORDER — SERTRALINE 25 MG/1
1.5 TABLET, FILM COATED ORAL
Qty: 0 | Refills: 0 | DISCHARGE

## 2023-11-11 RX ORDER — HYDRALAZINE HCL 50 MG
5 TABLET ORAL EVERY 6 HOURS
Refills: 0 | Status: DISCONTINUED | OUTPATIENT
Start: 2023-11-11 | End: 2023-11-16

## 2023-11-11 RX ORDER — CHLORHEXIDINE GLUCONATE 213 G/1000ML
1 SOLUTION TOPICAL DAILY
Refills: 0 | Status: DISCONTINUED | OUTPATIENT
Start: 2023-11-11 | End: 2023-11-20

## 2023-11-11 RX ORDER — FAMOTIDINE 10 MG/ML
1 INJECTION INTRAVENOUS
Refills: 0 | DISCHARGE

## 2023-11-11 RX ORDER — SENNA PLUS 8.6 MG/1
2 TABLET ORAL AT BEDTIME
Refills: 0 | Status: DISCONTINUED | OUTPATIENT
Start: 2023-11-11 | End: 2023-11-20

## 2023-11-11 RX ORDER — POLYETHYLENE GLYCOL 3350 17 G/17G
17 POWDER, FOR SOLUTION ORAL DAILY
Refills: 0 | Status: DISCONTINUED | OUTPATIENT
Start: 2023-11-11 | End: 2023-11-20

## 2023-11-11 RX ORDER — FAMOTIDINE 10 MG/ML
20 INJECTION INTRAVENOUS DAILY
Refills: 0 | Status: DISCONTINUED | OUTPATIENT
Start: 2023-11-11 | End: 2023-11-20

## 2023-11-11 RX ORDER — GABAPENTIN 400 MG/1
600 CAPSULE ORAL THREE TIMES A DAY
Refills: 0 | Status: DISCONTINUED | OUTPATIENT
Start: 2023-11-11 | End: 2023-11-11

## 2023-11-11 RX ORDER — ASPIRIN/CALCIUM CARB/MAGNESIUM 324 MG
81 TABLET ORAL DAILY
Refills: 0 | Status: DISCONTINUED | OUTPATIENT
Start: 2023-11-11 | End: 2023-11-11

## 2023-11-11 RX ADMIN — Medication 250 MILLIGRAM(S): at 12:51

## 2023-11-11 RX ADMIN — Medication 100 MILLIGRAM(S): at 21:00

## 2023-11-11 RX ADMIN — POLYETHYLENE GLYCOL 3350 17 GRAM(S): 17 POWDER, FOR SOLUTION ORAL at 17:23

## 2023-11-11 RX ADMIN — CARBIDOPA AND LEVODOPA 1 TABLET(S): 25; 100 TABLET ORAL at 22:02

## 2023-11-11 RX ADMIN — AMLODIPINE BESYLATE 10 MILLIGRAM(S): 2.5 TABLET ORAL at 14:49

## 2023-11-11 RX ADMIN — Medication 500 MILLIGRAM(S): at 14:48

## 2023-11-11 RX ADMIN — Medication 100 MILLIGRAM(S): at 05:54

## 2023-11-11 RX ADMIN — FAMOTIDINE 20 MILLIGRAM(S): 10 INJECTION INTRAVENOUS at 14:48

## 2023-11-11 RX ADMIN — Medication 5 MILLIGRAM(S): at 06:58

## 2023-11-11 RX ADMIN — LATANOPROST 1 DROP(S): 0.05 SOLUTION/ DROPS OPHTHALMIC; TOPICAL at 22:34

## 2023-11-11 RX ADMIN — SENNA PLUS 2 TABLET(S): 8.6 TABLET ORAL at 22:02

## 2023-11-11 RX ADMIN — GABAPENTIN 600 MILLIGRAM(S): 400 CAPSULE ORAL at 22:03

## 2023-11-11 RX ADMIN — GABAPENTIN 600 MILLIGRAM(S): 400 CAPSULE ORAL at 14:48

## 2023-11-11 RX ADMIN — AZITHROMYCIN 255 MILLIGRAM(S): 500 TABLET, FILM COATED ORAL at 16:00

## 2023-11-11 RX ADMIN — CARBIDOPA AND LEVODOPA 1 TABLET(S): 25; 100 TABLET ORAL at 14:49

## 2023-11-11 NOTE — CONSULT NOTE ADULT - SUBJECTIVE AND OBJECTIVE BOX
NEUROLOGY CONSULT    "HPI: 83-year-old female with past medical history of Brent body dementia, Parkinson disease who was brought in from [facility] for AMS.  Per , pt is AAOX2 and able to have a normal conversation at baseline , but patient has been having worsening confusion for the past week. Pt's  also says that she has been having a cough for the past week as well, not sure if productive. Unable to get rest of hx given pts mental status. In the ED, vitals wnl. Labs with wbc 17.94, 79.9% PMNs, Mg 1.7, trops 0.05, vbg pH 7.09, pCO2 72, Na 114 (possible error given BMP Na 134). UA negative. CXR with left basilar opacity. CTH with New small intraparenchymal hemorrhage in the left thalamus since the prior CT head from 12/24/2022. No acute territorial infarct or midline shift. Placed on bipap, admit to SDU. (10 Nov 2023 23:44)"    Neurology was consulted for further management of her ICH. Neurosurgery and  NCC consulted: no acute intervention. Per notes and collaterals () she is an NH resident, she wasn't on any anticoagulation, only on ASA 81, not sure if her BP was elevated but possible it was high intermittently. No previous CVA per his knowledge. mRS - 5, bedridden, has swallowing problems.      MEDICATIONS  Home Medications:  acetaminophen 325 mg oral tablet: 2 tab(s) orally every 12 hours (24 Dec 2022 15:18)  aluminum hydroxide-magnesium hydroxide 200 mg-200 mg/5 mL oral suspension: 30 milliliter(s) orally every 4 hours, As needed, Dyspepsia (27 Dec 2022 13:19)  Artificial Tears ophthalmic solution: 1 drop(s) to each affected eye 3 times a day (24 Dec 2022 15:18)  Aspercreme with Lidocaine 4% topical cream: Apply topically to affected area 3 times a day to left knee (24 Dec 2022 15:18)  aspirin 81 mg oral tablet, chewable: 1 tab(s) orally once a day (24 Dec 2022 15:18)  carbidopa-levodopa 25 mg-100 mg oral tablet: 1 tab(s) orally 2 times a day (24 Dec 2022 15:18)  Colace 100 mg oral capsule: 1 cap(s) orally 2 times a day (24 Dec 2022 15:18)  famotidine 20 mg oral tablet: 1 tab(s) orally once a day (11 Nov 2023 00:17)  gabapentin 600 mg oral tablet: 1 tab(s) orally 3 times a day (24 Dec 2022 15:18)  latanoprost 0.005% ophthalmic solution: 1 drop(s) to each affected eye once a day (at bedtime) (24 Dec 2022 15:18)  levothyroxine 50 mcg (0.05 mg) oral tablet: 1 tab(s) orally once a day (24 Dec 2022 15:18)  melatonin 3 mg oral tablet: 1 tab(s) orally once a day (at bedtime), As needed, Insomnia (27 Dec 2022 13:19)  polyethylene glycol 3350 oral powder for reconstitution: 17 gram(s) orally once a day (24 Dec 2022 15:18)  Senna 8.6 mg oral tablet: 2 tab(s) orally once a day (at bedtime) (24 Dec 2022 15:18)  timolol hemihydrate 0.5% ophthalmic solution: 1 drop(s) to each affected eye 3 times a day (24 Dec 2022 15:18)  Vitamin C 500 mg oral tablet: 1 tab(s) orally 2 times a day (24 Dec 2022 15:18)  Vitamin D3 1000 intl units oral tablet: 1 tab(s) orally once a day (24 Dec 2022 15:23)    MEDICATIONS  (STANDING):  ascorbic acid 500 milliGRAM(s) Oral daily  azithromycin  IVPB 500 milliGRAM(s) IV Intermittent every 24 hours  aztreonam  IVPB 2000 milliGRAM(s) IV Intermittent every 8 hours  carbidopa/levodopa  25/100 1 Tablet(s) Oral <User Schedule>  famotidine    Tablet 20 milliGRAM(s) Oral daily  gabapentin 600 milliGRAM(s) Oral three times a day  hydrALAZINE Injectable 5 milliGRAM(s) IV Push every 6 hours  latanoprost 0.005% Ophthalmic Solution 1 Drop(s) Both EYES at bedtime  levothyroxine 50 MICROGram(s) Oral daily  polyethylene glycol 3350 17 Gram(s) Oral daily  senna 2 Tablet(s) Oral at bedtime  vancomycin  IVPB 1000 milliGRAM(s) IV Intermittent every 12 hours  vancomycin  IVPB      vancomycin  IVPB 1000 milliGRAM(s) IV Intermittent once    MEDICATIONS  (PRN):  aluminum hydroxide/magnesium hydroxide/simethicone Suspension 30 milliLiter(s) Oral every 4 hours PRN Dyspepsia  artificial  tears Solution 1 Drop(s) Both EYES three times a day PRN Dry Eyes      FAMILY HISTORY:  No pertinent family history in first degree relatives      SOCIAL HISTORY: negative for tobacco, alcohol, or ilicit drug use.    Allergies    Originally Entered as [Unknown] reaction to [neuroleptics] (Unknown)  Originally Entered as [Unknown] reaction to [pepper] (Unknown)  Originally Entered as [Unknown] reaction to [red pepper] (Unknown)  penicillin (Anaphylaxis)  benzodiazepines (Unknown)  ciprofloxacin (Unknown)  Originally Entered as [Unknown] reaction to [green pepper] (Unknown)  antichlolinergics (Unknown)    Intolerances        GEN: NAD, on non-rebreather.   NEUROLOGICAL EXAMINATION:  GENERAL:  Appearance is consistent with chronologic age.   COGNITION/LANGUAGE:  Somnolent, non-verbal, can open her eyes inconsistently on verbal commands.     CRANIAL NERVES:   - Eyes:  PERRLA, EOMI w/o nystagmus, some semiptosis b/l.    - Face:  no facial asymmetry.    MOTOR EXAM:  (R/L) 3/2 UE; 2/2 LE.   No tenderness, twitching, tremors or involuntary movements.  SENSORY EXAM:  Can withdraw in all extremities.  REFLEXES:   1+ b/l biceps, patella and 0+hilles.     NIHSS: 14 ICH Score: 2 GCS: 10    LABS:                        11.8   14.65 )-----------( 269      ( 11 Nov 2023 06:55 )             37.1     11-11    133<L>  |  99  |  16  ----------------------------<  175<H>  3.6   |  23  |  0.6<L>    Ca    8.2<L>      11 Nov 2023 06:55  Mg     1.8     11-11    TPro  5.9<L>  /  Alb  3.2<L>  /  TBili  0.5  /  DBili  x   /  AST  13  /  ALT  <5  /  AlkPhos  53  11-11    Hemoglobin A1C:   Vitamin B12     CAPILLARY BLOOD GLUCOSE          Urinalysis Basic - ( 11 Nov 2023 06:55 )    Color: x / Appearance: x / SG: x / pH: x  Gluc: 175 mg/dL / Ketone: x  / Bili: x / Urobili: x   Blood: x / Protein: x / Nitrite: x   Leuk Esterase: x / RBC: x / WBC x   Sq Epi: x / Non Sq Epi: x / Bacteria: x        ABG - ( 11 Nov 2023 01:32 )  pH, Arterial: 7.40  pH, Blood: x     /  pCO2: 41    /  pO2: 189   / HCO3: 25    / Base Excess: 0.5   /  SaO2: 100.0       Microbiology:    RADIOLOGY, EKG AND ADDITIONAL TESTS: Reviewed.

## 2023-11-11 NOTE — EEG REPORT - NS EEG TEXT BOX
[facility] Department of Neurology  Inpatient Routine  ElectroEncephaloGram (EEG)    Patient Name:	IMELDA ROLLE    :	1940  MRN:	-    Study Start Date/Time:	2023, 6:22:08 AM      Brief Clinical History:  IMELDA ROLLE is a 83 year old Female; study performed to investigate for seizures or markers of epilepsy.   Technologist notes: AMS, CONFUSION, LEWY BODY DEMENTIA, DISORIENTATION    Diagnosis Code:  R56.9 convulsions/seizure  CPT:   43315 (awake/sleep)     Pertinent Medication:	  n/a    Acquisition Details:  Electroencephalography was acquired using a minimum of 21 channels on an [employer] system v 9.3.1 with electrode placement according to the standard International 10-20 system following ACNS (American Clinical Neurophysiology Society) guidelines.  Anterior temporal T1 and T2 electrodes were utilized whenever possible    Findings:  Background:  continuous, with predominantly theta frequencies.  Voltage:  Normal (20+ uV)  Generalized Slowing:  Intermittent frequent sporadic polymorhpic delta  Organization:  Appropriate anterior-posterior gradient  Posterior Dominant Rhythm:  6 Hz symmetric, well-organized, and well-modulated  Focal abnormalities: Right hemispheric attenuation.   Sleep:  Rudimentary but likely N2 transients present.  Spontaneous Activity:  No epileptiform discharges      Events:  No electrographic seizures or significant clinical events occurred during this study  Provocations:  1)	Hyperventilation: was not performed.  2)	Photic stimulation: was not performed.  Impression:  Abnormal routine EEG, awake and drowsy    1)	There was focal slowing over the right hemisphere   2)	There was diffuse slowing of electrographic activity    Final Clinical Correlation:  1)	There were indicators of  Right hemispheric cerebral dysfunction  2)	There were indicators of Diffuse or multifocal cerebral dysfunction      Karime Shankar MD  Attending Neurologist, [facility]

## 2023-11-11 NOTE — SWALLOW BEDSIDE ASSESSMENT ADULT - SLP PERTINENT HISTORY OF CURRENT PROBLEM
83-year-old female with past medical history of Brent body dementia, Parkinson disease who was brought in from [facility] for AMS.  Per , pt is AAOX2 and able to have a normal conversation at baseline , but patient has been having worsening confusion for the past week. Pt's  also says that she has been having a cough for the past week as well, not sure if productive. Unable to get rest of hx given pts mental status.CXR with left basilar opacity. CTH with New small intraparenchymal hemorrhage in the left thalamus since the prior CT head from 12/24/2022. No acute territorial infarct or midline shift. Placed on bipap, admit to SDU. Weaned to non-rebreather and O2 NC 11/11

## 2023-11-11 NOTE — SWALLOW BEDSIDE ASSESSMENT ADULT - SWALLOW EVAL: PATIENT/FAMILY GOALS STATEMENT
Per spouse pt w/ some coughing w/ po intake, neurologist recommended a swallow eval, to spouses knowledge SLP eval was not completed at [place]

## 2023-11-11 NOTE — ED ADULT NURSE REASSESSMENT NOTE - NS ED NURSE REASSESS COMMENT FT1
received report from prior RN, pt is sleeping with BIPAP on, respond to voice, but unable to answer questions. bright red blood in folly tube noted with clots. no s/s of distress, vital stable, iv intact.

## 2023-11-11 NOTE — PATIENT PROFILE ADULT - FALL HARM RISK - HARM RISK INTERVENTIONS
Assistance with ambulation/Assistance OOB with selected safe patient handling equipment/Communicate Risk of Fall with Harm to all staff/Discuss with provider need for PT consult/Monitor gait and stability/Reinforce activity limits and safety measures with patient and family/Tailored Fall Risk Interventions/Visual Cue: Yellow wristband and red socks/Bed in lowest position, wheels locked, appropriate side rails in place/Call bell, personal items and telephone in reach/Instruct patient to call for assistance before getting out of bed or chair/Non-slip footwear when patient is out of bed/Grant to call system/Physically safe environment - no spills, clutter or unnecessary equipment/Purposeful Proactive Rounding/Room/bathroom lighting operational, light cord in reach

## 2023-11-11 NOTE — CONSULT NOTE ADULT - ASSESSMENT
83-year-old female with past medical history of Lewy body dementia admitted for AMS 2/2 hypercapnic state with elevated WBC and L basilar lung opacification, found having small Left thalamic hemorrhage most likely 2/2 to HTN.       Recommendations:     - Neuro Checks (NIHSS) -  q4h  - Interval CTH 24 hrs from the last scan  - CTA of Neck and head  - SBP goal 110-140  - MRI brain w/o when possible  - Correct hypercapnia, infection w/up  - Coag labs and trend troponin  - C/w home sinemet       Case discussed with neurovascular attending, Dr. Hill   83-year-old female with past medical history of Lewy body dementia admitted for AMS 2/2 hypercapnic state with elevated WBC and L basilar lung opacification, found having small Left thalamic hemorrhage most likely 2/2 to HTN.       Recommendations:     - Neuro Checks (NIHSS) -  q4h  - Interval CTH 24 hrs from the last scan  - CTA of Neck and head  - SBP goal 110-140  - hold ASA for now  - MRI brain w/o when possible  - Correct hypercapnia, infection w/up  - Coag labs and trend troponin  - C/w home sinemet       Case discussed with neurovascular attending, Dr. Hill

## 2023-11-12 LAB
NT-PROBNP SERPL-SCNC: 790 PG/ML — HIGH (ref 0–300)
NT-PROBNP SERPL-SCNC: 790 PG/ML — HIGH (ref 0–300)
RAPID RVP RESULT: SIGNIFICANT CHANGE UP
RAPID RVP RESULT: SIGNIFICANT CHANGE UP
S PNEUM AG UR QL: NEGATIVE — SIGNIFICANT CHANGE UP
S PNEUM AG UR QL: NEGATIVE — SIGNIFICANT CHANGE UP
SARS-COV-2 RNA SPEC QL NAA+PROBE: SIGNIFICANT CHANGE UP
SARS-COV-2 RNA SPEC QL NAA+PROBE: SIGNIFICANT CHANGE UP
TROPONIN T SERPL-MCNC: 0.03 NG/ML — CRITICAL HIGH
TROPONIN T SERPL-MCNC: 0.03 NG/ML — CRITICAL HIGH

## 2023-11-12 PROCEDURE — 70450 CT HEAD/BRAIN W/O DYE: CPT | Mod: 26,XU

## 2023-11-12 PROCEDURE — 70496 CT ANGIOGRAPHY HEAD: CPT | Mod: 26

## 2023-11-12 PROCEDURE — 99232 SBSQ HOSP IP/OBS MODERATE 35: CPT

## 2023-11-12 PROCEDURE — 99233 SBSQ HOSP IP/OBS HIGH 50: CPT

## 2023-11-12 PROCEDURE — 70498 CT ANGIOGRAPHY NECK: CPT | Mod: 26

## 2023-11-12 RX ORDER — HEPARIN SODIUM 5000 [USP'U]/ML
5000 INJECTION INTRAVENOUS; SUBCUTANEOUS EVERY 12 HOURS
Refills: 0 | Status: DISCONTINUED | OUTPATIENT
Start: 2023-11-12 | End: 2023-11-16

## 2023-11-12 RX ADMIN — Medication 500 MILLIGRAM(S): at 11:12

## 2023-11-12 RX ADMIN — CARBIDOPA AND LEVODOPA 1 TABLET(S): 25; 100 TABLET ORAL at 17:07

## 2023-11-12 RX ADMIN — CARBIDOPA AND LEVODOPA 1 TABLET(S): 25; 100 TABLET ORAL at 05:27

## 2023-11-12 RX ADMIN — Medication 60 MILLIGRAM(S): at 05:27

## 2023-11-12 RX ADMIN — Medication 1000 MILLIGRAM(S): at 00:28

## 2023-11-12 RX ADMIN — Medication 100 MILLIGRAM(S): at 20:12

## 2023-11-12 RX ADMIN — FAMOTIDINE 20 MILLIGRAM(S): 10 INJECTION INTRAVENOUS at 11:12

## 2023-11-12 RX ADMIN — SENNA PLUS 2 TABLET(S): 8.6 TABLET ORAL at 21:02

## 2023-11-12 RX ADMIN — Medication 50 MICROGRAM(S): at 05:28

## 2023-11-12 RX ADMIN — CHLORHEXIDINE GLUCONATE 1 APPLICATION(S): 213 SOLUTION TOPICAL at 11:14

## 2023-11-12 RX ADMIN — GABAPENTIN 600 MILLIGRAM(S): 400 CAPSULE ORAL at 15:08

## 2023-11-12 RX ADMIN — GABAPENTIN 600 MILLIGRAM(S): 400 CAPSULE ORAL at 21:04

## 2023-11-12 RX ADMIN — LATANOPROST 1 DROP(S): 0.05 SOLUTION/ DROPS OPHTHALMIC; TOPICAL at 22:51

## 2023-11-12 RX ADMIN — Medication 100 MILLIGRAM(S): at 13:08

## 2023-11-12 RX ADMIN — AZITHROMYCIN 255 MILLIGRAM(S): 500 TABLET, FILM COATED ORAL at 15:27

## 2023-11-12 RX ADMIN — GABAPENTIN 600 MILLIGRAM(S): 400 CAPSULE ORAL at 05:27

## 2023-11-12 RX ADMIN — Medication 100 MILLIGRAM(S): at 03:38

## 2023-11-12 NOTE — PROGRESS NOTE ADULT - ASSESSMENT
83-year-old female with PMHx of Lewy Body dementia with associated autonomic dysfunction including dysphagia, hypothyroidism, who presented FROM NH  with encephalopathy x 1 week.          []Encephalopathy could be TME  hypercapnic respiratory failure suspected 2/2 aspiration pneumonia vs IPH     - CTH: New small intraparenchymal hemorrhage in the left thalamus since the prior CT head from 12/24/2022. No acute territorial infarct or midline shift. Stable severe chronic microvascular ischemic changes.  - s/p eval by neurocrit and neurosurgery in ED. --> no acute intervention currently.   - repeat CTH No changes   Repeat CTH today Stable left thalamic intracranial hemorrhage.  - check REEG   - Keep SBP from 110-140  - neuro o board CTA head and neck done pending report   MRI brain with/without   Continue home sinemet  SBP goal 100-140, Q4 neurochecks       Acute hypoxic and hypercapnic respiratory failure could be 2/2 Left PNA   - patient has WBC 17K with temperature of 100.1 in ED.   - CXR b/l bilateral opacities  - patient clinically does not look fluid overloaded.   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   - f/u BCx,, f/u positive urine culture , will consult ID given hx of abcx allergy    procalcitonin negative , urin strep negative and RVP negative   f/u urine legionella   -on NC  ,off biapap   - speech and swallow eval appreciated,  NGT feeding for now   - aspiration precaution.  nutrition consult   echo noted  EF of 60%.  mild mitral stenosis  Mild aortic valve stenosis  pro-  hold lasix         Hypothyroidism - TSH 1.90 . c/w synthroid.     Parkinson disease with Lewy body dementia - c/w home med. ./ neuro on board      DVT ppx: SCD- may start DVT ppx as per neuro so will start heparin sq       skin care  frequent turning, off loading,and positioning and skin care as per protocol, Maintain pressure injury prevention, Keep skin clean, Offload heels, Monitor  for wounds and notify provider if any     GI ppx:  pepcid  Diet:  Speech and swallow f/u , NGT for now while off BiPAP       PENDING: ID, CTA Reports, MRI brain , neurochecks , SLP

## 2023-11-12 NOTE — PROGRESS NOTE ADULT - SUBJECTIVE AND OBJECTIVE BOX
Neurology Progress Note    Interval History:    Patient was seen and examined, no acute events over night.     Medications:  aluminum hydroxide/magnesium hydroxide/simethicone Suspension 30 milliLiter(s) Oral every 4 hours PRN  artificial  tears Solution 1 Drop(s) Both EYES three times a day PRN  ascorbic acid 500 milliGRAM(s) Oral daily  azithromycin  IVPB 500 milliGRAM(s) IV Intermittent every 24 hours  aztreonam  IVPB 2000 milliGRAM(s) IV Intermittent every 8 hours  carbidopa/levodopa  25/100 1 Tablet(s) Oral <User Schedule>  chlorhexidine 2% Cloths 1 Application(s) Topical daily  famotidine    Tablet 20 milliGRAM(s) Oral daily  gabapentin Solution 600 milliGRAM(s) Oral three times a day  hydrALAZINE Injectable 5 milliGRAM(s) IV Push every 6 hours  latanoprost 0.005% Ophthalmic Solution 1 Drop(s) Both EYES at bedtime  levothyroxine 50 MICROGram(s) Oral daily  polyethylene glycol 3350 17 Gram(s) Oral daily  senna 2 Tablet(s) Oral at bedtime      Vital Signs Last 24 Hrs  T(C): 35.8 (12 Nov 2023 16:00), Max: 36.7 (11 Nov 2023 20:00)  T(F): 96.4 (12 Nov 2023 16:00), Max: 98 (11 Nov 2023 20:00)  HR: 96 (12 Nov 2023 16:00) (77 - 96)  BP: 120/63 (12 Nov 2023 16:00) (107/53 - 120/63)  BP(mean): 84 (12 Nov 2023 04:00) (80 - 84)  RR: 20 (12 Nov 2023 16:00) (16 - 20)  SpO2: 94% (12 Nov 2023 16:00) (94% - 96%)    Parameters below as of 12 Nov 2023 16:00  Patient On (Oxygen Delivery Method): room air        NEUROLOGICAL EXAMINATION:  GENERAL:  Appearance is consistent with chronologic age.   COGNITION/LANGUAGE:  Awake, alert, and oriented to person, place, time and date.  Fluent, intact comprehension, repetition, naming. Recent and remote memory intact.  Fund of knowledge is appropriate.  Nondysarthric.    CRANIAL NERVES:   - Eyes:  Visual acuity intact. Pupils equal round and reactive, no RAPD. EOMI w/o nystagmus, skew or reported double vision. Normal visual field on confrontation. No ptosis/weakness of eyelid closure.   - Face:  Facial sensation normal V1 - 3, no facial asymmetry.    - Ears/Nose/Throat:  Hearing grossly intact b/l to finger rub.  Palate elevates midline.  Tongue and uvula midline.   MOTOR EXAM:  (R/L) 5/5 UE; 5/5 LE.  No observable drift. Normal tone and bulk. No tenderness, twitching, tremors or involuntary movements.  SENSORY EXAM:  Intact to light touch and pinprick, pain, temperature and proprioception and vibration in all extremities.  REFLEXES:   2+ b/l biceps, triceps, patella and achilles.  Plantar response downgoing b/l.  Jaw jerk, Ronny, clonus absent.  CEREBELLUM:  Finger to nose/Heel to knee and shin intact.  No dysmetria.    GAIT: narrow based and normal.  Romberg: negative.   NIHSS: **** ASPECT Score: ***** ICH Score: ***** (GCS)    Labs:  CBC Full  -  ( 11 Nov 2023 06:55 )  WBC Count : 14.65 K/uL  RBC Count : 3.84 M/uL  Hemoglobin : 11.8 g/dL  Hematocrit : 37.1 %  Platelet Count - Automated : 269 K/uL  Mean Cell Volume : 96.6 fL  Mean Cell Hemoglobin : 30.7 pg  Mean Cell Hemoglobin Concentration : 31.8 g/dL  Auto Neutrophil # : x  Auto Lymphocyte # : x  Auto Monocyte # : x  Auto Eosinophil # : x  Auto Basophil # : x  Auto Neutrophil % : x  Auto Lymphocyte % : x  Auto Monocyte % : x  Auto Eosinophil % : x  Auto Basophil % : x    11-11    133<L>  |  99  |  16  ----------------------------<  175<H>  3.6   |  23  |  0.6<L>    Ca    8.2<L>      11 Nov 2023 06:55  Mg     1.8     11-11    TPro  5.9<L>  /  Alb  3.2<L>  /  TBili  0.5  /  DBili  x   /  AST  13  /  ALT  <5  /  AlkPhos  53  11-11    LIVER FUNCTIONS - ( 11 Nov 2023 06:55 )  Alb: 3.2 g/dL / Pro: 5.9 g/dL / ALK PHOS: 53 U/L / ALT: <5 U/L / AST: 13 U/L / GGT: x             Urinalysis Basic - ( 11 Nov 2023 06:55 )    Color: x / Appearance: x / SG: x / pH: x  Gluc: 175 mg/dL / Ketone: x  / Bili: x / Urobili: x   Blood: x / Protein: x / Nitrite: x   Leuk Esterase: x / RBC: x / WBC x   Sq Epi: x / Non Sq Epi: x / Bacteria: x        RADIOLOGY & ADDITIONAL TESTS:   Neurology Progress Note           Interval History:       Patient was seen and examined. 24 h. Repeat CTH - stable. EEG showed focal slowing over the right hemisphere with diffuse slowing of electrographic activity                 Medications:     aluminum hydroxide/magnesium hydroxide/simethicone Suspension 30 milliLiter(s) Oral every 4 hours PRN     artificial  tears Solution 1 Drop(s) Both EYES three times a day PRN     ascorbic acid 500 milliGRAM(s) Oral daily     azithromycin  IVPB 500 milliGRAM(s) IV Intermittent every 24 hours     aztreonam  IVPB 2000 milliGRAM(s) IV Intermittent every 8 hours     carbidopa/levodopa  25/100 1 Tablet(s) Oral <User Schedule>     chlorhexidine 2% Cloths 1 Application(s) Topical daily     famotidine    Tablet 20 milliGRAM(s) Oral daily     gabapentin Solution 600 milliGRAM(s) Oral three times a day     hydrALAZINE Injectable 5 milliGRAM(s) IV Push every 6 hours     latanoprost 0.005% Ophthalmic Solution 1 Drop(s) Both EYES at bedtime     levothyroxine 50 MICROGram(s) Oral daily     polyethylene glycol 3350 17 Gram(s) Oral daily     senna 2 Tablet(s) Oral at bedtime                 Vital Signs Last 24 Hrs     T(C): 35.8 (12 Nov 2023 16:00), Max: 36.7 (11 Nov 2023 20:00)     T(F): 96.4 (12 Nov 2023 16:00), Max: 98 (11 Nov 2023 20:00)     HR: 96 (12 Nov 2023 16:00) (77 - 96)     BP: 120/63 (12 Nov 2023 16:00) (107/53 - 120/63)     BP(mean): 84 (12 Nov 2023 04:00) (80 - 84)     RR: 20 (12 Nov 2023 16:00) (16 - 20)     SpO2: 94% (12 Nov 2023 16:00) (94% - 96%)           Parameters below as of 12 Nov 2023 16:00     Patient On (Oxygen Delivery Method): room air                 GEN: NAD,       NEUROLOGICAL EXAMINATION:     GENERAL:  Appearance is consistent with chronologic age.      COGNITION/LANGUAGE:  Awake, alert and oriented to person, follows simple verbal commands.        CRANIAL NERVES:      - Eyes:  PERRLA, EOMI w/o nystagmus, some semiptosis b/l.       - Face:  no facial asymmetry.       MOTOR EXAM:  (R/L) 3/2 UE; 1/1 LE.   No tenderness, twitching, tremors or involuntary movements.     SENSORY EXAM:  Can withdraw in all extremities for the noxious stimuli     REFLEXES:   1+ b/l biceps, patella and 0+hilles.        NIHSS: 14 ICH Score: 2 GCS: 13   Labs:  CBC Full  -  ( 11 Nov 2023 06:55 )  WBC Count : 14.65 K/uL  RBC Count : 3.84 M/uL  Hemoglobin : 11.8 g/dL  Hematocrit : 37.1 %  Platelet Count - Automated : 269 K/uL  Mean Cell Volume : 96.6 fL  Mean Cell Hemoglobin : 30.7 pg  Mean Cell Hemoglobin Concentration : 31.8 g/dL  Auto Neutrophil # : x  Auto Lymphocyte # : x  Auto Monocyte # : x  Auto Eosinophil # : x  Auto Basophil # : x  Auto Neutrophil % : x  Auto Lymphocyte % : x  Auto Monocyte % : x  Auto Eosinophil % : x  Auto Basophil % : x    11-11    133<L>  |  99  |  16  ----------------------------<  175<H>  3.6   |  23  |  0.6<L>    Ca    8.2<L>      11 Nov 2023 06:55  Mg     1.8     11-11    TPro  5.9<L>  /  Alb  3.2<L>  /  TBili  0.5  /  DBili  x   /  AST  13  /  ALT  <5  /  AlkPhos  53  11-11    LIVER FUNCTIONS - ( 11 Nov 2023 06:55 )  Alb: 3.2 g/dL / Pro: 5.9 g/dL / ALK PHOS: 53 U/L / ALT: <5 U/L / AST: 13 U/L / GGT: x             Urinalysis Basic - ( 11 Nov 2023 06:55 )    Color: x / Appearance: x / SG: x / pH: x  Gluc: 175 mg/dL / Ketone: x  / Bili: x / Urobili: x   Blood: x / Protein: x / Nitrite: x   Leuk Esterase: x / RBC: x / WBC x   Sq Epi: x / Non Sq Epi: x / Bacteria: x        RADIOLOGY & ADDITIONAL TESTS:

## 2023-11-12 NOTE — CONSULT NOTE ADULT - ASSESSMENT
IMPRESSION:    Left thalamus IPH   Possible aspiration  Hypercapnic respiratory failure - improved  Sepsis POA   HO Lewy body dementia  HO parkinson's   HO Breast ca on the right    PLAN:    CNS: avoid sedation, follow up with neurology, Repeat CTH noted, EEG (-) for seizure, neuro checks per neuro, CTA head/neck pending, restart sinemet     HEENT: oral care    PULMONARY: wean o2 to goal >92%, aspiration precautions, abg noted    CARDIOVASCULAR: trops elevated, repeat pending, BNP noted, echo, goal directed fluid, strict i/os     GI: GI prophylaxis. speech and swallow, aspiration precautions, tube feeds    RENAL: correct lytes as needed    INFECTIOUS DISEASE: on aztreonam, bcx, ucx, ua     HEMATOLOGICAL:  DVT prophylaxis once cleared by neuro, SCDs    ENDOCRINE:  Follow up FS.  Insulin protocol if needed.    MUSCULOSKELETAL: PT/OT, fall precautions    SDU  poor prognosis  goals of care  IMPRESSION:  AMS   Left thalamus IPH   Acute Hypercapnic respiratory failure - improved  HO Lewy body dementia  HO parkinson's   HO Breast ca on the right    PLAN:    CNS: avoid sedation, follow up with neurology, Repeat CTH noted, EEG (-) for seizure, neuro checks per neuro, CTA head/neck pending, restart sinemet     HEENT: oral care    PULMONARY: wean o2 to goal >92%, aspiration precautions, abg noted.      CARDIOVASCULAR: trops elevated, repeat pending, BNP noted, echo, goal directed fluid, strict i/os     GI: GI prophylaxis. speech and swallow, aspiration precautions, tube feeds for now     RENAL: correct lytes as needed    INFECTIOUS DISEASE: FU cultures.  Procal.  ABX per ID     HEMATOLOGICAL:  DVT prophylaxis once cleared by neuro, SCDs    ENDOCRINE:  Follow up FS.  Insulin protocol if needed.    MUSCULOSKELETAL: PT/OT, fall precautions    SDU  poor prognosis  goals of care

## 2023-11-12 NOTE — PROGRESS NOTE ADULT - ASSESSMENT
83-year-old female with past medical history of Lewy body dementia admitted for AMS 2/2 hypercapnic state with elevated WBC and L basilar lung opacification, found having small Left thalamic hemorrhage most likely 2/2 to HTN. Today she is more lucid and follows some simple commands. Repeat CTH - stable. EEG - no ictal pattern.            Recommendations:            - c/w Neuro Checks (NIHSS) -  q4h     - can start DVT ppx     - f/u CTA of Neck and head official report     - SBP goal 110-140     - MRI brain w and w/o when possible     - C/w home sinemet                 Case discussed with neurovascular attending, Dr. Hill

## 2023-11-12 NOTE — CONSULT NOTE ADULT - SUBJECTIVE AND OBJECTIVE BOX
Patient is a 83y old  Female who presents with a chief complaint of ams (11 Nov 2023 09:39)      HPI:  83-year-old female with past medical history of Brent body dementia, Parkinson disease who was brought in from [facility] for AMS.  Per , pt is AAOX2 and able to have a normal conversation at baseline , but patient has been having worsening confusion for the past week. Pt's  also says that she has been having a cough for the past week as well, not sure if productive. Unable to get rest of hx given pts mental status.     In the ED, vitals wnl. Labs with wbc 17.94, 79.9% PMNs, Mg 1.7, trops 0.05, vbg pH 7.09, pCO2 72, Na 114 (possible error given BMP Na 134). UA negative. CXR with left basilar opacity. CTH with New small intraparenchymal hemorrhage in the left thalamus since the prior CT head from 12/24/2022. No acute territorial infarct or midline   shift. Placed on bipap, admit to SDU. 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  No cardiovascular or pulmonary family history     REVIEW OF SYSTEMS:    All ROS are negative exept per HPI       Allergies    Originally Entered as [Unknown] reaction to [neuroleptics] (Unknown)  Originally Entered as [Unknown] reaction to [pepper] (Unknown)  Originally Entered as [Unknown] reaction to [red pepper] (Unknown)  penicillin (Anaphylaxis)  benzodiazepines (Unknown)  ciprofloxacin (Unknown)  Originally Entered as [Unknown] reaction to [green pepper] (Unknown)  antichlolinergics (Unknown)    Intolerances          PHYSICAL EXAM  Vital Signs Last 24 Hrs  T(C): 36.1 (12 Nov 2023 07:19), Max: 37.6 (11 Nov 2023 15:48)  T(F): 97 (12 Nov 2023 07:19), Max: 99.7 (11 Nov 2023 15:48)  HR: 86 (12 Nov 2023 07:19) (77 - 106)  BP: 113/58 (12 Nov 2023 07:19) (103/54 - 126/63)  BP(mean): 84 (12 Nov 2023 04:00) (80 - 84)  RR: 18 (12 Nov 2023 07:19) (16 - 20)  SpO2: 94% (12 Nov 2023 07:19) (94% - 98%)    Parameters below as of 12 Nov 2023 04:00  Patient On (Oxygen Delivery Method): room air        CONSTITUTIONAL:  Well nourished.  NAD    ENT:   Airway patent,   No thrush    EYES:   Clear bilaterally,   pupils equal,   round and reactive to light.    CARDIAC:   Normal rate,   regular rhythm.    no edema      RESPIRATORY:   No wheezing   Normal chest expansion  Not tachypneic,  No use of accessory muscles    GASTROINTESTINAL:  Abdomen soft, non-tender,   No guarding,   Positive BS    MUSCULOSKELETAL:   Range of motion is not limited,  No clubbing, cyanosis    NEUROLOGICAL:   Alert and oriented   No motor deficits.    SKIN:   Skin normal color for race,   No evidence of rash.      HEME LYMPH:   No cervical  lymphadenopathy.  no inguinal lymphadenopathy          LABS:                          11.8   14.65 )-----------( 269      ( 11 Nov 2023 06:55 )             37.1                                               11-11    133<L>  |  99  |  16  ----------------------------<  175<H>  3.6   |  23  |  0.6<L>    Ca    8.2<L>      11 Nov 2023 06:55  Mg     1.8     11-11    TPro  5.9<L>  /  Alb  3.2<L>  /  TBili  0.5  /  DBili  x   /  AST  13  /  ALT  <5  /  AlkPhos  53  11-11                                             Urinalysis Basic - ( 11 Nov 2023 06:55 )    Color: x / Appearance: x / SG: x / pH: x  Gluc: 175 mg/dL / Ketone: x  / Bili: x / Urobili: x   Blood: x / Protein: x / Nitrite: x   Leuk Esterase: x / RBC: x / WBC x   Sq Epi: x / Non Sq Epi: x / Bacteria: x        CARDIAC MARKERS ( 10 Nov 2023 15:11 )  x     / 0.05 ng/mL / x     / x     / x                                                LIVER FUNCTIONS - ( 11 Nov 2023 06:55 )  Alb: 3.2 g/dL / Pro: 5.9 g/dL / ALK PHOS: 53 U/L / ALT: <5 U/L / AST: 13 U/L / GGT: x                                                                                            ABG - ( 11 Nov 2023 01:32 )  pH, Arterial: 7.40  pH, Blood: x     /  pCO2: 41    /  pO2: 189   / HCO3: 25    / Base Excess: 0.5   /  SaO2: 100.0               MEDICATIONS  (STANDING):  ascorbic acid 500 milliGRAM(s) Oral daily  azithromycin  IVPB 500 milliGRAM(s) IV Intermittent every 24 hours  aztreonam  IVPB 2000 milliGRAM(s) IV Intermittent every 8 hours  carbidopa/levodopa  25/100 1 Tablet(s) Oral <User Schedule>  chlorhexidine 2% Cloths 1 Application(s) Topical daily  famotidine    Tablet 20 milliGRAM(s) Oral daily  furosemide   Injectable 60 milliGRAM(s) IV Push daily  gabapentin Solution 600 milliGRAM(s) Oral three times a day  hydrALAZINE Injectable 5 milliGRAM(s) IV Push every 6 hours  latanoprost 0.005% Ophthalmic Solution 1 Drop(s) Both EYES at bedtime  levothyroxine 50 MICROGram(s) Oral daily  polyethylene glycol 3350 17 Gram(s) Oral daily  senna 2 Tablet(s) Oral at bedtime  vancomycin  IVPB 1000 milliGRAM(s) IV Intermittent every 12 hours  vancomycin  IVPB        MEDICATIONS  (PRN):  aluminum hydroxide/magnesium hydroxide/simethicone Suspension 30 milliLiter(s) Oral every 4 hours PRN Dyspepsia  artificial  tears Solution 1 Drop(s) Both EYES three times a day PRN Dry Eyes      X-Rays reviewed:    CXR interpreted by me: Patient is a 83y old  Female who presents with a chief complaint of ams (11 Nov 2023 09:39)      HPI:  83-year-old female with past medical history of Brent body dementia, Parkinson disease who was brought in from [facility] for AMS.  Per , pt is AAOX2 and able to have a normal conversation at baseline , but patient has been having worsening confusion for the past week. Pt's  also says that she has been having a cough for the past week as well, not sure if productive. Unable to get rest of hx given pts mental status.     In the ED, vitals wnl. Labs with wbc 17.94, 79.9% PMNs, Mg 1.7, trops 0.05, vbg pH 7.09, pCO2 72, Na 114 (possible error given BMP Na 134). UA negative. CXR with left basilar opacity. CTH with New small intraparenchymal hemorrhage in the left thalamus since the prior CT head from 12/24/2022. No acute territorial infarct or midline   shift. Placed on bipap, admit to SDU. 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  No cardiovascular or pulmonary family history     REVIEW OF SYSTEMS:    All ROS are negative exept per HPI       Allergies    Originally Entered as [Unknown] reaction to [neuroleptics] (Unknown)  Originally Entered as [Unknown] reaction to [pepper] (Unknown)  Originally Entered as [Unknown] reaction to [red pepper] (Unknown)  penicillin (Anaphylaxis)  benzodiazepines (Unknown)  ciprofloxacin (Unknown)  Originally Entered as [Unknown] reaction to [green pepper] (Unknown)  antichlolinergics (Unknown)    Intolerances          PHYSICAL EXAM  Vital Signs Last 24 Hrs  T(C): 36.1 (12 Nov 2023 07:19), Max: 37.6 (11 Nov 2023 15:48)  T(F): 97 (12 Nov 2023 07:19), Max: 99.7 (11 Nov 2023 15:48)  HR: 86 (12 Nov 2023 07:19) (77 - 106)  BP: 113/58 (12 Nov 2023 07:19) (103/54 - 126/63)  BP(mean): 84 (12 Nov 2023 04:00) (80 - 84)  RR: 18 (12 Nov 2023 07:19) (16 - 20)  SpO2: 94% (12 Nov 2023 07:19) (94% - 98%)    Parameters below as of 12 Nov 2023 04:00  Patient On (Oxygen Delivery Method): room air        CONSTITUTIONAL:  Well nourished.  NAD    CARDIAC:   Normal rate,   regular rhythm.    no edema    RESPIRATORY:   BL BS  no acc muscle use     GASTROINTESTINAL:  Abdomen soft, non-tender,   No guarding,     MUSCULOSKELETAL:   Range of motion is not limited,  No clubbing, cyanosis    NEUROLOGICAL:   Alert and oriented x1    SKIN:   Skin normal color for race,   No evidence of rash.        LABS:                          11.8   14.65 )-----------( 269      ( 11 Nov 2023 06:55 )             37.1                                               11-11    133<L>  |  99  |  16  ----------------------------<  175<H>  3.6   |  23  |  0.6<L>    Ca    8.2<L>      11 Nov 2023 06:55  Mg     1.8     11-11    TPro  5.9<L>  /  Alb  3.2<L>  /  TBili  0.5  /  DBili  x   /  AST  13  /  ALT  <5  /  AlkPhos  53  11-11                                             Urinalysis Basic - ( 11 Nov 2023 06:55 )    Color: x / Appearance: x / SG: x / pH: x  Gluc: 175 mg/dL / Ketone: x  / Bili: x / Urobili: x   Blood: x / Protein: x / Nitrite: x   Leuk Esterase: x / RBC: x / WBC x   Sq Epi: x / Non Sq Epi: x / Bacteria: x        CARDIAC MARKERS ( 10 Nov 2023 15:11 )  x     / 0.05 ng/mL / x     / x     / x                                                LIVER FUNCTIONS - ( 11 Nov 2023 06:55 )  Alb: 3.2 g/dL / Pro: 5.9 g/dL / ALK PHOS: 53 U/L / ALT: <5 U/L / AST: 13 U/L / GGT: x                                                                                            ABG - ( 11 Nov 2023 01:32 )  pH, Arterial: 7.40  pH, Blood: x     /  pCO2: 41    /  pO2: 189   / HCO3: 25    / Base Excess: 0.5   /  SaO2: 100.0               MEDICATIONS  (STANDING):  ascorbic acid 500 milliGRAM(s) Oral daily  azithromycin  IVPB 500 milliGRAM(s) IV Intermittent every 24 hours  aztreonam  IVPB 2000 milliGRAM(s) IV Intermittent every 8 hours  carbidopa/levodopa  25/100 1 Tablet(s) Oral <User Schedule>  chlorhexidine 2% Cloths 1 Application(s) Topical daily  famotidine    Tablet 20 milliGRAM(s) Oral daily  furosemide   Injectable 60 milliGRAM(s) IV Push daily  gabapentin Solution 600 milliGRAM(s) Oral three times a day  hydrALAZINE Injectable 5 milliGRAM(s) IV Push every 6 hours  latanoprost 0.005% Ophthalmic Solution 1 Drop(s) Both EYES at bedtime  levothyroxine 50 MICROGram(s) Oral daily  polyethylene glycol 3350 17 Gram(s) Oral daily  senna 2 Tablet(s) Oral at bedtime  vancomycin  IVPB 1000 milliGRAM(s) IV Intermittent every 12 hours  vancomycin  IVPB        MEDICATIONS  (PRN):  aluminum hydroxide/magnesium hydroxide/simethicone Suspension 30 milliLiter(s) Oral every 4 hours PRN Dyspepsia  artificial  tears Solution 1 Drop(s) Both EYES three times a day PRN Dry Eyes      X-Rays reviewed:    CXR interpreted by me: Patient is a 83y old  Female who presents with a chief complaint of ams (11 Nov 2023 09:39)      HPI:  83-year-old female with past medical history of Brent body dementia, Parkinson disease who was brought in from [facility] for AMS.  Per , pt is AAOX2 and able to have a normal conversation at baseline , but patient has been having worsening confusion for the past week. Pt's  also says that she has been having a cough for the past week as well, not sure if productive. Unable to get rest of hx given pts mental status.     In the ED, vitals wnl. Labs with wbc 17.94, 79.9% PMNs, Mg 1.7, trops 0.05, vbg pH 7.09, pCO2 72, Na 114 (possible error given BMP Na 134). UA negative. CXR with left basilar opacity. CTH with New small intraparenchymal hemorrhage in the left thalamus since the prior CT head from 12/24/2022. No acute territorial infarct or midline   shift. Placed on bipap, admit to SDU. 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  No cardiovascular or pulmonary family history     REVIEW OF SYSTEMS:    All ROS are negative exept per HPI       Allergies    Originally Entered as [Unknown] reaction to [neuroleptics] (Unknown)  Originally Entered as [Unknown] reaction to [pepper] (Unknown)  Originally Entered as [Unknown] reaction to [red pepper] (Unknown)  penicillin (Anaphylaxis)  benzodiazepines (Unknown)  ciprofloxacin (Unknown)  Originally Entered as [Unknown] reaction to [green pepper] (Unknown)  antichlolinergics (Unknown)    Intolerances          PHYSICAL EXAM  Vital Signs Last 24 Hrs  T(C): 36.1 (12 Nov 2023 07:19), Max: 37.6 (11 Nov 2023 15:48)  T(F): 97 (12 Nov 2023 07:19), Max: 99.7 (11 Nov 2023 15:48)  HR: 86 (12 Nov 2023 07:19) (77 - 106)  BP: 113/58 (12 Nov 2023 07:19) (103/54 - 126/63)  BP(mean): 84 (12 Nov 2023 04:00) (80 - 84)  RR: 18 (12 Nov 2023 07:19) (16 - 20)  SpO2: 94% (12 Nov 2023 07:19) (94% - 98%)    Parameters below as of 12 Nov 2023 04:00  Patient On (Oxygen Delivery Method): room air        CONSTITUTIONAL:  Well nourished.  NAD    CARDIAC:   Normal rate,   regular rhythm.    no edema    RESPIRATORY:   BL BS  no acc muscle use     GASTROINTESTINAL:  Abdomen soft, non-tender,   No guarding,     MUSCULOSKELETAL:   Range of motion is not limited,  No clubbing, cyanosis    NEUROLOGICAL:   Alert and oriented x1    SKIN:   Skin normal color for race,   No evidence of rash.        LABS:                          11.8   14.65 )-----------( 269      ( 11 Nov 2023 06:55 )             37.1                                               11-11    133<L>  |  99  |  16  ----------------------------<  175<H>  3.6   |  23  |  0.6<L>    Ca    8.2<L>      11 Nov 2023 06:55  Mg     1.8     11-11    TPro  5.9<L>  /  Alb  3.2<L>  /  TBili  0.5  /  DBili  x   /  AST  13  /  ALT  <5  /  AlkPhos  53  11-11                                             Urinalysis Basic - ( 11 Nov 2023 06:55 )    Color: x / Appearance: x / SG: x / pH: x  Gluc: 175 mg/dL / Ketone: x  / Bili: x / Urobili: x   Blood: x / Protein: x / Nitrite: x   Leuk Esterase: x / RBC: x / WBC x   Sq Epi: x / Non Sq Epi: x / Bacteria: x        CARDIAC MARKERS ( 10 Nov 2023 15:11 )  x     / 0.05 ng/mL / x     / x     / x                                                LIVER FUNCTIONS - ( 11 Nov 2023 06:55 )  Alb: 3.2 g/dL / Pro: 5.9 g/dL / ALK PHOS: 53 U/L / ALT: <5 U/L / AST: 13 U/L / GGT: x                                                                                            ABG - ( 11 Nov 2023 01:32 )  pH, Arterial: 7.40  pH, Blood: x     /  pCO2: 41    /  pO2: 189   / HCO3: 25    / Base Excess: 0.5   /  SaO2: 100.0               MEDICATIONS  (STANDING):  ascorbic acid 500 milliGRAM(s) Oral daily  azithromycin  IVPB 500 milliGRAM(s) IV Intermittent every 24 hours  aztreonam  IVPB 2000 milliGRAM(s) IV Intermittent every 8 hours  carbidopa/levodopa  25/100 1 Tablet(s) Oral <User Schedule>  chlorhexidine 2% Cloths 1 Application(s) Topical daily  famotidine    Tablet 20 milliGRAM(s) Oral daily  furosemide   Injectable 60 milliGRAM(s) IV Push daily  gabapentin Solution 600 milliGRAM(s) Oral three times a day  hydrALAZINE Injectable 5 milliGRAM(s) IV Push every 6 hours  latanoprost 0.005% Ophthalmic Solution 1 Drop(s) Both EYES at bedtime  levothyroxine 50 MICROGram(s) Oral daily  polyethylene glycol 3350 17 Gram(s) Oral daily  senna 2 Tablet(s) Oral at bedtime  vancomycin  IVPB 1000 milliGRAM(s) IV Intermittent every 12 hours  vancomycin  IVPB        MEDICATIONS  (PRN):  aluminum hydroxide/magnesium hydroxide/simethicone Suspension 30 milliLiter(s) Oral every 4 hours PRN Dyspepsia  artificial  tears Solution 1 Drop(s) Both EYES three times a day PRN Dry Eyes      X-Rays reviewed:

## 2023-11-12 NOTE — SWALLOW BEDSIDE ASSESSMENT ADULT - SLP PERTINENT HISTORY OF CURRENT PROBLEM
83-year-old female with past medical history of Brent body dementia, Parkinson disease who was brought in from [facility] for AMS.  Per , pt is AAOX2 and able to have a normal conversation at baseline , but patient has been having worsening confusion for the past week. Pt's  also says that she has been having a cough for the past week as well, not sure if productive. Unable to get rest of hx given pts mental status.

## 2023-11-12 NOTE — SWALLOW BEDSIDE ASSESSMENT ADULT - PHARYNGEAL PHASE
Delayed pharyngeal swallow/Cough post oral intake/Throat clear post oral intake/Delayed cough post oral intake

## 2023-11-12 NOTE — SWALLOW BEDSIDE ASSESSMENT ADULT - COMMENTS
Pt's  reported that Pt with steady decline/coughing during meals x months    CXR 11/11/23- Findings:  Support devices: An enteric tube with tip in the stomach. Cardiac/mediastinum/hilum: Stable cardiomegaly. Lung parenchyma/Pleura: Bilateral opacities, stable. Skeleton/soft tissues: Unchanged.    Impression: Unchanged bilateral opacities. Nasogastric tube, tip in the stomach.    Head CT (11/11/23) Small stable hemorrhage in the left thalamus since the prior CT head from   11/10/2023. No acute territorial infarct or midline shift.

## 2023-11-12 NOTE — CONSULT NOTE ADULT - ATTENDING COMMENTS
Pt is a 84 yo F with PMHx of Lewy Body dementia (baseline mRS 4-5, has been residing in a NH for 7 yrs) with associated autonomic dysfunction including dysphagia, hypothyroidism, who presented with encephalopathy x 1 week. Per pt's , she is able to have some verbal interaction at baseline, bedbound, needs help with all ADL's. Found to have hypercapnic respiratory failure on presentation, suspected 2/2 aspiration pneumonia. CT head also showed a small left thalamic IPH (ICH score 1 for GCS). On exam, pt is stuporous, withdraws to noxious stimuli and grimaces in all extremities, pupils 2 mm and equal b/l, oculocephalic intact. Pt was on daily ASA prior to presentation.  CT head 12 after initial with stable IPH    Recommend repeating CT head in 24 hrs  Obtain CTA head/neck  MRI brain with/without when pt stable  SBP goal 100-140  If CT head tomorrow stable, may start DVT ppx  Q4 neurochecks
IMPRESSION:  AMS   Left thalamus IPH   Acute Hypercapnic respiratory failure - improved  HO Lewy body dementia  HO parkinson's   HO Breast ca on the right    Plan as outlined above

## 2023-11-12 NOTE — PROGRESS NOTE ADULT - SUBJECTIVE AND OBJECTIVE BOX
T H I S   I S    N O  T   A    F I N A L I Z E D   N O T IMELDA CRAWLEY  83y, Female  Allergy: Originally Entered as [Unknown] reaction to [neuroleptics] (Unknown)  Originally Entered as [Unknown] reaction to [pepper] (Unknown)  Originally Entered as [Unknown] reaction to [red pepper] (Unknown)  penicillin (Anaphylaxis)  benzodiazepines (Unknown)  ciprofloxacin (Unknown)  Originally Entered as [Unknown] reaction to [green pepper] (Unknown)  antichlolinergics (Unknown)    Hospital Day: 2d    Patient seen and examined earlier today.     PMH/PSH:  PAST MEDICAL & SURGICAL HISTORY:  Dementia      History of breast cancer      Constipation      Lewy body dementia without behavioral disturbance      Colon polyp      History of colon resection      H/O mastectomy, right          LAST 24-Hr EVENTS:    VITALS:  T(F): 97 (11-12-23 @ 07:19), Max: 99.7 (11-11-23 @ 15:48)  HR: 86 (11-12-23 @ 07:19)  BP: 113/58 (11-12-23 @ 07:19) (103/54 - 126/63)  RR: 18 (11-12-23 @ 07:19)  SpO2: 94% (11-12-23 @ 07:19)          TESTS & MEASUREMENTS:  Weight/BMI      11-11-23 @ 07:01  -  11-12-23 @ 07:00  --------------------------------------------------------  IN: 0 mL / OUT: 500 mL / NET: -500 mL                            11.8   14.65 )-----------( 269      ( 11 Nov 2023 06:55 )             37.1         11-11    133<L>  |  99  |  16  ----------------------------<  175<H>  3.6   |  23  |  0.6<L>    Ca    8.2<L>      11 Nov 2023 06:55  Mg     1.8     11-11    TPro  5.9<L>  /  Alb  3.2<L>  /  TBili  0.5  /  DBili  x   /  AST  13  /  ALT  <5  /  AlkPhos  53  11-11    LIVER FUNCTIONS - ( 11 Nov 2023 06:55 )  Alb: 3.2 g/dL / Pro: 5.9 g/dL / ALK PHOS: 53 U/L / ALT: <5 U/L / AST: 13 U/L / GGT: x           CARDIAC MARKERS ( 10 Nov 2023 15:11 )  x     / 0.05 ng/mL / x     / x     / x            Urinalysis Basic - ( 11 Nov 2023 06:55 )    Color: x / Appearance: x / SG: x / pH: x  Gluc: 175 mg/dL / Ketone: x  / Bili: x / Urobili: x   Blood: x / Protein: x / Nitrite: x   Leuk Esterase: x / RBC: x / WBC x   Sq Epi: x / Non Sq Epi: x / Bacteria: x      Procalcitonin, Serum: 0.10 ng/mL (11-11-23 @ 06:55)                A1C with Estimated Average Glucose Result: 5.1 % (12-25-22 @ 07:20)      Indwelling Urethral Catheter:     Connect To:  Straight Drainage/[place]    Indication:  Urinary Retention / Obstruction (11-12-23 @ 04:27) (not performed)  Indwelling Urethral Catheter:     Connect To:  Straight Drainage/[place]    Indication:  Urinary Retention / Obstruction (11-11-23 @ 00:00) (not performed)      RADIOLOGY, ECG, & ADDITIONAL TESTS:  12 Lead ECG:   Ventricular Rate 85 BPM    Atrial Rate 85 BPM    P-R Interval 174 ms    QRS Duration 76 ms    Q-T Interval 370 ms    QTC Calculation(Bazett) 440 ms    P Axis 32 degrees    R Axis -20 degrees    T Axis 8 degrees    Diagnosis Line Normal sinus rhythm  Inferior infarct , age undetermined  Abnormal ECG    Confirmed by Hemant Fuller ([id number]) on 11/11/2023 7:57:09 AM (11-10-23 @ 14:58)    CT Head No Cont:   ACC: [id number] EXAM:  CT BRAIN   ORDERED BY: RASHI MOREIRA     PROCEDURE DATE:  11/11/2023          INTERPRETATION:  CLINICAL INDICATION: Hemorrhage, follow-up.    TECHNIQUE: CT of the head was performed without the administration of   intravenous contrast.    COMPARISON: CT head dated 11/10/2023    FINDINGS:    There is stable focal intraparenchymal hemorrhage in the left thalamus.    There is stable enlargement of the sulci, sylvian fissures, and   ventricles likely reflecting moderate diffuse parenchymal volume loss.    There are stable confluent patchy low attenuations in bilateral   periventricular cerebral white matter consistent with severe chronic   microvascular ischemic changes.    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    There is no evidence of acute territorial infarct. There is no midline   shift.    There is no evidence of hydrocephalus. There are no extra-axial fluid   collections.    The visualized intraorbital contents are normal. Small stable retention   cyst versus polyp in the left frontal sinus. The mastoid air cells are   aerated. The visualized soft tissues and osseous structures appear normal.      IMPRESSION:    Small stable hemorrhage in the left thalamus since the prior CT head from   11/10/2023. No acute territorial infarct or midline shift.    Stable severe chronic microvascular ischemic changes.    --- End of Report ---            DENISE CALERO MD; Attending Radiologist  This document has been electronically signed. Nov 11 2023  9:23AM (11-11-23 @ 05:42)  CT Head No Cont:   ACC: [id number] EXAM:  CT BRAIN   ORDERED BY: FREDDY OSMAN     PROCEDURE DATE:  11/10/2023          INTERPRETATION:  CLINICAL INDICATION: Altered mental status.    TECHNIQUE: CT of the head was performed without the administration of   intravenous contrast.    COMPARISON: CT head dated 12/24/2022.    FINDINGS:    There is a new small focal intraparenchymal hemorrhage in the left   thalamus.    There is stable enlargement of the sulci, sylvian fissures, and   ventricles likely reflecting moderate diffuse parenchymal volume loss.    There are stable confluent patchy low attenuations in bilateral   periventricular cerebral white matter consistent with severe chronic   microvascular ischemic changes.    There is stable chronic infarct in the rightcerebellum.    There is no evidence of acute territorial infarct. There is no midline   shift.    There is no evidence of hydrocephalus. There are no extra-axial fluid   collections.    The visualized intraorbital contents are normal. Small retentioncyst   versus polyp in the left frontal sinus. The mastoid air cells are   aerated. The visualized soft tissues and osseous structures appear normal.      IMPRESSION:    New small intraparenchymal hemorrhage in the left thalamus since the   prior CT head from 12/24/2022. No acute territorial infarct or midline   shift.    Stable severe chronic microvascular ischemic changes.    Communication: The summary of above findings were discussed with readback   confirmation with KENNETH Osman by radiologist Dr. Calero on 11/10/2023 at 5:51PM.    --- End of Report ---            DENISE CALERO MD; Attending Radiologist  This document has been electronically signed. Nov 10 2023  5:54PM (11-10-23 @ 17:33)    RECENT DIAGNOSTIC ORDERS:      MEDICATIONS:  MEDICATIONS  (STANDING):  ascorbic acid 500 milliGRAM(s) Oral daily  azithromycin  IVPB 500 milliGRAM(s) IV Intermittent every 24 hours  aztreonam  IVPB 2000 milliGRAM(s) IV Intermittent every 8 hours  carbidopa/levodopa  25/100 1 Tablet(s) Oral <User Schedule>  chlorhexidine 2% Cloths 1 Application(s) Topical daily  famotidine    Tablet 20 milliGRAM(s) Oral daily  furosemide   Injectable 60 milliGRAM(s) IV Push daily  gabapentin Solution 600 milliGRAM(s) Oral three times a day  hydrALAZINE Injectable 5 milliGRAM(s) IV Push every 6 hours  latanoprost 0.005% Ophthalmic Solution 1 Drop(s) Both EYES at bedtime  levothyroxine 50 MICROGram(s) Oral daily  polyethylene glycol 3350 17 Gram(s) Oral daily  senna 2 Tablet(s) Oral at bedtime  vancomycin  IVPB      vancomycin  IVPB 1000 milliGRAM(s) IV Intermittent every 12 hours    MEDICATIONS  (PRN):  aluminum hydroxide/magnesium hydroxide/simethicone Suspension 30 milliLiter(s) Oral every 4 hours PRN Dyspepsia  artificial  tears Solution 1 Drop(s) Both EYES three times a day PRN Dry Eyes      HOME MEDICATIONS:  acetaminophen 325 mg oral tablet (12-24)  aluminum hydroxide-magnesium hydroxide 200 mg-200 mg/5 mL oral suspension (12-27)  Artificial Tears ophthalmic solution (12-24)  Aspercreme with Lidocaine 4% topical cream (12-24)  aspirin 81 mg oral tablet, chewable (12-24)  carbidopa-levodopa 25 mg-100 mg oral tablet (12-24)  Colace 100 mg oral capsule (12-24)  famotidine 20 mg oral tablet (11-11)  gabapentin 600 mg oral tablet (12-24)  latanoprost 0.005% ophthalmic solution (12-24)  levothyroxine 50 mcg (0.05 mg) oral tablet (12-24)  melatonin 3 mg oral tablet (12-27)  polyethylene glycol 3350 oral powder for reconstitution (12-24)  Senna 8.6 mg oral tablet (12-24)  timolol hemihydrate 0.5% ophthalmic solution (12-24)  Vitamin C 500 mg oral tablet (12-24)  Vitamin D3 1000 intl units oral tablet (12-24)      PHYSICAL EXAM:  GENERAL:   CHEST/LUNG:   HEART:   ABDOMEN:   EXTREMITIES:               IMELDA ROLLE  83y, Female  Allergy: Originally Entered as [Unknown] reaction to [neuroleptics] (Unknown)  Originally Entered as [Unknown] reaction to [pepper] (Unknown)  Originally Entered as [Unknown] reaction to [red pepper] (Unknown)  penicillin (Anaphylaxis)  benzodiazepines (Unknown)  ciprofloxacin (Unknown)  Originally Entered as [Unknown] reaction to [green pepper] (Unknown)  antichlolinergics (Unknown)    Hospital Day: 2d    Patient seen and examined earlier today. the patient  at bedside     PMH/PSH:  PAST MEDICAL & SURGICAL HISTORY:  Dementia      History of breast cancer      Constipation      Lewy body dementia without behavioral disturbance      Colon polyp      History of colon resection      H/O mastectomy, right          LAST 24-Hr EVENTS:    VITALS:  T(F): 97 (11-12-23 @ 07:19), Max: 99.7 (11-11-23 @ 15:48)  HR: 86 (11-12-23 @ 07:19)  BP: 113/58 (11-12-23 @ 07:19) (103/54 - 126/63)  RR: 18 (11-12-23 @ 07:19)  SpO2: 94% (11-12-23 @ 07:19)          TESTS & MEASUREMENTS:  Weight/BMI      11-11-23 @ 07:01  -  11-12-23 @ 07:00  --------------------------------------------------------  IN: 0 mL / OUT: 500 mL / NET: -500 mL                            11.8   14.65 )-----------( 269      ( 11 Nov 2023 06:55 )             37.1         11-11    133<L>  |  99  |  16  ----------------------------<  175<H>  3.6   |  23  |  0.6<L>    Ca    8.2<L>      11 Nov 2023 06:55  Mg     1.8     11-11    TPro  5.9<L>  /  Alb  3.2<L>  /  TBili  0.5  /  DBili  x   /  AST  13  /  ALT  <5  /  AlkPhos  53  11-11    LIVER FUNCTIONS - ( 11 Nov 2023 06:55 )  Alb: 3.2 g/dL / Pro: 5.9 g/dL / ALK PHOS: 53 U/L / ALT: <5 U/L / AST: 13 U/L / GGT: x           CARDIAC MARKERS ( 10 Nov 2023 15:11 )  x     / 0.05 ng/mL / x     / x     / x            Urinalysis Basic - ( 11 Nov 2023 06:55 )    Color: x / Appearance: x / SG: x / pH: x  Gluc: 175 mg/dL / Ketone: x  / Bili: x / Urobili: x   Blood: x / Protein: x / Nitrite: x   Leuk Esterase: x / RBC: x / WBC x   Sq Epi: x / Non Sq Epi: x / Bacteria: x      Procalcitonin, Serum: 0.10 ng/mL (11-11-23 @ 06:55)                A1C with Estimated Average Glucose Result: 5.1 % (12-25-22 @ 07:20)      Indwelling Urethral Catheter:     Connect To:  Straight Drainage/[place]    Indication:  Urinary Retention / Obstruction (11-12-23 @ 04:27) (not performed)  Indwelling Urethral Catheter:     Connect To:  Straight Drainage/[place]    Indication:  Urinary Retention / Obstruction (11-11-23 @ 00:00) (not performed)      RADIOLOGY, ECG, & ADDITIONAL TESTS:  12 Lead ECG:   Ventricular Rate 85 BPM    Atrial Rate 85 BPM    P-R Interval 174 ms    QRS Duration 76 ms    Q-T Interval 370 ms    QTC Calculation(Bazett) 440 ms    P Axis 32 degrees    R Axis -20 degrees    T Axis 8 degrees    Diagnosis Line Normal sinus rhythm  Inferior infarct , age undetermined  Abnormal ECG    Confirmed by Hemant Fuller ([id number]) on 11/11/2023 7:57:09 AM (11-10-23 @ 14:58)    CT Head No Cont:   ACC: [id number] EXAM:  CT BRAIN   ORDERED BY: RASHI MOREIRA     PROCEDURE DATE:  11/11/2023          INTERPRETATION:  CLINICAL INDICATION: Hemorrhage, follow-up.    TECHNIQUE: CT of the head was performed without the administration of   intravenous contrast.    COMPARISON: CT head dated 11/10/2023    FINDINGS:    There is stable focal intraparenchymal hemorrhage in the left thalamus.    There is stable enlargement of the sulci, sylvian fissures, and   ventricles likely reflecting moderate diffuse parenchymal volume loss.    There are stable confluent patchy low attenuations in bilateral   periventricular cerebral white matter consistent with severe chronic   microvascular ischemic changes.    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    There is no evidence of acute territorial infarct. There is no midline   shift.    There is no evidence of hydrocephalus. There are no extra-axial fluid   collections.    The visualized intraorbital contents are normal. Small stable retention   cyst versus polyp in the left frontal sinus. The mastoid air cells are   aerated. The visualized soft tissues and osseous structures appear normal.      IMPRESSION:    Small stable hemorrhage in the left thalamus since the prior CT head from   11/10/2023. No acute territorial infarct or midline shift.    Stable severe chronic microvascular ischemic changes.    --- End of Report ---            DENISE CALERO MD; Attending Radiologist  This document has been electronically signed. Nov 11 2023  9:23AM (11-11-23 @ 05:42)  CT Head No Cont:   ACC: [id number] EXAM:  CT BRAIN   ORDERED BY: FREDDY OSMAN     PROCEDURE DATE:  11/10/2023          INTERPRETATION:  CLINICAL INDICATION: Altered mental status.    TECHNIQUE: CT of the head was performed without the administration of   intravenous contrast.    COMPARISON: CT head dated 12/24/2022.    FINDINGS:    There is a new small focal intraparenchymal hemorrhage in the left   thalamus.    There is stable enlargement of the sulci, sylvian fissures, and   ventricles likely reflecting moderate diffuse parenchymal volume loss.    There are stable confluent patchy low attenuations in bilateral   periventricular cerebral white matter consistent with severe chronic   microvascular ischemic changes.    There is stable chronic infarct in the rightcerebellum.    There is no evidence of acute territorial infarct. There is no midline   shift.    There is no evidence of hydrocephalus. There are no extra-axial fluid   collections.    The visualized intraorbital contents are normal. Small retentioncyst   versus polyp in the left frontal sinus. The mastoid air cells are   aerated. The visualized soft tissues and osseous structures appear normal.      IMPRESSION:    New small intraparenchymal hemorrhage in the left thalamus since the   prior CT head from 12/24/2022. No acute territorial infarct or midline   shift.    Stable severe chronic microvascular ischemic changes.    Communication: The summary of above findings were discussed with readback   confirmation with KENNETH Osman by radiologist Dr. Calero on 11/10/2023 at 5:51PM.    --- End of Report ---            DENISE CALERO MD; Attending Radiologist  This document has been electronically signed. Nov 10 2023  5:54PM (11-10-23 @ 17:33)    RECENT DIAGNOSTIC ORDERS:      MEDICATIONS:  MEDICATIONS  (STANDING):  ascorbic acid 500 milliGRAM(s) Oral daily  azithromycin  IVPB 500 milliGRAM(s) IV Intermittent every 24 hours  aztreonam  IVPB 2000 milliGRAM(s) IV Intermittent every 8 hours  carbidopa/levodopa  25/100 1 Tablet(s) Oral <User Schedule>  chlorhexidine 2% Cloths 1 Application(s) Topical daily  famotidine    Tablet 20 milliGRAM(s) Oral daily  furosemide   Injectable 60 milliGRAM(s) IV Push daily  gabapentin Solution 600 milliGRAM(s) Oral three times a day  hydrALAZINE Injectable 5 milliGRAM(s) IV Push every 6 hours  latanoprost 0.005% Ophthalmic Solution 1 Drop(s) Both EYES at bedtime  levothyroxine 50 MICROGram(s) Oral daily  polyethylene glycol 3350 17 Gram(s) Oral daily  senna 2 Tablet(s) Oral at bedtime  vancomycin  IVPB      vancomycin  IVPB 1000 milliGRAM(s) IV Intermittent every 12 hours    MEDICATIONS  (PRN):  aluminum hydroxide/magnesium hydroxide/simethicone Suspension 30 milliLiter(s) Oral every 4 hours PRN Dyspepsia  artificial  tears Solution 1 Drop(s) Both EYES three times a day PRN Dry Eyes      HOME MEDICATIONS:  acetaminophen 325 mg oral tablet (12-24)  aluminum hydroxide-magnesium hydroxide 200 mg-200 mg/5 mL oral suspension (12-27)  Artificial Tears ophthalmic solution (12-24)  Aspercreme with Lidocaine 4% topical cream (12-24)  aspirin 81 mg oral tablet, chewable (12-24)  carbidopa-levodopa 25 mg-100 mg oral tablet (12-24)  Colace 100 mg oral capsule (12-24)  famotidine 20 mg oral tablet (11-11)  gabapentin 600 mg oral tablet (12-24)  latanoprost 0.005% ophthalmic solution (12-24)  levothyroxine 50 mcg (0.05 mg) oral tablet (12-24)  melatonin 3 mg oral tablet (12-27)  polyethylene glycol 3350 oral powder for reconstitution (12-24)  Senna 8.6 mg oral tablet (12-24)  timolol hemihydrate 0.5% ophthalmic solution (12-24)  Vitamin C 500 mg oral tablet (12-24)  Vitamin D3 1000 intl units oral tablet (12-24)      PHYSICAL EXAM:  On exam  General: closes her eyes but easy arousable , follows simple commands  NAD, chronic ill appearance + NGT   Lungs:  clear to ausculations b/l, normal resp effort  Heart: regular ryhthm   Abdomen: soft, non tender non distended  Ext: no edema,

## 2023-11-13 LAB
-  AMPICILLIN: SIGNIFICANT CHANGE UP
-  AMPICILLIN: SIGNIFICANT CHANGE UP
-  CIPROFLOXACIN: SIGNIFICANT CHANGE UP
-  CIPROFLOXACIN: SIGNIFICANT CHANGE UP
-  LEVOFLOXACIN: SIGNIFICANT CHANGE UP
-  LEVOFLOXACIN: SIGNIFICANT CHANGE UP
-  NITROFURANTOIN: SIGNIFICANT CHANGE UP
-  NITROFURANTOIN: SIGNIFICANT CHANGE UP
-  TETRACYCLINE: SIGNIFICANT CHANGE UP
-  TETRACYCLINE: SIGNIFICANT CHANGE UP
-  VANCOMYCIN: SIGNIFICANT CHANGE UP
-  VANCOMYCIN: SIGNIFICANT CHANGE UP
ALBUMIN SERPL ELPH-MCNC: 3.3 G/DL — LOW (ref 3.5–5.2)
ALBUMIN SERPL ELPH-MCNC: 3.3 G/DL — LOW (ref 3.5–5.2)
ALP SERPL-CCNC: 48 U/L — SIGNIFICANT CHANGE UP (ref 30–115)
ALP SERPL-CCNC: 48 U/L — SIGNIFICANT CHANGE UP (ref 30–115)
ALT FLD-CCNC: <5 U/L — SIGNIFICANT CHANGE UP (ref 0–41)
ALT FLD-CCNC: <5 U/L — SIGNIFICANT CHANGE UP (ref 0–41)
ANION GAP SERPL CALC-SCNC: 11 MMOL/L — SIGNIFICANT CHANGE UP (ref 7–14)
ANION GAP SERPL CALC-SCNC: 11 MMOL/L — SIGNIFICANT CHANGE UP (ref 7–14)
AST SERPL-CCNC: 11 U/L — SIGNIFICANT CHANGE UP (ref 0–41)
AST SERPL-CCNC: 11 U/L — SIGNIFICANT CHANGE UP (ref 0–41)
BILIRUB SERPL-MCNC: 0.6 MG/DL — SIGNIFICANT CHANGE UP (ref 0.2–1.2)
BILIRUB SERPL-MCNC: 0.6 MG/DL — SIGNIFICANT CHANGE UP (ref 0.2–1.2)
BUN SERPL-MCNC: 18 MG/DL — SIGNIFICANT CHANGE UP (ref 10–20)
BUN SERPL-MCNC: 18 MG/DL — SIGNIFICANT CHANGE UP (ref 10–20)
CALCIUM SERPL-MCNC: 8.1 MG/DL — LOW (ref 8.4–10.4)
CALCIUM SERPL-MCNC: 8.1 MG/DL — LOW (ref 8.4–10.4)
CHLORIDE SERPL-SCNC: 100 MMOL/L — SIGNIFICANT CHANGE UP (ref 98–110)
CHLORIDE SERPL-SCNC: 100 MMOL/L — SIGNIFICANT CHANGE UP (ref 98–110)
CO2 SERPL-SCNC: 27 MMOL/L — SIGNIFICANT CHANGE UP (ref 17–32)
CO2 SERPL-SCNC: 27 MMOL/L — SIGNIFICANT CHANGE UP (ref 17–32)
CREAT SERPL-MCNC: 0.8 MG/DL — SIGNIFICANT CHANGE UP (ref 0.7–1.5)
CREAT SERPL-MCNC: 0.8 MG/DL — SIGNIFICANT CHANGE UP (ref 0.7–1.5)
CULTURE RESULTS: ABNORMAL
CULTURE RESULTS: ABNORMAL
EGFR: 74 ML/MIN/1.73M2 — SIGNIFICANT CHANGE UP
EGFR: 74 ML/MIN/1.73M2 — SIGNIFICANT CHANGE UP
GLUCOSE SERPL-MCNC: 110 MG/DL — HIGH (ref 70–99)
GLUCOSE SERPL-MCNC: 110 MG/DL — HIGH (ref 70–99)
HCT VFR BLD CALC: 35.1 % — LOW (ref 37–47)
HCT VFR BLD CALC: 35.1 % — LOW (ref 37–47)
HGB BLD-MCNC: 11.1 G/DL — LOW (ref 12–16)
HGB BLD-MCNC: 11.1 G/DL — LOW (ref 12–16)
LEGIONELLA AG UR QL: NEGATIVE — SIGNIFICANT CHANGE UP
LEGIONELLA AG UR QL: NEGATIVE — SIGNIFICANT CHANGE UP
MCHC RBC-ENTMCNC: 30.5 PG — SIGNIFICANT CHANGE UP (ref 27–31)
MCHC RBC-ENTMCNC: 30.5 PG — SIGNIFICANT CHANGE UP (ref 27–31)
MCHC RBC-ENTMCNC: 31.6 G/DL — LOW (ref 32–37)
MCHC RBC-ENTMCNC: 31.6 G/DL — LOW (ref 32–37)
MCV RBC AUTO: 96.4 FL — SIGNIFICANT CHANGE UP (ref 81–99)
MCV RBC AUTO: 96.4 FL — SIGNIFICANT CHANGE UP (ref 81–99)
METHOD TYPE: SIGNIFICANT CHANGE UP
METHOD TYPE: SIGNIFICANT CHANGE UP
NRBC # BLD: 0 /100 WBCS — SIGNIFICANT CHANGE UP (ref 0–0)
NRBC # BLD: 0 /100 WBCS — SIGNIFICANT CHANGE UP (ref 0–0)
ORGANISM # SPEC MICROSCOPIC CNT: ABNORMAL
ORGANISM # SPEC MICROSCOPIC CNT: ABNORMAL
ORGANISM # SPEC MICROSCOPIC CNT: SIGNIFICANT CHANGE UP
ORGANISM # SPEC MICROSCOPIC CNT: SIGNIFICANT CHANGE UP
PLATELET # BLD AUTO: 294 K/UL — SIGNIFICANT CHANGE UP (ref 130–400)
PLATELET # BLD AUTO: 294 K/UL — SIGNIFICANT CHANGE UP (ref 130–400)
PMV BLD: 9.2 FL — SIGNIFICANT CHANGE UP (ref 7.4–10.4)
PMV BLD: 9.2 FL — SIGNIFICANT CHANGE UP (ref 7.4–10.4)
POTASSIUM SERPL-MCNC: 3.3 MMOL/L — LOW (ref 3.5–5)
POTASSIUM SERPL-MCNC: 3.3 MMOL/L — LOW (ref 3.5–5)
POTASSIUM SERPL-SCNC: 3.3 MMOL/L — LOW (ref 3.5–5)
POTASSIUM SERPL-SCNC: 3.3 MMOL/L — LOW (ref 3.5–5)
PROT SERPL-MCNC: 5.5 G/DL — LOW (ref 6–8)
PROT SERPL-MCNC: 5.5 G/DL — LOW (ref 6–8)
RBC # BLD: 3.64 M/UL — LOW (ref 4.2–5.4)
RBC # BLD: 3.64 M/UL — LOW (ref 4.2–5.4)
RBC # FLD: 14.3 % — SIGNIFICANT CHANGE UP (ref 11.5–14.5)
RBC # FLD: 14.3 % — SIGNIFICANT CHANGE UP (ref 11.5–14.5)
SODIUM SERPL-SCNC: 138 MMOL/L — SIGNIFICANT CHANGE UP (ref 135–146)
SODIUM SERPL-SCNC: 138 MMOL/L — SIGNIFICANT CHANGE UP (ref 135–146)
SPECIMEN SOURCE: SIGNIFICANT CHANGE UP
SPECIMEN SOURCE: SIGNIFICANT CHANGE UP
WBC # BLD: 13.51 K/UL — HIGH (ref 4.8–10.8)
WBC # BLD: 13.51 K/UL — HIGH (ref 4.8–10.8)
WBC # FLD AUTO: 13.51 K/UL — HIGH (ref 4.8–10.8)
WBC # FLD AUTO: 13.51 K/UL — HIGH (ref 4.8–10.8)

## 2023-11-13 PROCEDURE — 99232 SBSQ HOSP IP/OBS MODERATE 35: CPT

## 2023-11-13 RX ORDER — POTASSIUM CHLORIDE 20 MEQ
20 PACKET (EA) ORAL ONCE
Refills: 0 | Status: COMPLETED | OUTPATIENT
Start: 2023-11-13 | End: 2023-11-13

## 2023-11-13 RX ORDER — SODIUM CHLORIDE 9 MG/ML
250 INJECTION, SOLUTION INTRAVENOUS ONCE
Refills: 0 | Status: DISCONTINUED | OUTPATIENT
Start: 2023-11-13 | End: 2023-11-13

## 2023-11-13 RX ORDER — SODIUM CHLORIDE 9 MG/ML
250 INJECTION, SOLUTION INTRAVENOUS ONCE
Refills: 0 | Status: COMPLETED | OUTPATIENT
Start: 2023-11-13 | End: 2023-11-13

## 2023-11-13 RX ADMIN — AZITHROMYCIN 255 MILLIGRAM(S): 500 TABLET, FILM COATED ORAL at 15:05

## 2023-11-13 RX ADMIN — CARBIDOPA AND LEVODOPA 1 TABLET(S): 25; 100 TABLET ORAL at 17:28

## 2023-11-13 RX ADMIN — GABAPENTIN 600 MILLIGRAM(S): 400 CAPSULE ORAL at 21:05

## 2023-11-13 RX ADMIN — CARBIDOPA AND LEVODOPA 1 TABLET(S): 25; 100 TABLET ORAL at 05:01

## 2023-11-13 RX ADMIN — POLYETHYLENE GLYCOL 3350 17 GRAM(S): 17 POWDER, FOR SOLUTION ORAL at 11:13

## 2023-11-13 RX ADMIN — Medication 100 MILLIGRAM(S): at 11:18

## 2023-11-13 RX ADMIN — Medication 50 MILLIEQUIVALENT(S): at 10:03

## 2023-11-13 RX ADMIN — Medication 500 MILLIGRAM(S): at 11:13

## 2023-11-13 RX ADMIN — SODIUM CHLORIDE 750 MILLILITER(S): 9 INJECTION, SOLUTION INTRAVENOUS at 11:19

## 2023-11-13 RX ADMIN — SENNA PLUS 2 TABLET(S): 8.6 TABLET ORAL at 21:06

## 2023-11-13 RX ADMIN — HEPARIN SODIUM 5000 UNIT(S): 5000 INJECTION INTRAVENOUS; SUBCUTANEOUS at 05:03

## 2023-11-13 RX ADMIN — GABAPENTIN 600 MILLIGRAM(S): 400 CAPSULE ORAL at 13:42

## 2023-11-13 RX ADMIN — GABAPENTIN 600 MILLIGRAM(S): 400 CAPSULE ORAL at 05:01

## 2023-11-13 RX ADMIN — FAMOTIDINE 20 MILLIGRAM(S): 10 INJECTION INTRAVENOUS at 11:13

## 2023-11-13 RX ADMIN — CHLORHEXIDINE GLUCONATE 1 APPLICATION(S): 213 SOLUTION TOPICAL at 11:16

## 2023-11-13 RX ADMIN — HEPARIN SODIUM 5000 UNIT(S): 5000 INJECTION INTRAVENOUS; SUBCUTANEOUS at 17:28

## 2023-11-13 RX ADMIN — Medication 100 MILLIGRAM(S): at 04:52

## 2023-11-13 RX ADMIN — LATANOPROST 1 DROP(S): 0.05 SOLUTION/ DROPS OPHTHALMIC; TOPICAL at 21:06

## 2023-11-13 RX ADMIN — Medication 50 MICROGRAM(S): at 05:01

## 2023-11-13 NOTE — PHYSICAL THERAPY INITIAL EVALUATION ADULT - LEVEL OF INDEPENDENCE: SUPINE/SIT, REHAB EVAL
Patient was unable to obtain full sitting position at the EOB/dependent (less than 25% patients effort)

## 2023-11-13 NOTE — DIETITIAN INITIAL EVALUATION ADULT - NAME AND PHONE
Nafisa Sorenson, RD [phone] or via Teams    Patient is at high nutrition risk, RD to f/u in 3-5 days or PRN

## 2023-11-13 NOTE — PHYSICAL THERAPY INITIAL EVALUATION ADULT - GENERAL OBSERVATIONS, REHAB EVAL
Patient was seen from 14:20-14:38 for PT IE. Patient was rec'd in semi reclined in bed, +IVL, +NG tube, +primafit, NAD, agreeable to participate in PT.

## 2023-11-13 NOTE — PHYSICAL THERAPY INITIAL EVALUATION ADULT - PERSONAL SAFETY AND JUDGMENT, REHAB EVAL
impaired Thalidomide Pregnancy And Lactation Text: This medication is Pregnancy Category X and is absolutely contraindicated during pregnancy. It is unknown if it is excreted in breast milk.

## 2023-11-13 NOTE — PHYSICAL THERAPY INITIAL EVALUATION ADULT - LEVEL OF INDEPENDENCE: BED TO CHAIR, REHAB EVAL
unsafe to attempt due to increased assistance with bed mobility. Full body mechanical lift is recommended for OOB transfer./unable to perform

## 2023-11-13 NOTE — DIETITIAN INITIAL EVALUATION ADULT - OTHER CALCULATIONS
Energy: 1352 - 1623 kcal/day (MSJ x 1.0 - 1.2 SF) - estimated needs based on RD bedscale weight; IBW used for protein needs  estimated needs with consideration for obese BMI (class 1),

## 2023-11-13 NOTE — PHYSICAL THERAPY INITIAL EVALUATION ADULT - PERTINENT HX OF CURRENT PROBLEM, REHAB EVAL
83-year-old female with past medical history of Brent body dementia, Parkinson disease who was brought in from [facility] for AMS.  Per , pt is AAOX2 and able to have a normal conversation at baseline , but patient has been having worsening confusion for the past week. Pt's  also says that she has been having a cough for the past week as well, not sure if productive. Unable to get rest of hx given pts mental status.
Statement Selected

## 2023-11-13 NOTE — PHYSICAL THERAPY INITIAL EVALUATION ADULT - NSACTIVITYREC_GEN_A_PT
Patient is at her baseline functional level (completely dependent). Patient is being discharged from the inpatient skilled PT services. Please reconsult if the status changes.

## 2023-11-13 NOTE — PHYSICAL THERAPY INITIAL EVALUATION ADULT - REHAB POTENTIAL, PT EVAL
PT spoke with Dr Mccurdy. Patient is not appropriate for inpatient skilled PT at this time. Patient is being discharged from the inpatient skilled PT services. Dr Mccurdy was notified to reconsult PT if the status changes./none

## 2023-11-13 NOTE — DIETITIAN INITIAL EVALUATION ADULT - NSFNSGIIOFT_GEN_A_CORE
11-12-23 @ 07:01  -  11-13-23 @ 07:00  --------------------------------------------------------  OUT:  Total OUT: 0 mL    Total NET: 250 mL

## 2023-11-13 NOTE — PROGRESS NOTE ADULT - ASSESSMENT
ASSESSMENT  83y Female with a PMH of Lewy body dementia without behavioral disturbance , History of breast cancer s/p mastectomy( right ) , Colon polyp, History of colon resection presented for AMS ( baseline ANO 2 ) with cough for 1 week duration.     ID has been consulted for possible LLL  pneumonia and patient found to be allergic to penicillin , ciprofloxacin,  benzodiazepine and anticholinergics. 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 Left PNA vs Intraparenchymal Hemorrhage   # Hypothyroidism   # Parkinson disease with Lewy body dementia     -Patient is at risk of aspiration; on NG feeding, S/S recommendation for NPO   -WBC 13.51 k (trending Down)  , afebrile , one episode of Hypotension in the AM   - CXR : Bilateral opacities  - UA negative   - On Azithromycin and Aztreonam ( started on 11/11)   - Blood culture : negative (Preliminary report )   - Urine culture :  50,000 - 99,000 CFU/mL Enterococcus faecalis( Prelim)   - RVP negative   - Streptococcal antigen Negative   - MRSA Negative     RECOMMENDATIONS  - Aspiration precautions   -   - ***    This is a pended note. All final recommendations to follow pending discussion with ID Attending    ASSESSMENT  83y Female with a PMH of Lewy body dementia without behavioral disturbance , History of breast cancer s/p mastectomy( right ) , Colon polyp, History of colon resection presented for AMS ( baseline ANO 2 ) with cough for 1 week duration.     ID has been consulted for possible LLL  pneumonia and patient found to be allergic to penicillin , ciprofloxacin,  benzodiazepine and anticholinergics. 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 as per  But tolerated this admission   Vancomycin was stopped after MRSA negative       IMPRESSION  # Encephalopathy Acute hypoxic and hypercapnic respiratory failure; Resolvd  - Resolved   - Patient is comfortable on RA ; no respiratory distress   -Patient is at risk of aspiration; on NG feeding, S/S recommendation for NPO   -WBC 13.51 k (trending Down)  , afebrile , one episode of Hypotension in the AM   - CXR : Bilateral opacities  - UA negative   - On Azithromycin and Aztreonam ( started on 11/11)   - Blood culture : negative (Preliminary report )   - Urine culture :  50,000 - 99,000 CFU/mL Enterococcus faecalis( Prelim)   - RVP negative   - Streptococcal antigen Negative   - MRSA Negative     PLan:   - Remove Foleys  - Wbc increased in the setting of aspiration pneumonitis  - DC antibiotics   -Aspiration precautions       # Hypothyroidism   - Continue levothyroxine 50 mcg OD     # Parkinson disease with Lewy body dementia   - Continue carbidopa levodopa     This is a pended note. All final recommendations to follow pending discussion with ID Attending    ASSESSMENT  83y Female with a PMH of Lewy body dementia without behavioral disturbance , History of breast cancer s/p mastectomy( right ) , Colon polyp, History of colon resection presented for AMS ( baseline ANO 2 ) with cough for 1 week duration.     ID has been consulted for possible LLL  pneumonia and patient found to be allergic to penicillin , ciprofloxacin,  benzodiazepine and anticholinergics. 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 as per  But tolerated this admission   Vancomycin was stopped after MRSA negative       IMPRESSION  No ongoing bacterial PNA either clinically or radiographically  Possible aspiration with chemical pneumonitis with SIRS  with initial leucocytosis  Patient is comfortable on RA ; no respiratory distress   WBC 13.51 k  No pyuria  - Blood culture : negative (Preliminary report )   - Urine culture :  50,000 - 99,000 CFU/mL Enterococcus faecalis( Prelim)   - RVP negative   - Streptococcal antigen Negative   - MRSA Negative     -see no need for Abx  -Off loading to prevent pressure sores and preventive measures to avoid aspiration  -d/c richardson catheer . If in retention then CIC    Please do not hesitate to recall ID if any questions arise either through [facility] or through [employer] teams

## 2023-11-13 NOTE — DIETITIAN INITIAL EVALUATION ADULT - ORAL INTAKE PTA/DIET HISTORY
Nutrition hx obtained from patient's  present at bedside. Patient is from NH. She had PO diet PTA - she had a regular texture/thin liquids. Height reported as 64 inches. UBW: 180 lbs. RD bedscale weight (90.2 kg - 198.44 lbs). NKFA - does not eat red or green peppers due to acid reflux symptoms.     Currently patient is receiving 250 mL bolus feeds of Jevity tolerating well - no recent BM or overt GI s/s

## 2023-11-13 NOTE — PROGRESS NOTE ADULT - SUBJECTIVE AND OBJECTIVE BOX
IMELDA ROLLE  83y, Female  Allergy: Originally Entered as [Unknown] reaction to [neuroleptics] (Unknown)  Originally Entered as [Unknown] reaction to [pepper] (Unknown)  Originally Entered as [Unknown] reaction to [red pepper] (Unknown)  penicillin (Anaphylaxis)  benzodiazepines (Unknown)  ciprofloxacin (Unknown)  Originally Entered as [Unknown] reaction to [green pepper] (Unknown)  antichlolinergics (Unknown)    CHIEF COMPLAINT: ams (12 Nov 2023 16:19)    HPI:  HPI:  83-year-old female with past medical history of Lewy body dementia, Parkinson disease who was brought in from [facility] for AMS.  Per , pt is AAOX2 and able to have a normal conversation at baseline , but patient has been having worsening confusion for the past week. Pt's  also says that she has been having a cough for the past week as well, not sure if productive. Unable to get rest of hx given pts mental status.     In the ED, vitals wnl. Labs with wbc 17.94, 79.9% PMNs, Mg 1.7, trops 0.05, vbg pH 7.09, pCO2 72, Na 114 (possible error given BMP Na 134). UA negative. CXR with left basilar opacity. CTH with New small intraparenchymal hemorrhage in the left thalamus since the prior CT head from 12/24/2022. No acute territorial infarct or midline   shift. Placed on bipap, admit to SDU. (10 Nov 2023 23:44)      Infectious Diseases History:  Old Micro Data/Cultures:     FAMILY HISTORY:  No pertinent family history in first degree relatives      PAST MEDICAL & SURGICAL HISTORY:  Dementia      History of breast cancer      Constipation      Lewy body dementia without behavioral disturbance      Colon polyp      History of colon resection      H/O mastectomy, right          SOCIAL HISTORY  Social History:  Former smoker (10 Nov 2023 23:44)      Recent Travel:  Other Exposures:     ROS  General: Denies rigors, nightsweats  HEENT: Denies headache, rhinorrhea, sore throat, eye pain  CV: Denies CP, palpitations  PULM: Denies wheezing, hemoptysis  GI: Denies hematemesis, hematochezia, melena  : Denies discharge, hematuria  MSK: Denies arthralgias, myalgias  SKIN: Denies rash, lesions  NEURO: Denies paresthesias, weakness  PSYCH: Denies depression, anxiety    VITALS:  T(F): 96.6, Max: 98 (11-13-23 @ 00:19)  HR: 88  BP: 88/44  RR: 20Vital Signs Last 24 Hrs  T(C): 35.9 (13 Nov 2023 07:16), Max: 36.7 (13 Nov 2023 00:19)  T(F): 96.6 (13 Nov 2023 07:16), Max: 98 (13 Nov 2023 00:19)  HR: 88 (13 Nov 2023 07:16) (82 - 104)  BP: 88/44 (13 Nov 2023 07:16) (88/44 - 120/63)  BP(mean): 63 (13 Nov 2023 07:16) (63 - 80)  RR: 20 (13 Nov 2023 07:16) (18 - 20)  SpO2: 97% (13 Nov 2023 07:16) (93% - 97%)    Parameters below as of 13 Nov 2023 07:16  Patient On (Oxygen Delivery Method): room air        PHYSICAL EXAM:  Gen: NAD, resting in bed  HEENT: Normocephalic, atraumatic  Neck: supple, no lymphadenopathy  CV: Regular rate & regular rhythm  Lungs: CTAB  Abdomen: Soft, BS present  Ext: Warm, well perfused  Neuro: non focal, awake  Skin: no rash, no lesions  Lines: no phlebitis    TESTS & MEASUREMENTS:                        11.1   13.51 )-----------( 294      ( 13 Nov 2023 05:19 )             35.1     11-13    138  |  100  |  18  ----------------------------<  110<H>  3.3<L>   |  27  |  0.8    Ca    8.1<L>      13 Nov 2023 05:19    TPro  5.5<L>  /  Alb  3.3<L>  /  TBili  0.6  /  DBili  x   /  AST  11  /  ALT  <5  /  AlkPhos  48  11-13      LIVER FUNCTIONS - ( 13 Nov 2023 05:19 )  Alb: 3.3 g/dL / Pro: 5.5 g/dL / ALK PHOS: 48 U/L / ALT: <5 U/L / AST: 11 U/L / GGT: x           Urinalysis Basic - ( 13 Nov 2023 05:19 )    Color: x / Appearance: x / SG: x / pH: x  Gluc: 110 mg/dL / Ketone: x  / Bili: x / Urobili: x   Blood: x / Protein: x / Nitrite: x   Leuk Esterase: x / RBC: x / WBC x   Sq Epi: x / Non Sq Epi: x / Bacteria: x        Culture - Blood (collected 11-11-23 @ 06:55)  Source: .Blood None  Preliminary Report (11-12-23 @ 17:02):    No growth at 24 hours    Culture - Urine (collected 11-10-23 @ 15:33)  Source: Catheterized Catheterized  Preliminary Report (11-12-23 @ 20:09):    50,000 - 99,000 CFU/mL Enterococcus faecalis        Blood Gas Venous - Lactate: 1.10 mmol/L (11-10-23 @ 15:14)      INFECTIOUS DISEASES TESTING  MRSA PCR Result.: Negative (11-11-23 @ 10:12)      RADIOLOGY & ADDITIONAL TESTS:  I have personally reviewed the last available Chest xray  CXR  Xray Chest 1 View- PORTABLE-Urgent:   ACC: [id number] EXAM:  XR CHEST PORTABLE URGENT 1V   ORDERED BY: TRIP ORTIZ     PROCEDURE DATE:  11/11/2023          INTERPRETATION:  Clinical History / Reason for exam: Nasogastric tube   placement    Comparison : Chest radiograph November 10, 2023.    Technique/Positioning: AP.    Findings:    Support devices: An enteric tube with tip in the stomach.    Cardiac/mediastinum/hilum: Stable cardiomegaly.    Lung parenchyma/Pleura: Bilateral opacities, stable.    Skeleton/soft tissues: Unchanged.    Impression:    Unchanged bilateral opacities.    Nasogastric tube, tip in the stomach.        --- End of Report ---            LUCRECIA LUIS MD; Attending Radiologist  This document has been electronically signed. Nov 11 2023  1:01PM (11-11-23 @ 12:48)  Xray Chest 1 View- PORTABLE-Urgent:   ACC: [id number] EXAM:  XR CHEST PORTABLE URGENT 1V   ORDERED BY: FREDDY HEATH     PROCEDURE DATE:  11/10/2023          INTERPRETATION:  Clinical History / Reason for exam: Weakness    Comparison : Chest radiograph 12/24/2022.    Technique/Positioning: Frontal chest radiograph.    Findings:    Support devices: None.    Cardiac/mediastinum/hilum: Enlarged versus magnified    Lung parenchyma/Pleura: Suggestion of pulmonary vascular congestion.   Question of left basilar opacity/effusion. No pneumothorax.    Skeleton/soft tissues: Bony demineralization. Degenerative changes.   Coarse right breast calcification redemonstrated.    Impression:    Suggestion of pulmonary vascular congestion. Question of left basilar   opacity/effusion.    --- End ofReport ---            FAUZIA SUH MD; Attending Radiologist  This document has been electronically signed. Nov 10 2023  3:21PM (11-10-23 @ 15:19)      CT      CARDIOLOGY TESTING  12 Lead ECG:   Ventricular Rate 85 BPM    Atrial Rate 85 BPM    P-R Interval 174 ms    QRS Duration 76 ms    Q-T Interval 370 ms    QTC Calculation(Bazett) 440 ms    P Axis 32 degrees    R Axis -20 degrees    T Axis 8 degrees    Diagnosis Line Normal sinus rhythm  Inferior infarct , age undetermined  Abnormal ECG    Confirmed by Hemant Fuller ([id number]) on 11/11/2023 7:57:09 AM (11-10-23 @ 14:58)      All available historical records have been reviewed    MEDICATIONS  ascorbic acid 500  azithromycin  IVPB 500  aztreonam  IVPB 2000  carbidopa/levodopa  25/100 1  chlorhexidine 2% Cloths 1  famotidine    Tablet 20  gabapentin Solution 600  heparin   Injectable 5000  hydrALAZINE Injectable 5  latanoprost 0.005% Ophthalmic Solution 1  levothyroxine 50  polyethylene glycol 3350 17  senna 2      ANTIBIOTICS:  azithromycin  IVPB 500 milliGRAM(s) IV Intermittent every 24 hours  aztreonam  IVPB 2000 milliGRAM(s) IV Intermittent every 8 hours      All available historical data has been reviewed     IMELDA ROLLE  83y, Female  Allergy: Originally Entered as [Unknown] reaction to [neuroleptics] (Unknown)  Originally Entered as [Unknown] reaction to [pepper] (Unknown)  Originally Entered as [Unknown] reaction to [red pepper] (Unknown)  penicillin (Anaphylaxis)  benzodiazepines (Unknown)  ciprofloxacin (Unknown)  Originally Entered as [Unknown] reaction to [green pepper] (Unknown)  antichlolinergics (Unknown)    CHIEF COMPLAINT: ams (12 Nov 2023 16:19)    HPI:  HPI:  83-year-old female with past medical history of Lewy body dementia, Parkinson disease who was brought in from [facility] for AMS.  Per , pt is AAOX2 and able to have a normal conversation at baseline , but patient has been having worsening confusion for the past week. Pt's  also says that she has been having a cough for the past week as well, not sure if productive. Unable to get rest of hx given pts mental status.     In the ED, vitals wnl. Labs with wbc 17.94, 79.9% PMNs, Mg 1.7, trops 0.05, vbg pH 7.09, pCO2 72, Na 114 (possible error given BMP Na 134). UA negative. CXR with left basilar opacity. CTH with New small intraparenchymal hemorrhage in the left thalamus since the prior CT head from 12/24/2022. No acute territorial infarct or midline   shift. Placed on bipap, admit to SDU. (10 Nov 2023 23:44)      Infectious Diseases History:  Old Micro Data/Cultures:     FAMILY HISTORY:  No pertinent family history in first degree relatives      PAST MEDICAL & SURGICAL HISTORY:  Dementia      History of breast cancer      Constipation      Lewy body dementia without behavioral disturbance      Colon polyp      History of colon resection      H/O mastectomy, right          SOCIAL HISTORY  Social History:  Former smoker (10 Nov 2023 23:44)      Recent Travel:  Other Exposures:     ROS  General: patient lethargic verbalizing to question   HEENT: supple   CV: S1 S2  PULM:  clear chest sounds   GI: Denies hematemesis, hematochezia, melena  : Denied abdominal pain   SKIN: NO cellulitis , redness, discharge   NEURO: limited examination   CHRISTIANSON's INSITU ; placed in the ED     MIDLINE present ; New   VITALS:  T(F): 96.6, Max: 98 (11-13-23 @ 00:19)  HR: 88  BP: 88/44  RR: 20Vital Signs Last 24 Hrs  T(C): 35.9 (13 Nov 2023 07:16), Max: 36.7 (13 Nov 2023 00:19)  T(F): 96.6 (13 Nov 2023 07:16), Max: 98 (13 Nov 2023 00:19)  HR: 88 (13 Nov 2023 07:16) (82 - 104)  BP: 88/44 (13 Nov 2023 07:16) (88/44 - 120/63)  BP(mean): 63 (13 Nov 2023 07:16) (63 - 80)  RR: 20 (13 Nov 2023 07:16) (18 - 20)  SpO2: 97% (13 Nov 2023 07:16) (93% - 97%)    Parameters below as of 13 Nov 2023 07:16  Patient On (Oxygen Delivery Method): room air        PHYSICAL EXAM:  Gen: lethargic   HEENT: Normocephalic, atraumatic  Neck: supple, no lymphadenopathy  CV: Regular rate & regular rhythm  Lungs: clear lung sounds   Abdomen: Soft, BS present  Ext: Warm, well perfused  Neuro: non focal, awake  Skin: no rash, no lesions  Lines: no phlebitis    TESTS & MEASUREMENTS:                        11.1   13.51 )-----------( 294      ( 13 Nov 2023 05:19 )             35.1     11-13    138  |  100  |  18  ----------------------------<  110<H>  3.3<L>   |  27  |  0.8    Ca    8.1<L>      13 Nov 2023 05:19    TPro  5.5<L>  /  Alb  3.3<L>  /  TBili  0.6  /  DBili  x   /  AST  11  /  ALT  <5  /  AlkPhos  48  11-13      LIVER FUNCTIONS - ( 13 Nov 2023 05:19 )  Alb: 3.3 g/dL / Pro: 5.5 g/dL / ALK PHOS: 48 U/L / ALT: <5 U/L / AST: 11 U/L / GGT: x           Urinalysis Basic - ( 13 Nov 2023 05:19 )    Color: x / Appearance: x / SG: x / pH: x  Gluc: 110 mg/dL / Ketone: x  / Bili: x / Urobili: x   Blood: x / Protein: x / Nitrite: x   Leuk Esterase: x / RBC: x / WBC x   Sq Epi: x / Non Sq Epi: x / Bacteria: x        Culture - Blood (collected 11-11-23 @ 06:55)  Source: .Blood None  Preliminary Report (11-12-23 @ 17:02):    No growth at 24 hours    Culture - Urine (collected 11-10-23 @ 15:33)  Source: Catheterized Catheterized  Preliminary Report (11-12-23 @ 20:09):    50,000 - 99,000 CFU/mL Enterococcus faecalis        Blood Gas Venous - Lactate: 1.10 mmol/L (11-10-23 @ 15:14)      INFECTIOUS DISEASES TESTING  MRSA PCR Result.: Negative (11-11-23 @ 10:12)      RADIOLOGY & ADDITIONAL TESTS:  I have personally reviewed the last available Chest xray  CXR  Xray Chest 1 View- PORTABLE-Urgent:   ACC: [id number] EXAM:  XR CHEST PORTABLE URGENT 1V   ORDERED BY: TRIP ORTIZ     PROCEDURE DATE:  11/11/2023          INTERPRETATION:  Clinical History / Reason for exam: Nasogastric tube   placement    Comparison : Chest radiograph November 10, 2023.    Technique/Positioning: AP.    Findings:    Support devices: An enteric tube with tip in the stomach.    Cardiac/mediastinum/hilum: Stable cardiomegaly.    Lung parenchyma/Pleura: Bilateral opacities, stable.    Skeleton/soft tissues: Unchanged.    Impression:    Unchanged bilateral opacities.    Nasogastric tube, tip in the stomach.        --- End of Report ---            LUCRECIA LUIS MD; Attending Radiologist  This document has been electronically signed. Nov 11 2023  1:01PM (11-11-23 @ 12:48)  Xray Chest 1 View- PORTABLE-Urgent:   ACC: [id number] EXAM:  XR CHEST PORTABLE URGENT 1V   ORDERED BY: FREDDY HEATH     PROCEDURE DATE:  11/10/2023          INTERPRETATION:  Clinical History / Reason for exam: Weakness    Comparison : Chest radiograph 12/24/2022.    Technique/Positioning: Frontal chest radiograph.    Findings:    Support devices: None.    Cardiac/mediastinum/hilum: Enlarged versus magnified    Lung parenchyma/Pleura: Suggestion of pulmonary vascular congestion.   Question of left basilar opacity/effusion. No pneumothorax.    Skeleton/soft tissues: Bony demineralization. Degenerative changes.   Coarse right breast calcification redemonstrated.    Impression:    Suggestion of pulmonary vascular congestion. Question of left basilar   opacity/effusion.    --- End ofReport ---            FAUZIA SUH MD; Attending Radiologist  This document has been electronically signed. Nov 10 2023  3:21PM (11-10-23 @ 15:19)      CT      CARDIOLOGY TESTING  12 Lead ECG:   Ventricular Rate 85 BPM    Atrial Rate 85 BPM    P-R Interval 174 ms    QRS Duration 76 ms    Q-T Interval 370 ms    QTC Calculation(Bazett) 440 ms    P Axis 32 degrees    R Axis -20 degrees    T Axis 8 degrees    Diagnosis Line Normal sinus rhythm  Inferior infarct , age undetermined  Abnormal ECG    Confirmed by Hemant Fuller ([id number]) on 11/11/2023 7:57:09 AM (11-10-23 @ 14:58)      All available historical records have been reviewed    MEDICATIONS  ascorbic acid 500  azithromycin  IVPB 500  aztreonam  IVPB 2000  carbidopa/levodopa  25/100 1  chlorhexidine 2% Cloths 1  famotidine    Tablet 20  gabapentin Solution 600  heparin   Injectable 5000  hydrALAZINE Injectable 5  latanoprost 0.005% Ophthalmic Solution 1  levothyroxine 50  polyethylene glycol 3350 17  senna 2      ANTIBIOTICS:  azithromycin  IVPB 500 milliGRAM(s) IV Intermittent every 24 hours  aztreonam  IVPB 2000 milliGRAM(s) IV Intermittent every 8 hours      All available historical data has been reviewed

## 2023-11-13 NOTE — DIETITIAN INITIAL EVALUATION ADULT - ENTERAL
Diabetisource 275 mL q6hrs (4x) which will provide 1100 mL, 1320 kcal, 72.6 gm Protein, 902 mL free water + (75 mL flushes pre/post feeds) = 1502 mL total water Diabetisource Formula (current substitution for Jevity 1.2 and Glucerna 1.2 formula) - bolus feeds 275 mL q6hrs (4x) which will provide 1100 mL, 1320 kcal, 72.6 gm Protein, 902 mL free water + (75 mL flushes pre/post feeds) = 1502 mL total water

## 2023-11-13 NOTE — PROGRESS NOTE ADULT - ASSESSMENT
83-year-old female with past medical history of Brent body dementia, Parkinson disease who was brought in from [facility] for AMS/encephalopathy x 1 week    #Encephalopathy  #Hypercapnic respiratory failure suspected 2/2 aspiration pneumonia vs IPH   - CTH: New small intraparenchymal hemorrhage in the left thalamus since the prior CT head from 12/24/2022. No acute territorial infarct or midline shift. Stable severe chronic microvascular ischemic changes.  - s/p eval by neurocrit and neurosurgery in ED. --> no acute intervention currently.   - repeat CTH No changes   - 11/11 rEEG: no electrographic seizures or significant clinical events  - Keep SBP from 110-140  - neuro following  - 11/12 CTA no evidence of flow-limiting stenosis, occlusion, or aneurysm   - f/u MRI brain with/without   - c/w home sinemet  - hydralazine 5mg q6 PRN for SBP >140 and HR <70    #AMS  #Acute hypoxic and hypercapnic respiratory failure likely 2/2 Left PNA   - as per , pt confused for the past week; normally able to hold conversation but no longer can  - wbc ~17, afebrile otherwise  - CXR with LLL opacity  - vbg pH 7.09, pCO2 72, placed on bipap  - s/p aztreonam/azithromycin in ED; continue for now  - procal -ve, MRSA nares -ve (vanco dcd, pt has hx of red man syndrome per )  - f/u ID given hx of abx allergy  - UA negative; cultures +ve for enterococcus faecalis  - f/u urine legionella  - speech and swallow following: may need FEES vs modified barium swallow, NGT feeding for now    #Hypothyroidism  - continue home levothyroxine  - f/u TSH    #Lewy body dementia  #Parkinsons Disease  - continue home carbidopa-levodopa                                                                              ----------------------------------------------------  # DVT prophylaxis: SCDs  # GI prophylaxis: Pepcid  # Diet: NPO with Tube Feed  # Activity: Bed Bound  # Code status: FULL CODE  # Disposition: poss DGTF tomorrow pending MRI                                                                           --------------------------------------------------------    # Handoff:   - f/u MRI brain w/wo  - f/u ID recs 83-year-old female with past medical history of Brent body dementia, Parkinson disease who was brought in from [facility] for AMS/encephalopathy x 1 week    #Encephalopathy  #Hypercapnic respiratory failure suspected 2/2 aspiration pneumonia vs IPH   - CTH: New small intraparenchymal hemorrhage in the left thalamus since the prior CT head from 12/24/2022. No acute territorial infarct or midline shift. Stable severe chronic microvascular ischemic changes.  - s/p eval by neurocrit and neurosurgery in ED. --> no acute intervention currently.   - repeat CTH No changes   - 11/11 rEEG: no electrographic seizures or significant clinical events  - Keep SBP from 110-140  - neuro following  - 11/12 CTA no evidence of flow-limiting stenosis, occlusion, or aneurysm   - f/u MRI brain with/without   - c/w home sinemet  - hydralazine 5mg q6 PRN for SBP >140 and HR <70    #AMS  #Acute hypoxic and hypercapnic respiratory failure likely 2/2 Left PNA   - as per , pt confused for the past week; normally able to hold conversation but no longer can  - wbc ~17, afebrile otherwise  - CXR with LLL opacity  - vbg pH 7.09, pCO2 72, placed on bipap  - s/p aztreonam/azithromycin in ED; continue for now  - procal -ve, MRSA nares -ve (vanco dcd, pt has hx of red man syndrome per )  - f/u ID given hx of abx allergy; per ID, no abx  - UA negative; cultures +ve for enterococcus faecalis  - f/u urine legionella  - speech and swallow following: may need FEES vs modified barium swallow, NGT feeding for now    #Hypothyroidism  - continue home levothyroxine  - TSH 1.90    #Lewy body dementia  #Parkinsons Disease  - continue home carbidopa-levodopa                                                                              ----------------------------------------------------  # DVT prophylaxis: SCDs  # GI prophylaxis: Pepcid  # Diet: NPO with Tube Feed  # Activity: Bed Bound  # Code status: FULL CODE  # Disposition: poss DGTF tomorrow pending MRI                                                                           --------------------------------------------------------    # Handoff:   - f/u MRI brain w/wo  - f/u ID recs

## 2023-11-13 NOTE — DIETITIAN INITIAL EVALUATION ADULT - PERTINENT LABORATORY DATA
11-13    138  |  100  |  18  ----------------------------<  110<H>  3.3<L>   |  27  |  0.8    Ca    8.1<L>      13 Nov 2023 05:19    TPro  5.5<L>  /  Alb  3.3<L>  /  TBili  0.6  /  DBili  x   /  AST  11  /  ALT  <5  /  AlkPhos  48  11-13  A1C with Estimated Average Glucose Result: 5.1 % (12-25-22 @ 07:20)

## 2023-11-13 NOTE — DIETITIAN INITIAL EVALUATION ADULT - OTHER INFO
Patient is a 83-year-old female with PMHx of Lewy Body dementia with associated autonomic dysfunction including dysphagia, hypothyroidism, who presented FROM NH  with encephalopathy x 1 week.  Encephalopathy could be TME  hypercapnic respiratory failure suspected 2/2 aspiration pneumonia vs IPH; CTH: New small intraparenchymal hemorrhage in the left thalamus since the prior CT head from 12/24/2022. No acute territorial infarct or midline shift. Stable severe chronic microvascular ischemic changes; Acute hypoxic and hypercapnic respiratory failure could be 2/2 Left PNA; Hypothyroidism; Parkinson disease with Lewy body dementi;    SLP swallow bedside eval 11/12: recommending  - NPO with alternative means nutrition/hydration  -SLP will f/u for objective swallowing assessment to assess physiology of swallow/ candidacy for PO     Patient is a 83-year-old female with PMHx of Lewy Body dementia with associated autonomic dysfunction including dysphagia, hypothyroidism, who presented FROM NH  with encephalopathy x 1 week.  Encephalopathy could be TME  hypercapnic respiratory failure suspected 2/2 aspiration pneumonia vs IPH; CTH: New small intraparenchymal hemorrhage in the left thalamus since the prior CT head from 12/24/2022. No acute territorial infarct or midline shift. Stable severe chronic microvascular ischemic changes; Acute hypoxic and hypercapnic respiratory failure could be 2/2 Left PNA; Hypothyroidism; Parkinson disease with Lewy body dementi;    SLP swallow bedside eval 11/12: recommending  - NPO with alternative means nutrition/hydration  -SLP will f/u for objective swallowing assessment to assess physiology of swallow/ candidacy for PO    SLP bedside swallow f/u 11/13: continue with NPO with NGT  -SLP will f/u for objective swallowing assessment to assess physiology of swallow/ candidacy for PO

## 2023-11-13 NOTE — PHARMACOTHERAPY INTERVENTION NOTE - COMMENTS
Recommended discontinuing azithromycin as per ID consult recommendations.    Te Odonnell, PharmD  Clinical Pharmacy Specialist, Infectious Diseases  Tele-Antimicrobial Stewardship Program (Tele-ASP)  Tele-ASP Phone: [phone] 
Recommended discontinuing aztreonam as per ID consult recommendations.    Te Odonnell, PharmD  Clinical Pharmacy Specialist, Infectious Diseases  Tele-Antimicrobial Stewardship Program (Tele-ASP)  Tele-ASP Phone: [phone]

## 2023-11-13 NOTE — PROGRESS NOTE ADULT - SUBJECTIVE AND OBJECTIVE BOX
24H events:    Patient is a 83y old Female who presents with a chief complaint of ams (13 Nov 2023 08:42)    Primary diagnosis of Confusion    Today is hospital day 3d. This morning patient was seen and examined at bedside, resting comfortably in bed.    No acute or major events overnight. Hemodynamically stable, tolerating oral diet, voiding appropriately with appropriate bowel movements.       Family communication:  Contact date: 11/13/2023  Relationship to patient:  at bedside    PAST MEDICAL & SURGICAL HISTORY  Dementia    History of breast cancer    Constipation    Lewy body dementia without behavioral disturbance    Colon polyp    History of colon resection    H/O mastectomy, right      SOCIAL HISTORY:  Social History:  Former smoker (10 Nov 2023 23:44)      ALLERGIES:  Originally Entered as [Unknown] reaction to [neuroleptics] (Unknown)  Originally Entered as [Unknown] reaction to [pepper] (Unknown)  Originally Entered as [Unknown] reaction to [red pepper] (Unknown)  penicillin (Anaphylaxis)  benzodiazepines (Unknown)  ciprofloxacin (Unknown)  Originally Entered as [Unknown] reaction to [green pepper] (Unknown)  antichlolinergics (Unknown)    MEDICATIONS:  STANDING MEDICATIONS  ascorbic acid 500 milliGRAM(s) Oral daily  azithromycin  IVPB 500 milliGRAM(s) IV Intermittent every 24 hours  aztreonam  IVPB 2000 milliGRAM(s) IV Intermittent every 8 hours  carbidopa/levodopa  25/100 1 Tablet(s) Oral <User Schedule>  chlorhexidine 2% Cloths 1 Application(s) Topical daily  famotidine    Tablet 20 milliGRAM(s) Oral daily  gabapentin Solution 600 milliGRAM(s) Oral three times a day  heparin   Injectable 5000 Unit(s) SubCutaneous every 12 hours  hydrALAZINE Injectable 5 milliGRAM(s) IV Push every 6 hours  lactated ringers Bolus 250 milliLiter(s) IV Bolus once  latanoprost 0.005% Ophthalmic Solution 1 Drop(s) Both EYES at bedtime  levothyroxine 50 MICROGram(s) Oral daily  polyethylene glycol 3350 17 Gram(s) Oral daily  senna 2 Tablet(s) Oral at bedtime    PRN MEDICATIONS  aluminum hydroxide/magnesium hydroxide/simethicone Suspension 30 milliLiter(s) Oral every 4 hours PRN  artificial  tears Solution 1 Drop(s) Both EYES three times a day PRN    VITALS:   T(F): 96.6  HR: 88  BP: 88/44  RR: 20  SpO2: 97%    PHYSICAL EXAM:  GENERAL: NAD, lying in bed comfortably, elderly  HEAD: NCAD  NECK: Supple, no JVD    HEART: Regular rate and rhythm, normal S1/S2, no murmurs  LUNGS: No acute respiratory distress, clear b/l breath sounds  ABDOMEN:  soft, nontender, nondistended  EXTREMITIES: no LE edema  NERVOUS SYSTEM:  Unable to assess, patient not following commands      ( X ) Indwelling Pinon Catheter:  Reason (  ) Critical illness     (  X) urinary retention    (  ) Accurate Ins/Outs Monitoring     (  ) CMO patient    LABS:                        11.1   13.51 )-----------( 294      ( 13 Nov 2023 05:19 )             35.1     11-13    138  |  100  |  18  ----------------------------<  110<H>  3.3<L>   |  27  |  0.8    Ca    8.1<L>      13 Nov 2023 05:19    TPro  5.5<L>  /  Alb  3.3<L>  /  TBili  0.6  /  DBili  x   /  AST  11  /  ALT  <5  /  AlkPhos  48  11-13      Urinalysis Basic - ( 13 Nov 2023 05:19 )    Color: x / Appearance: x / SG: x / pH: x  Gluc: 110 mg/dL / Ketone: x  / Bili: x / Urobili: x   Blood: x / Protein: x / Nitrite: x   Leuk Esterase: x / RBC: x / WBC x   Sq Epi: x / Non Sq Epi: x / Bacteria: x        Troponin T, Serum: 0.03 ng/mL *HH* (11-12-23 @ 11:20)      Culture - Blood (collected 11 Nov 2023 06:55)  Source: .Blood None  Preliminary Report (12 Nov 2023 17:02):    No growth at 24 hours    Culture - Urine (collected 10 Nov 2023 15:33)  Source: Catheterized Catheterized  Preliminary Report (12 Nov 2023 20:09):    50,000 - 99,000 CFU/mL Enterococcus faecalis      CARDIAC MARKERS ( 12 Nov 2023 11:20 )  x     / 0.03 ng/mL / x     / x     / x          RADIOLOGY:    < from: CT Angio Neck w/ IV Cont (11.12.23 @ 12:10) >    ACC: [id number] EXAM:  CT ANGIO NECK (W)AW IC   ORDERED BY: MIKIE UPTON     ACC: [id number] EXAM:  CT ANGIO BRAIN (W)AW IC   ORDERED BY: MIKIE   WON     PROCEDURE DATE:  11/12/2023          INTERPRETATION:  CLINICAL INDICATION: Intracranial hemorrhage. .    TECHNIQUE: CT angiography of the intracranial (head) and extracranial   (neck) circulation was performed after contrast administration    Maximal intensity projection images were additionally included and   reviewed.    100 mls of Omnipaque 350 was administered intravenously without   complication and 0 mls were discarded.    CAROTID STENOSIS REFERENCE: (NASCET = 100x1-(N/D)). N=greatest narrowing.   D=normal distal diameter - MILD = <50% stenosis. - MODERATE = 50-69%  stenosis. - SEVERE = 70-89% stenosis. - HAIRLINE/CRITICAL = 90-99%   stenosis. - OCCLUDED = 100% stenosis.    COMPARISON: CTA of the head and neck dated 12/3/2013      FINDINGS:      HEAD CTA:    The internal carotid arteries demonstrate normal enhancement to the   intracranial circulation and Crow of [place]. There is minimal   atherosclerotic calcification involving the cavernous ICAs without   stenosis.    Anterior and middle cerebral arteries demonstrate normal enhancement and   symmetric arborization without intraluminal filling defect, stenosis,   occlusion, aneurysm or vascular malformation.    The intradural vertebral arteries demonstrate normal enhancement to the   vertebrobasilar confluence. Branch vasculature of the posterior   circulation are within normal limits. The posterior cerebral arteries   demonstrate symmetric enhancement and arborization without evidence for   aneurysm, stenosis, occlusion or vascular malformation.    Visualized dural venous sinuses and deep cerebral venous structures   demonstrate normal enhancement without evidence for filling defect.    NECK CTA:    The aortic arch and origins of the great vessels are within normal limits.    Right:  The origin of the right common carotid artery is normal. The   right common carotid artery is normal in course and caliber to the   carotid bifurcation. There is minimal atherosclerotic calcification   involving the carotid bulb without stenosis. Origins of the internal and   external carotid arteries are normal by NASCET criteria. The right   internal carotid artery has normal course and caliber to the intracranial   circulation.    The origin of the right vertebral artery is normal. The right vertebral   artery is normal in course and caliber to the intracranial circulation.    Left: The origin of the left common carotid artery is normal. The left   common carotid artery is normal in course and caliber to the carotid   bifurcation. Origins of the internal and external carotid arteries are   normalby NASCET criteria. The left internal carotid artery has normal   course and caliber to the intracranial circulation.    The origin of the left vertebral artery is normal. The left vertebral   artery is normal in course and caliber to the intracranial circulation.    There is a small left pleural effusion.    Partially visualized nasogastric tube, tip not visualized.    IMPRESSION:    CTA HEAD:  No evidence of flow-limiting stenosis, occlusion or aneurysm.    CTA NECK:  No evidence of carotid or vertebral artery stenosis.    --- End of Report ---            ENRIQUE LOCKWOOD MD; Attending Radiologist  This document has been electronically signed. Nov 13 2023  9:19AM    < end of copied text >    < from: CT Head No Cont (11.12.23 @ 11:55) >    ACC: [id number] EXAM:  CT BRAIN   ORDERED BY: MIKIE UPTON     PROCEDURE DATE:  11/12/2023          INTERPRETATION:  CLINICAL INDICATION: Altered mental status    TECHNIQUE: CT of the head was performed without the administration of   intravenous contrast.    COMPARISON: CT head 11/11/2023    FINDINGS:    Redemonstration of an acute left thalamic intraparenchymal hemorrhage   without significant change compared to prior CT. There is no associated   mass effect or midline shift    There is generalized cortical and cerebellar volume loss with   commensurate ventricular dilation. There is no hydrocephalus.    Patchy periventricular and subcortical hypodensities consistent with   severe chronic microvascular ischemic changes, stable. 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 The visualized intraorbital contents are normal.   Mucosal thickening of the left frontal sinus. The mastoid air cells are   aerated. The visualized soft tissues and osseous structures appear normal.      IMPRESSION:  No significant change since recent head CT of 11/11/2023.    Stable left thalamic acute hemorrhage without mass effect or midline   shift.    Additional chronic findings as above.    --- End of Report ---          MARCELLO NOBLE MD; Resident Radiologist  This document has been electronically signed.  ENRIQUE LOCKWOOD MD; Attending Radiologist  This document has been electronically signed. Nov 13 2023  8:38AM    < end of copied text >

## 2023-11-13 NOTE — DIETITIAN INITIAL EVALUATION ADULT - NS FNS DIET ORDER
Diet, NPO with Tube Feed:   Tube Feeding Modality: Nasogastric  Jevity 1.2 Demetri  Total Volume for 24 Hours (mL): 6000  Bolus  Total Volume of Bolus (mL):  1000  Total # of Feeds: 4  Tube Feed Frequency: Every 4 hours   Tube Feed Start Time: 19:00  Bolus Feed Rate (mL per Hour): 250   Bolus Feed Duration (in Hours): 1  Bolus Feed Instructions:  Please START tube feed at 250mL and titrate up   Total Volume per Flush (mL): 150   Frequency: Every 4 Hours (11-12-23 @ 14:06)

## 2023-11-13 NOTE — SWALLOW BEDSIDE ASSESSMENT ADULT - SLP PERTINENT HISTORY OF CURRENT PROBLEM
Pt is an 84 y/o F w/ PMHx: Lewy Body dementia (baseline mRS 4-5, has been residing in a NH for 7 yrs) w/ associated autonomic dysfunction including dysphagia, hypothyroidism, who presented with encephalopathy x 1 week. Per , pt is able to have some verbal interaction at baseline, bedbound, needs help w/ all ADL's. Pt found to have hypercapnic respiratory failure on presentation, suspected 2' aspiration pneumonia. CTH showed a small left thalamic IPH. ID c/s for possible LLL PNA. CXR 11/11-> B/L opacities.

## 2023-11-13 NOTE — DIETITIAN INITIAL EVALUATION ADULT - COLLABORATION WITH OTHER PROVIDERS
Interventions: EN, coordination of care  Monitoring/Evaluation: EN tolerance, weight, labs, skin status, NFPE

## 2023-11-13 NOTE — DIETITIAN INITIAL EVALUATION ADULT - PERTINENT MEDS FT
MEDICATIONS  (STANDING):  ascorbic acid 500 milliGRAM(s) Oral daily  azithromycin  IVPB 500 milliGRAM(s) IV Intermittent every 24 hours  aztreonam  IVPB 2000 milliGRAM(s) IV Intermittent every 8 hours  carbidopa/levodopa  25/100 1 Tablet(s) Oral <User Schedule>  chlorhexidine 2% Cloths 1 Application(s) Topical daily  famotidine    Tablet 20 milliGRAM(s) Oral daily  gabapentin Solution 600 milliGRAM(s) Oral three times a day  heparin   Injectable 5000 Unit(s) SubCutaneous every 12 hours  hydrALAZINE Injectable 5 milliGRAM(s) IV Push every 6 hours  lactated ringers Bolus 250 milliLiter(s) IV Bolus once  latanoprost 0.005% Ophthalmic Solution 1 Drop(s) Both EYES at bedtime  levothyroxine 50 MICROGram(s) Oral daily  polyethylene glycol 3350 17 Gram(s) Oral daily  senna 2 Tablet(s) Oral at bedtime    MEDICATIONS  (PRN):  aluminum hydroxide/magnesium hydroxide/simethicone Suspension 30 milliLiter(s) Oral every 4 hours PRN Dyspepsia  artificial  tears Solution 1 Drop(s) Both EYES three times a day PRN Dry Eyes

## 2023-11-14 LAB
ALBUMIN SERPL ELPH-MCNC: 3.2 G/DL — LOW (ref 3.5–5.2)
ALBUMIN SERPL ELPH-MCNC: 3.2 G/DL — LOW (ref 3.5–5.2)
ALP SERPL-CCNC: 48 U/L — SIGNIFICANT CHANGE UP (ref 30–115)
ALP SERPL-CCNC: 48 U/L — SIGNIFICANT CHANGE UP (ref 30–115)
ALT FLD-CCNC: 6 U/L — SIGNIFICANT CHANGE UP (ref 0–41)
ALT FLD-CCNC: 6 U/L — SIGNIFICANT CHANGE UP (ref 0–41)
ANION GAP SERPL CALC-SCNC: 8 MMOL/L — SIGNIFICANT CHANGE UP (ref 7–14)
ANION GAP SERPL CALC-SCNC: 8 MMOL/L — SIGNIFICANT CHANGE UP (ref 7–14)
AST SERPL-CCNC: 14 U/L — SIGNIFICANT CHANGE UP (ref 0–41)
AST SERPL-CCNC: 14 U/L — SIGNIFICANT CHANGE UP (ref 0–41)
BILIRUB SERPL-MCNC: 0.2 MG/DL — SIGNIFICANT CHANGE UP (ref 0.2–1.2)
BILIRUB SERPL-MCNC: 0.2 MG/DL — SIGNIFICANT CHANGE UP (ref 0.2–1.2)
BUN SERPL-MCNC: 19 MG/DL — SIGNIFICANT CHANGE UP (ref 10–20)
BUN SERPL-MCNC: 19 MG/DL — SIGNIFICANT CHANGE UP (ref 10–20)
CALCIUM SERPL-MCNC: 8.8 MG/DL — SIGNIFICANT CHANGE UP (ref 8.4–10.5)
CALCIUM SERPL-MCNC: 8.8 MG/DL — SIGNIFICANT CHANGE UP (ref 8.4–10.5)
CHLORIDE SERPL-SCNC: 103 MMOL/L — SIGNIFICANT CHANGE UP (ref 98–110)
CHLORIDE SERPL-SCNC: 103 MMOL/L — SIGNIFICANT CHANGE UP (ref 98–110)
CO2 SERPL-SCNC: 28 MMOL/L — SIGNIFICANT CHANGE UP (ref 17–32)
CO2 SERPL-SCNC: 28 MMOL/L — SIGNIFICANT CHANGE UP (ref 17–32)
CREAT SERPL-MCNC: 0.5 MG/DL — LOW (ref 0.7–1.5)
CREAT SERPL-MCNC: 0.5 MG/DL — LOW (ref 0.7–1.5)
EGFR: 94 ML/MIN/1.73M2 — SIGNIFICANT CHANGE UP
EGFR: 94 ML/MIN/1.73M2 — SIGNIFICANT CHANGE UP
GLUCOSE SERPL-MCNC: 95 MG/DL — SIGNIFICANT CHANGE UP (ref 70–99)
GLUCOSE SERPL-MCNC: 95 MG/DL — SIGNIFICANT CHANGE UP (ref 70–99)
HCT VFR BLD CALC: 35.1 % — LOW (ref 37–47)
HCT VFR BLD CALC: 35.1 % — LOW (ref 37–47)
HGB BLD-MCNC: 11 G/DL — LOW (ref 12–16)
HGB BLD-MCNC: 11 G/DL — LOW (ref 12–16)
MCHC RBC-ENTMCNC: 30.3 PG — SIGNIFICANT CHANGE UP (ref 27–31)
MCHC RBC-ENTMCNC: 30.3 PG — SIGNIFICANT CHANGE UP (ref 27–31)
MCHC RBC-ENTMCNC: 31.3 G/DL — LOW (ref 32–37)
MCHC RBC-ENTMCNC: 31.3 G/DL — LOW (ref 32–37)
MCV RBC AUTO: 96.7 FL — SIGNIFICANT CHANGE UP (ref 81–99)
MCV RBC AUTO: 96.7 FL — SIGNIFICANT CHANGE UP (ref 81–99)
NRBC # BLD: 0 /100 WBCS — SIGNIFICANT CHANGE UP (ref 0–0)
NRBC # BLD: 0 /100 WBCS — SIGNIFICANT CHANGE UP (ref 0–0)
PLATELET # BLD AUTO: 286 K/UL — SIGNIFICANT CHANGE UP (ref 130–400)
PLATELET # BLD AUTO: 286 K/UL — SIGNIFICANT CHANGE UP (ref 130–400)
PMV BLD: 9.2 FL — SIGNIFICANT CHANGE UP (ref 7.4–10.4)
PMV BLD: 9.2 FL — SIGNIFICANT CHANGE UP (ref 7.4–10.4)
POTASSIUM SERPL-MCNC: 4.2 MMOL/L — SIGNIFICANT CHANGE UP (ref 3.5–5)
POTASSIUM SERPL-MCNC: 4.2 MMOL/L — SIGNIFICANT CHANGE UP (ref 3.5–5)
POTASSIUM SERPL-SCNC: 4.2 MMOL/L — SIGNIFICANT CHANGE UP (ref 3.5–5)
POTASSIUM SERPL-SCNC: 4.2 MMOL/L — SIGNIFICANT CHANGE UP (ref 3.5–5)
PROT SERPL-MCNC: 5.6 G/DL — LOW (ref 6–8)
PROT SERPL-MCNC: 5.6 G/DL — LOW (ref 6–8)
RBC # BLD: 3.63 M/UL — LOW (ref 4.2–5.4)
RBC # BLD: 3.63 M/UL — LOW (ref 4.2–5.4)
RBC # FLD: 14.2 % — SIGNIFICANT CHANGE UP (ref 11.5–14.5)
RBC # FLD: 14.2 % — SIGNIFICANT CHANGE UP (ref 11.5–14.5)
SODIUM SERPL-SCNC: 139 MMOL/L — SIGNIFICANT CHANGE UP (ref 135–146)
SODIUM SERPL-SCNC: 139 MMOL/L — SIGNIFICANT CHANGE UP (ref 135–146)
WBC # BLD: 8.69 K/UL — SIGNIFICANT CHANGE UP (ref 4.8–10.8)
WBC # BLD: 8.69 K/UL — SIGNIFICANT CHANGE UP (ref 4.8–10.8)
WBC # FLD AUTO: 8.69 K/UL — SIGNIFICANT CHANGE UP (ref 4.8–10.8)
WBC # FLD AUTO: 8.69 K/UL — SIGNIFICANT CHANGE UP (ref 4.8–10.8)

## 2023-11-14 PROCEDURE — 99232 SBSQ HOSP IP/OBS MODERATE 35: CPT

## 2023-11-14 PROCEDURE — 70450 CT HEAD/BRAIN W/O DYE: CPT | Mod: 26

## 2023-11-14 RX ADMIN — Medication 1 DROP(S): at 22:14

## 2023-11-14 RX ADMIN — Medication 1 DROP(S): at 14:01

## 2023-11-14 RX ADMIN — CHLORHEXIDINE GLUCONATE 1 APPLICATION(S): 213 SOLUTION TOPICAL at 13:03

## 2023-11-14 RX ADMIN — GABAPENTIN 600 MILLIGRAM(S): 400 CAPSULE ORAL at 05:08

## 2023-11-14 RX ADMIN — Medication 50 MICROGRAM(S): at 05:08

## 2023-11-14 RX ADMIN — CARBIDOPA AND LEVODOPA 1 TABLET(S): 25; 100 TABLET ORAL at 05:08

## 2023-11-14 RX ADMIN — FAMOTIDINE 20 MILLIGRAM(S): 10 INJECTION INTRAVENOUS at 12:44

## 2023-11-14 RX ADMIN — CARBIDOPA AND LEVODOPA 1 TABLET(S): 25; 100 TABLET ORAL at 18:22

## 2023-11-14 RX ADMIN — HEPARIN SODIUM 5000 UNIT(S): 5000 INJECTION INTRAVENOUS; SUBCUTANEOUS at 18:22

## 2023-11-14 RX ADMIN — SENNA PLUS 2 TABLET(S): 8.6 TABLET ORAL at 22:00

## 2023-11-14 RX ADMIN — Medication 1 DROP(S): at 08:36

## 2023-11-14 RX ADMIN — GABAPENTIN 600 MILLIGRAM(S): 400 CAPSULE ORAL at 22:14

## 2023-11-14 RX ADMIN — Medication 500 MILLIGRAM(S): at 12:44

## 2023-11-14 RX ADMIN — POLYETHYLENE GLYCOL 3350 17 GRAM(S): 17 POWDER, FOR SOLUTION ORAL at 12:44

## 2023-11-14 RX ADMIN — GABAPENTIN 600 MILLIGRAM(S): 400 CAPSULE ORAL at 13:59

## 2023-11-14 RX ADMIN — LATANOPROST 1 DROP(S): 0.05 SOLUTION/ DROPS OPHTHALMIC; TOPICAL at 22:00

## 2023-11-14 RX ADMIN — HEPARIN SODIUM 5000 UNIT(S): 5000 INJECTION INTRAVENOUS; SUBCUTANEOUS at 05:08

## 2023-11-14 NOTE — SWALLOW BEDSIDE ASSESSMENT ADULT - SLP PERTINENT HISTORY OF CURRENT PROBLEM
Pt is an 84 y/o F w/ PMHx: Lewy Body dementia (baseline mRS 4-5, has been residing in a NH for 7 yrs) w/ associated autonomic dysfunction including dysphagia, hypothyroidism, who presented with encephalopathy x 1 week. Per , pt is able to have some verbal interaction at baseline, bedbound, needs help w/ all ADL's. Pt found to have hypercapnic respiratory failure on presentation, suspected 2' aspiration pneumonia. CTH showed a small left thalamic IPH. ID c/s for possible LLL PNA. CXR 11/11-> B/L opacities. Pt is an 82 y/o F w/ PMHx: Lewy Body dementia (baseline mRS 4-5, has been residing in a NH for 7 yrs) w/ associated autonomic dysfunction including dysphagia, hypothyroidism, who presented with encephalopathy x 1 week. Per , pt is able to have some verbal interaction at baseline, bedbound, needs help w/ all ADL's. Pt found to have hypercapnic respiratory failure on presentation, suspected 2' aspiration pneumonia. CTH showed a small left thalamic IPH. Pt seen by ID, "no ongoing bacterial PNA either clinically or radiographically, possible aspiration with chemical pneumonitis with SIRS w/ initial leucocytosis". CXR 11/11-> B/L opacities.

## 2023-11-14 NOTE — PROGRESS NOTE ADULT - ASSESSMENT
83-year-old female with PMHx of Lewy Body dementia with associated autonomic dysfunction including dysphagia, hypothyroidism, who presented FROM NH  with encephalopathy x 1 week.    []Encephalopathy could be TME  hypercapnic respiratory failure suspected 2/2 aspiration pneumonia or chemical pneumonitis vs IPH     - CTH: New small intraparenchymal hemorrhage in the left thalamus since the prior CT head from 12/24/2022. No acute territorial infarct or midline shift. Stable severe chronic microvascular ischemic changes.  - s/p eval by neurocrit and neurosurgery in ED. --> no acute intervention currently.   - repeat CTH No changes   Repeat CTH 11/12 Stable left thalamic intracranial hemorrhage.  - REEG No electrographic seizures or significant clinical events  - Keep SBP from 110-140 as per the patient  her blood pressure basline is on the lower side   - neuro on board   CTA head  No evidence of flow-limiting stenosis, occlusion or aneurysm.  CTA NECK: No evidence of carotid or vertebral artery stenosis.  MRI brain with/without pending   Continue home sinemet  SBP goal 100-140, Q4 neurochecks   today 11/14  had some intermittent headache that resolved but we got repeat CT head which showed Redemonstration of an evolving acute left thalamic hemorrhage without   significant change.  Severe chronic microvascular type changes.      Acute hypoxic and hypercapnic respiratory failure could be 2/2 Left PNA vs Possible aspiration with chemical pneumonitis  - patient has WBC 17K with temperature of 100.1 in ED.   - CXR b/l bilateral opacities  - patient clinically does not look fluid overloaded.   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   - f/u BCx,, No pyuria,  urine culture enterococcus   ID input appreciated , monitor off abcx, Possible aspiration with chemical pneumonitis  - hold  aztreonam and azithromycin  richardson removed 11/13, monitor urine outout and bladder scans    procalcitonin negative , urin strep negative and RVP negative   wbc improving    urine legionella negative  -on NC  ,off biapap   - speech and swallow on baord , c/w  NGT feeding for now   - aspiration precaution.  nutrition consult   echo noted  EF of 60%.  mild mitral stenosis  Mild aortic valve stenosis  pro-  hold lasix     Hypothyroidism - TSH 1.90 . c/w synthroid.     dry eyes - artificial tears     Parkinson disease with Lewy body dementia  Functional quadriplegia    - c/w home med. ./ neuro on board    skin care  frequent turning, off loading,and positioning and skin care as per protocol, Maintain pressure injury prevention, Keep skin clean, Offload heels, Monitor  for wounds and notify provider if any       DVT ppx: SCD heparin sq     GI ppx:  pepcid  Diet:  Speech and swallow f/u , NGT for now      PENDING:  , MRI brain , neurochecks as per neuro , feeding  SLP, , monitor off abcx .       at bedside, discussed with him in details

## 2023-11-14 NOTE — PROGRESS NOTE ADULT - TIME BILLING
time spent on review of labs, imaging studies, old records, obtaining history, personally examining patient, counselling and communicating with patient , entering orders for medications/tests/etc, discussions with other health care providers, documentation in electronic health records, independent interpretation of labs, imaging/procedure results and care coordination.
time spent on review of labs, imaging studies, old records, obtaining history, personally examining patient, counselling and communicating with patient and her  , entering orders for medications/tests/etc, discussions with other health care providers, documentation in electronic health records, independent interpretation of labs, imaging/procedure results and care coordination.
Review of imaging and chart; obtaining history; examination of pt; discussion and coordination of care.

## 2023-11-14 NOTE — PROGRESS NOTE ADULT - SUBJECTIVE AND OBJECTIVE BOX
T H I S   I S    N O  T   A    F I N A L I Z E D   N O T IMELDA CRAWLEY  83y, Female  Allergy: Originally Entered as [Unknown] reaction to [neuroleptics] (Unknown)  Originally Entered as [Unknown] reaction to [pepper] (Unknown)  Originally Entered as [Unknown] reaction to [red pepper] (Unknown)  penicillin (Anaphylaxis)  benzodiazepines (Unknown)  ciprofloxacin (Unknown)  Originally Entered as [Unknown] reaction to [green pepper] (Unknown)  antichlolinergics (Unknown)    Hospital Day: 4d    Patient seen and examined earlier today.     PMH/PSH:  PAST MEDICAL & SURGICAL HISTORY:  Dementia      History of breast cancer      Constipation      Lewy body dementia without behavioral disturbance      Colon polyp      History of colon resection      H/O mastectomy, right          LAST 24-Hr EVENTS:    VITALS:  T(F): 96.9 (11-14-23 @ 07:56), Max: 97.6 (11-13-23 @ 11:00)  HR: 87 (11-14-23 @ 08:36)  BP: 122/58 (11-14-23 @ 07:56) (95/50 - 138/61)  RR: 20 (11-14-23 @ 07:56)  SpO2: 98% (11-14-23 @ 08:36)          TESTS & MEASUREMENTS:  Weight/BMI      11-12-23 @ 07:01  -  11-13-23 @ 07:00  --------------------------------------------------------  IN: 500 mL / OUT: 2000 mL / NET: -1500 mL    11-13-23 @ 07:01  -  11-14-23 @ 07:00  --------------------------------------------------------  IN: 2050 mL / OUT: 950 mL / NET: 1100 mL                            11.1   13.51 )-----------( 294      ( 13 Nov 2023 05:19 )             35.1         11-13    138  |  100  |  18  ----------------------------<  110<H>  3.3<L>   |  27  |  0.8    Ca    8.1<L>      13 Nov 2023 05:19    TPro  5.5<L>  /  Alb  3.3<L>  /  TBili  0.6  /  DBili  x   /  AST  11  /  ALT  <5  /  AlkPhos  48  11-13    LIVER FUNCTIONS - ( 13 Nov 2023 05:19 )  Alb: 3.3 g/dL / Pro: 5.5 g/dL / ALK PHOS: 48 U/L / ALT: <5 U/L / AST: 11 U/L / GGT: x           CARDIAC MARKERS ( 12 Nov 2023 11:20 )  x     / 0.03 ng/mL / x     / x     / x            Culture - Blood (collected 11-11-23 @ 06:55)  Source: .Blood None  Preliminary Report (11-13-23 @ 17:02):    No growth at 48 Hours    Culture - Urine (collected 11-10-23 @ 15:33)  Source: Catheterized Catheterized  Final Report (11-13-23 @ 20:35):    50,000 - 99,000 CFU/mL Enterococcus faecalis  Organism: Enterococcus faecalis (11-13-23 @ 20:35)  Organism: Enterococcus faecalis (11-13-23 @ 20:35)      Method Type: JANNETH      -  Ampicillin: S <=2 Predicts results to ampicillin/sulbactam, amoxacillin-clavulanate and  piperacillin-tazobactam.      -  Ciprofloxacin: R >2      -  Levofloxacin: R >4      -  Nitrofurantoin: S <=32 Should not be used to treat pyelonephritis.      -  Tetracycline: R >8      -  Vancomycin: S 2      Urinalysis Basic - ( 13 Nov 2023 05:19 )    Color: x / Appearance: x / SG: x / pH: x  Gluc: 110 mg/dL / Ketone: x  / Bili: x / Urobili: x   Blood: x / Protein: x / Nitrite: x   Leuk Esterase: x / RBC: x / WBC x   Sq Epi: x / Non Sq Epi: x / Bacteria: x      Procalcitonin, Serum: 0.10 ng/mL (11-11-23 @ 06:55)                A1C with Estimated Average Glucose Result: 5.1 % (12-25-22 @ 07:20)      Indwelling Urethral Catheter:     Connect To:  Straight Drainage/[place]    Indication:  Urinary Retention / Obstruction (11-13-23 @ 07:46) (not performed)  Indwelling Urethral Catheter:     Connect To:  Straight Drainage/[place]    Indication:  Urinary Retention / Obstruction (11-12-23 @ 04:27) (not performed)      RADIOLOGY, ECG, & ADDITIONAL TESTS:  12 Lead ECG:   Ventricular Rate 85 BPM    Atrial Rate 85 BPM    P-R Interval 174 ms    QRS Duration 76 ms    Q-T Interval 370 ms    QTC Calculation(Bazett) 440 ms    P Axis 32 degrees    R Axis -20 degrees    T Axis 8 degrees    Diagnosis Line Normal sinus rhythm  Inferior infarct , age undetermined  Abnormal ECG    Confirmed by Hemant Fuller ([id number]) on 11/11/2023 7:57:09 AM (11-10-23 @ 14:58)    CT Head No Cont:   ACC: [id number] EXAM:  CT BRAIN   ORDERED BY: MIKIE UPTON     PROCEDURE DATE:  11/12/2023          INTERPRETATION:  CLINICAL INDICATION: Altered mental status    TECHNIQUE: CT of the head was performed without the administration of   intravenous contrast.    COMPARISON: CT head 11/11/2023    FINDINGS:    Redemonstration of an acute left thalamic intraparenchymal hemorrhage   without significant change compared to prior CT. There is no associated   mass effect or midline shift    There is generalized cortical and cerebellar volume loss with   commensurate ventricular dilation. There is no hydrocephalus.    Patchy periventricular and subcortical hypodensities consistent with   severe chronic microvascular ischemic changes, stable. 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 The visualized intraorbital contents are normal.   Mucosal thickening of the left frontal sinus. The mastoid air cells are   aerated. The visualized soft tissues and osseous structures appear normal.      IMPRESSION:  No significant change since recent head CT of 11/11/2023.    Stable left thalamic acute hemorrhage without mass effect or midline   shift.    Additional chronic findings as above.    --- End of Report ---          MARCELLO NOBLE MD; Resident Radiologist  This document has been electronically signed.  ENRIQUE LOCKWOOD MD; Attending Radiologist  This document has been electronically signed. Nov 13 2023  8:38AM (11-12-23 @ 11:55)  CT Head No Cont:   ACC: [id number] EXAM:  CT BRAIN   ORDERED BY: RASHI MOREIRA     PROCEDURE DATE:  11/11/2023          INTERPRETATION:  CLINICAL INDICATION: Hemorrhage, follow-up.    TECHNIQUE: CT of the head was performed without the administration of   intravenous contrast.    COMPARISON: CT head dated 11/10/2023    FINDINGS:    There is stable focal intraparenchymal hemorrhage in the left thalamus.    There is stable enlargement of the sulci, sylvian fissures, and   ventricles likely reflecting moderate diffuse parenchymal volume loss.    There are stable confluent patchy low attenuations in bilateral   periventricular cerebral white matter consistent with severe chronic   microvascular ischemic changes.    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    There is no evidence of acute territorial infarct. There is no midline   shift.    There is no evidence of hydrocephalus. There are no extra-axial fluid   collections.    The visualized intraorbital contents are normal. Small stable retention   cyst versus polyp in the left frontal sinus. The mastoid air cells are   aerated. The visualized soft tissues and osseous structures appear normal.      IMPRESSION:    Small stable hemorrhage in the left thalamus since the prior CT head from   11/10/2023. No acute territorial infarct or midline shift.    Stable severe chronic microvascular ischemic changes.    --- End of Report ---            DENISE CALERO MD; Attending Radiologist  This document has been electronically signed. Nov 11 2023  9:23AM (11-11-23 @ 05:42)    RECENT DIAGNOSTIC ORDERS:  CT Head No Cont: Urgent   Indication: Headache  Transport: Stretcher-Crib (11-14-23 @ 08:20)  Diet, NPO with Tube Feed:   Tube Feeding Modality: Nasogastric  Diabetisource  Total Volume for 24 Hours (mL): 1100  Bolus  Total Volume of Bolus (mL):  275  Total # of Feeds: 4  Tube Feed Frequency: Every 6 hours   Tube Feed Start Time: 12:00  Bolus Feed Rate (mL per Hour): 250   Bolus Feed Duration (in Hours): 1  Bolus Feed Instructions:  Advance as tolerated to goal rate of 275 mL q6hrs  Free Water Flush Instructions:  75 mL flushes pre/post feeds (11-13-23 @ 11:41)      MEDICATIONS:  MEDICATIONS  (STANDING):  artificial  tears Solution 1 Drop(s) Both EYES three times a day  ascorbic acid 500 milliGRAM(s) Oral daily  carbidopa/levodopa  25/100 1 Tablet(s) Oral <User Schedule>  chlorhexidine 2% Cloths 1 Application(s) Topical daily  famotidine    Tablet 20 milliGRAM(s) Oral daily  gabapentin Solution 600 milliGRAM(s) Oral three times a day  heparin   Injectable 5000 Unit(s) SubCutaneous every 12 hours  hydrALAZINE Injectable 5 milliGRAM(s) IV Push every 6 hours  latanoprost 0.005% Ophthalmic Solution 1 Drop(s) Both EYES at bedtime  levothyroxine 50 MICROGram(s) Oral daily  polyethylene glycol 3350 17 Gram(s) Oral daily  senna 2 Tablet(s) Oral at bedtime    MEDICATIONS  (PRN):  aluminum hydroxide/magnesium hydroxide/simethicone Suspension 30 milliLiter(s) Oral every 4 hours PRN Dyspepsia      HOME MEDICATIONS:  acetaminophen 325 mg oral tablet (12-24)  aluminum hydroxide-magnesium hydroxide 200 mg-200 mg/5 mL oral suspension (12-27)  Artificial Tears ophthalmic solution (12-24)  Aspercreme with Lidocaine 4% topical cream (12-24)  aspirin 81 mg oral tablet, chewable (12-24)  carbidopa-levodopa 25 mg-100 mg oral tablet (12-24)  Colace 100 mg oral capsule (12-24)  famotidine 20 mg oral tablet (11-11)  gabapentin 600 mg oral tablet (12-24)  latanoprost 0.005% ophthalmic solution (12-24)  levothyroxine 50 mcg (0.05 mg) oral tablet (12-24)  melatonin 3 mg oral tablet (12-27)  polyethylene glycol 3350 oral powder for reconstitution (12-24)  Senna 8.6 mg oral tablet (12-24)  timolol hemihydrate 0.5% ophthalmic solution (12-24)  Vitamin C 500 mg oral tablet (12-24)  Vitamin D3 1000 intl units oral tablet (12-24)      PHYSICAL EXAM:  GENERAL:   CHEST/LUNG:   HEART:   ABDOMEN:   EXTREMITIES:             ADALIDIMELDA JAVED  83y, Female  Allergy: Originally Entered as [Unknown] reaction to [neuroleptics] (Unknown)  Originally Entered as [Unknown] reaction to [pepper] (Unknown)  Originally Entered as [Unknown] reaction to [red pepper] (Unknown)  penicillin (Anaphylaxis)  benzodiazepines (Unknown)  ciprofloxacin (Unknown)  Originally Entered as [Unknown] reaction to [green pepper] (Unknown)  antichlolinergics (Unknown)    Hospital Day: 4d    Patient seen and examined earlier today.  the patient  at bedside, she has mild intermittent headache and dry eyes , as per her  she takes artifical tears     PMH/PSH:  PAST MEDICAL & SURGICAL HISTORY:  Dementia      History of breast cancer      Constipation      Lewy body dementia without behavioral disturbance      Colon polyp      History of colon resection      H/O mastectomy, right          LAST 24-Hr EVENTS:    VITALS:  T(F): 96.9 (11-14-23 @ 07:56), Max: 97.6 (11-13-23 @ 11:00)  HR: 87 (11-14-23 @ 08:36)  BP: 122/58 (11-14-23 @ 07:56) (95/50 - 138/61)  RR: 20 (11-14-23 @ 07:56)  SpO2: 98% (11-14-23 @ 08:36)          TESTS & MEASUREMENTS:  Weight/BMI      11-12-23 @ 07:01  -  11-13-23 @ 07:00  --------------------------------------------------------  IN: 500 mL / OUT: 2000 mL / NET: -1500 mL    11-13-23 @ 07:01  -  11-14-23 @ 07:00  --------------------------------------------------------  IN: 2050 mL / OUT: 950 mL / NET: 1100 mL                            11.1   13.51 )-----------( 294      ( 13 Nov 2023 05:19 )             35.1         11-13    138  |  100  |  18  ----------------------------<  110<H>  3.3<L>   |  27  |  0.8    Ca    8.1<L>      13 Nov 2023 05:19    TPro  5.5<L>  /  Alb  3.3<L>  /  TBili  0.6  /  DBili  x   /  AST  11  /  ALT  <5  /  AlkPhos  48  11-13    LIVER FUNCTIONS - ( 13 Nov 2023 05:19 )  Alb: 3.3 g/dL / Pro: 5.5 g/dL / ALK PHOS: 48 U/L / ALT: <5 U/L / AST: 11 U/L / GGT: x           CARDIAC MARKERS ( 12 Nov 2023 11:20 )  x     / 0.03 ng/mL / x     / x     / x            Culture - Blood (collected 11-11-23 @ 06:55)  Source: .Blood None  Preliminary Report (11-13-23 @ 17:02):    No growth at 48 Hours    Culture - Urine (collected 11-10-23 @ 15:33)  Source: Catheterized Catheterized  Final Report (11-13-23 @ 20:35):    50,000 - 99,000 CFU/mL Enterococcus faecalis  Organism: Enterococcus faecalis (11-13-23 @ 20:35)  Organism: Enterococcus faecalis (11-13-23 @ 20:35)      Method Type: JANNETH      -  Ampicillin: S <=2 Predicts results to ampicillin/sulbactam, amoxacillin-clavulanate and  piperacillin-tazobactam.      -  Ciprofloxacin: R >2      -  Levofloxacin: R >4      -  Nitrofurantoin: S <=32 Should not be used to treat pyelonephritis.      -  Tetracycline: R >8      -  Vancomycin: S 2      Urinalysis Basic - ( 13 Nov 2023 05:19 )    Color: x / Appearance: x / SG: x / pH: x  Gluc: 110 mg/dL / Ketone: x  / Bili: x / Urobili: x   Blood: x / Protein: x / Nitrite: x   Leuk Esterase: x / RBC: x / WBC x   Sq Epi: x / Non Sq Epi: x / Bacteria: x      Procalcitonin, Serum: 0.10 ng/mL (11-11-23 @ 06:55)                A1C with Estimated Average Glucose Result: 5.1 % (12-25-22 @ 07:20)      Indwelling Urethral Catheter:     Connect To:  Straight Drainage/[place]    Indication:  Urinary Retention / Obstruction (11-13-23 @ 07:46) (not performed)  Indwelling Urethral Catheter:     Connect To:  Straight Drainage/[place]    Indication:  Urinary Retention / Obstruction (11-12-23 @ 04:27) (not performed)      RADIOLOGY, ECG, & ADDITIONAL TESTS:  12 Lead ECG:   Ventricular Rate 85 BPM    Atrial Rate 85 BPM    P-R Interval 174 ms    QRS Duration 76 ms    Q-T Interval 370 ms    QTC Calculation(Bazett) 440 ms    P Axis 32 degrees    R Axis -20 degrees    T Axis 8 degrees    Diagnosis Line Normal sinus rhythm  Inferior infarct , age undetermined  Abnormal ECG    Confirmed by Hemant Fuller ([id number]) on 11/11/2023 7:57:09 AM (11-10-23 @ 14:58)    CT Head No Cont:   ACC: [id number] EXAM:  CT BRAIN   ORDERED BY: MIKIE UPTON     PROCEDURE DATE:  11/12/2023          INTERPRETATION:  CLINICAL INDICATION: Altered mental status    TECHNIQUE: CT of the head was performed without the administration of   intravenous contrast.    COMPARISON: CT head 11/11/2023    FINDINGS:    Redemonstration of an acute left thalamic intraparenchymal hemorrhage   without significant change compared to prior CT. There is no associated   mass effect or midline shift    There is generalized cortical and cerebellar volume loss with   commensurate ventricular dilation. There is no hydrocephalus.    Patchy periventricular and subcortical hypodensities consistent with   severe chronic microvascular ischemic changes, stable. 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 The visualized intraorbital contents are normal.   Mucosal thickening of the left frontal sinus. The mastoid air cells are   aerated. The visualized soft tissues and osseous structures appear normal.      IMPRESSION:  No significant change since recent head CT of 11/11/2023.    Stable left thalamic acute hemorrhage without mass effect or midline   shift.    Additional chronic findings as above.    --- End of Report ---          MARCELLO NOBLE MD; Resident Radiologist  This document has been electronically signed.  ENRIQUE LOCKWOOD MD; Attending Radiologist  This document has been electronically signed. Nov 13 2023  8:38AM (11-12-23 @ 11:55)  CT Head No Cont:   ACC: [id number] EXAM:  CT BRAIN   ORDERED BY: RASHI MOREIRA     PROCEDURE DATE:  11/11/2023          INTERPRETATION:  CLINICAL INDICATION: Hemorrhage, follow-up.    TECHNIQUE: CT of the head was performed without the administration of   intravenous contrast.    COMPARISON: CT head dated 11/10/2023    FINDINGS:    There is stable focal intraparenchymal hemorrhage in the left thalamus.    There is stable enlargement of the sulci, sylvian fissures, and   ventricles likely reflecting moderate diffuse parenchymal volume loss.    There are stable confluent patchy low attenuations in bilateral   periventricular cerebral white matter consistent with severe chronic   microvascular ischemic changes.    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    There is no evidence of acute territorial infarct. There is no midline   shift.    There is no evidence of hydrocephalus. There are no extra-axial fluid   collections.    The visualized intraorbital contents are normal. Small stable retention   cyst versus polyp in the left frontal sinus. The mastoid air cells are   aerated. The visualized soft tissues and osseous structures appear normal.      IMPRESSION:    Small stable hemorrhage in the left thalamus since the prior CT head from   11/10/2023. No acute territorial infarct or midline shift.    Stable severe chronic microvascular ischemic changes.    --- End of Report ---            DENISE CALERO MD; Attending Radiologist  This document has been electronically signed. Nov 11 2023  9:23AM (11-11-23 @ 05:42)    RECENT DIAGNOSTIC ORDERS:  CT Head No Cont: Urgent   Indication: Headache  Transport: Stretcher-Crib (11-14-23 @ 08:20)  Diet, NPO with Tube Feed:   Tube Feeding Modality: Nasogastric  Diabetisource  Total Volume for 24 Hours (mL): 1100  Bolus  Total Volume of Bolus (mL):  275  Total # of Feeds: 4  Tube Feed Frequency: Every 6 hours   Tube Feed Start Time: 12:00  Bolus Feed Rate (mL per Hour): 250   Bolus Feed Duration (in Hours): 1  Bolus Feed Instructions:  Advance as tolerated to goal rate of 275 mL q6hrs  Free Water Flush Instructions:  75 mL flushes pre/post feeds (11-13-23 @ 11:41)      MEDICATIONS:  MEDICATIONS  (STANDING):  artificial  tears Solution 1 Drop(s) Both EYES three times a day  ascorbic acid 500 milliGRAM(s) Oral daily  carbidopa/levodopa  25/100 1 Tablet(s) Oral <User Schedule>  chlorhexidine 2% Cloths 1 Application(s) Topical daily  famotidine    Tablet 20 milliGRAM(s) Oral daily  gabapentin Solution 600 milliGRAM(s) Oral three times a day  heparin   Injectable 5000 Unit(s) SubCutaneous every 12 hours  hydrALAZINE Injectable 5 milliGRAM(s) IV Push every 6 hours  latanoprost 0.005% Ophthalmic Solution 1 Drop(s) Both EYES at bedtime  levothyroxine 50 MICROGram(s) Oral daily  polyethylene glycol 3350 17 Gram(s) Oral daily  senna 2 Tablet(s) Oral at bedtime    MEDICATIONS  (PRN):  aluminum hydroxide/magnesium hydroxide/simethicone Suspension 30 milliLiter(s) Oral every 4 hours PRN Dyspepsia      HOME MEDICATIONS:  acetaminophen 325 mg oral tablet (12-24)  aluminum hydroxide-magnesium hydroxide 200 mg-200 mg/5 mL oral suspension (12-27)  Artificial Tears ophthalmic solution (12-24)  Aspercreme with Lidocaine 4% topical cream (12-24)  aspirin 81 mg oral tablet, chewable (12-24)  carbidopa-levodopa 25 mg-100 mg oral tablet (12-24)  Colace 100 mg oral capsule (12-24)  famotidine 20 mg oral tablet (11-11)  gabapentin 600 mg oral tablet (12-24)  latanoprost 0.005% ophthalmic solution (12-24)  levothyroxine 50 mcg (0.05 mg) oral tablet (12-24)  melatonin 3 mg oral tablet (12-27)  polyethylene glycol 3350 oral powder for reconstitution (12-24)  Senna 8.6 mg oral tablet (12-24)  timolol hemihydrate 0.5% ophthalmic solution (12-24)  Vitamin C 500 mg oral tablet (12-24)  Vitamin D3 1000 intl units oral tablet (12-24)      PHYSICAL EXAM:  On exam  General: more awake, alert, slow speech but said her name NAD, chronic ill appearance  Lungs:  clear to ausculation b/l, normal resp effort  Heart: regular rhythm   Abdomen: soft, non tender non distended  Ext: no edema, can move all  his extremities

## 2023-11-14 NOTE — SWALLOW BEDSIDE ASSESSMENT ADULT - COMMENTS
CTH-> redemonstration of an evolving acute left thalamic hemorrhage without significant change. +cough post swallow w/ cup sips of mildly thick liquids, +improved toleration for tsp sips of mildly thick liquids Pt w/ c/o headache this AM during rounds, repeat CTH-> redemonstration of an evolving acute left thalamic hemorrhage without significant change.

## 2023-11-14 NOTE — PROGRESS NOTE ADULT - SUBJECTIVE AND OBJECTIVE BOX
24H events:    Patient is a 83y old Female who presents with a chief complaint of ams (13 Nov 2023 10:51)    Primary diagnosis of Confusion      Today is hospital day 4d. This morning patient was seen and examined at bedside, resting comfortably in bed.    No acute or major events overnight. Hemodynamically stable, tolerating oral diet, voiding appropriately with appropriate bowel movements.     This AM, patient states she is having a headache. Denies any blurry or changes in vision, but states that her eyes have been dry and burning.       PAST MEDICAL & SURGICAL HISTORY  Dementia    History of breast cancer    Constipation    Lewy body dementia without behavioral disturbance    Colon polyp    History of colon resection    H/O mastectomy, right      SOCIAL HISTORY:  Social History:  Former smoker (10 Nov 2023 23:44)      ALLERGIES:  Originally Entered as [Unknown] reaction to [neuroleptics] (Unknown)  Originally Entered as [Unknown] reaction to [pepper] (Unknown)  Originally Entered as [Unknown] reaction to [red pepper] (Unknown)  penicillin (Anaphylaxis)  benzodiazepines (Unknown)  ciprofloxacin (Unknown)  Originally Entered as [Unknown] reaction to [green pepper] (Unknown)  antichlolinergics (Unknown)    MEDICATIONS:  STANDING MEDICATIONS  artificial  tears Solution 1 Drop(s) Both EYES three times a day  ascorbic acid 500 milliGRAM(s) Oral daily  carbidopa/levodopa  25/100 1 Tablet(s) Oral <User Schedule>  chlorhexidine 2% Cloths 1 Application(s) Topical daily  famotidine    Tablet 20 milliGRAM(s) Oral daily  gabapentin Solution 600 milliGRAM(s) Oral three times a day  heparin   Injectable 5000 Unit(s) SubCutaneous every 12 hours  hydrALAZINE Injectable 5 milliGRAM(s) IV Push every 6 hours  latanoprost 0.005% Ophthalmic Solution 1 Drop(s) Both EYES at bedtime  levothyroxine 50 MICROGram(s) Oral daily  polyethylene glycol 3350 17 Gram(s) Oral daily  senna 2 Tablet(s) Oral at bedtime    PRN MEDICATIONS  aluminum hydroxide/magnesium hydroxide/simethicone Suspension 30 milliLiter(s) Oral every 4 hours PRN    VITALS:   T(F): 96.9  HR: 87  BP: 122/58  RR: 20  SpO2: 98%    PHYSICAL EXAM:  GENERAL: NAD, lying in bed comfortably, elderly  HEAD: NCAD  NECK: Supple, no JVD    HEART: Regular rate and rhythm, normal S1/S2, no murmurs  LUNGS: No acute respiratory distress, clear b/l breath sounds  ABDOMEN:  soft, nontender, nondistended  EXTREMITIES: no LE edema  NERVOUS SYSTEM:  A&O x 1-2        LABS:                        11.1   13.51 )-----------( 294      ( 13 Nov 2023 05:19 )             35.1     11-13    138  |  100  |  18  ----------------------------<  110<H>  3.3<L>   |  27  |  0.8    Ca    8.1<L>      13 Nov 2023 05:19    TPro  5.5<L>  /  Alb  3.3<L>  /  TBili  0.6  /  DBili  x   /  AST  11  /  ALT  <5  /  AlkPhos  48  11-13      Urinalysis Basic - ( 13 Nov 2023 05:19 )    Color: x / Appearance: x / SG: x / pH: x  Gluc: 110 mg/dL / Ketone: x  / Bili: x / Urobili: x   Blood: x / Protein: x / Nitrite: x   Leuk Esterase: x / RBC: x / WBC x   Sq Epi: x / Non Sq Epi: x / Bacteria: x            CARDIAC MARKERS ( 12 Nov 2023 11:20 )  x     / 0.03 ng/mL / x     / x     / x          RADIOLOGY:  No radiographic images in the past 24 hours.

## 2023-11-14 NOTE — PROGRESS NOTE ADULT - ASSESSMENT
83-year-old female with past medical history of Brent body dementia, Parkinson disease who was brought in from [facility] for AMS/encephalopathy x 1 week    #Encephalopathy  #Hypercapnic respiratory failure suspected 2/2 aspiration pneumonia vs IPH   - CTH: New small intraparenchymal hemorrhage in the left thalamus since the prior CT head from 12/24/2022. No acute territorial infarct or midline shift. Stable severe chronic microvascular ischemic changes.  - s/p eval by neurocrit and neurosurgery in ED. --> no acute intervention currently.   - repeat CTH No changes, pt endorsing headache 11/14 AM, f/u repeat CTH  - 11/11 rEEG: no electrographic seizures or significant clinical events  - Keep SBP from 110-140  - neuro following  - 11/12 CTA no evidence of flow-limiting stenosis, occlusion, or aneurysm   - f/u MRI brain with/without   - c/w home sinemet  - hydralazine 5mg q6 PRN for SBP >140 and HR <70    #AMS  #Acute hypoxic and hypercapnic respiratory failure likely 2/2 Left PNA   - as per , pt confused for the past week; normally able to hold conversation but no longer can  - wbc ~17, afebrile otherwise  - CXR with LLL opacity  - vbg pH 7.09, pCO2 72, placed on bipap  - s/p aztreonam/azithromycin in ED; continue for now  - procal -ve, MRSA nares -ve (vanco dcd, pt has hx of red man syndrome per )  - f/u ID given hx of abx allergy; per ID, no abx, abx dced on 11/13  - UA negative; cultures +ve for enterococcus faecalis, urine legionella -ve  - speech and swallow following: may need FEES vs modified barium swallow, NGT feeding for now    #Urinary Retention - resolved  - TOV 11/3    #Hypothyroidism  - continue home levothyroxine  - TSH 1.90    #Lewy body dementia  #Parkinsons Disease  - continue home carbidopa-levodopa                                                                              ----------------------------------------------------  # DVT prophylaxis: SCDs  # GI prophylaxis: Pepcid  # Diet: NPO with Tube Feed  # Activity: Bed Bound  # Code status: FULL CODE  # Disposition:                                                                            --------------------------------------------------------    # Handoff:   - f/u MRI brain w/wo  - f/u repeat CTH in setting of new headache   83-year-old female with past medical history of Brent body dementia, Parkinson disease who was brought in from [facility] for AMS/encephalopathy x 1 week    #Encephalopathy  #Hypercapnic respiratory failure suspected 2/2 aspiration pneumonia vs IPH   - CTH: New small intraparenchymal hemorrhage in the left thalamus since the prior CT head from 12/24/2022. No acute territorial infarct or midline shift. Stable severe chronic microvascular ischemic changes.  - s/p eval by neurocrit and neurosurgery in ED. --> no acute intervention currently.   - repeat CTH No changes, pt endorsing headache 11/14 AM, repeat CTH no acute changes  - 11/11 rEEG: no electrographic seizures or significant clinical events  - Keep SBP from 110-140  - neuro following  - 11/12 CTA no evidence of flow-limiting stenosis, occlusion, or aneurysm   - f/u MRI brain with/without   - c/w home sinemet  - hydralazine 5mg q6 PRN for SBP >140 and HR <70    #AMS  #Acute hypoxic and hypercapnic respiratory failure likely 2/2 Left PNA   - as per , pt confused for the past week; normally able to hold conversation but no longer can  - wbc ~17, afebrile otherwise  - CXR with LLL opacity  - vbg pH 7.09, pCO2 72, placed on bipap  - s/p aztreonam/azithromycin in ED; continue for now  - procal -ve, MRSA nares -ve (vanco dcd, pt has hx of red man syndrome per )  - f/u ID given hx of abx allergy; per ID, no abx, abx dced on 11/13  - UA negative; cultures +ve for enterococcus faecalis, urine legionella -ve  - speech and swallow following: may need FEES vs modified barium swallow (will do when DGTF), c/w NGT feeding for now    #Urinary Retention - resolved  - s/p TOV 11/3    #Hypothyroidism  - continue home levothyroxine  - TSH 1.90    #Lewy body dementia  #Parkinsons Disease  - continue home carbidopa-levodopa                                                                              ----------------------------------------------------  # DVT prophylaxis: SCDs  # GI prophylaxis: Pepcid  # Diet: NPO with Tube Feed  # Activity: Bed Bound  # Code status: FULL CODE  # Disposition: from NH                                                                           --------------------------------------------------------    # Handoff:   - f/u MRI brain w/wo
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
## 2023-11-14 NOTE — CDI QUERY NOTE - NSCDIOTHERTXTBX_GEN_ALL_CORE_HH
Based on your professional judgment and the clinical indicators, please clarify if the patient’s functional status can be further specified as:    •   Complete immobility due to severe physical disability   •   Functional quadriplegia   •   Other (please specify):  •   Clinically unable to further specify functional status         CLINICAL INDICATORS      11/10 ED Provider Note: … HPI: 83-year-old female with past medical history of dementia, Parkinson disease who was brought in by  for altered mental status.  Per , patient's baseline is bedbound and ANO x2 and is also able to have a normal conversation, but patient has been having worsening confusion and has not been able to have a conversation with the  for the past 1 week…. Clinical summary: New left thalamic intraparenchymal bleed noted on CT…      11/10-11/14   Nursing Adult Skin Assessment and Interventions: … Tadeo … Activity:  Bedfast, Mobility: Very limited…      11/10-11/14  Nursing AM-PAC Functional assessment: …Basic Mobility: Total assistance(slide sheets/trochanter roll), Daily activity: Total assistance, Meals: Total assistance …      11/13 Physical Therapy Initial Evaluation Adult: … General interventions: There are currently no PT goals for the patient. Patient is at her baseline functional level. Patient is at her baseline functional level (completely dependent). Patient is being discharged from the inpatient skilled PT services.      Thank you,  Nayely Bourne RN, BSN, CCDS, [facility]  [phone]

## 2023-11-15 LAB
ALBUMIN SERPL ELPH-MCNC: 3.3 G/DL — LOW (ref 3.5–5.2)
ALBUMIN SERPL ELPH-MCNC: 3.3 G/DL — LOW (ref 3.5–5.2)
ALP SERPL-CCNC: 49 U/L — SIGNIFICANT CHANGE UP (ref 30–115)
ALP SERPL-CCNC: 49 U/L — SIGNIFICANT CHANGE UP (ref 30–115)
ALT FLD-CCNC: <5 U/L — SIGNIFICANT CHANGE UP (ref 0–41)
ALT FLD-CCNC: <5 U/L — SIGNIFICANT CHANGE UP (ref 0–41)
ANION GAP SERPL CALC-SCNC: 14 MMOL/L — SIGNIFICANT CHANGE UP (ref 7–14)
ANION GAP SERPL CALC-SCNC: 14 MMOL/L — SIGNIFICANT CHANGE UP (ref 7–14)
AST SERPL-CCNC: 15 U/L — SIGNIFICANT CHANGE UP (ref 0–41)
AST SERPL-CCNC: 15 U/L — SIGNIFICANT CHANGE UP (ref 0–41)
BASOPHILS # BLD AUTO: 0.06 K/UL — SIGNIFICANT CHANGE UP (ref 0–0.2)
BASOPHILS # BLD AUTO: 0.06 K/UL — SIGNIFICANT CHANGE UP (ref 0–0.2)
BASOPHILS NFR BLD AUTO: 0.6 % — SIGNIFICANT CHANGE UP (ref 0–1)
BASOPHILS NFR BLD AUTO: 0.6 % — SIGNIFICANT CHANGE UP (ref 0–1)
BILIRUB SERPL-MCNC: 0.2 MG/DL — SIGNIFICANT CHANGE UP (ref 0.2–1.2)
BILIRUB SERPL-MCNC: 0.2 MG/DL — SIGNIFICANT CHANGE UP (ref 0.2–1.2)
BUN SERPL-MCNC: 20 MG/DL — SIGNIFICANT CHANGE UP (ref 10–20)
BUN SERPL-MCNC: 20 MG/DL — SIGNIFICANT CHANGE UP (ref 10–20)
CALCIUM SERPL-MCNC: 9 MG/DL — SIGNIFICANT CHANGE UP (ref 8.4–10.5)
CALCIUM SERPL-MCNC: 9 MG/DL — SIGNIFICANT CHANGE UP (ref 8.4–10.5)
CHLORIDE SERPL-SCNC: 103 MMOL/L — SIGNIFICANT CHANGE UP (ref 98–110)
CHLORIDE SERPL-SCNC: 103 MMOL/L — SIGNIFICANT CHANGE UP (ref 98–110)
CO2 SERPL-SCNC: 25 MMOL/L — SIGNIFICANT CHANGE UP (ref 17–32)
CO2 SERPL-SCNC: 25 MMOL/L — SIGNIFICANT CHANGE UP (ref 17–32)
CREAT SERPL-MCNC: 0.7 MG/DL — SIGNIFICANT CHANGE UP (ref 0.7–1.5)
CREAT SERPL-MCNC: 0.7 MG/DL — SIGNIFICANT CHANGE UP (ref 0.7–1.5)
EGFR: 86 ML/MIN/1.73M2 — SIGNIFICANT CHANGE UP
EGFR: 86 ML/MIN/1.73M2 — SIGNIFICANT CHANGE UP
EOSINOPHIL # BLD AUTO: 0.32 K/UL — SIGNIFICANT CHANGE UP (ref 0–0.7)
EOSINOPHIL # BLD AUTO: 0.32 K/UL — SIGNIFICANT CHANGE UP (ref 0–0.7)
EOSINOPHIL NFR BLD AUTO: 3.3 % — SIGNIFICANT CHANGE UP (ref 0–8)
EOSINOPHIL NFR BLD AUTO: 3.3 % — SIGNIFICANT CHANGE UP (ref 0–8)
GLUCOSE SERPL-MCNC: 101 MG/DL — HIGH (ref 70–99)
GLUCOSE SERPL-MCNC: 101 MG/DL — HIGH (ref 70–99)
HCT VFR BLD CALC: 36.2 % — LOW (ref 37–47)
HCT VFR BLD CALC: 36.2 % — LOW (ref 37–47)
HGB BLD-MCNC: 11.4 G/DL — LOW (ref 12–16)
HGB BLD-MCNC: 11.4 G/DL — LOW (ref 12–16)
IMM GRANULOCYTES NFR BLD AUTO: 0.4 % — HIGH (ref 0.1–0.3)
IMM GRANULOCYTES NFR BLD AUTO: 0.4 % — HIGH (ref 0.1–0.3)
LYMPHOCYTES # BLD AUTO: 2.68 K/UL — SIGNIFICANT CHANGE UP (ref 1.2–3.4)
LYMPHOCYTES # BLD AUTO: 2.68 K/UL — SIGNIFICANT CHANGE UP (ref 1.2–3.4)
LYMPHOCYTES # BLD AUTO: 27.9 % — SIGNIFICANT CHANGE UP (ref 20.5–51.1)
LYMPHOCYTES # BLD AUTO: 27.9 % — SIGNIFICANT CHANGE UP (ref 20.5–51.1)
MAGNESIUM SERPL-MCNC: 1.9 MG/DL — SIGNIFICANT CHANGE UP (ref 1.8–2.4)
MAGNESIUM SERPL-MCNC: 1.9 MG/DL — SIGNIFICANT CHANGE UP (ref 1.8–2.4)
MCHC RBC-ENTMCNC: 30.5 PG — SIGNIFICANT CHANGE UP (ref 27–31)
MCHC RBC-ENTMCNC: 30.5 PG — SIGNIFICANT CHANGE UP (ref 27–31)
MCHC RBC-ENTMCNC: 31.5 G/DL — LOW (ref 32–37)
MCHC RBC-ENTMCNC: 31.5 G/DL — LOW (ref 32–37)
MCV RBC AUTO: 96.8 FL — SIGNIFICANT CHANGE UP (ref 81–99)
MCV RBC AUTO: 96.8 FL — SIGNIFICANT CHANGE UP (ref 81–99)
MONOCYTES # BLD AUTO: 0.79 K/UL — HIGH (ref 0.1–0.6)
MONOCYTES # BLD AUTO: 0.79 K/UL — HIGH (ref 0.1–0.6)
MONOCYTES NFR BLD AUTO: 8.2 % — SIGNIFICANT CHANGE UP (ref 1.7–9.3)
MONOCYTES NFR BLD AUTO: 8.2 % — SIGNIFICANT CHANGE UP (ref 1.7–9.3)
NEUTROPHILS # BLD AUTO: 5.71 K/UL — SIGNIFICANT CHANGE UP (ref 1.4–6.5)
NEUTROPHILS # BLD AUTO: 5.71 K/UL — SIGNIFICANT CHANGE UP (ref 1.4–6.5)
NEUTROPHILS NFR BLD AUTO: 59.6 % — SIGNIFICANT CHANGE UP (ref 42.2–75.2)
NEUTROPHILS NFR BLD AUTO: 59.6 % — SIGNIFICANT CHANGE UP (ref 42.2–75.2)
NRBC # BLD: 0 /100 WBCS — SIGNIFICANT CHANGE UP (ref 0–0)
NRBC # BLD: 0 /100 WBCS — SIGNIFICANT CHANGE UP (ref 0–0)
PLATELET # BLD AUTO: 292 K/UL — SIGNIFICANT CHANGE UP (ref 130–400)
PLATELET # BLD AUTO: 292 K/UL — SIGNIFICANT CHANGE UP (ref 130–400)
PMV BLD: 9.2 FL — SIGNIFICANT CHANGE UP (ref 7.4–10.4)
PMV BLD: 9.2 FL — SIGNIFICANT CHANGE UP (ref 7.4–10.4)
POTASSIUM SERPL-MCNC: 4 MMOL/L — SIGNIFICANT CHANGE UP (ref 3.5–5)
POTASSIUM SERPL-MCNC: 4 MMOL/L — SIGNIFICANT CHANGE UP (ref 3.5–5)
POTASSIUM SERPL-SCNC: 4 MMOL/L — SIGNIFICANT CHANGE UP (ref 3.5–5)
POTASSIUM SERPL-SCNC: 4 MMOL/L — SIGNIFICANT CHANGE UP (ref 3.5–5)
PROT SERPL-MCNC: 5.7 G/DL — LOW (ref 6–8)
PROT SERPL-MCNC: 5.7 G/DL — LOW (ref 6–8)
RBC # BLD: 3.74 M/UL — LOW (ref 4.2–5.4)
RBC # BLD: 3.74 M/UL — LOW (ref 4.2–5.4)
RBC # FLD: 14.2 % — SIGNIFICANT CHANGE UP (ref 11.5–14.5)
RBC # FLD: 14.2 % — SIGNIFICANT CHANGE UP (ref 11.5–14.5)
SODIUM SERPL-SCNC: 142 MMOL/L — SIGNIFICANT CHANGE UP (ref 135–146)
SODIUM SERPL-SCNC: 142 MMOL/L — SIGNIFICANT CHANGE UP (ref 135–146)
WBC # BLD: 9.6 K/UL — SIGNIFICANT CHANGE UP (ref 4.8–10.8)
WBC # BLD: 9.6 K/UL — SIGNIFICANT CHANGE UP (ref 4.8–10.8)
WBC # FLD AUTO: 9.6 K/UL — SIGNIFICANT CHANGE UP (ref 4.8–10.8)
WBC # FLD AUTO: 9.6 K/UL — SIGNIFICANT CHANGE UP (ref 4.8–10.8)

## 2023-11-15 PROCEDURE — 99232 SBSQ HOSP IP/OBS MODERATE 35: CPT

## 2023-11-15 PROCEDURE — 71045 X-RAY EXAM CHEST 1 VIEW: CPT | Mod: 26,76

## 2023-11-15 RX ADMIN — CARBIDOPA AND LEVODOPA 1 TABLET(S): 25; 100 TABLET ORAL at 05:01

## 2023-11-15 RX ADMIN — CHLORHEXIDINE GLUCONATE 1 APPLICATION(S): 213 SOLUTION TOPICAL at 12:45

## 2023-11-15 RX ADMIN — GABAPENTIN 600 MILLIGRAM(S): 400 CAPSULE ORAL at 15:23

## 2023-11-15 RX ADMIN — HEPARIN SODIUM 5000 UNIT(S): 5000 INJECTION INTRAVENOUS; SUBCUTANEOUS at 17:49

## 2023-11-15 RX ADMIN — Medication 1 DROP(S): at 22:47

## 2023-11-15 RX ADMIN — POLYETHYLENE GLYCOL 3350 17 GRAM(S): 17 POWDER, FOR SOLUTION ORAL at 12:44

## 2023-11-15 RX ADMIN — GABAPENTIN 600 MILLIGRAM(S): 400 CAPSULE ORAL at 22:46

## 2023-11-15 RX ADMIN — CARBIDOPA AND LEVODOPA 1 TABLET(S): 25; 100 TABLET ORAL at 21:30

## 2023-11-15 RX ADMIN — FAMOTIDINE 20 MILLIGRAM(S): 10 INJECTION INTRAVENOUS at 12:44

## 2023-11-15 RX ADMIN — Medication 1 DROP(S): at 05:03

## 2023-11-15 RX ADMIN — Medication 500 MILLIGRAM(S): at 12:45

## 2023-11-15 RX ADMIN — HEPARIN SODIUM 5000 UNIT(S): 5000 INJECTION INTRAVENOUS; SUBCUTANEOUS at 05:01

## 2023-11-15 RX ADMIN — GABAPENTIN 600 MILLIGRAM(S): 400 CAPSULE ORAL at 05:02

## 2023-11-15 RX ADMIN — LATANOPROST 1 DROP(S): 0.05 SOLUTION/ DROPS OPHTHALMIC; TOPICAL at 22:45

## 2023-11-15 RX ADMIN — Medication 50 MICROGRAM(S): at 05:01

## 2023-11-15 RX ADMIN — Medication 1 DROP(S): at 14:56

## 2023-11-15 NOTE — PROVIDER CONTACT NOTE (OTHER) - ACTION/TREATMENT ORDERED:
MD Laurent stated that there is an XR from today, just no official read yet. MD said it is okay to use.

## 2023-11-15 NOTE — SWALLOW BEDSIDE ASSESSMENT ADULT - SLP PERTINENT HISTORY OF CURRENT PROBLEM
Pt is an 82 y/o F w/ PMHx: Lewy Body dementia (baseline mRS 4-5, has been residing in a NH for 7 yrs) w/ associated autonomic dysfunction including dysphagia, hypothyroidism, who presented with encephalopathy x 1 week. Per , pt is able to have some verbal interaction at baseline, bedbound, needs help w/ all ADL's. Pt found to have hypercapnic respiratory failure on presentation, suspected 2' aspiration pneumonia. CTH showed a small left thalamic IPH. Pt seen by ID, "no ongoing bacterial PNA either clinically or radiographically, possible aspiration with chemical pneumonitis with SIRS w/ initial leucocytosis". CXR 11/11-> B/L opacities.

## 2023-11-15 NOTE — PROVIDER CONTACT NOTE (OTHER) - BACKGROUND
Patient is disoriented with AMS. Just downgraded from CEU. Patient is NPO with tube feeds. Meds crushed only via NGT.

## 2023-11-15 NOTE — PROGRESS NOTE ADULT - ASSESSMENT
83-year-old female with past medical history of Brent body dementia, Parkinson disease who was brought in from [facility] for AMS/encephalopathy x 1 week    #Encephalopathy  #Hypercapnic respiratory failure suspected 2/2 aspiration pneumonia vs IPH   - CTH: New small intraparenchymal hemorrhage in the left thalamus since the prior CT head from 12/24/2022. No acute territorial infarct or midline shift. Stable severe chronic microvascular ischemic changes.  - s/p eval by neurocrit and neurosurgery in ED. --> no acute intervention currently.   - repeat CTH No changes, pt endorsing headache 11/14 AM, repeat CTH no acute changes  - 11/11 rEEG: no electrographic seizures or significant clinical events  - Keep SBP from 110-140  - neuro following  - 11/12 CTA no evidence of flow-limiting stenosis, occlusion, or aneurysm   - f/u MRI brain with/without - was canceled as patient refused  - c/w home sinemet  - hydralazine 5mg q6 PRN for SBP >140 and HR <70    #AMS  #Acute hypoxic and hypercapnic respiratory failure likely 2/2 Left PNA   - as per , pt confused for the past week; normally able to hold conversation but no longer can  - wbc ~17, afebrile otherwise  - CXR with LLL opacity  - vbg pH 7.09, pCO2 72, placed on bipap  - s/p aztreonam/azithromycin in ED; continue for now  - procal -ve, MRSA nares -ve (vanco dcd, pt has hx of red man syndrome per )  - f/u ID given hx of abx allergy; per ID, no abx, abx dced on 11/13  - UA negative; cultures +ve for enterococcus faecalis, urine legionella -ve  - speech and swallow following: may need FEES vs modified barium swallow (will do when DGTF), c/w NGT feeding for now    #Urinary Retention - resolved  - s/p TOV 11/3    #Hypothyroidism  - continue home levothyroxine  - TSH 1.90    #Lewy body dementia  #Parkinsons Disease  - continue home carbidopa-levodopa                                                                              ----------------------------------------------------  # DVT prophylaxis: SCDs  # GI prophylaxis: Pepcid  # Diet: NPO with Tube Feed  # Activity: Bed Bound  # Code status: FULL CODE  # Disposition: from NH                                                                           --------------------------------------------------------    # Handoff:   - modified barium swallow when DGTF  - poss repeat MRI brain w/wo as patient previously refused

## 2023-11-15 NOTE — PROGRESS NOTE ADULT - SUBJECTIVE AND OBJECTIVE BOX
24H events:    Patient is a 83y old Female who presents with a chief complaint of ams (14 Nov 2023 10:33)    Primary diagnosis of Confusion      Today is hospital day 5d. This morning patient was seen and examined at bedside, resting comfortably in bed.    No acute or major events overnight. Hemodynamically stable, tolerating oral diet, voiding appropriately with appropriate bowel movements.     PAST MEDICAL & SURGICAL HISTORY  Dementia    History of breast cancer    Constipation    Lewy body dementia without behavioral disturbance    Colon polyp    History of colon resection    H/O mastectomy, right      SOCIAL HISTORY:  Social History:  Former smoker (10 Nov 2023 23:44)      ALLERGIES:  Originally Entered as [Unknown] reaction to [neuroleptics] (Unknown)  Originally Entered as [Unknown] reaction to [pepper] (Unknown)  Originally Entered as [Unknown] reaction to [red pepper] (Unknown)  penicillin (Anaphylaxis)  benzodiazepines (Unknown)  ciprofloxacin (Unknown)  Originally Entered as [Unknown] reaction to [green pepper] (Unknown)  antichlolinergics (Unknown)    MEDICATIONS:  STANDING MEDICATIONS  artificial  tears Solution 1 Drop(s) Both EYES three times a day  ascorbic acid 500 milliGRAM(s) Oral daily  carbidopa/levodopa  25/100 1 Tablet(s) Oral <User Schedule>  chlorhexidine 2% Cloths 1 Application(s) Topical daily  famotidine    Tablet 20 milliGRAM(s) Oral daily  gabapentin Solution 600 milliGRAM(s) Oral three times a day  heparin   Injectable 5000 Unit(s) SubCutaneous every 12 hours  hydrALAZINE Injectable 5 milliGRAM(s) IV Push every 6 hours  latanoprost 0.005% Ophthalmic Solution 1 Drop(s) Both EYES at bedtime  levothyroxine 50 MICROGram(s) Oral daily  polyethylene glycol 3350 17 Gram(s) Oral daily  senna 2 Tablet(s) Oral at bedtime    PRN MEDICATIONS  aluminum hydroxide/magnesium hydroxide/simethicone Suspension 30 milliLiter(s) Oral every 4 hours PRN    VITALS:   T(F): 98  HR: 80  BP: 128/60  RR: 18  SpO2: 96%    PHYSICAL EXAM:  GENERAL: NAD, lying in bed comfortably, elderly  HEAD: NCAD  NECK: Supple, no JVD    HEART: Regular rate and rhythm, normal S1/S2, no murmurs  LUNGS: No acute respiratory distress, clear b/l breath sounds  ABDOMEN:  soft, nontender, nondistended  EXTREMITIES: no LE edema  NERVOUS SYSTEM:  A&O x 2-3        LABS:                        11.4   9.60  )-----------( 292      ( 15 Nov 2023 05:53 )             36.2     11-15    142  |  103  |  20  ----------------------------<  101<H>  4.0   |  25  |  0.7    Ca    9.0      15 Nov 2023 05:53  Mg     1.9     11-15    TPro  5.7<L>  /  Alb  3.3<L>  /  TBili  0.2  /  DBili  x   /  AST  15  /  ALT  <5  /  AlkPhos  49  11-15      Urinalysis Basic - ( 15 Nov 2023 05:53 )    Color: x / Appearance: x / SG: x / pH: x  Gluc: 101 mg/dL / Ketone: x  / Bili: x / Urobili: x   Blood: x / Protein: x / Nitrite: x   Leuk Esterase: x / RBC: x / WBC x   Sq Epi: x / Non Sq Epi: x / Bacteria: x                RADIOLOGY:    < from: CT Head No Cont (11.14.23 @ 10:54) >    ACC: [id number] EXAM:  CT BRAIN   ORDERED BY: LAURIE FLORES     PROCEDURE DATE:  11/14/2023          INTERPRETATION:  CLINICAL INDICATION: Headache, history of left   intraparenchymal hemorrhage    Technique: CT of the head was performed without contrast.    Multiple contiguous axial images were acquired from the skullbase to the   vertex without the administration of intravenous contrast.  Coronal and   sagittal reformations were made.    COMPARISON:  prior head CT dated 11/12/2023    FINDINGS:    There is generalized cortical volume loss with commensurate ventricular   dilation. There is no hydrocephalus.    Redemonstration of evolving left thalamic intraparenchymal hemorrhage   with interval decrease in density.    There are confluent periventricular white matter hypodensities.    There is no mass effect or midline shift. No abnormal extra-axial fluid   collections are present.    The calvarium is intact. The visualized intraorbital compartments and   mastoid complexes appear free of acute disease. There is stable mucosal   thickening within the left frontal sinus. Mild mucosal thickening within   the posterior ethmoid sinus.    IMPRESSION:  Redemonstration of an evolving acute left thalamic hemorrhage without   significant change.    Severe chronic microvascular type changes.    --- End of Report ---          KADE MOSS MD; Resident Radiologist  This document has been electronically signed.  ENRIQUE LOCKWOOD MD; Attending Radiologist  This document has been electronically signed. Nov 14 2023 12:01PM    < end of copied text >

## 2023-11-15 NOTE — SWALLOW BEDSIDE ASSESSMENT ADULT - COMMENTS
Repeat CTH-> redemonstration of an evolving acute left thalamic hemorrhage without significant change. Pt refused MRI.

## 2023-11-15 NOTE — PROVIDER CONTACT NOTE (OTHER) - SITUATION
RN received report from CEU that the patient had been pulling at the NGT and placement needed to be verified before use.

## 2023-11-15 NOTE — PROGRESS NOTE ADULT - SUBJECTIVE AND OBJECTIVE BOX
IMELDA ROLLE  83y Female    CHIEF COMPLAINT:    Patient is a 83y old  Female who presents with a chief complaint of ams (15 Nov 2023 09:46)    INTERVAL HPI/OVERNIGHT EVENTS:    Patient seen and examined. No acute events overnight. mental status improving, on room air     ROS: All other systems are negative.    Vital Signs:    T(F): 97.2 (11-15-23 @ 11:28), Max: 98.1 (23 @ 21:35)  HR: 93 (11-15-23 @ 11:28) (80 - 100)  BP: 127/67 (11-15-23 @ 11:28) (122/57 - 192/88)  RR: 18 (11-15-23 @ 11:28) (18 - 20)  SpO2: 96% (11-15-23 @ 11:28) (94% - 98%)    2023 07:01  -  15 Nov 2023 07:00  --------------------------------------------------------  IN: 1125 mL / OUT: 400 mL / NET: 725 mL      Daily Weight in k.1 (15 Nov 2023 00:08)    PHYSICAL EXAM:    GENERAL:  NAD  SKIN: No rashes or lesions  HEENT: Atraumatic. Normocephalic.    NECK: Supple, No JVD.    PULMONARY: CTA B/L. No wheezing. No rales  CVS: Normal S1, S2. Rate and Rhythm are regular.   ABDOMEN/GI: Soft, Nontender, Nondistended   MSK:  No clubbing or cyanosis  NEUROLOGIC: moves all extremities   PSYCH: Alert & oriented x 2    Consultant(s) Notes Reviewed:  [x ] YES  [ ] NO  Care Discussed with Consultants/Other Providers [ x] YES  [ ] NO    LABS:                        11.4   9.60  )-----------( 292      ( 15 Nov 2023 05:53 )             36.2     142  |  103  |  20  ----------------------------<  101<H>  4.0   |  25  |  0.7    Ca    9.0      15 Nov 2023 05:53  Mg     1.9     11-15    TPro  5.7<L>  /  Alb  3.3<L>  /  TBili  0.2  /  DBili  x   /  AST  15  /  ALT  <5  /  AlkPhos  49  11-15    RADIOLOGY & ADDITIONAL TESTS:  Imaging or report Personally Reviewed:  [x] YES  [ ] NO  EKG reviewed: [x] YES  [ ] NO    Medications:  Standing  artificial  tears Solution 1 Drop(s) Both EYES three times a day  ascorbic acid 500 milliGRAM(s) Oral daily  carbidopa/levodopa  25/100 1 Tablet(s) Oral <User Schedule>  chlorhexidine 2% Cloths 1 Application(s) Topical daily  famotidine    Tablet 20 milliGRAM(s) Oral daily  gabapentin Solution 600 milliGRAM(s) Oral three times a day  heparin   Injectable 5000 Unit(s) SubCutaneous every 12 hours  hydrALAZINE Injectable 5 milliGRAM(s) IV Push every 6 hours  latanoprost 0.005% Ophthalmic Solution 1 Drop(s) Both EYES at bedtime  levothyroxine 50 MICROGram(s) Oral daily  polyethylene glycol 3350 17 Gram(s) Oral daily  senna 2 Tablet(s) Oral at bedtime    PRN Meds  aluminum hydroxide/magnesium hydroxide/simethicone Suspension 30 milliLiter(s) Oral every 4 hours PRN

## 2023-11-15 NOTE — PROGRESS NOTE ADULT - ASSESSMENT
83-year-old female with PMHx of Lewy Body dementia with associated autonomic dysfunction including dysphagia, hypothyroidism, who presented from NH  with encephalopathy x 1 week. Admitted to SDU due to small IPH for neurochecks    Metabolic Encephalopathy likely due to suspected IPH  Parkinson's disease with leuwy body dementia  Functional quadriplegia  mental status slowly improving, on room air, NGT in place  serial CTH with stable IPH  patient could not tolerate MRI,  not agreeable to any kind of sedation  f/u neurology if okay to repeat another CTH  REEG no seizures  s/p NCCU and Neurosx evals, no acute intervention     Acute Hypercapnic respiratory failure - resolved  could be due to aspiration/chemical pneumonitis or due to IPH  currently no evidence of infection, off antibiotcs, ID following  richardson removed 11/13, monitor I&Os, bladder scan   fu speech and swallow    Hypothyroidism - TSH 1.90 . c/w synthroid.     dry eyes - artificial tears     PendingL neurology fu, s/s fu to advance diet/?FEES

## 2023-11-15 NOTE — PROVIDER CONTACT NOTE (OTHER) - ASSESSMENT
There is a provider to RN order that NGT can be used; however, there is no recent CXR result. Last CXR available was from 11/11.

## 2023-11-16 LAB
ALBUMIN SERPL ELPH-MCNC: 3.1 G/DL — LOW (ref 3.5–5.2)
ALBUMIN SERPL ELPH-MCNC: 3.1 G/DL — LOW (ref 3.5–5.2)
ALP SERPL-CCNC: 47 U/L — SIGNIFICANT CHANGE UP (ref 30–115)
ALP SERPL-CCNC: 47 U/L — SIGNIFICANT CHANGE UP (ref 30–115)
ALT FLD-CCNC: <5 U/L — SIGNIFICANT CHANGE UP (ref 0–41)
ALT FLD-CCNC: <5 U/L — SIGNIFICANT CHANGE UP (ref 0–41)
ANION GAP SERPL CALC-SCNC: 8 MMOL/L — SIGNIFICANT CHANGE UP (ref 7–14)
ANION GAP SERPL CALC-SCNC: 8 MMOL/L — SIGNIFICANT CHANGE UP (ref 7–14)
AST SERPL-CCNC: 22 U/L — SIGNIFICANT CHANGE UP (ref 0–41)
AST SERPL-CCNC: 22 U/L — SIGNIFICANT CHANGE UP (ref 0–41)
BASOPHILS # BLD AUTO: 0.08 K/UL — SIGNIFICANT CHANGE UP (ref 0–0.2)
BASOPHILS # BLD AUTO: 0.08 K/UL — SIGNIFICANT CHANGE UP (ref 0–0.2)
BASOPHILS NFR BLD AUTO: 0.8 % — SIGNIFICANT CHANGE UP (ref 0–1)
BASOPHILS NFR BLD AUTO: 0.8 % — SIGNIFICANT CHANGE UP (ref 0–1)
BILIRUB SERPL-MCNC: 0.4 MG/DL — SIGNIFICANT CHANGE UP (ref 0.2–1.2)
BILIRUB SERPL-MCNC: 0.4 MG/DL — SIGNIFICANT CHANGE UP (ref 0.2–1.2)
BUN SERPL-MCNC: 21 MG/DL — HIGH (ref 10–20)
BUN SERPL-MCNC: 21 MG/DL — HIGH (ref 10–20)
CALCIUM SERPL-MCNC: 8.7 MG/DL — SIGNIFICANT CHANGE UP (ref 8.4–10.5)
CALCIUM SERPL-MCNC: 8.7 MG/DL — SIGNIFICANT CHANGE UP (ref 8.4–10.5)
CHLORIDE SERPL-SCNC: 102 MMOL/L — SIGNIFICANT CHANGE UP (ref 98–110)
CHLORIDE SERPL-SCNC: 102 MMOL/L — SIGNIFICANT CHANGE UP (ref 98–110)
CO2 SERPL-SCNC: 28 MMOL/L — SIGNIFICANT CHANGE UP (ref 17–32)
CO2 SERPL-SCNC: 28 MMOL/L — SIGNIFICANT CHANGE UP (ref 17–32)
CREAT SERPL-MCNC: 0.7 MG/DL — SIGNIFICANT CHANGE UP (ref 0.7–1.5)
CREAT SERPL-MCNC: 0.7 MG/DL — SIGNIFICANT CHANGE UP (ref 0.7–1.5)
CULTURE RESULTS: SIGNIFICANT CHANGE UP
CULTURE RESULTS: SIGNIFICANT CHANGE UP
EGFR: 86 ML/MIN/1.73M2 — SIGNIFICANT CHANGE UP
EGFR: 86 ML/MIN/1.73M2 — SIGNIFICANT CHANGE UP
EOSINOPHIL # BLD AUTO: 0.35 K/UL — SIGNIFICANT CHANGE UP (ref 0–0.7)
EOSINOPHIL # BLD AUTO: 0.35 K/UL — SIGNIFICANT CHANGE UP (ref 0–0.7)
EOSINOPHIL NFR BLD AUTO: 3.4 % — SIGNIFICANT CHANGE UP (ref 0–8)
EOSINOPHIL NFR BLD AUTO: 3.4 % — SIGNIFICANT CHANGE UP (ref 0–8)
GLUCOSE BLDC GLUCOMTR-MCNC: 128 MG/DL — HIGH (ref 70–99)
GLUCOSE BLDC GLUCOMTR-MCNC: 128 MG/DL — HIGH (ref 70–99)
GLUCOSE SERPL-MCNC: 94 MG/DL — SIGNIFICANT CHANGE UP (ref 70–99)
GLUCOSE SERPL-MCNC: 94 MG/DL — SIGNIFICANT CHANGE UP (ref 70–99)
HCT VFR BLD CALC: 36.2 % — LOW (ref 37–47)
HCT VFR BLD CALC: 36.2 % — LOW (ref 37–47)
HGB BLD-MCNC: 11.2 G/DL — LOW (ref 12–16)
HGB BLD-MCNC: 11.2 G/DL — LOW (ref 12–16)
IMM GRANULOCYTES NFR BLD AUTO: 0.8 % — HIGH (ref 0.1–0.3)
IMM GRANULOCYTES NFR BLD AUTO: 0.8 % — HIGH (ref 0.1–0.3)
LYMPHOCYTES # BLD AUTO: 2.71 K/UL — SIGNIFICANT CHANGE UP (ref 1.2–3.4)
LYMPHOCYTES # BLD AUTO: 2.71 K/UL — SIGNIFICANT CHANGE UP (ref 1.2–3.4)
LYMPHOCYTES # BLD AUTO: 26.3 % — SIGNIFICANT CHANGE UP (ref 20.5–51.1)
LYMPHOCYTES # BLD AUTO: 26.3 % — SIGNIFICANT CHANGE UP (ref 20.5–51.1)
MAGNESIUM SERPL-MCNC: 2.1 MG/DL — SIGNIFICANT CHANGE UP (ref 1.8–2.4)
MAGNESIUM SERPL-MCNC: 2.1 MG/DL — SIGNIFICANT CHANGE UP (ref 1.8–2.4)
MCHC RBC-ENTMCNC: 30.5 PG — SIGNIFICANT CHANGE UP (ref 27–31)
MCHC RBC-ENTMCNC: 30.5 PG — SIGNIFICANT CHANGE UP (ref 27–31)
MCHC RBC-ENTMCNC: 30.9 G/DL — LOW (ref 32–37)
MCHC RBC-ENTMCNC: 30.9 G/DL — LOW (ref 32–37)
MCV RBC AUTO: 98.6 FL — SIGNIFICANT CHANGE UP (ref 81–99)
MCV RBC AUTO: 98.6 FL — SIGNIFICANT CHANGE UP (ref 81–99)
MONOCYTES # BLD AUTO: 0.89 K/UL — HIGH (ref 0.1–0.6)
MONOCYTES # BLD AUTO: 0.89 K/UL — HIGH (ref 0.1–0.6)
MONOCYTES NFR BLD AUTO: 8.6 % — SIGNIFICANT CHANGE UP (ref 1.7–9.3)
MONOCYTES NFR BLD AUTO: 8.6 % — SIGNIFICANT CHANGE UP (ref 1.7–9.3)
NEUTROPHILS # BLD AUTO: 6.18 K/UL — SIGNIFICANT CHANGE UP (ref 1.4–6.5)
NEUTROPHILS # BLD AUTO: 6.18 K/UL — SIGNIFICANT CHANGE UP (ref 1.4–6.5)
NEUTROPHILS NFR BLD AUTO: 60.1 % — SIGNIFICANT CHANGE UP (ref 42.2–75.2)
NEUTROPHILS NFR BLD AUTO: 60.1 % — SIGNIFICANT CHANGE UP (ref 42.2–75.2)
NRBC # BLD: 0 /100 WBCS — SIGNIFICANT CHANGE UP (ref 0–0)
NRBC # BLD: 0 /100 WBCS — SIGNIFICANT CHANGE UP (ref 0–0)
PLATELET # BLD AUTO: 314 K/UL — SIGNIFICANT CHANGE UP (ref 130–400)
PLATELET # BLD AUTO: 314 K/UL — SIGNIFICANT CHANGE UP (ref 130–400)
PMV BLD: 9.2 FL — SIGNIFICANT CHANGE UP (ref 7.4–10.4)
PMV BLD: 9.2 FL — SIGNIFICANT CHANGE UP (ref 7.4–10.4)
POTASSIUM SERPL-MCNC: 4.1 MMOL/L — SIGNIFICANT CHANGE UP (ref 3.5–5)
POTASSIUM SERPL-MCNC: 4.1 MMOL/L — SIGNIFICANT CHANGE UP (ref 3.5–5)
POTASSIUM SERPL-SCNC: 4.1 MMOL/L — SIGNIFICANT CHANGE UP (ref 3.5–5)
POTASSIUM SERPL-SCNC: 4.1 MMOL/L — SIGNIFICANT CHANGE UP (ref 3.5–5)
PROT SERPL-MCNC: 5.7 G/DL — LOW (ref 6–8)
PROT SERPL-MCNC: 5.7 G/DL — LOW (ref 6–8)
RBC # BLD: 3.67 M/UL — LOW (ref 4.2–5.4)
RBC # BLD: 3.67 M/UL — LOW (ref 4.2–5.4)
RBC # FLD: 14.6 % — HIGH (ref 11.5–14.5)
RBC # FLD: 14.6 % — HIGH (ref 11.5–14.5)
SODIUM SERPL-SCNC: 138 MMOL/L — SIGNIFICANT CHANGE UP (ref 135–146)
SODIUM SERPL-SCNC: 138 MMOL/L — SIGNIFICANT CHANGE UP (ref 135–146)
SPECIMEN SOURCE: SIGNIFICANT CHANGE UP
SPECIMEN SOURCE: SIGNIFICANT CHANGE UP
WBC # BLD: 10.29 K/UL — SIGNIFICANT CHANGE UP (ref 4.8–10.8)
WBC # BLD: 10.29 K/UL — SIGNIFICANT CHANGE UP (ref 4.8–10.8)
WBC # FLD AUTO: 10.29 K/UL — SIGNIFICANT CHANGE UP (ref 4.8–10.8)
WBC # FLD AUTO: 10.29 K/UL — SIGNIFICANT CHANGE UP (ref 4.8–10.8)

## 2023-11-16 PROCEDURE — 70450 CT HEAD/BRAIN W/O DYE: CPT | Mod: 26

## 2023-11-16 PROCEDURE — 71045 X-RAY EXAM CHEST 1 VIEW: CPT | Mod: 26

## 2023-11-16 PROCEDURE — 99232 SBSQ HOSP IP/OBS MODERATE 35: CPT

## 2023-11-16 PROCEDURE — 93970 EXTREMITY STUDY: CPT | Mod: 26

## 2023-11-16 RX ORDER — HYDRALAZINE HCL 50 MG
5 TABLET ORAL EVERY 8 HOURS
Refills: 0 | Status: DISCONTINUED | OUTPATIENT
Start: 2023-11-16 | End: 2023-11-17

## 2023-11-16 RX ORDER — HEPARIN SODIUM 5000 [USP'U]/ML
5000 INJECTION INTRAVENOUS; SUBCUTANEOUS EVERY 12 HOURS
Refills: 0 | Status: DISCONTINUED | OUTPATIENT
Start: 2023-11-16 | End: 2023-11-16

## 2023-11-16 RX ORDER — LABETALOL HCL 100 MG
100 TABLET ORAL
Refills: 0 | Status: DISCONTINUED | OUTPATIENT
Start: 2023-11-16 | End: 2023-11-16

## 2023-11-16 RX ORDER — NYSTATIN CREAM 100000 [USP'U]/G
1 CREAM TOPICAL
Refills: 0 | Status: DISCONTINUED | OUTPATIENT
Start: 2023-11-16 | End: 2023-11-20

## 2023-11-16 RX ORDER — HEPARIN SODIUM 5000 [USP'U]/ML
5000 INJECTION INTRAVENOUS; SUBCUTANEOUS EVERY 12 HOURS
Refills: 0 | Status: DISCONTINUED | OUTPATIENT
Start: 2023-11-16 | End: 2023-11-20

## 2023-11-16 RX ORDER — HYDRALAZINE HCL 50 MG
10 TABLET ORAL EVERY 8 HOURS
Refills: 0 | Status: DISCONTINUED | OUTPATIENT
Start: 2023-11-16 | End: 2023-11-16

## 2023-11-16 RX ORDER — LABETALOL HCL 100 MG
100 TABLET ORAL
Refills: 0 | Status: DISCONTINUED | OUTPATIENT
Start: 2023-11-16 | End: 2023-11-17

## 2023-11-16 RX ADMIN — Medication 100 MILLIGRAM(S): at 17:26

## 2023-11-16 RX ADMIN — Medication 500 MILLIGRAM(S): at 13:36

## 2023-11-16 RX ADMIN — FAMOTIDINE 20 MILLIGRAM(S): 10 INJECTION INTRAVENOUS at 13:36

## 2023-11-16 RX ADMIN — Medication 1 DROP(S): at 13:58

## 2023-11-16 RX ADMIN — Medication 1 DROP(S): at 05:09

## 2023-11-16 RX ADMIN — POLYETHYLENE GLYCOL 3350 17 GRAM(S): 17 POWDER, FOR SOLUTION ORAL at 13:57

## 2023-11-16 RX ADMIN — GABAPENTIN 600 MILLIGRAM(S): 400 CAPSULE ORAL at 06:28

## 2023-11-16 RX ADMIN — HEPARIN SODIUM 5000 UNIT(S): 5000 INJECTION INTRAVENOUS; SUBCUTANEOUS at 06:34

## 2023-11-16 RX ADMIN — Medication 1 DROP(S): at 21:31

## 2023-11-16 RX ADMIN — CHLORHEXIDINE GLUCONATE 1 APPLICATION(S): 213 SOLUTION TOPICAL at 13:57

## 2023-11-16 RX ADMIN — GABAPENTIN 600 MILLIGRAM(S): 400 CAPSULE ORAL at 13:37

## 2023-11-16 RX ADMIN — NYSTATIN CREAM 1 APPLICATION(S): 100000 CREAM TOPICAL at 17:26

## 2023-11-16 RX ADMIN — LATANOPROST 1 DROP(S): 0.05 SOLUTION/ DROPS OPHTHALMIC; TOPICAL at 21:11

## 2023-11-16 RX ADMIN — GABAPENTIN 600 MILLIGRAM(S): 400 CAPSULE ORAL at 22:54

## 2023-11-16 RX ADMIN — SENNA PLUS 2 TABLET(S): 8.6 TABLET ORAL at 21:28

## 2023-11-16 RX ADMIN — CARBIDOPA AND LEVODOPA 1 TABLET(S): 25; 100 TABLET ORAL at 06:28

## 2023-11-16 RX ADMIN — CARBIDOPA AND LEVODOPA 1 TABLET(S): 25; 100 TABLET ORAL at 17:22

## 2023-11-16 RX ADMIN — Medication 50 MICROGRAM(S): at 06:29

## 2023-11-16 NOTE — SWALLOW BEDSIDE ASSESSMENT ADULT - DIET PRIOR TO ADMI
regular, thin liquids per NH documentation
Per , Pt was on a regular solid and thin liquid diet
regular, thin liquids per NH documentation
reg w/ thin at NH
regular, thin liquids per NH documentation
regular, thin liquids per NH documentation

## 2023-11-16 NOTE — PROGRESS NOTE ADULT - SUBJECTIVE AND OBJECTIVE BOX
24H events:    Patient is a 83y old Female who presents with a chief complaint of ams (15 Nov 2023 11:32)    Primary diagnosis of Confusion    Today is hospital day 6d. This morning patient was seen and examined at bedside, resting comfortably in bed.    No acute or major events overnight.  Denies chest pain, SOB, abdominal pain    Code Status: Full    Family communication:  Contact date: 11/16/23  Relationship to patient: , bedside  Communication details: Updates on her case    PAST MEDICAL & SURGICAL HISTORY  Dementia    History of breast cancer    Constipation    Lewy body dementia without behavioral disturbance    Colon polyp    History of colon resection    H/O mastectomy, right      SOCIAL HISTORY:  Social History:  Former smoker (10 Nov 2023 23:44)      ALLERGIES:  Originally Entered as [Unknown] reaction to [neuroleptics] (Unknown)  Originally Entered as [Unknown] reaction to [pepper] (Unknown)  Originally Entered as [Unknown] reaction to [red pepper] (Unknown)  penicillin (Anaphylaxis)  benzodiazepines (Unknown)  ciprofloxacin (Unknown)  Originally Entered as [Unknown] reaction to [green pepper] (Unknown)  antichlolinergics (Unknown)    MEDICATIONS:  STANDING MEDICATIONS  artificial  tears Solution 1 Drop(s) Both EYES three times a day  ascorbic acid 500 milliGRAM(s) Oral daily  carbidopa/levodopa  25/100 1 Tablet(s) Oral <User Schedule>  chlorhexidine 2% Cloths 1 Application(s) Topical daily  famotidine    Tablet 20 milliGRAM(s) Oral daily  gabapentin Solution 600 milliGRAM(s) Oral three times a day  heparin   Injectable 5000 Unit(s) SubCutaneous every 12 hours  labetalol 100 milliGRAM(s) Oral two times a day  latanoprost 0.005% Ophthalmic Solution 1 Drop(s) Both EYES at bedtime  levothyroxine 50 MICROGram(s) Oral daily  polyethylene glycol 3350 17 Gram(s) Oral daily  senna 2 Tablet(s) Oral at bedtime    PRN MEDICATIONS  aluminum hydroxide/magnesium hydroxide/simethicone Suspension 30 milliLiter(s) Oral every 4 hours PRN  hydrALAZINE Injectable 5 milliGRAM(s) IV Push every 8 hours PRN    VITALS:   T(F): 97.8  HR: 75  BP: 108/58  RR: 18  SpO2: 96%    PHYSICAL EXAM:  GENERAL:   ( x ) NAD, lying in bed comfortably     (  ) obtunded     (  ) lethargic     (  ) somnolent    HEAD:   ( x ) Atraumatic     (  ) hematoma     (  ) laceration (specify location:       )     NECK:  ( x ) Supple     (  ) neck stiffness     (  ) nuchal rigidity     ( x )  no JVD     (  ) JVD present ( -- cm)    HEART:  Rate -->     ( x ) normal rate     (  ) bradycardic     (  ) tachycardic  Rhythm -->     ( x ) regular     (  ) regularly irregular     (  ) irregularly irregular  Murmurs -->     ( x ) normal s1s2     (  ) systolic murmur     (  ) diastolic murmur     (  ) continuous murmur      (  ) S3 present     (  ) S4 present    LUNGS:   ( x )Unlabored respirations     (  ) tachypnea  ( x ) B/L air entry     (  ) decreased breath sounds in:  (location     )    ( x ) no adventitious sound     (  ) crackles     (  ) wheezing      (  ) rhonchi      (specify location:       )  (  ) chest wall tenderness (specify location:       )    ABDOMEN:   ( x ) Soft     (  ) tense   |   (  ) nondistended     ( x ) distended   |   ( x ) +BS     (  ) hypoactive bowel sounds     (  ) hyperactive bowel sounds  ( x ) nontender     (  ) RUQ tenderness     (  ) RLQ tenderness     (  ) LLQ tenderness     (  ) epigastric tenderness     (  ) diffuse tenderness  (  ) Splenomegaly      (  ) Hepatomegaly      (  ) Jaundice     (  ) ecchymosis     EXTREMITIES:  (x  ) Normal     (  ) Rash     (  ) ecchymosis     (  ) varicose veins      (  ) pitting edema     (  ) non-pitting edema   (  ) ulceration     (  ) gangrene:     (location:     )    NERVOUS SYSTEM:    ( x ) A&Ox0     (  ) confused     (  ) lethargic, Still worse than baseline. Forgets her name, 's name, not oriented to place  Moves all 4 limbs, but weak motor power     SKIN:   ( x ) No rashes or lesions     (  ) maculopapular rash     (  ) pustules     (  ) vesicles     (  ) ulcer     (  ) ecchymosis     (specify location:     )      LABS:                        11.2   10.29 )-----------( 314      ( 16 Nov 2023 05:59 )             36.2     11-16    138  |  102  |  21<H>  ----------------------------<  94  4.1   |  28  |  0.7    Ca    8.7      16 Nov 2023 05:59  Mg     2.1     11-16    TPro  5.7<L>  /  Alb  3.1<L>  /  TBili  0.4  /  DBili  x   /  AST  22  /  ALT  <5  /  AlkPhos  47  11-16      Urinalysis Basic - ( 16 Nov 2023 05:59 )    Color: x / Appearance: x / SG: x / pH: x  Gluc: 94 mg/dL / Ketone: x  / Bili: x / Urobili: x   Blood: x / Protein: x / Nitrite: x   Leuk Esterase: x / RBC: x / WBC x   Sq Epi: x / Non Sq Epi: x / Bacteria: x                RADIOLOGY:

## 2023-11-16 NOTE — SWALLOW BEDSIDE ASSESSMENT ADULT - ASR SWALLOW RECOMMEND DIAG
pt will not tolerate FEES, SLP to plan for VFSS once downgraded from SDU
SLP to determine candidacy for instrumental swallow study
VFSS/MBS
SLP to determine candidacy for instrumental swallow study
objective swallowing assessment required

## 2023-11-16 NOTE — SWALLOW BEDSIDE ASSESSMENT ADULT - SLP PERTINENT HISTORY OF CURRENT PROBLEM
Pt is an 82 y/o F w/ PMHx: Lewy Body dementia (baseline mRS 4-5, has been residing in a NH for 7 yrs) w/ associated autonomic dysfunction including dysphagia, hypothyroidism, who presented with encephalopathy x 1 week. Per , pt is able to have some verbal interaction at baseline, bedbound, needs help w/ all ADL's. Pt found to have hypercapnic respiratory failure on presentation, suspected 2' aspiration pneumonia. CTH showed a small left thalamic IPH. Pt seen by ID, "no ongoing bacterial PNA either clinically or radiographically, possible aspiration with chemical pneumonitis with SIRS w/ initial leucocytosis". Repeat CXR 11/16-> Unchanged low lung volumes and bibasilar opacities.

## 2023-11-16 NOTE — PROGRESS NOTE ADULT - ASSESSMENT
83-year-old female with past medical history of Lewy body dementia, Parkinson disease who was brought in from [facility] for AMS.  Per , pt is AAOX2 and able to have a normal conversation at baseline , but patient has been having worsening confusion for the past week. Pt's  also says that she has been having a cough for the past week as well, not sure if productive. Unable to get rest of hx given pts mental status. Diagnosed with UTI, respiratory failure s/p pneumonia (??) and ICH    #Encephalopathy  - CTH: New small intraparenchymal hemorrhage in the left thalamus since the prior CT head from 12/24/2022. No acute territorial infarct or midline shift. Stable severe chronic microvascular ischemic changes.  - s/p eval by neurocrit and neurosurgery in ED. --> no acute intervention currently.   - repeat CTH No changes, pt endorsing headache 11/14 AM, repeat CTH no acute changes  - 11/11 rEEG: no electrographic seizures or significant clinical events  - Keep SBP from 110-140  - neuro following  - 11/12 CTA no evidence of flow-limiting stenosis, occlusion, or aneurysm   - f/u MRI brain with/without - was canceled as patient refused  - c/w home sinemet  - Dr Lucero ordered a brain CT to rule out bleed progression (slight memory worsening)  - hydralazine 5mg q6 PRN for SBP >140 and HR <70, labetalol 100mg BID po    #Acute hypoxic and hypercapnic respiratory failure secondary to chemical pneumonitis vs IPH  - as per , pt confused for the past week; normally able to hold conversation but no longer can  - wbc ~17k, afebrile otherwise  - CXR with LLL opacity  - vbg pH 7.09, pCO2 72, placed on bipap  - s/p aztreonam/azithromycin in ED; stopped per ID  - procal -ve, MRSA nares -ve  - UA negative; cultures +ve for enterococcus faecalis, urine legionella -ve  - speech and swallow following: needs MBS today  - Advance NGT    #Urinary Retention - resolved  - s/p TOV 11/3    #Hypothyroidism  - continue home levothyroxine  - TSH 1.90    #Lewy body dementia  #Parkinsons Disease  - continue home carbidopa-levodopa                                                                              ----------------------------------------------------  # DVT prophylaxis: heparin SQ  # GI prophylaxis: Pepcid  # Diet: NPO with Tube Feed  # Activity: Bed Bound  # Code status: FULL CODE  # Disposition: from NH    Pending: VEGA MIXON   83-year-old female with past medical history of Lewy body dementia, Parkinson disease who was brought in from [facility] for AMS.  Per , pt is AAOX2 and able to have a normal conversation at baseline , but patient has been having worsening confusion for the past week. Pt's  also says that she has been having a cough for the past week as well, not sure if productive. Unable to get rest of hx given pts mental status. Diagnosed with UTI, respiratory failure s/p pneumonia (??) and ICH    #Encephalopathy  - CTH: New small intraparenchymal hemorrhage in the left thalamus since the prior CT head from 12/24/2022. No acute territorial infarct or midline shift. Stable severe chronic microvascular ischemic changes.  - s/p eval by neurocrit and neurosurgery in ED. --> no acute intervention currently.   - repeat CTH No changes, pt endorsing headache 11/14 AM, repeat CTH no acute changes  - 11/11 rEEG: no electrographic seizures or significant clinical events  - Keep SBP from 110-140  - neuro following  - 11/12 CTA no evidence of flow-limiting stenosis, occlusion, or aneurysm   - f/u MRI brain with/without - was canceled as patient refused  - c/w home sinemet  - Dr Lucero ordered a brain CT to rule out bleed progression (slight memory worsening): regression of bleed from 10 to 8mm  - hydralazine 5mg q6 PRN for SBP >140 and HR <70, labetalol 100mg BID PNG    #Acute hypoxic and hypercapnic respiratory failure secondary to chemical pneumonitis vs IPH  - as per , pt confused for the past week; normally able to hold conversation but no longer can  - wbc ~17k, afebrile otherwise  - CXR with LLL opacity  - vbg pH 7.09, pCO2 72, placed on bipap  - s/p aztreonam/azithromycin in ED; stopped per ID  - procal -ve, MRSA nares -ve  - UA negative; cultures +ve for enterococcus faecalis, urine legionella -ve  - speech and swallow following: needs MBS today  - Advance NGT    #Urinary Retention - resolved  - s/p TOV 11/3    #Hypothyroidism  - continue home levothyroxine  - TSH 1.90    #Lewy body dementia  #Parkinsons Disease  - continue home carbidopa-levodopa                                                                              ----------------------------------------------------  # DVT prophylaxis: heparin SQ  # GI prophylaxis: Pepcid  # Diet: NPO with Tube Feed  # Activity: Bed Bound  # Code status: FULL CODE  # Disposition: from NH    Pending: VEGA MIXON

## 2023-11-16 NOTE — PROGRESS NOTE ADULT - SUBJECTIVE AND OBJECTIVE BOX
Patient is a 83y old  Female who presents with a chief complaint of ams (16 Nov 2023 09:57)      Patient seen and examined at bedside.    ALLERGIES:  Originally Entered as [Unknown] reaction to [neuroleptics] (Unknown)  Originally Entered as [Unknown] reaction to [pepper] (Unknown)  Originally Entered as [Unknown] reaction to [red pepper] (Unknown)  penicillin (Anaphylaxis)  benzodiazepines (Unknown)  ciprofloxacin (Unknown)  Originally Entered as [Unknown] reaction to [green pepper] (Unknown)  antichlolinergics (Unknown)    MEDICATIONS:  aluminum hydroxide/magnesium hydroxide/simethicone Suspension 30 milliLiter(s) Oral every 4 hours PRN  artificial  tears Solution 1 Drop(s) Both EYES three times a day  ascorbic acid 500 milliGRAM(s) Oral daily  carbidopa/levodopa  25/100 1 Tablet(s) Oral <User Schedule>  chlorhexidine 2% Cloths 1 Application(s) Topical daily  famotidine    Tablet 20 milliGRAM(s) Oral daily  gabapentin Solution 600 milliGRAM(s) Oral three times a day  hydrALAZINE Injectable 5 milliGRAM(s) IV Push every 8 hours PRN  labetalol 100 milliGRAM(s) Oral two times a day  latanoprost 0.005% Ophthalmic Solution 1 Drop(s) Both EYES at bedtime  levothyroxine 50 MICROGram(s) Oral daily  nystatin Powder 1 Application(s) Topical two times a day  polyethylene glycol 3350 17 Gram(s) Oral daily  senna 2 Tablet(s) Oral at bedtime    Vital Signs Last 24 Hrs  T(F): 97.6 (16 Nov 2023 15:51), Max: 98.1 (15 Nov 2023 23:21)  HR: 90 (16 Nov 2023 17:25) (68 - 101)  BP: 117/56 (16 Nov 2023 17:25) (108/58 - 140/58)  RR: 18 (16 Nov 2023 15:51) (18 - 18)  SpO2: 96% (15 Nov 2023 23:21) (96% - 96%)  I&O's Summary    15 Nov 2023 07:01  -  16 Nov 2023 07:00  --------------------------------------------------------  IN: 425 mL / OUT: 0 mL / NET: 425 mL        PHYSICAL EXAM:  General: NAD, A/O x 0  ENT: MMM  Neck: Supple, No JVD  Lungs: diminished air entry  Cardio: RRR, S1/S2, 2/6 systolic murmur   Abdomen: Soft, Nontender, Nondistended; Bowel sounds present  Extremities: No cyanosis, No edema  Neuro: follows commands, PERRLA, no clonus    LABS:                        11.2   10.29 )-----------( 314      ( 16 Nov 2023 05:59 )             36.2     11-16    138  |  102  |  21  ----------------------------<  94  4.1   |  28  |  0.7    Ca    8.7      16 Nov 2023 05:59  Mg     2.1     11-16    TPro  5.7  /  Alb  3.1  /  TBili  0.4  /  DBili  x   /  AST  22  /  ALT  <5  /  AlkPhos  47  11-16                            POCT Blood Glucose.: 128 mg/dL (16 Nov 2023 09:35)      Urinalysis Basic - ( 16 Nov 2023 05:59 )    Color: x / Appearance: x / SG: x / pH: x  Gluc: 94 mg/dL / Ketone: x  / Bili: x / Urobili: x   Blood: x / Protein: x / Nitrite: x   Leuk Esterase: x / RBC: x / WBC x   Sq Epi: x / Non Sq Epi: x / Bacteria: x        Culture - Blood (collected 11 Nov 2023 06:55)  Source: .Blood None  Final Report (16 Nov 2023 17:00):    No growth at 5 days    Culture - Urine (collected 10 Nov 2023 15:33)  Source: Catheterized Catheterized  Final Report (13 Nov 2023 20:35):    50,000 - 99,000 CFU/mL Enterococcus faecalis  Organism: Enterococcus faecalis (13 Nov 2023 20:35)  Organism: Enterococcus faecalis (13 Nov 2023 20:35)      -  Levofloxacin: R >4      -  Nitrofurantoin: S <=32 Should not be used to treat pyelonephritis.      -  Vancomycin: S 2      -  Ciprofloxacin: R >2      -  Ampicillin: S <=2 Predicts results to ampicillin/sulbactam, amoxacillin-clavulanate and  piperacillin-tazobactam.      -  Tetracycline: R >8      Method Type: JANNETH          RADIOLOGY & ADDITIONAL TESTS:    Care Discussed with Consultants/Other Providers:

## 2023-11-16 NOTE — SWALLOW BEDSIDE ASSESSMENT ADULT - COMMENTS
Pt w/ reported change in mental status, repeat CTH 11/16-> left thalamic hematoma has decreased in size and density.  Pt downgraded to medical floor.

## 2023-11-16 NOTE — PROGRESS NOTE ADULT - ASSESSMENT
83-year-old female with PMHx of Lewy Body dementia with associated autonomic dysfunction including dysphagia, hypothyroidism, who presented from NH  with encephalopathy x 1 week. Admitted to SDU due to small IPH for neurochecks    Metabolic Encephalopathy likely due to suspected IPH  Parkinson's disease with leuwy body dementia  Functional quadriplegia  11/16-according to  patient with worsening mental status, urgent head CT showed regression of IPH  serial CTH with stable IPH  patient could not tolerate MRI,  not agreeable to any kind of sedation  REEG no seizures  s/p NCCU and Neurosx evals, no acute intervention     Acute Hypercapnic respiratory failure - resolved  could be due to aspiration/chemical pneumonitis or due to IPH  currently no evidence of infection, off antibiotcs, ID following  richardson removed 11/13, monitor I&Os, bladder scan   fu speech and swallow    Dysphagia  -speech and swallow eval appreciated  -PT currently on NGT  -MBS/FEES ordered     Hypothyroidism - TSH 1.90 . c/w synthroid.     dry eyes - artificial tears     PendingL neurology fu, s/s fu to advance diet/FEES

## 2023-11-17 LAB
ALBUMIN SERPL ELPH-MCNC: 3.6 G/DL — SIGNIFICANT CHANGE UP (ref 3.5–5.2)
ALBUMIN SERPL ELPH-MCNC: 3.6 G/DL — SIGNIFICANT CHANGE UP (ref 3.5–5.2)
ALP SERPL-CCNC: 51 U/L — SIGNIFICANT CHANGE UP (ref 30–115)
ALP SERPL-CCNC: 51 U/L — SIGNIFICANT CHANGE UP (ref 30–115)
ALT FLD-CCNC: <5 U/L — SIGNIFICANT CHANGE UP (ref 0–41)
ALT FLD-CCNC: <5 U/L — SIGNIFICANT CHANGE UP (ref 0–41)
ANION GAP SERPL CALC-SCNC: 15 MMOL/L — HIGH (ref 7–14)
ANION GAP SERPL CALC-SCNC: 15 MMOL/L — HIGH (ref 7–14)
AST SERPL-CCNC: 30 U/L — SIGNIFICANT CHANGE UP (ref 0–41)
AST SERPL-CCNC: 30 U/L — SIGNIFICANT CHANGE UP (ref 0–41)
BASOPHILS # BLD AUTO: 0.09 K/UL — SIGNIFICANT CHANGE UP (ref 0–0.2)
BASOPHILS # BLD AUTO: 0.09 K/UL — SIGNIFICANT CHANGE UP (ref 0–0.2)
BASOPHILS NFR BLD AUTO: 0.8 % — SIGNIFICANT CHANGE UP (ref 0–1)
BASOPHILS NFR BLD AUTO: 0.8 % — SIGNIFICANT CHANGE UP (ref 0–1)
BILIRUB SERPL-MCNC: 0.4 MG/DL — SIGNIFICANT CHANGE UP (ref 0.2–1.2)
BILIRUB SERPL-MCNC: 0.4 MG/DL — SIGNIFICANT CHANGE UP (ref 0.2–1.2)
BUN SERPL-MCNC: 21 MG/DL — HIGH (ref 10–20)
BUN SERPL-MCNC: 21 MG/DL — HIGH (ref 10–20)
CALCIUM SERPL-MCNC: 9.3 MG/DL — SIGNIFICANT CHANGE UP (ref 8.4–10.4)
CALCIUM SERPL-MCNC: 9.3 MG/DL — SIGNIFICANT CHANGE UP (ref 8.4–10.4)
CHLORIDE SERPL-SCNC: 102 MMOL/L — SIGNIFICANT CHANGE UP (ref 98–110)
CHLORIDE SERPL-SCNC: 102 MMOL/L — SIGNIFICANT CHANGE UP (ref 98–110)
CO2 SERPL-SCNC: 22 MMOL/L — SIGNIFICANT CHANGE UP (ref 17–32)
CO2 SERPL-SCNC: 22 MMOL/L — SIGNIFICANT CHANGE UP (ref 17–32)
CREAT SERPL-MCNC: 0.8 MG/DL — SIGNIFICANT CHANGE UP (ref 0.7–1.5)
CREAT SERPL-MCNC: 0.8 MG/DL — SIGNIFICANT CHANGE UP (ref 0.7–1.5)
EGFR: 74 ML/MIN/1.73M2 — SIGNIFICANT CHANGE UP
EGFR: 74 ML/MIN/1.73M2 — SIGNIFICANT CHANGE UP
EOSINOPHIL # BLD AUTO: 0.35 K/UL — SIGNIFICANT CHANGE UP (ref 0–0.7)
EOSINOPHIL # BLD AUTO: 0.35 K/UL — SIGNIFICANT CHANGE UP (ref 0–0.7)
EOSINOPHIL NFR BLD AUTO: 3.1 % — SIGNIFICANT CHANGE UP (ref 0–8)
EOSINOPHIL NFR BLD AUTO: 3.1 % — SIGNIFICANT CHANGE UP (ref 0–8)
GLUCOSE BLDC GLUCOMTR-MCNC: 135 MG/DL — HIGH (ref 70–99)
GLUCOSE BLDC GLUCOMTR-MCNC: 135 MG/DL — HIGH (ref 70–99)
GLUCOSE SERPL-MCNC: 122 MG/DL — HIGH (ref 70–99)
GLUCOSE SERPL-MCNC: 122 MG/DL — HIGH (ref 70–99)
HCT VFR BLD CALC: 36.6 % — LOW (ref 37–47)
HCT VFR BLD CALC: 36.6 % — LOW (ref 37–47)
HGB BLD-MCNC: 11.5 G/DL — LOW (ref 12–16)
HGB BLD-MCNC: 11.5 G/DL — LOW (ref 12–16)
IMM GRANULOCYTES NFR BLD AUTO: 0.7 % — HIGH (ref 0.1–0.3)
IMM GRANULOCYTES NFR BLD AUTO: 0.7 % — HIGH (ref 0.1–0.3)
LYMPHOCYTES # BLD AUTO: 2.44 K/UL — SIGNIFICANT CHANGE UP (ref 1.2–3.4)
LYMPHOCYTES # BLD AUTO: 2.44 K/UL — SIGNIFICANT CHANGE UP (ref 1.2–3.4)
LYMPHOCYTES # BLD AUTO: 21.4 % — SIGNIFICANT CHANGE UP (ref 20.5–51.1)
LYMPHOCYTES # BLD AUTO: 21.4 % — SIGNIFICANT CHANGE UP (ref 20.5–51.1)
MAGNESIUM SERPL-MCNC: 2.1 MG/DL — SIGNIFICANT CHANGE UP (ref 1.8–2.4)
MAGNESIUM SERPL-MCNC: 2.1 MG/DL — SIGNIFICANT CHANGE UP (ref 1.8–2.4)
MCHC RBC-ENTMCNC: 31.1 PG — HIGH (ref 27–31)
MCHC RBC-ENTMCNC: 31.1 PG — HIGH (ref 27–31)
MCHC RBC-ENTMCNC: 31.4 G/DL — LOW (ref 32–37)
MCHC RBC-ENTMCNC: 31.4 G/DL — LOW (ref 32–37)
MCV RBC AUTO: 98.9 FL — SIGNIFICANT CHANGE UP (ref 81–99)
MCV RBC AUTO: 98.9 FL — SIGNIFICANT CHANGE UP (ref 81–99)
MONOCYTES # BLD AUTO: 0.76 K/UL — HIGH (ref 0.1–0.6)
MONOCYTES # BLD AUTO: 0.76 K/UL — HIGH (ref 0.1–0.6)
MONOCYTES NFR BLD AUTO: 6.7 % — SIGNIFICANT CHANGE UP (ref 1.7–9.3)
MONOCYTES NFR BLD AUTO: 6.7 % — SIGNIFICANT CHANGE UP (ref 1.7–9.3)
NEUTROPHILS # BLD AUTO: 7.68 K/UL — HIGH (ref 1.4–6.5)
NEUTROPHILS # BLD AUTO: 7.68 K/UL — HIGH (ref 1.4–6.5)
NEUTROPHILS NFR BLD AUTO: 67.3 % — SIGNIFICANT CHANGE UP (ref 42.2–75.2)
NEUTROPHILS NFR BLD AUTO: 67.3 % — SIGNIFICANT CHANGE UP (ref 42.2–75.2)
NRBC # BLD: 0 /100 WBCS — SIGNIFICANT CHANGE UP (ref 0–0)
NRBC # BLD: 0 /100 WBCS — SIGNIFICANT CHANGE UP (ref 0–0)
PLATELET # BLD AUTO: 290 K/UL — SIGNIFICANT CHANGE UP (ref 130–400)
PLATELET # BLD AUTO: 290 K/UL — SIGNIFICANT CHANGE UP (ref 130–400)
PMV BLD: 9.4 FL — SIGNIFICANT CHANGE UP (ref 7.4–10.4)
PMV BLD: 9.4 FL — SIGNIFICANT CHANGE UP (ref 7.4–10.4)
POTASSIUM SERPL-MCNC: 4.4 MMOL/L — SIGNIFICANT CHANGE UP (ref 3.5–5)
POTASSIUM SERPL-MCNC: 4.4 MMOL/L — SIGNIFICANT CHANGE UP (ref 3.5–5)
POTASSIUM SERPL-SCNC: 4.4 MMOL/L — SIGNIFICANT CHANGE UP (ref 3.5–5)
POTASSIUM SERPL-SCNC: 4.4 MMOL/L — SIGNIFICANT CHANGE UP (ref 3.5–5)
PROT SERPL-MCNC: 6 G/DL — SIGNIFICANT CHANGE UP (ref 6–8)
PROT SERPL-MCNC: 6 G/DL — SIGNIFICANT CHANGE UP (ref 6–8)
RBC # BLD: 3.7 M/UL — LOW (ref 4.2–5.4)
RBC # BLD: 3.7 M/UL — LOW (ref 4.2–5.4)
RBC # FLD: 14.5 % — SIGNIFICANT CHANGE UP (ref 11.5–14.5)
RBC # FLD: 14.5 % — SIGNIFICANT CHANGE UP (ref 11.5–14.5)
SODIUM SERPL-SCNC: 139 MMOL/L — SIGNIFICANT CHANGE UP (ref 135–146)
SODIUM SERPL-SCNC: 139 MMOL/L — SIGNIFICANT CHANGE UP (ref 135–146)
WBC # BLD: 11.4 K/UL — HIGH (ref 4.8–10.8)
WBC # BLD: 11.4 K/UL — HIGH (ref 4.8–10.8)
WBC # FLD AUTO: 11.4 K/UL — HIGH (ref 4.8–10.8)
WBC # FLD AUTO: 11.4 K/UL — HIGH (ref 4.8–10.8)

## 2023-11-17 PROCEDURE — 99233 SBSQ HOSP IP/OBS HIGH 50: CPT

## 2023-11-17 PROCEDURE — 74230 X-RAY XM SWLNG FUNCJ C+: CPT | Mod: 26

## 2023-11-17 RX ADMIN — Medication 50 MICROGRAM(S): at 05:53

## 2023-11-17 RX ADMIN — NYSTATIN CREAM 1 APPLICATION(S): 100000 CREAM TOPICAL at 05:55

## 2023-11-17 RX ADMIN — GABAPENTIN 600 MILLIGRAM(S): 400 CAPSULE ORAL at 05:54

## 2023-11-17 RX ADMIN — HEPARIN SODIUM 5000 UNIT(S): 5000 INJECTION INTRAVENOUS; SUBCUTANEOUS at 18:18

## 2023-11-17 RX ADMIN — Medication 100 MILLIGRAM(S): at 05:54

## 2023-11-17 RX ADMIN — Medication 1 DROP(S): at 21:59

## 2023-11-17 RX ADMIN — Medication 1 DROP(S): at 15:35

## 2023-11-17 RX ADMIN — HEPARIN SODIUM 5000 UNIT(S): 5000 INJECTION INTRAVENOUS; SUBCUTANEOUS at 05:54

## 2023-11-17 RX ADMIN — POLYETHYLENE GLYCOL 3350 17 GRAM(S): 17 POWDER, FOR SOLUTION ORAL at 13:26

## 2023-11-17 RX ADMIN — NYSTATIN CREAM 1 APPLICATION(S): 100000 CREAM TOPICAL at 18:20

## 2023-11-17 RX ADMIN — LATANOPROST 1 DROP(S): 0.05 SOLUTION/ DROPS OPHTHALMIC; TOPICAL at 22:00

## 2023-11-17 RX ADMIN — Medication 1 DROP(S): at 05:55

## 2023-11-17 RX ADMIN — Medication 500 MILLIGRAM(S): at 13:27

## 2023-11-17 RX ADMIN — SENNA PLUS 2 TABLET(S): 8.6 TABLET ORAL at 21:59

## 2023-11-17 RX ADMIN — GABAPENTIN 600 MILLIGRAM(S): 400 CAPSULE ORAL at 18:21

## 2023-11-17 RX ADMIN — CARBIDOPA AND LEVODOPA 1 TABLET(S): 25; 100 TABLET ORAL at 18:18

## 2023-11-17 RX ADMIN — CARBIDOPA AND LEVODOPA 1 TABLET(S): 25; 100 TABLET ORAL at 05:54

## 2023-11-17 RX ADMIN — CHLORHEXIDINE GLUCONATE 1 APPLICATION(S): 213 SOLUTION TOPICAL at 12:56

## 2023-11-17 RX ADMIN — FAMOTIDINE 20 MILLIGRAM(S): 10 INJECTION INTRAVENOUS at 13:25

## 2023-11-17 RX ADMIN — GABAPENTIN 600 MILLIGRAM(S): 400 CAPSULE ORAL at 22:00

## 2023-11-17 NOTE — PROGRESS NOTE ADULT - ASSESSMENT
83-year-old female with past medical history of Lewy body dementia, Parkinson disease who was brought in from [facility] for AMS.  Per , pt is AAOX2 and able to have a normal conversation at baseline , but patient has been having worsening confusion for the past week. Pt's  also says that she has been having a cough for the past week as well, not sure if productive. Unable to get rest of hx given pts mental status. Diagnosed with UTI, respiratory failure s/p pneumonia (??) and ICH    #Encephalopathy  - CTH: New small intraparenchymal hemorrhage in the left thalamus since the prior CT head from 12/24/2022. No acute territorial infarct or midline shift. Stable severe chronic microvascular ischemic changes.  - s/p eval by neurocrit and neurosurgery in ED. --> no acute intervention currently.   - repeat CTH No changes, pt endorsing headache 11/14 AM, repeat CTH no acute changes  - 11/11 rEEG: no electrographic seizures or significant clinical events  - Keep SBP from 110-140  - neuro following  - 11/12 CTA no evidence of flow-limiting stenosis, occlusion, or aneurysm   - f/u MRI brain with/without - was canceled as patient refused  - c/w home sinemet  - Dr Lucero ordered a brain CT to rule out bleed progression (slight memory worsening): regression of bleed from 10 to 8mm  - hydralazine 5mg q6 PRN for SBP >140 and HR <70, labetalol 100mg BID PNG    #Acute hypoxic and hypercapnic respiratory failure secondary to chemical pneumonitis vs IPH  - as per , pt confused for the past week; normally able to hold conversation but no longer can  - wbc ~17k, afebrile otherwise  - CXR with LLL opacity  - vbg pH 7.09, pCO2 72, placed on bipap  - s/p aztreonam/azithromycin in ED; stopped per ID  - procal -ve, MRSA nares -ve  - UA negative; cultures +ve for enterococcus faecalis, urine legionella -ve  - speech and swallow following: MBS done today --> soft and bite-sized diet, mildly thick fluids, 1:1 assistance, drinking w/o straws, small sips 5cc. Leave NGT for now      #Urinary Retention - resolved  - s/p TOV 11/3    #Hypothyroidism  - continue home levothyroxine  - TSH 1.90    #Lewy body dementia  #Parkinsons Disease  - continue home carbidopa-levodopa                                                                              ----------------------------------------------------  # DVT prophylaxis: heparin SQ  # GI prophylaxis: Pepcid  # Diet: soft and bite-sized diet, mildly thick fluids, 1:1 assistance, drinking w/o straws, small sips 5cc  # Activity: Bed Bound  # Code status: FULL CODE  # Disposition: from NH    Pending: FU if diet tolerated

## 2023-11-17 NOTE — SWALLOW VFSS/MBS ASSESSMENT ADULT - RECOMMENDED CONSISTENCY
soft and bite sized, moderately thick liquids soft and bite sized, moderately thick liquids; consider leaving in NGT to ensure pt tolerating diet and w/ adequate PO intake.

## 2023-11-17 NOTE — PROGRESS NOTE ADULT - SUBJECTIVE AND OBJECTIVE BOX
24H events:    Patient is a 83y old Female who presents with a chief complaint of ams (16 Nov 2023 19:02)    Primary diagnosis of Confusion    Today is hospital day 7d. This morning patient was seen and examined at bedside, resting comfortably in bed.    No acute or major events overnight. Yest, had a BM.     Code Status: Full    Family communication:  Contact date: 11/18/23  Relationship to patient: , bedside  Communication details: Updates on case    PAST MEDICAL & SURGICAL HISTORY  Dementia    History of breast cancer    Constipation    Lewy body dementia without behavioral disturbance    Colon polyp    History of colon resection    H/O mastectomy, right      SOCIAL HISTORY:  Social History:  Former smoker (10 Nov 2023 23:44)      ALLERGIES:  Originally Entered as [Unknown] reaction to [neuroleptics] (Unknown)  Originally Entered as [Unknown] reaction to [pepper] (Unknown)  Originally Entered as [Unknown] reaction to [red pepper] (Unknown)  penicillin (Anaphylaxis)  benzodiazepines (Unknown)  ciprofloxacin (Unknown)  Originally Entered as [Unknown] reaction to [green pepper] (Unknown)  antichlolinergics (Unknown)    MEDICATIONS:  STANDING MEDICATIONS  artificial  tears Solution 1 Drop(s) Both EYES three times a day  ascorbic acid 500 milliGRAM(s) Oral daily  carbidopa/levodopa  25/100 1 Tablet(s) Oral <User Schedule>  chlorhexidine 2% Cloths 1 Application(s) Topical daily  famotidine    Tablet 20 milliGRAM(s) Oral daily  gabapentin Solution 600 milliGRAM(s) Oral three times a day  heparin   Injectable 5000 Unit(s) SubCutaneous every 12 hours  latanoprost 0.005% Ophthalmic Solution 1 Drop(s) Both EYES at bedtime  levothyroxine 50 MICROGram(s) Oral daily  nystatin Powder 1 Application(s) Topical two times a day  polyethylene glycol 3350 17 Gram(s) Oral daily  senna 2 Tablet(s) Oral at bedtime    PRN MEDICATIONS  aluminum hydroxide/magnesium hydroxide/simethicone Suspension 30 milliLiter(s) Oral every 4 hours PRN    VITALS:   T(F): 97.1  HR: 79  BP: 105/57  RR: 18  SpO2: --    PHYSICAL EXAM:  GENERAL:   ( x ) NAD, lying in bed comfortably     (  ) obtunded     (  ) lethargic     (  ) somnolent    HEAD:   ( x ) Atraumatic     (  ) hematoma     (  ) laceration (specify location:       )     NECK:  ( x ) Supple     (  ) neck stiffness     (  ) nuchal rigidity     ( x )  no JVD     (  ) JVD present ( -- cm)    HEART:  Rate -->     ( x ) normal rate     (  ) bradycardic     (  ) tachycardic  Rhythm -->     ( x ) regular     (  ) regularly irregular     (  ) irregularly irregular  Murmurs -->     ( x ) normal s1s2     (  ) systolic murmur     (  ) diastolic murmur     (  ) continuous murmur      (  ) S3 present     (  ) S4 present    LUNGS:   ( x )Unlabored respirations     (  ) tachypnea  ( x ) B/L air entry     (  ) decreased breath sounds in:  (location     )    ( x ) no adventitious sound     (  ) crackles     (  ) wheezing      (  ) rhonchi      (specify location:       )  (  ) chest wall tenderness (specify location:       )    ABDOMEN:   ( x ) Soft     (  ) tense   |   (  ) nondistended     ( x ) distended   |   ( x ) +BS     (  ) hypoactive bowel sounds     (  ) hyperactive bowel sounds  ( x ) nontender     (  ) RUQ tenderness     (  ) RLQ tenderness     (  ) LLQ tenderness     (  ) epigastric tenderness     (  ) diffuse tenderness  (  ) Splenomegaly      (  ) Hepatomegaly      (  ) Jaundice     (  ) ecchymosis     EXTREMITIES:  (x  ) Normal     (  ) Rash     (  ) ecchymosis     (  ) varicose veins      (  ) pitting edema     (  ) non-pitting edema   (  ) ulceration     (  ) gangrene:     (location:     )    NERVOUS SYSTEM:    ( x ) A&Ox1-2     (  ) confused     (  ) lethargic (x) Better than yest  Moves all 4 limbs, but weak motor power     SKIN:   ( x ) No rashes or lesions     (  ) maculopapular rash     (  ) pustules     (  ) vesicles     (  ) ulcer     (  ) ecchymosis     (specify location:     )      LABS:                        11.5   11.40 )-----------( 290      ( 17 Nov 2023 07:00 )             36.6     11-17    139  |  102  |  21<H>  ----------------------------<  122<H>  4.4   |  22  |  0.8    Ca    9.3      17 Nov 2023 07:00  Mg     2.1     11-17    TPro  6.0  /  Alb  3.6  /  TBili  0.4  /  DBili  x   /  AST  30  /  ALT  <5  /  AlkPhos  51  11-17      Urinalysis Basic - ( 17 Nov 2023 07:00 )    Color: x / Appearance: x / SG: x / pH: x  Gluc: 122 mg/dL / Ketone: x  / Bili: x / Urobili: x   Blood: x / Protein: x / Nitrite: x   Leuk Esterase: x / RBC: x / WBC x   Sq Epi: x / Non Sq Epi: x / Bacteria: x                RADIOLOGY:           24H events:    Patient is a 83y old Female who presents with a chief complaint of ams (16 Nov 2023 19:02)    Primary diagnosis of Confusion    Today is hospital day 7d. This morning patient was seen and examined at bedside, resting comfortably in bed.    No acute or major events overnight. Yest, had a BM.     Code Status: Full    Family communication:  Contact date: 11/17/23  Relationship to patient: , bedside  Communication details: Updates on case    PAST MEDICAL & SURGICAL HISTORY  Dementia    History of breast cancer    Constipation    Lewy body dementia without behavioral disturbance    Colon polyp    History of colon resection    H/O mastectomy, right      SOCIAL HISTORY:  Social History:  Former smoker (10 Nov 2023 23:44)      ALLERGIES:  Originally Entered as [Unknown] reaction to [neuroleptics] (Unknown)  Originally Entered as [Unknown] reaction to [pepper] (Unknown)  Originally Entered as [Unknown] reaction to [red pepper] (Unknown)  penicillin (Anaphylaxis)  benzodiazepines (Unknown)  ciprofloxacin (Unknown)  Originally Entered as [Unknown] reaction to [green pepper] (Unknown)  antichlolinergics (Unknown)    MEDICATIONS:  STANDING MEDICATIONS  artificial  tears Solution 1 Drop(s) Both EYES three times a day  ascorbic acid 500 milliGRAM(s) Oral daily  carbidopa/levodopa  25/100 1 Tablet(s) Oral <User Schedule>  chlorhexidine 2% Cloths 1 Application(s) Topical daily  famotidine    Tablet 20 milliGRAM(s) Oral daily  gabapentin Solution 600 milliGRAM(s) Oral three times a day  heparin   Injectable 5000 Unit(s) SubCutaneous every 12 hours  latanoprost 0.005% Ophthalmic Solution 1 Drop(s) Both EYES at bedtime  levothyroxine 50 MICROGram(s) Oral daily  nystatin Powder 1 Application(s) Topical two times a day  polyethylene glycol 3350 17 Gram(s) Oral daily  senna 2 Tablet(s) Oral at bedtime    PRN MEDICATIONS  aluminum hydroxide/magnesium hydroxide/simethicone Suspension 30 milliLiter(s) Oral every 4 hours PRN    VITALS:   T(F): 97.1  HR: 79  BP: 105/57  RR: 18  SpO2: --    PHYSICAL EXAM:  GENERAL:   ( x ) NAD, lying in bed comfortably     (  ) obtunded     (  ) lethargic     (  ) somnolent    HEAD:   ( x ) Atraumatic     (  ) hematoma     (  ) laceration (specify location:       )     NECK:  ( x ) Supple     (  ) neck stiffness     (  ) nuchal rigidity     ( x )  no JVD     (  ) JVD present ( -- cm)    HEART:  Rate -->     ( x ) normal rate     (  ) bradycardic     (  ) tachycardic  Rhythm -->     ( x ) regular     (  ) regularly irregular     (  ) irregularly irregular  Murmurs -->     ( x ) normal s1s2     (  ) systolic murmur     (  ) diastolic murmur     (  ) continuous murmur      (  ) S3 present     (  ) S4 present    LUNGS:   ( x )Unlabored respirations     (  ) tachypnea  ( x ) B/L air entry     (  ) decreased breath sounds in:  (location     )    ( x ) no adventitious sound     (  ) crackles     (  ) wheezing      (  ) rhonchi      (specify location:       )  (  ) chest wall tenderness (specify location:       )    ABDOMEN:   ( x ) Soft     (  ) tense   |   (  ) nondistended     ( x ) distended   |   ( x ) +BS     (  ) hypoactive bowel sounds     (  ) hyperactive bowel sounds  ( x ) nontender     (  ) RUQ tenderness     (  ) RLQ tenderness     (  ) LLQ tenderness     (  ) epigastric tenderness     (  ) diffuse tenderness  (  ) Splenomegaly      (  ) Hepatomegaly      (  ) Jaundice     (  ) ecchymosis     EXTREMITIES:  (x  ) Normal     (  ) Rash     (  ) ecchymosis     (  ) varicose veins      (  ) pitting edema     (  ) non-pitting edema   (  ) ulceration     (  ) gangrene:     (location:     )    NERVOUS SYSTEM:    ( x ) A&Ox1-2     (  ) confused     (  ) lethargic (x) Better than yest  Moves all 4 limbs, but weak motor power     SKIN:   ( x ) No rashes or lesions     (  ) maculopapular rash     (  ) pustules     (  ) vesicles     (  ) ulcer     (  ) ecchymosis     (specify location:     )      LABS:                        11.5   11.40 )-----------( 290      ( 17 Nov 2023 07:00 )             36.6     11-17    139  |  102  |  21<H>  ----------------------------<  122<H>  4.4   |  22  |  0.8    Ca    9.3      17 Nov 2023 07:00  Mg     2.1     11-17    TPro  6.0  /  Alb  3.6  /  TBili  0.4  /  DBili  x   /  AST  30  /  ALT  <5  /  AlkPhos  51  11-17      Urinalysis Basic - ( 17 Nov 2023 07:00 )    Color: x / Appearance: x / SG: x / pH: x  Gluc: 122 mg/dL / Ketone: x  / Bili: x / Urobili: x   Blood: x / Protein: x / Nitrite: x   Leuk Esterase: x / RBC: x / WBC x   Sq Epi: x / Non Sq Epi: x / Bacteria: x                RADIOLOGY:

## 2023-11-17 NOTE — SWALLOW VFSS/MBS ASSESSMENT ADULT - DIAGNOSTIC IMPRESSIONS
severe pharyngeal dysphagia for thin and mildly thick liquids; mild pharyngeal dysphagia for moderately thick liquids, puree, and soft and bite sized consistencies

## 2023-11-17 NOTE — SWALLOW VFSS/MBS ASSESSMENT ADULT - SLP PERTINENT HISTORY OF CURRENT PROBLEM
Pt is an 84 y/o F w/ PMHx: Lewy Body dementia (baseline mRS 4-5, has been residing in a NH for 7 yrs) w/ associated autonomic dysfunction including dysphagia, hypothyroidism, who presented with encephalopathy x 1 week. Per , pt is able to have some verbal interaction at baseline, bedbound, needs help w/ all ADL's. Pt found to have hypercapnic respiratory failure on presentation, suspected 2' aspiration pneumonia. CTH showed a small left thalamic IPH. Pt seen by ID, "no ongoing bacterial PNA either clinically or radiographically, possible aspiration with chemical pneumonitis with SIRS w/ initial leucocytosis". Repeat CXR 11/16-> Unchanged low lung volumes and bibasilar opacities.

## 2023-11-17 NOTE — SWALLOW VFSS/MBS ASSESSMENT ADULT - DELAYED PHARYNGEAL TRANSIT TIME
Subjective   Kunal Vargas is a 73 y.o. male. Patient is here today for   Chief Complaint   Patient presents with   • Hyperlipidemia   • Hypothyroidism          Vitals:    03/08/18 1339   BP: 120/66   Pulse: 53   Resp: 16       The following portions of the patient's history were reviewed and updated as appropriate: allergies, current medications, past family history, past medical history, past social history, past surgical history and problem list.    Past Medical History:   Diagnosis Date   • Disease of thyroid gland    • Hyperlipidemia    • Hyperlipidemia    • Irregular heartbeat       No Known Allergies   Social History     Social History   • Marital status:      Spouse name: N/A   • Number of children: N/A   • Years of education: N/A     Occupational History   • Not on file.     Social History Main Topics   • Smoking status: Never Smoker   • Smokeless tobacco: Not on file   • Alcohol use No   • Drug use: Not on file   • Sexual activity: Defer     Other Topics Concern   • Not on file     Social History Narrative        Current Outpatient Prescriptions:   •  levothyroxine (SYNTHROID) 75 MCG tablet, Take 1 tablet by mouth Daily., Disp: 30 tablet, Rfl: 11  •  metoprolol succinate XL (TOPROL-XL) 25 MG 24 hr tablet, Take 1 tablet by mouth Daily. (Patient taking differently: Take 25 mg by mouth Every Night.), Disp: 90 tablet, Rfl: 1  •  pravastatin (PRAVACHOL) 40 MG tablet, Take 1 tablet by mouth Every Night., Disp: 90 tablet, Rfl: 0     Objective   History of Present Illness Kunal is here for lab follow-up.  He has nonobstructive coronary artery disease, hyperlipidemia, and hypothyroidism.  He feels well.  He eats very healthy and exercises on a regular basis.  He was admitted to the hospital in January with a partial small bowel obstruction that resolved without surgery.    Review of Systems   Constitutional: Negative for activity change and unexpected weight change.   Respiratory: Negative.     Cardiovascular: Negative for chest pain.   Gastrointestinal:        As in HPI   Genitourinary: Negative.    Neurological: Negative.        Physical Exam   Constitutional: He appears well-developed and well-nourished.   Neck: Carotid bruit is not present. No thyromegaly present.   Cardiovascular: Normal rate, regular rhythm and normal heart sounds.    120/45   Pulmonary/Chest: Effort normal and breath sounds normal.   Musculoskeletal: He exhibits no edema.   Neurological: He is alert.   Psychiatric: He has a normal mood and affect.   Vitals reviewed.      ASSESSMENT     Problem List Items Addressed This Visit        Cardiovascular and Mediastinum    Hyperlipidemia    Chronic coronary artery disease       Endocrine    Acquired hypothyroidism - Primary       Hematopoietic and Hemostatic    Anemia      Other Visit Diagnoses     Need for vaccination        Relevant Orders    Pneumococcal Polysaccharide Vaccine 23-Valent Greater Than or Equal To 1yo Subcutaneous / IM (Completed)          PLAN  Patient Instructions   Blood pressure is normal.  Lipids are excellent.  Thyroid-stimulating hormone level is normal.  Hepatitis C is negative.  Hemoglobin is mildly decreased at 12.9.  We'll check a Hemoccult.  Will give a Pneumovax 23 today.  You need to get a shingles vaccination as well as a TDAP.      Return in about 6 months (around 9/8/2018) for labsCBC.   Moderate Mild

## 2023-11-17 NOTE — SWALLOW VFSS/MBS ASSESSMENT ADULT - ORAL PHASE
Delayed oral transit time/Reduced anterior - posterior transport/Residue in oral cavity/Incomplete tongue to palate contact/Uncontrolled bolus / spillover in mitch-pharynx/Uncontrolled bolus / spillover in hypopharynx Residue in oral cavity/Incomplete tongue to palate contact

## 2023-11-18 PROCEDURE — 99232 SBSQ HOSP IP/OBS MODERATE 35: CPT

## 2023-11-18 RX ADMIN — Medication 1 DROP(S): at 13:13

## 2023-11-18 RX ADMIN — NYSTATIN CREAM 1 APPLICATION(S): 100000 CREAM TOPICAL at 17:25

## 2023-11-18 RX ADMIN — GABAPENTIN 600 MILLIGRAM(S): 400 CAPSULE ORAL at 13:13

## 2023-11-18 RX ADMIN — Medication 500 MILLIGRAM(S): at 11:05

## 2023-11-18 RX ADMIN — Medication 1 DROP(S): at 21:39

## 2023-11-18 RX ADMIN — Medication 50 MICROGRAM(S): at 06:50

## 2023-11-18 RX ADMIN — SENNA PLUS 2 TABLET(S): 8.6 TABLET ORAL at 21:38

## 2023-11-18 RX ADMIN — HEPARIN SODIUM 5000 UNIT(S): 5000 INJECTION INTRAVENOUS; SUBCUTANEOUS at 17:25

## 2023-11-18 RX ADMIN — GABAPENTIN 600 MILLIGRAM(S): 400 CAPSULE ORAL at 21:39

## 2023-11-18 RX ADMIN — LATANOPROST 1 DROP(S): 0.05 SOLUTION/ DROPS OPHTHALMIC; TOPICAL at 21:38

## 2023-11-18 RX ADMIN — CARBIDOPA AND LEVODOPA 1 TABLET(S): 25; 100 TABLET ORAL at 17:25

## 2023-11-18 RX ADMIN — FAMOTIDINE 20 MILLIGRAM(S): 10 INJECTION INTRAVENOUS at 11:05

## 2023-11-18 RX ADMIN — HEPARIN SODIUM 5000 UNIT(S): 5000 INJECTION INTRAVENOUS; SUBCUTANEOUS at 06:52

## 2023-11-18 RX ADMIN — GABAPENTIN 600 MILLIGRAM(S): 400 CAPSULE ORAL at 06:50

## 2023-11-18 RX ADMIN — CARBIDOPA AND LEVODOPA 1 TABLET(S): 25; 100 TABLET ORAL at 06:50

## 2023-11-18 RX ADMIN — POLYETHYLENE GLYCOL 3350 17 GRAM(S): 17 POWDER, FOR SOLUTION ORAL at 11:05

## 2023-11-18 RX ADMIN — Medication 1 DROP(S): at 06:51

## 2023-11-18 RX ADMIN — NYSTATIN CREAM 1 APPLICATION(S): 100000 CREAM TOPICAL at 06:51

## 2023-11-18 NOTE — PROGRESS NOTE ADULT - ASSESSMENT
83-year-old female with past medical history of Lewy body dementia, Parkinson disease who was brought in from [facility] for AMS.  Per , pt is AAOX2 and able to have a normal conversation at baseline , but patient has been having worsening confusion for the past week. Pt's  also says that she has been having a cough for the past week as well, not sure if productive. Unable to get rest of hx given pts mental status. Diagnosed with UTI, respiratory failure s/p pneumonia (??) and ICH    #Encephalopathy  - CTH: New small intraparenchymal hemorrhage in the left thalamus since the prior CT head from 12/24/2022. No acute territorial infarct or midline shift. Stable severe chronic microvascular ischemic changes.  - s/p eval by neurocrit and neurosurgery in ED. --> no acute intervention currently.   - repeat CTH No changes, pt endorsing headache 11/14 AM, repeat CTH no acute changes  - 11/11 rEEG: no electrographic seizures or significant clinical events  - Keep SBP from 110-140  - neuro following  - 11/12 CTA no evidence of flow-limiting stenosis, occlusion, or aneurysm   - f/u MRI brain with/without - was canceled as patient refused  - c/w home sinemet  - Dr Lucero ordered a brain CT to rule out bleed progression (slight memory worsening): regression of bleed from 10 to 8mm  - hydralazine 5mg q6 PRN for SBP >140 and HR <70, labetalol 100mg BID PNG    #Acute hypoxic and hypercapnic respiratory failure secondary to chemical pneumonitis vs IPH  - as per , pt confused for the past week; normally able to hold conversation but no longer can  - wbc ~17k, afebrile otherwise  - CXR with LLL opacity  - vbg pH 7.09, pCO2 72, placed on bipap  - s/p aztreonam/azithromycin in ED; stopped per ID  - procal -ve, MRSA nares -ve  - UA negative; cultures +ve for enterococcus faecalis, urine legionella -ve  - speech and swallow following: MBS done today --> soft and bite-sized diet, mildly thick fluids, 1:1 assistance, drinking w/o straws, small sips 5cc. Leave NGT for now. Tolerating feeds for now      #Urinary Retention - resolved  - s/p TOV 11/3    #Hypothyroidism  - continue home levothyroxine  - TSH 1.90    #Lewy body dementia  #Parkinsons Disease  - continue home carbidopa-levodopa                                                                              ----------------------------------------------------  # DVT prophylaxis: heparin SQ  # GI prophylaxis: Pepcid  # Diet: soft and bite-sized diet, mildly thick fluids, 1:1 assistance, drinking w/o straws, small sips 5cc  # Activity: Bed Bound  # Code status: FULL CODE  # Disposition: from NH    Pending: possible dc on monday

## 2023-11-18 NOTE — PROGRESS NOTE ADULT - ATTENDING COMMENTS
Pt is a 84 yo F with PMHx of Lewy Body dementia (baseline mRS 4-5, has been residing in a NH for 7 yrs) with associated autonomic dysfunction including dysphagia, hypothyroidism, who presented with encephalopathy x 1 week. Per pt's , she is able to have some verbal interaction at baseline, bedbound, needs help with all ADL's. Found to have hypercapnic respiratory failure on presentation, suspected 2/2 aspiration pneumonia. CT head also showed a small left thalamic IPH (ICH score 1 for GCS).     CT head stable today, may start DVT ppx  CTA head/neck pending  MRI brain with/without   Continue home sinemet  SBP goal 100-140, Q4 neurochecks .
83-year-old female with PMHx of Lewy Body dementia with associated autonomic dysfunction including dysphagia, hypothyroidism, who presented from NH  with encephalopathy x 1 week. Admitted to SDU due to small IPH for neurochecks    GENERAL: NAD, lying in bed comfortably  CHEST/LUNG: Clear to auscultation bilaterally; No rales, rhonchi, wheezing, or rubs. Unlabored respirations  HEART: Regular rate and rhythm; No murmurs, rubs, or gallops  ABDOMEN: Bowel sounds present; Soft, Nontender, Nondistended.   EXTREMITIES:  noedema  NERVOUS SYSTEM:  Alert & Oriented X1. sleeping    Metabolic Encephalopathy likely due to suspected IPH  Parkinson's disease with Lewy body dementia  Functional quadriplegia  11/16-according to  patient with worsening mental status, urgent head CT showed regression of IPH  serial CTH with stable IPH  patient could not tolerate MRI,  not agreeable to any kind of sedation  REEG no seizures  s/p NCCU and Neurosx evals, no acute intervention     Acute Hypercapnic respiratory failure - resolved  Dysphagia  suspected aspiration/chemical pneumonitis  currently no evidence of infection, off antibiotcs, ID following  richardson removed 11/13, monitor I&Os, bladder scan   -speech and swallow eval appreciated  -PT currently on NGT  -MBS/FEES ordered -> advance diet as per speech. NGT removal if eating.     Hypothyroidism - TSH 1.90 . c/w synthroid.     dry eyes - artificial tears     Pending: advancment of diet. If adequet nutrition can be dc back to LTC
83-year-old female with PMHx of Lewy Body dementia with associated autonomic dysfunction including dysphagia, hypothyroidism, who presented from NH  with encephalopathy x 1 week. Admitted to SDU due to small IPH for neurochecks    GENERAL: NAD, lying in bed comfortably  CHEST/LUNG: Clear to auscultation bilaterally; No rales, rhonchi, wheezing, or rubs. Unlabored respirations  HEART: Regular rate and rhythm; No murmurs, rubs, or gallops  ABDOMEN: Bowel sounds present; Soft, Nontender, Nondistended.   EXTREMITIES:  noedema  NERVOUS SYSTEM:  Alert & Oriented X1. sleeping    Metabolic Encephalopathy likely due to suspected IPH  Parkinson's disease with Lewy body dementia  Functional quadriplegia  11/16-according to  patient with worsening mental status, urgent head CT showed regression of IPH  serial CTH with stable IPH  patient could not tolerate MRI,  not agreeable to any kind of sedation  REEG no seizures  s/p NCCU and Neurosx evals, no acute intervention     Acute Hypercapnic respiratory failure - resolved  Dysphagia  suspected aspiration/chemical pneumonitis  currently no evidence of infection, off antibiotcs, ID following  richardson removed 11/13, monitor I&Os, bladder scan   -speech and swallow eval appreciated  -remove ngt  -MBS/FEES ordered -> advance diet as per speech.     Hypothyroidism - TSH 1.90 . c/w synthroid.     dry eyes - artificial tears     Pending: dc pending to [facility]. likely monday
My note supersedes all residents notes that I sign, My correction for their notes are in my notes   the patient is more awake today , her  at bedside   On exam  General: more awake, alert, slow speech but said her name NAD, chronic ill appearance  Lungs:  clear to ausculation b/l, normal resp effort  Heart: regular rhythm   Abdomen: soft, non tender non distended  Ext: no edema, can move all  his extremities     83-year-old female with PMHx of Lewy Body dementia with associated autonomic dysfunction including dysphagia, hypothyroidism, who presented FROM NH  with encephalopathy x 1 week.    []Encephalopathy could be TME  hypercapnic respiratory failure suspected 2/2 aspiration pneumonia or chemical pneumonitis vs IPH     - CTH: New small intraparenchymal hemorrhage in the left thalamus since the prior CT head from 12/24/2022. No acute territorial infarct or midline shift. Stable severe chronic microvascular ischemic changes.  - s/p eval by neurocrit and neurosurgery in ED. --> no acute intervention currently.   - repeat CTH No changes   Repeat CTH 11/12 Stable left thalamic intracranial hemorrhage.  - REEG No electrographic seizures or significant clinical events  - Keep SBP from 110-140 as per the patient  her blood pressure basline is on the lower side   - neuro on board   CTA head  No evidence of flow-limiting stenosis, occlusion or aneurysm.  CTA NECK: No evidence of carotid or vertebral artery stenosis.  MRI brain with/without pending   Continue home sinemet  SBP goal 100-140, Q4 neurochecks       Acute hypoxic and hypercapnic respiratory failure could be 2/2 Left PNA vs Possible aspiration with chemical pneumonitis  - patient has WBC 17K with temperature of 100.1 in ED.   - CXR b/l bilateral opacities  - patient clinically does not look fluid overloaded.   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   - f/u BCx,, No pyuria,  urine culture enterococcus   ID input appreciated , monitor off abcx, Possible aspiration with chemical pneumonitis  - hold  aztreonam and azithromycin  TOV and bladder scan   procalcitonin negative , urin strep negative and RVP negative   wbc improving    urine legionella negative  -on NC  ,off biapap   - speech and swallow on baord , c/w  NGT feeding for now   - aspiration precaution.  nutrition consult   echo noted  EF of 60%.  mild mitral stenosis  Mild aortic valve stenosis  pro-  hold lasix     Hypothyroidism - TSH 1.90 . c/w synthroid.     Parkinson disease with Lewy body dementia - c/w home med. ./ neuro on board      DVT ppx: SCD- may start DVT ppx as per neuro so will start heparin sq       skin care  frequent turning, off loading,and positioning and skin care as per protocol, Maintain pressure injury prevention, Keep skin clean, Offload heels, Monitor  for wounds and notify provider if any     GI ppx:  pepcid  Diet:  Speech and swallow f/u , NGT for now while off BiPAP       PENDING:  , MRI brain , neurochecks , SLP, TOV , monitor off abcx
Counseled staff about diagnostic testing and treatment plan. All questions answered. Abnormal lab/radiographical/microbiological data reviewed.

## 2023-11-18 NOTE — PROGRESS NOTE ADULT - SUBJECTIVE AND OBJECTIVE BOX
24H events:    Patient is a 83y old Female who presents with a chief complaint of ams (17 Nov 2023 11:15)    Primary diagnosis of Confusion  Today is hospital day 8d. This morning patient was seen and examined at bedside, resting comfortably in bed.    No acute or major events overnight.    Code Status: Full      PAST MEDICAL & SURGICAL HISTORY  Dementia    History of breast cancer    Constipation    Lewy body dementia without behavioral disturbance    Colon polyp    History of colon resection    H/O mastectomy, right      SOCIAL HISTORY:  Social History:  Former smoker (10 Nov 2023 23:44)      ALLERGIES:  Originally Entered as [Unknown] reaction to [neuroleptics] (Unknown)  Originally Entered as [Unknown] reaction to [pepper] (Unknown)  Originally Entered as [Unknown] reaction to [red pepper] (Unknown)  penicillin (Anaphylaxis)  benzodiazepines (Unknown)  ciprofloxacin (Unknown)  Originally Entered as [Unknown] reaction to [green pepper] (Unknown)  antichlolinergics (Unknown)    MEDICATIONS:  STANDING MEDICATIONS  artificial  tears Solution 1 Drop(s) Both EYES three times a day  ascorbic acid 500 milliGRAM(s) Oral daily  carbidopa/levodopa  25/100 1 Tablet(s) Oral <User Schedule>  chlorhexidine 2% Cloths 1 Application(s) Topical daily  famotidine    Tablet 20 milliGRAM(s) Oral daily  gabapentin Solution 600 milliGRAM(s) Oral three times a day  heparin   Injectable 5000 Unit(s) SubCutaneous every 12 hours  latanoprost 0.005% Ophthalmic Solution 1 Drop(s) Both EYES at bedtime  levothyroxine 50 MICROGram(s) Oral daily  nystatin Powder 1 Application(s) Topical two times a day  polyethylene glycol 3350 17 Gram(s) Oral daily  senna 2 Tablet(s) Oral at bedtime    PRN MEDICATIONS  aluminum hydroxide/magnesium hydroxide/simethicone Suspension 30 milliLiter(s) Oral every 4 hours PRN    VITALS:   T(F): 97.9  HR: 90  BP: 115/59  RR: 18  SpO2: --    PHYSICAL EXAM:  GENERAL:   ( x ) NAD, lying in bed comfortably     (  ) obtunded     (  ) lethargic     (  ) somnolent    HEAD:   ( x ) Atraumatic     (  ) hematoma     (  ) laceration (specify location:       )     NECK:  ( x ) Supple     (  ) neck stiffness     (  ) nuchal rigidity     ( x )  no JVD     (  ) JVD present ( -- cm)    HEART:  Rate -->     ( x ) normal rate     (  ) bradycardic     (  ) tachycardic  Rhythm -->     ( x ) regular     (  ) regularly irregular     (  ) irregularly irregular  Murmurs -->     ( x ) normal s1s2     (  ) systolic murmur     (  ) diastolic murmur     (  ) continuous murmur      (  ) S3 present     (  ) S4 present    LUNGS:   ( x )Unlabored respirations     (  ) tachypnea  ( x ) B/L air entry     (  ) decreased breath sounds in:  (location     )    ( x ) no adventitious sound     (  ) crackles     (  ) wheezing      (  ) rhonchi      (specify location:       )  (  ) chest wall tenderness (specify location:       )    ABDOMEN:   ( x ) Soft     (  ) tense   |   (  ) nondistended     ( x ) distended   |   ( x ) +BS     (  ) hypoactive bowel sounds     (  ) hyperactive bowel sounds  ( x ) nontender     (  ) RUQ tenderness     (  ) RLQ tenderness     (  ) LLQ tenderness     (  ) epigastric tenderness     (  ) diffuse tenderness  (  ) Splenomegaly      (  ) Hepatomegaly      (  ) Jaundice     (  ) ecchymosis     EXTREMITIES:  (x  ) Normal     (  ) Rash     (  ) ecchymosis     (  ) varicose veins      (  ) pitting edema     (  ) non-pitting edema   (  ) ulceration     (  ) gangrene:     (location:     )    NERVOUS SYSTEM:    ( x ) A&Ox1-2     (  ) confused     (  ) lethargic (x) Better than yest  Moves all 4 limbs, but weak motor power     SKIN:   ( x ) No rashes or lesions     (  ) maculopapular rash     (  ) pustules     (  ) vesicles     (  ) ulcer     (  ) ecchymosis     (specify location:     )    LABS:                        11.5   11.40 )-----------( 290      ( 17 Nov 2023 07:00 )             36.6     11-17    139  |  102  |  21<H>  ----------------------------<  122<H>  4.4   |  22  |  0.8    Ca    9.3      17 Nov 2023 07:00  Mg     2.1     11-17    TPro  6.0  /  Alb  3.6  /  TBili  0.4  /  DBili  x   /  AST  30  /  ALT  <5  /  AlkPhos  51  11-17      Urinalysis Basic - ( 17 Nov 2023 07:00 )    Color: x / Appearance: x / SG: x / pH: x  Gluc: 122 mg/dL / Ketone: x  / Bili: x / Urobili: x   Blood: x / Protein: x / Nitrite: x   Leuk Esterase: x / RBC: x / WBC x   Sq Epi: x / Non Sq Epi: x / Bacteria: x                RADIOLOGY:

## 2023-11-19 LAB
ALBUMIN SERPL ELPH-MCNC: 3.5 G/DL — SIGNIFICANT CHANGE UP (ref 3.5–5.2)
ALBUMIN SERPL ELPH-MCNC: 3.5 G/DL — SIGNIFICANT CHANGE UP (ref 3.5–5.2)
ALP SERPL-CCNC: 48 U/L — SIGNIFICANT CHANGE UP (ref 30–115)
ALP SERPL-CCNC: 48 U/L — SIGNIFICANT CHANGE UP (ref 30–115)
ALT FLD-CCNC: 8 U/L — SIGNIFICANT CHANGE UP (ref 0–41)
ALT FLD-CCNC: 8 U/L — SIGNIFICANT CHANGE UP (ref 0–41)
ANION GAP SERPL CALC-SCNC: 14 MMOL/L — SIGNIFICANT CHANGE UP (ref 7–14)
ANION GAP SERPL CALC-SCNC: 14 MMOL/L — SIGNIFICANT CHANGE UP (ref 7–14)
AST SERPL-CCNC: 25 U/L — SIGNIFICANT CHANGE UP (ref 0–41)
AST SERPL-CCNC: 25 U/L — SIGNIFICANT CHANGE UP (ref 0–41)
BASOPHILS # BLD AUTO: 0.08 K/UL — SIGNIFICANT CHANGE UP (ref 0–0.2)
BASOPHILS # BLD AUTO: 0.08 K/UL — SIGNIFICANT CHANGE UP (ref 0–0.2)
BASOPHILS NFR BLD AUTO: 0.7 % — SIGNIFICANT CHANGE UP (ref 0–1)
BASOPHILS NFR BLD AUTO: 0.7 % — SIGNIFICANT CHANGE UP (ref 0–1)
BILIRUB SERPL-MCNC: 0.3 MG/DL — SIGNIFICANT CHANGE UP (ref 0.2–1.2)
BILIRUB SERPL-MCNC: 0.3 MG/DL — SIGNIFICANT CHANGE UP (ref 0.2–1.2)
BUN SERPL-MCNC: 26 MG/DL — HIGH (ref 10–20)
BUN SERPL-MCNC: 26 MG/DL — HIGH (ref 10–20)
CALCIUM SERPL-MCNC: 9.2 MG/DL — SIGNIFICANT CHANGE UP (ref 8.4–10.4)
CALCIUM SERPL-MCNC: 9.2 MG/DL — SIGNIFICANT CHANGE UP (ref 8.4–10.4)
CHLORIDE SERPL-SCNC: 106 MMOL/L — SIGNIFICANT CHANGE UP (ref 98–110)
CHLORIDE SERPL-SCNC: 106 MMOL/L — SIGNIFICANT CHANGE UP (ref 98–110)
CO2 SERPL-SCNC: 21 MMOL/L — SIGNIFICANT CHANGE UP (ref 17–32)
CO2 SERPL-SCNC: 21 MMOL/L — SIGNIFICANT CHANGE UP (ref 17–32)
CREAT SERPL-MCNC: 0.9 MG/DL — SIGNIFICANT CHANGE UP (ref 0.7–1.5)
CREAT SERPL-MCNC: 0.9 MG/DL — SIGNIFICANT CHANGE UP (ref 0.7–1.5)
EGFR: 64 ML/MIN/1.73M2 — SIGNIFICANT CHANGE UP
EGFR: 64 ML/MIN/1.73M2 — SIGNIFICANT CHANGE UP
EOSINOPHIL # BLD AUTO: 0.41 K/UL — SIGNIFICANT CHANGE UP (ref 0–0.7)
EOSINOPHIL # BLD AUTO: 0.41 K/UL — SIGNIFICANT CHANGE UP (ref 0–0.7)
EOSINOPHIL NFR BLD AUTO: 3.6 % — SIGNIFICANT CHANGE UP (ref 0–8)
EOSINOPHIL NFR BLD AUTO: 3.6 % — SIGNIFICANT CHANGE UP (ref 0–8)
GLUCOSE SERPL-MCNC: 99 MG/DL — SIGNIFICANT CHANGE UP (ref 70–99)
GLUCOSE SERPL-MCNC: 99 MG/DL — SIGNIFICANT CHANGE UP (ref 70–99)
HCT VFR BLD CALC: 35.5 % — LOW (ref 37–47)
HCT VFR BLD CALC: 35.5 % — LOW (ref 37–47)
HGB BLD-MCNC: 11 G/DL — LOW (ref 12–16)
HGB BLD-MCNC: 11 G/DL — LOW (ref 12–16)
IMM GRANULOCYTES NFR BLD AUTO: 0.9 % — HIGH (ref 0.1–0.3)
IMM GRANULOCYTES NFR BLD AUTO: 0.9 % — HIGH (ref 0.1–0.3)
LYMPHOCYTES # BLD AUTO: 28.5 % — SIGNIFICANT CHANGE UP (ref 20.5–51.1)
LYMPHOCYTES # BLD AUTO: 28.5 % — SIGNIFICANT CHANGE UP (ref 20.5–51.1)
LYMPHOCYTES # BLD AUTO: 3.25 K/UL — SIGNIFICANT CHANGE UP (ref 1.2–3.4)
LYMPHOCYTES # BLD AUTO: 3.25 K/UL — SIGNIFICANT CHANGE UP (ref 1.2–3.4)
MAGNESIUM SERPL-MCNC: 2 MG/DL — SIGNIFICANT CHANGE UP (ref 1.8–2.4)
MAGNESIUM SERPL-MCNC: 2 MG/DL — SIGNIFICANT CHANGE UP (ref 1.8–2.4)
MCHC RBC-ENTMCNC: 30.4 PG — SIGNIFICANT CHANGE UP (ref 27–31)
MCHC RBC-ENTMCNC: 30.4 PG — SIGNIFICANT CHANGE UP (ref 27–31)
MCHC RBC-ENTMCNC: 31 G/DL — LOW (ref 32–37)
MCHC RBC-ENTMCNC: 31 G/DL — LOW (ref 32–37)
MCV RBC AUTO: 98.1 FL — SIGNIFICANT CHANGE UP (ref 81–99)
MCV RBC AUTO: 98.1 FL — SIGNIFICANT CHANGE UP (ref 81–99)
MONOCYTES # BLD AUTO: 0.78 K/UL — HIGH (ref 0.1–0.6)
MONOCYTES # BLD AUTO: 0.78 K/UL — HIGH (ref 0.1–0.6)
MONOCYTES NFR BLD AUTO: 6.8 % — SIGNIFICANT CHANGE UP (ref 1.7–9.3)
MONOCYTES NFR BLD AUTO: 6.8 % — SIGNIFICANT CHANGE UP (ref 1.7–9.3)
NEUTROPHILS # BLD AUTO: 6.78 K/UL — HIGH (ref 1.4–6.5)
NEUTROPHILS # BLD AUTO: 6.78 K/UL — HIGH (ref 1.4–6.5)
NEUTROPHILS NFR BLD AUTO: 59.5 % — SIGNIFICANT CHANGE UP (ref 42.2–75.2)
NEUTROPHILS NFR BLD AUTO: 59.5 % — SIGNIFICANT CHANGE UP (ref 42.2–75.2)
NRBC # BLD: 0 /100 WBCS — SIGNIFICANT CHANGE UP (ref 0–0)
NRBC # BLD: 0 /100 WBCS — SIGNIFICANT CHANGE UP (ref 0–0)
PLATELET # BLD AUTO: 343 K/UL — SIGNIFICANT CHANGE UP (ref 130–400)
PLATELET # BLD AUTO: 343 K/UL — SIGNIFICANT CHANGE UP (ref 130–400)
PMV BLD: 9.1 FL — SIGNIFICANT CHANGE UP (ref 7.4–10.4)
PMV BLD: 9.1 FL — SIGNIFICANT CHANGE UP (ref 7.4–10.4)
POTASSIUM SERPL-MCNC: 4.3 MMOL/L — SIGNIFICANT CHANGE UP (ref 3.5–5)
POTASSIUM SERPL-MCNC: 4.3 MMOL/L — SIGNIFICANT CHANGE UP (ref 3.5–5)
POTASSIUM SERPL-SCNC: 4.3 MMOL/L — SIGNIFICANT CHANGE UP (ref 3.5–5)
POTASSIUM SERPL-SCNC: 4.3 MMOL/L — SIGNIFICANT CHANGE UP (ref 3.5–5)
PROT SERPL-MCNC: 5.9 G/DL — LOW (ref 6–8)
PROT SERPL-MCNC: 5.9 G/DL — LOW (ref 6–8)
RBC # BLD: 3.62 M/UL — LOW (ref 4.2–5.4)
RBC # BLD: 3.62 M/UL — LOW (ref 4.2–5.4)
RBC # FLD: 15 % — HIGH (ref 11.5–14.5)
RBC # FLD: 15 % — HIGH (ref 11.5–14.5)
SARS-COV-2 RNA SPEC QL NAA+PROBE: SIGNIFICANT CHANGE UP
SARS-COV-2 RNA SPEC QL NAA+PROBE: SIGNIFICANT CHANGE UP
SODIUM SERPL-SCNC: 141 MMOL/L — SIGNIFICANT CHANGE UP (ref 135–146)
SODIUM SERPL-SCNC: 141 MMOL/L — SIGNIFICANT CHANGE UP (ref 135–146)
WBC # BLD: 11.4 K/UL — HIGH (ref 4.8–10.8)
WBC # BLD: 11.4 K/UL — HIGH (ref 4.8–10.8)
WBC # FLD AUTO: 11.4 K/UL — HIGH (ref 4.8–10.8)
WBC # FLD AUTO: 11.4 K/UL — HIGH (ref 4.8–10.8)

## 2023-11-19 PROCEDURE — 99232 SBSQ HOSP IP/OBS MODERATE 35: CPT

## 2023-11-19 RX ADMIN — Medication 1 DROP(S): at 06:52

## 2023-11-19 RX ADMIN — GABAPENTIN 600 MILLIGRAM(S): 400 CAPSULE ORAL at 14:18

## 2023-11-19 RX ADMIN — GABAPENTIN 600 MILLIGRAM(S): 400 CAPSULE ORAL at 06:52

## 2023-11-19 RX ADMIN — NYSTATIN CREAM 1 APPLICATION(S): 100000 CREAM TOPICAL at 17:11

## 2023-11-19 RX ADMIN — Medication 1 DROP(S): at 22:07

## 2023-11-19 RX ADMIN — FAMOTIDINE 20 MILLIGRAM(S): 10 INJECTION INTRAVENOUS at 12:43

## 2023-11-19 RX ADMIN — Medication 500 MILLIGRAM(S): at 12:43

## 2023-11-19 RX ADMIN — GABAPENTIN 600 MILLIGRAM(S): 400 CAPSULE ORAL at 22:07

## 2023-11-19 RX ADMIN — CHLORHEXIDINE GLUCONATE 1 APPLICATION(S): 213 SOLUTION TOPICAL at 12:44

## 2023-11-19 RX ADMIN — CARBIDOPA AND LEVODOPA 1 TABLET(S): 25; 100 TABLET ORAL at 06:52

## 2023-11-19 RX ADMIN — Medication 1 DROP(S): at 14:01

## 2023-11-19 RX ADMIN — SENNA PLUS 2 TABLET(S): 8.6 TABLET ORAL at 22:06

## 2023-11-19 RX ADMIN — LATANOPROST 1 DROP(S): 0.05 SOLUTION/ DROPS OPHTHALMIC; TOPICAL at 22:07

## 2023-11-19 RX ADMIN — HEPARIN SODIUM 5000 UNIT(S): 5000 INJECTION INTRAVENOUS; SUBCUTANEOUS at 17:10

## 2023-11-19 RX ADMIN — Medication 50 MICROGRAM(S): at 06:52

## 2023-11-19 RX ADMIN — CARBIDOPA AND LEVODOPA 1 TABLET(S): 25; 100 TABLET ORAL at 17:10

## 2023-11-19 RX ADMIN — POLYETHYLENE GLYCOL 3350 17 GRAM(S): 17 POWDER, FOR SOLUTION ORAL at 14:19

## 2023-11-19 RX ADMIN — NYSTATIN CREAM 1 APPLICATION(S): 100000 CREAM TOPICAL at 06:52

## 2023-11-19 RX ADMIN — HEPARIN SODIUM 5000 UNIT(S): 5000 INJECTION INTRAVENOUS; SUBCUTANEOUS at 06:52

## 2023-11-19 NOTE — PROGRESS NOTE ADULT - PROVIDER SPECIALTY LIST ADULT
Infectious Disease
Internal Medicine
Neurology
Internal Medicine
Hospitalist
Hospitalist
Internal Medicine
Hospitalist
Internal Medicine

## 2023-11-19 NOTE — PROGRESS NOTE ADULT - SUBJECTIVE AND OBJECTIVE BOX
SUBJECTIVE:    Patient is a 83y old Female who presents with a chief complaint of ams (18 Nov 2023 11:56)    Currently admitted to medicine with the primary diagnosis of: AMS    Today is hospital day 9d.     Overnight Events:     patient eating now. no acute events.     PAST MEDICAL & SURGICAL HISTORY  Dementia    History of breast cancer    Constipation    Lewy body dementia without behavioral disturbance    Colon polyp    History of colon resection    H/O mastectomy, right    ALLERGIES:  Originally Entered as [Unknown] reaction to [neuroleptics] (Unknown)  Originally Entered as [Unknown] reaction to [pepper] (Unknown)  Originally Entered as [Unknown] reaction to [red pepper] (Unknown)  penicillin (Anaphylaxis)  benzodiazepines (Unknown)  ciprofloxacin (Unknown)  Originally Entered as [Unknown] reaction to [green pepper] (Unknown)  antichlolinergics (Unknown)    MEDICATIONS:  STANDING MEDICATIONS  artificial  tears Solution 1 Drop(s) Both EYES three times a day  ascorbic acid 500 milliGRAM(s) Oral daily  carbidopa/levodopa  25/100 1 Tablet(s) Oral <User Schedule>  chlorhexidine 2% Cloths 1 Application(s) Topical daily  famotidine    Tablet 20 milliGRAM(s) Oral daily  gabapentin Solution 600 milliGRAM(s) Oral three times a day  heparin   Injectable 5000 Unit(s) SubCutaneous every 12 hours  latanoprost 0.005% Ophthalmic Solution 1 Drop(s) Both EYES at bedtime  levothyroxine 50 MICROGram(s) Oral daily  nystatin Powder 1 Application(s) Topical two times a day  polyethylene glycol 3350 17 Gram(s) Oral daily  senna 2 Tablet(s) Oral at bedtime    PRN MEDICATIONS  aluminum hydroxide/magnesium hydroxide/simethicone Suspension 30 milliLiter(s) Oral every 4 hours PRN    VITALS:   ICU Vital Signs Last 24 Hrs  T(C): 35.6 (19 Nov 2023 07:14), Max: 36 (18 Nov 2023 15:35)  T(F): 96 (19 Nov 2023 07:14), Max: 96.8 (18 Nov 2023 15:35)  HR: 89 (19 Nov 2023 07:14) (89 - 94)  BP: 112/64 (19 Nov 2023 07:14) (112/64 - 124/64)  RR: 18 (19 Nov 2023 07:14) (18 - 18)  SpO2: 98% (19 Nov 2023 01:02) (97% - 98%)    O2 Parameters below as of 18 Nov 2023 15:35  Patient On (Oxygen Delivery Method): room ai      LABS:                        11.0   11.40 )-----------( 343      ( 19 Nov 2023 07:00 )             35.5                           RADIOLOGY:    PHYSICAL EXAM:  GENERAL: NAD, lying in bed comfortably  CHEST/LUNG: Clear to auscultation bilaterally; No rales, rhonchi, wheezing, or rubs. Unlabored respirations  HEART: Regular rate and rhythm; No murmurs, rubs, or gallops  ABDOMEN: Bowel sounds present; Soft, Nontender, Nondistended.   EXTREMITIES:  noedema  NERVOUS SYSTEM:  Alert & Oriented X1.

## 2023-11-19 NOTE — PROGRESS NOTE ADULT - ASSESSMENT
83-year-old female with PMHx of Lewy Body dementia with associated autonomic dysfunction including dysphagia, hypothyroidism, who presented from NH  with encephalopathy x 1 week. Admitted to SDU due to small IPH for neurochecks    #Metabolic Encephalopathy likely due to suspected IPH  #Parkinson's disease with Lewy body dementia  #Functional quadriplegia  serial CTH with stable IPH  patient could not tolerate MRI,  not agreeable to any kind of sedation  REEG no seizures  NCCU and Neurosx evals, no acute intervention     #Acute Hypercapnic respiratory failure - resolved  #Dysphagia- improving   #suspected aspiration/chemical pneumonitis  - currently no evidence of infection, off antibiotcs,   -speech and swallow eval appreciated  -remove ngt  -MBS/FEES completed -> advanced diet as per speech.   - ID following    Hypothyroidism - TSH 1.90 . c/w synthroid.     dry eyes - artificial tears     Pending: dc pending to [facility]. likely Monday .

## 2023-11-20 ENCOUNTER — TRANSCRIPTION ENCOUNTER (OUTPATIENT)
Age: 83
End: 2023-11-20

## 2023-11-20 VITALS
DIASTOLIC BLOOD PRESSURE: 71 MMHG | RESPIRATION RATE: 20 BRPM | HEART RATE: 97 BPM | TEMPERATURE: 98 F | SYSTOLIC BLOOD PRESSURE: 140 MMHG

## 2023-11-20 LAB
ALBUMIN SERPL ELPH-MCNC: 3.7 G/DL — SIGNIFICANT CHANGE UP (ref 3.5–5.2)
ALBUMIN SERPL ELPH-MCNC: 3.7 G/DL — SIGNIFICANT CHANGE UP (ref 3.5–5.2)
ALP SERPL-CCNC: 50 U/L — SIGNIFICANT CHANGE UP (ref 30–115)
ALP SERPL-CCNC: 50 U/L — SIGNIFICANT CHANGE UP (ref 30–115)
ALT FLD-CCNC: 9 U/L — SIGNIFICANT CHANGE UP (ref 0–41)
ALT FLD-CCNC: 9 U/L — SIGNIFICANT CHANGE UP (ref 0–41)
ANION GAP SERPL CALC-SCNC: 11 MMOL/L — SIGNIFICANT CHANGE UP (ref 7–14)
ANION GAP SERPL CALC-SCNC: 11 MMOL/L — SIGNIFICANT CHANGE UP (ref 7–14)
AST SERPL-CCNC: 23 U/L — SIGNIFICANT CHANGE UP (ref 0–41)
AST SERPL-CCNC: 23 U/L — SIGNIFICANT CHANGE UP (ref 0–41)
BASOPHILS # BLD AUTO: 0.05 K/UL — SIGNIFICANT CHANGE UP (ref 0–0.2)
BASOPHILS # BLD AUTO: 0.05 K/UL — SIGNIFICANT CHANGE UP (ref 0–0.2)
BASOPHILS NFR BLD AUTO: 0.4 % — SIGNIFICANT CHANGE UP (ref 0–1)
BASOPHILS NFR BLD AUTO: 0.4 % — SIGNIFICANT CHANGE UP (ref 0–1)
BILIRUB SERPL-MCNC: 0.4 MG/DL — SIGNIFICANT CHANGE UP (ref 0.2–1.2)
BILIRUB SERPL-MCNC: 0.4 MG/DL — SIGNIFICANT CHANGE UP (ref 0.2–1.2)
BUN SERPL-MCNC: 29 MG/DL — HIGH (ref 10–20)
BUN SERPL-MCNC: 29 MG/DL — HIGH (ref 10–20)
CALCIUM SERPL-MCNC: 8.9 MG/DL — SIGNIFICANT CHANGE UP (ref 8.4–10.4)
CALCIUM SERPL-MCNC: 8.9 MG/DL — SIGNIFICANT CHANGE UP (ref 8.4–10.4)
CHLORIDE SERPL-SCNC: 107 MMOL/L — SIGNIFICANT CHANGE UP (ref 98–110)
CHLORIDE SERPL-SCNC: 107 MMOL/L — SIGNIFICANT CHANGE UP (ref 98–110)
CO2 SERPL-SCNC: 22 MMOL/L — SIGNIFICANT CHANGE UP (ref 17–32)
CO2 SERPL-SCNC: 22 MMOL/L — SIGNIFICANT CHANGE UP (ref 17–32)
CREAT SERPL-MCNC: 0.8 MG/DL — SIGNIFICANT CHANGE UP (ref 0.7–1.5)
CREAT SERPL-MCNC: 0.8 MG/DL — SIGNIFICANT CHANGE UP (ref 0.7–1.5)
EGFR: 74 ML/MIN/1.73M2 — SIGNIFICANT CHANGE UP
EGFR: 74 ML/MIN/1.73M2 — SIGNIFICANT CHANGE UP
EOSINOPHIL # BLD AUTO: 0.34 K/UL — SIGNIFICANT CHANGE UP (ref 0–0.7)
EOSINOPHIL # BLD AUTO: 0.34 K/UL — SIGNIFICANT CHANGE UP (ref 0–0.7)
EOSINOPHIL NFR BLD AUTO: 3 % — SIGNIFICANT CHANGE UP (ref 0–8)
EOSINOPHIL NFR BLD AUTO: 3 % — SIGNIFICANT CHANGE UP (ref 0–8)
GLUCOSE SERPL-MCNC: 97 MG/DL — SIGNIFICANT CHANGE UP (ref 70–99)
GLUCOSE SERPL-MCNC: 97 MG/DL — SIGNIFICANT CHANGE UP (ref 70–99)
HCT VFR BLD CALC: 35.6 % — LOW (ref 37–47)
HCT VFR BLD CALC: 35.6 % — LOW (ref 37–47)
HGB BLD-MCNC: 11.2 G/DL — LOW (ref 12–16)
HGB BLD-MCNC: 11.2 G/DL — LOW (ref 12–16)
IMM GRANULOCYTES NFR BLD AUTO: 1 % — HIGH (ref 0.1–0.3)
IMM GRANULOCYTES NFR BLD AUTO: 1 % — HIGH (ref 0.1–0.3)
LYMPHOCYTES # BLD AUTO: 28.2 % — SIGNIFICANT CHANGE UP (ref 20.5–51.1)
LYMPHOCYTES # BLD AUTO: 28.2 % — SIGNIFICANT CHANGE UP (ref 20.5–51.1)
LYMPHOCYTES # BLD AUTO: 3.24 K/UL — SIGNIFICANT CHANGE UP (ref 1.2–3.4)
LYMPHOCYTES # BLD AUTO: 3.24 K/UL — SIGNIFICANT CHANGE UP (ref 1.2–3.4)
MAGNESIUM SERPL-MCNC: 1.9 MG/DL — SIGNIFICANT CHANGE UP (ref 1.8–2.4)
MAGNESIUM SERPL-MCNC: 1.9 MG/DL — SIGNIFICANT CHANGE UP (ref 1.8–2.4)
MCHC RBC-ENTMCNC: 31.5 G/DL — LOW (ref 32–37)
MCHC RBC-ENTMCNC: 31.5 G/DL — LOW (ref 32–37)
MCHC RBC-ENTMCNC: 31.5 PG — HIGH (ref 27–31)
MCHC RBC-ENTMCNC: 31.5 PG — HIGH (ref 27–31)
MCV RBC AUTO: 100.3 FL — HIGH (ref 81–99)
MCV RBC AUTO: 100.3 FL — HIGH (ref 81–99)
MONOCYTES # BLD AUTO: 0.91 K/UL — HIGH (ref 0.1–0.6)
MONOCYTES # BLD AUTO: 0.91 K/UL — HIGH (ref 0.1–0.6)
MONOCYTES NFR BLD AUTO: 7.9 % — SIGNIFICANT CHANGE UP (ref 1.7–9.3)
MONOCYTES NFR BLD AUTO: 7.9 % — SIGNIFICANT CHANGE UP (ref 1.7–9.3)
NEUTROPHILS # BLD AUTO: 6.82 K/UL — HIGH (ref 1.4–6.5)
NEUTROPHILS # BLD AUTO: 6.82 K/UL — HIGH (ref 1.4–6.5)
NEUTROPHILS NFR BLD AUTO: 59.5 % — SIGNIFICANT CHANGE UP (ref 42.2–75.2)
NEUTROPHILS NFR BLD AUTO: 59.5 % — SIGNIFICANT CHANGE UP (ref 42.2–75.2)
NRBC # BLD: 0 /100 WBCS — SIGNIFICANT CHANGE UP (ref 0–0)
NRBC # BLD: 0 /100 WBCS — SIGNIFICANT CHANGE UP (ref 0–0)
PLATELET # BLD AUTO: 332 K/UL — SIGNIFICANT CHANGE UP (ref 130–400)
PLATELET # BLD AUTO: 332 K/UL — SIGNIFICANT CHANGE UP (ref 130–400)
PMV BLD: 9.1 FL — SIGNIFICANT CHANGE UP (ref 7.4–10.4)
PMV BLD: 9.1 FL — SIGNIFICANT CHANGE UP (ref 7.4–10.4)
POTASSIUM SERPL-MCNC: 3.8 MMOL/L — SIGNIFICANT CHANGE UP (ref 3.5–5)
POTASSIUM SERPL-MCNC: 3.8 MMOL/L — SIGNIFICANT CHANGE UP (ref 3.5–5)
POTASSIUM SERPL-SCNC: 3.8 MMOL/L — SIGNIFICANT CHANGE UP (ref 3.5–5)
POTASSIUM SERPL-SCNC: 3.8 MMOL/L — SIGNIFICANT CHANGE UP (ref 3.5–5)
PROT SERPL-MCNC: 5.9 G/DL — LOW (ref 6–8)
PROT SERPL-MCNC: 5.9 G/DL — LOW (ref 6–8)
RBC # BLD: 3.55 M/UL — LOW (ref 4.2–5.4)
RBC # BLD: 3.55 M/UL — LOW (ref 4.2–5.4)
RBC # FLD: 15.1 % — HIGH (ref 11.5–14.5)
RBC # FLD: 15.1 % — HIGH (ref 11.5–14.5)
SODIUM SERPL-SCNC: 140 MMOL/L — SIGNIFICANT CHANGE UP (ref 135–146)
SODIUM SERPL-SCNC: 140 MMOL/L — SIGNIFICANT CHANGE UP (ref 135–146)
WBC # BLD: 11.47 K/UL — HIGH (ref 4.8–10.8)
WBC # BLD: 11.47 K/UL — HIGH (ref 4.8–10.8)
WBC # FLD AUTO: 11.47 K/UL — HIGH (ref 4.8–10.8)
WBC # FLD AUTO: 11.47 K/UL — HIGH (ref 4.8–10.8)

## 2023-11-20 PROCEDURE — 99233 SBSQ HOSP IP/OBS HIGH 50: CPT

## 2023-11-20 PROCEDURE — 99239 HOSP IP/OBS DSCHRG MGMT >30: CPT

## 2023-11-20 RX ADMIN — NYSTATIN CREAM 1 APPLICATION(S): 100000 CREAM TOPICAL at 05:38

## 2023-11-20 RX ADMIN — Medication 1 DROP(S): at 05:38

## 2023-11-20 RX ADMIN — CHLORHEXIDINE GLUCONATE 1 APPLICATION(S): 213 SOLUTION TOPICAL at 12:00

## 2023-11-20 RX ADMIN — FAMOTIDINE 20 MILLIGRAM(S): 10 INJECTION INTRAVENOUS at 12:08

## 2023-11-20 RX ADMIN — Medication 1 DROP(S): at 14:50

## 2023-11-20 RX ADMIN — HEPARIN SODIUM 5000 UNIT(S): 5000 INJECTION INTRAVENOUS; SUBCUTANEOUS at 05:37

## 2023-11-20 RX ADMIN — GABAPENTIN 600 MILLIGRAM(S): 400 CAPSULE ORAL at 05:38

## 2023-11-20 RX ADMIN — Medication 50 MICROGRAM(S): at 05:37

## 2023-11-20 RX ADMIN — Medication 500 MILLIGRAM(S): at 12:08

## 2023-11-20 RX ADMIN — CARBIDOPA AND LEVODOPA 1 TABLET(S): 25; 100 TABLET ORAL at 05:36

## 2023-11-20 RX ADMIN — POLYETHYLENE GLYCOL 3350 17 GRAM(S): 17 POWDER, FOR SOLUTION ORAL at 12:08

## 2023-11-20 NOTE — SWALLOW BEDSIDE ASSESSMENT ADULT - ORAL PHASE
Delayed oral transit time
Within functional limits
Decreased anterior-posterior movement of the bolus/Delayed oral transit time

## 2023-11-20 NOTE — DISCHARGE NOTE PROVIDER - NSDCCPCAREPLAN_GEN_ALL_CORE_FT
PRINCIPAL DISCHARGE DIAGNOSIS  Diagnosis: Confusion  Assessment and Plan of Treatment: You came to the hospital due to signs of confusion. The work up showed you have a bleed in your brain. You were evaluated by the neurology and neurosurgery teams and the plan was to manage your condition conservatively without surgical intervention. A repeat CT scan of your brain showed the bleed was resolving. You also developed respiratory failure, which could have been related to your brain condition. You were not found to have a lung infection and so antibiotics were discontinued. Your breathing improved over the course of your stay. You were also evaluated by the speech and swallow team. Please follow up with your PCP in the next 1-2 weeks. If your symptoms recur or worsen, please come back to the hospital for evaluation.      SECONDARY DISCHARGE DIAGNOSES  Diagnosis: Pneumonia  Assessment and Plan of Treatment:     Diagnosis: Brain bleed  Assessment and Plan of Treatment:

## 2023-11-20 NOTE — SWALLOW BEDSIDE ASSESSMENT ADULT - SPECIFY REASON(S)
dysphagia f/u
dysphagia eval
dysphagia f/u
dysphagia f/u
Dysphagia re-evaluation
dysphagia f/u
dysphagia f/u

## 2023-11-20 NOTE — SWALLOW BEDSIDE ASSESSMENT ADULT - SLP GENERAL OBSERVATIONS
Pt received in bed, moving, moaning, no eye opening, not appropriate for po trials 2' lethargy and current cog state, spouse educated on dysphagia and recs
pt received in bed asleep arousable w/o c/o pain. +NGT in place +room air +spouse at bedside
pt received in bed awake more alert w/o c/o pain. +NGT in place +room air +spouse at bedside +increased verbalizations today, pt not yet back to baseline per .
Nonverbal; following limited one step commands with repetition. Pt's  reported change from baseline (was communicating at the conversational level)
pt received in bed awake more alert w/o c/o pain. +NGT in place +room air +spouse at bedside +increased verbalizations today, pt not yet back to baseline per .
pt received in bed asleep arousable in no apparent pain. +NGT in place +room air +spouse at bedside +pt following simple 1-step commands, some verbalizations. Spouse reports pt not currently at baseline.
pt received in bed awake alert w/o c/o pain. +room air +spouse Lev at bedside

## 2023-11-20 NOTE — DISCHARGE NOTE PROVIDER - ATTENDING DISCHARGE PHYSICAL EXAMINATION:
Vital Signs Last 24 Hrs  T(F): 98.3 (20 Nov 2023 07:46), Max: 98.7 (19 Nov 2023 16:13)  HR: 92 (20 Nov 2023 07:46) (70 - 97)  BP: 114/55 (20 Nov 2023 07:46) (105/52 - 114/55)  RR: 18 (20 Nov 2023 07:46) (18 - 18)  SpO2: --    PHYSICAL EXAM:  GENERAL: NAD, well-groomed, well-developed  HEAD:  Atraumatic, Normocephalic  EYES: EOMI, conjunctiva and sclera clear  ENMT: Moist mucous membranes, Good dentition, no thrush  NECK: Supple, No JVD  CHEST/LUNG: Clear to auscultation bilaterally, good air entry, non-labored breathing  HEART: RRR; S1/S2, 2/6 systolic murmur   ABDOMEN: Soft, Nontender, Nondistended; Bowel sounds present  VASCULAR: Normal pulses, Normal capillary refill  EXTREMITIES: No calf tenderness, No cyanosis, No edema  LYMPH: Normal; No lymphadenopathy noted  SKIN: Warm, Intact  PSYCH: Normal mood, Normal affect  NERVOUS SYSTEM:  A/O x2, Good concentration

## 2023-11-20 NOTE — SWALLOW BEDSIDE ASSESSMENT ADULT - ORAL PREPARATORY PHASE
Reduced oral grading/Anterior loss of bolus
Within functional limits
Reduced oral grading
Within functional limits
Within functional limits

## 2023-11-20 NOTE — DISCHARGE NOTE PROVIDER - HOSPITAL COURSE
83-year-old female with past medical history of Brent body dementia, Parkinson disease who was brought in from [facility] for AMS.  Per , pt is AAOX2 and able to have a normal conversation at baseline , but patient has been having worsening confusion for the past week. Pt's  also says that she has been having a cough for the past week as well, not sure if productive. Unable to get rest of hx given pts mental status.     In the ED, vitals wnl. Labs with wbc 17.94, 79.9% PMNs, Mg 1.7, trops 0.05, vbg pH 7.09, pCO2 72, Na 114 (possible error given BMP Na 134). UA negative. CXR with left basilar opacity.   CTH with New small intraparenchymal hemorrhage in the left thalamus since the prior CT head from 12/24/2022. No acute territorial infarct or midline   shift. Placed on bipap, admit to SDU.    CEU Course:  Neurology was consulted for the IPH seen on CT and patient was placed on q4h neuro checks. Repeat CTH showed stable left thalamic acute hemorrhage without mass effect or midline shift. CTA H/N obtained showed no evidence of flow limiting stenosis, occlusion, or aneurysm, and no evidence of carotid or vertebral artery stenosis. EEG obtained on 11/11 showed no electrographic seizures or significant clinical events, though there were indicators of diffuse cerebral dysfunction. Neurocrit and neurosurgery evaluated the patient and no acute intervention. MRI brain w/wo were recommended.   Broad spectrum abx were started for possible aspiration pneumonia. ID was consulted, and since there was no ongoing bacterial PNA clinically or radiographically, all abx were dced. Procal -ve, MRSA nares -ve, UA negative, legionella negative.   Pinon placed in ED was dced on 11/14.  Patient stated she was developed a headache. Repeat CTH done. No changes. Speech and swallow saw the patient at bedside as patient currently NPO and receiving tube feeds. Speech and swallow recommends modified barium swallow or FEES once patient is downgraded to floors.   Patient was unable to tolerate MRI and patient's  stated that she does not tolerate ativan well so did not want anything administered prior to MRI. Neurology was contacted and informed. They stated as CTH was just done the day before with no changes, MRI is not needed. q4h neurochecks decreased to q8h neruochecks. Patient is stable and ready to be downgraded to floors.    Floors:  -reevaluated by S&S with modified BS- started on soft and bite sized diet  -repeat head CT showed regression of bleed from 10mm to 8mm   -pt stable and medically ready for discharge       83-year-old female with past medical history of Brent body dementia, Parkinson disease who was brought in from [facility] for AMS.  Per , pt is AAOX2 and able to have a normal conversation at baseline , but patient has been having worsening confusion for the past week. Pt's  also says that she has been having a cough for the past week as well, not sure if productive. Unable to get rest of hx given pts mental status.     In the ED, vitals wnl. Labs with wbc 17.94, 79.9% PMNs, Mg 1.7, trops 0.05, vbg pH 7.09, pCO2 72, Na 114 (possible error given BMP Na 134). UA negative. CXR with left basilar opacity.   CTH with New small intraparenchymal hemorrhage in the left thalamus since the prior CT head from 12/24/2022. No acute territorial infarct or midline   shift. Placed on bipap, admit to SDU.    CEU Course:  Neurology was consulted for the IPH seen on CT and patient was placed on q4h neuro checks. Repeat CTH showed stable left thalamic acute hemorrhage without mass effect or midline shift. CTA H/N obtained showed no evidence of flow limiting stenosis, occlusion, or aneurysm, and no evidence of carotid or vertebral artery stenosis. EEG obtained on 11/11 showed no electrographic seizures or significant clinical events, though there were indicators of diffuse cerebral dysfunction. Neurocrit and neurosurgery evaluated the patient and no acute intervention. MRI brain w/wo were recommended.   Broad spectrum abx were started for possible aspiration pneumonia. ID was consulted, and since there was no ongoing bacterial PNA clinically or radiographically, all abx were dced. Procal -ve, MRSA nares -ve, UA negative, legionella negative.   Pinon placed in ED was dced on 11/14.  Patient stated she was developed a headache. Repeat CTH done. No changes. Speech and swallow saw the patient at bedside as patient currently NPO and receiving tube feeds. Speech and swallow recommends modified barium swallow or FEES once patient is downgraded to floors.   Patient was unable to tolerate MRI and patient's  stated that she does not tolerate ativan well so did not want anything administered prior to MRI. Neurology was contacted and informed. They stated as CTH was just done the day before with no changes, MRI is not needed. q4h neurochecks decreased to q8h neruochecks. Patient is stable and ready to be downgraded to floors.    Floors:  -reevaluated by S&S with modified BS- started on soft and bite sized diet  -repeat head CT showed regression of bleed from 10mm to 8mm   -pt stable and medically ready for discharge    Patient discharge to her home nursing home

## 2023-11-20 NOTE — SWALLOW BEDSIDE ASSESSMENT ADULT - SWALLOW EVAL: RECOMMENDED DIET
NPO, non-oral means of nutrition and hydration
continue NPO w/ NGT
NPO; alternative means nutrition/hydration/meds
continue NPO w/ NGT
continue NPO w/ NGT
continue soft and bite sized, moderately thick liquids
continue NPO w/ NGT

## 2023-11-20 NOTE — DISCHARGE NOTE NURSING/CASE MANAGEMENT/SOCIAL WORK - PATIENT PORTAL LINK FT
You can access the FollowMyHealth Patient Portal offered by [facility] by registering at the following website: [email or web address]. By joining [employer]’s FollowMyHealth portal, you will also be able to view your health information using other applications (apps) compatible with our system.
SHORTNESS OF BREATH/EXERTIONAL DYSPNEA

## 2023-11-20 NOTE — SWALLOW BEDSIDE ASSESSMENT ADULT - SWALLOW EVAL: RECOMMENDED FEEDING/EATING TECHNIQUES
crush medication (when feasible)/oral hygiene/position upright (90 degrees)/small sips/bites
oral hygiene
oral hygiene
oral hygiene/position upright (90 degrees)
oral hygiene/position upright (90 degrees)
oral hygiene
oral hygiene

## 2023-11-20 NOTE — SWALLOW BEDSIDE ASSESSMENT ADULT - NS ASR SWALLOW FINDINGS DISCUS
Physician/Nursing/Patient/Family
Physician/Nursing/Patient/Family
Physician/Nursing/Patient
spouse at bs/Physician/Nursing/Patient/Family
Nursing/Patient/Family
Physician/Nursing/Patient/Family
Physician/Nursing/Patient/Family

## 2023-11-20 NOTE — DISCHARGE NOTE PROVIDER - CARE PROVIDER_API CALL
Dulce Mcmahan  Phone: (   )    -  Fax: (   )    -  Follow Up Time:    Dulce Mcmahan  Phone: (   )    -  Fax: (   )    -  Follow Up Time: 1 week

## 2023-11-20 NOTE — DISCHARGE NOTE PROVIDER - PROVIDER TOKENS
FREE:[LAST:[Martir],FIRST:[Dulce],PHONE:[(   )    -],FAX:[(   )    -]] FREE:[LAST:[Martir],FIRST:[Dulce],PHONE:[(   )    -],FAX:[(   )    -],FOLLOWUP:[1 week]]

## 2023-11-20 NOTE — SWALLOW BEDSIDE ASSESSMENT ADULT - SWALLOW EVAL: DIAGNOSIS
+suspected pharyngeal dysphagia for cup sips of mildly thick liquids; +improved toleration for tsp sips of mildly thick liquids; +toleration for puree and soft and bite sized consistencies w/o overt s/s aspiration/penetration
Pt will require VFSS to further investigate pharyngeal function
+awake keeps eyes closed, moaning, no commands, no response to questions, not appropriate for po trials
+suspected pharyngeal dysphagia for thin liquids; +delayed cough w/ mildly thick liquids; +improved toleration for puree and soft and bite sized consistencies w/o overt s/s aspiration/penetration
+toleration for moderately thick liquids w/o overt s/s aspiration/penetration; pt declined further PO trials.
Oropharyngeal Dysphagia
+min overt s/s aspiration/penetration across all PO trials (mildly thick liquids, puree, and soft and bite sized consistencies)

## 2023-11-20 NOTE — DISCHARGE NOTE PROVIDER - NSDCMRMEDTOKEN_GEN_ALL_CORE_FT
acetaminophen 325 mg oral tablet: 2 tab(s) orally every 12 hours  aluminum hydroxide-magnesium hydroxide 200 mg-200 mg/5 mL oral suspension: 30 milliliter(s) orally every 4 hours, As needed, Dyspepsia  Artificial Tears ophthalmic solution: 1 drop(s) to each affected eye 3 times a day  Aspercreme with Lidocaine 4% topical cream: Apply topically to affected area 3 times a day to left knee  aspirin 81 mg oral tablet, chewable: 1 tab(s) orally once a day  carbidopa-levodopa 25 mg-100 mg oral tablet: 1 tab(s) orally 2 times a day  Colace 100 mg oral capsule: 1 cap(s) orally 2 times a day  famotidine 20 mg oral tablet: 1 tab(s) orally once a day  gabapentin 600 mg oral tablet: 1 tab(s) orally 3 times a day  latanoprost 0.005% ophthalmic solution: 1 drop(s) to each affected eye once a day (at bedtime)  levothyroxine 50 mcg (0.05 mg) oral tablet: 1 tab(s) orally once a day  melatonin 3 mg oral tablet: 1 tab(s) orally once a day (at bedtime), As needed, Insomnia  polyethylene glycol 3350 oral powder for reconstitution: 17 gram(s) orally once a day  Senna 8.6 mg oral tablet: 2 tab(s) orally once a day (at bedtime)  timolol hemihydrate 0.5% ophthalmic solution: 1 drop(s) to each affected eye 3 times a day  Vitamin C 500 mg oral tablet: 1 tab(s) orally 2 times a day  Vitamin D3 1000 intl units oral tablet: 1 tab(s) orally once a day

## 2023-11-25 DIAGNOSIS — E03.9 HYPOTHYROIDISM, UNSPECIFIED: ICD-10-CM

## 2023-11-25 DIAGNOSIS — Z85.3 PERSONAL HISTORY OF MALIGNANT NEOPLASM OF BREAST: ICD-10-CM

## 2023-11-25 DIAGNOSIS — R33.9 RETENTION OF URINE, UNSPECIFIED: ICD-10-CM

## 2023-11-25 DIAGNOSIS — Z88.0 ALLERGY STATUS TO PENICILLIN: ICD-10-CM

## 2023-11-25 DIAGNOSIS — Z88.1 ALLERGY STATUS TO OTHER ANTIBIOTIC AGENTS STATUS: ICD-10-CM

## 2023-11-25 DIAGNOSIS — J96.01 ACUTE RESPIRATORY FAILURE WITH HYPOXIA: ICD-10-CM

## 2023-11-25 DIAGNOSIS — Z87.891 PERSONAL HISTORY OF NICOTINE DEPENDENCE: ICD-10-CM

## 2023-11-25 DIAGNOSIS — R41.82 ALTERED MENTAL STATUS, UNSPECIFIED: ICD-10-CM

## 2023-11-25 DIAGNOSIS — R13.10 DYSPHAGIA, UNSPECIFIED: ICD-10-CM

## 2023-11-25 DIAGNOSIS — G31.83 NEUROCOGNITIVE DISORDER WITH LEWY BODIES: ICD-10-CM

## 2023-11-25 DIAGNOSIS — H04.129 DRY EYE SYNDROME OF UNSPECIFIED LACRIMAL GLAND: ICD-10-CM

## 2023-11-25 DIAGNOSIS — Z79.82 LONG TERM (CURRENT) USE OF ASPIRIN: ICD-10-CM

## 2023-11-25 DIAGNOSIS — R65.10 SYSTEMIC INFLAMMATORY RESPONSE SYNDROME (SIRS) OF NON-INFECTIOUS ORIGIN WITHOUT ACUTE ORGAN DYSFUNCTION: ICD-10-CM

## 2023-11-25 DIAGNOSIS — I61.8 OTHER NONTRAUMATIC INTRACEREBRAL HEMORRHAGE: ICD-10-CM

## 2023-11-25 DIAGNOSIS — G93.49 OTHER ENCEPHALOPATHY: ICD-10-CM

## 2023-11-25 DIAGNOSIS — R53.2 FUNCTIONAL QUADRIPLEGIA: ICD-10-CM

## 2023-11-25 DIAGNOSIS — F02.80 DEMENTIA IN OTHER DISEASES CLASSIFIED ELSEWHERE, UNSPECIFIED SEVERITY, WITHOUT BEHAVIORAL DISTURBANCE, PSYCHOTIC DISTURBANCE, MOOD DISTURBANCE, AND ANXIETY: ICD-10-CM

## 2023-11-25 DIAGNOSIS — Z90.11 ACQUIRED ABSENCE OF RIGHT BREAST AND NIPPLE: ICD-10-CM

## 2023-11-25 DIAGNOSIS — Z90.49 ACQUIRED ABSENCE OF OTHER SPECIFIED PARTS OF DIGESTIVE TRACT: ICD-10-CM

## 2023-11-25 DIAGNOSIS — E83.42 HYPOMAGNESEMIA: ICD-10-CM

## 2023-11-25 DIAGNOSIS — J96.02 ACUTE RESPIRATORY FAILURE WITH HYPERCAPNIA: ICD-10-CM

## 2023-11-25 DIAGNOSIS — G20.A1 PARKINSON'S DISEASE WITHOUT DYSKINESIA, WITHOUT MENTION OF FLUCTUATIONS: ICD-10-CM

## 2023-11-25 DIAGNOSIS — J69.0 PNEUMONITIS DUE TO INHALATION OF FOOD AND VOMIT: ICD-10-CM

## 2023-11-25 DIAGNOSIS — Z87.440 PERSONAL HISTORY OF URINARY (TRACT) INFECTIONS: ICD-10-CM

## 2024-04-05 NOTE — ED ADULT TRIAGE NOTE - GLASGOW COMA SCALE: BEST VERBAL RESPONSE, MLM
Identified pt with two pt identifiers(name and ).    Chief Complaint   Patient presents with    Hypertension     Patient states she was at chemo yesterday and BP was elevated  Patient also states potassium level was low  Did not take medications yesterday, Did however take medications today    Immunizations     Meningococcal vaccine    Tinnitus     Hearing has gotten worse  Wants a referral to ENT         Health Maintenance Due   Topic    Hepatitis B vaccine (1 of 3 - 3-dose series)    COVID-19 Vaccine (1)    Pneumococcal 0-64 years Vaccine (1 of 2 - PCV)    Meningococcal B vaccine (1 of 4 - Increased Risk)    Shingles vaccine (1 of 2)    Colorectal Cancer Screen     Meningococcal (ACWY) vaccine (2 - Risk 2-dose series)       Wt Readings from Last 3 Encounters:   24 54.8 kg (120 lb 12.8 oz)   24 53.5 kg (118 lb)   24 53.5 kg (118 lb)     Temp Readings from Last 3 Encounters:   24 96.8 °F (36 °C) (Temporal)   24 97.9 °F (36.6 °C) (Temporal)   24 97.9 °F (36.6 °C) (Temporal)     BP Readings from Last 3 Encounters:   24 (!) 142/82   24 (!) 174/90   24 (!) 151/88     Pulse Readings from Last 3 Encounters:   24 99   24 70   24 70           Depression Screening:  :         2024    11:06 AM 2024    11:17 AM 2023     4:37 PM   PHQ-9 Questionaire   Little interest or pleasure in doing things 0 0 0   Feeling down, depressed, or hopeless 0 0 0   PHQ-9 Total Score 0 0 0        Fall Risk Assessment:  :          No data to display                 Abuse Screening:  :          No data to display                 Coordination of Care Questionnaire:  :     \"Have you been to the ER, urgent care clinic since your last visit?  Hospitalized since your last visit?\"    NO    “Have you seen or consulted any other health care providers outside of [facility] since your last visit?”    NO    “Have you had a colorectal cancer screening such 
by mouth 2 times daily 168 tablet 1    OLANZapine (ZYPREXA) 2.5 MG tablet 2.5 mg PO qhs on days 1-4 after chemo 30 tablet 3     No current facility-administered medications for this visit.      Hypertension ROS: taking medications as instructed, no medication side effects noted, no TIA's, no chest pain on exertion, no dyspnea on exertion, no swelling of ankles.   New concerns: patients blood pressure was high during chemotherapy, had not taken her blood pressure medications.     Objective:   BP (!) 142/82 (Site: Right Upper Arm, Position: Sitting, Cuff Size: Medium Adult)   Pulse 99   Temp 96.8 °F (36 °C) (Temporal)   Resp 18   Ht 1.524 m (5')   Wt 54.8 kg (120 lb 12.8 oz)   SpO2 98%   BMI 23.59 kg/m²    Appearance alert, well appearing, and in no distress, oriented to person, place, and time, and normal appearing weight.  General exam BP noted to be well controlled today in office, S1, S2 normal, no gallop, no murmur, chest clear, no JVD, no HSM, no edema.   Lab review: labs are reviewed, up to date and normal.     Assessment:    Hypertension stable, asymptomatic, and reasonably well controlled.     Plan:   Current treatment plan is effective, no change in therapy..      1. ACP (advance care planning)      2. Meningitis    - Meningococcal, MENVEO, (age 2m-55y), IM    3. Tinnitus of left ear    - [facility] - Danita Nichols MD, Otolaryngology, [place]    Melissa Mcdonnell MD  
(V5) oriented

## 2024-05-01 ENCOUNTER — APPOINTMENT (OUTPATIENT)
Dept: NEUROLOGY | Facility: CLINIC | Age: 84
End: 2024-05-01

## 2024-08-23 ENCOUNTER — INPATIENT (INPATIENT)
Facility: HOSPITAL | Age: 84
LOS: 11 days | Discharge: NURSING, REHAB OR LONG-TERM CARE | DRG: 948 | End: 2024-09-04
Attending: INTERNAL MEDICINE | Admitting: INTERNAL MEDICINE
Payer: MEDICARE

## 2024-08-23 VITALS
HEART RATE: 133 BPM | SYSTOLIC BLOOD PRESSURE: 141 MMHG | TEMPERATURE: 98 F | OXYGEN SATURATION: 99 % | RESPIRATION RATE: 24 BRPM | DIASTOLIC BLOOD PRESSURE: 77 MMHG | WEIGHT: 209.44 LBS

## 2024-08-23 DIAGNOSIS — Z90.11 ACQUIRED ABSENCE OF RIGHT BREAST AND NIPPLE: Chronic | ICD-10-CM

## 2024-08-23 DIAGNOSIS — Z90.49 ACQUIRED ABSENCE OF OTHER SPECIFIED PARTS OF DIGESTIVE TRACT: Chronic | ICD-10-CM

## 2024-08-23 LAB
GAS PNL BLDV: SIGNIFICANT CHANGE UP
INR BLD: 0.94 RATIO — SIGNIFICANT CHANGE UP (ref 0.65–1.3)
PROTHROM AB SERPL-ACNC: 10.7 SEC — SIGNIFICANT CHANGE UP (ref 9.95–12.87)

## 2024-08-23 PROCEDURE — 99285 EMERGENCY DEPT VISIT HI MDM: CPT | Mod: GC

## 2024-08-23 PROCEDURE — 71045 X-RAY EXAM CHEST 1 VIEW: CPT | Mod: 26

## 2024-08-23 RX ORDER — CEFEPIME 2 G/1
2000 INJECTION, POWDER, FOR SOLUTION INTRAVENOUS ONCE
Refills: 0 | Status: COMPLETED | OUTPATIENT
Start: 2024-08-23 | End: 2024-08-23

## 2024-08-23 RX ORDER — SODIUM CHLORIDE 9 MG/ML
1000 INJECTION INTRAMUSCULAR; INTRAVENOUS; SUBCUTANEOUS ONCE
Refills: 0 | Status: COMPLETED | OUTPATIENT
Start: 2024-08-23 | End: 2024-08-24

## 2024-08-23 RX ORDER — VANCOMYCIN/0.9 % SOD CHLORIDE 1.75G/25
1500 PLASTIC BAG, INJECTION (ML) INTRAVENOUS ONCE
Refills: 0 | Status: COMPLETED | OUTPATIENT
Start: 2024-08-23 | End: 2024-08-23

## 2024-08-23 RX ORDER — SODIUM CHLORIDE 9 MG/ML
2000 INJECTION INTRAMUSCULAR; INTRAVENOUS; SUBCUTANEOUS ONCE
Refills: 0 | Status: DISCONTINUED | OUTPATIENT
Start: 2024-08-23 | End: 2024-08-23

## 2024-08-23 RX ORDER — IPRATROPIUM BROMIDE AND ALBUTEROL SULFATE .5; 3 MG/3ML; MG/3ML
3 SOLUTION RESPIRATORY (INHALATION)
Refills: 0 | Status: COMPLETED | OUTPATIENT
Start: 2024-08-23 | End: 2024-08-23

## 2024-08-23 RX ADMIN — Medication 150 GRAM(S): at 23:15

## 2024-08-23 RX ADMIN — IPRATROPIUM BROMIDE AND ALBUTEROL SULFATE 3 MILLILITER(S): .5; 3 SOLUTION RESPIRATORY (INHALATION) at 23:10

## 2024-08-23 RX ADMIN — IPRATROPIUM BROMIDE AND ALBUTEROL SULFATE 3 MILLILITER(S): .5; 3 SOLUTION RESPIRATORY (INHALATION) at 23:15

## 2024-08-23 RX ADMIN — CEFEPIME 100 MILLIGRAM(S): 2 INJECTION, POWDER, FOR SOLUTION INTRAVENOUS at 23:20

## 2024-08-23 RX ADMIN — IPRATROPIUM BROMIDE AND ALBUTEROL SULFATE 3 MILLILITER(S): .5; 3 SOLUTION RESPIRATORY (INHALATION) at 23:20

## 2024-08-23 RX ADMIN — SODIUM CHLORIDE 500 MILLILITER(S): 9 INJECTION INTRAMUSCULAR; INTRAVENOUS; SUBCUTANEOUS at 23:25

## 2024-08-23 NOTE — ED ADULT NURSE REASSESSMENT NOTE - NS ED NURSE REASSESS COMMENT FT1
MD ordered 2000 NS,  arrived and stated pt has cardiac issues. MD cancelled original fluids. MD reordering fluids at slower rate.

## 2024-08-23 NOTE — ED ADULT NURSE NOTE - OBJECTIVE STATEMENT
83 yo pt brought in by EMS from nursing home, staff stated pt was unresponsive and declining mental status.

## 2024-08-23 NOTE — ED PROVIDER NOTE - CLINICAL SUMMARY MEDICAL DECISION MAKING FREE TEXT BOX
84-year-old woman wita history of Lewy body dementia (AAO x 2 at baseline), Parkinson's, COPD not on home oxygen, brought in by EMS for respiratory distress and change in mental status for shortly prior to arrival.  Per EMS, patient was found to be febrile and nursing home (103F), and they started her on ceftriaxone and ertapenem for a potential UTI.  Then later on tonight, they found her not responding to voice, prompting call to EMS.  EMS states that they found her hypoxic to 85%, with remainder of the vitals normal.  EMS administered dexamethasone 12 mg IV, and provided PPV.  Vital signs reviewed  General: Ill-appearing, moaning and groaning  Skin: Mottled  Head & neck: NCAT, supple neck  Eyes: EOMI, PER B/L  ENT: Dry mucous membranes udates  Card: Tachycardic, S1, S2  Resp: Tachypneic, decreased breath sounds diffusely  Abd: Soft, distended but nonperitonitic, nontender, no rebound, no guarding  Ext: Pulses palpable distally; no edema; no calf tenderness; symmetrical calf circumferences  NeuroMSK: Moving all extremities    ------------------------ Ndubuisi: Signout received from Dr. Ferrara.  Patient presented with altered mental status, decreased responsiveness was found to be tachycardic and hypoxic to the 80s on room air.  Patient started on positive pressure ventilation.  Required IV, labs, imaging.  Pending results and reassessment.    84-year-old woman wita history of Lewy body dementia (AAO x 2 at baseline), Parkinson's, COPD not on home oxygen, brought in by EMS for respiratory distress and change in mental status for shortly prior to arrival.  Per EMS, patient was found to be febrile and nursing home (103F), and they started her on ceftriaxone and ertapenem for a potential UTI.  Then later on tonight, they found her not responding to voice, prompting call to EMS.  EMS states that they found her hypoxic to 85%, with remainder of the vitals normal.  EMS administered dexamethasone 12 mg IV, and provided PPV.  Vital signs reviewed  General: Ill-appearing, moaning and groaning  Skin: Mottled  Head & neck: NCAT, supple neck  Eyes: EOMI, PER B/L  ENT: Dry mucous membranes udates  Card: Tachycardic, S1, S2  Resp: Tachypneic, decreased breath sounds diffusely  Abd: Soft, distended but nonperitonitic, nontender, no rebound, no guarding  Ext: Pulses palpable distally; no edema; no calf tenderness; symmetrical calf circumferences  NeuroMSK: Moving all extremities    ------------------------ Ndubuisi: Signout received from Dr. Ferrara.  Patient presented with altered mental status, decreased responsiveness was found to be tachycardic and hypoxic to the 80s on room air.  Patient started on positive pressure ventilation.  Required IV, labs, imaging.  PT was found to have UTI and CT evidence of cystitis. Started on abx and admitted for further evaluation and management.    84-year-old woman wita history of Lewy body dementia (AAO x 2 at baseline), Parkinson's, COPD not on home oxygen, brought in by EMS for respiratory distress and change in mental status for shortly prior to arrival.  Per EMS, patient was found to be febrile and nursing home (103F), and they started her on ceftriaxone and ertapenem for a potential UTI.  Then later on tonight, they found her not responding to voice, prompting call to EMS.  EMS states that they found her hypoxic to 85%, with remainder of the vitals normal.  EMS administered dexamethasone 12 mg IV, and provided PPV.  Vital signs reviewed  General: Ill-appearing, moaning and groaning  Skin: Mottled  Head & neck: NCAT, supple neck  Eyes: EOMI, PER B/L  ENT: Dry mucous membranes udates  Card: Tachycardic, S1, S2  Resp: Tachypneic, decreased breath sounds diffusely  Abd: Soft, distended but nonperitonitic, nontender, no rebound, no guarding  Ext: Pulses palpable distally; no edema; no calf tenderness; symmetrical calf circumferences  NeuroMSK: Moving all extremities    ------------------------

## 2024-08-23 NOTE — ED PROVIDER NOTE - PHYSICAL EXAMINATION
CONSTITUTIONAL: tachypneic, mild distress  SKIN: Warm dry, normal skin turgor  HEAD: NCAT  EYES: EOMI, PERRLA, no scleral icterus, conjunctiva pink  ENT: normal pharynx with no erythema or exudates  NECK: Supple; non tender. Full ROM.  CARD: RRR, no murmurs.  RESP: clear to ausculation b/l. No crackles or wheezing.  ABD: soft, non-tender,   EXT: no deformities, no bony tenderness  NEURO: limited s/2 ams

## 2024-08-23 NOTE — ED PROVIDER NOTE - CARE PLAN
Principal Discharge DX:	AMS (altered mental status)  Secondary Diagnosis:	Acute UTI  Secondary Diagnosis:	Sepsis   1

## 2024-08-23 NOTE — ED PROVIDER NOTE - PROGRESS NOTE DETAILS
Dr. Parisa Ferrara: Sepsis suspected.  Appropriate labs and cultures sent.  Will hold off of 30 cc/kg bolus, as patient has a history of heart failure: will administer 1 L and reassess.  Antibiotics ordered, to be given after cultures drawn.  Will follow-up repeat lactate after fluids.    Patient was found to be with decreased breath sounds diffusely, and was thought to be possibly in COPD exacerbation.  Already received Decadron by EMS prior to arrival.  Quick bedside ultrasound showed lung sliding, no pneumothorax.  Patient placed on BiPAP for potential hypercarbia.  Magnesium and nebs ordered as well.  On reassessment, patient is moving air better, with good tidal volumes, but no wheezing.  Blood gas resulted with normal CO2.  As patient is tachypneic, will obtain CTA for PE rule out.  Will also obtain CT of the abdomen pelvis, as potential intra-abdominal etiology in the setting of fever.     Spoke with the  who arrived to bedside shortly after the patient arrived, discussed goals of care.  Patient is DNR, with a trial of intubation if necessary for short term.  Patient currently protecting her airway. Dr. Parisa Ferrara: Signed out to Dr. Cruz, pending CT, reassessment, and disposition. pk: labs and imaging reviewed, likely has a uti, taken of bipap and resting comfortably on 3L nc, evaluated by cardio for two runs of Vtach approx 10 sec, cleared for [email or web address]. signed out to MAR

## 2024-08-23 NOTE — ED PROVIDER NOTE - OBJECTIVE STATEMENT
Patient 84-year-old woman with a history of Lewy body dementia (AAO x 2 at baseline), Parkinson's, COPD not on home oxygen, p/w ams and tachypnea, A&Ox0 on arrival. unable to provide history.

## 2024-08-23 NOTE — ED ADULT NURSE NOTE - CHIEF COMPLAINT QUOTE
pt was as pre Note and brought straight to crit,   pt ams and tachypnea, A&Ox0 on arrival. BVM being done by EMS upon arrival,  in field. pt is DNR/DNI

## 2024-08-24 DIAGNOSIS — R41.82 ALTERED MENTAL STATUS, UNSPECIFIED: ICD-10-CM

## 2024-08-24 LAB
ALBUMIN SERPL ELPH-MCNC: 3.7 G/DL — SIGNIFICANT CHANGE UP (ref 3.5–5.2)
ALBUMIN SERPL ELPH-MCNC: 4 G/DL — SIGNIFICANT CHANGE UP (ref 3.5–5.2)
ALP SERPL-CCNC: 52 U/L — SIGNIFICANT CHANGE UP (ref 30–115)
ALP SERPL-CCNC: 55 U/L — SIGNIFICANT CHANGE UP (ref 30–115)
ALT FLD-CCNC: 12 U/L — SIGNIFICANT CHANGE UP (ref 0–41)
ALT FLD-CCNC: 23 U/L — SIGNIFICANT CHANGE UP (ref 0–41)
ANION GAP SERPL CALC-SCNC: 14 MMOL/L — SIGNIFICANT CHANGE UP (ref 7–14)
ANION GAP SERPL CALC-SCNC: 20 MMOL/L — HIGH (ref 7–14)
APPEARANCE UR: CLEAR — SIGNIFICANT CHANGE UP
APTT BLD: 16.6 SEC — CRITICAL LOW (ref 27–39.2)
AST SERPL-CCNC: 27 U/L — SIGNIFICANT CHANGE UP (ref 0–41)
AST SERPL-CCNC: 59 U/L — HIGH (ref 0–41)
BASE EXCESS BLDA CALC-SCNC: -0.2 MMOL/L — SIGNIFICANT CHANGE UP (ref -2–3)
BASOPHILS # BLD AUTO: 0.03 K/UL — SIGNIFICANT CHANGE UP (ref 0–0.2)
BASOPHILS # BLD AUTO: 0.09 K/UL — SIGNIFICANT CHANGE UP (ref 0–0.2)
BASOPHILS NFR BLD AUTO: 0.1 % — SIGNIFICANT CHANGE UP (ref 0–1)
BASOPHILS NFR BLD AUTO: 0.5 % — SIGNIFICANT CHANGE UP (ref 0–1)
BILIRUB SERPL-MCNC: 0.2 MG/DL — SIGNIFICANT CHANGE UP (ref 0.2–1.2)
BILIRUB SERPL-MCNC: 0.4 MG/DL — SIGNIFICANT CHANGE UP (ref 0.2–1.2)
BILIRUB UR-MCNC: NEGATIVE — SIGNIFICANT CHANGE UP
BUN SERPL-MCNC: 56 MG/DL — HIGH (ref 10–20)
BUN SERPL-MCNC: 61 MG/DL — CRITICAL HIGH (ref 10–20)
CALCIUM SERPL-MCNC: 8.8 MG/DL — SIGNIFICANT CHANGE UP (ref 8.4–10.5)
CALCIUM SERPL-MCNC: 9.6 MG/DL — SIGNIFICANT CHANGE UP (ref 8.4–10.5)
CHLORIDE SERPL-SCNC: 107 MMOL/L — SIGNIFICANT CHANGE UP (ref 98–110)
CHLORIDE SERPL-SCNC: 109 MMOL/L — SIGNIFICANT CHANGE UP (ref 98–110)
CO2 SERPL-SCNC: 19 MMOL/L — SIGNIFICANT CHANGE UP (ref 17–32)
CO2 SERPL-SCNC: 24 MMOL/L — SIGNIFICANT CHANGE UP (ref 17–32)
COLOR SPEC: YELLOW — SIGNIFICANT CHANGE UP
CREAT SERPL-MCNC: 1.2 MG/DL — SIGNIFICANT CHANGE UP (ref 0.7–1.5)
CREAT SERPL-MCNC: 1.4 MG/DL — SIGNIFICANT CHANGE UP (ref 0.7–1.5)
DIFF PNL FLD: NEGATIVE — SIGNIFICANT CHANGE UP
EGFR: 37 ML/MIN/1.73M2 — LOW
EGFR: 45 ML/MIN/1.73M2 — LOW
EOSINOPHIL # BLD AUTO: 0.01 K/UL — SIGNIFICANT CHANGE UP (ref 0–0.7)
EOSINOPHIL # BLD AUTO: 0.17 K/UL — SIGNIFICANT CHANGE UP (ref 0–0.7)
EOSINOPHIL NFR BLD AUTO: 0 % — SIGNIFICANT CHANGE UP (ref 0–8)
EOSINOPHIL NFR BLD AUTO: 0.9 % — SIGNIFICANT CHANGE UP (ref 0–8)
GLUCOSE SERPL-MCNC: 132 MG/DL — HIGH (ref 70–99)
GLUCOSE SERPL-MCNC: 191 MG/DL — HIGH (ref 70–99)
GLUCOSE UR QL: NEGATIVE MG/DL — SIGNIFICANT CHANGE UP
HCO3 BLDA-SCNC: 24 MMOL/L — SIGNIFICANT CHANGE UP (ref 21–28)
HCT VFR BLD CALC: 37.4 % — SIGNIFICANT CHANGE UP (ref 37–47)
HCT VFR BLD CALC: 37.8 % — SIGNIFICANT CHANGE UP (ref 37–47)
HCT VFR BLD CALC: 40.7 % — SIGNIFICANT CHANGE UP (ref 37–47)
HGB BLD-MCNC: 11.3 G/DL — LOW (ref 12–16)
HGB BLD-MCNC: 11.3 G/DL — LOW (ref 12–16)
HGB BLD-MCNC: 11.9 G/DL — LOW (ref 12–16)
IMM GRANULOCYTES NFR BLD AUTO: 0.9 % — HIGH (ref 0.1–0.3)
IMM GRANULOCYTES NFR BLD AUTO: 1.9 % — HIGH (ref 0.1–0.3)
KETONES UR-MCNC: NEGATIVE MG/DL — SIGNIFICANT CHANGE UP
LACTATE SERPL-SCNC: 2.3 MMOL/L — HIGH (ref 0.7–2)
LACTATE SERPL-SCNC: 3.1 MMOL/L — HIGH (ref 0.7–2)
LACTATE SERPL-SCNC: 3.6 MMOL/L — HIGH (ref 0.7–2)
LACTATE SERPL-SCNC: 3.7 MMOL/L — HIGH (ref 0.7–2)
LACTATE SERPL-SCNC: 4 MMOL/L — CRITICAL HIGH (ref 0.7–2)
LEUKOCYTE ESTERASE UR-ACNC: NEGATIVE — SIGNIFICANT CHANGE UP
LYMPHOCYTES # BLD AUTO: 1.56 K/UL — SIGNIFICANT CHANGE UP (ref 1.2–3.4)
LYMPHOCYTES # BLD AUTO: 11.1 % — LOW (ref 20.5–51.1)
LYMPHOCYTES # BLD AUTO: 2.11 K/UL — SIGNIFICANT CHANGE UP (ref 1.2–3.4)
LYMPHOCYTES # BLD AUTO: 7.5 % — LOW (ref 20.5–51.1)
MAGNESIUM SERPL-MCNC: 3.3 MG/DL — CRITICAL HIGH (ref 1.8–2.4)
MCHC RBC-ENTMCNC: 28.8 PG — SIGNIFICANT CHANGE UP (ref 27–31)
MCHC RBC-ENTMCNC: 29 PG — SIGNIFICANT CHANGE UP (ref 27–31)
MCHC RBC-ENTMCNC: 29.2 G/DL — LOW (ref 32–37)
MCHC RBC-ENTMCNC: 29.2 PG — SIGNIFICANT CHANGE UP (ref 27–31)
MCHC RBC-ENTMCNC: 29.9 G/DL — LOW (ref 32–37)
MCHC RBC-ENTMCNC: 30.2 G/DL — LOW (ref 32–37)
MCV RBC AUTO: 96.6 FL — SIGNIFICANT CHANGE UP (ref 81–99)
MCV RBC AUTO: 97.2 FL — SIGNIFICANT CHANGE UP (ref 81–99)
MCV RBC AUTO: 98.5 FL — SIGNIFICANT CHANGE UP (ref 81–99)
MONOCYTES # BLD AUTO: 0.68 K/UL — HIGH (ref 0.1–0.6)
MONOCYTES # BLD AUTO: 1.03 K/UL — HIGH (ref 0.1–0.6)
MONOCYTES NFR BLD AUTO: 3.3 % — SIGNIFICANT CHANGE UP (ref 1.7–9.3)
MONOCYTES NFR BLD AUTO: 5.4 % — SIGNIFICANT CHANGE UP (ref 1.7–9.3)
MRSA PCR RESULT.: NEGATIVE — SIGNIFICANT CHANGE UP
NEUTROPHILS # BLD AUTO: 15.31 K/UL — HIGH (ref 1.4–6.5)
NEUTROPHILS # BLD AUTO: 18.26 K/UL — HIGH (ref 1.4–6.5)
NEUTROPHILS NFR BLD AUTO: 80.2 % — HIGH (ref 42.2–75.2)
NEUTROPHILS NFR BLD AUTO: 88.2 % — HIGH (ref 42.2–75.2)
NITRITE UR-MCNC: NEGATIVE — SIGNIFICANT CHANGE UP
NRBC # BLD: 0 /100 WBCS — SIGNIFICANT CHANGE UP (ref 0–0)
NT-PROBNP SERPL-SCNC: 672 PG/ML — HIGH (ref 0–300)
PCO2 BLDA: 37 MMHG — SIGNIFICANT CHANGE UP (ref 32–45)
PH BLDA: 7.42 — SIGNIFICANT CHANGE UP (ref 7.35–7.45)
PH UR: 5.5 — SIGNIFICANT CHANGE UP (ref 5–8)
PLATELET # BLD AUTO: 214 K/UL — SIGNIFICANT CHANGE UP (ref 130–400)
PLATELET # BLD AUTO: 256 K/UL — SIGNIFICANT CHANGE UP (ref 130–400)
PLATELET # BLD AUTO: 270 K/UL — SIGNIFICANT CHANGE UP (ref 130–400)
PMV BLD: 9.3 FL — SIGNIFICANT CHANGE UP (ref 7.4–10.4)
PMV BLD: 9.4 FL — SIGNIFICANT CHANGE UP (ref 7.4–10.4)
PMV BLD: SIGNIFICANT CHANGE UP (ref 7.4–10.4)
PO2 BLDA: 78 MMHG — LOW (ref 83–108)
POTASSIUM SERPL-MCNC: 4.8 MMOL/L — SIGNIFICANT CHANGE UP (ref 3.5–5)
POTASSIUM SERPL-MCNC: SIGNIFICANT CHANGE UP MMOL/L (ref 3.5–5)
POTASSIUM SERPL-SCNC: 4.8 MMOL/L — SIGNIFICANT CHANGE UP (ref 3.5–5)
POTASSIUM SERPL-SCNC: SIGNIFICANT CHANGE UP MMOL/L (ref 3.5–5)
PROT SERPL-MCNC: 6.5 G/DL — SIGNIFICANT CHANGE UP (ref 6–8)
PROT SERPL-MCNC: 7.4 G/DL — SIGNIFICANT CHANGE UP (ref 6–8)
PROT UR-MCNC: SIGNIFICANT CHANGE UP MG/DL
RBC # BLD: 3.87 M/UL — LOW (ref 4.2–5.4)
RBC # BLD: 3.89 M/UL — LOW (ref 4.2–5.4)
RBC # BLD: 4.13 M/UL — LOW (ref 4.2–5.4)
RBC # FLD: 16.8 % — HIGH (ref 11.5–14.5)
RBC # FLD: 17.2 % — HIGH (ref 11.5–14.5)
RBC # FLD: 17.4 % — HIGH (ref 11.5–14.5)
SAO2 % BLDA: 94.7 % — SIGNIFICANT CHANGE UP (ref 94–98)
SODIUM SERPL-SCNC: 146 MMOL/L — SIGNIFICANT CHANGE UP (ref 135–146)
SODIUM SERPL-SCNC: 147 MMOL/L — HIGH (ref 135–146)
SP GR SPEC: >1.03 — HIGH (ref 1–1.03)
TROPONIN T, HIGH SENSITIVITY RESULT: 132 NG/L — CRITICAL HIGH (ref 6–13)
TROPONIN T, HIGH SENSITIVITY RESULT: 142 NG/L — CRITICAL HIGH (ref 6–13)
TROPONIN T, HIGH SENSITIVITY RESULT: 78 NG/L — CRITICAL HIGH (ref 6–13)
TROPONIN T, HIGH SENSITIVITY RESULT: 79 NG/L — CRITICAL HIGH (ref 6–13)
UROBILINOGEN FLD QL: 0.2 MG/DL — SIGNIFICANT CHANGE UP (ref 0.2–1)
WBC # BLD: 19.08 K/UL — HIGH (ref 4.8–10.8)
WBC # BLD: 19.68 K/UL — HIGH (ref 4.8–10.8)
WBC # BLD: 20.73 K/UL — HIGH (ref 4.8–10.8)
WBC # FLD AUTO: 19.08 K/UL — HIGH (ref 4.8–10.8)
WBC # FLD AUTO: 19.68 K/UL — HIGH (ref 4.8–10.8)
WBC # FLD AUTO: 20.73 K/UL — HIGH (ref 4.8–10.8)

## 2024-08-24 PROCEDURE — 70450 CT HEAD/BRAIN W/O DYE: CPT | Mod: 26,MC

## 2024-08-24 PROCEDURE — 84295 ASSAY OF SERUM SODIUM: CPT

## 2024-08-24 PROCEDURE — 84132 ASSAY OF SERUM POTASSIUM: CPT

## 2024-08-24 PROCEDURE — 85379 FIBRIN DEGRADATION QUANT: CPT

## 2024-08-24 PROCEDURE — 85730 THROMBOPLASTIN TIME PARTIAL: CPT

## 2024-08-24 PROCEDURE — 92610 EVALUATE SWALLOWING FUNCTION: CPT | Mod: GN

## 2024-08-24 PROCEDURE — 86850 RBC ANTIBODY SCREEN: CPT

## 2024-08-24 PROCEDURE — 36430 TRANSFUSION BLD/BLD COMPNT: CPT

## 2024-08-24 PROCEDURE — 85610 PROTHROMBIN TIME: CPT

## 2024-08-24 PROCEDURE — 81003 URINALYSIS AUTO W/O SCOPE: CPT

## 2024-08-24 PROCEDURE — 82962 GLUCOSE BLOOD TEST: CPT

## 2024-08-24 PROCEDURE — 94660 CPAP INITIATION&MGMT: CPT

## 2024-08-24 PROCEDURE — 82330 ASSAY OF CALCIUM: CPT

## 2024-08-24 PROCEDURE — 93005 ELECTROCARDIOGRAM TRACING: CPT

## 2024-08-24 PROCEDURE — 87150 DNA/RNA AMPLIFIED PROBE: CPT

## 2024-08-24 PROCEDURE — P9016: CPT

## 2024-08-24 PROCEDURE — 86901 BLOOD TYPING SEROLOGIC RH(D): CPT

## 2024-08-24 PROCEDURE — 87641 MR-STAPH DNA AMP PROBE: CPT

## 2024-08-24 PROCEDURE — 99223 1ST HOSP IP/OBS HIGH 75: CPT

## 2024-08-24 PROCEDURE — 36415 COLL VENOUS BLD VENIPUNCTURE: CPT

## 2024-08-24 PROCEDURE — 84484 ASSAY OF TROPONIN QUANT: CPT

## 2024-08-24 PROCEDURE — 83605 ASSAY OF LACTIC ACID: CPT

## 2024-08-24 PROCEDURE — 71045 X-RAY EXAM CHEST 1 VIEW: CPT

## 2024-08-24 PROCEDURE — 94003 VENT MGMT INPAT SUBQ DAY: CPT

## 2024-08-24 PROCEDURE — 93306 TTE W/DOPPLER COMPLETE: CPT

## 2024-08-24 PROCEDURE — 85014 HEMATOCRIT: CPT

## 2024-08-24 PROCEDURE — 70450 CT HEAD/BRAIN W/O DYE: CPT | Mod: MC

## 2024-08-24 PROCEDURE — 74177 CT ABD & PELVIS W/CONTRAST: CPT | Mod: 26,MC

## 2024-08-24 PROCEDURE — 74018 RADEX ABDOMEN 1 VIEW: CPT

## 2024-08-24 PROCEDURE — 85018 HEMOGLOBIN: CPT

## 2024-08-24 PROCEDURE — 93970 EXTREMITY STUDY: CPT

## 2024-08-24 PROCEDURE — 86923 COMPATIBILITY TEST ELECTRIC: CPT

## 2024-08-24 PROCEDURE — 94640 AIRWAY INHALATION TREATMENT: CPT

## 2024-08-24 PROCEDURE — 87640 STAPH A DNA AMP PROBE: CPT

## 2024-08-24 PROCEDURE — 82803 BLOOD GASES ANY COMBINATION: CPT

## 2024-08-24 PROCEDURE — 80202 ASSAY OF VANCOMYCIN: CPT

## 2024-08-24 PROCEDURE — 87040 BLOOD CULTURE FOR BACTERIA: CPT

## 2024-08-24 PROCEDURE — 84443 ASSAY THYROID STIM HORMONE: CPT

## 2024-08-24 PROCEDURE — 86900 BLOOD TYPING SEROLOGIC ABO: CPT

## 2024-08-24 PROCEDURE — 71275 CT ANGIOGRAPHY CHEST: CPT | Mod: 26,MC

## 2024-08-24 PROCEDURE — 95819 EEG AWAKE AND ASLEEP: CPT

## 2024-08-24 PROCEDURE — 83735 ASSAY OF MAGNESIUM: CPT

## 2024-08-24 PROCEDURE — 99497 ADVNCD CARE PLAN 30 MIN: CPT | Mod: 25

## 2024-08-24 PROCEDURE — 74176 CT ABD & PELVIS W/O CONTRAST: CPT | Mod: MC

## 2024-08-24 PROCEDURE — 94002 VENT MGMT INPAT INIT DAY: CPT

## 2024-08-24 PROCEDURE — 85025 COMPLETE CBC W/AUTO DIFF WBC: CPT

## 2024-08-24 PROCEDURE — 85027 COMPLETE CBC AUTOMATED: CPT

## 2024-08-24 PROCEDURE — 93010 ELECTROCARDIOGRAM REPORT: CPT

## 2024-08-24 PROCEDURE — 92526 ORAL FUNCTION THERAPY: CPT | Mod: GN

## 2024-08-24 PROCEDURE — 87077 CULTURE AEROBIC IDENTIFY: CPT

## 2024-08-24 PROCEDURE — 80053 COMPREHEN METABOLIC PANEL: CPT

## 2024-08-24 PROCEDURE — 0241U: CPT

## 2024-08-24 PROCEDURE — 84145 PROCALCITONIN (PCT): CPT

## 2024-08-24 PROCEDURE — 84100 ASSAY OF PHOSPHORUS: CPT

## 2024-08-24 RX ORDER — LATANOPROST 50 UG/ML
1 SOLUTION OPHTHALMIC AT BEDTIME
Refills: 0 | Status: DISCONTINUED | OUTPATIENT
Start: 2024-08-24 | End: 2024-09-04

## 2024-08-24 RX ORDER — ACETAMINOPHEN 325 MG/1
325 TABLET ORAL ONCE
Refills: 0 | Status: DISCONTINUED | OUTPATIENT
Start: 2024-08-24 | End: 2024-08-24

## 2024-08-24 RX ORDER — NIFEDIPINE 60 MG/1
60 TABLET, FILM COATED, EXTENDED RELEASE ORAL DAILY
Refills: 0 | Status: DISCONTINUED | OUTPATIENT
Start: 2024-08-24 | End: 2024-08-24

## 2024-08-24 RX ORDER — FAMOTIDINE 10 MG/ML
20 INJECTION INTRAVENOUS DAILY
Refills: 0 | Status: DISCONTINUED | OUTPATIENT
Start: 2024-08-24 | End: 2024-09-04

## 2024-08-24 RX ORDER — CEFEPIME 2 G/1
2000 INJECTION, POWDER, FOR SOLUTION INTRAVENOUS EVERY 8 HOURS
Refills: 0 | Status: DISCONTINUED | OUTPATIENT
Start: 2024-08-24 | End: 2024-08-25

## 2024-08-24 RX ORDER — ASCORBIC ACID/ASCORBATE SODIUM 500 MG
500 TABLET,CHEWABLE ORAL DAILY
Refills: 0 | Status: DISCONTINUED | OUTPATIENT
Start: 2024-08-24 | End: 2024-08-30

## 2024-08-24 RX ORDER — MINERAL OIL 1000 MG/ML
133 LIQUID TOPICAL
Refills: 0 | Status: DISCONTINUED | OUTPATIENT
Start: 2024-08-24 | End: 2024-08-29

## 2024-08-24 RX ORDER — CARBIDOPA/LEVODOPA 25MG-250MG
1 TABLET ORAL THREE TIMES A DAY
Refills: 0 | Status: DISCONTINUED | OUTPATIENT
Start: 2024-08-24 | End: 2024-09-04

## 2024-08-24 RX ORDER — ASPIRIN 81 MG
81 TABLET, DELAYED RELEASE (ENTERIC COATED) ORAL DAILY
Refills: 0 | Status: DISCONTINUED | OUTPATIENT
Start: 2024-08-24 | End: 2024-09-04

## 2024-08-24 RX ORDER — VANCOMYCIN/0.9 % SOD CHLORIDE 1.75G/25
PLASTIC BAG, INJECTION (ML) INTRAVENOUS
Refills: 0 | Status: DISCONTINUED | OUTPATIENT
Start: 2024-08-24 | End: 2024-08-25

## 2024-08-24 RX ORDER — BUDESONIDE AND FORMOTEROL FUMARATE 80; 4.5 UG/1; UG/1
2 AEROSOL, METERED RESPIRATORY (INHALATION)
Refills: 0 | DISCHARGE

## 2024-08-24 RX ORDER — LEVOTHYROXINE SODIUM 100 MCG
50 TABLET ORAL DAILY
Refills: 0 | Status: DISCONTINUED | OUTPATIENT
Start: 2024-08-24 | End: 2024-09-04

## 2024-08-24 RX ORDER — METOPROLOL TARTRATE 100 MG/1
1 TABLET ORAL
Refills: 0 | DISCHARGE

## 2024-08-24 RX ORDER — IPRATROPIUM BROMIDE AND ALBUTEROL SULFATE .5; 3 MG/3ML; MG/3ML
3 SOLUTION RESPIRATORY (INHALATION)
Refills: 0 | DISCHARGE

## 2024-08-24 RX ORDER — BUDESONIDE AND FORMOTEROL FUMARATE 80; 4.5 UG/1; UG/1
2 AEROSOL, METERED RESPIRATORY (INHALATION)
Refills: 0 | Status: DISCONTINUED | OUTPATIENT
Start: 2024-08-24 | End: 2024-08-27

## 2024-08-24 RX ORDER — METOPROLOL TARTRATE 100 MG/1
25 TABLET ORAL DAILY
Refills: 0 | Status: DISCONTINUED | OUTPATIENT
Start: 2024-08-24 | End: 2024-08-27

## 2024-08-24 RX ORDER — POLYETHYLENE GLYCOL 3350 17 G/17G
17 POWDER, FOR SOLUTION ORAL DAILY
Refills: 0 | Status: DISCONTINUED | OUTPATIENT
Start: 2024-08-24 | End: 2024-08-27

## 2024-08-24 RX ORDER — ENOXAPARIN SODIUM 100 MG/ML
40 INJECTION SUBCUTANEOUS EVERY 24 HOURS
Refills: 0 | Status: DISCONTINUED | OUTPATIENT
Start: 2024-08-24 | End: 2024-08-27

## 2024-08-24 RX ORDER — IPRATROPIUM BROMIDE AND ALBUTEROL SULFATE .5; 3 MG/3ML; MG/3ML
3 SOLUTION RESPIRATORY (INHALATION) EVERY 6 HOURS
Refills: 0 | Status: DISCONTINUED | OUTPATIENT
Start: 2024-08-24 | End: 2024-08-30

## 2024-08-24 RX ORDER — POVIDONE, PROPYLENE GLYCOL 6.8; 3 MG/ML; MG/ML
1 LIQUID OPHTHALMIC
Refills: 0 | Status: DISCONTINUED | OUTPATIENT
Start: 2024-08-24 | End: 2024-09-04

## 2024-08-24 RX ORDER — LACTULOSE 10 G
10 PACKET (EA) ORAL
Refills: 0 | Status: COMPLETED | OUTPATIENT
Start: 2024-08-24 | End: 2024-08-25

## 2024-08-24 RX ORDER — FUROSEMIDE 40 MG
20 TABLET ORAL DAILY
Refills: 0 | Status: DISCONTINUED | OUTPATIENT
Start: 2024-08-24 | End: 2024-08-27

## 2024-08-24 RX ORDER — ACETAMINOPHEN 325 MG/1
650 TABLET ORAL ONCE
Refills: 0 | Status: DISCONTINUED | OUTPATIENT
Start: 2024-08-24 | End: 2024-08-24

## 2024-08-24 RX ORDER — FOLIC ACID/MULTIVIT,IRON,MINER 0.4MG-18MG
1000 TABLET,CHEWABLE ORAL DAILY
Refills: 0 | Status: DISCONTINUED | OUTPATIENT
Start: 2024-08-24 | End: 2024-08-30

## 2024-08-24 RX ORDER — FUROSEMIDE 40 MG
0.5 TABLET ORAL
Refills: 0 | DISCHARGE

## 2024-08-24 RX ORDER — VANCOMYCIN/0.9 % SOD CHLORIDE 1.75G/25
1250 PLASTIC BAG, INJECTION (ML) INTRAVENOUS EVERY 24 HOURS
Refills: 0 | Status: DISCONTINUED | OUTPATIENT
Start: 2024-08-25 | End: 2024-08-25

## 2024-08-24 RX ORDER — TIMOLOL MALEATE 5 MG/ML
1 SOLUTION/ DROPS OPHTHALMIC
Refills: 0 | Status: DISCONTINUED | OUTPATIENT
Start: 2024-08-24 | End: 2024-09-04

## 2024-08-24 RX ORDER — SENNA 187 MG
2 TABLET ORAL AT BEDTIME
Refills: 0 | Status: DISCONTINUED | OUTPATIENT
Start: 2024-08-24 | End: 2024-09-04

## 2024-08-24 RX ORDER — MINERAL OIL 1000 MG/ML
133 LIQUID TOPICAL
Refills: 0 | DISCHARGE

## 2024-08-24 RX ORDER — VANCOMYCIN/0.9 % SOD CHLORIDE 1.75G/25
1250 PLASTIC BAG, INJECTION (ML) INTRAVENOUS ONCE
Refills: 0 | Status: COMPLETED | OUTPATIENT
Start: 2024-08-24 | End: 2024-08-24

## 2024-08-24 RX ORDER — CARBIDOPA/LEVODOPA 25MG-250MG
1 TABLET ORAL
Refills: 0 | DISCHARGE

## 2024-08-24 RX ADMIN — Medication 25 GRAM(S): at 01:00

## 2024-08-24 RX ADMIN — TIMOLOL MALEATE 1 DROP(S): 5 SOLUTION/ DROPS OPHTHALMIC at 17:36

## 2024-08-24 RX ADMIN — METOPROLOL TARTRATE 25 MILLIGRAM(S): 100 TABLET ORAL at 13:31

## 2024-08-24 RX ADMIN — FAMOTIDINE 20 MILLIGRAM(S): 10 INJECTION INTRAVENOUS at 13:31

## 2024-08-24 RX ADMIN — Medication 75 MILLILITER(S): at 09:27

## 2024-08-24 RX ADMIN — Medication 1 TABLET(S): at 15:33

## 2024-08-24 RX ADMIN — POLYETHYLENE GLYCOL 3350 17 GRAM(S): 17 POWDER, FOR SOLUTION ORAL at 15:54

## 2024-08-24 RX ADMIN — Medication 1000 MILLILITER(S): at 15:32

## 2024-08-24 RX ADMIN — Medication 75 MILLILITER(S): at 14:43

## 2024-08-24 RX ADMIN — LATANOPROST 1 DROP(S): 50 SOLUTION OPHTHALMIC at 21:36

## 2024-08-24 RX ADMIN — CEFEPIME 100 MILLIGRAM(S): 2 INJECTION, POWDER, FOR SOLUTION INTRAVENOUS at 21:36

## 2024-08-24 RX ADMIN — ENOXAPARIN SODIUM 40 MILLIGRAM(S): 100 INJECTION SUBCUTANEOUS at 15:33

## 2024-08-24 RX ADMIN — Medication 75 MILLILITER(S): at 16:07

## 2024-08-24 RX ADMIN — Medication 500 MILLIGRAM(S): at 15:32

## 2024-08-24 RX ADMIN — Medication 166.67 MILLIGRAM(S): at 13:17

## 2024-08-24 RX ADMIN — NIFEDIPINE 60 MILLIGRAM(S): 60 TABLET, FILM COATED, EXTENDED RELEASE ORAL at 13:31

## 2024-08-24 RX ADMIN — Medication 20 MILLIGRAM(S): at 15:32

## 2024-08-24 RX ADMIN — Medication 10 GRAM(S): at 17:36

## 2024-08-24 RX ADMIN — CEFEPIME 100 MILLIGRAM(S): 2 INJECTION, POWDER, FOR SOLUTION INTRAVENOUS at 16:07

## 2024-08-24 RX ADMIN — POVIDONE, PROPYLENE GLYCOL 1 DROP(S): 6.8; 3 LIQUID OPHTHALMIC at 17:36

## 2024-08-24 RX ADMIN — Medication 81 MILLIGRAM(S): at 13:31

## 2024-08-24 RX ADMIN — Medication 1000 UNIT(S): at 13:31

## 2024-08-24 RX ADMIN — Medication 300 MILLIGRAM(S): at 02:02

## 2024-08-24 NOTE — H&P ADULT - NSHPPHYSICALEXAM_GEN_ALL_CORE
Physical Examination   CONSTITUTIONAL: Not in acute distress  NEUROLOGICAL: Alert and oriented x 2, (place and person)  EYES: Pupils equal, Round and reactive to light.  CARDIAC: Normal rate, Regular rhythm.    RESPIRATORY: No wheezing, No crackles, Normal chest expansion, Not tachypneic, No use of accessory muscles  GASTROINTESTINAL: Abdomen soft, Non-tender, No guarding, + BS, Abdominal distended  EXTREMITIES:  2+ Peripheral Pulses, No clubbing, cyanosis, or edema

## 2024-08-24 NOTE — H&P ADULT - NSHPLABSRESULTS_GEN_ALL_CORE
Home Medications:  acetaminophen 325 mg oral tablet: 2 tab(s) orally every 12 hours (24 Aug 2024 11:36)  Artificial Tears ophthalmic solution: 1 drop(s) to each affected eye 2 times a day (24 Aug 2024 11:26)  aspirin 81 mg oral tablet, chewable: 1 tab(s) orally once a day (24 Aug 2024 11:36)  carbidopa-levodopa 10 mg-100 mg oral tablet: 1 tab(s) orally 3 times a day (24 Aug 2024 11:28)  Colace 100 mg oral capsule: 1 cap(s) orally 2 times a day (24 Aug 2024 11:36)  famotidine 20 mg oral tablet: 1 tab(s) orally once a day (24 Aug 2024 11:36)  Fleet Mineral Oil rectal enema: 133 milliliter(s) rectally 3 times a week (24 Aug 2024 12:44)  gabapentin 600 mg oral tablet: 1 tab(s) orally 3 times a day (24 Aug 2024 11:30)  ipratropium-albuterol 0.5 mg-2.5 mg/3 mL inhalation solution: 3 milliliter(s) by nebulizer every 6 hours as needed for  shortness of breath and/or wheezing (24 Aug 2024 11:32)  Lasix 20 mg oral tablet: 0.5 tab(s) orally once a day (24 Aug 2024 11:30)  latanoprost 0.005% ophthalmic solution: 1 drop(s) to each affected eye once a day (at bedtime) (24 Aug 2024 11:36)  levothyroxine 50 mcg (0.05 mg) oral tablet: 1 tab(s) orally once a day (24 Aug 2024 11:36)  melatonin 3 mg oral tablet: 1 tab(s) orally once a day (at bedtime), As needed, Insomnia (24 Aug 2024 11:36)  metoprolol succinate 25 mg oral capsule, extended release: 1 cap(s) orally once a day (24 Aug 2024 11:33)  NIFEdipine 60 mg oral tablet, extended release: 1 tab(s) orally once a day (24 Aug 2024 11:34)  polyethylene glycol 3350 oral powder for reconstitution: 17 gram(s) orally once a day (24 Aug 2024 11:36)  Senna 8.6 mg oral tablet: 2 tab(s) orally once a day (at bedtime) (24 Aug 2024 11:36)  Symbicort 160 mcg-4.5 mcg/inh inhalation aerosol: 2 puff(s) inhaled 2 times a day (24 Aug 2024 11:27)  timolol hemihydrate 0.5% ophthalmic solution: 1 drop(s) to each affected eye 2 times a day (24 Aug 2024 11:34)  Vitamin C 500 mg oral tablet: 1 tab(s) orally once a day (24 Aug 2024 11:36)  Vitamin D3 1000 intl units oral tablet: 1 tab(s) orally once a day (24 Aug 2024 11:36)    MEDICATIONS  (STANDING):  cefepime   IVPB 2000 milliGRAM(s) IV Intermittent every 8 hours  lactulose Syrup 10 Gram(s) Oral two times a day  sodium chloride 0.45%. 1000 milliLiter(s) (75 mL/Hr) IV Continuous <Continuous>  vancomycin  IVPB        MEDICATIONS  (PRN):  melatonin 3 milliGRAM(s) Oral at bedtime PRN Insomnia        LABS:                        11.9   20.73 )-----------( 256      ( 24 Aug 2024 11:43 )             40.7     08-23    147<H>  |  109  |  61<HH>  ----------------------------<  191<H>  TNP,Hemolyzed   |  24  |  1.4    Ca    9.6      23 Aug 2024 23:19    TPro  7.4  /  Alb  4.0  /  TBili  0.2  /  DBili  x   /  AST  59<H>  /  ALT  12  /  AlkPhos  52  08-23    LIVER FUNCTIONS - ( 23 Aug 2024 23:19 )  Alb: 4.0 g/dL / Pro: 7.4 g/dL / ALK PHOS: 52 U/L / ALT: 12 U/L / AST: 59 U/L / GGT: x               PT/INR - ( 23 Aug 2024 23:19 )   PT: 10.70 sec;   INR: 0.94 ratio         PTT - ( 23 Aug 2024 23:19 )  PTT:16.6 sec  Urinalysis Basic - ( 24 Aug 2024 05:12 )    Color: Yellow / Appearance: Clear / SG: >1.030 / pH: x  Gluc: x / Ketone: Negative mg/dL  / Bili: Negative / Urobili: 0.2 mg/dL   Blood: x / Protein: Trace mg/dL / Nitrite: Negative   Leuk Esterase: Negative / RBC: x / WBC x   Sq Epi: x / Non Sq Epi: x / Bacteria: x          Urinalysis with Rflx Culture (collected 08-24-24 @ 05:12)        Urinalysis with Rflx Culture (collected 08-24-24 @ 05:12)        Urinalysis with Rflx Culture (collected 24 Aug 2024 05:12)

## 2024-08-24 NOTE — ED ADULT NURSE REASSESSMENT NOTE - NS ED NURSE REASSESS COMMENT FT1
[address] [place] [address]               [phone]        Leonel Talamantes is a 46 y.o. female and presents with Hypertension           Assessment/Plan:  Ham Knox was seen today for hypertension. Diagnoses and all orders for this visit:    Essential hypertension  -     Discontinue: furosemide (LASIX) 40 MG tablet; Take 1 tablet by mouth daily  -     furosemide (LASIX) 40 MG tablet; Take 1 tablet by mouth daily  -     Comprehensive Metabolic Panel; Future    Postconcussive syndrome    Tobacco use    Elevated LFTs  -     Cancel: Hepatic Function Panel; Future  -     Comprehensive Metabolic Panel; Future    Seasonal allergic rhinitis, unspecified trigger  -     Allergen (24) Panel; Future    Allergic conjunctivitis of both eyes    Dental caries  -     amoxicillin-clavulanate (AUGMENTIN) 875-125 MG per tablet; Take 1 tablet by mouth 2 times daily for 7 days    HTN: uncontrolled; pulse too low to add clonidine or beta blocker; maxed out on her lisinopril and amlodipine. Offered another water pill; pt does not drink enough water throughout the day, had rash with HCTZ and notes urge incontinence if she increases her water intake.  Offered to have her take it at night so she is not bothered with urination throughout the day; repeat electrolyte panel next week; advised of importance of keeping up with hydration to prevent renal disease/dehydration; f/u in 2 weeks in office for pap/bp check    Continue f/u with [facility] for postconcussive symtpoms/physical therapy    Encouraged tobacco cessation; plans to quit around her birthday    Repeat lfts with next set of labwork    Allergic rhinitis/conjunctivitis: encouraged her to take eye drops Zaditor twice/day instead of once/day to improve symptoms; continue claritin    Given chipped tooth/pain/swelling, concern for underlying infection; will treat with Augmentin bid; has pending appt with Dentist 5/22      Follow up and Pts weight confirmed w/ pharmacy at 00:20. Vanco not ready by pharmacy, waiting for med.Vanco started at 200

## 2024-08-24 NOTE — EEG REPORT - NS EEG TEXT BOX
IMELDA ROLLE BECCA-[id number]     Study Date: 08-24-24  Duration: 20 min  --------------------------------------------------------------------------------------------------  History:  CC/ HPI Patient is a 84y old  Female who presents with a chief complaint of Altered Mental Status and Increased WOB (24 Aug 2024 12:08)    MEDICATIONS  (STANDING):  artificial tears (preservative free) Ophthalmic Solution 1 Drop(s) Both EYES two times a day  ascorbic acid 500 milliGRAM(s) Oral daily  aspirin  chewable 81 milliGRAM(s) Oral daily  budesonide 160 MICROgram(s)/formoterol 4.5 MICROgram(s) Inhaler 2 Puff(s) Inhalation two times a day  carbidopa/levodopa  10/100 1 Tablet(s) Oral three times a day  cefepime   IVPB 2000 milliGRAM(s) IV Intermittent every 8 hours  cholecalciferol 1000 Unit(s) Oral daily  enoxaparin Injectable 40 milliGRAM(s) SubCutaneous every 24 hours  famotidine    Tablet 20 milliGRAM(s) Oral daily  furosemide    Tablet 20 milliGRAM(s) Oral daily  lactated ringers. 1000 milliLiter(s) (75 mL/Hr) IV Continuous <Continuous>  lactulose Syrup 10 Gram(s) Oral two times a day  latanoprost 0.005% Ophthalmic Solution 1 Drop(s) Both EYES at bedtime  levothyroxine 50 MICROGram(s) Oral daily  metoprolol succinate ER 25 milliGRAM(s) Oral daily  mineral oil enema 133 milliLiter(s) Rectal <User Schedule>  polyethylene glycol 3350 17 Gram(s) Oral daily  senna 2 Tablet(s) Oral at bedtime  timolol 0.5% Solution 1 Drop(s) Both EYES two times a day  vancomycin  IVPB        --------------------------------------------------------------------------------------------------  Study Interpretation:    [[[Abbreviation Key:  PDR=alpha rhythm/posterior dominant rhythm. A-P=anterior posterior.  Amplitude: ‘very low’:<20; ‘low’:20-49; ‘medium’:; ‘high’:>150uV.  Persistence for periodic/rhythmic patterns (% of epoch) ‘rare’:<1%; ‘occasional’:1-10%; ‘frequent’:10-50%; ‘abundant’:50-90%; ‘continuous’:>90%.  Persistence for sporadic discharges: ‘rare’:<1/hr; ‘occasional’:1/min-1/hr; ‘frequent’:>1/min; ‘abundant’:>1/10 sec.  RPP=rhythmic and periodic patterns; GRDA=generalized rhythmic delta activity; FIRDA=frontal intermittent GRDA; LRDA=lateralized rhythmic delta activity; TIRDA=temporal intermittent rhythmic delta activity;  LPD=PLED=lateralized periodic discharges; GPD=generalized periodic discharges; BIPDs =bilateral independent periodic discharges; Mf=multifocal; SIRPDs=stimulus induced rhythmic, periodic, or ictal appearing discharges; BIRDs=brief potentially ictal rhythmic discharges >4 Hz, lasting .5-10s; PFA (paroxysmal bursts >13 Hz or =8 Hz <10s).  Modifiers: +F=with fast component; +S=with spike component; +R=with rhythmic component.  S-B=burst suppression pattern.  Max=maximal. N1-drowsy; N2-stage II sleep; N3-slow wave sleep. SSS/BETS=small sharp spikes/benign epileptiform transients of sleep. HV=hyperventilation; PS=photic stimulation]]]    Daily EEG Visual Analysis    FINDINGS:      Background:  Continuity: Continuous  Symmetry: Symmetric  Posterior dominant rhythm (PDR): 6 Hz, reactive to eye closure. Intermittent low-amplitude frontal beta and theta in wakefulness.  Voltage: Normal  Anterior-Posterior Gradient: Present  Other background findings: Occasional diffuse polymorphic theta  Breach: Absent    Background Slowing:  Generalized slowing: As above  Focal slowing: None    State Changes:   Drowsiness and stage 2 sleep are not captured.    Interictal Findings:  None    Electrographic and Electroclinical seizures:  None    Other Clinical Events:  None    Activation Procedures:   Hyperventilation is not performed.    Photic stimulation is not performed.  Hyperventilation is performed and does not elicit any abnormalities.  Photic stimulation is performed and does not elicit any abnormalities.      Artifacts:  Intermittent myogenic and movement artifacts are present.    Single-lead EKG: Regular rhythm    EEG Classification / Summary:  Abnormal routine EEG in the awake state.  Mild diffuse slowing.  No focal or epileptiform abnormalities are captured.   No seizures.    Clinical Impression:  Mild diffuse cerebral dysfunction is nonspecific in etiology.   No epileptiform abnormalities or seizures.          -------------------------------------------------------------------------------------------------------    Nallely Maurice MD  Attending Physician, [facility]    -------------------------------------------------------------------------------------------------------    To reach EEG technologist:  Please use the pager number for the appropriate hospital or contact the .  At [facility] - Pager #: [phone]    To reach EEG-reading physician:  [facility] EEG Reading Room Phone #: [phone]  Epilepsy Answering Service after 5PM and before 8:30AM: Phone #: [phone]

## 2024-08-24 NOTE — H&P ADULT - ATTENDING COMMENTS
HPI as above.  Interval history: Pt seen and examined at bedside. No cp or sob.   Vital Signs (24 Hrs):  T(C): 36.3 (08-24-24 @ 07:30), Max: 36.6 (08-23-24 @ 23:04)  HR: 81 (08-24-24 @ 07:30) (81 - 145)  BP: 101/58 (08-24-24 @ 07:30) (101/58 - 150/90)  RR: 20 (08-24-24 @ 07:30) (20 - 28)  SpO2: 100% (08-24-24 @ 07:30) (94% - 100%)  Wt(kg): --  Daily Height in cm: 162.56 (24 Aug 2024 06:40)    Daily     I&O's Summary    PHYSICAL EXAM:  GENERAL: NAD, well-developed  HEAD:  Atraumatic, Normocephalic  EYES: EOMI, PERRLA, conjunctiva and sclera clear  NECK: Supple, No JVD  CHEST/LUNG: Clear to auscultation bilaterally; No wheeze  HEART: Regular rate and rhythm; No murmurs, rubs, or gallops  ABDOMEN: Soft, Nontender, Nondistended; Bowel sounds present  EXTREMITIES:  2+ Peripheral Pulses, No clubbing, cyanosis, or edema  PSYCH: AAOx1-2  NEUROLOGY: non-focal  SKIN: No rashes or lesions  Labs reviewed  Imaging reviewed independently and reviewed read  < from: CT Abdomen and Pelvis w/ IV Cont (08.24.24 @ 01:36) >    IMPRESSION:    1. Urinary bladder mild pericystic stranding, could be attributed to   urinary bladder infection in the appropriate clinical setting.    2. Increased ovarian 2.5 cm cystic lesion. Unchanged right ovarian 7 cm   cystic mass. Outpatient pelvic ultrasound recommended for further   evaluation.    3. Moderate-to-large colorectal stool burden.    4. No evidence of acute thoracic pathology or pulmonary embolus.    EKG reviewed independently and reviewed read    Plan as above. DW resident.     #Progress Note Handoff  Pending (specify):  ID, infectious work up , lactate, Labs   Family discussion: house staff updated pt family  Disposition: dc cardiac telemonitoring   Decision to admit the pt is based on acuity as above

## 2024-08-24 NOTE — H&P ADULT - HISTORY OF PRESENT ILLNESS
Patient 84-year-old woman with a history of Lewy body dementia (AAO x 2 at baseline), Parkinson's, COPD not on home oxygen, presented from [facility] with presenting complaints of AMS and Tachypnea, Patient was A&Ox0 on arrival, unable to provide history.  Patient was found to be with decreased breath sounds diffusely, quick bedside ultrasound by ED showed lung sliding, no pneumothorax, was placed on BiPAP for potential hypercapnia, magnesium and nebs given, blood gas resulted with normal CO2. was weaned off to NC. On my visit patient saturating 95 % on RA.  ED team held off on 30 cc/kg bolus, as patient has a history of heart failure: was given 1 L bolus              Spoke with the  who arrived to bedside shortly after the patient arrived, discussed goals of care.  Patient is DNR, with a trial of intubation if necessary for short term.  Patient currently protecting her airway.            Was taken off BIPAP and resting comfortably on 3L nc. Was evaluated by cardio for two runs of Vtach approx 10 sec, cleared for Nutrabolt. signed out to MAR.          97.8     133     141/77     24               WBC 19.08, Lactate 3.1 -> 4.0, Trops 142 -> 132, Na 147, BUN/Cr 61/1.4, pro-,      VBG: pH 7.32, pCo2 42, HCO3 22, UA WNL          CT Head: No evidence of acute intracranial abnormality.     CT Angio Chest PE protocol and CT A/P with IV contrast:      1. Urinary bladder mild pericystic stranding      2. Increased ovarian 2.5 cm cystic lesion. Unchanged right ovarian 7 cm cystic mass. Outpatient pelvic ultrasound recommended for further evaluation.     3. Moderate-to-large colorectal stool burden.     4. No evidence of acute thoracic pathology or pulmonary embolus.     CXR:      No focal consolidation, pneumothorax or pleural effusion.      Stable cardio-mediastinal silhouette.  Patient 84-year-old woman with a history of Lewy body dementia (AAO x 2 at baseline), Parkinson's, COPD not on home oxygen, presented from [facility] with presenting complaints of AMS and Tachypnea, Patient was A&Ox0 on arrival, unable to provide history.  Patient was found to be with decreased breath sounds diffusely, quick bedside ultrasound by ED showed lung sliding, no pneumothorax, was placed on BiPAP for potential hypercapnia, magnesium and nebs given, blood gas resulted with normal CO2. was weaned off to NC. On my visit patient saturating 95 % on RA.  ED team held off on 30 cc/kg bolus, as patient has a history of heart failure: was given 1 L bolus. She was evaluated by cardiology for two runs of Vtach approx 10 sec, recommended telemetry admission      GOC: Spoke with the  who arrived to bedside shortly after the patient arrived, discussed goals of care.  Patient is DNR, with a trial of intubation if necessary for short term.        ED Course  Vitals: 97.8, 133, 141/77 , 24 bpm  Labs:  WBC 19.08, Lactate 3.1 -> 4.0, Trops 142 -> 132, Na 147, BUN/Cr 61/1.4, pro-,    VBG: pH 7.32, pCo2 42, HCO3 22, UA WNL   Imaging:   CT Head: No evidence of acute intracranial abnormality.   CT Angio Chest PE protocol and CT A/P with IV contrast:    1. Urinary bladder mild pericystic stranding    2. Increased ovarian 2.5 cm cystic lesion. Unchanged right ovarian 7 cm cystic mass. Outpatient pelvic ultrasound recommended for further evaluation.   3. Moderate-to-large colorectal stool burden.   4. No evidence of acute thoracic pathology or pulmonary embolus.   CXR:    No focal consolidation, pneumothorax or pleural effusion.   Stable cardio-mediastinal silhouette.     Patient being admitted to medicine for further evaluation and management of severe sepsis 2/2 possible acute complicated UTI     Patient 84-year-old woman with a history of Lewy body dementia (AAO x 2 at baseline), Parkinson's, COPD not on home oxygen, presented from [facility] with presenting complaints of AMS and Tachypnea, Patient was A&Ox0 on arrival, unable to provide history. Patient was found to be with decreased breath sounds diffusely, quick bedside ultrasound by ED showed lung sliding, no pneumothorax, was placed on BiPAP for potential hypercapnia, magnesium and nebs given, blood gas resulted with normal CO2. was weaned off to NC.  ED team held off on 30 cc/kg bolus, as patient has a history of heart failure: was given 1 L bolus. She was evaluated by cardiology for two runs of NSVTs approx 10 sec, recommended telemetry admission. Patient was started on Ertapenem, Doxycycline and Ceftriaxone in NH for suspected UTI. At baseline patient is AXO x3, has difficulty finding words, has impaired memory but has meaningful conversation. On my visit patient saturating 95 % on RA lying comfortably in bed, is oriented to place and person and nodding to questions. Patient endorses no abdominal pain, chest pain, SOB or fever.     GOC: Spoke with the  who arrived to bedside shortly after the patient arrived, discussed goals of care.  Patient is DNR/DNI.       ED Course  Vitals: 97.8, 133, 141/77 , 24 bpm  Labs:  WBC 19.08, Lactate 3.1 -> 4.0, Trops 142 -> 132, Na 147, BUN/Cr 61/1.4, pro-,    VBG: pH 7.32, pCo2 42, HCO3 22, UA WNL   Imaging:   CT Head: No evidence of acute intracranial abnormality.   CT Angio Chest PE protocol and CT A/P with IV contrast:    1. Urinary bladder mild pericystic stranding    2. Increased ovarian 2.5 cm cystic lesion. Unchanged right ovarian 7 cm cystic mass. Outpatient pelvic ultrasound recommended for further evaluation.   3. Moderate-to-large colorectal stool burden.   4. No evidence of acute thoracic pathology or pulmonary embolus.   CXR:    No focal consolidation, pneumothorax or pleural effusion.   Stable cardio-mediastinal silhouette.     Patient being admitted to medicine for further evaluation and management of severe sepsis 2/2 possible acute complicated UTI

## 2024-08-24 NOTE — GOALS OF CARE CONVERSATION - ADVANCED CARE PLANNING - CONVERSATION DETAILS
Discussed with Pt's   Contreras Calvin regarding goals of care. Mr. Yanceyjenni endorsed do not intubate or resuscitate the pt. Can put a tube for feeding and continuing medical management. Discussed with Pt's  . Contreras Simpson regarding goals of care.  Calvin endorsed do not intubate or resuscitate the pt. Can put a tube for feeding and continuing medical management.    Attending note: Agree as above. MOLST signed

## 2024-08-24 NOTE — H&P ADULT - ASSESSMENT
Patient 84-year-old woman with a history of Lewy body dementia (AAO x 2 at baseline), Parkinson's, COPD not on home oxygen, p/w ams and tachypnea, A&Ox0 on arrival. unable to provide history.                    Will hold off of 30 cc/kg bolus, as patient has a history of heart failure: will administer 1 L and reassess.        Patient was found to be with decreased breath sounds diffusely, and was thought to be possibly in COPD exacerbation.  Already received Decadron by EMS prior to arrival.  Quick bedside ultrasound showed lung sliding, no pneumothorax.  Patient placed on BiPAP for potential hypercapnia.  Magnesium and nebs ordered as well.  On reassessment, patient is moving air better, with good tidal volumes, but not wheezing.  Blood gas resulted with normal CO2.  As patient is tachypneic, will obtain CTA for PE rule out.  Will also obtain CT of the abdomen pelvis, as potential intra-abdominal etiology in the setting of fever.            Spoke with the  who arrived to bedside shortly after the patient arrived, discussed goals of care.  Patient is DNR, with a trial of intubation if necessary for short term.  Patient currently protecting her airway.            Was taken off BIPAP and resting comfortably on 3L nc. Was evaluated by cardio for two runs of Vtach approx 10 sec, cleared for t3n [employer]. signed out to MAR.          97.8     133     141/77     24               WBC 19.08, Lactate 3.1 -> 4.0, Trops 142 -> 132, Na 147, BUN/Cr 61/1.4, pro-,      VBG: pH 7.32, pCo2 42, HCO3 22, UA WNL          CT Head: No evidence of acute intracranial abnormality.     CT Angio Chest PE protocol and CT A/P with IV contrast:      1. Urinary bladder mild pericystic stranding      2. Increased ovarian 2.5 cm cystic lesion. Unchanged right ovarian 7 cm cystic mass. Outpatient pelvic ultrasound recommended for further evaluation.     3. Moderate-to-large colorectal stool burden.     4. No evidence of acute thoracic pathology or pulmonary embolus.     CXR:      No focal consolidation, pneumothorax or pleural effusion.      Stable cardio-mediastinal silhouette.          83-year-old female with PMHx of Lewy Body dementia with associated autonomic dysfunction including dysphagia, Hypothyroidism, Parkinson's, COPD not on home oxygen , p/w ams and tachypnea, A&Ox0 on arrival. unable to provide history.          # Severe Sepsis on Admission ( Tachycardia 133, Tachypnea 24, Leukocytosis 19.08, Lactate 3.1 -> 4.0) 2/2 UTI   -s/p STAT cefepime and vancomycin in ED   -Was started on Ceftriaxone, Doxycycline and Ertapenem in NH for UTI   -was given 1L bolus, 30 cc/kg not given because of history of heart failure   - CT Angio Chest PE protocol and CT A/P with IV contrast:    1. Urinary bladder mild pericystic stranding    2. Increased ovarian 2.5 cm cystic lesion. Unchanged right ovarian 7 cm cystic mass. Outpatient pelvic ultrasound recommended for further evaluation.   3. Moderate-to-large colorectal stool burden.   4. No evidence of acute thoracic pathology or pulmonary embolus.   - Unable to asses urinary symptoms from patient  - Vitals on my assesement: HR 83 bpm, 94 % on RA, 119/59 BP, RR 20  -f/u Blood Cx   -f/u procalcitonin        # ? Run of NSVTs  # Raised troponins  - Cardiology consulted by ED, recommend telemetry monitoring  - Trops 142 -> 132,  - EKG: Sinus Tachycardia   - f/u troponins  - f/u electrolytes and replete as necessary      # Chronic constipation 2/2 Autonomic Dysfunction 2/2 Lewy Body Dementia  with h/o recurrent distension of bowel.   # H/O right hemicolectomy   -Continue with home regimen: Miralax daily, Senna 2 tabs HS, Colace 100 BID, Fleet [employer] every MWF in evening      # H/O ? HFpEF, Stable  # HTN   -ECHO: 11 Nov 23: Normal global left ventricular systolic function with a biplane EF of 60%, Mild mitral stenosis, Mild aortic regurgitation, Mild aortic valve stenosis   -c/w home Metoprolol succinate 25 mg od, furosemide 10 mg daily, c/w nifedipine 60 mg daily   -c/w aspirin 81 mg daily     # Altered Mental Status 2/2 Metabolic Encephalopathy likely due to UTI, improving  # Parkinson's disease with Lewy body dementia   # Functional quadriplegia, wheelchair bound at baseline  - CT Head: No evidence of acute intracranial abnormality.   - At baseline patient is AXO x 3 according to her , patient has difficulty findings words   - On my examination patient is nodding to my questions, is alert and oriented to place and person  - c/w Levodopa-Carbidopa 10/100 tid       # Vit D deficiency   -c/w Vit D 1000 daily     # Acute Hypoxic Respiratory Failure, was on BIPAP then weaned to NC, resolved   # COPD, stable   -Patient was placed on BiPAP for potential hypercapnia by ED, VBG: pH 7.32, pCo2 42, HCO3 22   -CXR: No focal consolidation, pneumothorax or pleural effusion.   -CTPE: WNL   Plan  -currently on RA saturating 95% with normal work of breathing  -c/w symbicort 2 puffs bid   -c/w duonebs PRN       -CT Angio Chest PE protocol and CT A/P with IV contrast:    1. Urinary bladder mild pericystic stranding    2. Increased ovarian 2.5 cm cystic lesion. Unchanged right ovarian 7 cm cystic mass. Outpatient pelvic ultrasound recommended for further evaluation.   3. Moderate-to-large colorectal stool burden.   4. No evidence of acute thoracic pathology or pulmonary embolus.   - CXR:    No focal consolidation, pneumothorax or pleural effusion.   Stable cardio-mediastinal silhouette.     # Dysphagia-   -soft and bite sized, moderately thick liquids as per speech Evalution last admission   -f/u speech eval     # Hypothyroidism    - Recent TSH 1.74 8/23 ([facility] records)   -c/w Synthroid 50 mcg daily.      # GERD   -c/w famotidine 20 mg daily     # Glaucoma   # Conjunctival Xerosis   -c/w home eye drops     # Peripheral neuropathy   -c/w gabapentin 600 mg tid                           83-year-old female with PMHx of Lewy Body dementia with associated autonomic dysfunction including dysphagia, Hypothyroidism, Parkinson's, COPD not on home oxygen , p/w ams and tachypnea, A&Ox0 on arrival being admitted for management of severe sepsis.     # Severe Sepsis on Admission ( Tachycardia 133, Tachypnea 24, Leukocytosis 19.08, Lactate 3.1 -> 4.0) 2/2 Acute Complicated UTI   -s/p STAT cefepime and vancomycin in ED   -Was started on Ceftriaxone, Doxycycline and Ertapenem in NH for UTI   -was given 1L bolus, 30 cc/kg not given because of history of heart failure   - CT Angio Chest PE protocol and CT A/P with IV contrast:    1. Urinary bladder mild pericystic stranding    2. Increased ovarian 2.5 cm cystic lesion. Unchanged right ovarian 7 cm cystic mass. Outpatient pelvic ultrasound recommended for further evaluation.   3. Moderate-to-large colorectal stool burden.   4. No evidence of acute thoracic pathology or pulmonary embolus.   - Unable to asses urinary symptoms from patient  - Vitals on my assessment HR 83 bpm, 94 % on RA, 119/59 BP, RR 20  - c/w empiric coverage Cefepime and Vancomycin in setting of Severe sepsis  -f/u Blood Cx   -f/u procalcitonin   -f/u Urine Cx  -f/u ID     # ? Run of NSVTs  # Raised troponins  - Cardiology consulted by ED, recommend telemetry monitoring  - Trops 142 -> 132,  - EKG: Sinus Tachycardia   - f/u troponins  - f/u electrolytes and replete as necessary      # Chronic constipation 2/2 Autonomic Dysfunction 2/2 Lewy Body Dementia  with h/o recurrent distension of bowel.   # H/O right hemicolectomy   -Continue with home regimen: Miralax daily, Senna 2 tabs HS, Colace 100 BID, [employer] every MWF in evening      # H/O ? HFpEF, Stable  # HTN   -ECHO: 11 Nov 23: Normal global left ventricular systolic function with a biplane EF of 60%, Mild mitral stenosis, Mild aortic regurgitation, Mild aortic valve stenosis   -c/w home Metoprolol succinate 25 mg od, furosemide 10 mg daily, c/w nifedipine 60 mg daily   -c/w aspirin 81 mg daily     # Altered Mental Status 2/2 Metabolic Encephalopathy likely due to UTI, improving  # Parkinson's disease with Lewy body dementia   # Functional quadriplegia, wheelchair bound at baseline  - CT Head: No evidence of acute intracranial abnormality.   - At baseline patient is AXO x 3 according to her , patient has difficulty findings words   - On my examination patient is nodding to my questions, is alert and oriented to place and person  - c/w Levodopa-Carbidopa 10/100 tid       # Vit D deficiency   -c/w Vit D 1000 daily     # Acute Hypoxic Respiratory Failure, was on BIPAP then weaned to NC, resolved   # COPD, stable   -Patient was placed on BiPAP for potential hypercapnia by ED, VBG: pH 7.32, pCo2 42, HCO3 22   -CXR: No focal consolidation, pneumothorax or pleural effusion.   -CTPE: WNL   Plan  -currently on RA saturating 95% with normal work of breathing  -c/w symbicort 2 puffs bid   -c/w duonebs PRN       # Dysphagia-   -soft and bite sized, moderately thick liquids as per speech Evalution last admission   -f/u speech eval     # Hypothyroidism    - Recent TSH 1.74 8/23 ([facility] records)   -c/w Synthroid 50 mcg daily.      # GERD   -c/w famotidine 20 mg daily     # Glaucoma   # Conjunctival Xerosis   -c/w home eye drops     # Peripheral neuropathy   -c/w gabapentin 600 mg tid                  83-year-old female with PMHx of Lewy Body dementia with associated autonomic dysfunction including dysphagia, Hypothyroidism, Parkinson's, COPD not on home oxygen , p/w ams and tachypnea, A&Ox0 on arrival being admitted for management of severe sepsis.     # Severe Sepsis on Admission ( Tachycardia 133, Tachypnea 24, Leukocytosis 19.08, Lactate 3.1 -> 4.0) 2/2 Possible Acute Complicated UTI   -s/p STAT cefepime and vancomycin in ED   -Was started on Ceftriaxone, Doxycycline and Ertapenem in NH for UTI   -was given 1L bolus, 30 cc/kg not given because of history of heart failure   - CT Angio Chest PE protocol and CT A/P with IV contrast:    1. Urinary bladder mild pericystic stranding    2. Increased ovarian 2.5 cm cystic lesion. Unchanged right ovarian 7 cm cystic mass. Outpatient pelvic ultrasound recommended for further evaluation.   3. Moderate-to-large colorectal stool burden.   4. No evidence of acute thoracic pathology or pulmonary embolus.   - Unable to asses urinary symptoms from patient  - Vitals on my assessment HR 83 bpm, 94 % on RA, 119/59 BP, RR 20  - c/w empiric coverage Cefepime and Vancomycin in setting of Severe sepsis  - f/u Blood Cx   - f/u procalcitonin   - f/u Urine Cx  - f/u RVP  - f/u ID    # Acute Hypoxic Respiratory Failure, was on BIPAP then weaned to NC, resolved   # COPD, stable   -Patient was placed on BiPAP for potential hypercapnia by ED, VBG: pH 7.32, pCo2 42, HCO3 22   -CXR: No focal consolidation, pneumothorax or pleural effusion.   -CTPE: WNL   Plan  -currently on RA saturating 95% with normal work of breathing  -c/w symbicort 2 puffs bid   -c/w duonebs PRN      # ? Run of NSVTs  # Raised troponins in setting of hypoxia and severe sepsis  - Cardiology consulted by ED, recommend telemetry monitoring  - Trops 142 -> 132,  - EKG: Sinus Tachycardia   - f/u troponins  - f/u electrolytes and replete as necessary    # Chronic constipation 2/2 Autonomic Dysfunction 2/2 Lewy Body Dementia  with h/o recurrent distension of bowel.   # H/O right hemicolectomy   -Continue with home regimen: Miralax daily, Senna 2 tabs HS, Colace 100 BID, [employer] every MWF in evening      # H/O ? HFpEF, Stable  # HTN   - ECHO: 11 Nov 23: Normal global left ventricular systolic function with a biplane EF of 60%, Mild mitral stenosis, Mild aortic regurgitation, Mild aortic valve stenosis  - Euvolemic on physical examination   - c/w home Metoprolol succinate 25 mg od, furosemide 10 mg daily, c/w nifedipine 60 mg daily   - c/w aspirin 81 mg daily     # Altered Mental Status 2/2 Metabolic Encephalopathy likely due to UTI, improving  # Parkinson's disease with Lewy body dementia   # Functional quadriplegia, wheelchair bound at baseline  - CT Head: No evidence of acute intracranial abnormality.   - At baseline patient is AXO x 3 according to her , patient has difficulty findings words   - On my examination patient is nodding to my questions, is alert and oriented to place and person  - c/w Levodopa-Carbidopa 10/100 tid     # Vit D deficiency   - c/w Vit D 1000 daily     # Dysphagia   - soft and bite sized, moderately thick liquids as per speech evaluation last admission   - f/u speech eval     # Hypothyroidism    - Recent TSH 1.74 8/23 ([facility] records)   -c/w Synthroid 50 mcg daily.      # GERD   -c/w famotidine 20 mg daily     # Glaucoma   # Conjunctival Xerosis   -c/w home eye drops     # Peripheral neuropathy   - at home gabapentin 600 mg tid, holding in setting of AMS for now    Miscellaneous:  DVT prophylaxis: Lovenox  Bowel  - Feeds: Pending speech evaluation  - Prophylaxis: PPI  - Regimen: Colace, Miralax, Senna and Enemas  Activity: Wheelchair bound at baseline  MedRec: Confirmed via NH papers and   Code status: DNR/DNI with trial of NIV  Disposition: Telemetry  Handoff:                             83-year-old female with PMHx of Lewy Body dementia with associated autonomic dysfunction including dysphagia, Hypothyroidism, Parkinson's, COPD not on home oxygen , p/w ams and tachypnea, A&Ox0 on arrival being admitted for management of severe sepsis.     # Severe Sepsis on Admission ( Tachycardia 133, Tachypnea 24, Leukocytosis 19.08, Lactate 3.1 -> 4.0) 2/2 Possible Acute Complicated UTI   -s/p STAT cefepime and vancomycin in ED   -Was started on Ceftriaxone, Doxycycline and Ertapenem in NH for UTI   -was given 1L bolus, 30 cc/kg not given because of history of heart failure   - CT Angio Chest PE protocol and CT A/P with IV contrast:    1. Urinary bladder mild pericystic stranding    2. Increased ovarian 2.5 cm cystic lesion. Unchanged right ovarian 7 cm cystic mass. Outpatient pelvic ultrasound recommended for further evaluation.   3. Moderate-to-large colorectal stool burden.   4. No evidence of acute thoracic pathology or pulmonary embolus.   - Unable to asses urinary symptoms from patient  - Vitals on my assessment HR 83 bpm, 94 % on RA, 119/59 BP, RR 20  - c/w empiric coverage Cefepime and Vancomycin in setting of Severe sepsis  - f/u Blood Cx   - f/u procalcitonin   - f/u Urine Cx  - f/u RVP  - f/u ID    # Acute Hypoxic Respiratory Failure, was on BIPAP then weaned to NC, resolved   # COPD, stable   -Patient was placed on BiPAP for potential hypercapnia by ED, VBG: pH 7.32, pCo2 42, HCO3 22   -CXR: No focal consolidation, pneumothorax or pleural effusion.   -CTPE: WNL   Plan  -currently on RA saturating 95% with normal work of breathing  -c/w symbicort 2 puffs bid   -c/w duonebs PRN      # ? Run of NSVTs  # Raised troponins in setting of hypoxia and severe sepsis  - Cardiology consulted by ED, recommend telemetry monitoring  - Trops 142 -> 132,  - EKG: Sinus Tachycardia   - f/u troponins  - f/u electrolytes and replete as necessary    # Chronic constipation 2/2 Autonomic Dysfunction 2/2 Lewy Body Dementia  with h/o recurrent distension of bowel.   # H/O right hemicolectomy   -Continue with home regimen: Miralax daily, Senna 2 tabs HS, Colace 100 BID, Fleet [employer] every MWF in evening      # H/O ? HFpEF, Stable  # HTN   - ECHO: 11 Nov 23: Normal global left ventricular systolic function with a biplane EF of 60%, Mild mitral stenosis, Mild aortic regurgitation, Mild aortic valve stenosis  - Euvolemic on physical examination   - c/w home Metoprolol succinate 25 mg od, furosemide 10 mg daily, c/w nifedipine 60 mg daily   - c/w aspirin 81 mg daily     # Altered Mental Status 2/2 Metabolic Encephalopathy likely due to UTI, improving  # Parkinson's disease with Lewy body dementia   # Functional quadriplegia, wheelchair bound at baseline  - CT Head: No evidence of acute intracranial abnormality.   - At baseline patient is AXO x 3 according to her , patient has difficulty findings words and has meaningful conversation   - On my examination patient is nodding to my questions, is alert and oriented to place and person  - c/w Levodopa-Carbidopa 10/100 tid     # Vit D deficiency   - c/w Vit D 1000 daily     # Dysphagia   - soft and bite sized, moderately thick liquids as per speech evaluation last admission   - f/u speech eval     # Hypothyroidism    - Recent TSH 1.74 8/23 ([facility] records)   -c/w Synthroid 50 mcg daily.      # GERD   -c/w famotidine 20 mg daily     # Glaucoma   # Conjunctival Xerosis   -c/w home eye drops     # Peripheral neuropathy   - at home gabapentin 600 mg tid, holding in setting of AMS for now    Miscellaneous:  DVT prophylaxis: Lovenox  Bowel  - Feeds: Pending speech evaluation  - Prophylaxis: PPI  - Regimen: Colace, Miralax, Senna and Enemas  Activity: Wheelchair bound at baseline  MedRec: Confirmed via NH papers and   Code status: DNR/DNI with trial of NIV  Disposition: Telemetry  Handoff:                             83-year-old female with PMHx of Lewy Body dementia with associated autonomic dysfunction including dysphagia, Hypothyroidism, Parkinson's, COPD not on home oxygen , p/w ams and tachypnea, A&Ox0 on arrival being admitted for management of severe sepsis.     # Severe Sepsis on Admission ( Tachycardia 133, Tachypnea 24, Leukocytosis 19.08, Lactate 3.1 -> 4.0) 2/2 Possible Acute Complicated UTI   -s/p STAT cefepime and vancomycin in ED   -Was started on Ceftriaxone, Doxycycline and Ertapenem in NH for UTI   -was given 1L bolus, 30 cc/kg not given because of history of heart failure   - CT Angio Chest PE protocol and CT A/P with IV contrast:    1. Urinary bladder mild pericystic stranding    2. Increased ovarian 2.5 cm cystic lesion. Unchanged right ovarian 7 cm cystic mass. Outpatient pelvic ultrasound recommended for further evaluation.   3. Moderate-to-large colorectal stool burden.   4. No evidence of acute thoracic pathology or pulmonary embolus.   - Unable to asses urinary symptoms from patient  - Vitals on my assessment HR 83 bpm, 94 % on RA, 119/59 BP, RR 20  - c/w empiric coverage Cefepime and Vancomycin in setting of Severe sepsis  - f/u Blood Cx   - f/u procalcitonin   - f/u Urine Cx  - f/u RVP  - f/u ID    # Acute Hypoxic Respiratory Failure, was on BIPAP then weaned to NC, resolved   # COPD, stable   -Patient was placed on BiPAP for potential hypercapnia by ED, VBG: pH 7.32, pCo2 42, HCO3 22   -CXR: No focal consolidation, pneumothorax or pleural effusion.   -CTPE: WNL   Plan  -currently on RA saturating 95% with normal work of breathing  -c/w symbicort 2 puffs bid   -c/w duonebs PRN      # ? Run of NSVTs  # Raised troponins in setting of hypoxia and severe sepsis  - Cardiology consulted by ED, recommend telemetry monitoring  - Trops 142 -> 132,  - EKG: Sinus Tachycardia   - f/u troponins  - f/u electrolytes and replete as necessary    # Chronic constipation 2/2 Autonomic Dysfunction 2/2 Lewy Body Dementia  with h/o recurrent distension of bowel.   # H/O right hemicolectomy   -Continue with home regimen: Miralax daily, Senna 2 tabs HS, Colace 100 BID, [employer] every MWF in evening      # H/O ? HFpEF, Stable  # HTN   - ECHO: 11 Nov 23: Normal global left ventricular systolic function with a biplane EF of 60%, Mild mitral stenosis, Mild aortic regurgitation, Mild aortic valve stenosis  - Euvolemic on physical examination   - c/w home Metoprolol succinate 25 mg od, furosemide 10 mg daily, c/w nifedipine 60 mg daily   - c/w aspirin 81 mg daily     # Altered Mental Status 2/2 Metabolic Encephalopathy likely due to UTI, improving  # Parkinson's disease with Lewy body dementia   # Functional quadriplegia, wheelchair bound at baseline  - CT Head: No evidence of acute intracranial abnormality.   - At baseline patient is AXO x 3 according to her , patient has difficulty findings words and has meaningful conversation   - On my examination patient is nodding to my questions, is alert and oriented to place and person  - c/w Levodopa-Carbidopa 10/100 tid     # Vit D deficiency   - c/w Vit D 1000 daily     # Dysphagia   - soft and bite sized, moderately thick liquids as per speech evaluation last admission   - f/u speech eval     # Hypothyroidism    - Recent TSH 1.74 8/23 ([facility] records)   -c/w Synthroid 50 mcg daily.      # GERD   -c/w famotidine 20 mg daily     # Glaucoma   # Conjunctival Xerosis   -c/w home eye drops     # Peripheral neuropathy   - at home gabapentin 600 mg tid, holding in setting of AMS for now    Miscellaneous:  DVT prophylaxis: Lovenox  Bowel  - Feeds: Pending speech evaluation  - Prophylaxis: PPI  - Regimen: Colace, Miralax, Senna and Enemas  Activity: Wheelchair bound at baseline  MedRec: Confirmed via NH papers and   Code status: DNR/DNI with trial of NIV  Disposition: Telemetry  Handoff: s/p 2nd 1L bolus, f/u repeat lactate, reassess volume status and replete as needed, f/u electrolytes and replete as needed, f/u ID, monitor for fever, f/u repeat troponins, f/u BCx, UCx                            83-year-old female with PMHx of Lewy Body dementia with associated autonomic dysfunction including dysphagia, Hypothyroidism, Parkinson's, COPD not on home oxygen , p/w ams and tachypnea, A&Ox0 on arrival being admitted for management of severe sepsis.     # Severe Sepsis on Admission ( Tachycardia 133, Tachypnea 24, Leukocytosis 19.08, Lactate 3.1 -> 4.0) 2/2 Possible Acute Complicated UTI possible pneumonitis   -s/p STAT cefepime and vancomycin in ED   -Was started on Ceftriaxone, Doxycycline and Ertapenem in NH for UTI   -was given 1L bolus, 30 cc/kg not given because of history of heart failure   - CT Angio Chest PE protocol and CT A/P with IV contrast:    1. Urinary bladder mild pericystic stranding    2. Increased ovarian 2.5 cm cystic lesion. Unchanged right ovarian 7 cm cystic mass. Outpatient pelvic ultrasound recommended for further evaluation.   3. Moderate-to-large colorectal stool burden.   4. No evidence of acute thoracic pathology or pulmonary embolus.   - Unable to asses urinary symptoms from patient  - Vitals on my assessment HR 83 bpm, 94 % on RA, 119/59 BP, RR 20  - c/w empiric coverage Cefepime and Vancomycin in setting of Severe sepsis  - f/u Blood Cx   - f/u procalcitonin   - f/u Urine Cx  - f/u RVP  - f/u ID  - lactate 4--> 3.6--> repeat, continue IV    # Acute Hypoxic Respiratory Failure, was on BIPAP then weaned to NC, resolved  possible pneumonitis   # COPD, stable   -Patient was placed on BiPAP for potential hypercapnia by ED, VBG: pH 7.32, pCo2 42, HCO3 22   -CXR: No focal consolidation, pneumothorax or pleural effusion.   -CTPE: WNL   Plan  -currently on RA saturating 95% with normal work of breathing  -c/w symbicort 2 puffs bid   -c/w duonebs PRN      # ? Run of NSVTs  # Raised troponins in setting of hypoxia and severe sepsis  - Cardiology consulted by ED, recommend telemetry monitoring  - Trops 142 -> 132,  - EKG: Sinus Tachycardia   - f/u troponins  - f/u electrolytes and replete as necessary    # Chronic constipation 2/2 Autonomic Dysfunction 2/2 Lewy Body Dementia  with h/o recurrent distension of bowel.   # H/O right hemicolectomy   -Continue with home regimen: Miralax daily, Senna 2 tabs HS, Colace 100 BID, Fleet [employer] every MWF in evening      # H/O ? HFpEF, Stable  # HTN   - ECHO: 11 Nov 23: Normal global left ventricular systolic function with a biplane EF of 60%, Mild mitral stenosis, Mild aortic regurgitation, Mild aortic valve stenosis  - Euvolemic on physical examination   - c/w home Metoprolol succinate 25 mg od, furosemide 10 mg daily, c/w nifedipine 60 mg daily   - c/w aspirin 81 mg daily     # Metabolic Encephalopathy likely due to UTI, improving  # Parkinson's disease with Lewy body dementia   # Functional quadriplegia, wheelchair bound at baseline  - CT Head: No evidence of acute intracranial abnormality.   - At baseline patient is AXO x 3 according to her , patient has difficulty findings words and has meaningful conversation   - On my examination patient is nodding to my questions, is alert and oriented to place and person  - c/w Levodopa-Carbidopa 10/100 tid     # Vit D deficiency   - c/w Vit D 1000 daily     # Dysphagia   - soft and bite sized, moderately thick liquids as per speech evaluation last admission   - f/u speech eval     # Hypothyroidism    - Recent TSH 1.74 8/23 ([facility] records)   -c/w Synthroid 50 mcg daily.      # GERD   -c/w famotidine 20 mg daily     # Glaucoma   # Conjunctival Xerosis   -c/w home eye drops     # Peripheral neuropathy   - at home gabapentin 600 mg tid, holding in setting of AMS for now    Miscellaneous:  DVT prophylaxis: Lovenox  Bowel  - Feeds: Pending speech evaluation  - Prophylaxis: PPI  - Regimen: Colace, Miralax, Senna and Enemas  Activity: Wheelchair bound at baseline  MedRec: Confirmed via NH papers and   Code status: DNR/DNI with trial of NIV  Disposition: Telemetry  Handoff: s/p 2nd 1L bolus, f/u repeat lactate, reassess volume status and replete as needed, f/u electrolytes and replete as needed, f/u ID, monitor for fever, f/u repeat troponins, f/u BCx, UCx

## 2024-08-25 LAB
FLUAV AG NPH QL: SIGNIFICANT CHANGE UP
FLUBV AG NPH QL: SIGNIFICANT CHANGE UP
PROCALCITONIN SERPL-MCNC: 0.86 NG/ML — HIGH (ref 0.02–0.1)
RSV RNA NPH QL NAA+NON-PROBE: SIGNIFICANT CHANGE UP
SARS-COV-2 RNA SPEC QL NAA+PROBE: SIGNIFICANT CHANGE UP

## 2024-08-25 PROCEDURE — 99233 SBSQ HOSP IP/OBS HIGH 50: CPT

## 2024-08-25 RX ORDER — HYDROCORTISONE 1 %
1 OINTMENT (GRAM) TOPICAL
Refills: 0 | Status: DISCONTINUED | OUTPATIENT
Start: 2024-08-25 | End: 2024-09-04

## 2024-08-25 RX ORDER — CLOTRIMAZOLE AND BETAMETHASONE DIPROPIONATE 10; .5 MG/G; MG/G
1 CREAM TOPICAL
Refills: 0 | Status: DISCONTINUED | OUTPATIENT
Start: 2024-08-25 | End: 2024-08-25

## 2024-08-25 RX ORDER — CLOTRIMAZOLE 1 %
1 CREAM (GRAM) TOPICAL
Refills: 0 | Status: DISCONTINUED | OUTPATIENT
Start: 2024-08-25 | End: 2024-09-04

## 2024-08-25 RX ADMIN — LATANOPROST 1 DROP(S): 50 SOLUTION OPHTHALMIC at 22:30

## 2024-08-25 RX ADMIN — Medication 2 TABLET(S): at 22:30

## 2024-08-25 RX ADMIN — CEFEPIME 100 MILLIGRAM(S): 2 INJECTION, POWDER, FOR SOLUTION INTRAVENOUS at 15:06

## 2024-08-25 RX ADMIN — Medication 500 MILLIGRAM(S): at 12:22

## 2024-08-25 RX ADMIN — Medication 1 APPLICATION(S): at 18:09

## 2024-08-25 RX ADMIN — BUDESONIDE AND FORMOTEROL FUMARATE 2 PUFF(S): 80; 4.5 AEROSOL, METERED RESPIRATORY (INHALATION) at 20:07

## 2024-08-25 RX ADMIN — Medication 20 MILLIGRAM(S): at 06:24

## 2024-08-25 RX ADMIN — Medication 50 MICROGRAM(S): at 06:24

## 2024-08-25 RX ADMIN — Medication 10 GRAM(S): at 06:44

## 2024-08-25 RX ADMIN — Medication 81 MILLIGRAM(S): at 12:22

## 2024-08-25 RX ADMIN — FAMOTIDINE 20 MILLIGRAM(S): 10 INJECTION INTRAVENOUS at 12:22

## 2024-08-25 RX ADMIN — TIMOLOL MALEATE 1 DROP(S): 5 SOLUTION/ DROPS OPHTHALMIC at 06:23

## 2024-08-25 RX ADMIN — Medication 1 TABLET(S): at 22:30

## 2024-08-25 RX ADMIN — Medication 166.67 MILLIGRAM(S): at 13:10

## 2024-08-25 RX ADMIN — Medication 1 TABLET(S): at 15:08

## 2024-08-25 RX ADMIN — TIMOLOL MALEATE 1 DROP(S): 5 SOLUTION/ DROPS OPHTHALMIC at 18:09

## 2024-08-25 RX ADMIN — METOPROLOL TARTRATE 25 MILLIGRAM(S): 100 TABLET ORAL at 06:24

## 2024-08-25 RX ADMIN — POVIDONE, PROPYLENE GLYCOL 1 DROP(S): 6.8; 3 LIQUID OPHTHALMIC at 06:24

## 2024-08-25 RX ADMIN — BUDESONIDE AND FORMOTEROL FUMARATE 2 PUFF(S): 80; 4.5 AEROSOL, METERED RESPIRATORY (INHALATION) at 09:20

## 2024-08-25 RX ADMIN — Medication 1 TABLET(S): at 06:44

## 2024-08-25 RX ADMIN — Medication 1000 UNIT(S): at 12:22

## 2024-08-25 RX ADMIN — CEFEPIME 100 MILLIGRAM(S): 2 INJECTION, POWDER, FOR SOLUTION INTRAVENOUS at 06:24

## 2024-08-25 RX ADMIN — POVIDONE, PROPYLENE GLYCOL 1 DROP(S): 6.8; 3 LIQUID OPHTHALMIC at 18:09

## 2024-08-25 NOTE — PROGRESS NOTE ADULT - ASSESSMENT
83-year-old female with PMHx of Lewy Body dementia with associated autonomic dysfunction including dysphagia, Hypothyroidism, Parkinson's, COPD not on home oxygen , p/w ams and tachypnea, A&Ox0 on arrival being admitted for management of severe sepsis.     # Severe Sepsis on Admission ( Tachycardia 133, Tachypnea 24, Leukocytosis 19.08, Lactate 3.1 -> 4.0) 2/2 Possible Acute Complicated UTI possible pneumonitis   # Acute Hypoxic Respiratory Failure, was on BIPAP then weaned to NC, resolved  possible pneumonitis   # Metabolic Encephalopathy likely due to UTI, improving- resolved  # COPD, stable not in exacerbation   -s/p STAT cefepime and vancomycin in ED   -Was started on Ceftriaxone, Doxycycline and Ertapenem in NH for UTI   -was given 1L bolus, 30 cc/kg not given because of history of heart failure   - CT Angio Chest PE protocol and CT A/P with IV contrast:    1. Urinary bladder mild pericystic stranding    2. Increased ovarian 2.5 cm cystic lesion. Unchanged right ovarian 7 cm cystic mass. Outpatient pelvic ultrasound recommended for further evaluation.   3. Moderate-to-large colorectal stool burden.   4. No evidence of acute thoracic pathology or pulmonary embolus.   -Patient was placed on BiPAP for potential hypercapnia by ED, VBG: pH 7.32, pCo2 42, HCO3 22   -CXR: No focal consolidation, pneumothorax or pleural effusion.   -CTPE: WNL  - Unable to asses urinary symptoms from patient  - Vitals on my assessment HR 83 bpm, 94 % on RA, 119/59 BP, RR 20  - c/w empiric coverage Cefepime and Vancomycin in setting of Severe sepsis  - f/u Blood Cx NTD so far  - procalcitonin 0.86   - f/u Urine Cx  -  RVP neg  - ID appreciated, change Abx to CTX 2 gms, dc vanco and cefepeime   - lactate 4--> 3.6--> repeat 1.5 stable   -currently on RA saturating 95% with normal work of breathing  -c/w symbicort 2 puffs bid   -c/w duonebs PRN   - follow up EEG read -> Abnormal diffuse slowing, no focal abnormalities or seizure activity.     # ? Run of NSVTs  # Raised troponins in setting of hypoxia and severe sepsis  - Cardiology consulted by ED, recommend telemetry monitoring  - Trops 142 -> 132,-->79  - EKG: Sinus Tachycardia   - f/u troponins  - f/u electrolytes and replete as necessary  - resolved, DC cardiac telemonitoring     # Chronic constipation 2/2 Autonomic Dysfunction 2/2 Lewy Body Dementia  with h/o recurrent distension of bowel.   # H/O right hemicolectomy   -Continue with home regimen: Miralax daily, Senna 2 tabs HS, Colace 100 BID, Fleet Enemas every MWF in evening      # H/O ? HFpEF, Stable  # HTN   - ECHO: 11 Nov 23: Normal global left ventricular systolic function with a biplane EF of 60%, Mild mitral stenosis, Mild aortic regurgitation, Mild aortic valve stenosis  - Euvolemic on physical examination   - c/w home Metoprolol succinate 25 mg od, furosemide 10 mg daily, c/w nifedipine 60 mg daily   - c/w aspirin 81 mg daily       # Parkinson's disease with Lewy body dementia   # Functional quadriplegia, wheelchair bound at baseline  - CT Head: No evidence of acute intracranial abnormality.   - At baseline patient is AXO x 3 according to her , patient has difficulty findings words and has meaningful conversation   - On my examination patient is nodding to my questions, is alert and oriented to place and person  - c/w Levodopa-Carbidopa 10/100 tid     # Vit D deficiency   - c/w Vit D 1000 daily     # Dysphagia   - soft and bite sized, moderately thick liquids as per speech evaluation last admission   - f/u speech eval     # Hypothyroidism    - Recent TSH 1.74 8/23 (NH records)   -c/w Synthroid 50 mcg daily.      # GERD   -c/w famotidine 20 mg daily     # Glaucoma   # Conjunctival Xerosis   -c/w home eye drops     # Peripheral neuropathy   - at home gabapentin 600 mg tid, holding in setting of AMS for now    #Progress Note Handoff  Pending (specify):  follow up cultures, EEG, ID   Family discussion: house staff updated pt family  Disposition: dc cardiac telemonitoring   Decision to admit the pt is based on acuity as above

## 2024-08-25 NOTE — PROGRESS NOTE ADULT - ASSESSMENT
83-year-old female with PMHx of Lewy Body dementia with associated autonomic dysfunction including dysphagia, Hypothyroidism, Parkinson's, COPD not on home oxygen , p/w ams and tachypnea, A&Ox0 on arrival being admitted for management of severe sepsis.     # Severe Sepsis on Admission ( Tachycardia 133, Tachypnea 24, Leukocytosis 19.08, Lactate 3.1 -> 4.0) 2/2 Possible Acute Complicated UTI possible pneumonitis   # Acute Hypoxic Respiratory Failure, was on BIPAP then weaned to NC, resolved  possible pneumonitis   # Metabolic Encephalopathy likely due to UTI, improving- resolved  # COPD, stable not in exacerbation   -s/p STAT cefepime and vancomycin in ED   -Was started on Ceftriaxone, Doxycycline and Ertapenem in NH for UTI   -was given 1L bolus, 30 cc/kg not given because of history of heart failure   - CT Angio Chest PE protocol and CT A/P with IV contrast:    1. Urinary bladder mild pericystic stranding    2. Increased ovarian 2.5 cm cystic lesion. Unchanged right ovarian 7 cm cystic mass. Outpatient pelvic ultrasound recommended for further evaluation.   3. Moderate-to-large colorectal stool burden.   4. No evidence of acute thoracic pathology or pulmonary embolus.   -Patient was placed on BiPAP for potential hypercapnia by ED, VBG: pH 7.32, pCo2 42, HCO3 22   -CXR: No focal consolidation, pneumothorax or pleural effusion.   -CTPE: WNL  - Unable to asses urinary symptoms from patient  - Vitals on my assessment HR 83 bpm, 94 % on RA, 119/59 BP, RR 20  - c/w empiric coverage Cefepime and Vancomycin in setting of Severe sepsis  - f/u Blood Cx NTD so far  - procalcitonin 0.86   - f/u Urine Cx  -  RVP neg  - ID appreciated, change Abx to CTX 2 gms, dc vanco and cefepeime   - lactate 4--> 3.6--> repeat 1.5 stable   -currently on RA saturating 95% with normal work of breathing  -c/w symbicort 2 puffs bid   -c/w duonebs PRN   - follow up EEG read      # ? Run of NSVTs  # Raised troponins in setting of hypoxia and severe sepsis  - Cardiology consulted by ED, recommend telemetry monitoring  - Trops 142 -> 132,-->79  - EKG: Sinus Tachycardia   - f/u troponins  - f/u electrolytes and replete as necessary  - resolved, DC cardiac telemonitoring     # Chronic constipation 2/2 Autonomic Dysfunction 2/2 Lewy Body Dementia  with h/o recurrent distension of bowel.   # H/O right hemicolectomy   -Continue with home regimen: Miralax daily, Senna 2 tabs HS, Colace 100 BID, Fleet Enemas every MWF in evening      # H/O ? HFpEF, Stable  # HTN   - ECHO: 11 Nov 23: Normal global left ventricular systolic function with a biplane EF of 60%, Mild mitral stenosis, Mild aortic regurgitation, Mild aortic valve stenosis  - Euvolemic on physical examination   - c/w home Metoprolol succinate 25 mg od, furosemide 10 mg daily, c/w nifedipine 60 mg daily   - c/w aspirin 81 mg daily       # Parkinson's disease with Lewy body dementia   # Functional quadriplegia, wheelchair bound at baseline  - CT Head: No evidence of acute intracranial abnormality.   - At baseline patient is AXO x 3 according to her , patient has difficulty findings words and has meaningful conversation   - On my examination patient is nodding to my questions, is alert and oriented to place and person  - c/w Levodopa-Carbidopa 10/100 tid     # Vit D deficiency   - c/w Vit D 1000 daily     # Dysphagia   - soft and bite sized, moderately thick liquids as per speech evaluation last admission   - f/u speech eval     # Hypothyroidism    - Recent TSH 1.74 8/23 ([facility] records)   -c/w Synthroid 50 mcg daily.      # GERD   -c/w famotidine 20 mg daily     # Glaucoma   # Conjunctival Xerosis   -c/w home eye drops     # Peripheral neuropathy   - at home gabapentin 600 mg tid, holding in setting of AMS for now    #Progress Note Handoff  Pending (specify):  follow up cultures, EEG, ID   Family discussion: house staff updated pt family  Disposition: dc cardiac telemonitoring   Decision to admit the pt is based on acuity as above

## 2024-08-25 NOTE — CONSULT NOTE ADULT - SUBJECTIVE AND OBJECTIVE BOX
IMELDA ROLLE  84y, Female  Allergy: Originally Entered as [Unknown] reaction to [red pepper] (Unknown)  ciprofloxacin (Unknown)  benzodiazepines (Unknown)  antichlolinergics (Unknown)  penicillin (Anaphylaxis)  Originally Entered as [Unknown] reaction to [pepper] (Unknown)  Originally Entered as [Unknown] reaction to [green pepper] (Unknown)  Originally Entered as [Unknown] reaction to [neuroleptics] (Unknown)      CHIEF COMPLAINT: Altered Mental Status and Increased WOB (24 Aug 2024 12:08)      LOS  1d    HPI:  Patient 84-year-old woman with a history of Lewy body dementia (AAO x 2 at baseline), Parkinson's, COPD not on home oxygen, presented from [facility] with presenting complaints of AMS and Tachypnea, Patient was A&Ox0 on arrival, unable to provide history. Patient was found to be with decreased breath sounds diffusely, quick bedside ultrasound by ED showed lung sliding, no pneumothorax, was placed on BiPAP for potential hypercapnia, magnesium and nebs given, blood gas resulted with normal CO2. was weaned off to NC.  ED team held off on 30 cc/kg bolus, as patient has a history of heart failure: was given 1 L bolus. She was evaluated by cardiology for two runs of NSVTs approx 10 sec, recommended telemetry admission. Patient was started on Ertapenem, Doxycycline and Ceftriaxone in NH for suspected UTI. At baseline patient is AXO x3, has difficulty finding words, has impaired memory but has meaningful conversation. On my visit patient saturating 95 % on RA lying comfortably in bed, is oriented to place and person and nodding to questions. Patient endorses no abdominal pain, chest pain, SOB or fever.     GOC: Spoke with the  who arrived to bedside shortly after the patient arrived, discussed goals of care.  Patient is DNR/DNI.       ED Course  Vitals: 97.8, 133, 141/77 , 24 bpm  Labs:  WBC 19.08, Lactate 3.1 -> 4.0, Trops 142 -> 132, Na 147, BUN/Cr 61/1.4, pro-,    VBG: pH 7.32, pCo2 42, HCO3 22, UA WNL   Imaging:   CT Head: No evidence of acute intracranial abnormality.   CT Angio Chest PE protocol and CT A/P with IV contrast:    1. Urinary bladder mild pericystic stranding    2. Increased ovarian 2.5 cm cystic lesion. Unchanged right ovarian 7 cm cystic mass. Outpatient pelvic ultrasound recommended for further evaluation.   3. Moderate-to-large colorectal stool burden.   4. No evidence of acute thoracic pathology or pulmonary embolus.   CXR:    No focal consolidation, pneumothorax or pleural effusion.   Stable cardio-mediastinal silhouette.     Patient being admitted to medicine for further evaluation and management of severe sepsis 2/2 possible acute complicated UTI     (24 Aug 2024 12:08)      INFECTIOUS DISEASE HISTORY:  History as above.    PAST MEDICAL & SURGICAL HISTORY:  Dementia      History of breast cancer      Constipation      Lewy body dementia without behavioral disturbance      Colon polyp      History of colon resection      H/O mastectomy, right          FAMILY HISTORY  No pertinent family history in first degree relatives    No pertinent family history in first degree relatives        SOCIAL HISTORY  Social History:  Former smoker (10 Nov 2023 23:44)        ROS  General: Denies rigors, nightsweats  HEENT: Denies headache, rhinorrhea, sore throat, eye pain  CV: Denies CP, palpitations  PULM: Denies wheezing, hemoptysis  GI: Denies hematemesis, hematochezia, melena  : Denies discharge, hematuria  MSK: Denies arthralgias, myalgias  SKIN: Denies rash, lesions  NEURO: Denies paresthesias, weakness  PSYCH: Denies depression, anxiety    VITALS:  T(F): 98.3, Max: 98.3 (08-25-24 @ 10:19)  HR: 81  BP: 117/73  RR: 18Vital Signs Last 24 Hrs  T(C): 36.8 (25 Aug 2024 10:19), Max: 36.8 (25 Aug 2024 10:19)  T(F): 98.3 (25 Aug 2024 10:19), Max: 98.3 (25 Aug 2024 10:19)  HR: 81 (25 Aug 2024 10:19) (79 - 85)  BP: 117/73 (25 Aug 2024 10:19) (100/59 - 140/64)  BP(mean): 81 (24 Aug 2024 16:38) (81 - 81)  RR: 18 (25 Aug 2024 10:19) (18 - 19)  SpO2: 98% (25 Aug 2024 10:19) (96% - 98%)    Parameters below as of 25 Aug 2024 06:30  Patient On (Oxygen Delivery Method): room air        PHYSICAL EXAM:  Gen: NAD, resting in bed  HEENT: Normocephalic, atraumatic  Neck: supple, no lymphadenopathy  CV: Regular rate & regular rhythm  Lungs: decreased BS at bases, no fremitus  Abdomen: Soft, BS present  Ext: Warm, well perfused  Neuro: non focal, awake  Skin: no rash, no erythema  Lines: no phlebitis    TESTS & MEASUREMENTS:                        11.3   19.68 )-----------( 270      ( 24 Aug 2024 21:59 )             37.4     08-24    146  |  107  |  56<H>  ----------------------------<  132<H>  4.8   |  19  |  1.2    Ca    8.8      24 Aug 2024 11:43  Mg     3.3     08-24    TPro  6.5  /  Alb  3.7  /  TBili  0.4  /  DBili  x   /  AST  27  /  ALT  23  /  AlkPhos  55  08-24      LIVER FUNCTIONS - ( 24 Aug 2024 11:43 )  Alb: 3.7 g/dL / Pro: 6.5 g/dL / ALK PHOS: 55 U/L / ALT: 23 U/L / AST: 27 U/L / GGT: x           Urinalysis Basic - ( 24 Aug 2024 11:43 )    Color: x / Appearance: x / SG: x / pH: x  Gluc: 132 mg/dL / Ketone: x  / Bili: x / Urobili: x   Blood: x / Protein: x / Nitrite: x   Leuk Esterase: x / RBC: x / WBC x   Sq Epi: x / Non Sq Epi: x / Bacteria: x        Urinalysis with Rflx Culture (collected 08-24-24 @ 05:12)    Culture - Blood (collected 08-23-24 @ 23:19)  Source: .Blood Blood-Peripheral  Preliminary Report (08-25-24 @ 09:01):    No growth at 24 hours    Culture - Blood (collected 08-23-24 @ 23:19)  Source: .Blood Blood-Peripheral  Preliminary Report (08-25-24 @ 09:01):    No growth at 24 hours    Culture - Blood (collected 11-11-23 @ 06:55)  Source: .Blood None  Final Report (11-16-23 @ 17:00):    No growth at 5 days    Culture - Urine (collected 11-10-23 @ 15:33)  Source: Catheterized Catheterized  Final Report (11-13-23 @ 20:35):    50,000 - 99,000 CFU/mL Enterococcus faecalis  Organism: Enterococcus faecalis (11-13-23 @ 20:35)  Organism: Enterococcus faecalis (11-13-23 @ 20:35)      Method Type: JANNETH      -  Ampicillin: S <=2 Predicts results to ampicillin/sulbactam, amoxacillin-clavulanate and  piperacillin-tazobactam.      -  Ciprofloxacin: R >2      -  Levofloxacin: R >4      -  Nitrofurantoin: S <=32 Should not be used to treat pyelonephritis.      -  Tetracycline: R >8      -  Vancomycin: S 2        Lactate, Blood: 2.3 mmol/L (08-24-24 @ 22:01)  Lactate, Blood: 3.7 mmol/L (08-24-24 @ 17:20)  Lactate, Blood: 3.6 mmol/L (08-24-24 @ 11:43)  Lactate, Blood: 4.0 mmol/L (08-24-24 @ 05:10)  Lactate, Blood: 3.1 mmol/L (08-23-24 @ 23:19)  Blood Gas Venous - Lactate: 2.3 mmol/L (08-23-24 @ 23:17)      INFECTIOUS DISEASES TESTING  MRSA PCR Result.: Negative (08-24-24 @ 15:15)  Procalcitonin: 0.86 ng/mL (08-24-24 @ 11:43)  COVID-19 PCR: NotDetec (11-19-23 @ 10:16)  MRSA PCR Result.: Negative (11-11-23 @ 10:12)  Legionella Antigen, Urine: Negative (11-11-23 @ 10:01)  Procalcitonin, Serum: 0.10 ng/mL (11-11-23 @ 06:55)      RADIOLOGY & ADDITIONAL TESTS:  I have personally reviewed the last Chest xray  CXR  Xray Chest 1 View- PORTABLE-Urgent:   ACC: [id number] EXAM:  XR CHEST PORTABLE URGENT 1V   ORDERED BY: EMETERIO ARMANDO     PROCEDURE DATE:  08/23/2024          INTERPRETATION:  STUDY INDICATION: resp distress, fever    TECHNIQUE:  Portable frontal view of the chest obtained.    COMPARISON: XR CHEST 11/16/2023,    FINDINGS/  IMPRESSION:      No focal consolidation, pneumothorax or pleural effusion.    Stable cardiomediastinal silhouette.    Unchanged osseous structures.    --- End of Report ---            AISHWARYA CHU MD; AttendingRadiologist  This document has been electronically signed. Aug 24 2024  2:20AM (08-23-24 @ 23:16)      CT  CT Angio Chest PE Protocol w/ IV Cont:   ACC: [id number] EXAM:  CT ABDOMEN AND PELVIS IC   ORDERED BY: VICKI SOTO     ACC: [id number] EXAM:  CT ANGIO CHEST PULM ART WAWIC   ORDERED BY: VICKI SOTO     PROCEDURE DATE:  08/24/2024          INTERPRETATION:  CTA chest with IV contrast, CT abdomen and pelvis with   IV contrast    CLINICAL HISTORY/REASON FOR EXAM: Fever. Hypoxia. Urinary tract   infection..    TECHNIQUE: CTA chest with IV contrast, CT abdomen and pelvis with IV   contrast performed. Initial CTA images of chest obtained with 90 cc   Omnipaque 350 IV contrast administration. Subsequently, CT images of   abdomen and pelvis obtained with IV contrast administration. Oral   contrast was not administered. Reformatted images in the coronal and   sagittal planes were acquired. 3D (MIP images) generated. 10 cc contrast   discarded.    COMPARISON: CT abdomen and pelvis 12/24/2022.    FINDINGS:    CHEST:    PULMONARY ARTERIES: No evidence of acute pulmonary embolism.    LUNGS, PLEURA, AIRWAYS: No lobar consolidation, mass, effusion, or   pneumothorax. No evidence of central endobronchial obstruction. No   bronchiectasis or honeycombing. No suspicious pulmonary nodule. Bilateral   subsegmental atelectasis.    THORACIC NODES: No mediastinal, hilar, supraclavicular, or axillary   lymphadenopathy.    MEDIASTINUM/GREAT VESSELS: No pericardial effusion. Heart size is within   normal limits. The aorta and main pulmonary artery are of normal caliber.    ABDOMEN/PELVIS:    HEPATOBILIARY: Hepatic steatosis. Gallbladder, biliary system   unremarkable.    SPLEEN: Unremarkable.    PANCREAS: Unremarkable.    ADRENAL GLANDS: Unremarkable.    KIDNEYS: Symmetric pattern of renal enhancement. No hydronephrosis   bilaterally.    ABDOMINOPELVIC NODES: No lymphadenopathy.    PELVIC ORGANS: Calcified fibroid uterus. Urinary bladder mild pericystic   stranding, could be attributed to urinary bladder infection in the   appropriate clinical setting. Increased ovarian 2.5 cm cystic lesion.   Unchanged right ovarian 7 cm cystic mass.    PERITONEUM/MESENTERY/BOWEL: No bowel obstruction. No ascites or   pneumoperitoneum. Colonic diverticulosis. Moderate-to-large colorectal   stool burden.    BONES/SOFT TISSUES: Right chest wall dystrophic calcification.   Degenerative change ofspine. Scoliosis.    OTHER: Vascular calcifications.      IMPRESSION:    1. Urinary bladder mild pericystic stranding, could be attributed to   urinary bladder infection in the appropriate clinical setting.    2. Increased ovarian 2.5 cm cystic lesion. Unchanged right ovarian 7 cm   cystic mass. Outpatient pelvic ultrasound recommended for further   evaluation.    3. Moderate-to-large colorectal stool burden.    4. No evidence of acute thoracic pathology or pulmonary embolus.    --- End of Report---            MARU GREY MD; Attending Radiologist  This document has been electronically signed. Aug 24 2024  1:43AM (08-24-24 @ 01:36)      CARDIOLOGY TESTING  12 Lead ECG:   Ventricular Rate 127 BPM    Atrial Rate 127 BPM    P-R Interval 150 ms    QRS Duration 84 ms    Q-T Interval 298 ms    QTC Calculation(Bazett) 433 ms    P Axis 46 degrees    R Axis -25 degrees    T Axis 31 degrees    Diagnosis Line Sinus tachycardia  Inferior-posterior infarct , age undetermined  Abnormal ECG    Confirmed by VICKIE VALDOVINOS, [facility] ([id number]) on 8/24/2024 10:37:59 PM (08-24-24 @ 00:20)      MEDICATIONS  artificial tears (preservative free) Ophthalmic Solution 1 Both EYES two times a day  ascorbic acid 500 Oral daily  aspirin  chewable 81 Oral daily  budesonide 160 MICROgram(s)/formoterol 4.5 MICROgram(s) Inhaler 2 Inhalation two times a day  carbidopa/levodopa  10/100 1 Oral three times a day  cefepime   IVPB 2000 IV Intermittent every 8 hours  cholecalciferol 1000 Oral daily  clotrimazole 1% Cream 1 Topical two times a day  enoxaparin Injectable 40 SubCutaneous every 24 hours  famotidine    Tablet 20 Oral daily  furosemide    Tablet 20 Oral daily  hydrocortisone 1% Cream 1 Topical two times a day  lactated ringers. 1000 IV Continuous <Continuous>  latanoprost 0.005% Ophthalmic Solution 1 Both EYES at bedtime  levothyroxine 50 Oral daily  metoprolol succinate ER 25 Oral daily  mineral oil enema 133 Rectal <User Schedule>  polyethylene glycol 3350 17 Oral daily  senna 2 Oral at bedtime  timolol 0.5% Solution 1 Both EYES two times a day  vancomycin  IVPB     vancomycin  IVPB 1250 IV Intermittent every 24 hours      Weight  Weight (kg): 94.6 (08-24-24 @ 06:40)    ANTIBIOTICS:  cefepime   IVPB 2000 milliGRAM(s) IV Intermittent every 8 hours  vancomycin  IVPB 1250 milliGRAM(s) IV Intermittent every 24 hours  vancomycin  IVPB          ALLERGIES:  Originally Entered as [Unknown] reaction to [red pepper] (Unknown)  ciprofloxacin (Unknown)  benzodiazepines (Unknown)  antichlolinergics (Unknown)  penicillin (Anaphylaxis)  Originally Entered as [Unknown] reaction to [pepper] (Unknown)  Originally Entered as [Unknown] reaction to [green pepper] (Unknown)  Originally Entered as [Unknown] reaction to [neuroleptics] (Unknown)         IMELDA ROLLE  84y, Female  Allergy: Originally Entered as [Unknown] reaction to [red pepper] (Unknown)  ciprofloxacin (Unknown)  benzodiazepines (Unknown)  antichlolinergics (Unknown)  penicillin (Anaphylaxis)  Originally Entered as [Unknown] reaction to [pepper] (Unknown)  Originally Entered as [Unknown] reaction to [green pepper] (Unknown)  Originally Entered as [Unknown] reaction to [neuroleptics] (Unknown)      CHIEF COMPLAINT: Altered Mental Status and Increased WOB (24 Aug 2024 12:08)      LOS  1d    HPI:  Patient 84-year-old woman with a history of Lewy body dementia (AAO x 2 at baseline), Parkinson's, COPD not on home oxygen, presented from [facility] with presenting complaints of AMS and Tachypnea, Patient was A&Ox0 on arrival, unable to provide history. Patient was found to be with decreased breath sounds diffusely, quick bedside ultrasound by ED showed lung sliding, no pneumothorax, was placed on BiPAP for potential hypercapnia, magnesium and nebs given, blood gas resulted with normal CO2. was weaned off to NC.  ED team held off on 30 cc/kg bolus, as patient has a history of heart failure: was given 1 L bolus. She was evaluated by cardiology for two runs of NSVTs approx 10 sec, recommended telemetry admission. Patient was started on Ertapenem, Doxycycline and Ceftriaxone in NH for suspected UTI. At baseline patient is AXO x3, has difficulty finding words, has impaired memory but has meaningful conversation. On my visit patient saturating 95 % on RA lying comfortably in bed, is oriented to place and person and nodding to questions. Patient endorses no abdominal pain, chest pain, SOB or fever.     GOC: Spoke with the  who arrived to bedside shortly after the patient arrived, discussed goals of care.  Patient is DNR/DNI.       ED Course  Vitals: 97.8, 133, 141/77 , 24 bpm  Labs:  WBC 19.08, Lactate 3.1 -> 4.0, Trops 142 -> 132, Na 147, BUN/Cr 61/1.4, pro-,    VBG: pH 7.32, pCo2 42, HCO3 22, UA WNL   Imaging:   CT Head: No evidence of acute intracranial abnormality.   CT Angio Chest PE protocol and CT A/P with IV contrast:    1. Urinary bladder mild pericystic stranding    2. Increased ovarian 2.5 cm cystic lesion. Unchanged right ovarian 7 cm cystic mass. Outpatient pelvic ultrasound recommended for further evaluation.   3. Moderate-to-large colorectal stool burden.   4. No evidence of acute thoracic pathology or pulmonary embolus.   CXR:    No focal consolidation, pneumothorax or pleural effusion.   Stable cardio-mediastinal silhouette.     Patient being admitted to medicine for further evaluation and management of severe sepsis 2/2 possible acute complicated UTI     (24 Aug 2024 12:08)      INFECTIOUS DISEASE HISTORY:  History as above.  Limited historian.   Only oriented to person and year.   Does not seem to be complaining of pain.     PAST MEDICAL & SURGICAL HISTORY:  Dementia      History of breast cancer      Constipation      Lewy body dementia without behavioral disturbance      Colon polyp      History of colon resection      H/O mastectomy, right          FAMILY HISTORY  No pertinent family history in first degree relatives    No pertinent family history in first degree relatives        SOCIAL HISTORY  Social History:  Former smoker (10 Nov 2023 23:44)        ROS  General: Denies rigors, nightsweats  HEENT: Denies headache, rhinorrhea, sore throat, eye pain  CV: Denies CP, palpitations  PULM: Denies wheezing, hemoptysis  GI: Denies hematemesis, hematochezia, melena  : Denies discharge, hematuria  MSK: Denies arthralgias, myalgias  SKIN: Denies rash, lesions  NEURO: Denies paresthesias, weakness  PSYCH: Denies depression, anxiety    VITALS:  T(F): 98.3, Max: 98.3 (08-25-24 @ 10:19)  HR: 81  BP: 117/73  RR: 18Vital Signs Last 24 Hrs  T(C): 36.8 (25 Aug 2024 10:19), Max: 36.8 (25 Aug 2024 10:19)  T(F): 98.3 (25 Aug 2024 10:19), Max: 98.3 (25 Aug 2024 10:19)  HR: 81 (25 Aug 2024 10:19) (79 - 85)  BP: 117/73 (25 Aug 2024 10:19) (100/59 - 140/64)  BP(mean): 81 (24 Aug 2024 16:38) (81 - 81)  RR: 18 (25 Aug 2024 10:19) (18 - 19)  SpO2: 98% (25 Aug 2024 10:19) (96% - 98%)    Parameters below as of 25 Aug 2024 06:30  Patient On (Oxygen Delivery Method): room air        PHYSICAL EXAM:  Gen: NAD, resting in bed  HEENT: Normocephalic, atraumatic  Neck: supple, no lymphadenopathy  CV: Regular rate & regular rhythm  Lungs: decreased BS at bases, no fremitus  Abdomen: Soft, BS present  Ext: Warm, well perfused  Neuro: non focal, awake  Skin: no rash, no erythema  Lines: no phlebitis    TESTS & MEASUREMENTS:                        11.3   19.68 )-----------( 270      ( 24 Aug 2024 21:59 )             37.4     08-24    146  |  107  |  56<H>  ----------------------------<  132<H>  4.8   |  19  |  1.2    Ca    8.8      24 Aug 2024 11:43  Mg     3.3     08-24    TPro  6.5  /  Alb  3.7  /  TBili  0.4  /  DBili  x   /  AST  27  /  ALT  23  /  AlkPhos  55  08-24      LIVER FUNCTIONS - ( 24 Aug 2024 11:43 )  Alb: 3.7 g/dL / Pro: 6.5 g/dL / ALK PHOS: 55 U/L / ALT: 23 U/L / AST: 27 U/L / GGT: x           Urinalysis Basic - ( 24 Aug 2024 11:43 )    Color: x / Appearance: x / SG: x / pH: x  Gluc: 132 mg/dL / Ketone: x  / Bili: x / Urobili: x   Blood: x / Protein: x / Nitrite: x   Leuk Esterase: x / RBC: x / WBC x   Sq Epi: x / Non Sq Epi: x / Bacteria: x        Urinalysis with Rflx Culture (collected 08-24-24 @ 05:12)    Culture - Blood (collected 08-23-24 @ 23:19)  Source: .Blood Blood-Peripheral  Preliminary Report (08-25-24 @ 09:01):    No growth at 24 hours    Culture - Blood (collected 08-23-24 @ 23:19)  Source: .Blood Blood-Peripheral  Preliminary Report (08-25-24 @ 09:01):    No growth at 24 hours    Culture - Blood (collected 11-11-23 @ 06:55)  Source: .Blood None  Final Report (11-16-23 @ 17:00):    No growth at 5 days    Culture - Urine (collected 11-10-23 @ 15:33)  Source: Catheterized Catheterized  Final Report (11-13-23 @ 20:35):    50,000 - 99,000 CFU/mL Enterococcus faecalis  Organism: Enterococcus faecalis (11-13-23 @ 20:35)  Organism: Enterococcus faecalis (11-13-23 @ 20:35)      Method Type: JANNETH      -  Ampicillin: S <=2 Predicts results to ampicillin/sulbactam, amoxacillin-clavulanate and  piperacillin-tazobactam.      -  Ciprofloxacin: R >2      -  Levofloxacin: R >4      -  Nitrofurantoin: S <=32 Should not be used to treat pyelonephritis.      -  Tetracycline: R >8      -  Vancomycin: S 2        Lactate, Blood: 2.3 mmol/L (08-24-24 @ 22:01)  Lactate, Blood: 3.7 mmol/L (08-24-24 @ 17:20)  Lactate, Blood: 3.6 mmol/L (08-24-24 @ 11:43)  Lactate, Blood: 4.0 mmol/L (08-24-24 @ 05:10)  Lactate, Blood: 3.1 mmol/L (08-23-24 @ 23:19)  Blood Gas Venous - Lactate: 2.3 mmol/L (08-23-24 @ 23:17)      INFECTIOUS DISEASES TESTING  MRSA PCR Result.: Negative (08-24-24 @ 15:15)  Procalcitonin: 0.86 ng/mL (08-24-24 @ 11:43)  COVID-19 PCR: NotDetec (11-19-23 @ 10:16)  MRSA PCR Result.: Negative (11-11-23 @ 10:12)  Legionella Antigen, Urine: Negative (11-11-23 @ 10:01)  Procalcitonin, Serum: 0.10 ng/mL (11-11-23 @ 06:55)      RADIOLOGY & ADDITIONAL TESTS:  I have personally reviewed the last Chest xray  CXR  Xray Chest 1 View- PORTABLE-Urgent:   ACC: [id number] EXAM:  XR CHEST PORTABLE URGENT 1V   ORDERED BY: EMETERIO ARMANDO     PROCEDURE DATE:  08/23/2024          INTERPRETATION:  STUDY INDICATION: resp distress, fever    TECHNIQUE:  Portable frontal view of the chest obtained.    COMPARISON: XR CHEST 11/16/2023,    FINDINGS/  IMPRESSION:      No focal consolidation, pneumothorax or pleural effusion.    Stable cardiomediastinal silhouette.    Unchanged osseous structures.    --- End of Report ---            AISHWARYA CHU MD; AttendingRadiologist  This document has been electronically signed. Aug 24 2024  2:20AM (08-23-24 @ 23:16)      CT  CT Angio Chest PE Protocol w/ IV Cont:   ACC: [id number] EXAM:  CT ABDOMEN AND PELVIS IC   ORDERED BY: VICKI SOTO     ACC: [id number] EXAM:  CT ANGIO CHEST PULM ART WAWIC   ORDERED BY: VICKI SOTO     PROCEDURE DATE:  08/24/2024          INTERPRETATION:  CTA chest with IV contrast, CT abdomen and pelvis with   IV contrast    CLINICAL HISTORY/REASON FOR EXAM: Fever. Hypoxia. Urinary tract   infection..    TECHNIQUE: CTA chest with IV contrast, CT abdomen and pelvis with IV   contrast performed. Initial CTA images of chest obtained with 90 cc   Omnipaque 350 IV contrast administration. Subsequently, CT images of   abdomen and pelvis obtained with IV contrast administration. Oral   contrast was not administered. Reformatted images in the coronal and   sagittal planes were acquired. 3D (MIP images) generated. 10 cc contrast   discarded.    COMPARISON: CT abdomen and pelvis 12/24/2022.    FINDINGS:    CHEST:    PULMONARY ARTERIES: No evidence of acute pulmonary embolism.    LUNGS, PLEURA, AIRWAYS: No lobar consolidation, mass, effusion, or   pneumothorax. No evidence of central endobronchial obstruction. No   bronchiectasis or honeycombing. No suspicious pulmonary nodule. Bilateral   subsegmental atelectasis.    THORACIC NODES: No mediastinal, hilar, supraclavicular, or axillary   lymphadenopathy.    MEDIASTINUM/GREAT VESSELS: No pericardial effusion. Heart size is within   normal limits. The aorta and main pulmonary artery are of normal caliber.    ABDOMEN/PELVIS:    HEPATOBILIARY: Hepatic steatosis. Gallbladder, biliary system   unremarkable.    SPLEEN: Unremarkable.    PANCREAS: Unremarkable.    ADRENAL GLANDS: Unremarkable.    KIDNEYS: Symmetric pattern of renal enhancement. No hydronephrosis   bilaterally.    ABDOMINOPELVIC NODES: No lymphadenopathy.    PELVIC ORGANS: Calcified fibroid uterus. Urinary bladder mild pericystic   stranding, could be attributed to urinary bladder infection in the   appropriate clinical setting. Increased ovarian 2.5 cm cystic lesion.   Unchanged right ovarian 7 cm cystic mass.    PERITONEUM/MESENTERY/BOWEL: No bowel obstruction. No ascites or   pneumoperitoneum. Colonic diverticulosis. Moderate-to-large colorectal   stool burden.    BONES/SOFT TISSUES: Right chest wall dystrophic calcification.   Degenerative change ofspine. Scoliosis.    OTHER: Vascular calcifications.      IMPRESSION:    1. Urinary bladder mild pericystic stranding, could be attributed to   urinary bladder infection in the appropriate clinical setting.    2. Increased ovarian 2.5 cm cystic lesion. Unchanged right ovarian 7 cm   cystic mass. Outpatient pelvic ultrasound recommended for further   evaluation.    3. Moderate-to-large colorectal stool burden.    4. No evidence of acute thoracic pathology or pulmonary embolus.    --- End of Report---            MARU GREY MD; Attending Radiologist  This document has been electronically signed. Aug 24 2024  1:43AM (08-24-24 @ 01:36)      CARDIOLOGY TESTING  12 Lead ECG:   Ventricular Rate 127 BPM    Atrial Rate 127 BPM    P-R Interval 150 ms    QRS Duration 84 ms    Q-T Interval 298 ms    QTC Calculation(Bazett) 433 ms    P Axis 46 degrees    R Axis -25 degrees    T Axis 31 degrees    Diagnosis Line Sinus tachycardia  Inferior-posterior infarct , age undetermined  Abnormal ECG    Confirmed by VICKIE VALDOVINOS, [facility] ([id number]) on 8/24/2024 10:37:59 PM (08-24-24 @ 00:20)      MEDICATIONS  artificial tears (preservative free) Ophthalmic Solution 1 Both EYES two times a day  ascorbic acid 500 Oral daily  aspirin  chewable 81 Oral daily  budesonide 160 MICROgram(s)/formoterol 4.5 MICROgram(s) Inhaler 2 Inhalation two times a day  carbidopa/levodopa  10/100 1 Oral three times a day  cefepime   IVPB 2000 IV Intermittent every 8 hours  cholecalciferol 1000 Oral daily  clotrimazole 1% Cream 1 Topical two times a day  enoxaparin Injectable 40 SubCutaneous every 24 hours  famotidine    Tablet 20 Oral daily  furosemide    Tablet 20 Oral daily  hydrocortisone 1% Cream 1 Topical two times a day  lactated ringers. 1000 IV Continuous <Continuous>  latanoprost 0.005% Ophthalmic Solution 1 Both EYES at bedtime  levothyroxine 50 Oral daily  metoprolol succinate ER 25 Oral daily  mineral oil enema 133 Rectal <User Schedule>  polyethylene glycol 3350 17 Oral daily  senna 2 Oral at bedtime  timolol 0.5% Solution 1 Both EYES two times a day  vancomycin  IVPB     vancomycin  IVPB 1250 IV Intermittent every 24 hours      Weight  Weight (kg): 94.6 (08-24-24 @ 06:40)    ANTIBIOTICS:  cefepime   IVPB 2000 milliGRAM(s) IV Intermittent every 8 hours  vancomycin  IVPB 1250 milliGRAM(s) IV Intermittent every 24 hours  vancomycin  IVPB          ALLERGIES:  Originally Entered as [Unknown] reaction to [red pepper] (Unknown)  ciprofloxacin (Unknown)  benzodiazepines (Unknown)  antichlolinergics (Unknown)  penicillin (Anaphylaxis)  Originally Entered as [Unknown] reaction to [pepper] (Unknown)  Originally Entered as [Unknown] reaction to [green pepper] (Unknown)  Originally Entered as [Unknown] reaction to [neuroleptics] (Unknown)

## 2024-08-25 NOTE — SWALLOW BEDSIDE ASSESSMENT ADULT - COMMENTS
+ toleration of sbs and mod thick liquids w/o overt s/s of aspiration/penetration MBS November 2023- recommendations for sbs and mod thick liquids

## 2024-08-25 NOTE — PROGRESS NOTE ADULT - SUBJECTIVE AND OBJECTIVE BOX
Patient is a 84y old  Female who presents with a chief complaint of Altered Mental Status and Increased WOB (08-25-24)      Pt seen and examined at bedside. No CP or SOB.       ABG - ( 24 Aug 2024 18:46 )  pH: 7.42  /  pCO2: 37    /  pO2: 78    / HCO3: 24    / Base Excess: -0.2  /  SaO2: 94.7        PAST MEDICAL & SURGICAL HISTORY:  Dementia  History of breast cancer  Constipation  Lewy body dementia without behavioral disturbance  Colon polyp    History of colon resection    H/O mastectomy, right        VITAL SIGNS (Last 24 hrs):  T(C): 36.8 (08-25-24 @ 10:19), Max: 36.8 (08-25-24 @ 10:19)  HR: 81 (08-25-24 @ 10:19) (79 - 85)  BP: 117/73 (08-25-24 @ 10:19) (100/59 - 140/64)  RR: 18 (08-25-24 @ 10:19) (18 - 19)  SpO2: 98% (08-25-24 @ 10:19) (96% - 98%)  Wt(kg): --  Daily     Daily     I&O's Summary      PHYSICAL EXAM:  GENERAL: NAD, well-developed  HEAD:  Atraumatic, Normocephalic  EYES: EOMI, PERRLA, conjunctiva and sclera clear  NECK: Supple, No JVD  CHEST/LUNG: Clear to auscultation bilaterally; No wheeze  HEART: Regular rate and rhythm; No murmurs, rubs, or gallops  ABDOMEN: Soft, Nontender, Nondistended; Bowel sounds present  EXTREMITIES:  2+ Peripheral Pulses, No clubbing, cyanosis, or edema  PSYCH: AAOx3  NEUROLOGY: non-focal  SKIN: No rashes or lesions    Labs Reviewed  Spoke to patient in regards to abnormal labs.    CBC Full  -  ( 24 Aug 2024 21:59 )  WBC Count : 19.68 K/uL  Hemoglobin : 11.3 g/dL  Hematocrit : 37.4 %  Platelet Count - Automated : 270 K/uL  Mean Cell Volume : 96.6 fL  Mean Cell Hemoglobin : 29.2 pg  Mean Cell Hemoglobin Concentration : 30.2 g/dL  Auto Neutrophil # : x  Auto Lymphocyte # : x  Auto Monocyte # : x  Auto Eosinophil # : x  Auto Basophil # : x  Auto Neutrophil % : x  Auto Lymphocyte % : x  Auto Monocyte % : x  Auto Eosinophil % : x  Auto Basophil % : x    BMP:    08-24 @ 11:43    Blood Urea Nitrogen - 56  Calcium - 8.8  Carbond Dioxide - 19  Chloride - 107  Creatinine - 1.2  Glucose - 132  Potassium - 4.8  Sodium - 146      Hemoglobin A1c -   PT/INR - ( 23 Aug 2024 23:19 )   PT: 10.70 sec;   INR: 0.94 ratio         PTT - ( 23 Aug 2024 23:19 )  PTT:16.6 sec  Urine Culture:  08-23 @ 23:19 Urine culture: --    Culture Results:   No growth at 24 hours  Method Type: --  Organism: --  Organism Identification: --  Specimen Source: .Blood Blood-Peripheral        COVID Labs  CRP:    Procalcitonin: 0.86 ng/mL (08-24-24 @ 11:43)    D-Dimer:        Imaging reviewed independently and reviewed read  < from: CT Abdomen and Pelvis w/ IV Cont (08.24.24 @ 01:36) >  IMPRESSION:    1. Urinary bladder mild pericystic stranding, could be attributed to   urinary bladder infection in the appropriate clinical setting.    2. Increased ovarian 2.5 cm cystic lesion. Unchanged right ovarian 7 cm   cystic mass. Outpatient pelvic ultrasound recommended for further   evaluation.    3. Moderate-to-large colorectal stool burden.    4. No evidence of acute thoracic pathology or pulmonary embolus.    < end of copied text >        MEDICATIONS  (STANDING):  artificial tears (preservative free) Ophthalmic Solution 1 Drop(s) Both EYES two times a day  ascorbic acid 500 milliGRAM(s) Oral daily  aspirin  chewable 81 milliGRAM(s) Oral daily  budesonide 160 MICROgram(s)/formoterol 4.5 MICROgram(s) Inhaler 2 Puff(s) Inhalation two times a day  carbidopa/levodopa  10/100 1 Tablet(s) Oral three times a day  cefepime   IVPB 2000 milliGRAM(s) IV Intermittent every 8 hours  cholecalciferol 1000 Unit(s) Oral daily  clotrimazole 1% Cream 1 Application(s) Topical two times a day  enoxaparin Injectable 40 milliGRAM(s) SubCutaneous every 24 hours  famotidine    Tablet 20 milliGRAM(s) Oral daily  furosemide    Tablet 20 milliGRAM(s) Oral daily  hydrocortisone 1% Cream 1 Application(s) Topical two times a day  lactated ringers. 1000 milliLiter(s) (150 mL/Hr) IV Continuous <Continuous>  latanoprost 0.005% Ophthalmic Solution 1 Drop(s) Both EYES at bedtime  levothyroxine 50 MICROGram(s) Oral daily  metoprolol succinate ER 25 milliGRAM(s) Oral daily  mineral oil enema 133 milliLiter(s) Rectal <User Schedule>  polyethylene glycol 3350 17 Gram(s) Oral daily  senna 2 Tablet(s) Oral at bedtime  timolol 0.5% Solution 1 Drop(s) Both EYES two times a day  vancomycin  IVPB 1250 milliGRAM(s) IV Intermittent every 24 hours  vancomycin  IVPB        MEDICATIONS  (PRN):  albuterol/ipratropium for Nebulization 3 milliLiter(s) Nebulizer every 6 hours PRN Shortness of Breath and/or Wheezing  melatonin 3 milliGRAM(s) Oral at bedtime PRN Insomnia

## 2024-08-25 NOTE — PROGRESS NOTE ADULT - SUBJECTIVE AND OBJECTIVE BOX
83-year-old female with PMHx of Lewy Body dementia with associated autonomic dysfunction including dysphagia, Hypothyroidism, Parkinson's, COPD not on home oxygen , p/w ams and tachypnea, A&Ox0 on arrival being admitted for management of severe sepsis.       24H events:    Today is 2d of hospitalization. This morning patient was seen and examined at bedside, resting comfortably in bed.    No acute or major events overnight.    PAST MEDICAL & SURGICAL HISTORY  Dementia    History of breast cancer    Constipation    Lewy body dementia without behavioral disturbance    Colon polyp    History of colon resection    H/O mastectomy, right      SOCIAL HISTORY:  Social History:      ALLERGIES:  Originally Entered as [Unknown] reaction to [red pepper] (Unknown)  ciprofloxacin (Unknown)  benzodiazepines (Unknown)  antichlolinergics (Unknown)  penicillin (Anaphylaxis)  Originally Entered as [Unknown] reaction to [pepper] (Unknown)  Originally Entered as [Unknown] reaction to [green pepper] (Unknown)  Originally Entered as [Unknown] reaction to [neuroleptics] (Unknown)    MEDICATIONS:  STANDING MEDICATIONS  artificial tears (preservative free) Ophthalmic Solution 1 Drop(s) Both EYES two times a day  ascorbic acid 500 milliGRAM(s) Oral daily  aspirin  chewable 81 milliGRAM(s) Oral daily  budesonide 160 MICROgram(s)/formoterol 4.5 MICROgram(s) Inhaler 2 Puff(s) Inhalation two times a day  carbidopa/levodopa  10/100 1 Tablet(s) Oral three times a day  cholecalciferol 1000 Unit(s) Oral daily  clotrimazole 1% Cream 1 Application(s) Topical two times a day  enoxaparin Injectable 40 milliGRAM(s) SubCutaneous every 24 hours  famotidine    Tablet 20 milliGRAM(s) Oral daily  furosemide    Tablet 20 milliGRAM(s) Oral daily  hydrocortisone 1% Cream 1 Application(s) Topical two times a day  lactated ringers. 1000 milliLiter(s) IV Continuous <Continuous>  latanoprost 0.005% Ophthalmic Solution 1 Drop(s) Both EYES at bedtime  levothyroxine 50 MICROGram(s) Oral daily  metoprolol succinate ER 25 milliGRAM(s) Oral daily  mineral oil enema 133 milliLiter(s) Rectal <User Schedule>  polyethylene glycol 3350 17 Gram(s) Oral daily  senna 2 Tablet(s) Oral at bedtime  timolol 0.5% Solution 1 Drop(s) Both EYES two times a day    PRN MEDICATIONS  albuterol/ipratropium for Nebulization 3 milliLiter(s) Nebulizer every 6 hours PRN  melatonin 3 milliGRAM(s) Oral at bedtime PRN    VITALS:   T(F): 98.6  HR: 77  BP: 121/68  RR: 18  SpO2: 97%    PHYSICAL EXAM:  GENERAL:   ( x) NAD, lying in bed comfortably     (  ) obtunded     (  ) lethargic     (  ) somnolent      NECK:  (x) Supple     (  ) neck stiffness     (  ) nuchal rigidity     (  )  no JVD     (  ) JVD present ( -- cm)    HEART:  Rate -->     (x) normal rate     (  ) bradycardic     (  ) tachycardic  Rhythm -->     (x) regular     (  ) regularly irregular     (  ) irregularly irregular  Murmurs -->     (x) normal s1s2     (  ) systolic murmur     (  ) diastolic murmur     (  ) continuous murmur      (  ) S3 present     (  ) S4 present    LUNGS:   ( x)Unlabored respirations     (  ) tachypnea  ( x) B/L air entry     (  ) decreased breath sounds in:  (location     )    ( x) no adventitious sound     (  ) crackles     (  ) wheezing      (  ) rhonchi      (specify location:       )  (  ) chest wall tenderness (specify location:       )    ABDOMEN:   ( x) Soft     (  ) tense   |   (  ) nondistended     (  ) distended   |   (  ) +BS     (  ) hypoactive bowel sounds     (  ) hyperactive bowel sounds  ( x) nontender     (  ) RUQ tenderness     (  ) RLQ tenderness     (  ) LLQ tenderness     (  ) epigastric tenderness     (  ) diffuse tenderness  (  ) Splenomegaly      (  ) Hepatomegaly      (  ) Jaundice     (  ) ecchymosis     EXTREMITIES:  ( x) Normal     (  ) Rash     (  ) ecchymosis     (  ) varicose veins      (  ) pitting edema     (  ) non-pitting edema   (  ) ulceration     (  ) gangrene:     (location:     )    NERVOUS SYSTEM:    ( x) A&Ox3     (  ) confused     (  ) lethargic  CN II-XII:     ( x) Intact     (  ) deficits found     (Specify:     )   Upper extremities:     (  ) no sensorimotor deficits     (  ) weakness     (  ) loss of proprioception/vibration     (  ) loss of touch/temperature (specify:    )  Lower extremities:     (  ) no sensorimotor deficits     (  ) weakness     (  ) loss of proprioception/vibration     (  ) loss of touch/temperature (specify:    )    SKIN:   (  ) No rashes or lesions     (  ) maculopapular rash     (  ) pustules     (  ) vesicles     (  ) ulcer     (  ) ecchymosis     (specify location:     )      LABS:                        11.3   19.68 )-----------( 270      ( 24 Aug 2024 21:59 )             37.4     08-24    146  |  107  |  56<H>  ----------------------------<  132<H>  4.8   |  19  |  1.2    Ca    8.8      24 Aug 2024 11:43  Mg     3.3     08-24    TPro  6.5  /  Alb  3.7  /  TBili  0.4  /  DBili  x   /  AST  27  /  ALT  23  /  AlkPhos  55  08-24    PT/INR - ( 23 Aug 2024 23:19 )   PT: 10.70 sec;   INR: 0.94 ratio         PTT - ( 23 Aug 2024 23:19 )  PTT:16.6 sec  Urinalysis Basic - ( 24 Aug 2024 11:43 )    Color: x / Appearance: x / SG: x / pH: x  Gluc: 132 mg/dL / Ketone: x  / Bili: x / Urobili: x   Blood: x / Protein: x / Nitrite: x   Leuk Esterase: x / RBC: x / WBC x   Sq Epi: x / Non Sq Epi: x / Bacteria: x      ABG - ( 24 Aug 2024 18:46 )  pH, Arterial: 7.42  pH, Blood: x     /  pCO2: 37    /  pO2: 78    / HCO3: 24    / Base Excess: -0.2  /  SaO2: 94.7              Lactate, Blood: 2.3 mmol/L *H* (08-24-24 @ 22:01)  Lactate, Blood: 3.7 mmol/L *H* (08-24-24 @ 17:20)      Urinalysis with Rflx Culture (collected 24 Aug 2024 05:12)    Culture - Blood (collected 23 Aug 2024 23:19)  Source: .Blood Blood-Peripheral  Preliminary Report (25 Aug 2024 09:01):    No growth at 24 hours    Culture - Blood (collected 23 Aug 2024 23:19)  Source: .Blood Blood-Peripheral  Preliminary Report (25 Aug 2024 09:01):    No growth at 24 hours

## 2024-08-25 NOTE — CONSULT NOTE ADULT - ASSESSMENT
ASSESSMENT  84-year-old woman with a history of Lewy body dementia (AAO x 2 at baseline), Parkinson's, COPD not on home oxygen, presented from [facility] with presenting complaints of AMS and Tachypnea, Patient was A&Ox0 on arrival, unable to provide history.       IMPRESSION  #Sepsis present on admission with encephalopathy- suspected UTI   Pt has an acute illness which poses threat to bodily function   - CT Angio Chest PE Protocol w/ IV Cont (08.24.24 @ 01:36): 1. Urinary bladder mild pericystic stranding, could be attributed to  urinary bladder infection in the appropriate clinical setting.  - WBC Count: 19.08 K/uL (08.23.24 @ 23:19) on admission  - Blood Cx 8/23 NG    #Ovarian Cystic Leions  - CT Abd/pelvis 8/24:  2. Increased ovarian 2.5 cm cystic lesion. Unchanged right ovarian 7 cm  cystic mass. Outpatient pelvic ultrasound recommended for further   evaluation.    #Constipation  #Lewy Vinnie Dementai AO x2 at baseline  #PArkinson's   #COPD     #Obesity BMI (kg/m2): 35.8  #DM   #Abx allergy: ciprofloxacin (Unknown)  penicillin (Anaphylaxis)    RECOMMENDATIONS  This is a preliminary incomplete pended note, all final recommendations to follow after interview and examination of the patient.    Please call or message on [employer] Teams if with any questions.  Spectra [phone]   ASSESSMENT  84-year-old woman with a history of Lewy body dementia (AAO x 2 at baseline), Parkinson's, COPD not on home oxygen, presented from [facility] with presenting complaints of AMS and Tachypnea, Patient was A&Ox0 on arrival, unable to provide history.       IMPRESSION  #Sepsis present on admission with encephalopathy- suspected UTI?  Pt has an acute illness which poses threat to bodily function   - CT Angio Chest PE Protocol w/ IV Cont (08.24.24 @ 01:36): 1. Urinary bladder mild pericystic stranding, could be attributed to  urinary bladder infection in the appropriate clinical setting.  - WBC Count: 19.08 K/uL (08.23.24 @ 23:19) on admission  - UA 8/24 negative - although taken after antibiotics  - Blood Cx 8/23 NG    #Ovarian Cystic Leions  - CT Abd/pelvis 8/24:  2. Increased ovarian 2.5 cm cystic lesion. Unchanged right ovarian 7 cm  cystic mass. Outpatient pelvic ultrasound recommended for further   evaluation.    #Constipation  #Lewy Body Dementia AO x2 at baseline  #Parkinson's   #COPD     #Obesity BMI (kg/m2): 35.8  #DM   #Abx allergy: ciprofloxacin (Unknown)  penicillin (Anaphylaxis)    RECOMMENDATIONS  -  of leukocytosis unclear -- no sacral ulcers -- noted CT imaging with possible bladder stranding, although UA negative   - as blood Cx negative and UA negative, narrow to vanc/cefepime to ceftriaxone 2g daily for now   - ensure BM  - trend WBC for now     Please call or message on [employer] Teams if with any questions.  Spectra [phone]

## 2024-08-25 NOTE — PATIENT PROFILE ADULT - FALL HARM RISK - HARM RISK INTERVENTIONS

## 2024-08-26 LAB
ALBUMIN SERPL ELPH-MCNC: 3.3 G/DL — LOW (ref 3.5–5.2)
ALBUMIN SERPL ELPH-MCNC: 3.7 G/DL — SIGNIFICANT CHANGE UP (ref 3.5–5.2)
ALP SERPL-CCNC: 47 U/L — SIGNIFICANT CHANGE UP (ref 30–115)
ALP SERPL-CCNC: 67 U/L — SIGNIFICANT CHANGE UP (ref 30–115)
ALT FLD-CCNC: 21 U/L — SIGNIFICANT CHANGE UP (ref 0–41)
ALT FLD-CCNC: 8 U/L — SIGNIFICANT CHANGE UP (ref 0–41)
ANION GAP SERPL CALC-SCNC: 14 MMOL/L — SIGNIFICANT CHANGE UP (ref 7–14)
ANION GAP SERPL CALC-SCNC: 17 MMOL/L — HIGH (ref 7–14)
APTT BLD: 22.6 SEC — CRITICAL LOW (ref 27–39.2)
AST SERPL-CCNC: 29 U/L — SIGNIFICANT CHANGE UP (ref 0–41)
AST SERPL-CCNC: 43 U/L — HIGH (ref 0–41)
BASOPHILS # BLD AUTO: 0.03 K/UL — SIGNIFICANT CHANGE UP (ref 0–0.2)
BASOPHILS NFR BLD AUTO: 0.2 % — SIGNIFICANT CHANGE UP (ref 0–1)
BILIRUB SERPL-MCNC: 0.3 MG/DL — SIGNIFICANT CHANGE UP (ref 0.2–1.2)
BILIRUB SERPL-MCNC: 0.5 MG/DL — SIGNIFICANT CHANGE UP (ref 0.2–1.2)
BUN SERPL-MCNC: 47 MG/DL — HIGH (ref 10–20)
BUN SERPL-MCNC: 52 MG/DL — HIGH (ref 10–20)
CALCIUM SERPL-MCNC: 8 MG/DL — LOW (ref 8.4–10.5)
CALCIUM SERPL-MCNC: 8.9 MG/DL — SIGNIFICANT CHANGE UP (ref 8.4–10.4)
CHLORIDE SERPL-SCNC: 105 MMOL/L — SIGNIFICANT CHANGE UP (ref 98–110)
CHLORIDE SERPL-SCNC: 99 MMOL/L — SIGNIFICANT CHANGE UP (ref 98–110)
CO2 SERPL-SCNC: 22 MMOL/L — SIGNIFICANT CHANGE UP (ref 17–32)
CO2 SERPL-SCNC: 23 MMOL/L — SIGNIFICANT CHANGE UP (ref 17–32)
CREAT SERPL-MCNC: 1 MG/DL — SIGNIFICANT CHANGE UP (ref 0.7–1.5)
CREAT SERPL-MCNC: 1.5 MG/DL — SIGNIFICANT CHANGE UP (ref 0.7–1.5)
EGFR: 34 ML/MIN/1.73M2 — LOW
EGFR: 56 ML/MIN/1.73M2 — LOW
EOSINOPHIL # BLD AUTO: 0.36 K/UL — SIGNIFICANT CHANGE UP (ref 0–0.7)
EOSINOPHIL NFR BLD AUTO: 2.8 % — SIGNIFICANT CHANGE UP (ref 0–8)
GAS PNL BLDA: SIGNIFICANT CHANGE UP
GAS PNL BLDA: SIGNIFICANT CHANGE UP
GLUCOSE BLDC GLUCOMTR-MCNC: 145 MG/DL — HIGH (ref 70–99)
GLUCOSE SERPL-MCNC: 100 MG/DL — HIGH (ref 70–99)
GLUCOSE SERPL-MCNC: 183 MG/DL — HIGH (ref 70–99)
HCT VFR BLD CALC: 34.7 % — LOW (ref 37–47)
HCT VFR BLD CALC: 39.3 % — SIGNIFICANT CHANGE UP (ref 37–47)
HGB BLD-MCNC: 10.7 G/DL — LOW (ref 12–16)
HGB BLD-MCNC: 11.7 G/DL — LOW (ref 12–16)
IMM GRANULOCYTES NFR BLD AUTO: 0.8 % — HIGH (ref 0.1–0.3)
INR BLD: 0.93 RATIO — SIGNIFICANT CHANGE UP (ref 0.65–1.3)
LYMPHOCYTES # BLD AUTO: 1.89 K/UL — SIGNIFICANT CHANGE UP (ref 1.2–3.4)
LYMPHOCYTES # BLD AUTO: 14.5 % — LOW (ref 20.5–51.1)
MAGNESIUM SERPL-MCNC: 2.4 MG/DL — SIGNIFICANT CHANGE UP (ref 1.8–2.4)
MAGNESIUM SERPL-MCNC: 2.4 MG/DL — SIGNIFICANT CHANGE UP (ref 1.8–2.4)
MCHC RBC-ENTMCNC: 28.9 PG — SIGNIFICANT CHANGE UP (ref 27–31)
MCHC RBC-ENTMCNC: 29 PG — SIGNIFICANT CHANGE UP (ref 27–31)
MCHC RBC-ENTMCNC: 29.8 G/DL — LOW (ref 32–37)
MCHC RBC-ENTMCNC: 30.8 G/DL — LOW (ref 32–37)
MCV RBC AUTO: 94 FL — SIGNIFICANT CHANGE UP (ref 81–99)
MCV RBC AUTO: 97 FL — SIGNIFICANT CHANGE UP (ref 81–99)
MONOCYTES # BLD AUTO: 0.8 K/UL — HIGH (ref 0.1–0.6)
MONOCYTES NFR BLD AUTO: 6.1 % — SIGNIFICANT CHANGE UP (ref 1.7–9.3)
NEUTROPHILS # BLD AUTO: 9.86 K/UL — HIGH (ref 1.4–6.5)
NEUTROPHILS NFR BLD AUTO: 75.6 % — HIGH (ref 42.2–75.2)
NRBC # BLD: 0 /100 WBCS — SIGNIFICANT CHANGE UP (ref 0–0)
NRBC # BLD: 0 /100 WBCS — SIGNIFICANT CHANGE UP (ref 0–0)
PLATELET # BLD AUTO: 232 K/UL — SIGNIFICANT CHANGE UP (ref 130–400)
PLATELET # BLD AUTO: 338 K/UL — SIGNIFICANT CHANGE UP (ref 130–400)
PMV BLD: 9.1 FL — SIGNIFICANT CHANGE UP (ref 7.4–10.4)
PMV BLD: 9.6 FL — SIGNIFICANT CHANGE UP (ref 7.4–10.4)
POTASSIUM SERPL-MCNC: 3.8 MMOL/L — SIGNIFICANT CHANGE UP (ref 3.5–5)
POTASSIUM SERPL-MCNC: 4.1 MMOL/L — SIGNIFICANT CHANGE UP (ref 3.5–5)
POTASSIUM SERPL-SCNC: 3.8 MMOL/L — SIGNIFICANT CHANGE UP (ref 3.5–5)
POTASSIUM SERPL-SCNC: 4.1 MMOL/L — SIGNIFICANT CHANGE UP (ref 3.5–5)
PROT SERPL-MCNC: 5.6 G/DL — LOW (ref 6–8)
PROT SERPL-MCNC: 6 G/DL — SIGNIFICANT CHANGE UP (ref 6–8)
PROTHROM AB SERPL-ACNC: 10.6 SEC — SIGNIFICANT CHANGE UP (ref 9.95–12.87)
RBC # BLD: 3.69 M/UL — LOW (ref 4.2–5.4)
RBC # BLD: 4.05 M/UL — LOW (ref 4.2–5.4)
RBC # FLD: 16.6 % — HIGH (ref 11.5–14.5)
RBC # FLD: 16.8 % — HIGH (ref 11.5–14.5)
SODIUM SERPL-SCNC: 139 MMOL/L — SIGNIFICANT CHANGE UP (ref 135–146)
SODIUM SERPL-SCNC: 141 MMOL/L — SIGNIFICANT CHANGE UP (ref 135–146)
WBC # BLD: 13.04 K/UL — HIGH (ref 4.8–10.8)
WBC # BLD: 29.38 K/UL — HIGH (ref 4.8–10.8)
WBC # FLD AUTO: 13.04 K/UL — HIGH (ref 4.8–10.8)
WBC # FLD AUTO: 29.38 K/UL — HIGH (ref 4.8–10.8)

## 2024-08-26 PROCEDURE — 71045 X-RAY EXAM CHEST 1 VIEW: CPT | Mod: 26,77,76

## 2024-08-26 PROCEDURE — 99233 SBSQ HOSP IP/OBS HIGH 50: CPT

## 2024-08-26 PROCEDURE — 70450 CT HEAD/BRAIN W/O DYE: CPT | Mod: 26

## 2024-08-26 PROCEDURE — 99291 CRITICAL CARE FIRST HOUR: CPT

## 2024-08-26 PROCEDURE — 71045 X-RAY EXAM CHEST 1 VIEW: CPT | Mod: 26

## 2024-08-26 PROCEDURE — 74018 RADEX ABDOMEN 1 VIEW: CPT | Mod: 26,76

## 2024-08-26 RX ORDER — CEFEPIME 2 G/1
2000 INJECTION, POWDER, FOR SOLUTION INTRAVENOUS ONCE
Refills: 0 | Status: COMPLETED | OUTPATIENT
Start: 2024-08-26 | End: 2024-08-26

## 2024-08-26 RX ORDER — VANCOMYCIN/0.9 % SOD CHLORIDE 1.75G/25
1500 PLASTIC BAG, INJECTION (ML) INTRAVENOUS ONCE
Refills: 0 | Status: COMPLETED | OUTPATIENT
Start: 2024-08-26 | End: 2024-08-26

## 2024-08-26 RX ORDER — FENTANYL CITRATE 50 UG/ML
0.5 INJECTION INTRAMUSCULAR; INTRAVENOUS
Qty: 2500 | Refills: 0 | Status: DISCONTINUED | OUTPATIENT
Start: 2024-08-26 | End: 2024-08-27

## 2024-08-26 RX ORDER — METRONIDAZOLE 250 MG
500 TABLET ORAL EVERY 12 HOURS
Refills: 0 | Status: DISCONTINUED | OUTPATIENT
Start: 2024-08-26 | End: 2024-08-27

## 2024-08-26 RX ORDER — CEFEPIME 2 G/1
2000 INJECTION, POWDER, FOR SOLUTION INTRAVENOUS EVERY 8 HOURS
Refills: 0 | Status: DISCONTINUED | OUTPATIENT
Start: 2024-08-26 | End: 2024-08-27

## 2024-08-26 RX ORDER — DEXMEDETOMIDINE HYDROCHLORIDE IN 0.9% SODIUM CHLORIDE 4 UG/ML
0.2 INJECTION INTRAVENOUS
Qty: 400 | Refills: 0 | Status: DISCONTINUED | OUTPATIENT
Start: 2024-08-26 | End: 2024-08-30

## 2024-08-26 RX ORDER — ROPIVACAINE IN 0.9% SOD CHL/PF 0.1 %
0.05 PLASTIC BAG, INJECTION (ML) EPIDURAL
Qty: 8 | Refills: 0 | Status: DISCONTINUED | OUTPATIENT
Start: 2024-08-26 | End: 2024-08-27

## 2024-08-26 RX ORDER — CHLORHEXIDINE GLUCONATE 40 MG/ML
1 SOLUTION TOPICAL DAILY
Refills: 0 | Status: DISCONTINUED | OUTPATIENT
Start: 2024-08-26 | End: 2024-09-04

## 2024-08-26 RX ORDER — CEFEPIME 2 G/1
INJECTION, POWDER, FOR SOLUTION INTRAVENOUS
Refills: 0 | Status: DISCONTINUED | OUTPATIENT
Start: 2024-08-26 | End: 2024-08-27

## 2024-08-26 RX ORDER — DEXMEDETOMIDINE HYDROCHLORIDE IN 0.9% SODIUM CHLORIDE 4 UG/ML
0.05 INJECTION INTRAVENOUS
Qty: 200 | Refills: 0 | Status: DISCONTINUED | OUTPATIENT
Start: 2024-08-26 | End: 2024-08-26

## 2024-08-26 RX ORDER — CHLORHEXIDINE GLUCONATE 40 MG/ML
15 SOLUTION TOPICAL EVERY 12 HOURS
Refills: 0 | Status: DISCONTINUED | OUTPATIENT
Start: 2024-08-26 | End: 2024-08-29

## 2024-08-26 RX ADMIN — Medication 1000 UNIT(S): at 11:59

## 2024-08-26 RX ADMIN — MINERAL OIL 133 MILLILITER(S): 1000 LIQUID TOPICAL at 21:14

## 2024-08-26 RX ADMIN — Medication 2 TABLET(S): at 21:15

## 2024-08-26 RX ADMIN — Medication 81 MILLIGRAM(S): at 11:59

## 2024-08-26 RX ADMIN — POLYETHYLENE GLYCOL 3350 17 GRAM(S): 17 POWDER, FOR SOLUTION ORAL at 11:59

## 2024-08-26 RX ADMIN — Medication 300 MILLIGRAM(S): at 15:01

## 2024-08-26 RX ADMIN — TIMOLOL MALEATE 1 DROP(S): 5 SOLUTION/ DROPS OPHTHALMIC at 05:20

## 2024-08-26 RX ADMIN — CEFEPIME 100 MILLIGRAM(S): 2 INJECTION, POWDER, FOR SOLUTION INTRAVENOUS at 21:16

## 2024-08-26 RX ADMIN — Medication 100 MILLIGRAM(S): at 17:43

## 2024-08-26 RX ADMIN — FAMOTIDINE 20 MILLIGRAM(S): 10 INJECTION INTRAVENOUS at 11:59

## 2024-08-26 RX ADMIN — Medication 50 MICROGRAM(S): at 05:17

## 2024-08-26 RX ADMIN — Medication 1 TABLET(S): at 21:15

## 2024-08-26 RX ADMIN — BUDESONIDE AND FORMOTEROL FUMARATE 2 PUFF(S): 80; 4.5 AEROSOL, METERED RESPIRATORY (INHALATION) at 11:58

## 2024-08-26 RX ADMIN — Medication 1 APPLICATION(S): at 17:43

## 2024-08-26 RX ADMIN — CHLORHEXIDINE GLUCONATE 15 MILLILITER(S): 40 SOLUTION TOPICAL at 17:44

## 2024-08-26 RX ADMIN — Medication 100 MILLIGRAM(S): at 11:58

## 2024-08-26 RX ADMIN — Medication 1 APPLICATION(S): at 05:17

## 2024-08-26 RX ADMIN — Medication 1 TABLET(S): at 05:17

## 2024-08-26 RX ADMIN — TIMOLOL MALEATE 1 DROP(S): 5 SOLUTION/ DROPS OPHTHALMIC at 17:44

## 2024-08-26 RX ADMIN — FENTANYL CITRATE 4.78 MICROGRAM(S)/KG/HR: 50 INJECTION INTRAMUSCULAR; INTRAVENOUS at 13:00

## 2024-08-26 RX ADMIN — CEFEPIME 100 MILLIGRAM(S): 2 INJECTION, POWDER, FOR SOLUTION INTRAVENOUS at 14:33

## 2024-08-26 RX ADMIN — POVIDONE, PROPYLENE GLYCOL 1 DROP(S): 6.8; 3 LIQUID OPHTHALMIC at 17:44

## 2024-08-26 RX ADMIN — Medication 8.96 MICROGRAM(S)/KG/MIN: at 13:02

## 2024-08-26 RX ADMIN — Medication 500 MILLIGRAM(S): at 11:59

## 2024-08-26 RX ADMIN — ENOXAPARIN SODIUM 40 MILLIGRAM(S): 100 INJECTION SUBCUTANEOUS at 15:01

## 2024-08-26 RX ADMIN — METOPROLOL TARTRATE 25 MILLIGRAM(S): 100 TABLET ORAL at 05:24

## 2024-08-26 RX ADMIN — Medication 1 APPLICATION(S): at 17:44

## 2024-08-26 RX ADMIN — POVIDONE, PROPYLENE GLYCOL 1 DROP(S): 6.8; 3 LIQUID OPHTHALMIC at 05:18

## 2024-08-26 RX ADMIN — LATANOPROST 1 DROP(S): 50 SOLUTION OPHTHALMIC at 21:14

## 2024-08-26 RX ADMIN — Medication 20 MILLIGRAM(S): at 05:17

## 2024-08-26 RX ADMIN — Medication 1 APPLICATION(S): at 05:20

## 2024-08-26 RX ADMIN — DEXMEDETOMIDINE HYDROCHLORIDE IN 0.9% SODIUM CHLORIDE 1.2 MICROGRAM(S)/KG/HR: 4 INJECTION INTRAVENOUS at 14:39

## 2024-08-26 NOTE — AIRWAY PLACEMENT NOTE ADULT - AIRWAY COMMENTS:
Patient intubated with no complications and connected/supported to ventilator by respiratory therapist.

## 2024-08-26 NOTE — PROGRESS NOTE ADULT - ASSESSMENT
83-year-old female with PMHx of Lewy Body dementia with associated autonomic dysfunction including dysphagia, Hypothyroidism, Parkinson's, COPD not on home oxygen , p/w ams and tachypnea, A&Ox0 on arrival being admitted for management of severe sepsis.     # Severe Sepsis on Admission -  # Acute Hypoxic Respiratory Failure, was on BIPAP then weaned to NC, resolved  possible pneumonitis   # Metabolic Encephalopathy   # COPD, stable not in exacerbation   -s/p STAT cefepime and vancomycin in ED   -Was started on Ceftriaxone, Doxycycline and Ertapenem in NH for UTI   - As per ID recs- started on rocephin and clotrimazole  -s/p 1L bolus, in setting of  heart failure   -s/p BiPAP--> NC -->RA stating well.   - f/u Blood Cx NTD so far  - procalcitonin 0.86   - Urine Cx no growth  -  RVP neg  - Lactate trend improving   -c/w symbicort 2 puffs bid   -c/w duonebs PRN   - follow up EEG read -> Abnormal diffuse slowing, no focal abnormalities or seizure activity.     # Run of NSVTs  # Elevated troponins  - in setting of hypoxia and severe sepsis  - Cardiology consulted by ED, recommend telemetry monitoring d/c ed  - Trops 142 -> 132,-->79  - EKG: Sinus Tachycardia   - f/u troponins trend  - f/u electrolytes and replete as necessary      # Chronic constipation 2/2 Autonomic Dysfunction 2/2 Lewy Body Dementia  with h/o recurrent distension of bowel.   # H/O right hemicolectomy   -Continue with home regimen: Miralax daily, Senna 2 tabs HS, Colace 100 BID, [employer] every MWF in evening      # H/O ? HFpEF, Stable  # HTN   - ECHO: 11 Nov 23: Normal global left ventricular systolic function with a biplane EF of 60%, Mild mitral stenosis, Mild aortic regurgitation, Mild aortic valve stenosis  - Euvolemic on physical examination   - c/w home Metoprolol succinate 25 mg od, furosemide 10 mg daily, c/w nifedipine 60 mg daily   - c/w aspirin 81 mg daily       # Parkinson's disease with Lewy body dementia   # Functional quadriplegia, wheelchair bound at baseline  - At baseline patient is AXO x 3 according to her ,  - c/w Levodopa-Carbidopa 10/100 tid   EEG:Clinical Impression:  Mild diffuse cerebral dysfunction is nonspecific in etiology.   No epileptiform abnormalities or seizures.    # Vit D deficiency   - c/w Vit D 1000 daily     # Dysphagia   - soft and bite sized, moderately thick liquids as per speech evaluation last admission   - f/u speech eval     # Hypothyroidism    - Recent TSH 1.74 8/23 ([facility] records)   -c/w Synthroid 50 mcg daily.      # GERD   -c/w famotidine 20 mg daily     # Glaucoma   # Conjunctival Xerosis   -c/w home eye drops     # Peripheral neuropathy   - at home gabapentin 600 mg tid, holding in setting of AMS for now    MISC  #DVT prophylaxis: Lovenox  #GI prophylaxis: PPI  #Diet: soft bite sized  #Code status: DNR DNI    #Progress Note Handoff  Pending (specify):  f/u ID  Disposition: acute - floors

## 2024-08-26 NOTE — PROGRESS NOTE ADULT - ASSESSMENT
83-year-old female with PMHx of Lewy Body dementia with associated autonomic dysfunction including dysphagia, Hypothyroidism, Parkinson's, COPD not on home oxygen , p/w ams and tachypnea, A&Ox0 on arrival being admitted for management of severe sepsis.     # Severe Sepsis on Admission ( Tachycardia 133, Tachypnea 24, Leukocytosis 19.08, Lactate 3.1 -> 4.0) 2/2 Possible Acute Complicated UTI possible pneumonitis   # Acute Hypoxic Respiratory Failure, was on BIPAP then weaned to NC, resolved  possible pneumonitis   # Metabolic Encephalopathy likely due to UTI, improving- resolved  # COPD, stable not in exacerbation   -s/p STAT cefepime and vancomycin in ED   -Was started on Ceftriaxone, Doxycycline and Ertapenem in NH for UTI   -was given 1L bolus, 30 cc/kg not given because of history of heart failure   - CT Angio Chest PE protocol and CT A/P with IV contrast:    1. Urinary bladder mild pericystic stranding    2. Increased ovarian 2.5 cm cystic lesion. Unchanged right ovarian 7 cm cystic mass. Outpatient pelvic ultrasound recommended for further evaluation.   3. Moderate-to-large colorectal stool burden.   4. No evidence of acute thoracic pathology or pulmonary embolus.   -Patient was placed on BiPAP for potential hypercapnia by ED, VBG: pH 7.32, pCo2 42, HCO3 22   -CXR: No focal consolidation, pneumothorax or pleural effusion.   -CTPE: WNL  - f/u Blood Cx   - procalcitonin 0.86   - f/u Urine Cx  -  RVP neg  now intubated possible aspiration    #AHRF , CODE BLUE , ROSC  patient was coded in am with ROSC  intubated and transferred to ICU   rescineded the DNR/DNI.  on cefepime and flagyl, follow Id  plan as pr ICU team.    explained about patient condition multiple times        # ? Run of NSVTs  # Raised troponins in setting of hypoxia and severe sepsis  - Cardiology consulted by ED, recommend telemetry monitoring  - Trops 142 -> 132,-->79  - EKG: Sinus Tachycardia   - f/u troponins  - f/u electrolytes and replete as necessary    # Chronic constipation 2/2 Autonomic Dysfunction 2/2 Lewy Body Dementia  with h/o recurrent distension of bowel.   # H/O right hemicolectomy   -Continue with home regimen: Miralax daily, Senna 2 tabs HS, Colace 100 BID,     # H/O ? HFpEF, Stable  # HTN   - ECHO: 11 Nov 23: Normal global left ventricular systolic function with a biplane EF of 60%, Mild mitral stenosis, Mild aortic regurgitation, Mild aortic valve stenosis  - Euvolemic on physical examination   - c/w home Metoprolol succinate 25 mg od, furosemide 10 mg daily, c/w nifedipine 60 mg daily   - c/w aspirin 81 mg daily       # Parkinson's disease with Lewy body dementia   # Functional quadriplegia, wheelchair bound at baseline  - CT Head: No evidence of acute intracranial abnormality.   - At baseline patient is AXO x 3 according to her , patient has difficulty findings words and has meaningful conversation   - c/w Levodopa-Carbidopa 10/100 tid     # Vit D deficiency   - c/w Vit D 1000 daily     # Dysphagia   - soft and bite sized, moderately thick liquids as per speech evaluation last admission   - f/u speech eval . now intubated     # Hypothyroidism    - Recent TSH 1.74 8/23 ([facility] records)   -c/w Synthroid 50 mcg daily.      # GERD   -c/w famotidine 20 mg daily     # Glaucoma   # Conjunctival Xerosis   -c/w home eye drops     # Peripheral neuropathy   - at home gabapentin 600 mg tid, holding in setting of AMS for now    follow up: follow cultures, CODE blue today, ROSC, transferred to ICU, likely as aspiration event.    rescinded the DNR/DNI today. now full code  palliaitve consult.   discussed at length with  multiple times.

## 2024-08-26 NOTE — CONSULT NOTE ADULT - SUBJECTIVE AND OBJECTIVE BOX
Patient is a 84y old  Female who presents with a chief complaint of Altered Mental Status and Increased WOB (26 Aug 2024 11:36)      HPI:  Patient 84-year-old woman with a history of Lewy body dementia (AAO x 2 at baseline), Parkinson's, COPD not on home oxygen, presented from [facility] with presenting complaints of AMS and Tachypnea, Patient was A&Ox0 on arrival, unable to provide history. Patient was found to be with decreased breath sounds diffusely, quick bedside ultrasound by ED showed lung sliding, no pneumothorax, was placed on BiPAP for potential hypercapnia, magnesium and nebs given, blood gas resulted with normal CO2. was weaned off to NC.  ED team held off on 30 cc/kg bolus, as patient has a history of heart failure: was given 1 L bolus. She was evaluated by cardiology for two runs of NSVTs approx 10 sec, recommended telemetry admission. Patient was started on Ertapenem, Doxycycline and Ceftriaxone in NH for suspected UTI. At baseline patient is AXO x3, has difficulty finding words, has impaired memory but has meaningful conversation. On my visit patient saturating 95 % on RA lying comfortably in bed, is oriented to place and person and nodding to questions. Patient endorses no abdominal pain, chest pain, SOB or fever.     GOC: Spoke with the  who arrived to bedside shortly after the patient arrived, discussed goals of care.  Patient is DNR/DNI.       ED Course  Vitals: 97.8, 133, 141/77 , 24 bpm  Labs:  WBC 19.08, Lactate 3.1 -> 4.0, Trops 142 -> 132, Na 147, BUN/Cr 61/1.4, pro-,    VBG: pH 7.32, pCo2 42, HCO3 22, UA WNL   Imaging:   CT Head: No evidence of acute intracranial abnormality.   CT Angio Chest PE protocol and CT A/P with IV contrast:    1. Urinary bladder mild pericystic stranding    2. Increased ovarian 2.5 cm cystic lesion. Unchanged right ovarian 7 cm cystic mass. Outpatient pelvic ultrasound recommended for further evaluation.   3. Moderate-to-large colorectal stool burden.   4. No evidence of acute thoracic pathology or pulmonary embolus.   CXR:    No focal consolidation, pneumothorax or pleural effusion.   Stable cardio-mediastinal silhouette.     Patient being admitted to medicine for further evaluation and management of severe sepsis 2/2 possible acute complicated UTI     (24 Aug 2024 12:08)    PAST MEDICAL & SURGICAL HISTORY:  Dementia      History of breast cancer      Constipation      Lewy body dementia without behavioral disturbance      Colon polyp      History of colon resection      H/O mastectomy, right          SOCIAL HX:   Smoking                         ETOH                            Other    FAMILY HISTORY:  No pertinent family history in first degree relatives    :  No known cardiovacular family hisotry     Review Of Systems:     All ROS are negative except per HPI       Allergies    Originally Entered as [Unknown] reaction to [red pepper] (Unknown)  ciprofloxacin (Unknown)  benzodiazepines (Unknown)  antichlolinergics (Unknown)  penicillin (Anaphylaxis)  Originally Entered as [Unknown] reaction to [pepper] (Unknown)  Originally Entered as [Unknown] reaction to [green pepper] (Unknown)  Originally Entered as [Unknown] reaction to [neuroleptics] (Unknown)    Intolerances          PHYSICAL EXAM    ICU Vital Signs Last 24 Hrs  T(C): 36.9 (26 Aug 2024 04:34), Max: 37 (25 Aug 2024 15:47)  T(F): 98.4 (26 Aug 2024 04:34), Max: 98.6 (25 Aug 2024 15:47)  HR: 85 (26 Aug 2024 04:34) (77 - 88)  BP: 127/73 (26 Aug 2024 04:34) (112/72 - 127/73)  BP(mean): 91 (26 Aug 2024 04:34) (91 - 91)  ABP: --  ABP(mean): --  RR: 16 (26 Aug 2024 04:34) (16 - 18)  SpO2: 94% (26 Aug 2024 04:34) (94% - 97%)    O2 Parameters below as of 26 Aug 2024 04:34  Patient On (Oxygen Delivery Method): room air            CONSTITUTIONAL:  ill appearing  intubated     ENT:   Airway patent,   Mouth with normal mucosa.    CARDIAC:   tachycardic   LE edema     RESPIRATORY:   bilateral wheezing  tachypneic,  No use of accessory muscles    GASTROINTESTINAL:  Abdomen distended, soft     NEUROLOGICAL:   sedated     SKIN:   Skin normal color for race,   No evidence of rash.      LABS:                          10.7   13.04 )-----------( 232      ( 26 Aug 2024 06:49 )             34.7                                               08-26    141  |  105  |  47<H>  ----------------------------<  100<H>  3.8   |  22  |  1.0    Ca    8.0<L>      26 Aug 2024 06:49  Mg     2.4     08-26    TPro  5.6<L>  /  Alb  3.3<L>  /  TBili  0.5  /  DBili  x   /  AST  29  /  ALT  8   /  AlkPhos  47  08-26                                             Urinalysis Basic - ( 26 Aug 2024 06:49 )    Color: x / Appearance: x / SG: x / pH: x  Gluc: 100 mg/dL / Ketone: x  / Bili: x / Urobili: x   Blood: x / Protein: x / Nitrite: x   Leuk Esterase: x / RBC: x / WBC x   Sq Epi: x / Non Sq Epi: x / Bacteria: x                                                  LIVER FUNCTIONS - ( 26 Aug 2024 06:49 )  Alb: 3.3 g/dL / Pro: 5.6 g/dL / ALK PHOS: 47 U/L / ALT: 8 U/L / AST: 29 U/L / GGT: x                                                  Urinalysis with Rflx Culture (collected 24 Aug 2024 05:12)    Culture - Blood (collected 23 Aug 2024 23:19)  Source: .Blood Blood-Peripheral  Preliminary Report (26 Aug 2024 09:01):    No growth at 48 Hours    Culture - Blood (collected 23 Aug 2024 23:19)  Source: .Blood Blood-Peripheral  Preliminary Report (26 Aug 2024 09:01):    No growth at 48 Hours                                                                                       ABG - ( 26 Aug 2024 13:23 )  pH, Arterial: 7.22  pH, Blood: x     /  pCO2: 52    /  pO2: 394   / HCO3: 21    / Base Excess: -6.7  /  SaO2: 98.3                X-Rays reviewed                                                                                     ECHO    MEDICATIONS  (STANDING):  artificial tears (preservative free) Ophthalmic Solution 1 Drop(s) Both EYES two times a day  ascorbic acid 500 milliGRAM(s) Oral daily  aspirin  chewable 81 milliGRAM(s) Oral daily  budesonide 160 MICROgram(s)/formoterol 4.5 MICROgram(s) Inhaler 2 Puff(s) Inhalation two times a day  carbidopa/levodopa  10/100 1 Tablet(s) Oral three times a day  cefepime   IVPB      chlorhexidine 0.12% Liquid 15 milliLiter(s) Oral Mucosa every 12 hours  cholecalciferol 1000 Unit(s) Oral daily  clotrimazole 1% Cream 1 Application(s) Topical two times a day  dexMEDEtomidine Infusion 0.05 MICROgram(s)/kG/Hr (1.2 mL/Hr) IV Continuous <Continuous>  enoxaparin Injectable 40 milliGRAM(s) SubCutaneous every 24 hours  famotidine    Tablet 20 milliGRAM(s) Oral daily  fentaNYL   Infusion. 0.5 MICROgram(s)/kG/Hr (4.78 mL/Hr) IV Continuous <Continuous>  furosemide    Tablet 20 milliGRAM(s) Oral daily  hydrocortisone 1% Cream 1 Application(s) Topical two times a day  lactated ringers. 1000 milliLiter(s) (150 mL/Hr) IV Continuous <Continuous>  latanoprost 0.005% Ophthalmic Solution 1 Drop(s) Both EYES at bedtime  levothyroxine 50 MICROGram(s) Oral daily  metoprolol succinate ER 25 milliGRAM(s) Oral daily  metroNIDAZOLE  IVPB 500 milliGRAM(s) IV Intermittent every 12 hours  mineral oil enema 133 milliLiter(s) Rectal <User Schedule>  norepinephrine Infusion 0.05 MICROgram(s)/kG/Min (8.96 mL/Hr) IV Continuous <Continuous>  polyethylene glycol 3350 17 Gram(s) Oral daily  senna 2 Tablet(s) Oral at bedtime  timolol 0.5% Solution 1 Drop(s) Both EYES two times a day  vancomycin  IVPB 1500 milliGRAM(s) IV Intermittent once    MEDICATIONS  (PRN):  albuterol/ipratropium for Nebulization 3 milliLiter(s) Nebulizer every 6 hours PRN Shortness of Breath and/or Wheezing  melatonin 3 milliGRAM(s) Oral at bedtime PRN Insomnia         Patient is a 84y old  Female who presents with a chief complaint of Altered Mental Status and Increased WOB (26 Aug 2024 11:36)    84-year-old woman with a history of Lewy body dementia, Parkinson's, COPD not on home oxygen, presented from [facility] with presenting complaints of AMS and Tachypnea, Patient was A&Ox0 on arrival, unable to provide history. Patient was found to be with decreased breath sounds diffusely, quick bedside ultrasound by ED showed lung sliding, no pneumothorax, was placed on BiPAP for potential hypercapnia, magnesium and nebs given, blood gas resulted with normal CO2. was weaned off to NC.  ED team held off on 30 cc/kg bolus, as patient has a history of heart failure: was given 1 L bolus. She was evaluated by cardiology for two runs of NSVTs approx 10 sec, recommended telemetry admission. Patient was started on Ertapenem, Doxycycline and Ceftriaxone in NH for suspected UTI. At baseline patient is AXO x3, has difficulty finding words, has impaired memory but has meaningful conversation. On my visit patient saturating 95 % on RA lying comfortably in bed, is oriented to place and person and nodding to questions. Patient endorses no abdominal pain, chest pain, SOB or fever.     Patient admitted to 3 E  ID consult- Antibiotics switched  from cefepime and vanco to ceftriaxone 2gm. Bcx- no growth   Neuro: EEG done in setting of AMS - No seizure activity noted  Cardiac consult- Consulted in setting of elevated trops, requested for tele but later discontinued as the trops were trending down. Requested to c/w home BP meds    Patient clinically and medically stable until noon Today    RR was called after  asked nurses to check on patient . Patient aspirated while feed. Turned into code blue as patient became cyanotic and lost pulse. Patient was inially DNI DNR but  bedside on spot re-send the prior MOLST.ABG, Bag mask, Intubated, Suctioned  Blood. IV fluid bolus given. Epi, calcium gluconate, Phenyephrine, Levophed given. Central line in place.  Code blue called at: 12.45 called to evaluate, on precedex, fentanyl, levophed    PAST MEDICAL & SURGICAL HISTORY:  Dementia      History of breast cancer      Constipation      Lewy body dementia without behavioral disturbance      Colon polyp      History of colon resection      H/O mastectomy, right          SOCIAL HX:   Smoking -    FAMILY HISTORY:  No pertinent family history in first degree relative      Review Of Systems:     All ROS are negative except per HPI       Allergies    Originally Entered as [Unknown] reaction to [red pepper] (Unknown)  ciprofloxacin (Unknown)  benzodiazepines (Unknown)  antichlolinergics (Unknown)  penicillin (Anaphylaxis)  Originally Entered as [Unknown] reaction to [pepper] (Unknown)  Originally Entered as [Unknown] reaction to [green pepper] (Unknown)  Originally Entered as [Unknown] reaction to [neuroleptics] (Unknown)    Intolerances          PHYSICAL EXAM    ICU Vital Signs Last 24 Hrs  T(C): 36.9 (26 Aug 2024 04:34), Max: 37 (25 Aug 2024 15:47)  T(F): 98.4 (26 Aug 2024 04:34), Max: 98.6 (25 Aug 2024 15:47)  HR: 85 (26 Aug 2024 04:34) (77 - 88)  BP: 127/73 (26 Aug 2024 04:34) (112/72 - 127/73)  BP(mean): 91 (26 Aug 2024 04:34) (91 - 91)  RR: 16 (26 Aug 2024 04:34) (16 - 18)  SpO2: 94% (26 Aug 2024 04:34) (94% - 97%)    O2 Parameters below as of 26 Aug 2024 04:34  Patient On (Oxygen Delivery Method): room air            CONSTITUTIONAL:  ill appearing  intubated     ENT:   Airway patent,   Mouth with normal mucosa.    CARDIAC:   tachycardic   LE edema     RESPIRATORY:   bilateral wheezing  tachypneic,  No use of accessory muscles    GASTROINTESTINAL:  Abdomen distended, soft   incisional hernia    NEUROLOGICAL:   sedated   breathing over vent brainstem activity present    LABS:                          10.7   13.04 )-----------( 232      ( 26 Aug 2024 06:49 )             34.7                                               08-26    141  |  105  |  47<H>  ----------------------------<  100<H>  3.8   |  22  |  1.0    Ca    8.0<L>      26 Aug 2024 06:49  Mg     2.4     08-26    TPro  5.6<L>  /  Alb  3.3<L>  /  TBili  0.5  /  DBili  x   /  AST  29  /  ALT  8   /  AlkPhos  47  08-26                                             Urinalysis Basic - ( 26 Aug 2024 06:49 )    Color: x / Appearance: x / SG: x / pH: x  Gluc: 100 mg/dL / Ketone: x  / Bili: x / Urobili: x   Blood: x / Protein: x / Nitrite: x   Leuk Esterase: x / RBC: x / WBC x   Sq Epi: x / Non Sq Epi: x / Bacteria: x                                                  LIVER FUNCTIONS - ( 26 Aug 2024 06:49 )  Alb: 3.3 g/dL / Pro: 5.6 g/dL / ALK PHOS: 47 U/L / ALT: 8 U/L / AST: 29 U/L / GGT: x                                                  Urinalysis with Rflx Culture (collected 24 Aug 2024 05:12)    Culture - Blood (collected 23 Aug 2024 23:19)  Source: .Blood Blood-Peripheral  Preliminary Report (26 Aug 2024 09:01):    No growth at 48 Hours    Culture - Blood (collected 23 Aug 2024 23:19)  Source: .Blood Blood-Peripheral  Preliminary Report (26 Aug 2024 09:01):    No growth at 48 Hours                                                                                       ABG - ( 26 Aug 2024 13:23 )  pH, Arterial: 7.22  pH, Blood: x     /  pCO2: 52    /  pO2: 394   / HCO3: 21    / Base Excess: -6.7  /  SaO2: 98.3                MEDICATIONS  (STANDING):  artificial tears (preservative free) Ophthalmic Solution 1 Drop(s) Both EYES two times a day  ascorbic acid 500 milliGRAM(s) Oral daily  aspirin  chewable 81 milliGRAM(s) Oral daily  budesonide 160 MICROgram(s)/formoterol 4.5 MICROgram(s) Inhaler 2 Puff(s) Inhalation two times a day  carbidopa/levodopa  10/100 1 Tablet(s) Oral three times a day  cefepime   IVPB      chlorhexidine 0.12% Liquid 15 milliLiter(s) Oral Mucosa every 12 hours  cholecalciferol 1000 Unit(s) Oral daily  clotrimazole 1% Cream 1 Application(s) Topical two times a day  dexMEDEtomidine Infusion 0.05 MICROgram(s)/kG/Hr (1.2 mL/Hr) IV Continuous <Continuous>  enoxaparin Injectable 40 milliGRAM(s) SubCutaneous every 24 hours  famotidine    Tablet 20 milliGRAM(s) Oral daily  fentaNYL   Infusion. 0.5 MICROgram(s)/kG/Hr (4.78 mL/Hr) IV Continuous <Continuous>  furosemide    Tablet 20 milliGRAM(s) Oral daily  hydrocortisone 1% Cream 1 Application(s) Topical two times a day  lactated ringers. 1000 milliLiter(s) (150 mL/Hr) IV Continuous <Continuous>  latanoprost 0.005% Ophthalmic Solution 1 Drop(s) Both EYES at bedtime  levothyroxine 50 MICROGram(s) Oral daily  metoprolol succinate ER 25 milliGRAM(s) Oral daily  metroNIDAZOLE  IVPB 500 milliGRAM(s) IV Intermittent every 12 hours  mineral oil enema 133 milliLiter(s) Rectal <User Schedule>  norepinephrine Infusion 0.05 MICROgram(s)/kG/Min (8.96 mL/Hr) IV Continuous <Continuous>  polyethylene glycol 3350 17 Gram(s) Oral daily  senna 2 Tablet(s) Oral at bedtime  timolol 0.5% Solution 1 Drop(s) Both EYES two times a day  vancomycin  IVPB 1500 milliGRAM(s) IV Intermittent once    MEDICATIONS  (PRN):  albuterol/ipratropium for Nebulization 3 milliLiter(s) Nebulizer every 6 hours PRN Shortness of Breath and/or Wheezing  melatonin 3 milliGRAM(s) Oral at bedtime PRN Insomnia

## 2024-08-26 NOTE — CONSULT NOTE ADULT - ATTENDING COMMENTS
events noted, sp CP arrest, aspiration event sp intubation,  multiple co morbidities as above, spoke with  at bedside, plan as above, palliative care eval, very poor prognosis

## 2024-08-26 NOTE — CHART NOTE - NSCHARTNOTEFT_GEN_A_CORE
Code blue called at: 12.45  2 cycles done then rosc , one epi one calcium gluconate given , bagged and suctioned.   then PEA again, one cycle done rosc , given one epi and intubated  started on levophed. precedex fentanyl   abx : cefepime , vanc, flagyl   pending cxr kub labs  abgs Code blue called at: 12.45  2 cycles done then rosc , one epi one calcium gluconate given , bagged and suctioned.   then PEA again, one cycle done rosc , given one epi and intubated  started on levophed. precedex fentanyl   abx :  escalated to cefepime , flagyl , one shot vanco given, mrsa ordered   pending cxr kub labs  abgs Code blue called at: 12.45  2 cycles done then rosc , one epi one calcium gluconate given , bagged and suctioned.   then PEA again, one cycle done rosc , given one epi and intubated  started on levophed. precedex fentanyl   abx :  escalated to cefepime , flagyl , one shot vanco given, mrsa ordered   pending cxr kub labs  abgs    attending addendum:   Code blue called on this patient with ROSC.  rescinded DNR/DNI.  as per RN discussion patient likely aspirated.  at bedside. I explained to  regarding patient condition multiple times. ROSC and later transferred to ICU.

## 2024-08-26 NOTE — RAPID RESPONSE TEAM SUMMARY - NSSITUATIONBACKGROUNDRRT_GEN_ALL_CORE
Rapid response was called as the patient started complaining of SOB initially with increased work of breathing. Patient was eating in a upright position. She began to destat and the rapid was called. The patient coded with no pulse and started CPR. Lasted for 5mins with ROSC and pulse attained. Immediately later she didn't have a pulse and resumed CPR. Attempted to intubate , administered muscle relaxant. Patient intubated successfully.
RR was called after  asked nurses to check on patient . Patient aspirated while feed. Turned into code blue as patient became cyanotic and lost pulse.   Patient was inially DNI DNR but  bedside on spot re-send the prior MOLST.

## 2024-08-26 NOTE — CHART NOTE - NSCHARTNOTEFT_GEN_A_CORE
Transfer from: 3e  Transfer to: MICU    ED course:    Patient 84-year-old woman with a history of Lewy body dementia (AAO x 2 at baseline), Parkinson's, COPD not on home oxygen, came in from [facility] with complaints of AMS and Tachypnea, Patient was A&Ox0 on arrival, unable to provide history. Patient was found to be with decreased breath sounds diffusely, quick bedside ultrasound by ED showed lung sliding, no pneumothorax, was placed on BiPAP for potential hypercapnia, magnesium and nebs given, blood gas resulted with normal CO2. was weaned off to NC.  ED team held off on 30 cc/kg bolus, as patient has a history of heart failure: was given 1 L bolus. She was evaluated by cardiology for two runs of NSVTs approx 10 sec, recommended telemetry admission. Patient was started on Ertapenem, Doxycycline and Ceftriaxone in NH for suspected UTI. At baseline patient is AXO x3, has difficulty finding words, has impaired memory but has meaningful conversation. On my visit patient saturating 95 % on RA lying comfortably in bed, is oriented to place and person and nodding to questions. Patient endorses no abdominal pain, chest pain, SOB or fever.   GOC: Spoke with the  who arrived to bedside shortly after the patient arrived, discussed goals of care.  Patient is DNR/DNI.     ED Course  Vitals: 97.8, 133, 141/77 , 24 bpm  Labs:  WBC 19.08, Lactate 3.1 -> 4.0, Trops 142 -> 132, Na 147, BUN/Cr 61/1.4, pro-,    VBG: pH 7.32, pCo2 42, HCO3 22, UA WNL       3E course:  Patient was here for 2 days. Patient was alert and awake but didn't respond to questions. She is AAOx3 at baseline. No acute events noted over 2 nights. Lactate trending down and sepsis resolved, vitals stabilised. Patient on NC when came in but was on RA later and stating well.  ID consult- Antibiotics switched  from cefepime and vanco to ceftriaxone 2gm. Bcx- no growth   Neuro: EEG done in setting of AMS - No seizure activity noted  Cardiac consult- Consulted in setting of elevated trops, requested for tele but later discontinued as the trops were trending down. Requested to c/w home BP meds    Patient clinically and medically stable until noon.    RR was called after  asked nurses to check on patient . Patient aspirated while feed. Turned into code blue as patient became cyanotic and lost pulse. Patient was inially DNI DNR but  bedside on spot re-send the prior MOLST.ABG, Bag mask, Intubated, Suctioned  Blood. IV fluid bolus given. Epi, calcium gluconate, Phenyephrine, Levophed given. Central line in place.  Code blue called at: 12.45  2 cycles done then rosc , one epi one calcium gluconate given , bagged and suctioned. Then PEA again, one cycle done ROSC , given one epi and intubated  Started on levophed. precedex fentanyl   abx :  escalated to cefepime , flagyl , one shot vanco given, MRSA ordered   Lactate level of 8  pending CXR KUB EKG labs, abgs.    Detailed assessment and plan noted below.      Vital Signs Last 24 Hrs  T(C): 36.9 (26 Aug 2024 04:34), Max: 37 (25 Aug 2024 15:47)  T(F): 98.4 (26 Aug 2024 04:34), Max: 98.6 (25 Aug 2024 15:47)  HR: 85 (26 Aug 2024 04:34) (77 - 88)  BP: 127/73 (26 Aug 2024 04:34) (112/72 - 127/73)  BP(mean): 91 (26 Aug 2024 04:34) (91 - 91)  RR: 16 (26 Aug 2024 04:34) (16 - 18)  SpO2: 94% (26 Aug 2024 04:34) (94% - 97%)    Parameters below as of 26 Aug 2024 04:34  Patient On (Oxygen Delivery Method): room air        I&O's Summary      MEDICATIONS  (STANDING):  artificial tears (preservative free) Ophthalmic Solution 1 Drop(s) Both EYES two times a day  ascorbic acid 500 milliGRAM(s) Oral daily  aspirin  chewable 81 milliGRAM(s) Oral daily  budesonide 160 MICROgram(s)/formoterol 4.5 MICROgram(s) Inhaler 2 Puff(s) Inhalation two times a day  carbidopa/levodopa  10/100 1 Tablet(s) Oral three times a day  cefTRIAXone   IVPB 2000 milliGRAM(s) IV Intermittent every 24 hours  chlorhexidine 0.12% Liquid 15 milliLiter(s) Oral Mucosa every 12 hours  cholecalciferol 1000 Unit(s) Oral daily  clotrimazole 1% Cream 1 Application(s) Topical two times a day  dexMEDEtomidine Infusion 0.05 MICROgram(s)/kG/Hr (1.2 mL/Hr) IV Continuous <Continuous>  enoxaparin Injectable 40 milliGRAM(s) SubCutaneous every 24 hours  famotidine    Tablet 20 milliGRAM(s) Oral daily  fentaNYL   Infusion. 0.5 MICROgram(s)/kG/Hr (4.78 mL/Hr) IV Continuous <Continuous>  furosemide    Tablet 20 milliGRAM(s) Oral daily  hydrocortisone 1% Cream 1 Application(s) Topical two times a day  lactated ringers. 1000 milliLiter(s) (150 mL/Hr) IV Continuous <Continuous>  latanoprost 0.005% Ophthalmic Solution 1 Drop(s) Both EYES at bedtime  levothyroxine 50 MICROGram(s) Oral daily  metoprolol succinate ER 25 milliGRAM(s) Oral daily  mineral oil enema 133 milliLiter(s) Rectal <User Schedule>  norepinephrine Infusion 0.05 MICROgram(s)/kG/Min (8.96 mL/Hr) IV Continuous <Continuous>  polyethylene glycol 3350 17 Gram(s) Oral daily  senna 2 Tablet(s) Oral at bedtime  timolol 0.5% Solution 1 Drop(s) Both EYES two times a day    MEDICATIONS  (PRN):  albuterol/ipratropium for Nebulization 3 milliLiter(s) Nebulizer every 6 hours PRN Shortness of Breath and/or Wheezing  melatonin 3 milliGRAM(s) Oral at bedtime PRN Insomnia        LABS                                            10.7                  Neurophils% (auto):   75.6   (08-26 @ 06:49):    13.04)-----------(232          Lymphocytes% (auto):  14.5                                          34.7                   Eosinphils% (auto):   2.8      Manual%: Neutrophils x    ; Lymphocytes x    ; Eosinophils x    ; Bands%: x    ; Blasts x                                    141    |  105    |  47                  Calcium: 8.0   / iCa: x      (08-26 @ 06:49)    ----------------------------<  100       Magnesium: 2.4                              3.8     |  22     |  1.0              Phosphorous: x        TPro  5.6    /  Alb  3.3    /  TBili  0.5    /  DBili  x      /  AST  29     /  ALT  8      /  AlkPhos  47     26 Aug 2024 06:49        Imaging:   CT Head: No evidence of acute intracranial abnormality.   CT Angio Chest PE protocol and CT A/P with IV contrast:    1. Urinary bladder mild pericystic stranding    2. Increased ovarian 2.5 cm cystic lesion. Unchanged right ovarian 7 cm cystic mass. Outpatient pelvic ultrasound recommended for further evaluation.   3. Moderate-to-large colorectal stool burden.   4. No evidence of acute thoracic pathology or pulmonary embolus.   CXR:    No focal consolidation, pneumothorax or pleural effusion.   Stable cardio-mediastinal silhouette.     EEG:  Clinical Impression:  Mild diffuse cerebral dysfunction is nonspecific in etiology.   No epileptiform abnormalities or seizures.      Detailed assessment and plan:    84-year-old female with PMHx of Lewy Body dementia with associated autonomic dysfunction including dysphagia, Hypothyroidism, Parkinson's, COPD not on home oxygen , p/w ams and tachypnea, A&Ox0 on arrival being admitted for management of severe sepsis.     # Severe Sepsis on Admission -  # Acute Hypoxic Respiratory Failure, was on BIPAP then weaned to NC, resolved  possible pneumonitis   # Metabolic Encephalopathy   # COPD, stable not in exacerbation   -s/p STAT cefepime and vancomycin in ED   -Was started on Ceftriaxone, Doxycycline and Ertapenem in NH for UTI   - As per ID recs- started on rocephin and clotrimazole  - Escalated abx to cefepime, flagyl and one dose of vanc  -s/p 1L bolus, in setting of  heart failure   -s/p BiPAP--> NC -->RA stating well.   - f/u Blood Cx NTD so far  - procalcitonin 0.86   - Urine Cx no growth  -  RVP neg  - Lactate trend improving   -c/w symbicort 2 puffs bid   -c/w duonebs PRN   - follow up EEG read -> Abnormal diffuse slowing, no focal abnormalities or seizure activity.     # Run of NSVTs  # Elevated troponins  - in setting of hypoxia and severe sepsis  - Cardiology consulted by ED, recommend telemetry monitoring d/c ed  - Trops 142 -> 132,-->79  - EKG: Sinus Tachycardia   - f/u troponins trend  - f/u electrolytes and replete as necessary      # Chronic constipation 2/2 Autonomic Dysfunction 2/2 Lewy Body Dementia  with h/o recurrent distension of bowel.   # H/O right hemicolectomy   -Continue with home regimen: Miralax daily, Senna 2 tabs HS, Colace 100 BID, Fleet [employer]emas every MWF in evening      # H/O ? HFpEF, Stable  # HTN   - ECHO: 11 Nov 23: Normal global left ventricular systolic function with a biplane EF of 60%, Mild mitral stenosis, Mild aortic regurgitation, Mild aortic valve stenosis  - Euvolemic on physical examination   - c/w home Metoprolol succinate 25 mg od, furosemide 10 mg daily, c/w nifedipine 60 mg daily   - c/w aspirin 81 mg daily       # Parkinson's disease with Lewy body dementia   # Functional quadriplegia, wheelchair bound at baseline  - At baseline patient is AXO x 3 according to her ,  - c/w Levodopa-Carbidopa 10/100 tid   EEG:Clinical Impression:  Mild diffuse cerebral dysfunction is nonspecific in etiology.   No epileptiform abnormalities or seizures.    # Vit D deficiency   - c/w Vit D 1000 daily     # Dysphagia   - soft and bite sized, moderately thick liquids as per speech evaluation last admission   - f/u speech eval     # Hypothyroidism    - Recent TSH 1.74 8/23 (NH records)   -c/w Synthroid 50 mcg daily.      # GERD   -c/w famotidine 20 mg daily     # Glaucoma   # Conjunctival Xerosis   -c/w home eye drops     # Peripheral neuropathy   - at home gabapentin 600 mg tid, holding in setting of AMS for now    MISC  #DVT prophylaxis: Lovenox  #GI prophylaxis: PPI  #Diet: soft bite sized  #Code status: DNR DNI    #Progress Note Handoff  Pending (specify):  f/u ID, f/u labs, EKG,KUB, CXR  Disposition: MICU

## 2024-08-26 NOTE — PROGRESS NOTE ADULT - SUBJECTIVE AND OBJECTIVE BOX
Patient is a 84y old  Female who presents with a chief complaint of Altered Mental Status and Increased WOB (08-25-24)      sub: patient seen at bedside during code blue. discussed with  multiple times.       ABG - ( 24 Aug 2024 18:46 )  pH: 7.42  /  pCO2: 37    /  pO2: 78    / HCO3: 24    / Base Excess: -0.2  /  SaO2: 94.7        PAST MEDICAL & SURGICAL HISTORY:  Dementia  History of breast cancer  Constipation  Lewy body dementia without behavioral disturbance  Colon polyp    History of colon resection    H/O mastectomy, right        VITAL SIGNS (Last 24 hrs):  Vital Signs Last 24 Hrs  T(C): 37.4 (26 Aug 2024 15:45), Max: 37.4 (26 Aug 2024 15:45)  T(F): 99.3 (26 Aug 2024 15:45), Max: 99.3 (26 Aug 2024 15:45)  HR: 83 (26 Aug 2024 15:45) (78 - 112)  BP: 119/66 (26 Aug 2024 15:45) (112/72 - 136/70)  BP(mean): 88 (26 Aug 2024 15:45) (88 - 91)  RR: 28 (26 Aug 2024 15:45) (16 - 28)  SpO2: 99% (26 Aug 2024 15:45) (94% - 100%)    Parameters below as of 26 Aug 2024 15:45  Patient On (Oxygen Delivery Method): ventilator    O2 Concentration (%): 50      PHYSICAL EXAM:  GENERAL: NAD, on vent   HEAD:  Atraumatic, Normocephalic  NECK: Supple, No JVD  CHEST/LUNG: intubated   HEART: Regular rate and rhythm; No murmurs, rubs, or gallops  ABDOMEN: Soft, Nontender,  EXTREMITIES:  2+ Peripheral Pulses, No clubbing, cyanosis, or edema    Labs Reviewed  Spoke to patient in regards to abnormal labs.      LABS:                        11.7   29.38 )-----------( 338      ( 26 Aug 2024 14:45 )             39.3     08-26    139  |  99  |  52<H>  ----------------------------<  183<H>  4.1   |  23  |  1.5    Ca    8.9      26 Aug 2024 14:45  Mg     2.4     08-26    TPro  6.0  /  Alb  3.7  /  TBili  0.3  /  DBili  x   /  AST  43<H>  /  ALT  21  /  AlkPhos  67  08-26    PT/INR - ( 26 Aug 2024 14:45 )   PT: 10.60 sec;   INR: 0.93 ratio         PTT - ( 26 Aug 2024 14:45 )  PTT:22.6 sec        BMP:    08-24 @ 11:43    Blood Urea Nitrogen - 56  Calcium - 8.8  Carbond Dioxide - 19  Chloride - 107  Creatinine - 1.2  Glucose - 132  Potassium - 4.8  Sodium - 146      Hemoglobin A1c -   PT/INR - ( 23 Aug 2024 23:19 )   PT: 10.70 sec;   INR: 0.94 ratio         PTT - ( 23 Aug 2024 23:19 )  PTT:16.6 sec  Urine Culture:  08-23 @ 23:19 Urine culture: --    Culture Results:   No growth at 24 hours  Method Type: --  Organism: --  Organism Identification: --  Specimen Source: .Blood Blood-Peripheral        COVID Labs  CRP:    Procalcitonin: 0.86 ng/mL (08-24-24 @ 11:43)    D-Dimer:        Imaging reviewed independently and reviewed read  < from: CT Abdomen and Pelvis w/ IV Cont (08.24.24 @ 01:36) >  IMPRESSION:    1. Urinary bladder mild pericystic stranding, could be attributed to   urinary bladder infection in the appropriate clinical setting.    2. Increased ovarian 2.5 cm cystic lesion. Unchanged right ovarian 7 cm   cystic mass. Outpatient pelvic ultrasound recommended for further   evaluation.    3. Moderate-to-large colorectal stool burden.    4. No evidence of acute thoracic pathology or pulmonary embolus.    < end of copied text >        MEDICATIONS  (STANDING):  artificial tears (preservative free) Ophthalmic Solution 1 Drop(s) Both EYES two times a day  ascorbic acid 500 milliGRAM(s) Oral daily  aspirin  chewable 81 milliGRAM(s) Oral daily  budesonide 160 MICROgram(s)/formoterol 4.5 MICROgram(s) Inhaler 2 Puff(s) Inhalation two times a day  carbidopa/levodopa  10/100 1 Tablet(s) Oral three times a day  cefepime   IVPB 2000 milliGRAM(s) IV Intermittent every 8 hours  cholecalciferol 1000 Unit(s) Oral daily  clotrimazole 1% Cream 1 Application(s) Topical two times a day  enoxaparin Injectable 40 milliGRAM(s) SubCutaneous every 24 hours  famotidine    Tablet 20 milliGRAM(s) Oral daily  furosemide    Tablet 20 milliGRAM(s) Oral daily  hydrocortisone 1% Cream 1 Application(s) Topical two times a day  lactated ringers. 1000 milliLiter(s) (150 mL/Hr) IV Continuous <Continuous>  latanoprost 0.005% Ophthalmic Solution 1 Drop(s) Both EYES at bedtime  levothyroxine 50 MICROGram(s) Oral daily  metoprolol succinate ER 25 milliGRAM(s) Oral daily  mineral oil enema 133 milliLiter(s) Rectal <User Schedule>  polyethylene glycol 3350 17 Gram(s) Oral daily  senna 2 Tablet(s) Oral at bedtime  timolol 0.5% Solution 1 Drop(s) Both EYES two times a day  vancomycin  IVPB 1250 milliGRAM(s) IV Intermittent every 24 hours  vancomycin  IVPB        MEDICATIONS  (PRN):  albuterol/ipratropium for Nebulization 3 milliLiter(s) Nebulizer every 6 hours PRN Shortness of Breath and/or Wheezing  melatonin 3 milliGRAM(s) Oral at bedtime PRN Insomnia

## 2024-08-26 NOTE — CONSULT NOTE ADULT - ASSESSMENT
Impression:     Cardiopulmonary arrest approximately 10 minutes  Acute hypoxemic respiratory failure   Likely aspiration event   HAGMA   Sepsis POA  Possible UTI    JENN improving  Moderate to large stool burden on admission   HO lewy body dementia   HO COPD   HO abdominal surgery    PLAN:    CNS: Sedation with fentanyl and Precedex. CW carbidopa/levodopa.     HEENT: Oral care    PULMONARY:  HOB @ 45 degrees. ABG noted. Vent as follows: , RR 28, PEEP 8, FiO2 80%. Repeat ABG in 1-2 hours. Post-intubation CXR. CW albuterol MDI. Solumedrol 60mg IV daily. CTA noted, no PE.     CARDIOVASCULAR: Goal directed fluid resuscitation. Check TTE. Trend LA.     GI: GI prophylaxis.  NPO for now. OG tube to low continuous suction. CT abdomen/pelvis noted. KUB. If negative start bowel regimen.     RENAL:  Follow up lytes.  Correct as needed.     INFECTIOUS DISEASE: Follow up cultures. One dose of vancomycin now. Expand to cefepime and flagyl. Check MRSA nares.     HEMATOLOGICAL:  DVT prophylaxis. LE duplex.     ENDOCRINE:  Follow up FS.  Insulin protocol if needed. CW synthroid. Check TSH.     MUSCULOSKELETAL: Bed rest     Previously DNR DNI, rescinded by  while patient decompensating. Clarify GOC with patient's .   Palliative care eval.   Poor prognosis   MICU monitoring for now.    Impression:     Cardiopulmonary arrest approximately 10 minutes  Acute hypoxemic respiratory failure   Likely aspiration event   HAGMA   Sepsis POA  Possible UTI    JENN improving  Moderate to large stool burden on admission   HO lewy body dementia   HO COPD   HO abdominal surgery    PLAN:    CNS: Sedation with fentanyl and Precedex. CW carbidopa/levodopa. avoid T, EEG, NSE, daily SAT    HEENT: Oral care    PULMONARY:  HOB @ 45 degrees. ABG noted. Vent as follows: , RR 28, PEEP 8, FiO2 80%. Repeat ABG in 1-2 hours. Post-intubation CXR. CW albuterol MDI. Solumedrol 60mg IV daily. CTA noted, no PE.     CARDIOVASCULAR: Goal directed fluid resuscitation. Check TTE. Trend LA. CE    GI: GI prophylaxis.  NPO for now. OG tube to low continuous suction. CT abdomen/pelvis noted. KUB. If negative start bowel regimen.     RENAL:  Follow up lytes.  Correct as needed.     INFECTIOUS DISEASE: Follow up cultures. One dose of vancomycin now. Expand to cefepime and flagyl. Check MRSA nares.     HEMATOLOGICAL:  DVT prophylaxis. LE duplex.     ENDOCRINE:  Follow up FS.  Insulin protocol if needed. CW synthroid. Check TSH.     MUSCULOSKELETAL: Bed rest     Previously DNR DNI, rescinded by  while patient decompensating. Clarify GOC with patient's .   Palliative care eval.   Poor prognosis   MICU monitoring for now.

## 2024-08-27 ENCOUNTER — RESULT REVIEW (OUTPATIENT)
Age: 84
End: 2024-08-27

## 2024-08-27 DIAGNOSIS — G31.83 NEUROCOGNITIVE DISORDER WITH LEWY BODIES: ICD-10-CM

## 2024-08-27 DIAGNOSIS — Z51.5 ENCOUNTER FOR PALLIATIVE CARE: ICD-10-CM

## 2024-08-27 DIAGNOSIS — J96.01 ACUTE RESPIRATORY FAILURE WITH HYPOXIA: ICD-10-CM

## 2024-08-27 DIAGNOSIS — I46.9 CARDIAC ARREST, CAUSE UNSPECIFIED: ICD-10-CM

## 2024-08-27 LAB
-  COAGULASE NEGATIVE STAPHYLOCOCCUS: SIGNIFICANT CHANGE UP
ALBUMIN SERPL ELPH-MCNC: 3 G/DL — LOW (ref 3.5–5.2)
ALP SERPL-CCNC: 46 U/L — SIGNIFICANT CHANGE UP (ref 30–115)
ALT FLD-CCNC: 6 U/L — SIGNIFICANT CHANGE UP (ref 0–41)
ANION GAP SERPL CALC-SCNC: 12 MMOL/L — SIGNIFICANT CHANGE UP (ref 7–14)
APTT BLD: 21.2 SEC — CRITICAL LOW (ref 27–39.2)
AST SERPL-CCNC: 31 U/L — SIGNIFICANT CHANGE UP (ref 0–41)
BASE EXCESS BLDA CALC-SCNC: -3.2 MMOL/L — LOW (ref -2–3)
BASOPHILS # BLD AUTO: 0 K/UL — SIGNIFICANT CHANGE UP (ref 0–0.2)
BASOPHILS NFR BLD AUTO: 0 % — SIGNIFICANT CHANGE UP (ref 0–1)
BILIRUB SERPL-MCNC: 0.4 MG/DL — SIGNIFICANT CHANGE UP (ref 0.2–1.2)
BUN SERPL-MCNC: 55 MG/DL — HIGH (ref 10–20)
CALCIUM SERPL-MCNC: 7.9 MG/DL — LOW (ref 8.4–10.4)
CHLORIDE SERPL-SCNC: 100 MMOL/L — SIGNIFICANT CHANGE UP (ref 98–110)
CO2 SERPL-SCNC: 22 MMOL/L — SIGNIFICANT CHANGE UP (ref 17–32)
CREAT SERPL-MCNC: 1.7 MG/DL — HIGH (ref 0.7–1.5)
D DIMER BLD IA.RAPID-MCNC: 2925 NG/ML DDU — HIGH
EGFR: 30 ML/MIN/1.73M2 — LOW
EOSINOPHIL # BLD AUTO: 0 K/UL — SIGNIFICANT CHANGE UP (ref 0–0.7)
EOSINOPHIL NFR BLD AUTO: 0 % — SIGNIFICANT CHANGE UP (ref 0–8)
GLUCOSE SERPL-MCNC: 174 MG/DL — HIGH (ref 70–99)
GRAM STN FLD: ABNORMAL
HCO3 BLDA-SCNC: 19 MMOL/L — LOW (ref 21–28)
HCT VFR BLD CALC: 26.5 % — LOW (ref 37–47)
HCT VFR BLD CALC: 27.8 % — LOW (ref 37–47)
HGB BLD-MCNC: 8.2 G/DL — LOW (ref 12–16)
HGB BLD-MCNC: 8.8 G/DL — LOW (ref 12–16)
HOROWITZ INDEX BLDA+IHG-RTO: 50 — SIGNIFICANT CHANGE UP
INR BLD: 1.1 RATIO — SIGNIFICANT CHANGE UP (ref 0.65–1.3)
LACTATE SERPL-SCNC: 1.6 MMOL/L — SIGNIFICANT CHANGE UP (ref 0.7–2)
LACTATE SERPL-SCNC: 2.7 MMOL/L — HIGH (ref 0.7–2)
LYMPHOCYTES # BLD AUTO: 0.51 K/UL — LOW (ref 1.2–3.4)
LYMPHOCYTES # BLD AUTO: 1.7 % — LOW (ref 20.5–51.1)
MAGNESIUM SERPL-MCNC: 2.1 MG/DL — SIGNIFICANT CHANGE UP (ref 1.8–2.4)
MCHC RBC-ENTMCNC: 28.9 PG — SIGNIFICANT CHANGE UP (ref 27–31)
MCHC RBC-ENTMCNC: 29.6 PG — SIGNIFICANT CHANGE UP (ref 27–31)
MCHC RBC-ENTMCNC: 30.9 G/DL — LOW (ref 32–37)
MCHC RBC-ENTMCNC: 31.7 G/DL — LOW (ref 32–37)
MCV RBC AUTO: 93.3 FL — SIGNIFICANT CHANGE UP (ref 81–99)
MCV RBC AUTO: 93.6 FL — SIGNIFICANT CHANGE UP (ref 81–99)
METHOD TYPE: SIGNIFICANT CHANGE UP
MONOCYTES # BLD AUTO: 1.31 K/UL — HIGH (ref 0.1–0.6)
MONOCYTES NFR BLD AUTO: 4.4 % — SIGNIFICANT CHANGE UP (ref 1.7–9.3)
NEUTROPHILS # BLD AUTO: 27.64 K/UL — HIGH (ref 1.4–6.5)
NEUTROPHILS NFR BLD AUTO: 93 % — HIGH (ref 42.2–75.2)
NRBC # BLD: 0 /100 WBCS — SIGNIFICANT CHANGE UP (ref 0–0)
PCO2 BLDA: 26 MMHG — LOW (ref 32–45)
PH BLDA: 7.47 — HIGH (ref 7.35–7.45)
PHOSPHATE SERPL-MCNC: 3.9 MG/DL — SIGNIFICANT CHANGE UP (ref 2.1–4.9)
PLATELET # BLD AUTO: 258 K/UL — SIGNIFICANT CHANGE UP (ref 130–400)
PLATELET # BLD AUTO: 263 K/UL — SIGNIFICANT CHANGE UP (ref 130–400)
PMV BLD: 9.3 FL — SIGNIFICANT CHANGE UP (ref 7.4–10.4)
PMV BLD: 9.3 FL — SIGNIFICANT CHANGE UP (ref 7.4–10.4)
PO2 BLDA: 198 MMHG — HIGH (ref 83–108)
POTASSIUM SERPL-MCNC: 4.6 MMOL/L — SIGNIFICANT CHANGE UP (ref 3.5–5)
POTASSIUM SERPL-SCNC: 4.6 MMOL/L — SIGNIFICANT CHANGE UP (ref 3.5–5)
PROT SERPL-MCNC: 4.9 G/DL — LOW (ref 6–8)
PROTHROM AB SERPL-ACNC: 12.5 SEC — SIGNIFICANT CHANGE UP (ref 9.95–12.87)
RBC # BLD: 2.84 M/UL — LOW (ref 4.2–5.4)
RBC # BLD: 2.97 M/UL — LOW (ref 4.2–5.4)
RBC # FLD: 16 % — HIGH (ref 11.5–14.5)
RBC # FLD: 16 % — HIGH (ref 11.5–14.5)
SAO2 % BLDA: 97.5 % — SIGNIFICANT CHANGE UP (ref 94–98)
SODIUM SERPL-SCNC: 134 MMOL/L — LOW (ref 135–146)
SPECIMEN SOURCE: SIGNIFICANT CHANGE UP
TROPONIN T, HIGH SENSITIVITY RESULT: 161 NG/L — CRITICAL HIGH (ref 6–13)
WBC # BLD: 27.69 K/UL — HIGH (ref 4.8–10.8)
WBC # BLD: 29.72 K/UL — HIGH (ref 4.8–10.8)
WBC # FLD AUTO: 27.69 K/UL — HIGH (ref 4.8–10.8)
WBC # FLD AUTO: 29.72 K/UL — HIGH (ref 4.8–10.8)

## 2024-08-27 PROCEDURE — 93010 ELECTROCARDIOGRAM REPORT: CPT

## 2024-08-27 PROCEDURE — 70450 CT HEAD/BRAIN W/O DYE: CPT | Mod: 26

## 2024-08-27 PROCEDURE — 71045 X-RAY EXAM CHEST 1 VIEW: CPT | Mod: 26

## 2024-08-27 PROCEDURE — 74176 CT ABD & PELVIS W/O CONTRAST: CPT | Mod: 26

## 2024-08-27 PROCEDURE — 99291 CRITICAL CARE FIRST HOUR: CPT

## 2024-08-27 PROCEDURE — 93306 TTE W/DOPPLER COMPLETE: CPT | Mod: 26

## 2024-08-27 PROCEDURE — 99223 1ST HOSP IP/OBS HIGH 75: CPT

## 2024-08-27 PROCEDURE — 93970 EXTREMITY STUDY: CPT | Mod: 26

## 2024-08-27 RX ORDER — POLYETHYLENE GLYCOL 3350 17 G/17G
17 POWDER, FOR SOLUTION ORAL
Refills: 0 | Status: DISCONTINUED | OUTPATIENT
Start: 2024-08-27 | End: 2024-09-04

## 2024-08-27 RX ORDER — ROPIVACAINE IN 0.9% SOD CHL/PF 0.1 %
0.05 PLASTIC BAG, INJECTION (ML) EPIDURAL
Qty: 16 | Refills: 0 | Status: DISCONTINUED | OUTPATIENT
Start: 2024-08-27 | End: 2024-08-30

## 2024-08-27 RX ORDER — ACETAMINOPHEN 325 MG/1
650 TABLET ORAL EVERY 6 HOURS
Refills: 0 | Status: DISCONTINUED | OUTPATIENT
Start: 2024-08-27 | End: 2024-08-30

## 2024-08-27 RX ORDER — CEFEPIME 2 G/1
1000 INJECTION, POWDER, FOR SOLUTION INTRAVENOUS EVERY 12 HOURS
Refills: 0 | Status: DISCONTINUED | OUTPATIENT
Start: 2024-08-27 | End: 2024-08-29

## 2024-08-27 RX ADMIN — CEFEPIME 100 MILLIGRAM(S): 2 INJECTION, POWDER, FOR SOLUTION INTRAVENOUS at 05:11

## 2024-08-27 RX ADMIN — POVIDONE, PROPYLENE GLYCOL 1 DROP(S): 6.8; 3 LIQUID OPHTHALMIC at 18:05

## 2024-08-27 RX ADMIN — Medication 81 MILLIGRAM(S): at 12:23

## 2024-08-27 RX ADMIN — POLYETHYLENE GLYCOL 3350 17 GRAM(S): 17 POWDER, FOR SOLUTION ORAL at 12:24

## 2024-08-27 RX ADMIN — TIMOLOL MALEATE 1 DROP(S): 5 SOLUTION/ DROPS OPHTHALMIC at 05:12

## 2024-08-27 RX ADMIN — Medication 100 MILLIGRAM(S): at 05:11

## 2024-08-27 RX ADMIN — Medication 1000 MILLILITER(S): at 05:57

## 2024-08-27 RX ADMIN — Medication 1 TABLET(S): at 05:13

## 2024-08-27 RX ADMIN — POVIDONE, PROPYLENE GLYCOL 1 DROP(S): 6.8; 3 LIQUID OPHTHALMIC at 05:12

## 2024-08-27 RX ADMIN — Medication 500 MILLIGRAM(S): at 12:26

## 2024-08-27 RX ADMIN — CHLORHEXIDINE GLUCONATE 15 MILLILITER(S): 40 SOLUTION TOPICAL at 05:11

## 2024-08-27 RX ADMIN — Medication 50 MICROGRAM(S): at 05:11

## 2024-08-27 RX ADMIN — FAMOTIDINE 20 MILLIGRAM(S): 10 INJECTION INTRAVENOUS at 12:23

## 2024-08-27 RX ADMIN — Medication 1 TABLET(S): at 21:35

## 2024-08-27 RX ADMIN — Medication 1000 MILLILITER(S): at 02:26

## 2024-08-27 RX ADMIN — Medication 1 APPLICATION(S): at 05:12

## 2024-08-27 RX ADMIN — TIMOLOL MALEATE 1 DROP(S): 5 SOLUTION/ DROPS OPHTHALMIC at 18:05

## 2024-08-27 RX ADMIN — Medication 1000 UNIT(S): at 12:25

## 2024-08-27 RX ADMIN — CHLORHEXIDINE GLUCONATE 15 MILLILITER(S): 40 SOLUTION TOPICAL at 18:07

## 2024-08-27 RX ADMIN — ACETAMINOPHEN 650 MILLIGRAM(S): 325 TABLET ORAL at 02:32

## 2024-08-27 RX ADMIN — CHLORHEXIDINE GLUCONATE 1 APPLICATION(S): 40 SOLUTION TOPICAL at 12:25

## 2024-08-27 RX ADMIN — Medication 1 APPLICATION(S): at 18:06

## 2024-08-27 RX ADMIN — Medication 2 TABLET(S): at 21:23

## 2024-08-27 RX ADMIN — POLYETHYLENE GLYCOL 3350 17 GRAM(S): 17 POWDER, FOR SOLUTION ORAL at 18:07

## 2024-08-27 RX ADMIN — ACETAMINOPHEN 650 MILLIGRAM(S): 325 TABLET ORAL at 02:26

## 2024-08-27 RX ADMIN — CEFEPIME 100 MILLIGRAM(S): 2 INJECTION, POWDER, FOR SOLUTION INTRAVENOUS at 18:46

## 2024-08-27 RX ADMIN — LATANOPROST 1 DROP(S): 50 SOLUTION OPHTHALMIC at 21:27

## 2024-08-27 RX ADMIN — Medication 1 TABLET(S): at 14:38

## 2024-08-27 NOTE — CONSULT NOTE ADULT - REASON FOR ADMISSION
Altered Mental Status and Increased WOB

## 2024-08-27 NOTE — PROGRESS NOTE ADULT - SUBJECTIVE AND OBJECTIVE BOX
Patient is a 84y old  Female who presents with a chief complaint of Altered Mental Status and Increased WOB (26 Aug 2024 17:16)        Over Night Events: Remains critically ill on MV.  Sedated.  On Levophed.  Drop in Hg          ROS:     All ROS are negative except HPI         PHYSICAL EXAM    ICU Vital Signs Last 24 Hrs  T(C): 37.7 (27 Aug 2024 08:00), Max: 38.1 (27 Aug 2024 02:00)  T(F): 99.9 (27 Aug 2024 08:00), Max: 100.6 (27 Aug 2024 02:00)  HR: 94 (27 Aug 2024 08:00) (68 - 117)  BP: 87/53 (27 Aug 2024 08:00) (57/34 - 146/77)  BP(mean): 66 (27 Aug 2024 08:00) (42 - 105)  ABP: --  ABP(mean): --  RR: 27 (27 Aug 2024 08:00) (17 - 30)  SpO2: 100% (27 Aug 2024 08:00) (95% - 100%)    O2 Parameters below as of 27 Aug 2024 08:00  Patient On (Oxygen Delivery Method): ventilator    O2 Concentration (%): 50        CONSTITUTIONAL:  Ill appearing in NAD    ENT:   Airway patent,   Mouth with normal mucosa.   ET     EYES:   Pupils equal,   Round and reactive to light.    CARDIAC:   Normal rate,   Regular rhythm.      RESPIRATORY:   No wheezing  Bilateral BS  Normal chest expansion  Not tachypneic,  No use of accessory muscles    GASTROINTESTINAL:  Abdomen soft,   Non-tender,   No guarding,   + BS    MUSCULOSKELETAL:   No clubbing, cyanosis    NEUROLOGICAL:   Sedated     SKIN:   Skin normal color for race,   No evidence of rash.      08-26-24 @ 07:01  -  08-27-24 @ 07:00  --------------------------------------------------------  IN:    Dexmedetomidine: 25.2 mL    IV PiggyBack: 200 mL    Lactated Ringers Bolus: 1998 mL    Norepinephrine: 256.7 mL    Norepinephrine: 16 mL  Total IN: 2495.9 mL    OUT:    FentaNYL: 0 mL    Indwelling Catheter - Urethral (mL): 440 mL    Voided (mL): 250 mL  Total OUT: 690 mL    Total NET: 1805.9 mL      08-27-24 @ 07:01  -  08-27-24 @ 09:06  --------------------------------------------------------  IN:    Dexmedetomidine: 12.6 mL    Norepinephrine: 75 mL  Total IN: 87.6 mL    OUT:    Indwelling Catheter - Urethral (mL): 60 mL  Total OUT: 60 mL    Total NET: 27.6 mL          LABS:                            8.2    27.69 )-----------( 258      ( 27 Aug 2024 06:00 )             26.5                                               08-27    134<L>  |  100  |  55<H>  ----------------------------<  174<H>  4.6   |  22  |  1.7<H>    Creatinine Trend  BUN 55, Cr 1.7, (08-27-24 @ 04:20)  Creatinine Trend  BUN 52, Cr 1.5, (08-26-24 @ 14:45)  Creatinine Trend  BUN 47, Cr 1.0, (08-26-24 @ 06:49)  Creatinine Trend  BUN 56, Cr 1.2, (08-24-24 @ 11:43)  Creatinine Trend  BUN 61, Cr 1.4, (08-23-24 @ 23:19)      Ca    7.9<L>      27 Aug 2024 04:20  Phos  3.9     08-27  Mg     2.1     08-27    TPro  4.9<L>  /  Alb  3.0<L>  /  TBili  0.4  /  DBili  x   /  AST  31  /  ALT  6   /  AlkPhos  46  08-27      PT/INR - ( 26 Aug 2024 14:45 )   PT: 10.60 sec;   INR: 0.93 ratio         PTT - ( 26 Aug 2024 14:45 )  PTT:22.6 sec                                       Urinalysis Basic - ( 27 Aug 2024 04:20 )    Color: x / Appearance: x / SG: x / pH: x  Gluc: 174 mg/dL / Ketone: x  / Bili: x / Urobili: x   Blood: x / Protein: x / Nitrite: x   Leuk Esterase: x / RBC: x / WBC x   Sq Epi: x / Non Sq Epi: x / Bacteria: x                                                  LIVER FUNCTIONS - ( 27 Aug 2024 04:20 )  Alb: 3.0 g/dL / Pro: 4.9 g/dL / ALK PHOS: 46 U/L / ALT: 6 U/L / AST: 31 U/L / GGT: x                                                                                               Mode: AC/ CMV (Assist Control/ Continuous Mandatory Ventilation)  RR (machine): 28  TV (machine): 350  FiO2: 50  PEEP: 8  ITime: 1  MAP: 15  PIP: 28                                      ABG - ( 27 Aug 2024 03:35 )  pH, Arterial: 7.47  pH, Blood: x     /  pCO2: 26    /  pO2: 198   / HCO3: 19    / Base Excess: -3.2  /  SaO2: 97.5                MEDICATIONS  (STANDING):  artificial tears (preservative free) Ophthalmic Solution 1 Drop(s) Both EYES two times a day  ascorbic acid 500 milliGRAM(s) Oral daily  aspirin  chewable 81 milliGRAM(s) Oral daily  budesonide 160 MICROgram(s)/formoterol 4.5 MICROgram(s) Inhaler 2 Puff(s) Inhalation two times a day  carbidopa/levodopa  10/100 1 Tablet(s) Oral three times a day  cefepime   IVPB      cefepime   IVPB 2000 milliGRAM(s) IV Intermittent every 8 hours  chlorhexidine 0.12% Liquid 15 milliLiter(s) Oral Mucosa every 12 hours  chlorhexidine 2% Cloths 1 Application(s) Topical daily  cholecalciferol 1000 Unit(s) Oral daily  clotrimazole 1% Cream 1 Application(s) Topical two times a day  dexMEDEtomidine Infusion 0.2 MICROgram(s)/kG/Hr (4.78 mL/Hr) IV Continuous <Continuous>  famotidine    Tablet 20 milliGRAM(s) Oral daily  fentaNYL   Infusion. 0.5 MICROgram(s)/kG/Hr (4.78 mL/Hr) IV Continuous <Continuous>  hydrocortisone 1% Cream 1 Application(s) Topical two times a day  lactated ringers. 1000 milliLiter(s) (150 mL/Hr) IV Continuous <Continuous>  latanoprost 0.005% Ophthalmic Solution 1 Drop(s) Both EYES at bedtime  levothyroxine 50 MICROGram(s) Oral daily  metoprolol succinate ER 25 milliGRAM(s) Oral daily  metroNIDAZOLE  IVPB 500 milliGRAM(s) IV Intermittent every 12 hours  mineral oil enema 133 milliLiter(s) Rectal <User Schedule>  norepinephrine Infusion 0.05 MICROgram(s)/kG/Min (4.48 mL/Hr) IV Continuous <Continuous>  polyethylene glycol 3350 17 Gram(s) Oral daily  senna 2 Tablet(s) Oral at bedtime  timolol 0.5% Solution 1 Drop(s) Both EYES two times a day    MEDICATIONS  (PRN):  acetaminophen     Tablet .. 650 milliGRAM(s) Oral every 6 hours PRN Temp greater or equal to 38C (100.4F)  albuterol/ipratropium for Nebulization 3 milliLiter(s) Nebulizer every 6 hours PRN Shortness of Breath and/or Wheezing  melatonin 3 milliGRAM(s) Oral at bedtime PRN Insomnia      New X-rays reviewed:                                                                                  ECHO

## 2024-08-27 NOTE — CONSULT NOTE ADULT - ASSESSMENT
84yFemale with history of lewy body dementia with autonomic dysfunction wiht dysphagia, Parkinson's, COPD on home O2, AAOx0-1 presented with severe sepsis.  Patient with cardiac arrest in last 24 hours with ROSC after 10 minutes. Palliative care consulted for GOC.    Spoke with patient's  over the phone. Palliative care introduced. He was able to provide a medical history and hospital course.  He noted the patient has been a nursing home and DNR/DNI, but he rescinded it when she went into cardiac arrest, and made her DNR again after ROSC was achieved.  We discussed overall GOC should the patient not improve or be unable to be medically extubated.  He noted that if the patient is unable to be weaned from the ventilator or does not improve, he would be open to additional discussions about palliative extubation, comfort, and discontinuation of artificial nutrition. All questions answered.    #Cardiac Arrest  #Respiratory Failure  #Shock  #Lewy Body Dementia    Recommendations  -DNR  -ongoing medical management  -vent management, antibiotic management and pressors per primary ICU team  -continued GOC  -will follow      Education about palliative care provided to patient/family.  See Recs below.    Please call [phone] with questions or concerns 24/7.   We will continue to follow.

## 2024-08-27 NOTE — PROGRESS NOTE ADULT - SUBJECTIVE AND OBJECTIVE BOX
FLODENIZ LUJANAINE  84y, Female  Allergy: Originally Entered as [Unknown] reaction to [red pepper] (Unknown)  ciprofloxacin (Unknown)  benzodiazepines (Unknown)  antichlolinergics (Unknown)  penicillin (Anaphylaxis)  Originally Entered as [Unknown] reaction to [pepper] (Unknown)  Originally Entered as [Unknown] reaction to [green pepper] (Unknown)  Originally Entered as [Unknown] reaction to [neuroleptics] (Unknown)      LOS  3d    CHIEF COMPLAINT: Altered Mental Status and Increased WOB (27 Aug 2024 09:05)      INTERVAL EVENTS/HPI  - No acute events overnight  - T(F): , Max: 100.6 (08-27-24 @ 02:00)  - on levophed -- code blue over weekend   - WBC Count: 27.69 (08-27-24 @ 06:00)  WBC Count: 29.72 (08-27-24 @ 04:20)     - Creatinine: 1.7 (08-27-24 @ 04:20)  Creatinine: 1.5 (08-26-24 @ 14:45)       ROS  unable to obtain history secondary to patient's mental status and/or sedation    VITALS:  T(F): 99.9, Max: 100.6 (08-27-24 @ 02:00)  HR: 94  BP: 87/53  RR: 27Vital Signs Last 24 Hrs  T(C): 37.7 (27 Aug 2024 08:00), Max: 38.1 (27 Aug 2024 02:00)  T(F): 99.9 (27 Aug 2024 08:00), Max: 100.6 (27 Aug 2024 02:00)  HR: 94 (27 Aug 2024 08:00) (68 - 117)  BP: 87/53 (27 Aug 2024 08:00) (57/34 - 146/77)  BP(mean): 66 (27 Aug 2024 08:00) (42 - 105)  RR: 27 (27 Aug 2024 08:00) (17 - 30)  SpO2: 100% (27 Aug 2024 08:00) (95% - 100%)    Parameters below as of 27 Aug 2024 08:00  Patient On (Oxygen Delivery Method): ventilator    O2 Concentration (%): 50    PHYSICAL EXAM:  Gen: intubated  HEENT: Normocephalic, atraumatic  Neck: supple, no lymphadenopathy  CV: Regular rate & regular rhythm  Lungs: decreased BS at bases, no fremitus  Abdomen: Soft, BS present  Ext: Warm, well perfused  Neuro: non focal, awake  Skin: no rash, no erythema  Lines: no phlebitis    FH: Non-contributory  Social Hx: Non-contributory    TESTS & MEASUREMENTS:                        8.2    27.69 )-----------( 258      ( 27 Aug 2024 06:00 )             26.5     08-27    134<L>  |  100  |  55<H>  ----------------------------<  174<H>  4.6   |  22  |  1.7<H>    Ca    7.9<L>      27 Aug 2024 04:20  Phos  3.9     08-27  Mg     2.1     08-27    TPro  4.9<L>  /  Alb  3.0<L>  /  TBili  0.4  /  DBili  x   /  AST  31  /  ALT  6   /  AlkPhos  46  08-27      LIVER FUNCTIONS - ( 27 Aug 2024 04:20 )  Alb: 3.0 g/dL / Pro: 4.9 g/dL / ALK PHOS: 46 U/L / ALT: 6 U/L / AST: 31 U/L / GGT: x           Urinalysis Basic - ( 27 Aug 2024 04:20 )    Color: x / Appearance: x / SG: x / pH: x  Gluc: 174 mg/dL / Ketone: x  / Bili: x / Urobili: x   Blood: x / Protein: x / Nitrite: x   Leuk Esterase: x / RBC: x / WBC x   Sq Epi: x / Non Sq Epi: x / Bacteria: x        Urinalysis with Rflx Culture (collected 08-24-24 @ 05:12)    Culture - Blood (collected 08-23-24 @ 23:19)  Source: .Blood Blood-Peripheral  Preliminary Report (08-27-24 @ 09:00):    No growth at 72 Hours    Culture - Blood (collected 08-23-24 @ 23:19)  Source: .Blood Blood-Peripheral  Preliminary Report (08-27-24 @ 09:01):    No growth at 72 Hours        Lactate, Blood: 1.6 mmol/L (08-27-24 @ 11:05)  Lactate, Blood: 2.7 mmol/L (08-27-24 @ 04:20)  Lactate, Blood: 2.3 mmol/L (08-24-24 @ 22:01)  Lactate, Blood: 3.7 mmol/L (08-24-24 @ 17:20)  Lactate, Blood: 3.6 mmol/L (08-24-24 @ 11:43)  Lactate, Blood: 4.0 mmol/L (08-24-24 @ 05:10)  Lactate, Blood: 3.1 mmol/L (08-23-24 @ 23:19)  Blood Gas Venous - Lactate: 2.3 mmol/L (08-23-24 @ 23:17)      INFECTIOUS DISEASES TESTING  MRSA PCR Result.: Negative (08-24-24 @ 15:15)  Procalcitonin: 0.86 (08-24-24 @ 11:43)  COVID-19 PCR: NotDetec (11-19-23 @ 10:16)  Rapid RVP Result: NotDetec (11-11-23 @ 10:12)  MRSA PCR Result.: Negative (11-11-23 @ 10:12)  Legionella Antigen, Urine: Negative (11-11-23 @ 10:01)  Procalcitonin, Serum: 0.10 (11-11-23 @ 06:55)      INFLAMMATORY MARKERS      RADIOLOGY & ADDITIONAL TESTS:  I have personally reviewed the last available Chest xray  CXR  Xray Chest 1 View- PORTABLE-Urgent:   ACC: [id number] EXAM:  XR CHEST PORTABLE URGENT 1V   ORDERED BY: LAURA WHITFIELD     PROCEDURE DATE:  08/26/2024          INTERPRETATION:  Clinical History / Reason for exam: Assess OG tube.    Comparison : Chest radiograph 8/26/2024 at 4:32 PM.    Technique/Positioning: Frontal chest radiograph.    Findings:    Support devices: ET tube terminates 2.9 cm above the amanda. Enteric tube   terminates below left hemidiaphragm. Essentially stable right central   venous catheter.    Cardiac/mediastinum/hilum: Unchanged.    Lung parenchyma/Pleura: Bilateral interstitial prominence may be due to   low lung volumes. No pneumothorax.    Skeleton/soft tissues: Unchanged.    Impression:    Support devices as described.    Bilateral interstitial prominence may be due to low lung volumes.        --- End of Report ---            SHARYN TENORIO MD; Attending Radiologist  This document has been electronically signed. Aug 27 2024  9:50AM (08-26-24 @ 18:35)      CT      CARDIOLOGY TESTING  12 Lead ECG:   Ventricular Rate 127 BPM    Atrial Rate 127 BPM    P-R Interval 150 ms    QRS Duration 84 ms    Q-T Interval 298 ms    QTC Calculation(Bazett) 433 ms    P Axis 46 degrees    R Axis -25 degrees    T Axis 31 degrees    Diagnosis Line Sinus tachycardia  Inferior-posterior infarct , age undetermined  Abnormal ECG    Confirmed by VICKIE VALDOVINOS, YURIDIA ([id number]) on 8/24/2024 10:37:59 PM (08-24-24 @ 00:20)      MEDICATIONS  artificial tears (preservative free) Ophthalmic Solution 1 Both EYES two times a day  ascorbic acid 500 Oral daily  aspirin  chewable 81 Oral daily  budesonide 160 MICROgram(s)/formoterol 4.5 MICROgram(s) Inhaler 2 Inhalation two times a day  carbidopa/levodopa  10/100 1 Oral three times a day  cefepime   IVPB 1000 IV Intermittent every 12 hours  chlorhexidine 0.12% Liquid 15 Oral Mucosa every 12 hours  chlorhexidine 2% Cloths 1 Topical daily  cholecalciferol 1000 Oral daily  clotrimazole 1% Cream 1 Topical two times a day  dexMEDEtomidine Infusion 0.2 IV Continuous <Continuous>  famotidine    Tablet 20 Oral daily  hydrocortisone 1% Cream 1 Topical two times a day  lactated ringers. 1000 IV Continuous <Continuous>  latanoprost 0.005% Ophthalmic Solution 1 Both EYES at bedtime  levothyroxine 50 Oral daily  metoprolol succinate ER 25 Oral daily  mineral oil enema 133 Rectal <User Schedule>  norepinephrine Infusion 0.05 IV Continuous <Continuous>  polyethylene glycol 3350 17 Oral two times a day  senna 2 Oral at bedtime  timolol 0.5% Solution 1 Both EYES two times a day      WEIGHT  Weight (kg): 95.6 (08-25-24 @ 22:13)  Creatinine: 1.7 mg/dL (08-27-24 @ 04:20)  Creatinine: 1.5 mg/dL (08-26-24 @ 14:45)      ANTIBIOTICS:  cefepime   IVPB 1000 milliGRAM(s) IV Intermittent every 12 hours      All available historical records have been reviewed

## 2024-08-27 NOTE — PROGRESS NOTE ADULT - ASSESSMENT
83-year-old female with PMHx of Lewy Body dementia with associated autonomic dysfunction including dysphagia, Hypothyroidism, Parkinson's, COPD not on home oxygen , A&Ox0 on arrival being admitted for management of severe sepsis.     # Severe Sepsis on Admission ( Tachycardia 133, Tachypnea 24, Leukocytosis 19.08, Lactate 3.1 -> 4.0)   2/2 Possible Acute Complicated UTI possible pneumonitis   # Metabolic Encephalopathy likely due to UTI, improving- resolved  # COPD, stable not in exacerbation   -d/c vancomycin d/c flagyl  -c/w cefepime  - f/u Blood Cx   -repeat CT AB/P  -trend lactate levels 4-->3.6-->1.5  - procalcitonin 0.86   -  RVP neg    #AHRF , CODE BLUE , ROSC  patient was coded in am with ROSC  intubated and transferred to ICU   rescineded the DNR/DNI.  on cefepime per ID  plan as pr ICU team.    explained about patient condition multiple times        # ? Run of NSVTs  # Raised troponins in setting of hypoxia and severe sepsis  - Cardiology consulted by ED, recommend telemetry monitoring  - Trops 142 -> 132,-->79  - EKG: Sinus Tachycardia   - continue trending troponin  - f/u electrolytes and replete as necessary    # Chronic constipation 2/2 Autonomic Dysfunction 2/2 Lewy Body Dementia  with h/o recurrent distension of bowel.   # H/O right hemicolectomy   -Continue aggressive bowel regimen    # H/O ? HFpEF, Stable  # HTN   -repeat Echo  - ECHO: 11 Nov 23: Normal global left ventricular systolic function with a biplane EF of 60%, Mild mitral stenosis, Mild aortic regurgitation, Mild aortic valve stenosis  - c/w home Metoprolol succinate 25 mg od, furosemide 10 mg daily, c/w nifedipine 60 mg daily   - c/w aspirin 81 mg daily       # Parkinson's disease with Lewy body dementia   # Functional quadriplegia, wheelchair bound at baseline  - CT Head: No evidence of acute intracranial abnormality.   - At baseline patient is AXO x 3 according to her , patient has difficulty findings words and has meaningful conversation   - c/w Levodopa-Carbidopa 10/100 tid   -repeat head CT    # Vit D deficiency   - c/w Vit D 1000 daily     # Dysphagia   - soft and bite sized, moderately thick liquids as per speech evaluation last admission   -now intubated     # Hypothyroidism    - Recent TSH 1.74 8/23 ([facility] records)   -c/w Synthroid 50 mcg daily.      # GERD   -c/w famotidine 20 mg daily     # Glaucoma   # Conjunctival Xerosis   -c/w home eye drops     # Peripheral neuropathy   - at home gabapentin 600 mg tid, holding in setting of AMS for now    follow up:   f/u blood cultures, f/u lactate palliative consult. Goal directed care

## 2024-08-27 NOTE — PROGRESS NOTE ADULT - ASSESSMENT
ASSESSMENT  84-year-old woman with a history of Lewy body dementia (AAO x 2 at baseline), Parkinson's, COPD not on home oxygen, presented from [facility] with presenting complaints of AMS and Tachypnea, Patient was A&Ox0 on arrival, unable to provide history.       IMPRESSION  #Sepsis present on admission with encephalopathy- suspected UTI?  Pt has an acute illness which poses threat to bodily function   - CT Angio Chest PE Protocol w/ IV Cont (08.24.24 @ 01:36): 1. Urinary bladder mild pericystic stranding, could be attributed to  urinary bladder infection in the appropriate clinical setting.  - WBC Count: 19.08 K/uL (08.23.24 @ 23:19) on admission  - UA 8/24 negative - although taken after antibiotics  - Blood Cx 8/23 NG    #Cardiac Arrest   #Fever post-cardiac arrest    #Ovarian Cystic Leions  - CT Abd/pelvis 8/24:  2. Increased ovarian 2.5 cm cystic lesion. Unchanged right ovarian 7 cm  cystic mass. Outpatient pelvic ultrasound recommended for further evaluation.    #Constipation  #Lewy Body Dementia AO x2 at baseline  #Parkinson's   #COPD     #Obesity BMI (kg/m2): 35.8  #DM   #Abx allergy: ciprofloxacin (Unknown)  penicillin (Anaphylaxis)    RECOMMENDATIONS  - Cardiac arrest -- prior to cardiac arrest, diver of leuckotysos was unclear   - continue cefepime 1g q 12 hours for now   - trend WBC    Please call or message on [employer] Teams if with any questions.  Spectra [phone]

## 2024-08-27 NOTE — PROGRESS NOTE ADULT - SUBJECTIVE AND OBJECTIVE BOX
IMELDA ROLLE  84y  Female      Patient is a 84y old  Female who presents with a chief complaint of Altered Mental Status and respiratory distress    HPI  Patient 84-year-old woman with a history of Lewy body dementia (AAO x 2 at baseline), Parkinson's, COPD not on home oxygen, presented from [facility] with presenting complaints of AMS and Tachypnea, Patient was A&Ox0 on arrival. Transferred to ICU for management of sepsis and an episode of cardiac arrest.    OVERNIGHT EVENTS: Today is hospital day 3. Patient sedated. Drop in Hg from 11.7 to 8.2    REVIEW OF SYSTEMS:  All ROS are negative except HPI     Vital Signs Last 24 Hrs  T(C): 37.7 (27 Aug 2024 08:00), Max: 38.1 (27 Aug 2024 02:00)  T(F): 99.9 (27 Aug 2024 08:00), Max: 100.6 (27 Aug 2024 02:00)  HR: 94 (27 Aug 2024 08:00) (68 - 117)  BP: 87/53 (27 Aug 2024 08:00) (57/34 - 146/77)  BP(mean): 66 (27 Aug 2024 08:00) (42 - 105)  RR: 27 (27 Aug 2024 08:00) (17 - 30)  SpO2: 100% (27 Aug 2024 08:00) (95% - 100%)    Parameters below as of 27 Aug 2024 08:00  Patient On (Oxygen Delivery Method): ventilator    O2 Concentration (%): 50    PHYSICAL EXAM:  CONSTITUTIONAL:  Ill appearing in NAD    ENT:   Airway patent,   Mouth with normal mucosa.   ET     EYES:   Pupils equal,   Round and reactive to light.    CARDIAC:   Normal rate,   Regular rhythm.      RESPIRATORY:   No wheezing  Bilateral BS  Normal chest expansion  Not tachypneic,  No use of accessory muscles    GASTROINTESTINAL:  Abdomen soft,   Non-tender,   No guarding,   + BS    MUSCULOSKELETAL:   No clubbing, cyanosis    NEUROLOGICAL:   Sedated     SKIN:   Skin normal color for race,   No evidence of rash.        Consultant(s) Notes Reviewed:  [x ] YES  [ ] NO  Care Discussed with Consultants/Other Providers [ x] YES  [ ] NO    LABS:                        8.2    27.69 )-----------( 258      ( 27 Aug 2024 06:00 )             26.5     08-27    134<L>  |  100  |  55<H>  ----------------------------<  174<H>  4.6   |  22  |  1.7<H>    Ca    7.9<L>      27 Aug 2024 04:20  Phos  3.9     08-27  Mg     2.1     08-27    TPro  4.9<L>  /  Alb  3.0<L>  /  TBili  0.4  /  DBili  x   /  AST  31  /  ALT  6   /  AlkPhos  46  08-27    PT/INR - ( 27 Aug 2024 11:05 )   PT: 12.50 sec;   INR: 1.10 ratio         PTT - ( 27 Aug 2024 11:05 )  PTT:21.2 sec  Urinalysis Basic - ( 27 Aug 2024 04:20 )    Color: x / Appearance: x / SG: x / pH: x  Gluc: 174 mg/dL / Ketone: x  / Bili: x / Urobili: x   Blood: x / Protein: x / Nitrite: x   Leuk Esterase: x / RBC: x / WBC x   Sq Epi: x / Non Sq Epi: x / Bacteria: x      ABG - ( 27 Aug 2024 03:35 )  pH, Arterial: 7.47  pH, Blood: x     /  pCO2: 26    /  pO2: 198   / HCO3: 19    / Base Excess: -3.2  /  SaO2: 97.5        POCT Blood Glucose.: 145 mg/dL (26 Aug 2024 12:57)    Imaging:   CT Head: No evidence of acute intracranial abnormality.   CT Angio Chest PE protocol and CT A/P with IV contrast:    1. Urinary bladder mild pericystic stranding    2. Increased ovarian 2.5 cm cystic lesion. Unchanged right ovarian 7 cm cystic mass. Outpatient pelvic ultrasound recommended for further evaluation.   3. Moderate-to-large colorectal stool burden.   4. No evidence of acute thoracic pathology or pulmonary embolus.   CXR:    No focal consolidation, pneumothorax or pleural effusion.   Stable cardio-mediastinal silhouette.

## 2024-08-27 NOTE — CONSULT NOTE ADULT - SUBJECTIVE AND OBJECTIVE BOX
CC:   AMS and tachypnea    HPI:  Patient 84-year-old woman with a history of Lewy body dementia (AAO x 2 at baseline), Parkinson's, COPD not on home oxygen, presented from [facility] with presenting complaints of AMS and Tachypnea, Patient was A&Ox0 on arrival, unable to provide history. Patient was found to be with decreased breath sounds diffusely, quick bedside ultrasound by ED showed lung sliding, no pneumothorax, was placed on BiPAP for potential hypercapnia, magnesium and nebs given, blood gas resulted with normal CO2. was weaned off to NC.  ED team held off on 30 cc/kg bolus, as patient has a history of heart failure: was given 1 L bolus. She was evaluated by cardiology for two runs of NSVTs approx 10 sec, recommended telemetry admission. Patient was started on Ertapenem, Doxycycline and Ceftriaxone in NH for suspected UTI. At baseline patient is AXO x3, has difficulty finding words, has impaired memory but has meaningful conversation. On my visit patient saturating 95 % on RA lying comfortably in bed, is oriented to place and person and nodding to questions. Patient endorses no abdominal pain, chest pain, SOB or fever.     GOC: Spoke with the  who arrived to bedside shortly after the patient arrived, discussed goals of care.  Patient is DNR/DNI.       ED Course  Vitals: 97.8, 133, 141/77 , 24 bpm  Labs:  WBC 19.08, Lactate 3.1 -> 4.0, Trops 142 -> 132, Na 147, BUN/Cr 61/1.4, pro-,    VBG: pH 7.32, pCo2 42, HCO3 22, UA WNL   Imaging:   CT Head: No evidence of acute intracranial abnormality.   CT Angio Chest PE protocol and CT A/P with IV contrast:    1. Urinary bladder mild pericystic stranding    2. Increased ovarian 2.5 cm cystic lesion. Unchanged right ovarian 7 cm cystic mass. Outpatient pelvic ultrasound recommended for further evaluation.   3. Moderate-to-large colorectal stool burden.   4. No evidence of acute thoracic pathology or pulmonary embolus.   CXR:    No focal consolidation, pneumothorax or pleural effusion.   Stable cardio-mediastinal silhouette.     Patient being admitted to medicine for further evaluation and management of severe sepsis 2/2 possible acute complicated UTI     (24 Aug 2024 12:08)    PERTINENT PM/SXH:   Dementia    History of breast cancer    Constipation    Lewy body dementia without behavioral disturbance    Colon polyp      No significant past surgical history    History of colon resection    H/O mastectomy, right      FAMILY HISTORY:  No pertinent family history in first degree relatives      None pertinent    ITEMS NOT CHECKED ARE NOT PRESENT    SOCIAL HISTORY:   Significant other/partner[ ]  Children[ ]  Evangelical/Spirituality:  Substance hx:  [ ]   Tobacco hx:  [ ]   Alcohol hx: [ ]   Living Situation: [ ]Home  [x ]Long term care  [ ]Rehab [ ]Other  Home Services: [ ] HHA [ ] Visting RN [ ] Hospice  Occupation:  Home Opioid hx:  [ ] Y [ ] N [x ] I-Stop Reference No:    Reference #: [id number] - no meds     ADVANCE DIRECTIVES:     [ ] Full Code [x ] DNR  MOLST  [ ]  Living Will  [ ]   DECISION MAKER(s):  [x ] Health Care Proxy(s)  [ ] Surrogate(s)  [ ] Guardian           Name(s): Phone Number(s):  Contreras Simpson      BASELINE (I)ADL(s) (prior to admission):    [place]: [ ]Total  [ ] Moderate [ ]Dependent  Palliative Performance Status Version 2:         %    http://npcrc.org/files/news/palliative_performance_scale_ppsv2.pdf    Allergies    Originally Entered as [Unknown] reaction to [red pepper] (Unknown)  ciprofloxacin (Unknown)  benzodiazepines (Unknown)  antichlolinergics (Unknown)  penicillin (Anaphylaxis)  Originally Entered as [Unknown] reaction to [pepper] (Unknown)  Originally Entered as [Unknown] reaction to [green pepper] (Unknown)  Originally Entered as [Unknown] reaction to [neuroleptics] (Unknown)    Intolerances    MEDICATIONS  (STANDING):  artificial tears (preservative free) Ophthalmic Solution 1 Drop(s) Both EYES two times a day  ascorbic acid 500 milliGRAM(s) Oral daily  aspirin  chewable 81 milliGRAM(s) Oral daily  budesonide 160 MICROgram(s)/formoterol 4.5 MICROgram(s) Inhaler 2 Puff(s) Inhalation two times a day  carbidopa/levodopa  10/100 1 Tablet(s) Oral three times a day  cefepime   IVPB 1000 milliGRAM(s) IV Intermittent every 12 hours  chlorhexidine 0.12% Liquid 15 milliLiter(s) Oral Mucosa every 12 hours  chlorhexidine 2% Cloths 1 Application(s) Topical daily  cholecalciferol 1000 Unit(s) Oral daily  clotrimazole 1% Cream 1 Application(s) Topical two times a day  dexMEDEtomidine Infusion 0.2 MICROgram(s)/kG/Hr (4.78 mL/Hr) IV Continuous <Continuous>  famotidine    Tablet 20 milliGRAM(s) Oral daily  hydrocortisone 1% Cream 1 Application(s) Topical two times a day  lactated ringers. 1000 milliLiter(s) (150 mL/Hr) IV Continuous <Continuous>  latanoprost 0.005% Ophthalmic Solution 1 Drop(s) Both EYES at bedtime  levothyroxine 50 MICROGram(s) Oral daily  metoprolol succinate ER 25 milliGRAM(s) Oral daily  mineral oil enema 133 milliLiter(s) Rectal <User Schedule>  norepinephrine Infusion 0.05 MICROgram(s)/kG/Min (4.48 mL/Hr) IV Continuous <Continuous>  polyethylene glycol 3350 17 Gram(s) Oral two times a day  senna 2 Tablet(s) Oral at bedtime  timolol 0.5% Solution 1 Drop(s) Both EYES two times a day    MEDICATIONS  (PRN):  acetaminophen     Tablet .. 650 milliGRAM(s) Oral every 6 hours PRN Temp greater or equal to 38C (100.4F)  albuterol/ipratropium for Nebulization 3 milliLiter(s) Nebulizer every 6 hours PRN Shortness of Breath and/or Wheezing  melatonin 3 milliGRAM(s) Oral at bedtime PRN Insomnia    PRESENT SYMPTOMS: [x ]Unable to obtain due to poor mentation   Source if other than patient:  [ ]Family   [ ]Team     Pain: [ ]yes [ ]no  ALL PAIN ASSESSMENT COMPONENTS WERE ASKED ABOUT UNLESS OTHERWISE NOTED  QOL impact -   Location -                    Aggravating factors -  Quality -  Radiation -  Timing-  Severity (0-10 scale):  Minimal acceptable level (0-10 scale):     CPOT:  0  https://www.sccm.org/getattachment/ele06n23-4r5l-4o8b-5z7m-8724j5832a4w/Critical-Care-Pain-Observation-Tool-(CPOT)    PAIN AD Score:   http://geriatrictoolkit.[email or web address]/painad.pdf (press ctrl +  left click to view)    Dyspnea:                           [ ]None[ ]Mild [ ]Moderate [ ]Severe     Respiratory Distress Observation Scale (RDOS): 0  A score of 0 to 2 signifies little or no respiratory distress, 3 signifies mild distress, scores 4 to 6 indicate moderate distress, and scores greater than 7 signify severe distress  [email or web address]-sdochsbnjlc-pvrejcys-tjktwtlytqf-[email or web address]    Anxiety:                             [ ]None[ ]Mild [ ]Moderate [ ]Severe   Fatigue:                             [ ]None[ ]Mild [ ]Moderate [ ]Severe   Nausea:                             [ ]None[ ]Mild [ ]Moderate [ ]Severe   Loss of appetite:              [ ]None[ ]Mild [ ]Moderate [ ]Severe   Constipation:                    [ ]None[ ]Mild [ ]Moderate [ ]Severe    Other Symptoms:  [ ]All other review of systems negative     Palliative Performance Status Version 2:         10%    http://npcrc.org/files/news/palliative_performance_scale_ppsv2.pdf    PHYSICAL EXAM:  Vital Signs Last 24 Hrs  T(C): 37.3 (27 Aug 2024 12:00), Max: 38.1 (27 Aug 2024 02:00)  T(F): 99.2 (27 Aug 2024 12:00), Max: 100.6 (27 Aug 2024 02:00)  HR: 92 (27 Aug 2024 14:00) (68 - 117)  BP: 103/53 (27 Aug 2024 14:00) (57/34 - 146/77)  BP(mean): 75 (27 Aug 2024 14:00) (42 - 105)  RR: 26 (27 Aug 2024 14:00) (13 - 30)  SpO2: 99% (27 Aug 2024 14:00) (97% - 100%)    Parameters below as of 27 Aug 2024 13:00  Patient On (Oxygen Delivery Method): ventilator    O2 Concentration (%): 40 I&O's Summary    26 Aug 2024 07:01  -  27 Aug 2024 07:00  --------------------------------------------------------  IN: 2495.9 mL / OUT: 690 mL / NET: 1805.9 mL    27 Aug 2024 07:01  -  27 Aug 2024 15:07  --------------------------------------------------------  IN: 236.6 mL / OUT: 280 mL / NET: -43.4 mL        GENERAL:  [ ] No acute distress [ ]Lethargic  [ x]Unarousable  [ ]Verbal  [ ]Non-Verbal [ ]Cachexia    BEHAVIORAL/PSYCH:  [ ]Alert and Oriented x  [ ] Anxiety [ ] Delirium [ ] Agitation [ x] Calm   EYES: [x ] No scleral icterus [ ] Scleral icterus [ ] Closed  ENMT:  [ ]Dry mouth  [x ]No external oral lesions [ ] No external ear or nose lesions  CARDIOVASCULAR:  [ ]Regular [ ]Irregular [ ]Tachy [ ]Not Tachy  [ ]Jarocho [ ] Edema [ ] No edema  PULMONARY:  [ ]Tachypnea  [ ]Audible excessive secretions [ x] No labored breathing [ ] labored breathing  GASTROINTESTINAL: [ ]Soft  [ ]Distended  [ x]Not distended [ ]Non tender [ ]Tender  MUSCULOSKELETAL: [ ]No clubbing [ ] clubbing  [ ] No cyanosis [ ] cyanosis  NEUROLOGIC: [ ]No focal deficits  [ ]Follows commands  [x ]Does not follow commands  [ ]Cognitive impairment  [ ]Dysphagia  [ ]Dysarthria  [ ]Paresis   SKIN: [ ] Jaundiced [x ] Non-jaundiced [ ]Rash [ ]No Rash [ ] Warm [ ] Dry  MISC/LINES: [ x] ET tube [ ] Trach [ ]NGT/OGT [ ]PEG [ ]Pinon    LABS: reviewed by me                        8.2    27.69 )-----------( 258      ( 27 Aug 2024 06:00 )             26.5   08-27    134<L>  |  100  |  55<H>  ----------------------------<  174<H>  4.6   |  22  |  1.7<H>    Ca    7.9<L>      27 Aug 2024 04:20  Phos  3.9     08-27  Mg     2.1     08-27    TPro  4.9<L>  /  Alb  3.0<L>  /  TBili  0.4  /  DBili  x   /  AST  31  /  ALT  6   /  AlkPhos  46  08-27  PT/INR - ( 27 Aug 2024 11:05 )   PT: 12.50 sec;   INR: 1.10 ratio         PTT - ( 27 Aug 2024 11:05 )  PTT:21.2 sec    Urinalysis Basic - ( 27 Aug 2024 04:20 )    Color: x / Appearance: x / SG: x / pH: x  Gluc: 174 mg/dL / Ketone: x  / Bili: x / Urobili: x   Blood: x / Protein: x / Nitrite: x   Leuk Esterase: x / RBC: x / WBC x   Sq Epi: x / Non Sq Epi: x / Bacteria: x      RADIOLOGY & ADDITIONAL STUDIES: reviewed by me    < from: CT Head No Cont (08.27.24 @ 10:50) >  IMPRESSION:    1.  No CT evidence of acute intracranial pathology. Stable exam since   8/26/2024.    2.  Stable severe chronic microvascular changes.    < end of copied text >      EKG: reviewed by me    < from: 12 Lead ECG (08.24.24 @ 00:20) >  Ventricular Rate 127 BPM    Atrial Rate 127 BPM    P-R Interval 150 ms    QRS Duration 84 ms    Q-T Interval 298 ms    QTC Calculation(Bazett) 433 ms    P Axis 46 degrees    R Axis -25 degrees    T Axis 31 degrees    Diagnosis Line Sinus tachycardia  Inferior-posterior infarct , age undetermined  Abnormal ECG    < end of copied text >      PROTEIN CALORIE MALNUTRITION PRESENT: [ ]mild [ ]moderate [ ]severe [ ]underweight [ ]morbid obesity  https://www.andeal.org/vault/2440/web/files/ONC/Table_Clinical%20Characteristics%20to%20Document%20Malnutrition-White%20JV%20et%20al%202012.pdf    Height (cm): 165.1 (08-25-24 @ 22:13), 162.6 (11-13-23 @ 11:38)  Weight (kg): 95.6 (08-25-24 @ 22:13)  BMI (kg/m2): 35.1 (08-25-24 @ 22:13)  [ ]PPSV2 < or = to 30% [ ]significant weight loss  [ ]poor nutritional intake  [ ]anasarca      [ ]Artificial Nutrition      Palliative Care Spiritual/Emotional Screening Tool Question  Severity (0-4):                    OR                    [ x] Unable to determine. Will assess at later time if appropriate.  Score of 2 or greater indicates recommendation of Chaplaincy and/or SW referral  Chaplaincy Referral: [ ] Yes [ ] Refused [ ] Following     Caregiver Sherman:  [ ] Yes [ ] No    OR    [x ] Unable to determine. Will assess at later time if appropriate.  Social Work Referral [ ]  Patient and Family Centered Care Referral [ ]    Anticipatory Grief Present: [ ] Yes [ ] No    OR     [ x] Unable to determine. Will assess at later time if appropriate.  Social Work Referral [ ]  Patient and Family Centered Care Referral [ ]    Patient discussed with primary medical team MD  Palliative care education provided to patient and/or family

## 2024-08-27 NOTE — CONSULT NOTE ADULT - CONVERSATION DETAILS
Spoke with patient's  over the phone. Palliative care introduced. He was able to provide a medical history and hospital course.  He noted the patient has been a nursing home and DNR/DNI, but he rescinded it when she went into cardiac arrest, and made her DNR again after ROSC was achieved.  We discussed overall GOC should the patient not improve or be unable to be medically extubated.  He noted that if the patient is unable to be weaned from the ventilator or does not improve, he would be open to additional discussions about palliative extubation, comfort, and discontinuation of artificial nutrition. All questions answered.

## 2024-08-27 NOTE — PROGRESS NOTE ADULT - ASSESSMENT
Impression:     Cardiopulmonary arrest approximately 10 minutes  Acute hypoxemic respiratory failure   Likely aspiration event   Shock on Levophed    Possible UTI    Drop in Hg   JENN   Moderate to large stool burden on admission   HO Lewy body dementia   HO COPD   HO abdominal surgery    PLAN:    CNS: SAT After repeat CT.  Repeat CTH.  NSE.  CW carbidopa/levodopa. Avoid Temperature  EEG negative.      HEENT: Oral care.  ET tube care.      PULMONARY:  HOB @ 45 degrees. ABG noted. Vent as follows: , RR 20, PEEP 8, Wean FiO2. CTA noted, no PE.     CARDIOVASCULAR: Goal directed fluid resuscitation. Check TTE. Trend LA. CE.  Wean Levophed     GI: GI prophylaxis.  NPO for now. OG lavage.  OG feeding post CT abdomen.  Aggressive bowel regimen      RENAL:  Follow up lytes.  Correct as needed. Pinon.      INFECTIOUS DISEASE: Follow up cultures. Cefepime for now.       HEMATOLOGICAL:  DVT prophylaxis deq. LE duplex. Dimer.  trend CBC and coags       ENDOCRINE:  Follow up FS.  Insulin protocol if needed. CW synthroid. Check TSH.      MUSCULOSKELETAL: Bed rest.  off loading.      Palliative care eval.   Poor prognosis   MICU monitoring for now.   DW  at the bed side.

## 2024-08-28 LAB
ALBUMIN SERPL ELPH-MCNC: 2.8 G/DL — LOW (ref 3.5–5.2)
ALP SERPL-CCNC: 40 U/L — SIGNIFICANT CHANGE UP (ref 30–115)
ALT FLD-CCNC: <5 U/L — SIGNIFICANT CHANGE UP (ref 0–41)
ANION GAP SERPL CALC-SCNC: 19 MMOL/L — HIGH (ref 7–14)
AST SERPL-CCNC: 21 U/L — SIGNIFICANT CHANGE UP (ref 0–41)
BASE EXCESS BLDA CALC-SCNC: -5.9 MMOL/L — LOW (ref -2–3)
BASOPHILS # BLD AUTO: 0.05 K/UL — SIGNIFICANT CHANGE UP (ref 0–0.2)
BASOPHILS NFR BLD AUTO: 0.2 % — SIGNIFICANT CHANGE UP (ref 0–1)
BILIRUB SERPL-MCNC: 0.3 MG/DL — SIGNIFICANT CHANGE UP (ref 0.2–1.2)
BUN SERPL-MCNC: 59 MG/DL — HIGH (ref 10–20)
CALCIUM SERPL-MCNC: 7.6 MG/DL — LOW (ref 8.4–10.4)
CHLORIDE SERPL-SCNC: 98 MMOL/L — SIGNIFICANT CHANGE UP (ref 98–110)
CO2 SERPL-SCNC: 17 MMOL/L — SIGNIFICANT CHANGE UP (ref 17–32)
CREAT SERPL-MCNC: 1.9 MG/DL — HIGH (ref 0.7–1.5)
CULTURE RESULTS: ABNORMAL
EGFR: 26 ML/MIN/1.73M2 — LOW
EOSINOPHIL # BLD AUTO: 0.71 K/UL — HIGH (ref 0–0.7)
EOSINOPHIL NFR BLD AUTO: 3 % — SIGNIFICANT CHANGE UP (ref 0–8)
GLUCOSE SERPL-MCNC: 158 MG/DL — HIGH (ref 70–99)
HCO3 BLDA-SCNC: 19 MMOL/L — LOW (ref 21–28)
HCT VFR BLD CALC: 20.8 % — LOW (ref 37–47)
HGB BLD-MCNC: 6.6 G/DL — CRITICAL LOW (ref 12–16)
HOROWITZ INDEX BLDA+IHG-RTO: 40 — SIGNIFICANT CHANGE UP
IMM GRANULOCYTES NFR BLD AUTO: 1.1 % — HIGH (ref 0.1–0.3)
LYMPHOCYTES # BLD AUTO: 12.5 % — LOW (ref 20.5–51.1)
LYMPHOCYTES # BLD AUTO: 2.9 K/UL — SIGNIFICANT CHANGE UP (ref 1.2–3.4)
MAGNESIUM SERPL-MCNC: 2.4 MG/DL — SIGNIFICANT CHANGE UP (ref 1.8–2.4)
MCHC RBC-ENTMCNC: 29.5 PG — SIGNIFICANT CHANGE UP (ref 27–31)
MCHC RBC-ENTMCNC: 31.7 G/DL — LOW (ref 32–37)
MCV RBC AUTO: 92.9 FL — SIGNIFICANT CHANGE UP (ref 81–99)
MONOCYTES # BLD AUTO: 1.54 K/UL — HIGH (ref 0.1–0.6)
MONOCYTES NFR BLD AUTO: 6.6 % — SIGNIFICANT CHANGE UP (ref 1.7–9.3)
NEUTROPHILS # BLD AUTO: 17.83 K/UL — HIGH (ref 1.4–6.5)
NEUTROPHILS NFR BLD AUTO: 76.6 % — HIGH (ref 42.2–75.2)
NRBC # BLD: 0 /100 WBCS — SIGNIFICANT CHANGE UP (ref 0–0)
ORGANISM # SPEC MICROSCOPIC CNT: ABNORMAL
ORGANISM # SPEC MICROSCOPIC CNT: SIGNIFICANT CHANGE UP
PCO2 BLDA: 33 MMHG — SIGNIFICANT CHANGE UP (ref 32–45)
PH BLDA: 7.36 — SIGNIFICANT CHANGE UP (ref 7.35–7.45)
PHOSPHATE SERPL-MCNC: 3.8 MG/DL — SIGNIFICANT CHANGE UP (ref 2.1–4.9)
PLATELET # BLD AUTO: 213 K/UL — SIGNIFICANT CHANGE UP (ref 130–400)
PMV BLD: 9.6 FL — SIGNIFICANT CHANGE UP (ref 7.4–10.4)
PO2 BLDA: 171 MMHG — HIGH (ref 83–108)
POTASSIUM SERPL-MCNC: 3.9 MMOL/L — SIGNIFICANT CHANGE UP (ref 3.5–5)
POTASSIUM SERPL-SCNC: 3.9 MMOL/L — SIGNIFICANT CHANGE UP (ref 3.5–5)
PROT SERPL-MCNC: 4.6 G/DL — LOW (ref 6–8)
RBC # BLD: 2.24 M/UL — LOW (ref 4.2–5.4)
RBC # FLD: 16.5 % — HIGH (ref 11.5–14.5)
SAO2 % BLDA: 97.9 % — SIGNIFICANT CHANGE UP (ref 94–98)
SODIUM SERPL-SCNC: 134 MMOL/L — LOW (ref 135–146)
SPECIMEN SOURCE: SIGNIFICANT CHANGE UP
VANCOMYCIN TROUGH SERPL-MCNC: 19 UG/ML — HIGH (ref 5–10)
WBC # BLD: 23.28 K/UL — HIGH (ref 4.8–10.8)
WBC # FLD AUTO: 23.28 K/UL — HIGH (ref 4.8–10.8)

## 2024-08-28 PROCEDURE — 71045 X-RAY EXAM CHEST 1 VIEW: CPT | Mod: 26

## 2024-08-28 PROCEDURE — 99291 CRITICAL CARE FIRST HOUR: CPT

## 2024-08-28 RX ADMIN — POLYETHYLENE GLYCOL 3350 17 GRAM(S): 17 POWDER, FOR SOLUTION ORAL at 05:35

## 2024-08-28 RX ADMIN — CHLORHEXIDINE GLUCONATE 1 APPLICATION(S): 40 SOLUTION TOPICAL at 12:28

## 2024-08-28 RX ADMIN — POVIDONE, PROPYLENE GLYCOL 1 DROP(S): 6.8; 3 LIQUID OPHTHALMIC at 05:35

## 2024-08-28 RX ADMIN — POLYETHYLENE GLYCOL 3350 17 GRAM(S): 17 POWDER, FOR SOLUTION ORAL at 17:02

## 2024-08-28 RX ADMIN — LATANOPROST 1 DROP(S): 50 SOLUTION OPHTHALMIC at 21:30

## 2024-08-28 RX ADMIN — Medication 1 TABLET(S): at 21:30

## 2024-08-28 RX ADMIN — TIMOLOL MALEATE 1 DROP(S): 5 SOLUTION/ DROPS OPHTHALMIC at 05:36

## 2024-08-28 RX ADMIN — CHLORHEXIDINE GLUCONATE 15 MILLILITER(S): 40 SOLUTION TOPICAL at 05:34

## 2024-08-28 RX ADMIN — FAMOTIDINE 20 MILLIGRAM(S): 10 INJECTION INTRAVENOUS at 12:30

## 2024-08-28 RX ADMIN — CEFEPIME 100 MILLIGRAM(S): 2 INJECTION, POWDER, FOR SOLUTION INTRAVENOUS at 06:05

## 2024-08-28 RX ADMIN — MINERAL OIL 133 MILLILITER(S): 1000 LIQUID TOPICAL at 18:40

## 2024-08-28 RX ADMIN — Medication 500 MILLIGRAM(S): at 12:32

## 2024-08-28 RX ADMIN — Medication 81 MILLIGRAM(S): at 12:30

## 2024-08-28 RX ADMIN — Medication 1 APPLICATION(S): at 05:36

## 2024-08-28 RX ADMIN — Medication 2 TABLET(S): at 21:30

## 2024-08-28 RX ADMIN — Medication 4.48 MICROGRAM(S)/KG/MIN: at 05:35

## 2024-08-28 RX ADMIN — Medication 1000 MILLILITER(S): at 12:27

## 2024-08-28 RX ADMIN — Medication 1 APPLICATION(S): at 17:00

## 2024-08-28 RX ADMIN — Medication 250 MILLILITER(S): at 14:00

## 2024-08-28 RX ADMIN — Medication 1000 UNIT(S): at 12:32

## 2024-08-28 RX ADMIN — Medication 1 TABLET(S): at 14:02

## 2024-08-28 RX ADMIN — Medication 50 MICROGRAM(S): at 05:35

## 2024-08-28 RX ADMIN — CEFEPIME 100 MILLIGRAM(S): 2 INJECTION, POWDER, FOR SOLUTION INTRAVENOUS at 17:00

## 2024-08-28 RX ADMIN — CHLORHEXIDINE GLUCONATE 15 MILLILITER(S): 40 SOLUTION TOPICAL at 17:02

## 2024-08-28 RX ADMIN — POVIDONE, PROPYLENE GLYCOL 1 DROP(S): 6.8; 3 LIQUID OPHTHALMIC at 17:00

## 2024-08-28 RX ADMIN — TIMOLOL MALEATE 1 DROP(S): 5 SOLUTION/ DROPS OPHTHALMIC at 17:01

## 2024-08-28 RX ADMIN — Medication 1 TABLET(S): at 05:50

## 2024-08-28 NOTE — PROGRESS NOTE ADULT - ASSESSMENT
Impression:     Cardiopulmonary arrest approximately 10 minutes  Acute hypoxemic respiratory failure   Likely aspiration event   Shock on Levophed    Left chest wall hematomas   Symptomatic anemia   Possible UTI    G+ cocci bacteremia.  Possibly contaminant   JENN   Moderate to large stool burden on admission   HO Lewy body dementia   HO COPD   HO abdominal surgery    PLAN:    CNS: SAT After repeat CT.  Repeat CTH.  NSE.  CW carbidopa/levodopa. Avoid Temperature  EEG negative.      HEENT: Oral care.  ET tube care.      PULMONARY:  HOB @ 45 degrees. Vent as follows: , RR 20, PEEP 8, Wean FiO2.      CARDIOVASCULAR: Goal directed fluid resuscitation. FU TTE. Trend LA. CE.  Wean Levophed     GI: GI prophylaxis.  OG feeding.  Aggressive bowel regimen enema       RENAL:  Follow up lytes.  Correct as needed. Pionn.  Monitory UO     INFECTIOUS DISEASE: Follow up cultures. Cefepime for now.  Repeat BC. FU cultures     HEMATOLOGICAL:  DVT prophylaxis Seq. LE duplex neg.  One Unit PRBC. Trend CBC and coags       ENDOCRINE:  Follow up FS.  Insulin protocol if needed. CW synthroid. Check TSH.      MUSCULOSKELETAL: Bed rest.  off loading.      Palliative care eval.   Poor prognosis   MICU monitoring for now.   DW  at the bed side.   Impression:     Cardiopulmonary arrest approximately 10 minutes  Acute hypoxemic respiratory failure   Likely aspiration event   Shock on Levophed    Left chest wall hematomas   Symptomatic anemia   Possible UTI    G+ cocci bacteremia.  Possibly contaminant   JENN   Moderate to large stool burden on admission   HO Lewy body dementia   HO COPD   HO abdominal surgery    PLAN:    CNS: SAT.  Repeat CTH unchganged.  NSE.  CW carbidopa/levodopa. Avoid Temperature  EEG negative.      HEENT: Oral care.  ET tube care.      PULMONARY:  HOB @ 45 degrees. Vent as follows: , RR 20, PEEP 8, Wean FiO2.      CARDIOVASCULAR: Goal directed fluid resuscitation. TTE poor test. Trend LA. CE.  Wean Levophed.  [place] HD monitoring     GI: GI prophylaxis.  OG feeding.  Aggressive bowel regimen enema       RENAL:  Follow up lytes.  Correct as needed. Pinon.  Monitory UO     INFECTIOUS DISEASE: Follow up cultures. Cefepime for now.  Repeat BC. FU cultures     HEMATOLOGICAL:  DVT prophylaxis Seq. LE duplex neg.  One Unit PRBC. Trend CBC and coags       ENDOCRINE:  Follow up FS.  Insulin protocol if needed. CW synthroid. Check TSH.      MUSCULOSKELETAL: Bed rest.  off loading.      Palliative care eval.   Poor prognosis   MICU monitoring for now.   DW  at the bed side.

## 2024-08-28 NOTE — DIETITIAN INITIAL EVALUATION ADULT - PERTINENT MEDS FT
MEDICATIONS  (STANDING):  artificial tears (preservative free) Ophthalmic Solution 1 Drop(s) Both EYES two times a day  ascorbic acid 500 milliGRAM(s) Oral daily  aspirin  chewable 81 milliGRAM(s) Oral daily  carbidopa/levodopa  10/100 1 Tablet(s) Oral three times a day  cefepime   IVPB 1000 milliGRAM(s) IV Intermittent every 12 hours  chlorhexidine 0.12% Liquid 15 milliLiter(s) Oral Mucosa every 12 hours  chlorhexidine 2% Cloths 1 Application(s) Topical daily  cholecalciferol 1000 Unit(s) Oral daily  clotrimazole 1% Cream 1 Application(s) Topical two times a day  dexMEDEtomidine Infusion 0.2 MICROgram(s)/kG/Hr (4.78 mL/Hr) IV Continuous <Continuous>  famotidine    Tablet 20 milliGRAM(s) Oral daily  hydrocortisone 1% Cream 1 Application(s) Topical two times a day  lactated ringers. 1000 milliLiter(s) (150 mL/Hr) IV Continuous <Continuous>  latanoprost 0.005% Ophthalmic Solution 1 Drop(s) Both EYES at bedtime  levothyroxine 50 MICROGram(s) Oral daily  mineral oil enema 133 milliLiter(s) Rectal <User Schedule>  norepinephrine Infusion 0.05 MICROgram(s)/kG/Min (4.48 mL/Hr) IV Continuous <Continuous>  polyethylene glycol 3350 17 Gram(s) Oral two times a day  senna 2 Tablet(s) Oral at bedtime  timolol 0.5% Solution 1 Drop(s) Both EYES two times a day    MEDICATIONS  (PRN):  acetaminophen     Tablet .. 650 milliGRAM(s) Oral every 6 hours PRN Temp greater or equal to 38C (100.4F)  albuterol/ipratropium for Nebulization 3 milliLiter(s) Nebulizer every 6 hours PRN Shortness of Breath and/or Wheezing  melatonin 3 milliGRAM(s) Oral at bedtime PRN Insomnia

## 2024-08-28 NOTE — DIETITIAN INITIAL EVALUATION ADULT - PERTINENT LABORATORY DATA
08-28    134<L>  |  98  |  59<H>  ----------------------------<  158<H>  3.9   |  17  |  1.9<H>    Ca    7.6<L>      28 Aug 2024 05:05  Phos  3.8     08-28  Mg     2.4     08-28    TPro  4.6<L>  /  Alb  2.8<L>  /  TBili  0.3  /  DBili  x   /  AST  21  /  ALT  <5  /  AlkPhos  40  08-28

## 2024-08-28 NOTE — DIETITIAN INITIAL EVALUATION ADULT - ORAL INTAKE PTA/DIET HISTORY
Pt intubated at this time; unable to obtain nutrition hx at this time. No signs of significant muscle wasting/subcutaneous fat loss observed.

## 2024-08-28 NOTE — PROGRESS NOTE ADULT - SUBJECTIVE AND OBJECTIVE BOX
Patient is a 84y old  Female who presents with a chief complaint of Altered Mental Status and Increased WOB (27 Aug 2024 15:06)        Over Night Events:  remains critically ill on MV. On Levophed.  Sedated.          ROS:     All ROS are negative except HPI         PHYSICAL EXAM    ICU Vital Signs Last 24 Hrs  T(C): 36.7 (28 Aug 2024 08:00), Max: 37.3 (27 Aug 2024 12:00)  T(F): 98.1 (28 Aug 2024 08:00), Max: 99.2 (27 Aug 2024 12:00)  HR: 65 (28 Aug 2024 08:00) (65 - 113)  BP: 85/46 (28 Aug 2024 08:00) (69/40 - 128/63)  BP(mean): 60 (28 Aug 2024 08:00) (49 - 90)  ABP: --  ABP(mean): --  RR: 23 (28 Aug 2024 06:00) (3 - 26)  SpO2: 100% (28 Aug 2024 07:31) (98% - 100%)    O2 Parameters below as of 28 Aug 2024 08:00  Patient On (Oxygen Delivery Method): ventilator    O2 Concentration (%): 40        CONSTITUTIONAL:  Ill appearing NAD    ENT:   Airway patent,   Mouth with normal mucosa.   ET     EYES:   Pupils equal,   Round and reactive to light.    CARDIAC:   Normal rate,   Regular rhythm.        RESPIRATORY:   No wheezing  Bilateral BS  Normal chest expansion  Not tachypneic,  No use of accessory muscles    GASTROINTESTINAL:  Abdomen soft,   Non-tender,   No guarding,   + BS    MUSCULOSKELETAL:   No clubbing, cyanosis    NEUROLOGICAL:   Responds to pain  Does not follow commands     SKIN:   Skin normal color for race,   Warm and dry  No evidence of rash.          08-27-24 @ 07:01  -  08-28-24 @ 07:00  --------------------------------------------------------  IN:    Dexmedetomidine: 81.5 mL    Free Water: 200 mL    IV PiggyBack: 100 mL    Norepinephrine: 485.4 mL  Total IN: 866.9 mL    OUT:    Indwelling Catheter - Urethral (mL): 820 mL  Total OUT: 820 mL    Total NET: 46.9 mL      08-28-24 @ 07:01  -  08-28-24 @ 09:29  --------------------------------------------------------  IN:    Norepinephrine: 13 mL  Total IN: 13 mL    OUT:    Indwelling Catheter - Urethral (mL): 40 mL  Total OUT: 40 mL    Total NET: -27 mL          LABS:                            6.6    23.28 )-----------( 213      ( 28 Aug 2024 05:05 )             20.8                                               08-28    134<L>  |  98  |  59<H>  ----------------------------<  158<H>  3.9   |  17  |  1.9<H>    Creatinine Trend  BUN 59, Cr 1.9, (08-28-24 @ 05:05)  Creatinine Trend  BUN 55, Cr 1.7, (08-27-24 @ 04:20)  Creatinine Trend  BUN 52, Cr 1.5, (08-26-24 @ 14:45)  Creatinine Trend  BUN 47, Cr 1.0, (08-26-24 @ 06:49)  Creatinine Trend  BUN 56, Cr 1.2, (08-24-24 @ 11:43)  Creatinine Trend  BUN 61, Cr 1.4, (08-23-24 @ 23:19)      Ca    7.6<L>      28 Aug 2024 05:05  Phos  3.8     08-28  Mg     2.4     08-28    TPro  4.6<L>  /  Alb  2.8<L>  /  TBili  0.3  /  DBili  x   /  AST  21  /  ALT  <5  /  AlkPhos  40  08-28      PT/INR - ( 27 Aug 2024 11:05 )   PT: 12.50 sec;   INR: 1.10 ratio         PTT - ( 27 Aug 2024 11:05 )  PTT:21.2 sec                                       Urinalysis Basic - ( 28 Aug 2024 05:05 )    Color: x / Appearance: x / SG: x / pH: x  Gluc: 158 mg/dL / Ketone: x  / Bili: x / Urobili: x   Blood: x / Protein: x / Nitrite: x   Leuk Esterase: x / RBC: x / WBC x   Sq Epi: x / Non Sq Epi: x / Bacteria: x                                                  LIVER FUNCTIONS - ( 28 Aug 2024 05:05 )  Alb: 2.8 g/dL / Pro: 4.6 g/dL / ALK PHOS: 40 U/L / ALT: <5 U/L / AST: 21 U/L / GGT: x                                                  Culture - Blood (collected 26 Aug 2024 14:45)  Source: .Blood Blood  Gram Stain (27 Aug 2024 20:25):    Growth in aerobic bottle: Gram Positive Cocci in Clusters  Preliminary Report (27 Aug 2024 20:25):    Growth in aerobic bottle: Gram Positive Cocci in Clusters    Direct identification is available within approximately 3-5    hours either by Blood Panel Multiplexed PCR or Direct    MALDI-TOF. Details: [email or web address]  Organism: Blood Culture PCR (27 Aug 2024 21:48)  Organism: Blood Culture PCR (27 Aug 2024 21:48)                                                   Mode: AC/ CMV (Assist Control/ Continuous Mandatory Ventilation)  RR (machine): 20  TV (machine): 340  FiO2: 40  PEEP: 8  ITime: 1  MAP: 14  PIP: 27                                      ABG - ( 28 Aug 2024 04:14 )  pH, Arterial: 7.36  pH, Blood: x     /  pCO2: 33    /  pO2: 171   / HCO3: 19    / Base Excess: -5.9  /  SaO2: 97.9                MEDICATIONS  (STANDING):  artificial tears (preservative free) Ophthalmic Solution 1 Drop(s) Both EYES two times a day  ascorbic acid 500 milliGRAM(s) Oral daily  aspirin  chewable 81 milliGRAM(s) Oral daily  carbidopa/levodopa  10/100 1 Tablet(s) Oral three times a day  cefepime   IVPB 1000 milliGRAM(s) IV Intermittent every 12 hours  chlorhexidine 0.12% Liquid 15 milliLiter(s) Oral Mucosa every 12 hours  chlorhexidine 2% Cloths 1 Application(s) Topical daily  cholecalciferol 1000 Unit(s) Oral daily  clotrimazole 1% Cream 1 Application(s) Topical two times a day  dexMEDEtomidine Infusion 0.2 MICROgram(s)/kG/Hr (4.78 mL/Hr) IV Continuous <Continuous>  famotidine    Tablet 20 milliGRAM(s) Oral daily  hydrocortisone 1% Cream 1 Application(s) Topical two times a day  lactated ringers. 1000 milliLiter(s) (150 mL/Hr) IV Continuous <Continuous>  latanoprost 0.005% Ophthalmic Solution 1 Drop(s) Both EYES at bedtime  levothyroxine 50 MICROGram(s) Oral daily  mineral oil enema 133 milliLiter(s) Rectal <User Schedule>  norepinephrine Infusion 0.05 MICROgram(s)/kG/Min (4.48 mL/Hr) IV Continuous <Continuous>  polyethylene glycol 3350 17 Gram(s) Oral two times a day  senna 2 Tablet(s) Oral at bedtime  timolol 0.5% Solution 1 Drop(s) Both EYES two times a day    MEDICATIONS  (PRN):  acetaminophen     Tablet .. 650 milliGRAM(s) Oral every 6 hours PRN Temp greater or equal to 38C (100.4F)  albuterol/ipratropium for Nebulization 3 milliLiter(s) Nebulizer every 6 hours PRN Shortness of Breath and/or Wheezing  melatonin 3 milliGRAM(s) Oral at bedtime PRN Insomnia      New X-rays reviewed:                                                                                  ECHO

## 2024-08-28 NOTE — PROGRESS NOTE ADULT - ASSESSMENT
ASSESSMENT  84-year-old woman with a history of Lewy body dementia (AAO x 2 at baseline), Parkinson's, COPD not on home oxygen, presented from [facility] with presenting complaints of AMS and Tachypnea, Patient was A&Ox0 on arrival, unable to provide history.       IMPRESSION  #Sepsis present on admission with encephalopathy- suspected UTI?  Pt has an acute illness which poses threat to bodily function   - CT Angio Chest PE Protocol w/ IV Cont (08.24.24 @ 01:36): 1. Urinary bladder mild pericystic stranding, could be attributed to  urinary bladder infection in the appropriate clinical setting.  - WBC Count: 19.08 K/uL (08.23.24 @ 23:19) on admission  - UA 8/24 negative - although taken after antibiotics  - Blood Cx 8/23 NG    #Cardiac Arrest 8/26    #Fever post-cardiac arrest  - Blood Cx 8/16 Coag negative staph - likely contaminant     #Acute Anemia Large Chest Wall Hematoma 8/27  -CT Abdomen and Pelvis No Cont (08.27.24 @ 10:51): Partially imaged numerous hyperdense left chest wall structures,  measuring up to at least 14.1 cm, new from 8/24/2024, suggestive of  hematomas. Recommend evaluated with dedicated chest CT. Rectal fecal impaction, increased from prior. Bilateral cystic adnexal gaseous redemonstrated. Recommend GYN  evaluation/further workup.    #Ovarian Cystic Leions  - CT Abd/pelvis 8/24:  2. Increased ovarian 2.5 cm cystic lesion. Unchanged right ovarian 7 cm  cystic mass. Outpatient pelvic ultrasound recommended for further evaluation.    #Constipation  #Lewy Body Dementia AO x2 at baseline  #Parkinson's   #COPD     #Obesity BMI (kg/m2): 35.8  #DM   #Abx allergy: ciprofloxacin (Unknown)  penicillin (Anaphylaxis)    RECOMMENDATIONS  - noted chest wall hematomas s/p cardiac arrest - trend H/H   - Unclear of what to make of gaseous adnexa on CT imaging -- can add flagyl 500 mg q 8 hours to regimen and consider Gyn evaluation/TVUS if consistent with GOC   - continue cefepime 1g q 12 hours   - trend WBC  - poor prognosis overall     Please call or message on [employer] Teams if with any questions.  Spectra [phone]

## 2024-08-28 NOTE — PROGRESS NOTE ADULT - ASSESSMENT
83-year-old female with PMHx of Lewy Body dementia with associated autonomic dysfunction including dysphagia, Hypothyroidism, Parkinson's, COPD not on home oxygen , A&Ox0 on arrival being admitted for management of severe sepsis.     PLAN:  CNS: SAT.  Repeat CTH unchanged  NSE.  CW carbidopa/levodopa. Avoid Temperature  EEG negative.      HEENT: Oral care.  ET tube care.      PULMONARY:  HOB @ 45 degrees. Vent as follows: , RR 20, PEEP 8, Wean FiO2.      CARDIOVASCULAR: Goal directed fluid resuscitation. TTE undiagnostic Trend LA. CE.  Wean Levophed.  Cheetah HD monitoring     GI: GI prophylaxis.  OG feeding.  Aggressive bowel regimen enema--monitor for bowel movements and obtain KUB if patient unresponsive to enemas       RENAL:  Follow up lytes.  Correct as needed. Pinon.  Monitory UO     INFECTIOUS DISEASE: Repeat blood cx. continue Cefepime for now    HEMATOLOGICAL:  DVT prophylaxis Seq. LE duplex neg.  One Unit PRBC. Trend CBC and coags       ENDOCRINE:  Follow up FS.  Insulin protocol if needed. CW synthroid. Check TSH.      MUSCULOSKELETAL: Bed rest.  off loading.      Palliative following     83-year-old female with PMHx of Lewy Body dementia with associated autonomic dysfunction including dysphagia, Hypothyroidism, Parkinson's, COPD not on home oxygen , A&Ox0 on arrival being admitted for management of severe sepsis.     PLAN:    CNS: SAT.  Repeat CTH unchanged  NSE.  CW carbidopa/levodopa. Avoid Temperature  EEG negative.      HEENT: Oral care.  ET tube care.      PULMONARY:  HOB @ 45 degrees. Vent as follows: , RR 20, PEEP 8, Wean FiO2.      CARDIOVASCULAR: Goal directed fluid resuscitation. TTE undiagnostic Trend LA. CE.  Wean Levophed.  Cheetah HD monitoring. Failed cheetah.    GI: GI prophylaxis.  OG feeding.  Aggressive bowel regimen enema--monitor for bowel movements and obtain KUB if patient unresponsive to enemas       RENAL:  Follow up lytes.  Correct as needed. Pinon.  Monitory UO     INFECTIOUS DISEASE: Repeat blood cx. continue Cefepime for now    HEMATOLOGICAL:  DVT prophylaxis Seq. LE duplex neg. Given one Unit PRBC. Trend CBC and coags       ENDOCRINE:  Follow up FS.  Insulin protocol if needed. CW synthroid.    MUSCULOSKELETAL: Bed rest.  off loading.      Palliative following      -DVT prophylaxis: Lovenox AC  -GI prophylaxis:  -Diet: NPO except meds  -Code status: DNR  -Activity: IAT  -Dispo: MICU    #Handoff: F/u BCx, CBC, coag     83-year-old female with PMHx of Lewy Body dementia with associated autonomic dysfunction including dysphagia, Hypothyroidism, Parkinson's, COPD not on home oxygen , A&Ox0 on arrival being admitted for management of severe sepsis.     PLAN:    CNS: SAT.  Repeat CTH unchanged  NSE.  CW carbidopa/levodopa. Avoid Temperature  EEG negative.      HEENT: Oral care.  ET tube care.      PULMONARY:  HOB @ 45 degrees. Vent as follows: , RR 20, PEEP 8, Wean FiO2.      CARDIOVASCULAR: Goal directed fluid resuscitation. TTE undiagnostic Trend LA. CE.  Wean Levophed.  Cheetah HD monitoring. Failed cheetah.    GI: GI prophylaxis.  OG feeding.  Aggressive bowel regimen enema- change mineral enema to tap enemas-monitor for bowel movements. F/u KUB AM to r/o obstruction.     RENAL:  Follow up lytes.  Correct as needed. Pinon.  Monitory UO     INFECTIOUS DISEASE: Repeat blood cx. continue Cefepime for now. F/u vanc trough in AM.    HEMATOLOGICAL:  DVT prophylaxis Seq. LE duplex neg. Given one Unit PRBC. Trend CBC and coags       ENDOCRINE:  Follow up FS.  Insulin protocol if needed. CW synthroid.    MUSCULOSKELETAL: Bed rest.  off loading.      Palliative following      -DVT prophylaxis: Lovenox AC  -GI prophylaxis:  -Diet: NPO except meds  -Code status: DNR  -Activity: IAT  -Dispo: MICU    #Handoff: F/u BCx, CBC, coag, and KUB

## 2024-08-28 NOTE — DIETITIAN INITIAL EVALUATION ADULT - OTHER CALCULATIONS
Energy: ~1045-8600 kcal/day; obtained by comparing 1643 kcal/day ([facility] 2010 equation using VE 8.8 and Tmax 24 hours 37.2 C) vs 2332-4556 kcal/day (22-25 kcal/kg IBW) vs 8568-2618 kcal/day (11-14 kcal/kg ABW)

## 2024-08-28 NOTE — PROGRESS NOTE ADULT - SUBJECTIVE AND OBJECTIVE BOX
Patient is a 84y old  Female who presents with a chief complaint of Altered Mental Status and Increased WOB (28 Aug 2024 10:02)    HPI  Patient 84-year-old woman with a history of Lewy body dementia (AAO x 2 at baseline), Parkinson's, COPD not on home oxygen, presented from [facility] with presenting complaints of AMS and Tachypnea, Patient was A&Ox0 on arrival. Transferred to ICU for management of sepsis and an episode of cardiac arrest.    OVERNIGHT EVENTS: Today is hospital day 4. No acute events overnight. Patient is afebrile. Remains on levophed. Hg dropped from 8.2 to 6.6      REVIEW OF SYSTEMS:  All ROS negative except in HPI      T(C): 36.7 (08-28-24 @ 08:00), Max: 37.3 (08-27-24 @ 12:00)  HR: 65 (08-28-24 @ 08:00) (65 - 113)  BP: 85/46 (08-28-24 @ 08:00) (69/40 - 128/63)  RR: 23 (08-28-24 @ 06:00) (3 - 26)  SpO2: 100% (08-28-24 @ 07:31) (98% - 100%)Vital Signs Last 24 Hrs    Parameters below as of 28 Aug 2024 08:00  Patient On (Oxygen Delivery Method): ventilator    O2 Concentration (%): 40    PHYSICAL EXAM:  CONSTITUTIONAL:  Ill appearing NAD    ENT:   Airway patent,   Mouth with normal mucosa.   ET     EYES:   Pupils equal,   Round and reactive to light.    CARDIAC:   Normal rate,   Regular rhythm.        RESPIRATORY:   No wheezing  Bilateral BS  Normal chest expansion  Not tachypneic,  No use of accessory muscles    GASTROINTESTINAL:  Abdomen soft,   Non-tender,   No guarding,   + BS    MUSCULOSKELETAL:   No clubbing, cyanosis    NEUROLOGICAL:   Responds to pain  Does not follow commands     SKIN:   Skin normal color for race,   Warm and dry  No evidence of rash    Consultant(s) Notes Reviewed:  [x] YES  [ ] NO  Care Discussed with Consultants/Other Providers [x] YES  [ ] NO    LABS:  Culture - Blood (collected 08-26-24 @ 14:45)  Source: .Blood Blood  Gram Stain (08-27-24 @ 20:25):    Growth in aerobic bottle: Gram Positive Cocci in Clusters  Preliminary Report (08-27-24 @ 20:25):    Growth in aerobic bottle: Gram Positive Cocci in Clusters    Direct identification is available within approximately 3-5    hours either by Blood Panel Multiplexed PCR or Direct    MALDI-TOF. Details: [email or web address]  Organism: Blood Culture PCR (08-27-24 @ 21:48)  Organism: Blood Culture PCR (08-27-24 @ 21:48)      -  Coagulase negative Staphylococcus: Detec      Method Type: PCR    Urinalysis with Rflx Culture (collected 08-24-24 @ 05:12)    Culture - Blood (collected 08-23-24 @ 23:19)  Source: .Blood Blood-Peripheral  Preliminary Report (08-28-24 @ 09:01):    No growth at 4 days    Culture - Blood (collected 08-23-24 @ 23:19)  Source: .Blood Blood-Peripheral  Preliminary Report (08-28-24 @ 09:01):    No growth at 4 days        RADIOLOGY & ADDITIONAL TESTS:   CXR 08/28/2024    Findings:  Support devices: ETT with its tip above the amanda, NGT with its tip   below the diaphragm and right IJ line with tip overlying the SVC.  Cardiac/mediastinum/hilum: Stable  Lung parenchyma/Pleura: Within normal limits.  Skeleton/soft tissues: Stable  Impression: Low lung volume. No acute infiltrates.    CT AB/P 08/27/2024   IMPRESSION:  Partially imaged numerous hyperdense left chest wall structures,   measuring up to at least 14.1 cm, new from 8/24/2024, suggestive of   hematomas. Recommend evaluated with dedicated chest CT.  Rectal fecal impaction, increased from prior.  Bilateral cystic adnexal gaseous redemonstrated. Recommend GYN   evaluation/further workup.      HEALTH ISSUES - PROBLEM Dx:  Cardiac arrest  Acute respiratory failure with hypoxia  Lewy body dementia             Patient is a 84y old  Female who presents with a chief complaint of Altered Mental Status and Increased WOB (28 Aug 2024 10:02)    HPI  Patient 84-year-old woman with a history of Lewy body dementia (AAO x 2 at baseline), Parkinson's, COPD not on home oxygen, presented from [facility] with AMS and Tachypnea, Patient was A&Ox0 on arrival to the ED. Transferred to ICU for management of sepsis and an episode of cardiac arrest.    OVERNIGHT EVENTS: Today is hospital day 4. No acute events overnight. Patient is afebrile. Remains on levophed. Hg dropped from 8.2 to 6.6      REVIEW OF SYSTEMS:  All ROS negative except in HPI      T(C): 36.7 (08-28-24 @ 08:00), Max: 37.3 (08-27-24 @ 12:00)  HR: 65 (08-28-24 @ 08:00) (65 - 113)  BP: 85/46 (08-28-24 @ 08:00) (69/40 - 128/63)  RR: 23 (08-28-24 @ 06:00) (3 - 26)  SpO2: 100% (08-28-24 @ 07:31) (98% - 100%)Vital Signs Last 24 Hrs    Parameters below as of 28 Aug 2024 08:00  Patient On (Oxygen Delivery Method): ventilator    O2 Concentration (%): 40    PHYSICAL EXAM:  CONSTITUTIONAL:  Ill appearing NAD    ENT:   Airway patent,   Mouth with normal mucosa.   ET     EYES:   Pupils equal,   Round and reactive to light.    CARDIAC:   Normal rate,   Regular rhythm.        RESPIRATORY:   No wheezing  Bilateral BS  Normal chest expansion  Not tachypneic,  No use of accessory muscles    GASTROINTESTINAL:  Abdomen soft,   Non-tender,   No guarding,   + BS    MUSCULOSKELETAL:   No clubbing, cyanosis    NEUROLOGICAL:   Responds to pain  Does not follow commands     SKIN:   Skin normal color for race,   Warm and dry  No evidence of rash    Consultant(s) Notes Reviewed:  [x] YES  [ ] NO  Care Discussed with Consultants/Other Providers [x] YES  [ ] NO    LABS:  Culture - Blood (collected 08-26-24 @ 14:45)  Source: .Blood Blood  Gram Stain (08-27-24 @ 20:25):    Growth in aerobic bottle: Gram Positive Cocci in Clusters  Preliminary Report (08-27-24 @ 20:25):    Growth in aerobic bottle: Gram Positive Cocci in Clusters    Direct identification is available within approximately 3-5    hours either by Blood Panel Multiplexed PCR or Direct    MALDI-TOF. Details: [email or web address]  Organism: Blood Culture PCR (08-27-24 @ 21:48)  Organism: Blood Culture PCR (08-27-24 @ 21:48)      -  Coagulase negative Staphylococcus: Detec      Method Type: PCR    Urinalysis with Rflx Culture (collected 08-24-24 @ 05:12)    Culture - Blood (collected 08-23-24 @ 23:19)  Source: .Blood Blood-Peripheral  Preliminary Report (08-28-24 @ 09:01):    No growth at 4 days    Culture - Blood (collected 08-23-24 @ 23:19)  Source: .Blood Blood-Peripheral  Preliminary Report (08-28-24 @ 09:01):    No growth at 4 days        RADIOLOGY & ADDITIONAL TESTS:   CXR 08/28/2024    Findings:  Support devices: ETT with its tip above the amanda, NGT with its tip   below the diaphragm and right IJ line with tip overlying the SVC.  Cardiac/mediastinum/hilum: Stable  Lung parenchyma/Pleura: Within normal limits.  Skeleton/soft tissues: Stable  Impression: Low lung volume. No acute infiltrates.    CT AB/P 08/27/2024   IMPRESSION:  Partially imaged numerous hyperdense left chest wall structures,   measuring up to at least 14.1 cm, new from 8/24/2024, suggestive of   hematomas. Recommend evaluated with dedicated chest CT.  Rectal fecal impaction, increased from prior.  Bilateral cystic adnexal gaseous redemonstrated. Recommend GYN   evaluation/further workup.      HEALTH ISSUES - PROBLEM Dx:  Cardiac arrest  Acute respiratory failure with hypoxia  Lewy body dementia             Patient is a 84y old  Female who presents with a chief complaint of Altered Mental Status and Increased WOB (28 Aug 2024 10:02)    HPI  Patient 84-year-old woman with a history of Lewy body dementia (AAO x 2 at baseline), Parkinson's, COPD not on home oxygen, presented from [facility] with AMS and Tachypnea, Patient was A&Ox0 on arrival to the ED. Transferred to ICU for management of sepsis and an episode of cardiac arrest.    OVERNIGHT EVENTS: Today is hospital day 4. No acute events overnight. Patient is afebrile. Remains on levophed. Hg dropped from 8.2 to 6.6. Pt given 1 u PRBCs.      REVIEW OF SYSTEMS:  All ROS negative except in HPI      T(C): 36.7 (08-28-24 @ 08:00), Max: 37.3 (08-27-24 @ 12:00)  HR: 65 (08-28-24 @ 08:00) (65 - 113)  BP: 85/46 (08-28-24 @ 08:00) (69/40 - 128/63)  RR: 23 (08-28-24 @ 06:00) (3 - 26)  SpO2: 100% (08-28-24 @ 07:31) (98% - 100%)Vital Signs Last 24 Hrs    Parameters below as of 28 Aug 2024 08:00  Patient On (Oxygen Delivery Method): ventilator    O2 Concentration (%): 40    PHYSICAL EXAM:  CONSTITUTIONAL:Ill appearing NAD, intubated laying down bed  ENT: Airway patent, Mouth with normal mucosa. ET   EYES: Pupils equal, Round and reactive to light.  CARDIAC: Normal rate, Regular rhythm.    RESPIRATORY: CTAB  GASTROINTESTINAL: Abdomen soft, No guarding, + BS  MUSCULOSKELETAL: No clubbing, cyanosis  NEUROLOGICAL: Gag reflex present; Responds to sternal rub; does not follow commands   SKIN: Skin normal color for race, Warm and dry; no evidence of rash    Consultant(s) Notes Reviewed:  [x] YES  [ ] NO  Care Discussed with Consultants/Other Providers [x] YES  [ ] NO    LABS:  Culture - Blood (collected 08-26-24 @ 14:45)  Source: .Blood Blood  Gram Stain (08-27-24 @ 20:25):    Growth in aerobic bottle: Gram Positive Cocci in Clusters  Preliminary Report (08-27-24 @ 20:25):    Growth in aerobic bottle: Gram Positive Cocci in Clusters    Direct identification is available within approximately 3-5    hours either by Blood Panel Multiplexed PCR or Direct    MALDI-TOF. Details: [email or web address]  Organism: Blood Culture PCR (08-27-24 @ 21:48)  Organism: Blood Culture PCR (08-27-24 @ 21:48)      -  Coagulase negative Staphylococcus: Detec      Method Type: PCR    Urinalysis with Rflx Culture (collected 08-24-24 @ 05:12)    Culture - Blood (collected 08-23-24 @ 23:19)  Source: .Blood Blood-Peripheral  Preliminary Report (08-28-24 @ 09:01):    No growth at 4 days    Culture - Blood (collected 08-23-24 @ 23:19)  Source: .Blood Blood-Peripheral  Preliminary Report (08-28-24 @ 09:01):    No growth at 4 days        RADIOLOGY & ADDITIONAL TESTS:   CXR 08/28/2024    Findings:  Support devices: ETT with its tip above the amanda, NGT with its tip   below the diaphragm and right IJ line with tip overlying the SVC.  Cardiac/mediastinum/hilum: Stable  Lung parenchyma/Pleura: Within normal limits.  Skeleton/soft tissues: Stable  Impression: Low lung volume. No acute infiltrates.    CT AB/P 08/27/2024   IMPRESSION:  Partially imaged numerous hyperdense left chest wall structures,   measuring up to at least 14.1 cm, new from 8/24/2024, suggestive of   hematomas. Recommend evaluated with dedicated chest CT.  Rectal fecal impaction, increased from prior.  Bilateral cystic adnexal gaseous redemonstrated. Recommend GYN   evaluation/further workup.      HEALTH ISSUES - PROBLEM Dx:  Cardiac arrest  Acute respiratory failure with hypoxia  Lewy body dementia

## 2024-08-28 NOTE — DIETITIAN INITIAL EVALUATION ADULT - NUTRITIONGOAL OUTCOME1
Nutrition regimen determined & initiated as soon as medically feasible; pt to demonstrate tolerance, meeting >85% & <105% of estimated nutrient needs over next 3-5 days.    Pt at high nutrition risk; RD to follow-up in 3-5 days.    Monitor: Skin, labs, BM, wt, nutrition focused physical findings, body composition, GI, respiratory status, diet order.

## 2024-08-28 NOTE — PROGRESS NOTE ADULT - SUBJECTIVE AND OBJECTIVE BOX
FLODENIZ LUJANAINE  84y, Female  Allergy: Originally Entered as [Unknown] reaction to [red pepper] (Unknown)  ciprofloxacin (Unknown)  benzodiazepines (Unknown)  antichlolinergics (Unknown)  penicillin (Anaphylaxis)  Originally Entered as [Unknown] reaction to [pepper] (Unknown)  Originally Entered as [Unknown] reaction to [green pepper] (Unknown)  Originally Entered as [Unknown] reaction to [neuroleptics] (Unknown)      LOS  4d    CHIEF COMPLAINT: Altered Mental Status and Increased WOB (28 Aug 2024 09:28)      INTERVAL EVENTS/HPI  - No acute events overnight  - T(F): , Max: 99.2 (08-27-24 @ 12:00)  - remains on levophed - CT imaging reviewed  - WBC Count: 23.28 (08-28-24 @ 05:05)  WBC Count: 27.69 (08-27-24 @ 06:00)     - Creatinine: 1.9 (08-28-24 @ 05:05)  Creatinine: 1.7 (08-27-24 @ 04:20)       ROS  unable to obtain history secondary to patient's mental status and/or sedation      VITALS:  T(F): 98.1, Max: 99.2 (08-27-24 @ 12:00)  HR: 65  BP: 85/46  RR: 23Vital Signs Last 24 Hrs  T(C): 36.7 (28 Aug 2024 08:00), Max: 37.3 (27 Aug 2024 12:00)  T(F): 98.1 (28 Aug 2024 08:00), Max: 99.2 (27 Aug 2024 12:00)  HR: 65 (28 Aug 2024 08:00) (65 - 113)  BP: 85/46 (28 Aug 2024 08:00) (69/40 - 128/63)  BP(mean): 60 (28 Aug 2024 08:00) (49 - 90)  RR: 23 (28 Aug 2024 06:00) (3 - 26)  SpO2: 100% (28 Aug 2024 07:31) (98% - 100%)    Parameters below as of 28 Aug 2024 08:00  Patient On (Oxygen Delivery Method): ventilator    O2 Concentration (%): 40    PHYSICAL EXAM:  Gen: intubated  HEENT: Normocephalic, atraumatic  Neck: supple, no lymphadenopathy  CV: Regular rate & regular rhythm  Lungs: decreased BS at bases, no fremitus  Abdomen: Soft, BS present  Ext: Warm, well perfused  Neuro: non focal, awake  Skin: no rash, no erythema  Lines: no phlebitis    FH: Non-contributory  Social Hx: Non-contributory    TESTS & MEASUREMENTS:                        6.6    23.28 )-----------( 213      ( 28 Aug 2024 05:05 )             20.8     08-28    134<L>  |  98  |  59<H>  ----------------------------<  158<H>  3.9   |  17  |  1.9<H>    Ca    7.6<L>      28 Aug 2024 05:05  Phos  3.8     08-28  Mg     2.4     08-28    TPro  4.6<L>  /  Alb  2.8<L>  /  TBili  0.3  /  DBili  x   /  AST  21  /  ALT  <5  /  AlkPhos  40  08-28      LIVER FUNCTIONS - ( 28 Aug 2024 05:05 )  Alb: 2.8 g/dL / Pro: 4.6 g/dL / ALK PHOS: 40 U/L / ALT: <5 U/L / AST: 21 U/L / GGT: x           Urinalysis Basic - ( 28 Aug 2024 05:05 )    Color: x / Appearance: x / SG: x / pH: x  Gluc: 158 mg/dL / Ketone: x  / Bili: x / Urobili: x   Blood: x / Protein: x / Nitrite: x   Leuk Esterase: x / RBC: x / WBC x   Sq Epi: x / Non Sq Epi: x / Bacteria: x        Culture - Blood (collected 08-26-24 @ 14:45)  Source: .Blood Blood  Gram Stain (08-27-24 @ 20:25):    Growth in aerobic bottle: Gram Positive Cocci in Clusters  Preliminary Report (08-27-24 @ 20:25):    Growth in aerobic bottle: Gram Positive Cocci in Clusters    Direct identification is available within approximately 3-5    hours either by Blood Panel Multiplexed PCR or Direct    MALDI-TOF. Details: [email or web address]  Organism: Blood Culture PCR (08-27-24 @ 21:48)  Organism: Blood Culture PCR (08-27-24 @ 21:48)      -  Coagulase negative Staphylococcus: Detec      Method Type: PCR    Urinalysis with Rflx Culture (collected 08-24-24 @ 05:12)    Culture - Blood (collected 08-23-24 @ 23:19)  Source: .Blood Blood-Peripheral  Preliminary Report (08-28-24 @ 09:01):    No growth at 4 days    Culture - Blood (collected 08-23-24 @ 23:19)  Source: .Blood Blood-Peripheral  Preliminary Report (08-28-24 @ 09:01):    No growth at 4 days        Lactate, Blood: 1.6 mmol/L (08-27-24 @ 11:05)  Lactate, Blood: 2.7 mmol/L (08-27-24 @ 04:20)  Lactate, Blood: 2.3 mmol/L (08-24-24 @ 22:01)  Lactate, Blood: 3.7 mmol/L (08-24-24 @ 17:20)  Lactate, Blood: 3.6 mmol/L (08-24-24 @ 11:43)  Lactate, Blood: 4.0 mmol/L (08-24-24 @ 05:10)  Lactate, Blood: 3.1 mmol/L (08-23-24 @ 23:19)  Blood Gas Venous - Lactate: 2.3 mmol/L (08-23-24 @ 23:17)      INFECTIOUS DISEASES TESTING  MRSA PCR Result.: Negative (08-24-24 @ 15:15)  Procalcitonin: 0.86 (08-24-24 @ 11:43)  COVID-19 PCR: NotDetec (11-19-23 @ 10:16)  Rapid RVP Result: NotDetec (11-11-23 @ 10:12)  MRSA PCR Result.: Negative (11-11-23 @ 10:12)  Legionella Antigen, Urine: Negative (11-11-23 @ 10:01)  Procalcitonin, Serum: 0.10 (11-11-23 @ 06:55)      INFLAMMATORY MARKERS      RADIOLOGY & ADDITIONAL TESTS:  I have personally reviewed the last available Chest xray  CXR  Xray Chest 1 View- PORTABLE-Urgent:   ACC: [id number] EXAM:  XR CHEST PORTABLE URGENT 1V   ORDERED BY: LAURA WHITFIELD     PROCEDURE DATE:  08/26/2024          INTERPRETATION:  Clinical History / Reason for exam: Assess OG tube.    Comparison : Chest radiograph 8/26/2024 at 4:32 PM.    Technique/Positioning: Frontal chest radiograph.    Findings:    Support devices: ET tube terminates 2.9 cm above the amanda. Enteric tube   terminates below left hemidiaphragm. Essentially stable right central   venous catheter.    Cardiac/mediastinum/hilum: Unchanged.    Lung parenchyma/Pleura: Bilateral interstitial prominence may be due to   low lung volumes. No pneumothorax.    Skeleton/soft tissues: Unchanged.    Impression:    Support devices as described.    Bilateral interstitial prominence may be due to low lung volumes.        --- End of Report ---            SHARYN TENORIO MD; Attending Radiologist  This document has been electronically signed. Aug 27 2024  9:50AM (08-26-24 @ 18:35)      CT  CT Abdomen and Pelvis No Cont:   ACC: [id number] EXAM:  CT ABDOMEN AND PELVIS   ORDERED BY: ARDEN SMITH     PROCEDURE DATE:  08/27/2024          INTERPRETATION:  CLINICAL HISTORY: Rule out retroperitoneal bleed.    TECHNIQUE: Contiguous axial CT images were obtained from the lower chest   to the pubic symphysis without intravenous contrast. NONE ( ).  Oral   contrast was not administered.  Reformatted images in the coronal and   sagittal planes were acquired.    COMPARISON: CT dated 8/24/2024.      FINDINGS:      Study limited without intravenous contrast.    Partially imaged numerous hyperdense left chest wall structures,   measuring up to at least 14.1 cm, new from 8/24/2024, suggestive of   hematoma.    Rectal fecal impaction, increased from prior. Feeding tube tip in  stomach. Likely vicarious excretion of contrast via gallbladder. Bladder   contract around Pinon catheter precluding assessment. Remainder findings   are without significant change on the short-term follow-up CT-see prior   report.      IMPRESSION:    Partially imaged numerous hyperdense left chest wall structures,   measuring up to at least 14.1 cm, new from 8/24/2024, suggestive of   hematomas. Recommend evaluated with dedicated chest CT.    Rectal fecal impaction, increased from prior.    Bilateral cystic adnexal gaseous redemonstrated. Recommend GYN   evaluation/further workup.    --- End of Report ---            FAUZIA SUH MD; Attending Radiologist  This document has been electronically signed. Aug 27 2024 12:00PM (08-27-24 @ 10:51)      CARDIOLOGY TESTING  12 Lead ECG:   Ventricular Rate 105 BPM    Atrial Rate 105 BPM    P-R Interval 156 ms    QRS Duration 64 ms    Q-T Interval 338 ms    QTC Calculation(Bazett) 446 ms    P Axis 35 degrees    R Axis -25 degrees    T Axis 21 degrees    Diagnosis Line Sinus tachycardia  Low voltage QRS  Inferior infarct , age undetermined  Abnormal ECG    Confirmed by Douglas Del Valle ([id number]) on 8/28/2024 9:16:49 AM (08-27-24 @ 01:54)  12 Lead ECG:   Ventricular Rate 127 BPM    Atrial Rate 127 BPM    P-R Interval 150 ms    QRS Duration 84 ms    Q-T Interval 298 ms    QTC Calculation(Bazett) 433 ms    P Axis 46 degrees    R Axis -25 degrees    T Axis 31 degrees    Diagnosis Line Sinus tachycardia  Inferior-posterior infarct , age undetermined  Abnormal ECG    Confirmed by YURIDIA JOHNSTON MD ([id number]) on 8/24/2024 10:37:59 PM (08-24-24 @ 00:20)      MEDICATIONS  artificial tears (preservative free) Ophthalmic Solution 1 Both EYES two times a day  ascorbic acid 500 Oral daily  aspirin  chewable 81 Oral daily  carbidopa/levodopa  10/100 1 Oral three times a day  cefepime   IVPB 1000 IV Intermittent every 12 hours  chlorhexidine 0.12% Liquid 15 Oral Mucosa every 12 hours  chlorhexidine 2% Cloths 1 Topical daily  cholecalciferol 1000 Oral daily  clotrimazole 1% Cream 1 Topical two times a day  dexMEDEtomidine Infusion 0.2 IV Continuous <Continuous>  famotidine    Tablet 20 Oral daily  hydrocortisone 1% Cream 1 Topical two times a day  lactated ringers Bolus 250 IV Bolus once  lactated ringers. 1000 IV Continuous <Continuous>  latanoprost 0.005% Ophthalmic Solution 1 Both EYES at bedtime  levothyroxine 50 Oral daily  mineral oil enema 133 Rectal <User Schedule>  norepinephrine Infusion 0.05 IV Continuous <Continuous>  polyethylene glycol 3350 17 Oral two times a day  senna 2 Oral at bedtime  timolol 0.5% Solution 1 Both EYES two times a day      WEIGHT  Weight (kg): 95.6 (08-25-24 @ 22:13)  Creatinine: 1.9 mg/dL (08-28-24 @ 05:05)      ANTIBIOTICS:  cefepime   IVPB 1000 milliGRAM(s) IV Intermittent every 12 hours      All available historical records have been reviewed

## 2024-08-28 NOTE — DIETITIAN INITIAL EVALUATION ADULT - OTHER INFO
Pertinent Medical Information: Pt presented with AMS and Tachypnea, Patient was A&Ox0 on arrival to the ED. Transferred to ICU for management of sepsis and an episode of cardiac arrest. Acute hypoxemic respiratory failure - intubated at this time. JENN noted.    PMH includes Lewy body dementia (AAO x 2 at baseline), Parkinson's, COPD not on home oxygen.

## 2024-08-28 NOTE — PHARMACOTHERAPY INTERVENTION NOTE - COMMENTS
SCr 1.7, CrCl ~28 (Adj BW-72.4kg) d/w team, adjust cefepime to 1g IV q12h
pt on levophed, d/w team, hold metoprolol for now  -intubated, d/w team, hold Symbicort for now
SCr 1.9, vancomycin level 8/28 (19), AUC ~550, precise PK calc hold dose for today

## 2024-08-29 LAB
ALBUMIN SERPL ELPH-MCNC: 2.7 G/DL — LOW (ref 3.5–5.2)
ALP SERPL-CCNC: 45 U/L — SIGNIFICANT CHANGE UP (ref 30–115)
ALT FLD-CCNC: <5 U/L — SIGNIFICANT CHANGE UP (ref 0–41)
ANION GAP SERPL CALC-SCNC: 11 MMOL/L — SIGNIFICANT CHANGE UP (ref 7–14)
AST SERPL-CCNC: 17 U/L — SIGNIFICANT CHANGE UP (ref 0–41)
BASE EXCESS BLDA CALC-SCNC: -4.7 MMOL/L — LOW (ref -2–3)
BASOPHILS # BLD AUTO: 0.03 K/UL — SIGNIFICANT CHANGE UP (ref 0–0.2)
BASOPHILS # BLD AUTO: 0.03 K/UL — SIGNIFICANT CHANGE UP (ref 0–0.2)
BASOPHILS NFR BLD AUTO: 0.2 % — SIGNIFICANT CHANGE UP (ref 0–1)
BASOPHILS NFR BLD AUTO: 0.2 % — SIGNIFICANT CHANGE UP (ref 0–1)
BILIRUB SERPL-MCNC: 0.4 MG/DL — SIGNIFICANT CHANGE UP (ref 0.2–1.2)
BUN SERPL-MCNC: 51 MG/DL — HIGH (ref 10–20)
CALCIUM SERPL-MCNC: 7.5 MG/DL — LOW (ref 8.4–10.4)
CHLORIDE SERPL-SCNC: 102 MMOL/L — SIGNIFICANT CHANGE UP (ref 98–110)
CO2 SERPL-SCNC: 22 MMOL/L — SIGNIFICANT CHANGE UP (ref 17–32)
CREAT SERPL-MCNC: 1.5 MG/DL — SIGNIFICANT CHANGE UP (ref 0.7–1.5)
CULTURE RESULTS: SIGNIFICANT CHANGE UP
CULTURE RESULTS: SIGNIFICANT CHANGE UP
EGFR: 34 ML/MIN/1.73M2 — LOW
EOSINOPHIL # BLD AUTO: 0.67 K/UL — SIGNIFICANT CHANGE UP (ref 0–0.7)
EOSINOPHIL # BLD AUTO: 0.7 K/UL — SIGNIFICANT CHANGE UP (ref 0–0.7)
EOSINOPHIL NFR BLD AUTO: 3.7 % — SIGNIFICANT CHANGE UP (ref 0–8)
EOSINOPHIL NFR BLD AUTO: 3.8 % — SIGNIFICANT CHANGE UP (ref 0–8)
GAS PNL BLDA: SIGNIFICANT CHANGE UP
GLUCOSE SERPL-MCNC: 127 MG/DL — HIGH (ref 70–99)
HCO3 BLDA-SCNC: 20 MMOL/L — LOW (ref 21–28)
HCT VFR BLD CALC: 22 % — LOW (ref 37–47)
HCT VFR BLD CALC: 22.3 % — LOW (ref 37–47)
HGB BLD-MCNC: 7 G/DL — LOW (ref 12–16)
HGB BLD-MCNC: 7.2 G/DL — LOW (ref 12–16)
HOROWITZ INDEX BLDA+IHG-RTO: 40 — SIGNIFICANT CHANGE UP
IMM GRANULOCYTES NFR BLD AUTO: 1.1 % — HIGH (ref 0.1–0.3)
IMM GRANULOCYTES NFR BLD AUTO: 1.2 % — HIGH (ref 0.1–0.3)
LYMPHOCYTES # BLD AUTO: 10.9 % — LOW (ref 20.5–51.1)
LYMPHOCYTES # BLD AUTO: 11.7 % — LOW (ref 20.5–51.1)
LYMPHOCYTES # BLD AUTO: 2.06 K/UL — SIGNIFICANT CHANGE UP (ref 1.2–3.4)
LYMPHOCYTES # BLD AUTO: 2.09 K/UL — SIGNIFICANT CHANGE UP (ref 1.2–3.4)
MAGNESIUM SERPL-MCNC: 2.2 MG/DL — SIGNIFICANT CHANGE UP (ref 1.8–2.4)
MCHC RBC-ENTMCNC: 27.7 PG — SIGNIFICANT CHANGE UP (ref 27–31)
MCHC RBC-ENTMCNC: 27.9 PG — SIGNIFICANT CHANGE UP (ref 27–31)
MCHC RBC-ENTMCNC: 31.8 G/DL — LOW (ref 32–37)
MCHC RBC-ENTMCNC: 32.3 G/DL — SIGNIFICANT CHANGE UP (ref 32–37)
MCV RBC AUTO: 86.4 FL — SIGNIFICANT CHANGE UP (ref 81–99)
MCV RBC AUTO: 87 FL — SIGNIFICANT CHANGE UP (ref 81–99)
MONOCYTES # BLD AUTO: 1.06 K/UL — HIGH (ref 0.1–0.6)
MONOCYTES # BLD AUTO: 1.14 K/UL — HIGH (ref 0.1–0.6)
MONOCYTES NFR BLD AUTO: 5.6 % — SIGNIFICANT CHANGE UP (ref 1.7–9.3)
MONOCYTES NFR BLD AUTO: 6.4 % — SIGNIFICANT CHANGE UP (ref 1.7–9.3)
NEUTROPHILS # BLD AUTO: 13.65 K/UL — HIGH (ref 1.4–6.5)
NEUTROPHILS # BLD AUTO: 14.8 K/UL — HIGH (ref 1.4–6.5)
NEUTROPHILS NFR BLD AUTO: 76.7 % — HIGH (ref 42.2–75.2)
NEUTROPHILS NFR BLD AUTO: 78.5 % — HIGH (ref 42.2–75.2)
NRBC # BLD: 0 /100 WBCS — SIGNIFICANT CHANGE UP (ref 0–0)
NRBC # BLD: 0 /100 WBCS — SIGNIFICANT CHANGE UP (ref 0–0)
PCO2 BLDA: 33 MMHG — SIGNIFICANT CHANGE UP (ref 32–45)
PH BLDA: 7.38 — SIGNIFICANT CHANGE UP (ref 7.35–7.45)
PLATELET # BLD AUTO: 165 K/UL — SIGNIFICANT CHANGE UP (ref 130–400)
PLATELET # BLD AUTO: 165 K/UL — SIGNIFICANT CHANGE UP (ref 130–400)
PMV BLD: 9.8 FL — SIGNIFICANT CHANGE UP (ref 7.4–10.4)
PMV BLD: 9.8 FL — SIGNIFICANT CHANGE UP (ref 7.4–10.4)
PO2 BLDA: 174 MMHG — HIGH (ref 83–108)
POTASSIUM SERPL-MCNC: 3.6 MMOL/L — SIGNIFICANT CHANGE UP (ref 3.5–5)
POTASSIUM SERPL-SCNC: 3.6 MMOL/L — SIGNIFICANT CHANGE UP (ref 3.5–5)
PROT SERPL-MCNC: 4.5 G/DL — LOW (ref 6–8)
RBC # BLD: 2.53 M/UL — LOW (ref 4.2–5.4)
RBC # BLD: 2.58 M/UL — LOW (ref 4.2–5.4)
RBC # FLD: 23.7 % — HIGH (ref 11.5–14.5)
RBC # FLD: 23.7 % — HIGH (ref 11.5–14.5)
SAO2 % BLDA: 99.1 % — HIGH (ref 94–98)
SODIUM SERPL-SCNC: 135 MMOL/L — SIGNIFICANT CHANGE UP (ref 135–146)
SPECIMEN SOURCE: SIGNIFICANT CHANGE UP
SPECIMEN SOURCE: SIGNIFICANT CHANGE UP
VANCOMYCIN TROUGH SERPL-MCNC: 13.3 UG/ML — HIGH (ref 5–10)
WBC # BLD: 17.8 K/UL — HIGH (ref 4.8–10.8)
WBC # BLD: 18.86 K/UL — HIGH (ref 4.8–10.8)
WBC # FLD AUTO: 17.8 K/UL — HIGH (ref 4.8–10.8)
WBC # FLD AUTO: 18.86 K/UL — HIGH (ref 4.8–10.8)

## 2024-08-29 PROCEDURE — 71045 X-RAY EXAM CHEST 1 VIEW: CPT | Mod: 26,77

## 2024-08-29 PROCEDURE — 99291 CRITICAL CARE FIRST HOUR: CPT

## 2024-08-29 PROCEDURE — 74018 RADEX ABDOMEN 1 VIEW: CPT | Mod: 26

## 2024-08-29 PROCEDURE — 71045 X-RAY EXAM CHEST 1 VIEW: CPT | Mod: 26

## 2024-08-29 RX ORDER — HEPARIN SODIUM,BOVINE 1000/ML
5000 VIAL (ML) INJECTION EVERY 8 HOURS
Refills: 0 | Status: DISCONTINUED | OUTPATIENT
Start: 2024-08-29 | End: 2024-08-30

## 2024-08-29 RX ADMIN — Medication 50 MICROGRAM(S): at 05:19

## 2024-08-29 RX ADMIN — Medication 1000 UNIT(S): at 11:14

## 2024-08-29 RX ADMIN — Medication 500 MILLIGRAM(S): at 12:24

## 2024-08-29 RX ADMIN — LATANOPROST 1 DROP(S): 50 SOLUTION OPHTHALMIC at 23:12

## 2024-08-29 RX ADMIN — POVIDONE, PROPYLENE GLYCOL 1 DROP(S): 6.8; 3 LIQUID OPHTHALMIC at 17:26

## 2024-08-29 RX ADMIN — Medication 1 APPLICATION(S): at 05:18

## 2024-08-29 RX ADMIN — TIMOLOL MALEATE 1 DROP(S): 5 SOLUTION/ DROPS OPHTHALMIC at 17:27

## 2024-08-29 RX ADMIN — CHLORHEXIDINE GLUCONATE 1 APPLICATION(S): 40 SOLUTION TOPICAL at 11:13

## 2024-08-29 RX ADMIN — CEFEPIME 100 MILLIGRAM(S): 2 INJECTION, POWDER, FOR SOLUTION INTRAVENOUS at 05:19

## 2024-08-29 RX ADMIN — Medication 1 TABLET(S): at 05:19

## 2024-08-29 RX ADMIN — Medication 1 TABLET(S): at 14:10

## 2024-08-29 RX ADMIN — POLYETHYLENE GLYCOL 3350 17 GRAM(S): 17 POWDER, FOR SOLUTION ORAL at 05:19

## 2024-08-29 RX ADMIN — Medication 1 APPLICATION(S): at 05:17

## 2024-08-29 RX ADMIN — Medication 1 TABLET(S): at 23:11

## 2024-08-29 RX ADMIN — POVIDONE, PROPYLENE GLYCOL 1 DROP(S): 6.8; 3 LIQUID OPHTHALMIC at 05:19

## 2024-08-29 RX ADMIN — Medication 81 MILLIGRAM(S): at 11:13

## 2024-08-29 RX ADMIN — TIMOLOL MALEATE 1 DROP(S): 5 SOLUTION/ DROPS OPHTHALMIC at 05:17

## 2024-08-29 RX ADMIN — FAMOTIDINE 20 MILLIGRAM(S): 10 INJECTION INTRAVENOUS at 11:13

## 2024-08-29 RX ADMIN — Medication 1 APPLICATION(S): at 17:27

## 2024-08-29 RX ADMIN — CHLORHEXIDINE GLUCONATE 15 MILLILITER(S): 40 SOLUTION TOPICAL at 05:18

## 2024-08-29 RX ADMIN — Medication 5000 UNIT(S): at 14:09

## 2024-08-29 RX ADMIN — Medication 5000 UNIT(S): at 23:11

## 2024-08-29 NOTE — PROGRESS NOTE ADULT - SUBJECTIVE AND OBJECTIVE BOX
Patient is a 84y old  Female who presents with a chief complaint of Altered Mental Status and Increased WOB (29 Aug 2024 10:04)      HPI  Patient 84-year-old woman with a history of Lewy body dementia (AAO x 2 at baseline), Parkinson's, COPD not on home oxygen, presented from [facility] with AMS and Tachypnea, Patient was A&Ox0 on arrival to the ED. Transferred to ICU for management of sepsis and an episode of cardiac arrest.      OVERNIGHT EVENTS: Today is hospital day 5. No acute events overnight. Patient is afebrile.  Pt given 1 unit PRBCs. Hg remains stable at 7.2      REVIEW OF SYSTEMS:  All ROS negative except in HPI        FAMILY HISTORY:  No pertinent family history in first degree relatives        T(C): 36.1 (08-29-24 @ 12:00), Max: 36.7 (08-28-24 @ 16:00)  HR: 85 (08-29-24 @ 12:45) (52 - 100)  BP: 127/60 (08-29-24 @ 12:45) (63/34 - 151/65)  RR: 18 (08-29-24 @ 12:45) (12 - 27)  SpO2: 100% (08-29-24 @ 12:45) (100% - 100%)Vital Signs Last 24 Hrs    Parameters below as of 29 Aug 2024 12:00  Patient On (Oxygen Delivery Method): ventilator        PHYSICAL EXAM:  CONSTITUTIONAL:Ill appearing NAD, intubated laying down bed  ENT: Airway patent, Mouth with normal mucosa. ET   EYES: Pupils equal, Round and reactive to light.  CARDIAC: Normal rate, Regular rhythm.    RESPIRATORY: CTAB  GASTROINTESTINAL: Abdomen soft, No guarding, + BS  MUSCULOSKELETAL: No clubbing, cyanosis  NEUROLOGICAL: Gag reflex present; Responds to sternal rub; does not follow commands   SKIN: Skin normal color for race, Warm and dry; no evidence of rash      Consultant(s) Notes Reviewed:  [x] YES  [ ] NO  Care Discussed with Consultants/Other Providers [x] YES  [ ] NO    LABS:  Culture - Blood (collected 08-26-24 @ 14:45)  Source: .Blood Blood  Gram Stain (08-27-24 @ 20:25):    Growth in aerobic bottle: Gram Positive Cocci in Clusters  Final Report (08-28-24 @ 13:18):    Growth in aerobic bottle: Staphylococcus hominis    Isolation of Coagulase negative Staphylococcus from single blood culture    sets may represent    contamination. Contact the Microbiology Department at [phone] if    susceptibility testing is    clinically indicated.    Direct identification is available within approximately 3-5    hours either by Blood Panel Multiplexed PCR or Direct    MALDI-TOF. Details: [email or web address]  Organism: Blood Culture PCR (08-28-24 @ 13:18)  Organism: Blood Culture PCR (08-28-24 @ 13:18)      -  Coagulase negative Staphylococcus: Detec      Method Type: PCR    RADIOLOGY & ADDITIONAL TESTS:  CT AB/P 08/27/2024   IMPRESSION:  Partially imaged numerous hyperdense left chest wall structures,   measuring up to at least 14.1 cm, new from 8/24/2024, suggestive of   hematomas. Recommend evaluated with dedicated chest CT.  Rectal fecal impaction, increased from prior.  Bilateral cystic adnexal gaseous redemonstrated. Recommend GYN   evaluation/further workup.    Medications:  acetaminophen     Tablet .. 650 milliGRAM(s) Oral every 6 hours PRN  albuterol/ipratropium for Nebulization 3 milliLiter(s) Nebulizer every 6 hours PRN  artificial tears (preservative free) Ophthalmic Solution 1 Drop(s) Both EYES two times a day  ascorbic acid 500 milliGRAM(s) Oral daily  aspirin  chewable 81 milliGRAM(s) Oral daily  carbidopa/levodopa  10/100 1 Tablet(s) Oral three times a day  chlorhexidine 0.12% Liquid 15 milliLiter(s) Oral Mucosa every 12 hours  chlorhexidine 2% Cloths 1 Application(s) Topical daily  cholecalciferol 1000 Unit(s) Oral daily  clotrimazole 1% Cream 1 Application(s) Topical two times a day  dexMEDEtomidine Infusion 0.2 MICROgram(s)/kG/Hr IV Continuous <Continuous>  famotidine    Tablet 20 milliGRAM(s) Oral daily  heparin   Injectable 5000 Unit(s) SubCutaneous every 8 hours  hydrocortisone 1% Cream 1 Application(s) Topical two times a day  lactated ringers. 1000 milliLiter(s) IV Continuous <Continuous>  lactated ringers. 1000 milliLiter(s) IV Continuous <Continuous>  latanoprost 0.005% Ophthalmic Solution 1 Drop(s) Both EYES at bedtime  levothyroxine 50 MICROGram(s) Oral daily  melatonin 3 milliGRAM(s) Oral at bedtime PRN  norepinephrine Infusion 0.05 MICROgram(s)/kG/Min IV Continuous <Continuous>  polyethylene glycol 3350 17 Gram(s) Oral two times a day  senna 2 Tablet(s) Oral at bedtime  timolol 0.5% Solution 1 Drop(s) Both EYES two times a day    Mode: CPAP with PS, PEEP: 8, PS: 5, MAP: 12, PIP: 14    HEALTH ISSUES - PROBLEM Dx:  Cardiac arrest  Acute respiratory failure with hypoxia  Lewy body dementia  Palliative care by specialist    f/u   blood cultures           Patient is a 84y old  Female who presents with a chief complaint of Altered Mental Status and Increased WOB (29 Aug 2024 10:04)      HPI  Patient 84-year-old woman with a history of Lewy body dementia (AAO x 2 at baseline), Parkinson's, COPD not on home oxygen, presented from [facility] with AMS and Tachypnea, Patient was A&Ox0 on arrival to the ED. Transferred to ICU for management of sepsis and an episode of cardiac arrest.      OVERNIGHT EVENTS: Today is hospital day 5. No acute events overnight. Patient is afebrile. Given 1 unit PRBCs. Hg at 7.2 from 6.6 yesterday.      REVIEW OF SYSTEMS:  All ROS negative except in HPI        FAMILY HISTORY:  No pertinent family history in first degree relatives        T(C): 36.1 (08-29-24 @ 12:00), Max: 36.7 (08-28-24 @ 16:00)  HR: 85 (08-29-24 @ 12:45) (52 - 100)  BP: 127/60 (08-29-24 @ 12:45) (63/34 - 151/65)  RR: 18 (08-29-24 @ 12:45) (12 - 27)  SpO2: 100% (08-29-24 @ 12:45) (100% - 100%)Vital Signs Last 24 Hrs    Parameters below as of 29 Aug 2024 12:00  Patient On (Oxygen Delivery Method): ventilator        PHYSICAL EXAM:  CONSTITUTIONAL: Ill appearing NAD, intubated laying down bed  ENT: Airway patent, Mouth with normal mucosa. ET   EYES: Pupils equal, Round and reactive to light.  CARDIAC: Normal rate, Regular rhythm.    RESPIRATORY: CTAB  GASTROINTESTINAL: Abdomen soft, No guarding, + BS  MUSCULOSKELETAL: No clubbing, cyanosis  NEUROLOGICAL: Gag reflex present; Responds to sternal rub; does not follow commands   SKIN: Skin normal color for race, Warm and dry; no evidence of rash      Consultant(s) Notes Reviewed:  [x] YES  [ ] NO  Care Discussed with Consultants/Other Providers [x] YES  [ ] NO    LABS:  Culture - Blood (collected 08-26-24 @ 14:45)  Source: .Blood Blood  Gram Stain (08-27-24 @ 20:25):    Growth in aerobic bottle: Gram Positive Cocci in Clusters  Final Report (08-28-24 @ 13:18):    Growth in aerobic bottle: Staphylococcus hominis    Isolation of Coagulase negative Staphylococcus from single blood culture    sets may represent    contamination. Contact the Microbiology Department at [phone] if    susceptibility testing is    clinically indicated.    Direct identification is available within approximately 3-5    hours either by Blood Panel Multiplexed PCR or Direct    MALDI-TOF. Details: [email or web address]  Organism: Blood Culture PCR (08-28-24 @ 13:18)  Organism: Blood Culture PCR (08-28-24 @ 13:18)      -  Coagulase negative Staphylococcus: Detec      Method Type: PCR    RADIOLOGY & ADDITIONAL TESTS:  CT AB/P 08/27/2024   IMPRESSION:  Partially imaged numerous hyperdense left chest wall structures,   measuring up to at least 14.1 cm, new from 8/24/2024, suggestive of   hematomas. Recommend evaluated with dedicated chest CT.  Rectal fecal impaction, increased from prior.  Bilateral cystic adnexal gaseous redemonstrated. Recommend GYN   evaluation/further workup.    Medications:  acetaminophen     Tablet .. 650 milliGRAM(s) Oral every 6 hours PRN  albuterol/ipratropium for Nebulization 3 milliLiter(s) Nebulizer every 6 hours PRN  artificial tears (preservative free) Ophthalmic Solution 1 Drop(s) Both EYES two times a day  ascorbic acid 500 milliGRAM(s) Oral daily  aspirin  chewable 81 milliGRAM(s) Oral daily  carbidopa/levodopa  10/100 1 Tablet(s) Oral three times a day  chlorhexidine 0.12% Liquid 15 milliLiter(s) Oral Mucosa every 12 hours  chlorhexidine 2% Cloths 1 Application(s) Topical daily  cholecalciferol 1000 Unit(s) Oral daily  clotrimazole 1% Cream 1 Application(s) Topical two times a day  dexMEDEtomidine Infusion 0.2 MICROgram(s)/kG/Hr IV Continuous <Continuous>  famotidine    Tablet 20 milliGRAM(s) Oral daily  heparin   Injectable 5000 Unit(s) SubCutaneous every 8 hours  hydrocortisone 1% Cream 1 Application(s) Topical two times a day  lactated ringers. 1000 milliLiter(s) IV Continuous <Continuous>  lactated ringers. 1000 milliLiter(s) IV Continuous <Continuous>  latanoprost 0.005% Ophthalmic Solution 1 Drop(s) Both EYES at bedtime  levothyroxine 50 MICROGram(s) Oral daily  melatonin 3 milliGRAM(s) Oral at bedtime PRN  norepinephrine Infusion 0.05 MICROgram(s)/kG/Min IV Continuous <Continuous>  polyethylene glycol 3350 17 Gram(s) Oral two times a day  senna 2 Tablet(s) Oral at bedtime  timolol 0.5% Solution 1 Drop(s) Both EYES two times a day    Mode: CPAP with PS, PEEP: 8, PS: 5, MAP: 12, PIP: 14    HEALTH ISSUES - PROBLEM Dx:  Cardiac arrest  Acute respiratory failure with hypoxia  Lewy body dementia  Palliative care by specialist    f/u   blood cultures

## 2024-08-29 NOTE — PROGRESS NOTE ADULT - SUBJECTIVE AND OBJECTIVE BOX
Patient is a 84y old  Female who presents with a chief complaint of Altered mental status     (28 Aug 2024 14:13)        Over Night Events:  remains critically ill on MV>  Sedated.  On Levophed         ROS:     All ROS are negative except HPI         PHYSICAL EXAM    ICU Vital Signs Last 24 Hrs  T(C): 35.9 (29 Aug 2024 08:00), Max: 37.2 (28 Aug 2024 12:00)  T(F): 96.7 (29 Aug 2024 08:00), Max: 99 (28 Aug 2024 12:00)  HR: 66 (29 Aug 2024 09:45) (58 - 93)  BP: 111/52 (29 Aug 2024 09:45) (101/54 - 151/69)  BP(mean): 71 (29 Aug 2024 09:45) (71 - 99)  ABP: --  ABP(mean): --  RR: 20 (29 Aug 2024 09:45) (12 - 26)  SpO2: 100% (29 Aug 2024 09:45) (99% - 100%)    O2 Parameters below as of 29 Aug 2024 08:00  Patient On (Oxygen Delivery Method): ventilator    O2 Concentration (%): 40        CONSTITUTIONAL:  Ill appearing     ENT:   Airway patent,   Mouth with normal mucosa.   ET     EYES:   Pupils equal,   Round and reactive to light.    CARDIAC:   Normal rate,   Regular rhythm.      RESPIRATORY:   No wheezing  Bilateral BS  Normal chest expansion  Not tachypneic,  No use of accessory muscles    GASTROINTESTINAL:  Abdomen soft,   Non-tender,   No guarding,   + BS    MUSCULOSKELETAL:   No clubbing, cyanosis    NEUROLOGICAL:   Sedated  Withdraws to pain RUE     SKIN:   Skin normal color for race,   No evidence of rash.          08-28-24 @ 07:01  -  08-29-24 @ 07:00  --------------------------------------------------------  IN:    Dexmedetomidine: 96 mL    IV PiggyBack: 291 mL    Norepinephrine: 165 mL  Total IN: 552 mL    OUT:    Indwelling Catheter - Urethral (mL): 895 mL  Total OUT: 895 mL    Total NET: -343 mL      08-29-24 @ 07:01  -  08-29-24 @ 10:05  --------------------------------------------------------  IN:    Dexmedetomidine: 21.6 mL    Norepinephrine: 21.6 mL  Total IN: 43.2 mL    OUT:    Indwelling Catheter - Urethral (mL): 70 mL  Total OUT: 70 mL    Total NET: -26.8 mL          LABS:                            7.0    17.80 )-----------( 165      ( 29 Aug 2024 05:10 )             22.0                                               08-29    135  |  102  |  51<H>  ----------------------------<  127<H>  3.6   |  22  |  1.5    Creatinine Trend  BUN 51, Cr 1.5, (08-29-24 @ 05:10)  Creatinine Trend  BUN 59, Cr 1.9, (08-28-24 @ 05:05)  Creatinine Trend  BUN 55, Cr 1.7, (08-27-24 @ 04:20)  Creatinine Trend  BUN 52, Cr 1.5, (08-26-24 @ 14:45)  Creatinine Trend  BUN 47, Cr 1.0, (08-26-24 @ 06:49)  Creatinine Trend  BUN 56, Cr 1.2, (08-24-24 @ 11:43)      Ca    7.5<L>      29 Aug 2024 05:10  Phos  3.8     08-28  Mg     2.2     08-29    TPro  4.5<L>  /  Alb  2.7<L>  /  TBili  0.4  /  DBili  x   /  AST  17  /  ALT  <5  /  AlkPhos  45  08-29      PT/INR - ( 27 Aug 2024 11:05 )   PT: 12.50 sec;   INR: 1.10 ratio         PTT - ( 27 Aug 2024 11:05 )  PTT:21.2 sec                                       Urinalysis Basic - ( 29 Aug 2024 05:10 )    Color: x / Appearance: x / SG: x / pH: x  Gluc: 127 mg/dL / Ketone: x  / Bili: x / Urobili: x   Blood: x / Protein: x / Nitrite: x   Leuk Esterase: x / RBC: x / WBC x   Sq Epi: x / Non Sq Epi: x / Bacteria: x                                                  LIVER FUNCTIONS - ( 29 Aug 2024 05:10 )  Alb: 2.7 g/dL / Pro: 4.5 g/dL / ALK PHOS: 45 U/L / ALT: <5 U/L / AST: 17 U/L / GGT: x                                                  Culture - Blood (collected 26 Aug 2024 14:45)  Source: .Blood Blood  Gram Stain (27 Aug 2024 20:25):    Growth in aerobic bottle: Gram Positive Cocci in Clusters  Final Report (28 Aug 2024 13:18):    Growth in aerobic bottle: Staphylococcus hominis    Isolation of Coagulase negative Staphylococcus from single blood culture    sets may represent    contamination. Contact the Microbiology Department at [phone] if    susceptibility testing is    clinically indicated.    Direct identification is available within approximately 3-5    hours either by Blood Panel Multiplexed PCR or Direct    MALDI-TOF. Details: [email or web address]  Organism: Blood Culture PCR (28 Aug 2024 13:18)  Organism: Blood Culture PCR (28 Aug 2024 13:18)                                                   Mode: AC/ CMV (Assist Control/ Continuous Mandatory Ventilation)  RR (machine): 20  TV (machine): 340  FiO2: 40  PEEP: 8  ITime: 1  MAP: 12  PIP: 24                                      ABG - ( 29 Aug 2024 03:20 )  pH, Arterial: 7.38  pH, Blood: x     /  pCO2: 33    /  pO2: 174   / HCO3: 20    / Base Excess: -4.7  /  SaO2: 99.1                MEDICATIONS  (STANDING):  artificial tears (preservative free) Ophthalmic Solution 1 Drop(s) Both EYES two times a day  ascorbic acid 500 milliGRAM(s) Oral daily  aspirin  chewable 81 milliGRAM(s) Oral daily  carbidopa/levodopa  10/100 1 Tablet(s) Oral three times a day  cefepime   IVPB 1000 milliGRAM(s) IV Intermittent every 12 hours  chlorhexidine 0.12% Liquid 15 milliLiter(s) Oral Mucosa every 12 hours  chlorhexidine 2% Cloths 1 Application(s) Topical daily  cholecalciferol 1000 Unit(s) Oral daily  clotrimazole 1% Cream 1 Application(s) Topical two times a day  dexMEDEtomidine Infusion 0.2 MICROgram(s)/kG/Hr (4.78 mL/Hr) IV Continuous <Continuous>  famotidine    Tablet 20 milliGRAM(s) Oral daily  hydrocortisone 1% Cream 1 Application(s) Topical two times a day  lactated ringers. 1000 milliLiter(s) (150 mL/Hr) IV Continuous <Continuous>  latanoprost 0.005% Ophthalmic Solution 1 Drop(s) Both EYES at bedtime  levothyroxine 50 MICROGram(s) Oral daily  mineral oil enema 133 milliLiter(s) Rectal <User Schedule>  norepinephrine Infusion 0.05 MICROgram(s)/kG/Min (4.48 mL/Hr) IV Continuous <Continuous>  polyethylene glycol 3350 17 Gram(s) Oral two times a day  senna 2 Tablet(s) Oral at bedtime  timolol 0.5% Solution 1 Drop(s) Both EYES two times a day    MEDICATIONS  (PRN):  acetaminophen     Tablet .. 650 milliGRAM(s) Oral every 6 hours PRN Temp greater or equal to 38C (100.4F)  albuterol/ipratropium for Nebulization 3 milliLiter(s) Nebulizer every 6 hours PRN Shortness of Breath and/or Wheezing  melatonin 3 milliGRAM(s) Oral at bedtime PRN Insomnia      New X-rays reviewed:                                                                                  ECHO

## 2024-08-29 NOTE — PROGRESS NOTE ADULT - ASSESSMENT
Impression:     Cardiopulmonary arrest approximately 10 minutes  Acute hypoxemic respiratory failure   Likely aspiration event   Shock on Levophed    Left chest wall hematomas   Symptomatic anemia   Possible UTI    G+ cocci bacteremia.  Possibly contaminant   JENN   Moderate to large stool burden on admission   HO Lewy body dementia   HO COPD   HO abdominal surgery    PLAN:    CNS: SAT.  Repeat CTH unchanged.  NSE.  CW carbidopa/levodopa. Avoid Temperature  EEG negative.      HEENT: Oral care.  ET tube care.      PULMONARY:  HOB @ 45 degrees. Vent as follows: , RR 20, PEEP 8, Wean FiO2.  SBT when awake     CARDIOVASCULAR: Goal directed fluid resuscitation. TTE poor test. Trend LA. CE.  Wean Levophed.  [place] HD monitoring.  LR hydration     GI: GI prophylaxis.  OG feeding.  Continue bowel regimen enema       RENAL:  Follow up lytes.  Correct as needed. Pinon.  Monitory UO     INFECTIOUS DISEASE: Follow up cultures. Cefepime for now.  Repeat BC. FU cultures     HEMATOLOGICAL:  DVT prophylaxis Seq. LE duplex neg.  One Unit PRBC. Trend CBC and coags       ENDOCRINE:  Follow up FS.  Insulin protocol if needed. CW synthroid. Check TSH.      MUSCULOSKELETAL: Bed rest.  off loading.      Palliative care eval.   Poor prognosis   MICU monitoring for now.   DW  at the bed side.

## 2024-08-29 NOTE — PHYSICAL THERAPY INITIAL EVALUATION ADULT - GENERAL OBSERVATIONS, REHAB EVAL
Hold and d/c PT at this time. Pt. currently intubated and not following commands. Additionally, pt. was completely dependent at baseline. Not an appropriate candidate for PT. Will f/u with PT as appropriate.

## 2024-08-29 NOTE — PROGRESS NOTE ADULT - ASSESSMENT
83-year-old female with PMHx of Lewy Body dementia with associated autonomic dysfunction including dysphagia, Hypothyroidism, Parkinson's, COPD not on home oxygen , A&Ox0 on arrival being admitted for management of severe sepsis.     PLAN: 83-year-old female with PMHx of Lewy Body dementia with associated autonomic dysfunction including dysphagia, Hypothyroidism, Parkinson's, COPD not on home oxygen , A&Ox0 on arrival being admitted for management of severe sepsis.     PLAN:  CNS: SAT.  Repeat CTH unchanged  NSE. CW carbidopa/levodopa. Avoid Temperature  EEG negative.    HEENT: Oral care.  ET tube care.    PULMONARY: HOB @ 45 degrees. passed SBT  CARDIOVASCULAR: Goal directed fluid resuscitation. TTE undiagnostic Trend LA. CE.  Wean Levophed. [place] HD monitoring.   GI: GI prophylaxis. OG feeding. MIGUEL. KUB c/w fecal impaction  RENAL:  Follow up lytes.  Correct as needed. Pinon.  Monitory UO   INFECTIOUS DISEASE: F/u on repeat blood cx. d/c Cefepime. vanc trough 13.3  HEMATOLOGICAL:  DVT prophylaxis Seq. LE duplex neg. Given one Unit PRBC. Trend CBC and coags     ENDOCRINE:  Follow up FS.  Insulin protocol if needed. CW synthroid. Get TSH level  MUSCULOSKELETAL: Bed rest.  off loading.      Palliative following      -DVT prophylaxis: Lovenox AC  -GI prophylaxis:  -Diet: OG feeds  -Code status: DNR  -Activity: IAT  -Dispo: MICU    #Handoff: F/u BCx, CBC, coag, and monitor for bowel movement

## 2024-08-29 NOTE — PROGRESS NOTE ADULT - SUBJECTIVE AND OBJECTIVE BOX
FLODENIZ LUJANAINE  84y, Female  Allergy: Originally Entered as [Unknown] reaction to [red pepper] (Unknown)  ciprofloxacin (Unknown)  benzodiazepines (Unknown)  antichlolinergics (Unknown)  penicillin (Anaphylaxis)  Originally Entered as [Unknown] reaction to [pepper] (Unknown)  Originally Entered as [Unknown] reaction to [green pepper] (Unknown)  Originally Entered as [Unknown] reaction to [neuroleptics] (Unknown)      LOS  5d    CHIEF COMPLAINT: Altered Mental Status and Increased WOB (29 Aug 2024 13:09)      INTERVAL EVENTS/HPI  - No acute events overnight  - T(F): , Max: 98.1 (08-28-24 @ 16:00)  - undergoing SBT   - WBC Count: 17.80 (08-29-24 @ 05:10)  WBC Count: 18.86 (08-29-24 @ 00:53)     - Creatinine: 1.5 (08-29-24 @ 05:10)  Creatinine: 1.9 (08-28-24 @ 05:05)       ROS  unable to obtain history secondary to patient's mental status and/or sedation      VITALS:  T(F): 96.9, Max: 98.1 (08-28-24 @ 16:00)  HR: 85  BP: 119/57  RR: 18Vital Signs Last 24 Hrs  T(C): 36.1 (29 Aug 2024 12:00), Max: 36.7 (28 Aug 2024 16:00)  T(F): 96.9 (29 Aug 2024 12:00), Max: 98.1 (28 Aug 2024 16:00)  HR: 85 (29 Aug 2024 15:00) (52 - 121)  BP: 119/57 (29 Aug 2024 15:00) (63/34 - 151/65)  BP(mean): 82 (29 Aug 2024 15:00) (43 - 94)  RR: 18 (29 Aug 2024 15:00) (12 - 29)  SpO2: 99% (29 Aug 2024 15:00) (99% - 100%)    Parameters below as of 29 Aug 2024 14:30  Patient On (Oxygen Delivery Method): nasal cannula  O2 Flow (L/min): 3      PHYSICAL EXAM:  Gen:  intubated  HEENT: Normocephalic, atraumatic  Neck: supple, no lymphadenopathy  CV: Regular rate & regular rhythm  Lungs: decreased BS at bases, no fremitus  Abdomen: Soft, BS present  Ext: Warm, well perfused  Neuro: non focal, awake  Skin: no rash, no erythema  Lines: no phlebitis    FH: Non-contributory  Social Hx: Non-contributory    TESTS & MEASUREMENTS:                        7.0    17.80 )-----------( 165      ( 29 Aug 2024 05:10 )             22.0     08-29    135  |  102  |  51<H>  ----------------------------<  127<H>  3.6   |  22  |  1.5    Ca    7.5<L>      29 Aug 2024 05:10  Phos  3.8     08-28  Mg     2.2     08-29    TPro  4.5<L>  /  Alb  2.7<L>  /  TBili  0.4  /  DBili  x   /  AST  17  /  ALT  <5  /  AlkPhos  45  08-29      LIVER FUNCTIONS - ( 29 Aug 2024 05:10 )  Alb: 2.7 g/dL / Pro: 4.5 g/dL / ALK PHOS: 45 U/L / ALT: <5 U/L / AST: 17 U/L / GGT: x           Urinalysis Basic - ( 29 Aug 2024 05:10 )    Color: x / Appearance: x / SG: x / pH: x  Gluc: 127 mg/dL / Ketone: x  / Bili: x / Urobili: x   Blood: x / Protein: x / Nitrite: x   Leuk Esterase: x / RBC: x / WBC x   Sq Epi: x / Non Sq Epi: x / Bacteria: x        Culture - Blood (collected 08-26-24 @ 14:45)  Source: .Blood Blood  Gram Stain (08-27-24 @ 20:25):    Growth in aerobic bottle: Gram Positive Cocci in Clusters  Final Report (08-28-24 @ 13:18):    Growth in aerobic bottle: Staphylococcus hominis    Isolation of Coagulase negative Staphylococcus from single blood culture    sets may represent    contamination. Contact the Microbiology Department at [phone] if    susceptibility testing is    clinically indicated.    Direct identification is available within approximately 3-5    hours either by Blood Panel Multiplexed PCR or Direct    MALDI-TOF. Details: [email or web address]  Organism: Blood Culture PCR (08-28-24 @ 13:18)  Organism: Blood Culture PCR (08-28-24 @ 13:18)      -  Coagulase negative Staphylococcus: Detec      Method Type: PCR    Urinalysis with Rflx Culture (collected 08-24-24 @ 05:12)    Culture - Blood (collected 08-23-24 @ 23:19)  Source: .Blood Blood-Peripheral  Final Report (08-29-24 @ 09:00):    No growth at 5 days    Culture - Blood (collected 08-23-24 @ 23:19)  Source: .Blood Blood-Peripheral  Final Report (08-29-24 @ 09:00):    No growth at 5 days        Lactate, Blood: 1.6 mmol/L (08-27-24 @ 11:05)  Lactate, Blood: 2.7 mmol/L (08-27-24 @ 04:20)  Lactate, Blood: 2.3 mmol/L (08-24-24 @ 22:01)  Lactate, Blood: 3.7 mmol/L (08-24-24 @ 17:20)      INFECTIOUS DISEASES TESTING  MRSA PCR Result.: Negative (08-24-24 @ 15:15)  Procalcitonin: 0.86 (08-24-24 @ 11:43)  COVID-19 PCR: NotDetec (11-19-23 @ 10:16)  Rapid RVP Result: NotDetec (11-11-23 @ 10:12)  MRSA PCR Result.: Negative (11-11-23 @ 10:12)  Legionella Antigen, Urine: Negative (11-11-23 @ 10:01)  Procalcitonin, Serum: 0.10 (11-11-23 @ 06:55)      INFLAMMATORY MARKERS      RADIOLOGY & ADDITIONAL TESTS:  I have personally reviewed the last available Chest xray  CXR  Xray Chest 1 View- PORTABLE-Urgent:   ACC: [id number] EXAM:  XR CHEST PORTABLE URGENT 1V   ORDERED BY: LAURA WHITFIELD     PROCEDURE DATE:  08/26/2024          INTERPRETATION:  Clinical History / Reason for exam: Assess OG tube.    Comparison : Chest radiograph 8/26/2024 at 4:32 PM.    Technique/Positioning: Frontal chest radiograph.    Findings:    Support devices: ET tube terminates 2.9 cm above the amanda. Enteric tube   terminates below left hemidiaphragm. Essentially stable right central   venous catheter.    Cardiac/mediastinum/hilum: Unchanged.    Lung parenchyma/Pleura: Bilateral interstitial prominence may be due to   low lung volumes. No pneumothorax.    Skeleton/soft tissues: Unchanged.    Impression:    Support devices as described.    Bilateral interstitial prominence may be due to low lung volumes.        --- End of Report ---            SHARYN TENORIO MD; Attending Radiologist  This document has been electronically signed. Aug 27 2024  9:50AM (08-26-24 @ 18:35)      CT  CT Abdomen and Pelvis No Cont:   ACC: [id number] EXAM:  CT ABDOMEN AND PELVIS   ORDERED BY: ARDEN SMITH     PROCEDURE DATE:  08/27/2024          INTERPRETATION:  CLINICAL HISTORY: Rule out retroperitoneal bleed.    TECHNIQUE: Contiguous axial CT images were obtained from the lower chest   to the pubic symphysis without intravenous contrast. NONE ( ).  Oral   contrast was not administered.  Reformatted images in the coronal and   sagittal planes were acquired.    COMPARISON: CT dated 8/24/2024.      FINDINGS:      Study limited without intravenous contrast.    Partially imaged numerous hyperdense left chest wall structures,   measuring up to at least 14.1 cm, new from 8/24/2024, suggestive of   hematoma.    Rectal fecal impaction, increased from prior. Feeding tube tip in  stomach. Likely vicarious excretion of contrast via gallbladder. Bladder   contract around Pinon catheter precluding assessment. Remainder findings   are without significant change on the short-term follow-up CT-see prior   report.      IMPRESSION:    Partially imaged numerous hyperdense left chest wall structures,   measuring up to at least 14.1 cm, new from 8/24/2024, suggestive of   hematomas. Recommend evaluated with dedicated chest CT.    Rectal fecal impaction, increased from prior.    Bilateral cystic adnexal gaseous redemonstrated. Recommend GYN   evaluation/further workup.    --- End of Report ---            FAUZIA SUH MD; Attending Radiologist  This document has been electronically signed. Aug 27 2024 12:00PM (08-27-24 @ 10:51)      CARDIOLOGY TESTING  12 Lead ECG:   Ventricular Rate 105 BPM    Atrial Rate 105 BPM    P-R Interval 156 ms    QRS Duration 64 ms    Q-T Interval 338 ms    QTC Calculation(Bazett) 446 ms    P Axis 35 degrees    R Axis -25 degrees    T Axis 21 degrees    Diagnosis Line Sinus tachycardia  Low voltage QRS  Inferior infarct , age undetermined  Abnormal ECG    Confirmed by Douglas Del Valle ([id number]) on 8/28/2024 9:16:49 AM (08-27-24 @ 01:54)  12 Lead ECG:   Ventricular Rate 127 BPM    Atrial Rate 127 BPM    P-R Interval 150 ms    QRS Duration 84 ms    Q-T Interval 298 ms    QTC Calculation(Bazett) 433 ms    P Axis 46 degrees    R Axis -25 degrees    T Axis 31 degrees    Diagnosis Line Sinus tachycardia  Inferior-posterior infarct , age undetermined  Abnormal ECG    Confirmed by YURIDIA JOHNSTON MD ([id number]) on 8/24/2024 10:37:59 PM (08-24-24 @ 00:20)      MEDICATIONS  artificial tears (preservative free) Ophthalmic Solution 1 Both EYES two times a day  ascorbic acid 500 Oral daily  aspirin  chewable 81 Oral daily  carbidopa/levodopa  10/100 1 Oral three times a day  chlorhexidine 2% Cloths 1 Topical daily  cholecalciferol 1000 Oral daily  clotrimazole 1% Cream 1 Topical two times a day  dexMEDEtomidine Infusion 0.2 IV Continuous <Continuous>  famotidine    Tablet 20 Oral daily  heparin   Injectable 5000 SubCutaneous every 8 hours  hydrocortisone 1% Cream 1 Topical two times a day  lactated ringers. 1000 IV Continuous <Continuous>  lactated ringers. 1000 IV Continuous <Continuous>  latanoprost 0.005% Ophthalmic Solution 1 Both EYES at bedtime  levothyroxine 50 Oral daily  norepinephrine Infusion 0.05 IV Continuous <Continuous>  polyethylene glycol 3350 17 Oral two times a day  senna 2 Oral at bedtime  timolol 0.5% Solution 1 Both EYES two times a day      WEIGHT  Weight (kg): 95.6 (08-25-24 @ 22:13)  Creatinine: 1.5 mg/dL (08-29-24 @ 05:10)      ANTIBIOTICS:      All available historical records have been reviewed

## 2024-08-29 NOTE — PROGRESS NOTE ADULT - ASSESSMENT
ASSESSMENT  84-year-old woman with a history of Lewy body dementia (AAO x 2 at baseline), Parkinson's, COPD not on home oxygen, presented from [facility] with presenting complaints of AMS and Tachypnea, Patient was A&Ox0 on arrival, unable to provide history.       IMPRESSION  #Sepsis present on admission with encephalopathy- suspected UTI?  Pt has an acute illness which poses threat to bodily function   - CT Angio Chest PE Protocol w/ IV Cont (08.24.24 @ 01:36): 1. Urinary bladder mild pericystic stranding, could be attributed to  urinary bladder infection in the appropriate clinical setting.  - WBC Count: 19.08 K/uL (08.23.24 @ 23:19) on admission  - UA 8/24 negative - although taken after antibiotics  - Blood Cx 8/23 NG    #Cardiac Arrest 8/26    #Fever post-cardiac arrest  - Blood Cx 8/16 Coag negative staph - likely contaminant     #Acute Anemia Large Chest Wall Hematoma 8/27  -CT Abdomen and Pelvis No Cont (08.27.24 @ 10:51): Partially imaged numerous hyperdense left chest wall structures,  measuring up to at least 14.1 cm, new from 8/24/2024, suggestive of  hematomas. Recommend evaluated with dedicated chest CT. Rectal fecal impaction, increased from prior. Bilateral cystic adnexal gaseous redemonstrated. Recommend GYN  evaluation/further workup.    #Ovarian Cystic Leions  - CT Abd/pelvis 8/24:  2. Increased ovarian 2.5 cm cystic lesion. Unchanged right ovarian 7 cm  cystic mass. Outpatient pelvic ultrasound recommended for further evaluation.    #Constipation  #Lewy Body Dementia AO x2 at baseline  #Parkinson's   #COPD     #Obesity BMI (kg/m2): 35.8  #DM   #Abx allergy: ciprofloxacin (Unknown)  penicillin (Anaphylaxis)    RECOMMENDATIONS  - continue cefepime 1g q 12 hours -- tentatively continue until 9/2  - trend WBC  - vent management by primary     Please call or message on Microsoft Teams if with any questions.  Spectra [phone]

## 2024-08-30 LAB
ALBUMIN SERPL ELPH-MCNC: 2.7 G/DL — LOW (ref 3.5–5.2)
ALP SERPL-CCNC: 44 U/L — SIGNIFICANT CHANGE UP (ref 30–115)
ALT FLD-CCNC: <5 U/L — SIGNIFICANT CHANGE UP (ref 0–41)
ANION GAP SERPL CALC-SCNC: 16 MMOL/L — HIGH (ref 7–14)
AST SERPL-CCNC: 20 U/L — SIGNIFICANT CHANGE UP (ref 0–41)
BASOPHILS # BLD AUTO: 0.02 K/UL — SIGNIFICANT CHANGE UP (ref 0–0.2)
BASOPHILS NFR BLD AUTO: 0.2 % — SIGNIFICANT CHANGE UP (ref 0–1)
BILIRUB SERPL-MCNC: 0.5 MG/DL — SIGNIFICANT CHANGE UP (ref 0.2–1.2)
BUN SERPL-MCNC: 43 MG/DL — HIGH (ref 10–20)
CALCIUM SERPL-MCNC: 7.6 MG/DL — LOW (ref 8.4–10.4)
CHLORIDE SERPL-SCNC: 102 MMOL/L — SIGNIFICANT CHANGE UP (ref 98–110)
CO2 SERPL-SCNC: 19 MMOL/L — SIGNIFICANT CHANGE UP (ref 17–32)
CREAT SERPL-MCNC: 1.4 MG/DL — SIGNIFICANT CHANGE UP (ref 0.7–1.5)
EGFR: 37 ML/MIN/1.73M2 — LOW
EOSINOPHIL # BLD AUTO: 0.26 K/UL — SIGNIFICANT CHANGE UP (ref 0–0.7)
EOSINOPHIL NFR BLD AUTO: 2.3 % — SIGNIFICANT CHANGE UP (ref 0–8)
GLUCOSE SERPL-MCNC: 84 MG/DL — SIGNIFICANT CHANGE UP (ref 70–99)
HCT VFR BLD CALC: 21 % — LOW (ref 37–47)
HGB BLD-MCNC: 6.6 G/DL — CRITICAL LOW (ref 12–16)
IMM GRANULOCYTES NFR BLD AUTO: 1.1 % — HIGH (ref 0.1–0.3)
LYMPHOCYTES # BLD AUTO: 1.49 K/UL — SIGNIFICANT CHANGE UP (ref 1.2–3.4)
LYMPHOCYTES # BLD AUTO: 13.4 % — LOW (ref 20.5–51.1)
MAGNESIUM SERPL-MCNC: 2.1 MG/DL — SIGNIFICANT CHANGE UP (ref 1.8–2.4)
MCHC RBC-ENTMCNC: 27.4 PG — SIGNIFICANT CHANGE UP (ref 27–31)
MCHC RBC-ENTMCNC: 31.4 G/DL — LOW (ref 32–37)
MCV RBC AUTO: 87.1 FL — SIGNIFICANT CHANGE UP (ref 81–99)
MONOCYTES # BLD AUTO: 0.69 K/UL — HIGH (ref 0.1–0.6)
MONOCYTES NFR BLD AUTO: 6.2 % — SIGNIFICANT CHANGE UP (ref 1.7–9.3)
NEUTROPHILS # BLD AUTO: 8.58 K/UL — HIGH (ref 1.4–6.5)
NEUTROPHILS NFR BLD AUTO: 76.8 % — HIGH (ref 42.2–75.2)
NRBC # BLD: 0 /100 WBCS — SIGNIFICANT CHANGE UP (ref 0–0)
PHOSPHATE SERPL-MCNC: 2.4 MG/DL — SIGNIFICANT CHANGE UP (ref 2.1–4.9)
PLATELET # BLD AUTO: 180 K/UL — SIGNIFICANT CHANGE UP (ref 130–400)
PMV BLD: 10.3 FL — SIGNIFICANT CHANGE UP (ref 7.4–10.4)
POTASSIUM SERPL-MCNC: 3.5 MMOL/L — SIGNIFICANT CHANGE UP (ref 3.5–5)
POTASSIUM SERPL-SCNC: 3.5 MMOL/L — SIGNIFICANT CHANGE UP (ref 3.5–5)
PROT SERPL-MCNC: 4.5 G/DL — LOW (ref 6–8)
RBC # BLD: 2.41 M/UL — LOW (ref 4.2–5.4)
RBC # FLD: 24 % — HIGH (ref 11.5–14.5)
SODIUM SERPL-SCNC: 137 MMOL/L — SIGNIFICANT CHANGE UP (ref 135–146)
TSH SERPL-MCNC: 1.93 UIU/ML — SIGNIFICANT CHANGE UP (ref 0.27–4.2)
WBC # BLD: 11.16 K/UL — HIGH (ref 4.8–10.8)
WBC # FLD AUTO: 11.16 K/UL — HIGH (ref 4.8–10.8)

## 2024-08-30 PROCEDURE — 99233 SBSQ HOSP IP/OBS HIGH 50: CPT

## 2024-08-30 PROCEDURE — 71045 X-RAY EXAM CHEST 1 VIEW: CPT | Mod: 26

## 2024-08-30 PROCEDURE — 99291 CRITICAL CARE FIRST HOUR: CPT

## 2024-08-30 RX ORDER — METOPROLOL TARTRATE 100 MG/1
12.5 TABLET ORAL EVERY 12 HOURS
Refills: 0 | Status: DISCONTINUED | OUTPATIENT
Start: 2024-08-30 | End: 2024-09-04

## 2024-08-30 RX ORDER — SODIUM CHLORIDE 9 MG/ML
250 INJECTION INTRAMUSCULAR; INTRAVENOUS; SUBCUTANEOUS ONCE
Refills: 0 | Status: COMPLETED | OUTPATIENT
Start: 2024-08-30 | End: 2024-08-30

## 2024-08-30 RX ORDER — IPRATROPIUM BROMIDE AND ALBUTEROL SULFATE .5; 3 MG/3ML; MG/3ML
3 SOLUTION RESPIRATORY (INHALATION) EVERY 12 HOURS
Refills: 0 | Status: DISCONTINUED | OUTPATIENT
Start: 2024-08-30 | End: 2024-09-01

## 2024-08-30 RX ADMIN — Medication 5000 UNIT(S): at 05:06

## 2024-08-30 RX ADMIN — FAMOTIDINE 20 MILLIGRAM(S): 10 INJECTION INTRAVENOUS at 13:04

## 2024-08-30 RX ADMIN — SODIUM CHLORIDE 1000 MILLILITER(S): 9 INJECTION INTRAMUSCULAR; INTRAVENOUS; SUBCUTANEOUS at 06:21

## 2024-08-30 RX ADMIN — CHLORHEXIDINE GLUCONATE 1 APPLICATION(S): 40 SOLUTION TOPICAL at 13:05

## 2024-08-30 RX ADMIN — Medication 1 APPLICATION(S): at 17:58

## 2024-08-30 RX ADMIN — Medication 2 TABLET(S): at 22:23

## 2024-08-30 RX ADMIN — Medication 1 TABLET(S): at 05:14

## 2024-08-30 RX ADMIN — TIMOLOL MALEATE 1 DROP(S): 5 SOLUTION/ DROPS OPHTHALMIC at 17:57

## 2024-08-30 RX ADMIN — LATANOPROST 1 DROP(S): 50 SOLUTION OPHTHALMIC at 22:22

## 2024-08-30 RX ADMIN — POVIDONE, PROPYLENE GLYCOL 1 DROP(S): 6.8; 3 LIQUID OPHTHALMIC at 17:59

## 2024-08-30 RX ADMIN — METOPROLOL TARTRATE 12.5 MILLIGRAM(S): 100 TABLET ORAL at 17:58

## 2024-08-30 RX ADMIN — Medication 50 MICROGRAM(S): at 05:07

## 2024-08-30 RX ADMIN — Medication 1 APPLICATION(S): at 05:08

## 2024-08-30 RX ADMIN — Medication 1 TABLET(S): at 22:22

## 2024-08-30 RX ADMIN — Medication 1 TABLET(S): at 13:03

## 2024-08-30 RX ADMIN — IPRATROPIUM BROMIDE AND ALBUTEROL SULFATE 3 MILLILITER(S): .5; 3 SOLUTION RESPIRATORY (INHALATION) at 22:47

## 2024-08-30 RX ADMIN — Medication 1 APPLICATION(S): at 05:07

## 2024-08-30 RX ADMIN — TIMOLOL MALEATE 1 DROP(S): 5 SOLUTION/ DROPS OPHTHALMIC at 05:07

## 2024-08-30 RX ADMIN — Medication 81 MILLIGRAM(S): at 13:04

## 2024-08-30 NOTE — PROGRESS NOTE ADULT - SUBJECTIVE AND OBJECTIVE BOX
FLOIMELDA LUJAN  84y, Female  Allergy: Originally Entered as [Unknown] reaction to [red pepper] (Unknown)  ciprofloxacin (Unknown)  benzodiazepines (Unknown)  antichlolinergics (Unknown)  penicillin (Anaphylaxis)  Originally Entered as [Unknown] reaction to [pepper] (Unknown)  Originally Entered as [Unknown] reaction to [green pepper] (Unknown)  Originally Entered as [Unknown] reaction to [neuroleptics] (Unknown)      LOS  6d    CHIEF COMPLAINT: Altered Mental Status and Increased WOB (30 Aug 2024 08:54)      INTERVAL EVENTS/HPI  - No acute events overnight  - T(F): , Max: 99.8 (08-30-24 @ 08:00)  - extubated - off pressors  - WBC Count: 11.16 (08-30-24 @ 05:27)  WBC Count: 17.80 (08-29-24 @ 05:10)     - Creatinine: 1.4 (08-30-24 @ 05:27)  Creatinine: 1.5 (08-29-24 @ 05:10)       ROS  unable to obtain history secondary to patient's mental status and/or sedation    VITALS:  T(F): 99.8, Max: 99.8 (08-30-24 @ 08:00)  HR: 95  BP: 111/55  RR: 26Vital Signs Last 24 Hrs  T(C): 37.7 (30 Aug 2024 08:00), Max: 37.7 (30 Aug 2024 08:00)  T(F): 99.8 (30 Aug 2024 08:00), Max: 99.8 (30 Aug 2024 08:00)  HR: 95 (30 Aug 2024 09:00) (52 - 121)  BP: 111/55 (30 Aug 2024 09:00) (99/50 - 151/65)  BP(mean): 79 (30 Aug 2024 09:00) (64 - 94)  RR: 26 (30 Aug 2024 09:00) (16 - 37)  SpO2: 98% (30 Aug 2024 09:00) (93% - 100%)    Parameters below as of 30 Aug 2024 08:00  Patient On (Oxygen Delivery Method): nasal cannula  O2 Flow (L/min): 2      PHYSICAL EXAM:  Gen: NAD  HEENT: Normocephalic, atraumatic  Neck: supple, no lymphadenopathy  CV: Regular rate & regular rhythm  Lungs: decreased BS at bases, no fremitus  Abdomen: Soft, BS present  Ext: Warm, well perfused  Neuro: non focal, awake  Skin: no rash, no erythema  Lines: no phlebitis    FH: Non-contributory  Social Hx: Non-contributory    TESTS & MEASUREMENTS:                        6.6    11.16 )-----------( 180      ( 30 Aug 2024 05:27 )             21.0     08-30    137  |  102  |  43<H>  ----------------------------<  84  3.5   |  19  |  1.4    Ca    7.6<L>      30 Aug 2024 05:27  Phos  2.4     08-30  Mg     2.1     08-30    TPro  4.5<L>  /  Alb  2.7<L>  /  TBili  0.5  /  DBili  x   /  AST  20  /  ALT  <5  /  AlkPhos  44  08-30      LIVER FUNCTIONS - ( 30 Aug 2024 05:27 )  Alb: 2.7 g/dL / Pro: 4.5 g/dL / ALK PHOS: 44 U/L / ALT: <5 U/L / AST: 20 U/L / GGT: x           Urinalysis Basic - ( 30 Aug 2024 05:27 )    Color: x / Appearance: x / SG: x / pH: x  Gluc: 84 mg/dL / Ketone: x  / Bili: x / Urobili: x   Blood: x / Protein: x / Nitrite: x   Leuk Esterase: x / RBC: x / WBC x   Sq Epi: x / Non Sq Epi: x / Bacteria: x        Culture - Blood (collected 08-28-24 @ 11:13)  Source: .Blood None  Preliminary Report (08-29-24 @ 22:02):    No growth at 24 hours    Culture - Blood (collected 08-26-24 @ 14:45)  Source: .Blood Blood  Gram Stain (08-27-24 @ 20:25):    Growth in aerobic bottle: Gram Positive Cocci in Clusters  Final Report (08-28-24 @ 13:18):    Growth in aerobic bottle: Staphylococcus hominis    Isolation of Coagulase negative Staphylococcus from single blood culture    sets may represent    contamination. Contact the Microbiology Department at [phone] if    susceptibility testing is    clinically indicated.    Direct identification is available within approximately 3-5    hours either by Blood Panel Multiplexed PCR or Direct    MALDI-TOF. Details: [email or web address]  Organism: Blood Culture PCR (08-28-24 @ 13:18)  Organism: Blood Culture PCR (08-28-24 @ 13:18)      -  Coagulase negative Staphylococcus: Detec      Method Type: PCR    Urinalysis with Rflx Culture (collected 08-24-24 @ 05:12)    Culture - Blood (collected 08-23-24 @ 23:19)  Source: .Blood Blood-Peripheral  Final Report (08-29-24 @ 09:00):    No growth at 5 days    Culture - Blood (collected 08-23-24 @ 23:19)  Source: .Blood Blood-Peripheral  Final Report (08-29-24 @ 09:00):    No growth at 5 days        Lactate, Blood: 1.6 mmol/L (08-27-24 @ 11:05)  Lactate, Blood: 2.7 mmol/L (08-27-24 @ 04:20)      INFECTIOUS DISEASES TESTING  MRSA PCR Result.: Negative (08-24-24 @ 15:15)  Procalcitonin: 0.86 (08-24-24 @ 11:43)  COVID-19 PCR: NotDetec (11-19-23 @ 10:16)  Rapid RVP Result: NotDetec (11-11-23 @ 10:12)  MRSA PCR Result.: Negative (11-11-23 @ 10:12)  Legionella Antigen, Urine: Negative (11-11-23 @ 10:01)  Procalcitonin, Serum: 0.10 (11-11-23 @ 06:55)      INFLAMMATORY MARKERS      RADIOLOGY & ADDITIONAL TESTS:  I have personally reviewed the last available Chest xray  CXR  Xray Chest 1 View- PORTABLE-Urgent:   ACC: [id number] EXAM:  XR CHEST PORTABLE URGENT 1V   ORDERED BY: ARDEN SMITH     PROCEDURE DATE:  08/29/2024          INTERPRETATION:  Clinical History / Reason for exam: Follow-up    Comparison : Chest radiograph August 29, 2024.    Technique/Positioning: Low lung volume.    Findings:    Support devices: NGT with its tip below the diaphragm and right IJ line   with its tip overlying the SVC.    Cardiac/mediastinum/hilum: Stable    Lung parenchyma/Pleura: Bibasilar opacities. No pneumothorax is seen.    Skeleton/soft tissues: Stable    Impression:    Low lung volume.    Bibasilar opacities.    Support tubes and lines as above.    --- End of Report ---            TRAN CAMPOVERDE MD; Attending Radiologist  This document has been electronically signed. Aug 30 2024  1:36AM (08-29-24 @ 22:52)      CT  CT Abdomen and Pelvis No Cont:   ACC: [id number] EXAM:  CT ABDOMEN AND PELVIS   ORDERED BY: ARDEN SMITH     PROCEDURE DATE:  08/27/2024          INTERPRETATION:  CLINICAL HISTORY: Rule out retroperitoneal bleed.    TECHNIQUE: Contiguous axial CT images were obtained from the lower chest   to the pubic symphysis without intravenous contrast. NONE ( ).  Oral   contrast was not administered.  Reformatted images in the coronal and   sagittal planes were acquired.    COMPARISON: CT dated 8/24/2024.      FINDINGS:      Study limited without intravenous contrast.    Partially imaged numerous hyperdense left chest wall structures,   measuring up to at least 14.1 cm, new from 8/24/2024, suggestive of   hematoma.    Rectal fecal impaction, increased from prior. Feeding tube tip in  stomach. Likely vicarious excretion of contrast via gallbladder. Bladder   contract around Pinon catheter precluding assessment. Remainder findings   are without significant change on the short-term follow-up CT-see prior   report.      IMPRESSION:    Partially imaged numerous hyperdense left chest wall structures,   measuring up to at least 14.1 cm, new from 8/24/2024, suggestive of   hematomas. Recommend evaluated with dedicated chest CT.    Rectal fecal impaction, increased from prior.    Bilateral cystic adnexal gaseous redemonstrated. Recommend GYN   evaluation/further workup.    --- End of Report ---            FAUZIA SUH MD; Attending Radiologist  This document has been electronically signed. Aug 27 2024 12:00PM (08-27-24 @ 10:51)      CARDIOLOGY TESTING  12 Lead ECG:   Ventricular Rate 105 BPM    Atrial Rate 105 BPM    P-R Interval 156 ms    QRS Duration 64 ms    Q-T Interval 338 ms    QTC Calculation(Bazett) 446 ms    P Axis 35 degrees    R Axis -25 degrees    T Axis 21 degrees    Diagnosis Line Sinus tachycardia  Low voltage QRS  Inferior infarct , age undetermined  Abnormal ECG    Confirmed by Tran Del Valle ([id number]) on 8/28/2024 9:16:49 AM (08-27-24 @ 01:54)  12 Lead ECG:   Ventricular Rate 127 BPM    Atrial Rate 127 BPM    P-R Interval 150 ms    QRS Duration 84 ms    Q-T Interval 298 ms    QTC Calculation(Bazett) 433 ms    P Axis 46 degrees    R Axis -25 degrees    T Axis 31 degrees    Diagnosis Line Sinus tachycardia  Inferior-posterior infarct , age undetermined  Abnormal ECG    Confirmed by ERIN JOHNSTON MDFIM ([id number]) on 8/24/2024 10:37:59 PM (08-24-24 @ 00:20)      MEDICATIONS  albuterol/ipratropium for Nebulization 3 Nebulizer every 12 hours  artificial tears (preservative free) Ophthalmic Solution 1 Both EYES two times a day  aspirin  chewable 81 Oral daily  carbidopa/levodopa  10/100 1 Oral three times a day  chlorhexidine 2% Cloths 1 Topical daily  clotrimazole 1% Cream 1 Topical two times a day  famotidine    Tablet 20 Oral daily  hydrocortisone 1% Cream 1 Topical two times a day  lactated ringers. 1000 IV Continuous <Continuous>  latanoprost 0.005% Ophthalmic Solution 1 Both EYES at bedtime  levothyroxine 50 Oral daily  metoprolol tartrate 12.5 Oral every 12 hours  polyethylene glycol 3350 17 Oral two times a day  senna 2 Oral at bedtime  timolol 0.5% Solution 1 Both EYES two times a day      WEIGHT  Weight (kg): 95.6 (08-25-24 @ 22:13)  Creatinine: 1.4 mg/dL (08-30-24 @ 05:27)      ANTIBIOTICS:      All available historical records have been reviewed

## 2024-08-30 NOTE — PROGRESS NOTE ADULT - ASSESSMENT
Impression:     Cardiopulmonary arrest approximately 10 minutes  Acute hypoxemic respiratory failure SP extubation   Likely aspiration event   Shock now off Levophed    Left chest wall hematomas   Symptomatic anemia   Possible UTI    G+ cocci bacteremia.  Possibly contaminant   JENN   Moderate to large stool burden on admission   HO Lewy body dementia   HO COPD   HO abdominal surgery    PLAN:    CNS:  Repeat CTH unchanged.  CW carbidopa/levodopa.        HEENT: Oral care.  Speech and swallow     PULMONARY:  HOB @ 45 degrees. Wean O2 as tolerated.  Aspiration precautions     CARDIOVASCULAR: Goal directed fluid resuscitation. TTE poor test. LR 50 cc per hour while NPO.  Low dose Beta blockers     GI: GI prophylaxis.  NG feeding.  Continue bowel regimen enema       RENAL:  Follow up lytes.  Correct as needed.  Voiding trial.  Bladder scans.  Monitory UO     INFECTIOUS DISEASE: Follow up cultures. Finish Cefepime curse.  FU cultures     HEMATOLOGICAL:  DVT prophylaxis Seq. LE duplex neg.  One Unit PRBC. Trend CBC and coags       ENDOCRINE:  Follow up FS.  Insulin protocol if needed. CW synthroid. Check TSH.      MUSCULOSKELETAL: Bed rest.  Off loading.    DC TLC   Palliative care eval.   Poor prognosis   SDU  DW  at the bed side.

## 2024-08-30 NOTE — PROGRESS NOTE ADULT - ASSESSMENT
ASSESSMENT  84-year-old woman with a history of Lewy body dementia (AAO x 2 at baseline), Parkinson's, COPD not on home oxygen, presented from [facility] with presenting complaints of AMS and Tachypnea, Patient was A&Ox0 on arrival, unable to provide history.       IMPRESSION  #Sepsis present on admission with encephalopathy- suspected UTI?  Pt has an acute illness which poses threat to bodily function   - CT Angio Chest PE Protocol w/ IV Cont (08.24.24 @ 01:36): 1. Urinary bladder mild pericystic stranding, could be attributed to  urinary bladder infection in the appropriate clinical setting.  - WBC Count: 19.08 K/uL (08.23.24 @ 23:19) on admission  - UA 8/24 negative - although taken after antibiotics  - Blood Cx 8/23 NG    #Cardiac Arrest 8/26    #Fever post-cardiac arrest  - Blood Cx 8/16 Coag negative staph - likely contaminant     #Acute Anemia Large Chest Wall Hematoma 8/27  -CT Abdomen and Pelvis No Cont (08.27.24 @ 10:51): Partially imaged numerous hyperdense left chest wall structures,  measuring up to at least 14.1 cm, new from 8/24/2024, suggestive of  hematomas. Recommend evaluated with dedicated chest CT. Rectal fecal impaction, increased from prior. Bilateral cystic adnexal gaseous redemonstrated. Recommend GYN  evaluation/further workup.    #Ovarian Cystic Leions  - CT Abd/pelvis 8/24:  2. Increased ovarian 2.5 cm cystic lesion. Unchanged right ovarian 7 cm  cystic mass. Outpatient pelvic ultrasound recommended for further evaluation.    #Constipation  #Lewy Body Dementia AO x2 at baseline  #Parkinson's   #COPD     #Obesity BMI (kg/m2): 35.8  #DM   #Abx allergy: ciprofloxacin (Unknown)  penicillin (Anaphylaxis)    RECOMMENDATIONS  - currently off antibiotics -- ok with holding and monitoring WBC and fevers   - aspiration precautions     Please call or message on Microsoft Teams if with any questions.  Spectra [phone]

## 2024-08-30 NOTE — PROGRESS NOTE ADULT - SUBJECTIVE AND OBJECTIVE BOX
Patient is a 84y old  Female who presents with a chief complaint of Altered Mental Status and Increased WOB (30 Aug 2024 10:24)      INTERVAL HPI/OVERNIGHT EVENTS: Today is hospital day 6. Patient is now extubated and currently on BIPAP. Transient episode of Afib overnight. AC held due to left chest wall hematomas. Hg back down to 6.6. 1 unit PRBCs given.    REVIEW OF SYSTEMS:  All ROS negative except in HPI    FAMILY HISTORY:  No pertinent family history in first degree relatives      Parameters below as of 30 Aug 2024 08:00  Patient On (Oxygen Delivery Method): nasal cannula  O2 Flow (L/min): 2      PHYSICAL EXAM:  T(C): 37.7 (08-30-24 @ 08:00), Max: 37.7 (08-30-24 @ 08:00)  HR: 95 (08-30-24 @ 09:00) (58 - 121)  BP: 111/55 (08-30-24 @ 09:00) (99/50 - 141/63)  RR: 26 (08-30-24 @ 09:00) (16 - 37)  SpO2: 98% (08-30-24 @ 09:00) (93% - 100%)Vital Signs Last 24 Hrs    CONSTITUTIONAL: Ill appearing NAD,  ENT: Airway patent, Mouth with normal mucosa.   EYES: Pupils equal, Round and reactive to light.  CARDIAC: Normal rate, Regular rhythm.    RESPIRATORY: +diffuse rhonchi bilaterally  GASTROINTESTINAL: Abdomen mildly distended, +hard No guarding, + BS  MUSCULOSKELETAL: No clubbing, cyanosis  NEUROLOGICAL: does not follow commands. moves all extremities responds to painful stimuli  SKIN: Skin normal color for race, Warm and dry; no evidence of rash      Consultant(s) Notes Reviewed:  [x] YES  [ ] NO  Care Discussed with Consultants/Other Providers [x] YES  [ ] NO    LABS:  Culture - Blood (collected 08-28-24 @ 11:13)  Source: .Blood None  Preliminary Report (08-29-24 @ 22:02):    No growth at 24 hours    Culture - Blood (collected 08-26-24 @ 14:45)  Source: .Blood Blood  Gram Stain (08-27-24 @ 20:25):    Growth in aerobic bottle: Gram Positive Cocci in Clusters  Final Report (08-28-24 @ 13:18):    Growth in aerobic bottle: Staphylococcus hominis    Isolation of Coagulase negative Staphylococcus from single blood culture    sets may represent    contamination. Contact the Microbiology Department at [phone] if    susceptibility testing is    clinically indicated.    Direct identification is available within approximately 3-5    hours either by Blood Panel Multiplexed PCR or Direct    MALDI-TOF. Details: [email or web address]  Organism: Blood Culture PCR (08-28-24 @ 13:18)  Organism: Blood Culture PCR (08-28-24 @ 13:18)      -  Coagulase negative Staphylococcus: Detec      Method Type: PCR    RADIOLOGY & ADDITIONAL TESTS:  CT AB/P 08/27/2024   IMPRESSION:  Partially imaged numerous hyperdense left chest wall structures,   measuring up to at least 14.1 cm, new from 8/24/2024, suggestive of   hematomas. Recommend evaluated with dedicated chest CT.  Rectal fecal impaction, increased from prior.  Bilateral cystic adnexal gaseous redemonstrated. Recommend GYN   evaluation/further workup.      Medications:  albuterol/ipratropium for Nebulization 3 milliLiter(s) Nebulizer every 12 hours  artificial tears (preservative free) Ophthalmic Solution 1 Drop(s) Both EYES two times a day  aspirin  chewable 81 milliGRAM(s) Oral daily  carbidopa/levodopa  10/100 1 Tablet(s) Oral three times a day  chlorhexidine 2% Cloths 1 Application(s) Topical daily  clotrimazole 1% Cream 1 Application(s) Topical two times a day  famotidine    Tablet 20 milliGRAM(s) Oral daily  hydrocortisone 1% Cream 1 Application(s) Topical two times a day  lactated ringers. 1000 milliLiter(s) IV Continuous <Continuous>  latanoprost 0.005% Ophthalmic Solution 1 Drop(s) Both EYES at bedtime  levothyroxine 50 MICROGram(s) Oral daily  metoprolol tartrate 12.5 milliGRAM(s) Oral every 12 hours  polyethylene glycol 3350 17 Gram(s) Oral two times a day  senna 2 Tablet(s) Oral at bedtime  timolol 0.5% Solution 1 Drop(s) Both EYES two times a day    Mode: CPAP with PS, PEEP: 8, PS: 5, MAP: 12, PIP: 14    HEALTH ISSUES - PROBLEM Dx:  Cardiac arrest    Acute respiratory failure with hypoxia    Lewy body dementia    Palliative care by specialist

## 2024-08-30 NOTE — SWALLOW BEDSIDE ASSESSMENT ADULT - COMMENTS
VFSS Nov 2023, recs for soft and bite w/ moderately thick. Per spouse @ [facility] they had her on Mildly thick despite our recs, family concerns, and w/o reported repeat instrumental swallow study.

## 2024-08-30 NOTE — PROGRESS NOTE ADULT - ASSESSMENT
84yFemale with history of lewy body dementia with autonomic dysfunction wiht dysphagia, Parkinson's, COPD on home O2, AAOx0-1 presented with severe sepsis.  Patient with cardiac arrest in last 24 hours with ROSC after 10 minutes. Palliative care consulted for GOC.    Spoke with patient's  over the phone. Patient was medically extubated.  He noted that after she was medically extubated, he made her DNR/DNI again.  HE wishes to maintain DNR/DNI.    We discussed that the patient now has an NGT. DIscussed that we will need to discuss GOC again if the patient is not able to transition off NGT.  All questions answered.    #Cardiac Arrest  #Respiratory Failure  #Shock  #Lewy Body Dementia    Recommendations  -DNR/DNI  -ongoing medical management  -NGT feeds for now  -continued GOC  -will follow      Education about palliative care provided to patient/family.  See Recs below.    Please call [phone] with questions or concerns 24/7.   We will continue to follow.

## 2024-08-30 NOTE — PROGRESS NOTE ADULT - SUBJECTIVE AND OBJECTIVE BOX
Patient is a 84y old  Female who presents with a chief complaint of Altered Mental Status and Increased WOB (29 Aug 2024 15:12)        Over Night Events:  Extubated yesterday.  Off pressors.  On NC.          ROS:     All ROS are negative except HPI         PHYSICAL EXAM    ICU Vital Signs Last 24 Hrs  T(C): 37.7 (30 Aug 2024 08:00), Max: 37.7 (30 Aug 2024 08:00)  T(F): 99.8 (30 Aug 2024 08:00), Max: 99.8 (30 Aug 2024 08:00)  HR: 104 (30 Aug 2024 08:00) (52 - 121)  BP: 118/56 (30 Aug 2024 08:00) (63/34 - 151/65)  BP(mean): 80 (30 Aug 2024 08:00) (43 - 94)  ABP: --  ABP(mean): --  RR: 28 (30 Aug 2024 08:00) (16 - 37)  SpO2: 98% (30 Aug 2024 08:00) (93% - 100%)    O2 Parameters below as of 30 Aug 2024 08:00  Patient On (Oxygen Delivery Method): nasal cannula  O2 Flow (L/min): 2          CONSTITUTIONAL:  Well nourished.  NAD    ENT:   Airway patent,   Mouth with normal mucosa.     EYES:   Pupils equal,   Round and reactive to light.    CARDIAC:   Normal rate,   Regular rhythm.      RESPIRATORY:   No wheezing  Bilateral BS  Normal chest expansion  Not tachypneic,  No use of accessory muscles    GASTROINTESTINAL:  Abdomen soft,   Non-tender,   No guarding,   + BS    MUSCULOSKELETAL:   No clubbing, cyanosis    NEUROLOGICAL:   Alert  not following commands     SKIN:   Skin normal color for race,   No evidence of rash.      08-29-24 @ 07:01  -  08-30-24 @ 07:00  --------------------------------------------------------  IN:    Dexmedetomidine: 21.6 mL    Lactated Ringers: 675 mL    Norepinephrine: 73.8 mL  Total IN: 770.4 mL    OUT:    Blood Loss (mL): 2 mL    Indwelling Catheter - Urethral (mL): 550 mL  Total OUT: 552 mL    Total NET: 218.4 mL      08-30-24 @ 07:01  -  08-30-24 @ 08:54  --------------------------------------------------------  IN:    Lactated Ringers: 150 mL  Total IN: 150 mL    OUT:    Blood Loss (mL): 75 mL    Dexmedetomidine: 0 mL    Norepinephrine: 0 mL  Total OUT: 75 mL    Total NET: 75 mL          LABS:                            6.6    11.16 )-----------( 180      ( 30 Aug 2024 05:27 )             21.0                                               08-30    137  |  102  |  43<H>  ----------------------------<  84  3.5   |  19  |  1.4    Creatinine Trend  BUN 43, Cr 1.4, (08-30-24 @ 05:27)  Creatinine Trend  BUN 51, Cr 1.5, (08-29-24 @ 05:10)  Creatinine Trend  BUN 59, Cr 1.9, (08-28-24 @ 05:05)  Creatinine Trend  BUN 55, Cr 1.7, (08-27-24 @ 04:20)  Creatinine Trend  BUN 52, Cr 1.5, (08-26-24 @ 14:45)  Creatinine Trend  BUN 47, Cr 1.0, (08-26-24 @ 06:49)      Ca    7.6<L>      30 Aug 2024 05:27  Phos  2.4     08-30  Mg     2.1     08-30    TPro  4.5<L>  /  Alb  2.7<L>  /  TBili  0.5  /  DBili  x   /  AST  20  /  ALT  <5  /  AlkPhos  44  08-30                                             Urinalysis Basic - ( 30 Aug 2024 05:27 )    Color: x / Appearance: x / SG: x / pH: x  Gluc: 84 mg/dL / Ketone: x  / Bili: x / Urobili: x   Blood: x / Protein: x / Nitrite: x   Leuk Esterase: x / RBC: x / WBC x   Sq Epi: x / Non Sq Epi: x / Bacteria: x                                                  LIVER FUNCTIONS - ( 30 Aug 2024 05:27 )  Alb: 2.7 g/dL / Pro: 4.5 g/dL / ALK PHOS: 44 U/L / ALT: <5 U/L / AST: 20 U/L / GGT: x                                                  Culture - Blood (collected 28 Aug 2024 11:13)  Source: .Blood None  Preliminary Report (29 Aug 2024 22:02):    No growth at 24 hours                                                   Mode: CPAP with PS  PEEP: 8  PS: 5  MAP: 12  PIP: 14                                      ABG - ( 29 Aug 2024 15:49 )  pH, Arterial: 7.40  pH, Blood: x     /  pCO2: 32    /  pO2: 111   / HCO3: 20    / Base Excess: -4.5  /  SaO2: 97.4                MEDICATIONS  (STANDING):  artificial tears (preservative free) Ophthalmic Solution 1 Drop(s) Both EYES two times a day  ascorbic acid 500 milliGRAM(s) Oral daily  aspirin  chewable 81 milliGRAM(s) Oral daily  carbidopa/levodopa  10/100 1 Tablet(s) Oral three times a day  chlorhexidine 2% Cloths 1 Application(s) Topical daily  cholecalciferol 1000 Unit(s) Oral daily  clotrimazole 1% Cream 1 Application(s) Topical two times a day  dexMEDEtomidine Infusion 0.2 MICROgram(s)/kG/Hr (4.78 mL/Hr) IV Continuous <Continuous>  famotidine    Tablet 20 milliGRAM(s) Oral daily  heparin   Injectable 5000 Unit(s) SubCutaneous every 8 hours  hydrocortisone 1% Cream 1 Application(s) Topical two times a day  lactated ringers. 1000 milliLiter(s) (150 mL/Hr) IV Continuous <Continuous>  lactated ringers. 1000 milliLiter(s) (75 mL/Hr) IV Continuous <Continuous>  latanoprost 0.005% Ophthalmic Solution 1 Drop(s) Both EYES at bedtime  levothyroxine 50 MICROGram(s) Oral daily  norepinephrine Infusion 0.05 MICROgram(s)/kG/Min (4.48 mL/Hr) IV Continuous <Continuous>  polyethylene glycol 3350 17 Gram(s) Oral two times a day  senna 2 Tablet(s) Oral at bedtime  timolol 0.5% Solution 1 Drop(s) Both EYES two times a day    MEDICATIONS  (PRN):  acetaminophen     Tablet .. 650 milliGRAM(s) Oral every 6 hours PRN Temp greater or equal to 38C (100.4F)  albuterol/ipratropium for Nebulization 3 milliLiter(s) Nebulizer every 6 hours PRN Shortness of Breath and/or Wheezing  melatonin 3 milliGRAM(s) Oral at bedtime PRN Insomnia      New X-rays reviewed:                                                                                  ECHO

## 2024-08-30 NOTE — PROGRESS NOTE ADULT - SUBJECTIVE AND OBJECTIVE BOX
HPI:  Patient 84-year-old woman with a history of Lewy body dementia (AAO x 2 at baseline), Parkinson's, COPD not on home oxygen, presented from [facility] with presenting complaints of AMS and Tachypnea, Patient was A&Ox0 on arrival, unable to provide history. Patient was found to be with decreased breath sounds diffusely, quick bedside ultrasound by ED showed lung sliding, no pneumothorax, was placed on BiPAP for potential hypercapnia, magnesium and nebs given, blood gas resulted with normal CO2. was weaned off to NC.  ED team held off on 30 cc/kg bolus, as patient has a history of heart failure: was given 1 L bolus. She was evaluated by cardiology for two runs of NSVTs approx 10 sec, recommended telemetry admission. Patient was started on Ertapenem, Doxycycline and Ceftriaxone in NH for suspected UTI. At baseline patient is AXO x3, has difficulty finding words, has impaired memory but has meaningful conversation. On my visit patient saturating 95 % on RA lying comfortably in bed, is oriented to place and person and nodding to questions. Patient endorses no abdominal pain, chest pain, SOB or fever.     Interval history  -Patient seen at bedside  -She is awake but was unable to participate in exam     ADVANCE DIRECTIVES:     [ ] Full Code [x ] DNR  MOLST  [ ]  Living Will  [ ]   DECISION MAKER(s):  [x ] Health Care Proxy(s)  [ ] Surrogate(s)  [ ] Guardian           Name(s): Phone Number(s):  Contreras Calvin      BASELINE (I)ADL(s) (prior to admission):    [place]: [ ]Total  [ ] Moderate [ ]Dependent  Palliative Performance Status Version 2:         %    http://npcrc.org/files/news/palliative_performance_scale_ppsv2.pdf    Allergies    Originally Entered as [Unknown] reaction to [red pepper] (Unknown)  ciprofloxacin (Unknown)  benzodiazepines (Unknown)  antichlolinergics (Unknown)  penicillin (Anaphylaxis)  Originally Entered as [Unknown] reaction to [pepper] (Unknown)  Originally Entered as [Unknown] reaction to [green pepper] (Unknown)  Originally Entered as [Unknown] reaction to [neuroleptics] (Unknown)    Intolerances    MEDICATIONS  (STANDING):  albuterol/ipratropium for Nebulization 3 milliLiter(s) Nebulizer every 12 hours  artificial tears (preservative free) Ophthalmic Solution 1 Drop(s) Both EYES two times a day  aspirin  chewable 81 milliGRAM(s) Oral daily  carbidopa/levodopa  10/100 1 Tablet(s) Oral three times a day  chlorhexidine 2% Cloths 1 Application(s) Topical daily  clotrimazole 1% Cream 1 Application(s) Topical two times a day  famotidine    Tablet 20 milliGRAM(s) Oral daily  hydrocortisone 1% Cream 1 Application(s) Topical two times a day  lactated ringers. 1000 milliLiter(s) (50 mL/Hr) IV Continuous <Continuous>  latanoprost 0.005% Ophthalmic Solution 1 Drop(s) Both EYES at bedtime  levothyroxine 50 MICROGram(s) Oral daily  metoprolol tartrate 12.5 milliGRAM(s) Oral every 12 hours  polyethylene glycol 3350 17 Gram(s) Oral two times a day  senna 2 Tablet(s) Oral at bedtime  timolol 0.5% Solution 1 Drop(s) Both EYES two times a day    MEDICATIONS  (PRN):    PRESENT SYMPTOMS: [x ]Unable to obtain due to poor mentation   Source if other than patient:  [ ]Family   [ ]Team     Pain: [ ]yes [ ]no  ALL PAIN ASSESSMENT COMPONENTS WERE ASKED ABOUT UNLESS OTHERWISE NOTED  QOL impact -   Location -                    Aggravating factors -  Quality -  Radiation -  Timing-  Severity (0-10 scale):  Minimal acceptable level (0-10 scale):     CPOT:  0  https://www.sccm.org/getattachment/gvc20h87-0z2i-0b1r-3k4e-2433t8447z2z/Critical-Care-Pain-Observation-Tool-(CPOT)    PAIN AD Score:   http://geriatrictoolkit.[email or web address]/cog/painad.pdf (press ctrl +  left click to view)    Dyspnea:                           [ ]None[ ]Mild [ ]Moderate [ ]Severe     Respiratory Distress Observation Scale (RDOS): 0  A score of 0 to 2 signifies little or no respiratory distress, 3 signifies mild distress, scores 4 to 6 indicate moderate distress, and scores greater than 7 signify severe distress  [email or web address]://[email or web address]/sites/default/files/PDFS/467732-aqgcsyedqef-mmhiilea-gerjdpkwghn-lzgvi.pdf    Anxiety:                             [ ]None[ ]Mild [ ]Moderate [ ]Severe   Fatigue:                             [ ]None[ ]Mild [ ]Moderate [ ]Severe   Nausea:                             [ ]None[ ]Mild [ ]Moderate [ ]Severe   Loss of appetite:              [ ]None[ ]Mild [ ]Moderate [ ]Severe   Constipation:                    [ ]None[ ]Mild [ ]Moderate [ ]Severe    Other Symptoms:  [ ]All other review of systems negative     Palliative Performance Status Version 2:         30%    http://[email or web address].org/files/news/palliative_performance_scale_[email or web address].pdf    PHYSICAL EXAM:  Vital Signs Last 24 Hrs  T(C): 37.4 (30 Aug 2024 16:00), Max: 37.7 (30 Aug 2024 08:00)  T(F): 99.4 (30 Aug 2024 16:00), Max: 99.8 (30 Aug 2024 08:00)  HR: 101 (30 Aug 2024 16:00) (94 - 127)  BP: 121/59 (30 Aug 2024 16:00) (96/52 - 130/58)  BP(mean): 85 (30 Aug 2024 16:00) (71 - 85)  RR: 31 (30 Aug 2024 16:00) (21 - 36)  SpO2: 96% (30 Aug 2024 16:00) (93% - 99%)    Parameters below as of 30 Aug 2024 16:00  Patient On (Oxygen Delivery Method): nasal cannula  O2 Flow (L/min): 2      GENERAL:  [x ] No acute distress [ ]Lethargic  [ ]Unarousable  [ ]Verbal  [x ]Non-Verbal [ ]Cachexia    BEHAVIORAL/PSYCH:  [ ]Alert and Oriented x  [ ] Anxiety [ ] Delirium [ ] Agitation [ x] Calm   EYES: [x ] No scleral icterus [ ] Scleral icterus [ ] Closed  ENMT:  [ ]Dry mouth  [x ]No external oral lesions [ ] No external ear or nose lesions  CARDIOVASCULAR:  [ ]Regular [ ]Irregular [ ]Tachy [ ]Not Tachy  [ ]Jarocho [ ] Edema [ ] No edema  PULMONARY:  [ ]Tachypnea  [ ]Audible excessive secretions [ x] No labored breathing [ ] labored breathing  GASTROINTESTINAL: [ ]Soft  [ ]Distended  [ x]Not distended [ ]Non tender [ ]Tender  MUSCULOSKELETAL: [ ]No clubbing [ ] clubbing  [ x] No cyanosis [ ] cyanosis  NEUROLOGIC: [ ]No focal deficits  [ ]Follows commands  [x ]Does not follow commands  [ ]Cognitive impairment  [ ]Dysphagia  [ ]Dysarthria  [ ]Paresis   SKIN: [ ] Jaundiced [x ] Non-jaundiced [ ]Rash [ ]No Rash [ ] Warm [ ] Dry  MISC/LINES: [ x] ET tube [ ] Trach [ ]NGT/OGT [ ]PEG [ ]Pinon    LABS: reviewed by me                                   6.6    11.16 )-----------( 180      ( 30 Aug 2024 05:27 )             21.0       08-30    137  |  102  |  43<H>  ----------------------------<  84  3.5   |  19  |  1.4    Ca    7.6<L>      30 Aug 2024 05:27  Phos  2.4     08-30  Mg     2.1     08-30    TPro  4.5<L>  /  Alb  2.7<L>  /  TBili  0.5  /  DBili  x   /  AST  20  /  ALT  <5  /  AlkPhos  44  08-30          ABG - ( 29 Aug 2024 15:49 )  pH, Arterial: 7.40  pH, Blood: x     /  pCO2: 32    /  pO2: 111   / HCO3: 20    / Base Excess: -4.5  /  SaO2: 97.4                Urinalysis Basic - ( 30 Aug 2024 05:27 )    Color: x / Appearance: x / SG: x / pH: x  Gluc: 84 mg/dL / Ketone: x  / Bili: x / Urobili: x   Blood: x / Protein: x / Nitrite: x   Leuk Esterase: x / RBC: x / WBC x   Sq Epi: x / Non Sq Epi: x / Bacteria: x                  CAPILLARY BLOOD GLUCOSE                  RADIOLOGY & ADDITIONAL STUDIES: reviewed by me    < from: Xray Chest 1 View- PORTABLE-Routine (08.30.24 @ 06:03) >  Findings/  impression:    Support devices: Stable enteric tube courses below the diaphragm. Stable   right IJ central venous catheter. ET tube is not visualized.    Cardiac/mediastinum/hilum: Unchanged.    Lung parenchyma/Pleura: Unchanged left lung opacities. No pneumothorax.    Skeleton/soft tissues: Unchanged.    < end of copied text >        EKG: reviewed by me    < from: 12 Lead ECG (08.27.24 @ 01:54) >  Ventricular Rate 105 BPM    Atrial Rate 105 BPM    P-R Interval 156 ms    QRS Duration 64 ms    Q-T Interval 338 ms    QTC Calculation(Bazett) 446 ms    P Axis 35 degrees    R Axis -25 degrees    T Axis 21 degrees    Diagnosis Line Sinus tachycardia  Low voltage QRS  Inferior infarct , age undetermined  Abnormal ECG    < end of copied text >        PROTEIN CALORIE MALNUTRITION PRESENT: [ ]mild [ ]moderate [ ]severe [ ]underweight [ ]morbid obesity  https://www.andeal.org/vault/2440/web/files/ONC/Table_Clinical%20Characteristics%20to%20Document%20Malnutrition-White%20JV%20et%20al%202012.pdf    Height (cm): 165.1 (08-25-24 @ 22:13), 162.6 (11-13-23 @ 11:38)  Weight (kg): 95.6 (08-25-24 @ 22:13)  BMI (kg/m2): 35.1 (08-25-24 @ 22:13)  [ ]PPSV2 < or = to 30% [ ]significant weight loss  [ ]poor nutritional intake  [ ]anasarca      [ ]Artificial Nutrition      Palliative Care Spiritual/Emotional Screening Tool Question  Severity (0-4):                    OR                    [ x] Unable to determine. Will assess at later time if appropriate.  Score of 2 or greater indicates recommendation of Chaplaincy and/or SW referral  Chaplaincy Referral: [ ] Yes [ ] Refused [ ] Following     Caregiver Liberty:  [ ] Yes [ ] No    OR    [x ] Unable to determine. Will assess at later time if appropriate.  Social Work Referral [ ]  Patient and Family Centered Care Referral [ ]    Anticipatory Grief Present: [ ] Yes [ ] No    OR     [ x] Unable to determine. Will assess at later time if appropriate.  Social Work Referral [ ]  Patient and Family Centered Care Referral [ ]    Patient discussed with primary medical team MD  Palliative care education provided to patient and/or family

## 2024-08-30 NOTE — PROGRESS NOTE ADULT - ASSESSMENT
83-year-old female with PMHx of Lewy Body dementia with associated autonomic dysfunction including dysphagia, Hypothyroidism, Parkinson's, COPD not on home oxygen , A&Ox0 on arrival being admitted for management of severe sepsis.     PLAN:  # Severe Sepsis on Admission ( Tachycardia 133, Tachypnea 24, Leukocytosis 19.08, Lactate 3.1 -> 4.0)   2/2 Possible Acute Complicated UTI possible pneumonitis   # Metabolic Encephalopathy likely due to UTI, improving- resolved  # COPD, stable not in exacerbation   -d/c cefepime  - f/u repeat Blood Cx   -repeat CT AB/P showing numerous L chest wall hematomas  -monitor Hg (6.6 on 8/30)  -2 units PRBCs total given  -wean O2 as tolerated    #transient Afib  -transient episode overnight 8/29/24  -start metoprolol 12.5 mg q12  -hold ac due to L chest wall hematomas and drop in Hg    #AHRF , CODE BLUE , ROSC  patient was coded in am with ROSC  intubated and transferred to ICU  Patient is now DNR/DNI.      # ? Run of NSVTs  # Raised troponins in setting of hypoxia and severe sepsis  - Cardiology consulted by ED, recommend telemetry monitoring  - Trops 142 -> 132,-->79  - f/u electrolytes and replete as necessary    # Chronic constipation 2/2 Autonomic Dysfunction 2/2 Lewy Body Dementia  with h/o recurrent distension of bowel.   # H/O right hemicolectomy   -Continue aggressive bowel regimen    # H/O ? HFpEF, Stable  # HTN   -repeat Echo non-diagnostic  - ECHO: 11 Nov 23: Normal global left ventricular systolic function with a biplane EF of 60%, Mild mitral stenosis, Mild aortic regurgitation, Mild aortic valve stenosis  - c/w aspirin 81 mg daily       # Parkinson's disease with Lewy body dementia   # Functional quadriplegia, wheelchair bound at baseline  - CT Head: No evidence of acute intracranial abnormality.   - At baseline patient is AXO x 3 according to her , patient has difficulty findings words and has meaningful conversation   - c/w Levodopa-Carbidopa 10/100 tid   -repeat head CT negative    # Vit D deficiency   - c/w Vit D 1000 daily     # Dysphagia   - soft and bite sized, moderately thick liquids as per speech evaluation last admission   -now extubated    # Hypothyroidism    - f/uTSH  -  most recent TSH 1.74 8/23 ([facility] records)   -c/w Synthroid 50 mcg daily.      # GERD   -c/w famotidine 20 mg daily     # Glaucoma   # Conjunctival Xerosis   -c/w home eye drops     # Peripheral neuropathy   - at home gabapentin 600 mg tid, holding in setting of AMS for now    follow up:   f/u repeat blood cultures. Goal directed care. Monitor bowel and urine output. f/u TSH

## 2024-08-30 NOTE — CHART NOTE - NSCHARTNOTEFT_GEN_A_CORE
MICU Transfer Note    Transfer from: MICU  Transfer to: Stepdown Unit     ----------------------------------------------------------------------------------------------------------  HPI / ICU COURSE:        Pressors during ICU course:  Antibiotics:  Lines:  Pinon:  --------------------------------------------------------------------------------------------------------  PMH/PSH:  PAST MEDICAL & SURGICAL HISTORY:  Dementia    History of breast cancer    Constipation    Lewy body dementia without behavioral disturbance    Colon polyp    History of colon resection    H/O mastectomy, right        -------------------------------------------------------------------------------------------------------  PHYSICAL EXAM:  General: NAD  HEENT: Atraumatic  Respiratory: CTAB  Cardiac: RRR  Abdomen: soft, non-tender, non-distended  Extremities: warm and well-perfused  Neuro: A+Ox4. No FND    Vital Signs Last 24 Hrs  T(C): 37.6 (30 Aug 2024 12:00), Max: 37.7 (30 Aug 2024 08:00)  T(F): 99.6 (30 Aug 2024 12:00), Max: 99.8 (30 Aug 2024 08:00)  HR: 97 (30 Aug 2024 13:00) (81 - 121)  BP: 121/59 (30 Aug 2024 13:00) (99/50 - 135/60)  BP(mean): 85 (30 Aug 2024 13:00) (64 - 87)  RR: 36 (30 Aug 2024 13:00) (17 - 37)  SpO2: 95% (30 Aug 2024 13:00) (93% - 100%)    Parameters below as of 30 Aug 2024 12:00  Patient On (Oxygen Delivery Method): nasal cannula  O2 Flow (L/min): 2      I&O's Summary    29 Aug 2024 07:01  -  30 Aug 2024 07:00  --------------------------------------------------------  IN: 770.4 mL / OUT: 552 mL / NET: 218.4 mL    30 Aug 2024 07:01  -  30 Aug 2024 14:23  --------------------------------------------------------  IN: 400 mL / OUT: 300 mL / NET: 100 mL        --------------------------------------------------------------------------------------------------------  LABS:                               6.6    11.16 )-----------( 180      ( 30 Aug 2024 05:27 )             21.0       08-30    137  |  102  |  43<H>  ----------------------------<  84  3.5   |  19  |  1.4    Ca    7.6<L>      30 Aug 2024 05:27  Phos  2.4     08-30  Mg     2.1     08-30    TPro  4.5<L>  /  Alb  2.7<L>  /  TBili  0.5  /  DBili  x   /  AST  20  /  ALT  <5  /  AlkPhos  44  08-30          ABG - ( 29 Aug 2024 15:49 )  pH, Arterial: 7.40  pH, Blood: x     /  pCO2: 32    /  pO2: 111   / HCO3: 20    / Base Excess: -4.5  /  SaO2: 97.4                CULTURE RESULTS:                08-28-24 @ 11:13  Specimen Source: --  Method Type: --  Gram Stain - RRL: --  Gram Stain - Wound: --  Bacteria: --  Culture Results:   No growth at 24 hours      Specimen Source:   Method Type: Method Type: PCR (08-26-24 @ 14:45)    Gram Stain:   Culture Results: Culture Results:   No growth at 24 hours (08-28-24 @ 11:13)  Culture Results:   Growth in aerobic bottle: Staphylococcus hominis  Isolation of Coagulase negative Staphylococcus from single blood culture  sets may represent  contamination. Contact the Microbiology Department at [phone] if  susceptibility testing is  clinically indicated.  Direct identification is available within approximately 3-5  hours either by Blood Panel Multiplexed PCR or Direct  MALDI-TOF. Details: [email or web address] (08-26-24 @ 14:45)  Culture Results:   No growth at 5 days (08-23-24 @ 23:19)  Culture Results:   No growth at 5 days (08-23-24 @ 23:19)    Bacteria:       -------------------------------------------------------------------------------------------------  RADIOLOGY:      ---------------------------------------------------------------------------------------------------  ASSESSMENT & PLAN:       FOR FOLLOW UP:  [ ]   [ ]   [ ] -----------------------------------PRELIMINARY-------------------------------------------------    MICU Transfer Note    Transfer from: MICU  Transfer to: Stepdown Unit     ----------------------------------------------------------------------------------------------------------  ICU COURSE:    Patient was admited    Pressors during ICU Course: N/A   Antibiotics: Completed  Lines: Peripheral, Midline  Pinon: Discontinued    HPI (8/24/24 H&P):    Patient 84-year-old woman with a history of Lewy body dementia (AAO x 2 at baseline), Parkinson's, COPD not on home oxygen, presented from [facility] with presenting complaints of AMS and Tachypnea, Patient was A&Ox0 on arrival, unable to provide history. Patient was found to be with decreased breath sounds diffusely, quick bedside ultrasound by ED showed lung sliding, no pneumothorax, was placed on BiPAP for potential hypercapnia, magnesium and nebs given, blood gas resulted with normal CO2. was weaned off to NC.  ED team held off on 30 cc/kg bolus, as patient has a history of heart failure: was given 1 L bolus. She was evaluated by cardiology for two runs of NSVTs approx 10 sec, recommended telemetry admission. Patient was started on Ertapenem, Doxycycline and Ceftriaxone in NH for suspected UTI. At baseline patient is AXO x3, has difficulty finding words, has impaired memory but has meaningful conversation. On my visit patient saturating 95 % on RA lying comfortably in bed, is oriented to place and person and nodding to questions. Patient endorses no abdominal pain, chest pain, SOB or fever.     --------------------------------------------------------------------------------------------------------    PAST MEDICAL & SURGICAL HISTORY:    Dementia    History of breast cancer    Constipation    Lewy body dementia without behavioral disturbance    Colon polyp    History of colon resection    H/O mastectomy, right    -------------------------------------------------------------------------------------------------------  PHYSICAL EXAM:    General: NAD  HEENT: Atraumatic  Respiratory: CTAB  Cardiac: RRR  Abdomen: soft, non-tender, non-distended  Extremities: warm and well-perfused  Neuro: A+Ox4. No FND    Vital Signs Last 24 Hrs  T(C): 37.6 (30 Aug 2024 12:00), Max: 37.7 (30 Aug 2024 08:00)  T(F): 99.6 (30 Aug 2024 12:00), Max: 99.8 (30 Aug 2024 08:00)  HR: 97 (30 Aug 2024 13:00) (81 - 121)  BP: 121/59 (30 Aug 2024 13:00) (99/50 - 135/60)  BP(mean): 85 (30 Aug 2024 13:00) (64 - 87)  RR: 36 (30 Aug 2024 13:00) (17 - 37)  SpO2: 95% (30 Aug 2024 13:00) (93% - 100%)    Parameters below as of 30 Aug 2024 12:00  Patient On (Oxygen Delivery Method): nasal cannula  O2 Flow (L/min): 2      I&O's Summary    29 Aug 2024 07:01  -  30 Aug 2024 07:00  --------------------------------------------------------  IN: 770.4 mL / OUT: 552 mL / NET: 218.4 mL    30 Aug 2024 07:01  -  30 Aug 2024 14:23  --------------------------------------------------------  IN: 400 mL / OUT: 300 mL / NET: 100 mL        --------------------------------------------------------------------------------------------------------  LABS:                 6.6    11.16 )-----------( 180      ( 30 Aug 2024 05:27 )             21.0   08-30    137  |  102  |  43<H>  ----------------------------<  84  3.5   |  19  |  1.4    Ca    7.6<L>      30 Aug 2024 05:27  Phos  2.4     08-30  Mg     2.1     08-30    TPro  4.5<L>  /  Alb  2.7<L>  /  TBili  0.5  /  DBili  x   /  AST  20  /  ALT  <5  /  AlkPhos  44  08-30    ABG - ( 29 Aug 2024 15:49 )  pH, Arterial: 7.40  pH, Blood: x     /  pCO2: 32    /  pO2: 111   / HCO3: 20    / Base Excess: -4.5  /  SaO2: 97.4      CULTURE RESULTS:                08-28-24 @ 11:13  Specimen Source: --  Method Type: --  Gram Stain - RRL: --  Gram Stain - Wound: --  Bacteria: --  Culture Results:   No growth at 24 hours    Specimen Source:   Method Type: Method Type: PCR (08-26-24 @ 14:45)    Gram Stain:   Culture Results: Culture Results:   No growth at 24 hours (08-28-24 @ 11:13)  Culture Results:   Growth in aerobic bottle: Staphylococcus hominis  Isolation of Coagulase negative Staphylococcus from single blood culture  sets may represent  contamination. Contact the Microbiology Department at [phone] if  susceptibility testing is  clinically indicated.  Direct identification is available within approximately 3-5  hours either by Blood Panel Multiplexed PCR or Direct  MALDI-TOF. Details: [email or web address] (08-26-24 @ 14:45)  Culture Results:   No growth at 5 days (08-23-24 @ 23:19)  Culture Results:   No growth at 5 days (08-23-24 @ 23:19)    Bacteria:       -------------------------------------------------------------------------------------------------  RADIOLOGY:      ---------------------------------------------------------------------------------------------------  ASSESSMENT & PLAN:     #Acute Hypoxemic Respiratory Failure  - Likely aspiration event on       # New-Onset A-fib  - Holding anticoagulation due to chest hematoma bleeding, 1x pRBC given  -     PLAN:  # Severe Sepsis on Admission ( Tachycardia 133, Tachypnea 24, Leukocytosis 19.08, Lactate 3.1 -> 4.0)   2/2 Possible Acute Complicated UTI possible pneumonitis   # Metabolic Encephalopathy likely due to UTI, improving- resolved  # COPD, stable not in exacerbation   -d/c cefepime  - f/u repeat Blood Cx   -repeat CT AB/P showing numerous L chest wall hematomas  -monitor Hg (6.6 on 8/30)  -2 units PRBCs total given  -wean O2 as tolerated    #transient Afib  -transient episode overnight 8/29/24  -start metoprolol 12.5 mg q12  -hold ac due to L chest wall hematomas and drop in Hg    #AHRF , CODE BLUE , ROSC  patient was coded in am with ROSC  intubated and transferred to ICU  Patient is now DNR/DNI.      # ? Run of NSVTs  # Raised troponins in setting of hypoxia and severe sepsis  - Cardiology consulted by ED, recommend telemetry monitoring  - Trops 142 -> 132,-->79  - f/u electrolytes and replete as necessary    # Chronic constipation 2/2 Autonomic Dysfunction 2/2 Lewy Body Dementia  with h/o recurrent distension of bowel.   # H/O right hemicolectomy   -Continue aggressive bowel regimen    # H/O ? HFpEF, Stable  # HTN   -repeat Echo non-diagnostic  - ECHO: 11 Nov 23: Normal global left ventricular systolic function with a biplane EF of 60%, Mild mitral stenosis, Mild aortic regurgitation, Mild aortic valve stenosis  - c/w aspirin 81 mg daily       # Parkinson's disease with Lewy body dementia   # Functional quadriplegia, wheelchair bound at baseline  - CT Head: No evidence of acute intracranial abnormality.   - At baseline patient is AXO x 3 according to her , patient has difficulty findings words and has meaningful conversation   - c/w Levodopa-Carbidopa 10/100 tid   -repeat head CT negative    # Vit D deficiency   - c/w Vit D 1000 daily     # Dysphagia   - soft and bite sized, moderately thick liquids as per speech evaluation last admission   -now extubated    # Hypothyroidism    - f/uTSH  -  most recent TSH 1.74 8/23 ([facility] records)   -c/w Synthroid 50 mcg daily.      # GERD   -c/w famotidine 20 mg daily     # Glaucoma   # Conjunctival Xerosis   -c/w home eye drops     # Peripheral neuropathy   - at home gabapentin 600 mg tid, holding in setting of AMS for now    follow up:   f/u repeat blood cultures. Goal directed care. Monitor bowel and urine output. f/u TSH        FOR FOLLOW UP:  [ ]   [ ]   [ ] -----------------------------------PRELIMINARY-------------------------------------------------    MICU Transfer Note    Transfer from: MICU  Transfer to: Stepdown Unit     ----------------------------------------------------------------------------------------------------------  ICU COURSE:    Patient presented to ED on 8/23 for respiratory distress, A&Ox2. Patient admitted to 3E on 8/24, awake but did not respond to questions. Upgraded to ICU on 8/26 after patient coded secondary to possible aspiration event. Two cycles completed. ICU course uncomplicated. Extubated on 8/29, total days 3. Downtrending Hgb requiring total of 2x pRBCs, most likely chest hematoma secondary to CPR. Patient had abdominal distension and ileus for 1-2 days. Bowel movements resumed with aggressive bowel regiment and mineral oil enema. Patient off pressors, on RA, and at A&Ox0 not following commands. Stable but guarded. DNR/DNI with trial of NIV.     Pressors during ICU Course: N/A   Antibiotics: Completed  Lines: Peripheral, Midline  Pinon: Discontinued    HPI (8/24/24 H&P):    Patient 84-year-old woman with a history of Lewy body dementia (AAO x 2 at baseline), Parkinson's, COPD not on home oxygen, presented from [facility] with presenting complaints of AMS and Tachypnea, Patient was A&Ox0 on arrival, unable to provide history. Patient was found to be with decreased breath sounds diffusely, quick bedside ultrasound by ED showed lung sliding, no pneumothorax, was placed on BiPAP for potential hypercapnia, magnesium and nebs given, blood gas resulted with normal CO2. was weaned off to NC.  ED team held off on 30 cc/kg bolus, as patient has a history of heart failure: was given 1 L bolus. She was evaluated by cardiology for two runs of NSVTs approx 10 sec, recommended telemetry admission. Patient was started on Ertapenem, Doxycycline and Ceftriaxone in NH for suspected UTI. At baseline patient is AXO x3, has difficulty finding words, has impaired memory but has meaningful conversation. On my visit patient saturating 95 % on RA lying comfortably in bed, is oriented to place and person and nodding to questions. Patient endorses no abdominal pain, chest pain, SOB or fever.     --------------------------------------------------------------------------------------------------------    PAST MEDICAL & SURGICAL HISTORY:    Dementia    History of breast cancer    Constipation    Lewy body dementia without behavioral disturbance    Colon polyp    History of colon resection    H/O mastectomy, right    -------------------------------------------------------------------------------------------------------  PHYSICAL EXAM:    General: NAD  HEENT: Atraumatic  Respiratory: CTAB  Cardiac: RRR  Abdomen: soft, non-tender, non-distended  Extremities: warm and well-perfused  Neuro: A+Ox4. No FND    Vital Signs Last 24 Hrs  T(C): 37.6 (30 Aug 2024 12:00), Max: 37.7 (30 Aug 2024 08:00)  T(F): 99.6 (30 Aug 2024 12:00), Max: 99.8 (30 Aug 2024 08:00)  HR: 97 (30 Aug 2024 13:00) (81 - 121)  BP: 121/59 (30 Aug 2024 13:00) (99/50 - 135/60)  BP(mean): 85 (30 Aug 2024 13:00) (64 - 87)  RR: 36 (30 Aug 2024 13:00) (17 - 37)  SpO2: 95% (30 Aug 2024 13:00) (93% - 100%)    Parameters below as of 30 Aug 2024 12:00  Patient On (Oxygen Delivery Method): nasal cannula  O2 Flow (L/min): 2      I&O's Summary    29 Aug 2024 07:01  -  30 Aug 2024 07:00  --------------------------------------------------------  IN: 770.4 mL / OUT: 552 mL / NET: 218.4 mL    30 Aug 2024 07:01  -  30 Aug 2024 14:23  --------------------------------------------------------  IN: 400 mL / OUT: 300 mL / NET: 100 mL        --------------------------------------------------------------------------------------------------------  LABS:                 6.6    11.16 )-----------( 180      ( 30 Aug 2024 05:27 )             21.0   08-30    137  |  102  |  43<H>  ----------------------------<  84  3.5   |  19  |  1.4    Ca    7.6<L>      30 Aug 2024 05:27  Phos  2.4     08-30  Mg     2.1     08-30    TPro  4.5<L>  /  Alb  2.7<L>  /  TBili  0.5  /  DBili  x   /  AST  20  /  ALT  <5  /  AlkPhos  44  08-30    ABG - ( 29 Aug 2024 15:49 )  pH, Arterial: 7.40  pH, Blood: x     /  pCO2: 32    /  pO2: 111   / HCO3: 20    / Base Excess: -4.5  /  SaO2: 97.4      CULTURE RESULTS:                08-28-24 @ 11:13  Specimen Source: --  Method Type: --  Gram Stain - RRL: --  Gram Stain - Wound: --  Bacteria: --  Culture Results:   No growth at 24 hours    Specimen Source:   Method Type: Method Type: PCR (08-26-24 @ 14:45)    Gram Stain:   Culture Results: Culture Results:   No growth at 24 hours (08-28-24 @ 11:13)  Culture Results:   Growth in aerobic bottle: Staphylococcus hominis  Isolation of Coagulase negative Staphylococcus from single blood culture  sets may represent  contamination. Contact the Microbiology Department at [phone] if  susceptibility testing is  clinically indicated.  Direct identification is available within approximately 3-5  hours either by Blood Panel Multiplexed PCR or Direct  MALDI-TOF. Details: [email or web address] (08-26-24 @ 14:45)  Culture Results:   No growth at 5 days (08-23-24 @ 23:19)  Culture Results:   No growth at 5 days (08-23-24 @ 23:19)    Bacteria:       -------------------------------------------------------------------------------------------------  RADIOLOGY:      ---------------------------------------------------------------------------------------------------  ASSESSMENT & PLAN:     #Acute Hypoxemic Respiratory Failure  - Likely aspiration event on       # New-Onset A-fib  - Holding anticoagulation due to chest hematoma bleeding, 1x pRBC given  -     PLAN:  # Severe Sepsis on Admission ( Tachycardia 133, Tachypnea 24, Leukocytosis 19.08, Lactate 3.1 -> 4.0)   2/2 Possible Acute Complicated UTI possible pneumonitis   # Metabolic Encephalopathy likely due to UTI, improving- resolved  # COPD, stable not in exacerbation   -d/c cefepime  - f/u repeat Blood Cx   -repeat CT AB/P showing numerous L chest wall hematomas  -monitor Hg (6.6 on 8/30)  -2 units PRBCs total given  -wean O2 as tolerated    #transient Afib  -transient episode overnight 8/29/24  -start metoprolol 12.5 mg q12  -hold ac due to L chest wall hematomas and drop in Hg    #AHRF , CODE BLUE , ROSC  patient was coded in am with ROSC  intubated and transferred to ICU  Patient is now DNR/DNI.      # ? Run of NSVTs  # Raised troponins in setting of hypoxia and severe sepsis  - Cardiology consulted by ED, recommend telemetry monitoring  - Trops 142 -> 132,-->79  - f/u electrolytes and replete as necessary    # Chronic constipation 2/2 Autonomic Dysfunction 2/2 Lewy Body Dementia  with h/o recurrent distension of bowel.   # H/O right hemicolectomy   -Continue aggressive bowel regimen    # H/O ? HFpEF, Stable  # HTN   -repeat Echo non-diagnostic  - ECHO: 11 Nov 23: Normal global left ventricular systolic function with a biplane EF of 60%, Mild mitral stenosis, Mild aortic regurgitation, Mild aortic valve stenosis  - c/w aspirin 81 mg daily       # Parkinson's disease with Lewy body dementia   # Functional quadriplegia, wheelchair bound at baseline  - CT Head: No evidence of acute intracranial abnormality.   - At baseline patient is AXO x 3 according to her , patient has difficulty findings words and has meaningful conversation   - c/w Levodopa-Carbidopa 10/100 tid   -repeat head CT negative    # Vit D deficiency   - c/w Vit D 1000 daily     # Dysphagia   - soft and bite sized, moderately thick liquids as per speech evaluation last admission   -now extubated    # Hypothyroidism    - f/uTSH  -  most recent TSH 1.74 8/23 ([facility] records)   -c/w Synthroid 50 mcg daily.      # GERD   -c/w famotidine 20 mg daily     # Glaucoma   # Conjunctival Xerosis   -c/w home eye drops     # Peripheral neuropathy   - at home gabapentin 600 mg tid, holding in setting of AMS for now    follow up:   f/u repeat blood cultures. Goal directed care. Monitor bowel and urine output. f/u TSH        FOR FOLLOW UP:  [ ]   [ ]   [ ] -----------------------------------PRELIMINARY-------------------------------------------------    MICU Transfer Note    Transfer from: MICU  Transfer to: Stepdown Unit     ----------------------------------------------------------------------------------------------------------  ICU COURSE:    Patient presented to ED on 8/23 for respiratory distress, A&Ox2. Patient admitted to 3E on 8/24, awake but did not respond to questions. Upgraded to ICU on 8/26 after patient coded secondary to possible aspiration event. Two cycles completed. ICU course uncomplicated. Extubated on 8/29, total days 3. Downtrending Hgb requiring total of 2x pRBCs, most likely chest hematoma secondary to CPR. Patient had abdominal distension and ileus for 1-2 days. Bowel movements resumed with aggressive bowel regiment and mineral oil enema. Patient off pressors, on RA, and at A&Ox0 not following commands. Stable but guarded. DNR/DNI with trial of NIV, 8/30/24.     Pressors: Levophed, discontinued.  Antibiotics: Cefepime, 5d, completed.   Lines: Peripheral, Midline  Pinon: Discontinued    HPI (8/24/24 H&P):    Patient 84-year-old woman with a history of Lewy body dementia (AAO x 2 at baseline), Parkinson's, COPD not on home oxygen, presented from [facility] with presenting complaints of AMS and Tachypnea, Patient was A&Ox0 on arrival, unable to provide history. Patient was found to be with decreased breath sounds diffusely, quick bedside ultrasound by ED showed lung sliding, no pneumothorax, was placed on BiPAP for potential hypercapnia, magnesium and nebs given, blood gas resulted with normal CO2. was weaned off to NC.  ED team held off on 30 cc/kg bolus, as patient has a history of heart failure: was given 1 L bolus. She was evaluated by cardiology for two runs of NSVTs approx 10 sec, recommended telemetry admission. Patient was started on Ertapenem, Doxycycline and Ceftriaxone in NH for suspected UTI. At baseline patient is AXO x3, has difficulty finding words, has impaired memory but has meaningful conversation. On my visit patient saturating 95 % on RA lying comfortably in bed, is oriented to place and person and nodding to questions. Patient endorses no abdominal pain, chest pain, SOB or fever.     --------------------------------------------------------------------------------------------------------    PAST MEDICAL & SURGICAL HISTORY:    Dementia    History of breast cancer    Constipation    Lewy body dementia without behavioral disturbance    Colon polyp    History of colon resection    H/O mastectomy, right    -------------------------------------------------------------------------------------------------------  PHYSICAL EXAM:    General: NAD  HEENT: Atraumatic  Respiratory: CTAB  Cardiac: RRR  Abdomen: soft, non-tender, non-distended  Extremities: warm and well-perfused  Neuro: A+Ox4. No FND    Vital Signs Last 24 Hrs  T(C): 37.6 (30 Aug 2024 12:00), Max: 37.7 (30 Aug 2024 08:00)  T(F): 99.6 (30 Aug 2024 12:00), Max: 99.8 (30 Aug 2024 08:00)  HR: 97 (30 Aug 2024 13:00) (81 - 121)  BP: 121/59 (30 Aug 2024 13:00) (99/50 - 135/60)  BP(mean): 85 (30 Aug 2024 13:00) (64 - 87)  RR: 36 (30 Aug 2024 13:00) (17 - 37)  SpO2: 95% (30 Aug 2024 13:00) (93% - 100%)    Parameters below as of 30 Aug 2024 12:00  Patient On (Oxygen Delivery Method): nasal cannula  O2 Flow (L/min): 2      I&O's Summary    29 Aug 2024 07:01  -  30 Aug 2024 07:00  --------------------------------------------------------  IN: 770.4 mL / OUT: 552 mL / NET: 218.4 mL    30 Aug 2024 07:01  -  30 Aug 2024 14:23  --------------------------------------------------------  IN: 400 mL / OUT: 300 mL / NET: 100 mL        --------------------------------------------------------------------------------------------------------  LABS:                 6.6    11.16 )-----------( 180      ( 30 Aug 2024 05:27 )             21.0   08-30    137  |  102  |  43<H>  ----------------------------<  84  3.5   |  19  |  1.4    Ca    7.6<L>      30 Aug 2024 05:27  Phos  2.4     08-30  Mg     2.1     08-30    TPro  4.5<L>  /  Alb  2.7<L>  /  TBili  0.5  /  DBili  x   /  AST  20  /  ALT  <5  /  AlkPhos  44  08-30    ABG - ( 29 Aug 2024 15:49 )  pH, Arterial: 7.40  pH, Blood: x     /  pCO2: 32    /  pO2: 111   / HCO3: 20    / Base Excess: -4.5  /  SaO2: 97.4      CULTURE RESULTS:                08-28-24 @ 11:13  Specimen Source: --  Method Type: --  Gram Stain - RRL: --  Gram Stain - Wound: --  Bacteria: --  Culture Results:   No growth at 24 hours    Specimen Source:   Method Type: Method Type: PCR (08-26-24 @ 14:45)    Gram Stain:   Culture Results: Culture Results:   No growth at 24 hours (08-28-24 @ 11:13)  Culture Results:   Growth in aerobic bottle: Staphylococcus hominis  Isolation of Coagulase negative Staphylococcus from single blood culture  sets may represent  contamination. Contact the Microbiology Department at [phone] if  susceptibility testing is  clinically indicated.  Direct identification is available within approximately 3-5  hours either by Blood Panel Multiplexed PCR or Direct  MALDI-TOF. Details: [email or web address] (08-26-24 @ 14:45)  Culture Results:   No growth at 5 days (08-23-24 @ 23:19)  Culture Results:   No growth at 5 days (08-23-24 @ 23:19)    -------------------------------------------------------------------------------------------------    RADIOLOGY:      ---------------------------------------------------------------------------------------------------  ASSESSMENT & PLAN:     #Acute Hypoxemic Respiratory Failure  -     # New-Onset A-fib  - Holding anticoagulation due to Hgb of 6.7 secondary to chest hematoma from CPR, 1x pRBC given 8/30  - Continue rate control, metoprolol 12.5 mg BID  - Continue sequentials for DVT prophylaxis    -repeat CT AB/P showing numerous L chest wall hematomas  -monitor Hg (6.6 on 8/30)  -2 units PRBCs total given  -wean O2 as tolerated    # Code Blue  - 8/26: Coded secondary to likely aspiration event  - DNR/DNI on admission,  rescinded during code  - 2 cycles, 1x epi, 1x calcium gluconate, ROSC achieved, lost, PEA  - 1 cycle, 1x epi, intubated, ROSC achieved  - Patient again made DNR/DNI by      # ? Run of NSVTs  # Raised troponins in setting of hypoxia and severe sepsis  - Cardiology consulted by ED, recommend telemetry monitoring  - Trops 142 -> 132,-->79  - f/u electrolytes and replete as necessary    # Chronic constipation 2/2 Autonomic Dysfunction 2/2 Lewy Body Dementia  with h/o recurrent distension of bowel.   # H/O right hemicolectomy   -Continue aggressive bowel regimen    # Severe Sepsis   -d/c cefepime  - f/u repeat Blood Cx     # Parkinson's Disease with Lewy Body Dementia   - CT Head negative x3  - EEG negative  - Baseline functional quadriplegic, wheelchair bound, A&Ox3  - Continue carbidopa/levodopa 10/100 TID    # Dysphagia   - NGT, feeds as ordered  - Speech consult recommends     # Hypothyroidism    - 8/30: TSH 1.93  - Continue levothyroxine 50 mcg daily    # GERD   - Continue famotidine 20 mg daily=    # Metabolic Encephalopathy  - Improved, stable    # Glaucoma   # Conjunctival Xerosis   # Peripheral Neuropathy     FOR FOLLOW UP:  [ ]   [ ]   [ ] MICU Transfer Note    Transfer from: MICU  Transfer to: Stepdown Unit     ----------------------------------------------------------------------------------------------------------  ICU COURSE:    Patient presented to ED on 8/23 for respiratory distress, A&Ox2. Patient admitted to  on 8/24, awake but did not respond to questions. Upgraded to ICU on 8/26 after patient coded secondary to possible aspiration event. Two cycles completed. ICU course uncomplicated. Extubated on 8/29, total days 3. Downtrending Hgb requiring total of 2x pRBCs, most likely chest hematoma secondary to CPR. Patient had abdominal distension and ileus for 1-2 days. Bowel movements resumed with aggressive bowel regiment and mineral oil enema. Patient off pressors, on RA, and at A&Ox0 not following commands. Stable but guarded. DNR/DNI with trial of NIV, 8/30/24.     Pressors: Levophed, discontinued.  Antibiotics: Cefepime, 5d, completed.   Lines: Peripheral, Midline  Pinon: Discontinued    HPI (8/24/24 H&P):    Patient 84-year-old woman with a history of Lewy body dementia (AAO x 2 at baseline), Parkinson's, COPD not on home oxygen, presented from [facility] with presenting complaints of AMS and Tachypnea, Patient was A&Ox0 on arrival, unable to provide history. Patient was found to be with decreased breath sounds diffusely, quick bedside ultrasound by ED showed lung sliding, no pneumothorax, was placed on BiPAP for potential hypercapnia, magnesium and nebs given, blood gas resulted with normal CO2. was weaned off to NC.  ED team held off on 30 cc/kg bolus, as patient has a history of heart failure: was given 1 L bolus. She was evaluated by cardiology for two runs of NSVTs approx 10 sec, recommended telemetry admission. Patient was started on Ertapenem, Doxycycline and Ceftriaxone in NH for suspected UTI. At baseline patient is AXO x3, has difficulty finding words, has impaired memory but has meaningful conversation. On my visit patient saturating 95 % on RA lying comfortably in bed, is oriented to place and person and nodding to questions. Patient endorses no abdominal pain, chest pain, SOB or fever.     --------------------------------------------------------------------------------------------------------    PAST MEDICAL & SURGICAL HISTORY:    Dementia    History of breast cancer    Constipation    Lewy body dementia without behavioral disturbance    Colon polyp    History of colon resection    H/O mastectomy, right    -------------------------------------------------------------------------------------------------------  PHYSICAL EXAM:    General: NAD  HEENT: Atraumatic  Respiratory: Diffuse rhonchi  Cardiac: RRR  Abdomen: Mildly hard, somewhat non-tender, mildly distended  Extremities: Warm, cyanotic L hand  Neuro: A+Ox0. Moves upper extremity    Vital Signs Last 24 Hrs  T(C): 37.6 (30 Aug 2024 12:00), Max: 37.7 (30 Aug 2024 08:00)  T(F): 99.6 (30 Aug 2024 12:00), Max: 99.8 (30 Aug 2024 08:00)  HR: 97 (30 Aug 2024 13:00) (81 - 121)  BP: 121/59 (30 Aug 2024 13:00) (99/50 - 135/60)  BP(mean): 85 (30 Aug 2024 13:00) (64 - 87)  RR: 36 (30 Aug 2024 13:00) (17 - 37)  SpO2: 95% (30 Aug 2024 13:00) (93% - 100%)    Parameters below as of 30 Aug 2024 12:00  Patient On (Oxygen Delivery Method): nasal cannula  O2 Flow (L/min): 2    I&O's Summary    29 Aug 2024 07:01  -  30 Aug 2024 07:00  --------------------------------------------------------  IN: 770.4 mL / OUT: 552 mL / NET: 218.4 mL    30 Aug 2024 07:01  -  30 Aug 2024 14:23  --------------------------------------------------------  IN: 400 mL / OUT: 300 mL / NET: 100 mL    --------------------------------------------------------------------------------------------------------  LABS:                 6.6    11.16 )-----------( 180      ( 30 Aug 2024 05:27 )             21.0   08-30    137  |  102  |  43<H>  ----------------------------<  84  3.5   |  19  |  1.4    Ca    7.6<L>      30 Aug 2024 05:27  Phos  2.4     08-30  Mg     2.1     08-30    TPro  4.5<L>  /  Alb  2.7<L>  /  TBili  0.5  /  DBili  x   /  AST  20  /  ALT  <5  /  AlkPhos  44  08-30    ABG - ( 29 Aug 2024 15:49 )  pH, Arterial: 7.40  pH, Blood: x     /  pCO2: 32    /  pO2: 111   / HCO3: 20    / Base Excess: -4.5  /  SaO2: 97.4      Gram Stain:   Culture Results: Culture Results:   No growth at 24 hours (08-28-24 @ 11:13)  Culture Results:   Growth in aerobic bottle: Staphylococcus hominis  Isolation of Coagulase negative Staphylococcus from single blood culture  sets may represent  contamination. Contact the Microbiology Department at [phone] if  susceptibility testing is  clinically indicated.  Direct identification is available within approximately 3-5  hours either by Blood Panel Multiplexed PCR or Direct  MALDI-TOF. Details: [email or web address] (08-26-24 @ 14:45)  Culture Results:   No growth at 5 days (08-23-24 @ 23:19)  Culture Results:   No growth at 5 days (08-23-24 @ 23:19)    -------------------------------------------------------------------------------------------------    RADIOLOGY:    Comparison : Chest radiograph August 29, 2024.    Technique/Positioning: Frontal chest radiograph.    Findings/  impression:    Support devices: Stable enteric tube courses below the diaphragm. Stable   right IJ central venous catheter. ET tube is not visualized.    Cardiac/mediastinum/hilum: Unchanged.    Lung parenchyma/Pleura: Unchanged left lung opacities. No pneumothorax.    Skeleton/soft tissues: Unchanged.    ---------------------------------------------------------------------------------------------------  ASSESSMENT & PLAN:     #Acute Hypoxemic Respiratory Failure  - I    # New-Onset A-fib  - Holding anticoagulation due to Hgb of 6.7 secondary to chest hematoma from CPR, 1x pRBC given 8/30  - Continue rate control, metoprolol 12.5 mg BID  - Continue sequentials for DVT prophylaxis    -repeat CT AB/P showing numerous L chest wall hematomas  -monitor Hg (6.6 on 8/30)  -2 units PRBCs total given  -wean O2 as tolerated    # Code Blue  - 8/26: Coded secondary to likely aspiration event  - DNR/DNI on admission,  rescinded during code  - 2 cycles, 1x epi, 1x calcium gluconate, ROSC achieved, lost, PEA  - 1 cycle, 1x epi, intubated, ROSC achieved  - Patient again made DNR/DNI by    - CT Abdomen/Pelvis showing numerous L chest wall hematomas    # Chronic Constipation   - Secondary to autonomic dysfunction, right hemicolectomy   - Continue aggressive bowel regimen    # Severe Sepsis     - CT Angio Chest PE Protocol w/ IV Cont (08.24.24 @ 01:36): 1. Urinary bladder mild pericystic stranding, could be attributed to  urinary bladder infection in the appropriate clinical setting.  - WBC Count: 19.08 K/uL (08.23.24 @ 23:19) on admission  - UA 8/24 negative - although taken after antibiotics  - Blood Cx 8/23 NG    # Parkinson's Disease with Lewy Body Dementia   - CT Head negative x3  - EEG negative  - Baseline functional quadriplegic, wheelchair bound, A&Ox3  - Continue carbidopa/levodopa 10/100 TID    # Dysphagia   - NGT, feeds as ordered  - Speech consult recommends     # Hypothyroidism    - 8/30: TSH 1.93  - Continue levothyroxine 50 mcg daily    # GERD   - Continue famotidine 20 mg daily=    # Metabolic Encephalopathy  - Improved, stable    # Glaucoma   # Conjunctival Xerosis   # Peripheral Neuropathy     FOR FOLLOW UP:  [ ]   [ ]   [ ] MICU Transfer Note    Transfer from: MICU  Transfer to: Stepdown Unit     ----------------------------------------------------------------------------------------------------------  ICU COURSE:    Patient presented to ED on 8/23 for respiratory distress, A&Ox2. Patient admitted to  on 8/24, awake but did not respond to questions. Upgraded to ICU on 8/26 after patient coded secondary to possible aspiration event. Two cycles completed. ICU course uncomplicated. Extubated on 8/29, total days 3. Downtrending Hgb requiring total of 2x pRBCs, most likely chest hematoma secondary to CPR. Patient had abdominal distension and ileus for 1-2 days. Bowel movements resumed with aggressive bowel regiment and mineral oil enema. Patient off pressors, on RA, and at A&Ox0 not following commands. Stable but guarded. DNR/DNI with trial of NIV, 8/30/24.     Pressors: Levophed, discontinued.  Antibiotics: Cefepime, 5d, completed.   Lines: Peripheral, Midline  Pinon: Discontinued    HPI (8/24/24 H&P):    Patient 84-year-old woman with a history of Lewy body dementia (AAO x 2 at baseline), Parkinson's, COPD not on home oxygen, presented from [facility] with presenting complaints of AMS and Tachypnea, Patient was A&Ox0 on arrival, unable to provide history. Patient was found to be with decreased breath sounds diffusely, quick bedside ultrasound by ED showed lung sliding, no pneumothorax, was placed on BiPAP for potential hypercapnia, magnesium and nebs given, blood gas resulted with normal CO2. was weaned off to NC.  ED team held off on 30 cc/kg bolus, as patient has a history of heart failure: was given 1 L bolus. She was evaluated by cardiology for two runs of NSVTs approx 10 sec, recommended telemetry admission. Patient was started on Ertapenem, Doxycycline and Ceftriaxone in NH for suspected UTI. At baseline patient is AXO x3, has difficulty finding words, has impaired memory but has meaningful conversation. On my visit patient saturating 95 % on RA lying comfortably in bed, is oriented to place and person and nodding to questions. Patient endorses no abdominal pain, chest pain, SOB or fever.     --------------------------------------------------------------------------------------------------------    PAST MEDICAL & SURGICAL HISTORY:    Dementia    History of breast cancer    Constipation    Lewy body dementia without behavioral disturbance    Colon polyp    History of colon resection    H/O mastectomy, right    -------------------------------------------------------------------------------------------------------  PHYSICAL EXAM:    General: NAD  HEENT: Atraumatic  Respiratory: Diffuse rhonchi  Cardiac: RRR  Abdomen: Mildly hard, somewhat non-tender, mildly distended  Extremities: Warm, cyanotic L hand  Neuro: A+Ox0. Moves upper extremity    Vital Signs Last 24 Hrs  T(C): 37.6 (30 Aug 2024 12:00), Max: 37.7 (30 Aug 2024 08:00)  T(F): 99.6 (30 Aug 2024 12:00), Max: 99.8 (30 Aug 2024 08:00)  HR: 97 (30 Aug 2024 13:00) (81 - 121)  BP: 121/59 (30 Aug 2024 13:00) (99/50 - 135/60)  BP(mean): 85 (30 Aug 2024 13:00) (64 - 87)  RR: 36 (30 Aug 2024 13:00) (17 - 37)  SpO2: 95% (30 Aug 2024 13:00) (93% - 100%)    Parameters below as of 30 Aug 2024 12:00  Patient On (Oxygen Delivery Method): nasal cannula  O2 Flow (L/min): 2    I&O's Summary    29 Aug 2024 07:01  -  30 Aug 2024 07:00  --------------------------------------------------------  IN: 770.4 mL / OUT: 552 mL / NET: 218.4 mL    30 Aug 2024 07:01  -  30 Aug 2024 14:23  --------------------------------------------------------  IN: 400 mL / OUT: 300 mL / NET: 100 mL    --------------------------------------------------------------------------------------------------------  LABS:                 6.6    11.16 )-----------( 180      ( 30 Aug 2024 05:27 )             21.0   08-30    137  |  102  |  43<H>  ----------------------------<  84  3.5   |  19  |  1.4    Ca    7.6<L>      30 Aug 2024 05:27  Phos  2.4     08-30  Mg     2.1     08-30    TPro  4.5<L>  /  Alb  2.7<L>  /  TBili  0.5  /  DBili  x   /  AST  20  /  ALT  <5  /  AlkPhos  44  08-30    ABG - ( 29 Aug 2024 15:49 )  pH, Arterial: 7.40  pH, Blood: x     /  pCO2: 32    /  pO2: 111   / HCO3: 20    / Base Excess: -4.5  /  SaO2: 97.4      Gram Stain:   Culture Results: Culture Results:   No growth at 24 hours (08-28-24 @ 11:13)  Culture Results:   Growth in aerobic bottle: Staphylococcus hominis  Isolation of Coagulase negative Staphylococcus from single blood culture  sets may represent  contamination. Contact the Microbiology Department at [phone] if  susceptibility testing is  clinically indicated.  Direct identification is available within approximately 3-5  hours either by Blood Panel Multiplexed PCR or Direct  MALDI-TOF. Details: [email or web address] (08-26-24 @ 14:45)  Culture Results:   No growth at 5 days (08-23-24 @ 23:19)  Culture Results:   No growth at 5 days (08-23-24 @ 23:19)    -------------------------------------------------------------------------------------------------    RADIOLOGY:    Comparison : Chest radiograph August 29, 2024.    Technique/Positioning: Frontal chest radiograph.    Findings/  impression:    Support devices: Stable enteric tube courses below the diaphragm. Stable   right IJ central venous catheter. ET tube is not visualized.    Cardiac/mediastinum/hilum: Unchanged.    Lung parenchyma/Pleura: Unchanged left lung opacities. No pneumothorax.    Skeleton/soft tissues: Unchanged.    ---------------------------------------------------------------------------------------------------  ASSESSMENT & PLAN:     # New-Onset A-fib  - Holding anticoagulation due to low Hgb   - Continue rate control, metoprolol 12.5 mg BID  - Continue sequentials for DVT prophylaxis    # Code Blue  - 8/26: Coded secondary to likely aspiration event  - DNR/DNI on admission,  rescinded during code  - 2 cycles, 1x epi, 1x calcium gluconate, ROSC achieved, lost, PEA  - 1 cycle, 1x epi, intubated, ROSC achieved  - Patient again made DNR/DNI by    - CT Abdomen/Pelvis showing numerous L chest wall hematomas  - Trend Hgb, 6.6 on 8/30  - Total 2x pRBCs given    # Severe Sepsis on Admission  - 8/23: Blood culture negative  - 8/24: CT Angio Chest urinary bladder mild pericystic stranding, could be attributed to urinary bladder infection in the appropriate clinical setting  - 8/24: UA negative  - Cefepime 2g q12h, 5 days completed    # Chronic Constipation   - Secondary to autonomic dysfunction, right hemicolectomy   - Continue aggressive bowel regime    # Parkinson's Disease with Lewy Body Dementia   - CT Head negative x3  - EEG negative  - Baseline functional quadriplegic, wheelchair bound, A&Ox3  - Continue carbidopa/levodopa 10/100 TID    # Dysphagia   - NGT, feeds as ordered  - Speech consult recommends instrumental swallow study 9/3    # Hypothyroidism    - 8/30: TSH 1.93  - Continue levothyroxine 50 mcg daily    # GERD   - Continue famotidine 20 mg daily=    # Metabolic Encephalopathy  - Improved, stable    # Glaucoma   # Conjunctival Xerosis   # Peripheral Neuropathy     FOR FOLLOW UP:  [ ] Trend CBC  [ ] Aggressive bowel regimen MICU Transfer Note    Transfer from: MICU  Transfer to: Stepdown Unit     ----------------------------------------------------------------------------------------------------------  ICU COURSE:    Patient presented to ED on 8/23 for respiratory distress, A&Ox2. Patient admitted to  on 8/24, awake but did not respond to questions. Upgraded to ICU on 8/26 after patient coded secondary to possible aspiration event. Two cycles completed. ICU course uncomplicated. Extubated on 8/29, total days 3. Downtrending Hgb requiring total of 2x pRBCs, most likely chest hematoma secondary to CPR. Patient had abdominal distension and ileus for 1-2 days. Bowel movements resumed with aggressive bowel regiment and mineral oil enema. Patient off pressors, on RA, and at A&Ox0 not following commands. Stable but guarded. DNR/DNI with trial of NIV, 8/30/24.     Pressors: Levophed, discontinued.  Antibiotics: Cefepime, 5d, completed.   Lines: Peripheral, Midline  Pinon: Discontinued    HPI (8/24/24 H&P):    Patient 84-year-old woman with a history of Lewy body dementia (AAO x 2 at baseline), Parkinson's, COPD not on home oxygen, presented from [facility] with presenting complaints of AMS and Tachypnea, Patient was A&Ox0 on arrival, unable to provide history. Patient was found to be with decreased breath sounds diffusely, quick bedside ultrasound by ED showed lung sliding, no pneumothorax, was placed on BiPAP for potential hypercapnia, magnesium and nebs given, blood gas resulted with normal CO2. was weaned off to NC.  ED team held off on 30 cc/kg bolus, as patient has a history of heart failure: was given 1 L bolus. She was evaluated by cardiology for two runs of NSVTs approx 10 sec, recommended telemetry admission. Patient was started on Ertapenem, Doxycycline and Ceftriaxone in NH for suspected UTI. At baseline patient is AXO x3, has difficulty finding words, has impaired memory but has meaningful conversation. On my visit patient saturating 95 % on RA lying comfortably in bed, is oriented to place and person and nodding to questions. Patient endorses no abdominal pain, chest pain, SOB or fever.     FOR FOLLOW UP:  [ ] Trend CBC  [ ] Aggressive bowel regimen  [ ] Bladder scans    --------------------------------------------------------------------------------------------------------    PAST MEDICAL & SURGICAL HISTORY:    Dementia    History of breast cancer    Constipation    Lewy body dementia without behavioral disturbance    Colon polyp    History of colon resection    H/O mastectomy, right    -------------------------------------------------------------------------------------------------------  PHYSICAL EXAM:    General: NAD  HEENT: Atraumatic  Respiratory: Diffuse rhonchi  Cardiac: RRR  Abdomen: Mildly hard, somewhat non-tender, mildly distended  Extremities: Warm, cyanotic L hand  Neuro: A+Ox0. Moves upper extremity    Vital Signs Last 24 Hrs  T(C): 37.6 (30 Aug 2024 12:00), Max: 37.7 (30 Aug 2024 08:00)  T(F): 99.6 (30 Aug 2024 12:00), Max: 99.8 (30 Aug 2024 08:00)  HR: 97 (30 Aug 2024 13:00) (81 - 121)  BP: 121/59 (30 Aug 2024 13:00) (99/50 - 135/60)  BP(mean): 85 (30 Aug 2024 13:00) (64 - 87)  RR: 36 (30 Aug 2024 13:00) (17 - 37)  SpO2: 95% (30 Aug 2024 13:00) (93% - 100%)    Parameters below as of 30 Aug 2024 12:00  Patient On (Oxygen Delivery Method): nasal cannula  O2 Flow (L/min): 2    I&O's Summary    29 Aug 2024 07:01  -  30 Aug 2024 07:00  --------------------------------------------------------  IN: 770.4 mL / OUT: 552 mL / NET: 218.4 mL    30 Aug 2024 07:01  -  30 Aug 2024 14:23  --------------------------------------------------------  IN: 400 mL / OUT: 300 mL / NET: 100 mL    --------------------------------------------------------------------------------------------------------  LABS:                 6.6    11.16 )-----------( 180      ( 30 Aug 2024 05:27 )             21.0   08-30    137  |  102  |  43<H>  ----------------------------<  84  3.5   |  19  |  1.4    Ca    7.6<L>      30 Aug 2024 05:27  Phos  2.4     08-30  Mg     2.1     08-30    TPro  4.5<L>  /  Alb  2.7<L>  /  TBili  0.5  /  DBili  x   /  AST  20  /  ALT  <5  /  AlkPhos  44  08-30    ABG - ( 29 Aug 2024 15:49 )  pH, Arterial: 7.40  pH, Blood: x     /  pCO2: 32    /  pO2: 111   / HCO3: 20    / Base Excess: -4.5  /  SaO2: 97.4      Gram Stain:   Culture Results: Culture Results:   No growth at 24 hours (08-28-24 @ 11:13)  Culture Results:   Growth in aerobic bottle: Staphylococcus hominis  Isolation of Coagulase negative Staphylococcus from single blood culture  sets may represent  contamination. Contact the Microbiology Department at [phone] if  susceptibility testing is  clinically indicated.  Direct identification is available within approximately 3-5  hours either by Blood Panel Multiplexed PCR or Direct  MALDI-TOF. Details: [email or web address] (08-26-24 @ 14:45)  Culture Results:   No growth at 5 days (08-23-24 @ 23:19)  Culture Results:   No growth at 5 days (08-23-24 @ 23:19)    -------------------------------------------------------------------------------------------------    RADIOLOGY:    Comparison : Chest radiograph August 29, 2024.    Technique/Positioning: Frontal chest radiograph.    Findings/  impression:    Support devices: Stable enteric tube courses below the diaphragm. Stable   right IJ central venous catheter. ET tube is not visualized.    Cardiac/mediastinum/hilum: Unchanged.    Lung parenchyma/Pleura: Unchanged left lung opacities. No pneumothorax.    Skeleton/soft tissues: Unchanged.    ---------------------------------------------------------------------------------------------------  ASSESSMENT & PLAN:     # New-Onset Paroxysmal A-fib  - As seen on telemetry 8/30, currently NSR  - Troponin 161 on 8/27  - Holding anticoagulation due to low Hgb in the setting of L chest wall hematoma  - Continue rate control, metoprolol 12.5 mg BID  - Continue sequentials for DVT prophylaxis until Hgb stabilized    # Severe Sepsis on Admission  - 8/23: Blood culture negative  - 8/24: CT Angio Chest urinary bladder mild pericystic stranding, could be attributed to urinary bladder infection in the appropriate clinical setting  - 8/24: UA negative  - Cefepime 2g q12h, 5 days completed  - Monitor, off antibiotics, ID following    # Cardiopulmonary Arrest Secondary to Aspiration  - 8/26: Coded secondary to likely aspiration event  - DNR/DNI on admission,  rescinded during code  - 2 cycles, 1x epi, 1x calcium gluconate, ROSC achieved, lost, PEA  - 1 cycle, 1x epi, intubated, ROSC achieved  - Patient again made DNR/DNI by    - CT Abdomen/Pelvis showing numerous L chest wall hematomas  - Trend Hgb, 6.6 on 8/30  - Total 2x pRBCs given    # Dysphagia   - NGT, feeds as ordered  - Speech consult recommends instrumental swallow study 9/3    # Chronic Constipation   - Secondary to autonomic dysfunction, right hemicolectomy   - Continue aggressive bowel regime    # Parkinson's Disease with Lewy Body Dementia   - CT Head negative x3  - EEG negative  - Baseline functional quadriplegic, wheelchair bound, A&Ox3  - Continue carbidopa/levodopa 10/100 TID    # Hypothyroidism    - 8/30: TSH 1.93  - Continue levothyroxine 50 mcg daily    # GERD   - Continue famotidine 20 mg daily    # Metabolic Encephalopathy  - Improved, stable    # Conjunctival Xerosis   # Glaucoma   - Continue with eye drops    # Peripheral Neuropathy  - Gabapentin held in setting of encephalopathy    -------------------------------------------------------------------------------------------------------------------------------------  #DVT PPX: Sequentials  #GI PPX: Famotidine  #Diet: NGT, per speech  #Code Status: DNR/DNI with NIV Trial  #Activity Order: Increase as tolerated  #Dispo/Needs: SDU    #Handoff  - Follow Hgb, GOC, monitor mental status.

## 2024-08-31 LAB
ALBUMIN SERPL ELPH-MCNC: 2.2 G/DL — LOW (ref 3.5–5.2)
ALP SERPL-CCNC: 30 U/L — SIGNIFICANT CHANGE UP (ref 30–115)
ALT FLD-CCNC: 6 U/L — SIGNIFICANT CHANGE UP (ref 0–41)
ANION GAP SERPL CALC-SCNC: 12 MMOL/L — SIGNIFICANT CHANGE UP (ref 7–14)
AST SERPL-CCNC: 27 U/L — SIGNIFICANT CHANGE UP (ref 0–41)
BASE EXCESS BLDA CALC-SCNC: -4.9 MMOL/L — LOW (ref -2–3)
BILIRUB SERPL-MCNC: 0.8 MG/DL — SIGNIFICANT CHANGE UP (ref 0.2–1.2)
BUN SERPL-MCNC: 40 MG/DL — HIGH (ref 10–20)
CALCIUM SERPL-MCNC: 7.1 MG/DL — LOW (ref 8.4–10.5)
CHLORIDE SERPL-SCNC: 106 MMOL/L — SIGNIFICANT CHANGE UP (ref 98–110)
CO2 SERPL-SCNC: 18 MMOL/L — SIGNIFICANT CHANGE UP (ref 17–32)
CREAT SERPL-MCNC: 1.2 MG/DL — SIGNIFICANT CHANGE UP (ref 0.7–1.5)
EGFR: 45 ML/MIN/1.73M2 — LOW
GAS PNL BLDA: SIGNIFICANT CHANGE UP
GLUCOSE SERPL-MCNC: 63 MG/DL — LOW (ref 70–99)
HCO3 BLDA-SCNC: 18 MMOL/L — LOW (ref 21–28)
HCT VFR BLD CALC: 26.7 % — LOW (ref 37–47)
HGB BLD-MCNC: 8.6 G/DL — LOW (ref 12–16)
MAGNESIUM SERPL-MCNC: 1.9 MG/DL — SIGNIFICANT CHANGE UP (ref 1.8–2.4)
MCHC RBC-ENTMCNC: 28.7 PG — SIGNIFICANT CHANGE UP (ref 27–31)
MCHC RBC-ENTMCNC: 32.2 G/DL — SIGNIFICANT CHANGE UP (ref 32–37)
MCV RBC AUTO: 89 FL — SIGNIFICANT CHANGE UP (ref 81–99)
NRBC # BLD: 0 /100 WBCS — SIGNIFICANT CHANGE UP (ref 0–0)
PCO2 BLDA: 28 MMHG — LOW (ref 32–45)
PH BLDA: 7.42 — SIGNIFICANT CHANGE UP (ref 7.35–7.45)
PLATELET # BLD AUTO: 199 K/UL — SIGNIFICANT CHANGE UP (ref 130–400)
PMV BLD: 9.6 FL — SIGNIFICANT CHANGE UP (ref 7.4–10.4)
PO2 BLDA: 77 MMHG — LOW (ref 83–108)
POTASSIUM SERPL-MCNC: 3.7 MMOL/L — SIGNIFICANT CHANGE UP (ref 3.5–5)
POTASSIUM SERPL-SCNC: 3.7 MMOL/L — SIGNIFICANT CHANGE UP (ref 3.5–5)
PROT SERPL-MCNC: 3.1 G/DL — LOW (ref 6–8)
RBC # BLD: 3 M/UL — LOW (ref 4.2–5.4)
RBC # FLD: 22.3 % — HIGH (ref 11.5–14.5)
SAO2 % BLDA: 96.6 % — SIGNIFICANT CHANGE UP (ref 94–98)
SODIUM SERPL-SCNC: 136 MMOL/L — SIGNIFICANT CHANGE UP (ref 135–146)
WBC # BLD: 12.22 K/UL — HIGH (ref 4.8–10.8)
WBC # FLD AUTO: 12.22 K/UL — HIGH (ref 4.8–10.8)

## 2024-08-31 PROCEDURE — 99233 SBSQ HOSP IP/OBS HIGH 50: CPT

## 2024-08-31 PROCEDURE — 99291 CRITICAL CARE FIRST HOUR: CPT

## 2024-08-31 PROCEDURE — 93010 ELECTROCARDIOGRAM REPORT: CPT

## 2024-08-31 RX ADMIN — IPRATROPIUM BROMIDE AND ALBUTEROL SULFATE 3 MILLILITER(S): .5; 3 SOLUTION RESPIRATORY (INHALATION) at 08:20

## 2024-08-31 RX ADMIN — CHLORHEXIDINE GLUCONATE 1 APPLICATION(S): 40 SOLUTION TOPICAL at 11:41

## 2024-08-31 RX ADMIN — Medication 1 TABLET(S): at 21:31

## 2024-08-31 RX ADMIN — Medication 81 MILLIGRAM(S): at 11:41

## 2024-08-31 RX ADMIN — Medication 50 MILLILITER(S): at 05:08

## 2024-08-31 RX ADMIN — Medication 1 TABLET(S): at 05:08

## 2024-08-31 RX ADMIN — TIMOLOL MALEATE 1 DROP(S): 5 SOLUTION/ DROPS OPHTHALMIC at 17:23

## 2024-08-31 RX ADMIN — IPRATROPIUM BROMIDE AND ALBUTEROL SULFATE 3 MILLILITER(S): .5; 3 SOLUTION RESPIRATORY (INHALATION) at 19:24

## 2024-08-31 RX ADMIN — POLYETHYLENE GLYCOL 3350 17 GRAM(S): 17 POWDER, FOR SOLUTION ORAL at 17:21

## 2024-08-31 RX ADMIN — Medication 1 APPLICATION(S): at 05:08

## 2024-08-31 RX ADMIN — Medication 2 TABLET(S): at 21:32

## 2024-08-31 RX ADMIN — POLYETHYLENE GLYCOL 3350 17 GRAM(S): 17 POWDER, FOR SOLUTION ORAL at 05:07

## 2024-08-31 RX ADMIN — POVIDONE, PROPYLENE GLYCOL 1 DROP(S): 6.8; 3 LIQUID OPHTHALMIC at 05:09

## 2024-08-31 RX ADMIN — METOPROLOL TARTRATE 12.5 MILLIGRAM(S): 100 TABLET ORAL at 05:08

## 2024-08-31 RX ADMIN — LATANOPROST 1 DROP(S): 50 SOLUTION OPHTHALMIC at 21:34

## 2024-08-31 RX ADMIN — TIMOLOL MALEATE 1 DROP(S): 5 SOLUTION/ DROPS OPHTHALMIC at 05:08

## 2024-08-31 RX ADMIN — Medication 1 APPLICATION(S): at 05:09

## 2024-08-31 RX ADMIN — Medication 1 APPLICATION(S): at 17:23

## 2024-08-31 RX ADMIN — Medication 1 TABLET(S): at 13:05

## 2024-08-31 RX ADMIN — METOPROLOL TARTRATE 12.5 MILLIGRAM(S): 100 TABLET ORAL at 17:21

## 2024-08-31 RX ADMIN — Medication 50 MICROGRAM(S): at 05:08

## 2024-08-31 RX ADMIN — FAMOTIDINE 20 MILLIGRAM(S): 10 INJECTION INTRAVENOUS at 11:41

## 2024-08-31 RX ADMIN — POVIDONE, PROPYLENE GLYCOL 1 DROP(S): 6.8; 3 LIQUID OPHTHALMIC at 17:23

## 2024-08-31 NOTE — PROGRESS NOTE ADULT - ATTENDING COMMENTS
84-year-old woman with a history of Lewy body dementia (AAO x 2 at baseline), Parkinson's, COPD not on home oxygen, presented from [facility] with presenting complaints of AMS and Tachypnea, Patient was A&Ox0 on arrival, unable to provide history. Patient was found to be with decreased breath sounds diffusely, quick bedside ultrasound by ED showed lung sliding, no pneumothorax, was placed on BiPAP for potential hypercapnia, magnesium and nebs given, blood gas resulted with normal CO2. was weaned off to NC.  ED team held off on 30 cc/kg bolus, as patient has a history of heart failure: was given 1 L bolus. She was evaluated by cardiology for two runs of NSVTs approx 10 sec, recommended telemetry admission. Patient was started on Ertapenem, Doxycycline and Ceftriaxone in NH for suspected UTI. At baseline patient is AXO x3, has difficulty finding words, has impaired memory but has meaningful conversation. Pt was found in  Cardiopulmonary Arrest Secondary to Aspiration on 8/26,    rescinded during code, pt was resuscitated, developed extensive soft tissue hematoma on chest wall after CPR, received blood transfusion.   Initially she was admitted to ICU, downgraded to SDU on 8/30       A/P  # New-Onset Paroxysmal A-fib  - telemetry monitoring, currently NSR  - no on AC due to low Hgb in the setting of L chest wall hematoma  - c/w metoprolol 12.5 mg BID  - monitor and replete electrolytes     # Severe Sepsis on Admission  - resolved now   - blood and urine Cx are negative to date   - 8/24: CT Angio Chest urinary bladder mild pericystic stranding, could be attributed to urinary bladder infection in the appropriate clinical setting  -  completed 5 day of Cefepime   -  ID following    # Cardiopulmonary Arrest Secondary to Aspiration  - DNR/DNI on admission,  rescinded during code  - 2 cycles, 1x epi, 1x calcium gluconate, ROSC achieved, lost, PEA  - 1 cycle, 1x epi, intubated, ROSC achieved  - Patient now is  DNR/DNI by      # Anemia   - CT Abdomen/Pelvis showing numerous L chest wall hematomas  - Hgb, 6.6 on 8/30  - Total 2x pRBCs given  - monitor H/H, keep Hb above 7.5    # Dysphagia   - NGT, feeds as ordered  - Speech consult recommends instrumental swallow study 9/3    # Chronic Constipation   - Secondary to autonomic dysfunction, right hemicolectomy   - Continue aggressive bowel regimen   - add enema     # 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

## 2024-08-31 NOTE — PROGRESS NOTE ADULT - ASSESSMENT
Patient 84-year-old woman with a history of Lewy body dementia (AAO x 2 at baseline), Parkinson's, COPD not on home oxygen, presented from [facility] with presenting complaints of AMS and Tachypnea, Patient was A&Ox0 on arrival, unable to provide history. Patient was found to be with decreased breath sounds diffusely, quick bedside ultrasound by ED showed lung sliding, no pneumothorax, was placed on BiPAP for potential hypercapnia, magnesium and nebs given, blood gas resulted with normal CO2. was weaned off to NC.  ED team held off on 30 cc/kg bolus, as patient has a history of heart failure: was given 1 L bolus. She was evaluated by cardiology for two runs of NSVTs approx 10 sec, recommended telemetry admission. Patient was started on Ertapenem, Doxycycline and Ceftriaxone in NH for suspected UTI.   At baseline patient is AXO x3, has difficulty finding words, has impaired memory but has meaningful conversation. On my visit patient saturating 95 % on RA lying comfortably in bed, is oriented to place and person and nodding to questions. Patient endorses no abdominal pain, chest pain, SOB or fever.    3E-ICU COURSE:    Patient presented to ED on 8/23 for respiratory distress, A&Ox2. Patient admitted to 3E on 8/24, awake but did not respond to questions.   Upgraded to ICU on 8/26 after patient coded secondary to possible aspiration event. Two cycles completed.   ICU course uncomplicated. Extubated on 8/29, total days 3. Downtrending Hgb requiring total of 2x pRBCs, most likely chest hematoma secondary to CPR. Patient had abdominal distension and ileus for 1-2 days. Bowel movements resumed with aggressive bowel regiment and mineral oil enema. Patient off pressors, on RA, and at A&Ox0 not following commands. Stable but guarded. DNR/DNI with trial of NIV, 8/30/24.     Pressors: Levophed, discontinued.  Antibiotics: Cefepime, 5d, completed.   Lines: Peripheral, Midline  Pinon: Discontinued    DG to CEU:  -Today, Pt is awake, not responding, AOx0  -abd still distended  -Resumed feeds & stopped LR fluids  -yest got 1unit blood>> F/U 11am Hb    # New-Onset Paroxysmal A-fib  - As seen on telemetry 8/30, currently NSR  - Troponin 161 on 8/27  - Holding anticoagulation due to low Hgb in the setting of L chest wall hematoma  - Continue rate control, metoprolol 12.5 mg BID  - Continue sequentials for DVT prophylaxis until Hgb stabilized    # Severe Sepsis on Admission  - 8/23: Blood culture negative  - 8/24: CT Angio Chest urinary bladder mild pericystic stranding, could be attributed to urinary bladder infection in the appropriate clinical setting  - 8/24: UA negative  - Cefepime 2g q12h, 5 days completed  - Monitor, off antibiotics, ID following    # Cardiopulmonary Arrest Secondary to Aspiration  - 8/26: Coded secondary to likely aspiration event  - DNR/DNI on admission,  rescinded during code  - 2 cycles, 1x epi, 1x calcium gluconate, ROSC achieved, lost, PEA  - 1 cycle, 1x epi, intubated, ROSC achieved  - Patient again made DNR/DNI by    - CT Abdomen/Pelvis showing numerous L chest wall hematomas  - Trend Hgb, 6.6 on 8/30  - Total 2x pRBCs given    # Dysphagia   - NGT, feeds as ordered  - Speech consult recommends instrumental swallow study 9/3    # Chronic Constipation   - Secondary to autonomic dysfunction, right hemicolectomy   - Continue aggressive bowel regime    # Parkinson's Disease with Lewy Body Dementia   - CT Head negative x3  - EEG negative  - Baseline functional quadriplegic, wheelchair bound, A&Ox3  - Continue carbidopa/levodopa 10/100 TID    # Hypothyroidism    - 8/30: TSH 1.93  - Continue levothyroxine 50 mcg daily    # GERD   - Continue famotidine 20 mg daily    # Metabolic Encephalopathy  - Improved, stable    # Conjunctival Xerosis   # Glaucoma   - Continue with eye drops    # Peripheral Neuropathy  - Gabapentin held in setting of encephalopathy    -------------------------------------------------------------------------------------------------------------------------------------  #DVT PPX: Sequentials  #GI PPX: Famotidine  #Diet: NGT, per speech  #Code Status: DNR/DNI with NIV Trial  #Activity Order: Increase as tolerated  #Dispo/Needs: SDU    #Handoff  - Follow Hgb, GOC, monitor mental status.

## 2024-08-31 NOTE — PROGRESS NOTE ADULT - SUBJECTIVE AND OBJECTIVE BOX
Patient is a 84y old  Female who presents with a chief complaint of Altered Mental Status and Increased WOB (30 Aug 2024 10:35)        Over Night Events:  off pressors.          ROS:     All ROS are negative except HPI         PHYSICAL EXAM    ICU Vital Signs Last 24 Hrs  T(C): 36.8 (31 Aug 2024 04:00), Max: 37.7 (30 Aug 2024 08:00)  T(F): 98.2 (31 Aug 2024 04:00), Max: 99.8 (30 Aug 2024 08:00)  HR: 101 (31 Aug 2024 04:00) (88 - 127)  BP: 140/102 (31 Aug 2024 04:00) (96/52 - 147/64)  BP(mean): 116 (31 Aug 2024 04:00) (71 - 116)  ABP: --  ABP(mean): --  RR: 19 (31 Aug 2024 04:00) (19 - 36)  SpO2: 95% (31 Aug 2024 04:00) (93% - 99%)    O2 Parameters below as of 31 Aug 2024 04:00  Patient On (Oxygen Delivery Method): room air            CONSTITUTIONAL:  NAD    ENT:   Airway patent,       EYES:   Pupils equal,   Round and reactive to light.    CARDIAC:   Normal rate,   Regular rhythm.        RESPIRATORY:   No wheezing  Bilateral BS  Normal chest expansion  Not tachypneic,  No use of accessory muscles    GASTROINTESTINAL:  Abdomen soft,   Non-tender,   No guarding,   + BS    MUSCULOSKELETAL:   No clubbing, cyanosis    NEUROLOGICAL:   Not following commands     SKIN:   Skin normal color for race,   No evidence of rash.        08-30-24 @ 07:01  -  08-31-24 @ 07:00  --------------------------------------------------------  IN:    Lactated Ringers: 1100 mL  Total IN: 1100 mL    OUT:    Dexmedetomidine: 0 mL    Indwelling Catheter - Urethral (mL): 300 mL    Norepinephrine: 0 mL    Voided (mL): 400 mL  Total OUT: 700 mL    Total NET: 400 mL          LABS:                            6.6    11.16 )-----------( 180      ( 30 Aug 2024 05:27 )             21.0                                               08-30    137  |  102  |  43<H>  ----------------------------<  84  3.5   |  19  |  1.4    Creatinine Trend  BUN 43, Cr 1.4, (08-30-24 @ 05:27)  Creatinine Trend  BUN 51, Cr 1.5, (08-29-24 @ 05:10)  Creatinine Trend  BUN 59, Cr 1.9, (08-28-24 @ 05:05)  Creatinine Trend  BUN 55, Cr 1.7, (08-27-24 @ 04:20)  Creatinine Trend  BUN 52, Cr 1.5, (08-26-24 @ 14:45)      Ca    7.6<L>      30 Aug 2024 05:27  Phos  2.4     08-30  Mg     2.1     08-30    TPro  4.5<L>  /  Alb  2.7<L>  /  TBili  0.5  /  DBili  x   /  AST  20  /  ALT  <5  /  AlkPhos  44  08-30                                             Urinalysis Basic - ( 30 Aug 2024 05:27 )    Color: x / Appearance: x / SG: x / pH: x  Gluc: 84 mg/dL / Ketone: x  / Bili: x / Urobili: x   Blood: x / Protein: x / Nitrite: x   Leuk Esterase: x / RBC: x / WBC x   Sq Epi: x / Non Sq Epi: x / Bacteria: x                                                  LIVER FUNCTIONS - ( 30 Aug 2024 05:27 )  Alb: 2.7 g/dL / Pro: 4.5 g/dL / ALK PHOS: 44 U/L / ALT: <5 U/L / AST: 20 U/L / GGT: x                                                  Culture - Blood (collected 28 Aug 2024 11:13)  Source: .Blood None  Preliminary Report (30 Aug 2024 22:01):    No growth at 48 Hours                                                                                       ABG - ( 29 Aug 2024 15:49 )  pH, Arterial: 7.40  pH, Blood: x     /  pCO2: 32    /  pO2: 111   / HCO3: 20    / Base Excess: -4.5  /  SaO2: 97.4                MEDICATIONS  (STANDING):  albuterol/ipratropium for Nebulization 3 milliLiter(s) Nebulizer every 12 hours  artificial tears (preservative free) Ophthalmic Solution 1 Drop(s) Both EYES two times a day  aspirin  chewable 81 milliGRAM(s) Oral daily  carbidopa/levodopa  10/100 1 Tablet(s) Oral three times a day  chlorhexidine 2% Cloths 1 Application(s) Topical daily  clotrimazole 1% Cream 1 Application(s) Topical two times a day  famotidine    Tablet 20 milliGRAM(s) Oral daily  hydrocortisone 1% Cream 1 Application(s) Topical two times a day  lactated ringers. 1000 milliLiter(s) (50 mL/Hr) IV Continuous <Continuous>  latanoprost 0.005% Ophthalmic Solution 1 Drop(s) Both EYES at bedtime  levothyroxine 50 MICROGram(s) Oral daily  metoprolol tartrate 12.5 milliGRAM(s) Oral every 12 hours  polyethylene glycol 3350 17 Gram(s) Oral two times a day  senna 2 Tablet(s) Oral at bedtime  timolol 0.5% Solution 1 Drop(s) Both EYES two times a day    MEDICATIONS  (PRN):      New X-rays reviewed:                                                                                  ECHO

## 2024-08-31 NOTE — PROGRESS NOTE ADULT - ASSESSMENT
Impression:     SP Cardiopulmonary arrest approximately 10 minutes  Acute hypoxemic respiratory failure SP extubation   Likely aspiration event   Shock now off Levophed    Left chest wall hematomas   Symptomatic anemia   Possible UTI    G+ cocci bacteremia.  Possibly contaminant   JENN   Moderate to large stool burden on admission   HO Lewy body dementia   HO COPD   HO abdominal surgery    PLAN:    CNS:  Repeat CTH unchanged.  CW carbidopa/levodopa.  Avoid depressants     HEENT: Oral care.      PULMONARY:  HOB @ 45 degrees. O2 if needed to keep O2 sat 92--96%.  Aspiration precautions.  Trial of NIV during sleep      CARDIOVASCULAR: Goal directed fluid resuscitation. TTE poor test. Low dose Beta blockers     GI: GI prophylaxis.  NG feeding.  NPO while on NIV.  Continue bowel regimen enema.         RENAL:  Follow up lytes.  Correct as needed.  Voiding trial.  Bladder scans.  Monitory UO     INFECTIOUS DISEASE: Follow up cultures. Finish Cefepime curse.  FU cultures     HEMATOLOGICAL:  DVT prophylaxis Seq. LE duplex neg.  One Unit PRBC. Trend CBC and coags       ENDOCRINE:  Follow up FS.  Insulin protocol if needed. CW synthroid. Check TSH.      MUSCULOSKELETAL: Bed rest.  Off loading.      Palliative care eval.   Poor prognosis overall   SDU

## 2024-08-31 NOTE — PROGRESS NOTE ADULT - SUBJECTIVE AND OBJECTIVE BOX
SUBJECTIVE / OVERNIGHT EVENTS  Patient slept well overnight. No acute complaints this AM. Patient does not report fevers, chills, CP, SOB, or n/v/d    MEDICATIONS  albuterol/ipratropium for Nebulization 3 milliLiter(s) Nebulizer every 12 hours  artificial tears (preservative free) Ophthalmic Solution 1 Drop(s) Both EYES two times a day  aspirin  chewable 81 milliGRAM(s) Oral daily  carbidopa/levodopa  10/100 1 Tablet(s) Oral three times a day  chlorhexidine 2% Cloths 1 Application(s) Topical daily  clotrimazole 1% Cream 1 Application(s) Topical two times a day  famotidine    Tablet 20 milliGRAM(s) Oral daily  hydrocortisone 1% Cream 1 Application(s) Topical two times a day  latanoprost 0.005% Ophthalmic Solution 1 Drop(s) Both EYES at bedtime  levothyroxine 50 MICROGram(s) Oral daily  metoprolol tartrate 12.5 milliGRAM(s) Oral every 12 hours  polyethylene glycol 3350 17 Gram(s) Oral two times a day  senna 2 Tablet(s) Oral at bedtime  timolol 0.5% Solution 1 Drop(s) Both EYES two times a day      VITALS /  EXAM    T(C): 36.6 (08-31-24 @ 08:33), Max: 37.6 (08-30-24 @ 12:00)  HR: 99 (08-31-24 @ 08:33) (88 - 127)  BP: 132/62 (08-31-24 @ 08:33) (96/52 - 147/64)  RR: 20 (08-31-24 @ 08:33) (19 - 36)  SpO2: 94% (08-31-24 @ 08:33) (93% - 99%)    GENERAL: NAD, AOx0  CHEST/LUNG: Clear to auscultation bilaterally; No wheezes, rales or rhonchi  HEART: Regular rate and rhythm; No murmurs, rubs, or gallops  ABDOMEN: soft, distended  EXTREMITIES:  2+ Peripheral Pulses, No clubbing, cyanosis, or edema    I's & O's     08-30-24 @ 07:01  -  08-31-24 @ 07:00  --------------------------------------------------------  IN:    Lactated Ringers: 1100 mL  Total IN: 1100 mL    OUT:    Dexmedetomidine: 0 mL    Indwelling Catheter - Urethral (mL): 300 mL    Norepinephrine: 0 mL    Voided (mL): 400 mL  Total OUT: 700 mL    Total NET: 400 mL        LABS             6.6    11.16 )-----------( 180      ( 08-30-24 @ 05:27 )             21.0     136  |  106  |  40  -------------------------<  63   08-31-24 @ 04:44  3.7  |  18  |  1.2    Ca      7.1     08-31-24 @ 04:44  Phos   2.4     08-30-24 @ 05:27  Mg     1.9     08-31-24 @ 04:44    TPro  3.1  /  Alb  2.2  /  TBili  0.8  /  DBili  x   /  AST  27  /  ALT  6   /  AlkPhos  30  /  GGT  x     08-31-24 @ 04:44                    Urinalysis Basic - ( 31 Aug 2024 04:44 )    Color: x / Appearance: x / SG: x / pH: x  Gluc: 63 mg/dL / Ketone: x  / Bili: x / Urobili: x   Blood: x / Protein: x / Nitrite: x   Leuk Esterase: x / RBC: x / WBC x   Sq Epi: x / Non Sq Epi: x / Bacteria: x      MICRO / IMAGING / CARDIOLOGY  Telemetry: Reviewed   EKG: Reviewed    CULTURES    Culture - Blood (collected 08-28-24 @ 11:13)  Source: .Blood None  Preliminary Report:    No growth at 48 Hours    Culture - Blood (collected 08-26-24 @ 14:45)  Source: .Blood Blood  Gram Stain:    Growth in aerobic bottle: Gram Positive Cocci in Clusters  Final Report:    Growth in aerobic bottle: Staphylococcus hominis    Isolation of Coagulase negative Staphylococcus from single blood culture    sets may represent    contamination. Contact the Microbiology Department at [phone] if    susceptibility testing is    clinically indicated.    Direct identification is available within approximately 3-5    hours either by Blood Panel Multiplexed PCR or Direct    MALDI-TOF. Details: [email or web address]  Organism: Blood Culture PCR  Organism: Blood Culture PCR      Method Type: PCR      -  Coagulase negative Staphylococcus: Detec      IMAGING  PACS Image:  (08-30-24 @ 06:03)  PACS Image:  (08-29-24 @ 22:52)    CARDIOLOGY

## 2024-09-01 LAB
ALBUMIN SERPL ELPH-MCNC: 3.3 G/DL — LOW (ref 3.5–5.2)
ALP SERPL-CCNC: 51 U/L — SIGNIFICANT CHANGE UP (ref 30–115)
ALT FLD-CCNC: 8 U/L — SIGNIFICANT CHANGE UP (ref 0–41)
ANION GAP SERPL CALC-SCNC: 12 MMOL/L — SIGNIFICANT CHANGE UP (ref 7–14)
AST SERPL-CCNC: 18 U/L — SIGNIFICANT CHANGE UP (ref 0–41)
BASOPHILS # BLD AUTO: 0.04 K/UL — SIGNIFICANT CHANGE UP (ref 0–0.2)
BASOPHILS NFR BLD AUTO: 0.3 % — SIGNIFICANT CHANGE UP (ref 0–1)
BILIRUB SERPL-MCNC: 0.9 MG/DL — SIGNIFICANT CHANGE UP (ref 0.2–1.2)
BUN SERPL-MCNC: 31 MG/DL — HIGH (ref 10–20)
CALCIUM SERPL-MCNC: 8.2 MG/DL — LOW (ref 8.4–10.5)
CHLORIDE SERPL-SCNC: 102 MMOL/L — SIGNIFICANT CHANGE UP (ref 98–110)
CO2 SERPL-SCNC: 22 MMOL/L — SIGNIFICANT CHANGE UP (ref 17–32)
CREAT SERPL-MCNC: 1.2 MG/DL — SIGNIFICANT CHANGE UP (ref 0.7–1.5)
EGFR: 45 ML/MIN/1.73M2 — LOW
EOSINOPHIL # BLD AUTO: 0.32 K/UL — SIGNIFICANT CHANGE UP (ref 0–0.7)
EOSINOPHIL NFR BLD AUTO: 2.7 % — SIGNIFICANT CHANGE UP (ref 0–8)
GLUCOSE SERPL-MCNC: 134 MG/DL — HIGH (ref 70–99)
HCT VFR BLD CALC: 26.5 % — LOW (ref 37–47)
HGB BLD-MCNC: 8.6 G/DL — LOW (ref 12–16)
IMM GRANULOCYTES NFR BLD AUTO: 1.7 % — HIGH (ref 0.1–0.3)
LYMPHOCYTES # BLD AUTO: 1.8 K/UL — SIGNIFICANT CHANGE UP (ref 1.2–3.4)
LYMPHOCYTES # BLD AUTO: 15.1 % — LOW (ref 20.5–51.1)
MAGNESIUM SERPL-MCNC: 2 MG/DL — SIGNIFICANT CHANGE UP (ref 1.8–2.4)
MCHC RBC-ENTMCNC: 29 PG — SIGNIFICANT CHANGE UP (ref 27–31)
MCHC RBC-ENTMCNC: 32.5 G/DL — SIGNIFICANT CHANGE UP (ref 32–37)
MCV RBC AUTO: 89.2 FL — SIGNIFICANT CHANGE UP (ref 81–99)
MONOCYTES # BLD AUTO: 1.05 K/UL — HIGH (ref 0.1–0.6)
MONOCYTES NFR BLD AUTO: 8.8 % — SIGNIFICANT CHANGE UP (ref 1.7–9.3)
NEUTROPHILS # BLD AUTO: 8.54 K/UL — HIGH (ref 1.4–6.5)
NEUTROPHILS NFR BLD AUTO: 71.4 % — SIGNIFICANT CHANGE UP (ref 42.2–75.2)
NRBC # BLD: 0 /100 WBCS — SIGNIFICANT CHANGE UP (ref 0–0)
PLATELET # BLD AUTO: 221 K/UL — SIGNIFICANT CHANGE UP (ref 130–400)
PMV BLD: 9.8 FL — SIGNIFICANT CHANGE UP (ref 7.4–10.4)
POTASSIUM SERPL-MCNC: 3.5 MMOL/L — SIGNIFICANT CHANGE UP (ref 3.5–5)
POTASSIUM SERPL-SCNC: 3.5 MMOL/L — SIGNIFICANT CHANGE UP (ref 3.5–5)
PROT SERPL-MCNC: 5.1 G/DL — LOW (ref 6–8)
RBC # BLD: 2.97 M/UL — LOW (ref 4.2–5.4)
RBC # FLD: 22.5 % — HIGH (ref 11.5–14.5)
SODIUM SERPL-SCNC: 136 MMOL/L — SIGNIFICANT CHANGE UP (ref 135–146)
WBC # BLD: 11.95 K/UL — HIGH (ref 4.8–10.8)
WBC # FLD AUTO: 11.95 K/UL — HIGH (ref 4.8–10.8)

## 2024-09-01 PROCEDURE — 99233 SBSQ HOSP IP/OBS HIGH 50: CPT

## 2024-09-01 PROCEDURE — 71045 X-RAY EXAM CHEST 1 VIEW: CPT | Mod: 26

## 2024-09-01 PROCEDURE — 93010 ELECTROCARDIOGRAM REPORT: CPT

## 2024-09-01 RX ORDER — IPRATROPIUM BROMIDE AND ALBUTEROL SULFATE .5; 3 MG/3ML; MG/3ML
3 SOLUTION RESPIRATORY (INHALATION) EVERY 6 HOURS
Refills: 0 | Status: DISCONTINUED | OUTPATIENT
Start: 2024-09-01 | End: 2024-09-04

## 2024-09-01 RX ORDER — METHYLPREDNISOLONE 4 MG
40 TABLET ORAL
Refills: 0 | Status: DISCONTINUED | OUTPATIENT
Start: 2024-09-01 | End: 2024-09-02

## 2024-09-01 RX ADMIN — METOPROLOL TARTRATE 12.5 MILLIGRAM(S): 100 TABLET ORAL at 06:05

## 2024-09-01 RX ADMIN — METOPROLOL TARTRATE 12.5 MILLIGRAM(S): 100 TABLET ORAL at 17:22

## 2024-09-01 RX ADMIN — Medication 1 APPLICATION(S): at 06:07

## 2024-09-01 RX ADMIN — POVIDONE, PROPYLENE GLYCOL 1 DROP(S): 6.8; 3 LIQUID OPHTHALMIC at 17:28

## 2024-09-01 RX ADMIN — IPRATROPIUM BROMIDE AND ALBUTEROL SULFATE 3 MILLILITER(S): .5; 3 SOLUTION RESPIRATORY (INHALATION) at 08:33

## 2024-09-01 RX ADMIN — Medication 1 APPLICATION(S): at 17:28

## 2024-09-01 RX ADMIN — TIMOLOL MALEATE 1 DROP(S): 5 SOLUTION/ DROPS OPHTHALMIC at 06:07

## 2024-09-01 RX ADMIN — LATANOPROST 1 DROP(S): 50 SOLUTION OPHTHALMIC at 21:44

## 2024-09-01 RX ADMIN — POLYETHYLENE GLYCOL 3350 17 GRAM(S): 17 POWDER, FOR SOLUTION ORAL at 17:21

## 2024-09-01 RX ADMIN — POLYETHYLENE GLYCOL 3350 17 GRAM(S): 17 POWDER, FOR SOLUTION ORAL at 06:05

## 2024-09-01 RX ADMIN — Medication 40 MILLIGRAM(S): at 17:22

## 2024-09-01 RX ADMIN — IPRATROPIUM BROMIDE AND ALBUTEROL SULFATE 3 MILLILITER(S): .5; 3 SOLUTION RESPIRATORY (INHALATION) at 14:22

## 2024-09-01 RX ADMIN — CHLORHEXIDINE GLUCONATE 1 APPLICATION(S): 40 SOLUTION TOPICAL at 12:03

## 2024-09-01 RX ADMIN — FAMOTIDINE 20 MILLIGRAM(S): 10 INJECTION INTRAVENOUS at 12:03

## 2024-09-01 RX ADMIN — TIMOLOL MALEATE 1 DROP(S): 5 SOLUTION/ DROPS OPHTHALMIC at 17:28

## 2024-09-01 RX ADMIN — Medication 1 TABLET(S): at 13:18

## 2024-09-01 RX ADMIN — IPRATROPIUM BROMIDE AND ALBUTEROL SULFATE 3 MILLILITER(S): .5; 3 SOLUTION RESPIRATORY (INHALATION) at 20:06

## 2024-09-01 RX ADMIN — Medication 1 TABLET(S): at 21:43

## 2024-09-01 RX ADMIN — Medication 81 MILLIGRAM(S): at 12:03

## 2024-09-01 RX ADMIN — Medication 2 TABLET(S): at 21:43

## 2024-09-01 RX ADMIN — Medication 1 TABLET(S): at 06:05

## 2024-09-01 RX ADMIN — Medication 50 MICROGRAM(S): at 06:05

## 2024-09-01 NOTE — PROGRESS NOTE ADULT - ASSESSMENT
Impression:     SP Cardiopulmonary arrest approximately 10 minutes  Acute hypoxemic respiratory failure SP extubation   Likely aspiration event   Shock now off Levophed    Left chest wall hematomas   Symptomatic anemia   Possible UTI    G+ cocci bacteremia.  Possibly contaminant   JENN   Moderate to large stool burden on admission   HO Lewy body dementia   HO COPD   HO abdominal surgery    PLAN:    CNS:  Repeat CTH unchanged.  CW carbidopa/levodopa.  Avoid depressants     HEENT: Oral care.      PULMONARY:  HOB @ 45 degrees. O2 if needed to keep O2 sat 92--96%.  Aspiration precautions.  Trial of NIV during sleep      CARDIOVASCULAR: Goal directed fluid resuscitation. TTE poor test. Low dose Beta blockers     GI: GI prophylaxis.  NG feeding.  NPO while on NIV.  Continue bowel regimen enema.         RENAL:  Follow up lytes.  Correct as needed.  Voiding trial.  Bladder scans.  Monitory UO     INFECTIOUS DISEASE: Follow up cultures. Finish Cefepime curse.  FU cultures     HEMATOLOGICAL:  DVT prophylaxis Seq. LE duplex neg.  One Unit PRBC. Trend CBC and coags       ENDOCRINE:  Follow up FS.  Insulin protocol if needed. CW synthroid. Check TSH.      MUSCULOSKELETAL: Bed rest.  Off loading.      Palliative care eval.   Poor prognosis overall   SDU    Impression:     SP Cardiopulmonary arrest approximately 10 minutes  Acute hypoxemic respiratory failure 2/2 intubation now ext  Likely aspiration event   Shock now off Levophed    Left chest wall hematomas   Symptomatic anemia   Possible UTI    G+ cocci bacteremia.  Possibly contaminant   JENN   Moderate to large stool burden on admission   HO Lewy body dementia   HO COPD   HO abdominal surgery    PLAN:    CNS:    CW carbidopa/levodopa.  Avoid depressants     HEENT: Oral care.      PULMONARY:  HOB @ 45 degrees. O2 if needed to keep O2 sat 92--96%.  Aspiration precautions.  Trial of NIV during sleep and during the day as needed for increased WOB, duonebs standing, steroids daily,     CARDIOVASCULAR: Goal directed fluid resuscitation. c/w Low dose Beta blockers     GI: GI prophylaxis.  NG feeding.  NPO while on NIV.  Continue bowel regimen    RENAL:  Follow up lytes.  Correct as needed.  Voiding trial.  Bladder scans qshift .  Monitory UO     INFECTIOUS DISEASE:  Abx completed, for leukocytosis, Restart if fever spikes     HEMATOLOGICAL:  DVT prophylaxis Seq. LE duplex neg.       ENDOCRINE:  Follow up FS. avoid hypoglycemia  cW synthroid    MUSCULOSKELETAL: Bed rest.  Off loading.      Palliative care eval.   Poor prognosis overall   SDU    Impression:     SP Cardiopulmonary arrest approximately 10 minutes  Acute hypoxemic respiratory failure 2/2 intubation now ext  Likely aspiration event   Shock now off Levophed    Left chest wall hematomas   Symptomatic anemia   Possible UTI    G+ cocci bacteremia.  Possibly contaminant   JENN   Moderate to large stool burden on admission   HO Lewy body dementia   HO COPD   HO abdominal surgery    PLAN:    CNS:    CW carbidopa/levodopa.  Avoid depressants     HEENT: Oral care.      PULMONARY:  HOB @ 45 degrees. O2 if needed to keep O2 sat 92--96%.  Aspiration precautions.  Trial of NIV during sleep and during the day as needed,      CARDIOVASCULAR: Goal directed fluid resuscitation. c/w Low dose Beta blockers     GI: GI prophylaxis.  NG feeding.  NPO while on NIV.  Continue bowel regimen    RENAL:  Follow up lytes.  Correct as needed.  Voiding trial.  Bladder scans qshift .  Monitory UO     INFECTIOUS DISEASE:  Abx completed, for leukocytosis, Restart if fever spikes     HEMATOLOGICAL:  DVT prophylaxis Seq. LE duplex neg.       ENDOCRINE:  Follow up FS. avoid hypoglycemia  cW synthroid    MUSCULOSKELETAL: Bed rest.  Off loading.      Palliative care eval.   Poor prognosis overall   SDU

## 2024-09-01 NOTE — PROGRESS NOTE ADULT - SUBJECTIVE AND OBJECTIVE BOX
84-year-old woman with a history of Lewy body dementia (AAO x 2 at baseline), Parkinson's, COPD not on home oxygen, presented from [facility] with presenting complaints of AMS and Tachypnea, Patient was A&Ox0 on arrival, unable to provide history. Patient was found to be with decreased breath sounds diffusely, quick bedside ultrasound by ED showed lung sliding, no pneumothorax, was placed on BiPAP for potential hypercapnia, magnesium and nebs given, blood gas resulted with normal CO2. was weaned off to NC.  ED team held off on 30 cc/kg bolus, as patient has a history of heart failure: was given 1 L bolus. She was evaluated by cardiology for two runs of NSVTs approx 10 sec, recommended telemetry admission. Patient was started on Ertapenem, Doxycycline and Ceftriaxone in NH for suspected UTI. At baseline patient is AXO x3, has difficulty finding words, has impaired memory but has meaningful conversation. Pt was found in  Cardiopulmonary Arrest Secondary to Aspiration on 8/26,  rescinded during code, pt was resuscitated, developed extensive soft tissue hematoma on chest wall after CPR, received blood transfusion.   Initially she was admitted to ICU, downgraded to SDU on 8/30.  Today pt is more comfortable, opens eyes to voice,  at the bedside.     PAST MEDICAL & SURGICAL HISTORY:  Dementia      History of breast cancer      Constipation      Lewy body dementia without behavioral disturbance      Colon polyp      History of colon resection      H/O mastectomy, right      Vital Signs Last 24 Hrs  T(C): 37.3 (01 Sep 2024 11:52), Max: 37.3 (01 Sep 2024 11:52)  T(F): 99.2 (01 Sep 2024 11:52), Max: 99.2 (01 Sep 2024 11:52)  HR: 102 (01 Sep 2024 11:52) (95 - 107)  BP: 123/57 (01 Sep 2024 11:52) (100/49 - 126/59)  BP(mean): 82 (01 Sep 2024 11:52) (70 - 85)  RR: 20 (01 Sep 2024 11:52) (18 - 20)  SpO2: 97% (01 Sep 2024 11:52) (94% - 99%)    Parameters below as of 01 Sep 2024 11:52  Patient On (Oxygen Delivery Method): room air      PHYSICAL EXAM:  GENERAL: NAD, elderly demented female   HEAD:  Atraumatic, Normocephalic, NGT in place   NECK: Supple, No JVD, Normal thyroid  NERVOUS SYSTEM:  demented, nonverbal, opens eye to voice, does not follow commands   CHEST/LUNG: decreased BS at bases, shallow breathing   HEART: S1, S2, tachycardic   ABDOMEN: Soft, Nontender, less distended; Bowel sounds present  EXTREMITIES:  trace  edema      LABS:                        8.6    11.95 )-----------( 221      ( 01 Sep 2024 04:43 )             26.5     09-01    136  |  102  |  31<H>  ----------------------------<  134<H>  3.5   |  22  |  1.2    Ca    8.2<L>      01 Sep 2024 04:43  Mg     2.0     09-01    TPro  5.1<L>  /  Alb  3.3<L>  /  TBili  0.9  /  DBili  x   /  AST  18  /  ALT  8   /  AlkPhos  51  09-01    Urinalysis Basic - ( 01 Sep 2024 04:43 )    Color: x / Appearance: x / SG: x / pH: x  Gluc: 134 mg/dL / Ketone: x  / Bili: x / Urobili: x   Blood: x / Protein: x / Nitrite: x   Leuk Esterase: x / RBC: x / WBC x   Sq Epi: x / Non Sq Epi: x / Bacteria: x    Culture - Blood (08.28.24 @ 11:13)   Specimen Source: .Blood None  Culture Results:   No growth at 72 Hours    Culture - Blood (08.26.24 @ 14:45)   - Coagulase negative Staphylococcus: Detec  Gram Stain:   Growth in aerobic bottle: Gram Positive Cocci in Clusters  Specimen Source: .Blood Blood  Organism: Blood Culture PCR  Culture Results:   Growth in aerobic bottle: Staphylococcus hominis   Isolation of Coagulase negative Staphylococcus from single blood culture   sets may represent   contamination. Contact the Microbiology Department at [phone] if   susceptibility testing is   clinically indicated.   Direct identification is available within approximately 3-5   hours either by Blood Panel Multiplexed PCR or Direct   MALDI-TOF. Details: [email or web address]  Organism Identification: Blood Culture PCR  Method Type: PCR  RADIOLOGY & ADDITIONAL TESTS:  < from: Xray Chest 1 View-PORTABLE IMMEDIATE (Xray Chest 1 View-PORTABLE IMMEDIATE .) (09.01.24 @ 09:14) >  Impression:    Cardiomegaly with bilateral opacifications, left greater than right.   Follow-up as needed.      < from: Xray Kidney Ureter Bladder (08.29.24 @ 06:59) >  impression:    Fecal impaction in the rectum.    Calcified fibroid.    Degenerative changes of the spine.      < from: [facility] Duplex Lower Ext Vein Scan, Bilat (08.27.24 @ 16:32) >  IMPRESSION:  No evidence of deep venous thrombosis in either lower extremity.    < from: CT Abdomen and Pelvis No Cont (08.27.24 @ 10:51) >  IMPRESSION:    Partially imaged numerous hyperdense left chest wall structures,   measuring up to at least 14.1 cm, new from 8/24/2024, suggestive of   hematomas. Recommend evaluated with dedicated chest CT.    Rectal fecal impaction, increased from prior.    Bilateral cystic adnexal gaseous redemonstrated. Recommend GYN   evaluation/further workup.     from: CT Head No Cont (08.26.24 @ 23:44) >  IMPRESSION:    No evidence of acute intracranial pathology. Stable exam.    Stable severe chronic microvascular ischemic changes.    MEDICATIONS  (STANDING):  albuterol    90 MICROgram(s) HFA Inhaler 2 Puff(s) Inhalation four times a day  albuterol/ipratropium for Nebulization 3 milliLiter(s) Nebulizer every 6 hours  artificial tears (preservative free) Ophthalmic Solution 1 Drop(s) Both EYES two times a day  aspirin  chewable 81 milliGRAM(s) Oral daily  carbidopa/levodopa  10/100 1 Tablet(s) Oral three times a day  chlorhexidine 2% Cloths 1 Application(s) Topical daily  clotrimazole 1% Cream 1 Application(s) Topical two times a day  famotidine    Tablet 20 milliGRAM(s) Oral daily  hydrocortisone 1% Cream 1 Application(s) Topical two times a day  latanoprost 0.005% Ophthalmic Solution 1 Drop(s) Both EYES at bedtime  levothyroxine 50 MICROGram(s) Oral daily  methylPREDNISolone sodium succinate Injectable 40 milliGRAM(s) IV Push two times a day  metoprolol tartrate 12.5 milliGRAM(s) Oral every 12 hours  polyethylene glycol 3350 17 Gram(s) Oral two times a day  senna 2 Tablet(s) Oral at bedtime  timolol 0.5% Solution 1 Drop(s) Both EYES two times a day

## 2024-09-01 NOTE — PROGRESS NOTE ADULT - SUBJECTIVE AND OBJECTIVE BOX
SUBJECTIVE/OVERNIGHT EVENTS  Today is hospital day 8d. This morning patient was seen and examined at bedside, A&O x 0, tachypneic. No major events overnight.      CODE STATUS: DNR/DNI: Trial NIV      MEDICATIONS  STANDING MEDICATIONS  albuterol    90 MICROgram(s) HFA Inhaler 2 Puff(s) Inhalation four times a day  albuterol/ipratropium for Nebulization 3 milliLiter(s) Nebulizer every 6 hours  artificial tears (preservative free) Ophthalmic Solution 1 Drop(s) Both EYES two times a day  aspirin  chewable 81 milliGRAM(s) Oral daily  carbidopa/levodopa  10/100 1 Tablet(s) Oral three times a day  chlorhexidine 2% Cloths 1 Application(s) Topical daily  clotrimazole 1% Cream 1 Application(s) Topical two times a day  famotidine    Tablet 20 milliGRAM(s) Oral daily  hydrocortisone 1% Cream 1 Application(s) Topical two times a day  latanoprost 0.005% Ophthalmic Solution 1 Drop(s) Both EYES at bedtime  levothyroxine 50 MICROGram(s) Oral daily  methylPREDNISolone sodium succinate Injectable 40 milliGRAM(s) IV Push two times a day  metoprolol tartrate 12.5 milliGRAM(s) Oral every 12 hours  polyethylene glycol 3350 17 Gram(s) Oral two times a day  senna 2 Tablet(s) Oral at bedtime  timolol 0.5% Solution 1 Drop(s) Both EYES two times a day    PRN MEDICATIONS    VITALS  T(F): 99 (09-01-24 @ 08:02), Max: 99 (09-01-24 @ 08:02)  HR: 98 (09-01-24 @ 08:02) (98 - 107)  BP: 100/49 (09-01-24 @ 08:02) (100/49 - 126/59)  RR: 20 (09-01-24 @ 08:02) (18 - 20)  SpO2: 94% (09-01-24 @ 08:02) (94% - 99%)    PHYSICAL EXAM  GENERAL: NAD, AOx0  CHEST/LUNG: Tachypneic, + wheezes. No rales or rhonchi  HEART: Regular rate and rhythm; No murmurs, rubs, or gallops  ABDOMEN: soft, distended  EXTREMITIES:  2+ Peripheral Pulses, No clubbing, cyanosis, or edema      (  ) Indwelling Pinon Catheter   Date insterted:    Reason (  ) Critical illness     (  ) urinary retention    (  ) Accurate Ins/Outs Monitoring     (  ) CMO patient    (  ) Central Line  Date inserted:  Location: (  ) Right IJ   (  ) Left IJ   (  ) Right Fem   (  ) Left Fem    (  ) SPC  (  ) pigtail  (  ) PEG tube  (  ) colostomy  (  ) jejunostomy  (  ) U-Dall    LABS             8.6    11.95 )-----------( 221      ( 09-01-24 @ 04:43 )             26.5     136  |  102  |  31  -------------------------<  134   09-01-24 @ 04:43  3.5  |  22  |  1.2    Ca      8.2     09-01-24 @ 04:43  Mg     2.0     09-01-24 @ 04:43    TPro  5.1  /  Alb  3.3  /  TBili  0.9  /  DBili  x   /  AST  18  /  ALT  8   /  AlkPhos  51  /  GGT  x     09-01-24 @ 04:43        Urinalysis Basic - ( 01 Sep 2024 04:43 )    Color: x / Appearance: x / SG: x / pH: x  Gluc: 134 mg/dL / Ketone: x  / Bili: x / Urobili: x   Blood: x / Protein: x / Nitrite: x   Leuk Esterase: x / RBC: x / WBC x   Sq Epi: x / Non Sq Epi: x / Bacteria: x      ABG - ( 31 Aug 2024 09:40 )  pH, Arterial: 7.42  pH, Blood: x     /  pCO2: 28    /  pO2: 77    / HCO3: 18    / Base Excess: -4.9  /  SaO2: 96.6                IMAGING

## 2024-09-01 NOTE — PROGRESS NOTE ADULT - SUBJECTIVE AND OBJECTIVE BOX
Patient is a 84y old  Female who presents with a chief complaint of Altered Mental Status and Increased WOB (31 Aug 2024 10:53)      Over Night Events:  Patient seen and examined.       ROS:  See HPI    PHYSICAL EXAM    ICU Vital Signs Last 24 Hrs  T(C): 36.8 (01 Sep 2024 04:00), Max: 37.1 (31 Aug 2024 16:00)  T(F): 98.3 (01 Sep 2024 04:00), Max: 98.8 (31 Aug 2024 16:00)  HR: 104 (01 Sep 2024 04:00) (99 - 107)  BP: 110/54 (01 Sep 2024 04:00) (104/56 - 132/62)  BP(mean): 85 (31 Aug 2024 20:00) (77 - 89)  ABP: --  ABP(mean): --  RR: 18 (01 Sep 2024 04:00) (18 - 20)  SpO2: 98% (01 Sep 2024 04:00) (93% - 99%)    O2 Parameters below as of 31 Aug 2024 20:00  Patient On (Oxygen Delivery Method): room air            General:  HEENT:                Lymph Nodes: NO cervical LN   Lungs: Bilateral BS  Cardiovascular: Regular   Abdomen: Soft, Positive BS  Extremities: No clubbing   Skin: warm   Neurological:   Musculoskeletal: move all ext     I&O's Detail    31 Aug 2024 07:01  -  01 Sep 2024 07:00  --------------------------------------------------------  IN:    Jevity 1.2: 420 mL    Lactated Ringers: 50 mL  Total IN: 470 mL    OUT:    Voided (mL): 450 mL  Total OUT: 450 mL    Total NET: 20 mL          LABS:                          8.6    11.95 )-----------( 221      ( 01 Sep 2024 04:43 )             26.5         01 Sep 2024 04:43    136    |  102    |  31     ----------------------------<  134    3.5     |  22     |  1.2      Ca    8.2        01 Sep 2024 04:43  Mg     2.0       01 Sep 2024 04:43    TPro  5.1    /  Alb  3.3    /  TBili  0.9    /  DBili  x      /  AST  18     /  ALT  8      /  AlkPhos  51     01 Sep 2024 04:43  Amylase x     lipase x                                                                                        Urinalysis Basic - ( 01 Sep 2024 04:43 )    Color: x / Appearance: x / SG: x / pH: x  Gluc: 134 mg/dL / Ketone: x  / Bili: x / Urobili: x   Blood: x / Protein: x / Nitrite: x   Leuk Esterase: x / RBC: x / WBC x   Sq Epi: x / Non Sq Epi: x / Bacteria: x                                                                                                                                                 ABG - ( 31 Aug 2024 09:40 )  pH, Arterial: 7.42  pH, Blood: x     /  pCO2: 28    /  pO2: 77    / HCO3: 18    / Base Excess: -4.9  /  SaO2: 96.6                MEDICATIONS  (STANDING):  albuterol/ipratropium for Nebulization 3 milliLiter(s) Nebulizer every 12 hours  artificial tears (preservative free) Ophthalmic Solution 1 Drop(s) Both EYES two times a day  aspirin  chewable 81 milliGRAM(s) Oral daily  carbidopa/levodopa  10/100 1 Tablet(s) Oral three times a day  chlorhexidine 2% Cloths 1 Application(s) Topical daily  clotrimazole 1% Cream 1 Application(s) Topical two times a day  famotidine    Tablet 20 milliGRAM(s) Oral daily  hydrocortisone 1% Cream 1 Application(s) Topical two times a day  latanoprost 0.005% Ophthalmic Solution 1 Drop(s) Both EYES at bedtime  levothyroxine 50 MICROGram(s) Oral daily  metoprolol tartrate 12.5 milliGRAM(s) Oral every 12 hours  polyethylene glycol 3350 17 Gram(s) Oral two times a day  senna 2 Tablet(s) Oral at bedtime  timolol 0.5% Solution 1 Drop(s) Both EYES two times a day    MEDICATIONS  (PRN):                       Patient is a 84y old  Female who presents with a chief complaint of Altered Mental Status and Increased WOB (31 Aug 2024 10:53)      Over Night Events:  Patient seen and examined.       ROS:  See HPI    PHYSICAL EXAM    ICU Vital Signs Last 24 Hrs  T(C): 36.8 (01 Sep 2024 04:00), Max: 37.1 (31 Aug 2024 16:00)  T(F): 98.3 (01 Sep 2024 04:00), Max: 98.8 (31 Aug 2024 16:00)  HR: 104 (01 Sep 2024 04:00) (99 - 107)  BP: 110/54 (01 Sep 2024 04:00) (104/56 - 132/62)  BP(mean): 85 (31 Aug 2024 20:00) (77 - 89)  ABP: --  ABP(mean): --  RR: 18 (01 Sep 2024 04:00) (18 - 20)  SpO2: 98% (01 Sep 2024 04:00) (93% - 99%)    O2 Parameters below as of 31 Aug 2024 20:00  Patient On (Oxygen Delivery Method): room air            General:n in no acute distress   HEENT:     MMM, EOMI        Lymph Nodes: NO cervical LN   Lungs: Bilateral BS, wheezing bilaterally, slightly increased work of breathing   Cardiovascular: Regular , normal S2 S2   Abdomen: Soft, Positive BS  Extremities: No clubbing   Skin: warm, no new rashes   Neurological: arousable,   Musculoskeletal: move all ext     I&O's Detail    31 Aug 2024 07:01  -  01 Sep 2024 07:00  --------------------------------------------------------  IN:    Jevity 1.2: 420 mL    Lactated Ringers: 50 mL  Total IN: 470 mL    OUT:    Voided (mL): 450 mL  Total OUT: 450 mL    Total NET: 20 mL          LABS:                          8.6    11.95 )-----------( 221      ( 01 Sep 2024 04:43 )             26.5         01 Sep 2024 04:43    136    |  102    |  31     ----------------------------<  134    3.5     |  22     |  1.2      Ca    8.2        01 Sep 2024 04:43  Mg     2.0       01 Sep 2024 04:43    TPro  5.1    /  Alb  3.3    /  TBili  0.9    /  DBili  x      /  AST  18     /  ALT  8      /  AlkPhos  51     01 Sep 2024 04:43  Amylase x     lipase x                                                                                        Urinalysis Basic - ( 01 Sep 2024 04:43 )    Color: x / Appearance: x / SG: x / pH: x  Gluc: 134 mg/dL / Ketone: x  / Bili: x / Urobili: x   Blood: x / Protein: x / Nitrite: x   Leuk Esterase: x / RBC: x / WBC x   Sq Epi: x / Non Sq Epi: x / Bacteria: x                                                                                                                                                 ABG - ( 31 Aug 2024 09:40 )  pH, Arterial: 7.42  pH, Blood: x     /  pCO2: 28    /  pO2: 77    / HCO3: 18    / Base Excess: -4.9  /  SaO2: 96.6                MEDICATIONS  (STANDING):  albuterol/ipratropium for Nebulization 3 milliLiter(s) Nebulizer every 12 hours  artificial tears (preservative free) Ophthalmic Solution 1 Drop(s) Both EYES two times a day  aspirin  chewable 81 milliGRAM(s) Oral daily  carbidopa/levodopa  10/100 1 Tablet(s) Oral three times a day  chlorhexidine 2% Cloths 1 Application(s) Topical daily  clotrimazole 1% Cream 1 Application(s) Topical two times a day  famotidine    Tablet 20 milliGRAM(s) Oral daily  hydrocortisone 1% Cream 1 Application(s) Topical two times a day  latanoprost 0.005% Ophthalmic Solution 1 Drop(s) Both EYES at bedtime  levothyroxine 50 MICROGram(s) Oral daily  metoprolol tartrate 12.5 milliGRAM(s) Oral every 12 hours  polyethylene glycol 3350 17 Gram(s) Oral two times a day  senna 2 Tablet(s) Oral at bedtime  timolol 0.5% Solution 1 Drop(s) Both EYES two times a day    MEDICATIONS  (PRN):                       Patient is a 84y old  Female who presents with a chief complaint of Altered Mental Status and Increased WOB (31 Aug 2024 10:53)      Over Night Events:  Off pressors.        ROS:  See HPI    PHYSICAL EXAM    ICU Vital Signs Last 24 Hrs  T(C): 36.8 (01 Sep 2024 04:00), Max: 37.1 (31 Aug 2024 16:00)  T(F): 98.3 (01 Sep 2024 04:00), Max: 98.8 (31 Aug 2024 16:00)  HR: 104 (01 Sep 2024 04:00) (99 - 107)  BP: 110/54 (01 Sep 2024 04:00) (104/56 - 132/62)  BP(mean): 85 (31 Aug 2024 20:00) (77 - 89)  ABP: --  ABP(mean): --  RR: 18 (01 Sep 2024 04:00) (18 - 20)  SpO2: 98% (01 Sep 2024 04:00) (93% - 99%)    O2 Parameters below as of 31 Aug 2024 20:00  Patient On (Oxygen Delivery Method): room air            General: in no acute distress   HEENT:     MMM, EOMI        Lymph Nodes: NO cervical LN   Lungs: Bilateral BS, wheezing bilaterally, slightly increased work of breathing   Cardiovascular: Regular , normal S2 S2   Abdomen: Soft, Positive BS  Extremities: No clubbing   Skin: warm, no new rashes   Neurological: arousable,   Musculoskeletal: move all ext     I&O's Detail    31 Aug 2024 07:01  -  01 Sep 2024 07:00  --------------------------------------------------------  IN:    Jevity 1.2: 420 mL    Lactated Ringers: 50 mL  Total IN: 470 mL    OUT:    Voided (mL): 450 mL  Total OUT: 450 mL    Total NET: 20 mL          LABS:                          8.6    11.95 )-----------( 221      ( 01 Sep 2024 04:43 )             26.5         01 Sep 2024 04:43    136    |  102    |  31     ----------------------------<  134    3.5     |  22     |  1.2      Ca    8.2        01 Sep 2024 04:43  Mg     2.0       01 Sep 2024 04:43    TPro  5.1    /  Alb  3.3    /  TBili  0.9    /  DBili  x      /  AST  18     /  ALT  8      /  AlkPhos  51     01 Sep 2024 04:43  Amylase x     lipase x                                                                                        Urinalysis Basic - ( 01 Sep 2024 04:43 )    Color: x / Appearance: x / SG: x / pH: x  Gluc: 134 mg/dL / Ketone: x  / Bili: x / Urobili: x   Blood: x / Protein: x / Nitrite: x   Leuk Esterase: x / RBC: x / WBC x   Sq Epi: x / Non Sq Epi: x / Bacteria: x                                                                                                                                                 ABG - ( 31 Aug 2024 09:40 )  pH, Arterial: 7.42  pH, Blood: x     /  pCO2: 28    /  pO2: 77    / HCO3: 18    / Base Excess: -4.9  /  SaO2: 96.6                MEDICATIONS  (STANDING):  albuterol/ipratropium for Nebulization 3 milliLiter(s) Nebulizer every 12 hours  artificial tears (preservative free) Ophthalmic Solution 1 Drop(s) Both EYES two times a day  aspirin  chewable 81 milliGRAM(s) Oral daily  carbidopa/levodopa  10/100 1 Tablet(s) Oral three times a day  chlorhexidine 2% Cloths 1 Application(s) Topical daily  clotrimazole 1% Cream 1 Application(s) Topical two times a day  famotidine    Tablet 20 milliGRAM(s) Oral daily  hydrocortisone 1% Cream 1 Application(s) Topical two times a day  latanoprost 0.005% Ophthalmic Solution 1 Drop(s) Both EYES at bedtime  levothyroxine 50 MICROGram(s) Oral daily  metoprolol tartrate 12.5 milliGRAM(s) Oral every 12 hours  polyethylene glycol 3350 17 Gram(s) Oral two times a day  senna 2 Tablet(s) Oral at bedtime  timolol 0.5% Solution 1 Drop(s) Both EYES two times a day    MEDICATIONS  (PRN):

## 2024-09-01 NOTE — PROGRESS NOTE ADULT - ATTENDING COMMENTS
Impression:     SP Cardiopulmonary arrest approximately 10 minutes  Acute hypoxemic respiratory failure 2/2 intubation now ext  Likely aspiration event   Shock now off Levophed    Left chest wall hematomas   Symptomatic anemia   Possible UTI    G+ cocci bacteremia.  Possibly contaminant   JENN   Moderate to large stool burden on admission   HO Lewy body dementia   HO COPD   HO abdominal surgery    Plan as outlined above

## 2024-09-01 NOTE — PROGRESS NOTE ADULT - ASSESSMENT
84-year-old woman with a history of Lewy body dementia (AAO x 2 at baseline), Parkinson's, COPD not on home oxygen, presented from [facility] with presenting complaints of AMS and Tachypnea, Patient was A&Ox0 on arrival, unable to provide history. Patient was found to be with decreased breath sounds diffusely, quick bedside ultrasound by ED showed lung sliding, no pneumothorax, was placed on BiPAP for potential hypercapnia, magnesium and nebs given, blood gas resulted with normal CO2. was weaned off to NC.  ED team held off on 30 cc/kg bolus, as patient has a history of heart failure: was given 1 L bolus. She was evaluated by cardiology for two runs of NSVTs approx 10 sec, recommended telemetry admission. Patient was started on Ertapenem, Doxycycline and Ceftriaxone in NH for suspected UTI. At baseline patient is AXO x3, has difficulty finding words, has impaired memory but has meaningful conversation. Pt was found in  Cardiopulmonary Arrest Secondary to Aspiration on 8/26,    rescinded during code, pt was resuscitated, developed extensive soft tissue hematoma on chest wall after CPR, received blood transfusion.   Initially she was admitted to ICU, downgraded to SDU on 8/30       A/P  # New-Onset Paroxysmal A-fib  - telemetry monitoring, currently NSR  - no on AC due to low Hgb in the setting of L chest wall hematoma  - c/w metoprolol 12.5 mg BID  - monitor and replete electrolytes     # Severe Sepsis on Admission/ Staphylococcus hominis bacteremia   - resolved now , repeat blood Cx is NTD   -  urine Cx is  negative to date   - 8/24: CT Angio Chest urinary bladder mild pericystic stranding, could be attributed to urinary bladder infection in the appropriate clinical setting  -  completed 5 day of Cefepime   -  ID following, recommendations noted     # Cardiopulmonary Arrest Secondary to Aspiration  - DNR/DNI on admission,  rescinded during code  - 2 cycles, 1x epi, 1x calcium gluconate, ROSC achieved, lost, PEA  - 1 cycle, 1x epi, intubated, ROSC achieved  - Patient now is  DNR/DNI by  , he is refusing PEG tube, considering comfort and end of life care     # Acute blood loss anemia   - CT Abdomen/Pelvis showing numerous L chest wall hematomas  - Hgb, 6.6 on 8/30  - Total 2x pRBCs given  - monitor H/H, keep Hb above 7.5    # Dysphagia   - NGT, feeds as ordered  - Speech consult recommends instrumental swallow study 9/3    # Chronic Constipation   - Secondary to autonomic dysfunction, right hemicolectomy   - Continue aggressive bowel regimen   - enema PRN     # 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

## 2024-09-01 NOTE — PROGRESS NOTE ADULT - ASSESSMENT
84-year-old woman with a history of Lewy body dementia (AAO x 2 at baseline), Parkinson's, COPD not on home oxygen, presented from [facility] with presenting complaints of AMS and Tachypnea, Patient was A&Ox0 on arrival, unable to provide history. Patient was found to be with decreased breath sounds diffusely, quick bedside ultrasound by ED showed lung sliding, no pneumothorax, was placed on BiPAP for potential hypercapnia, magnesium and nebs given, blood gas resulted with normal CO2. was weaned off to NC.  ED team held off on 30 cc/kg bolus, as patient has a history of heart failure: was given 1 L bolus. She was evaluated by cardiology for two runs of NSVTs approx 10 sec, recommended telemetry admission. Patient was started on Ertapenem, Doxycycline and Ceftriaxone in NH for suspected UTI.   At baseline patient is AXO x3, has difficulty finding words, has impaired memory but has meaningful conversation. On my visit patient saturating 95 % on RA lying comfortably in bed, is oriented to place and person and nodding to questions. Patient endorses no abdominal pain, chest pain, SOB or fever.    3E-ICU COURSE:    Patient presented to ED on 8/23 for respiratory distress, A&Ox2. Patient admitted to 3E on 8/24, awake but did not respond to questions.   Upgraded to ICU on 8/26 after patient coded secondary to possible aspiration event. Two cycles completed.   ICU course uncomplicated. Extubated on 8/29, total days 3. Downtrending Hgb requiring total of 2x pRBCs, most likely chest hematoma secondary to CPR. Patient had abdominal distension and ileus for 1-2 days. Bowel movements resumed with aggressive bowel regiment and mineral oil enema. Patient off pressors, on RA, and at A&Ox0 not following commands. Stable but guarded. DNR/DNI with trial of NIV, 8/30/24.     Pressors: Levophed, discontinued.  Antibiotics: Cefepime, 5d, completed.   Lines: Peripheral, Midline  Pinon: Discontinued    DG to CEU:  -Pt is awake, not responding, AOx0  -abd still distended  -Resumed feeds & stopped LR fluids  -s/p 1unit blood 8/30 --> hgb 8.6    # New-Onset Paroxysmal A-fib  - As seen on telemetry 8/30, currently NSR  - Troponin 161 on 8/27  - Holding anticoagulation due to low Hgb in the setting of L chest wall hematoma  - Continue rate control, metoprolol 12.5 mg BID  - Continue sequentials for DVT prophylaxis until Hgb stabilized    # Severe Sepsis on Admission  - 8/23: Blood culture negative  - 8/24: CT Angio Chest urinary bladder mild pericystic stranding, could be attributed to urinary bladder infection in the appropriate clinical setting  - 8/24: UA w/ reflex cx negative  - Cefepime 2g q12h, 5 days completed  - Monitor, off antibiotics, ID following  - Voiding trial.  Bladder scans.  Monitory UO     # Cardiopulmonary Arrest Secondary to Aspiration  - 8/26: Coded secondary to likely aspiration event  - DNR/DNI on admission,  rescinded during code  - 2 cycles, 1x epi, 1x calcium gluconate, ROSC achieved, lost, PEA  - 1 cycle, 1x epi, intubated, ROSC achieved  - Patient again made DNR/DNI by      #Anemia  - CT Abdomen/Pelvis showing numerous L chest wall hematomas  - Trend Hgb, 6.6 on 8/28 --s/p 2U PRBC --> 8.6 (9/1)  - monitor H/H, keep Hb above 7.5    # Dysphagia   - NGT, feeds as ordered  - Speech consult recommends instrumental swallow study 9/3    # Chronic Constipation   - Secondary to autonomic dysfunction, right hemicolectomy   - Continue aggressive bowel regimen  - add enema    # Parkinson's Disease with Lewy Body Dementia   - CT Head negative x3  - EEG negative  - Baseline functional quadriplegic, wheelchair bound, A&Ox3  - Continue carbidopa/levodopa 10/100 TID    # Hypothyroidism    - 8/30: TSH 1.93  - Continue levothyroxine 50 mcg daily    # GERD   - Continue famotidine 20 mg daily    # Metabolic Encephalopathy  - Improved, stable    # Conjunctival Xerosis   # Glaucoma   - Continue with eye drops    # Peripheral Neuropathy  - Gabapentin held in setting of encephalopathy      #DVT PPX: Sequentials  #GI PPX: Famotidine  #Diet: NGT, per speech  #Code Status: DNR/DNI with NIV Trial  #Activity Order: Increase as tolerated    #Handoff:  - Trend Hgb, GOC, monitor mental status.

## 2024-09-02 LAB
ALBUMIN SERPL ELPH-MCNC: 3.2 G/DL — LOW (ref 3.5–5.2)
ALP SERPL-CCNC: 50 U/L — SIGNIFICANT CHANGE UP (ref 30–115)
ALT FLD-CCNC: 9 U/L — SIGNIFICANT CHANGE UP (ref 0–41)
ANION GAP SERPL CALC-SCNC: 11 MMOL/L — SIGNIFICANT CHANGE UP (ref 7–14)
AST SERPL-CCNC: 18 U/L — SIGNIFICANT CHANGE UP (ref 0–41)
BASOPHILS # BLD AUTO: 0.02 K/UL — SIGNIFICANT CHANGE UP (ref 0–0.2)
BASOPHILS NFR BLD AUTO: 0.2 % — SIGNIFICANT CHANGE UP (ref 0–1)
BILIRUB SERPL-MCNC: 0.9 MG/DL — SIGNIFICANT CHANGE UP (ref 0.2–1.2)
BUN SERPL-MCNC: 27 MG/DL — HIGH (ref 10–20)
CALCIUM SERPL-MCNC: 8.3 MG/DL — LOW (ref 8.4–10.5)
CHLORIDE SERPL-SCNC: 105 MMOL/L — SIGNIFICANT CHANGE UP (ref 98–110)
CO2 SERPL-SCNC: 24 MMOL/L — SIGNIFICANT CHANGE UP (ref 17–32)
CREAT SERPL-MCNC: 1.1 MG/DL — SIGNIFICANT CHANGE UP (ref 0.7–1.5)
CULTURE RESULTS: SIGNIFICANT CHANGE UP
EGFR: 50 ML/MIN/1.73M2 — LOW
EOSINOPHIL # BLD AUTO: 0.03 K/UL — SIGNIFICANT CHANGE UP (ref 0–0.7)
EOSINOPHIL NFR BLD AUTO: 0.3 % — SIGNIFICANT CHANGE UP (ref 0–8)
GLUCOSE SERPL-MCNC: 128 MG/DL — HIGH (ref 70–99)
HCT VFR BLD CALC: 25.5 % — LOW (ref 37–47)
HGB BLD-MCNC: 8.4 G/DL — LOW (ref 12–16)
IMM GRANULOCYTES NFR BLD AUTO: 2.2 % — HIGH (ref 0.1–0.3)
LYMPHOCYTES # BLD AUTO: 1.07 K/UL — LOW (ref 1.2–3.4)
LYMPHOCYTES # BLD AUTO: 10.6 % — LOW (ref 20.5–51.1)
MAGNESIUM SERPL-MCNC: 1.9 MG/DL — SIGNIFICANT CHANGE UP (ref 1.8–2.4)
MCHC RBC-ENTMCNC: 29.4 PG — SIGNIFICANT CHANGE UP (ref 27–31)
MCHC RBC-ENTMCNC: 32.9 G/DL — SIGNIFICANT CHANGE UP (ref 32–37)
MCV RBC AUTO: 89.2 FL — SIGNIFICANT CHANGE UP (ref 81–99)
MONOCYTES # BLD AUTO: 0.52 K/UL — SIGNIFICANT CHANGE UP (ref 0.1–0.6)
MONOCYTES NFR BLD AUTO: 5.1 % — SIGNIFICANT CHANGE UP (ref 1.7–9.3)
NEUTROPHILS # BLD AUTO: 8.25 K/UL — HIGH (ref 1.4–6.5)
NEUTROPHILS NFR BLD AUTO: 81.6 % — HIGH (ref 42.2–75.2)
NRBC # BLD: 0 /100 WBCS — SIGNIFICANT CHANGE UP (ref 0–0)
PLATELET # BLD AUTO: 231 K/UL — SIGNIFICANT CHANGE UP (ref 130–400)
PMV BLD: 9.7 FL — SIGNIFICANT CHANGE UP (ref 7.4–10.4)
POTASSIUM SERPL-MCNC: 4.2 MMOL/L — SIGNIFICANT CHANGE UP (ref 3.5–5)
POTASSIUM SERPL-SCNC: 4.2 MMOL/L — SIGNIFICANT CHANGE UP (ref 3.5–5)
PROT SERPL-MCNC: 5.1 G/DL — LOW (ref 6–8)
RBC # BLD: 2.86 M/UL — LOW (ref 4.2–5.4)
RBC # FLD: 23 % — HIGH (ref 11.5–14.5)
SODIUM SERPL-SCNC: 140 MMOL/L — SIGNIFICANT CHANGE UP (ref 135–146)
SPECIMEN SOURCE: SIGNIFICANT CHANGE UP
WBC # BLD: 10.11 K/UL — SIGNIFICANT CHANGE UP (ref 4.8–10.8)
WBC # FLD AUTO: 10.11 K/UL — SIGNIFICANT CHANGE UP (ref 4.8–10.8)

## 2024-09-02 PROCEDURE — 99233 SBSQ HOSP IP/OBS HIGH 50: CPT

## 2024-09-02 PROCEDURE — 93010 ELECTROCARDIOGRAM REPORT: CPT

## 2024-09-02 RX ORDER — HEPARIN SODIUM,BOVINE 1000/ML
5000 VIAL (ML) INJECTION EVERY 12 HOURS
Refills: 0 | Status: DISCONTINUED | OUTPATIENT
Start: 2024-09-02 | End: 2024-09-04

## 2024-09-02 RX ORDER — METHYLPREDNISOLONE 4 MG
40 TABLET ORAL DAILY
Refills: 0 | Status: COMPLETED | OUTPATIENT
Start: 2024-09-02 | End: 2024-09-04

## 2024-09-02 RX ADMIN — Medication 1 APPLICATION(S): at 05:53

## 2024-09-02 RX ADMIN — POLYETHYLENE GLYCOL 3350 17 GRAM(S): 17 POWDER, FOR SOLUTION ORAL at 18:50

## 2024-09-02 RX ADMIN — POVIDONE, PROPYLENE GLYCOL 1 DROP(S): 6.8; 3 LIQUID OPHTHALMIC at 06:00

## 2024-09-02 RX ADMIN — METOPROLOL TARTRATE 12.5 MILLIGRAM(S): 100 TABLET ORAL at 18:50

## 2024-09-02 RX ADMIN — FAMOTIDINE 20 MILLIGRAM(S): 10 INJECTION INTRAVENOUS at 12:04

## 2024-09-02 RX ADMIN — IPRATROPIUM BROMIDE AND ALBUTEROL SULFATE 3 MILLILITER(S): .5; 3 SOLUTION RESPIRATORY (INHALATION) at 02:02

## 2024-09-02 RX ADMIN — Medication 50 MICROGRAM(S): at 05:50

## 2024-09-02 RX ADMIN — Medication 1 TABLET(S): at 12:05

## 2024-09-02 RX ADMIN — Medication 1 TABLET(S): at 23:39

## 2024-09-02 RX ADMIN — CHLORHEXIDINE GLUCONATE 1 APPLICATION(S): 40 SOLUTION TOPICAL at 12:05

## 2024-09-02 RX ADMIN — Medication 40 MILLIGRAM(S): at 05:49

## 2024-09-02 RX ADMIN — Medication 1 APPLICATION(S): at 18:54

## 2024-09-02 RX ADMIN — Medication 5000 UNIT(S): at 18:53

## 2024-09-02 RX ADMIN — Medication 1 APPLICATION(S): at 05:52

## 2024-09-02 RX ADMIN — TIMOLOL MALEATE 1 DROP(S): 5 SOLUTION/ DROPS OPHTHALMIC at 05:52

## 2024-09-02 RX ADMIN — Medication 81 MILLIGRAM(S): at 12:04

## 2024-09-02 RX ADMIN — IPRATROPIUM BROMIDE AND ALBUTEROL SULFATE 3 MILLILITER(S): .5; 3 SOLUTION RESPIRATORY (INHALATION) at 13:54

## 2024-09-02 RX ADMIN — METOPROLOL TARTRATE 12.5 MILLIGRAM(S): 100 TABLET ORAL at 05:50

## 2024-09-02 RX ADMIN — Medication 1 APPLICATION(S): at 18:48

## 2024-09-02 RX ADMIN — Medication 2 TABLET(S): at 21:39

## 2024-09-02 RX ADMIN — POLYETHYLENE GLYCOL 3350 17 GRAM(S): 17 POWDER, FOR SOLUTION ORAL at 05:50

## 2024-09-02 RX ADMIN — IPRATROPIUM BROMIDE AND ALBUTEROL SULFATE 3 MILLILITER(S): .5; 3 SOLUTION RESPIRATORY (INHALATION) at 19:41

## 2024-09-02 RX ADMIN — Medication 1 TABLET(S): at 05:50

## 2024-09-02 RX ADMIN — IPRATROPIUM BROMIDE AND ALBUTEROL SULFATE 3 MILLILITER(S): .5; 3 SOLUTION RESPIRATORY (INHALATION) at 08:09

## 2024-09-02 RX ADMIN — TIMOLOL MALEATE 1 DROP(S): 5 SOLUTION/ DROPS OPHTHALMIC at 18:54

## 2024-09-02 RX ADMIN — LATANOPROST 1 DROP(S): 50 SOLUTION OPHTHALMIC at 21:42

## 2024-09-02 NOTE — CHART NOTE - NSCHARTNOTEFT_GEN_A_CORE
Palliative care team already following case. Please read previous notes in chart    Recommendations  - DNR/DNI  - ongoing GOC discussion  - if family decides CMO, then contact  or message via teams if needed for recs for symptom management  - Palliative care team will follow up 9/3

## 2024-09-02 NOTE — PROGRESS NOTE ADULT - SUBJECTIVE AND OBJECTIVE BOX
Patient is a 84y old  Female who presents with a chief complaint of Altered Mental Status and Increased WOB (01 Sep 2024 14:05)      Overnight events: on RA. off pressor. no overnight events.          ROS: as in HPI; All other systems reviewed are negative        PHYSICAL EXAM  Vital Signs Last 24 Hrs  T(C): 36.7 (02 Sep 2024 04:00), Max: 37.3 (01 Sep 2024 11:52)  T(F): 98 (02 Sep 2024 04:00), Max: 99.2 (01 Sep 2024 11:52)  HR: 104 (02 Sep 2024 04:00) (94 - 104)  BP: 120/56 (02 Sep 2024 04:00) (94/46 - 144/65)  BP(mean): 94 (01 Sep 2024 20:00) (66 - 94)  RR: 20 (02 Sep 2024 04:00) (20 - 20)  SpO2: 92% (02 Sep 2024 04:00) (92% - 98%)    Parameters below as of 01 Sep 2024 20:00  Patient On (Oxygen Delivery Method): room air          CONSTITUTIONAL:   NAD    ENT:   Airway patent,     EYES:   Clear bilaterally,   pupils equal,   round and reactive to light.    CARDIAC:   Normal rate,   regular rhythm.    no edema    RESPIRATORY:   Bilateral decreased breath sounds at bases  No wheezing   Not tachypneic,    GASTROINTESTINAL:  Abdomen soft, non-tender,   No guarding,   Positive BS    MUSCULOSKELETAL:   Range of motion is not limited,    NEUROLOGICAL:   arousable  does not follow commands    SKIN:   Skin normal color for race,   No evidence of rash.            ABG - ( 31 Aug 2024 09:40 )  pH, Arterial: 7.42  pH, Blood: x     /  pCO2: 28    /  pO2: 77    / HCO3: 18    / Base Excess: -4.9  /  SaO2: 96.6                I&O's Detail    01 Sep 2024 07:01  -  02 Sep 2024 07:00  --------------------------------------------------------  IN:    Free Water: 800 mL    Jevity 1.2: 840 mL  Total IN: 1640 mL    OUT:    Voided (mL): 1050 mL  Total OUT: 1050 mL    Total NET: 590 mL            LABS:                        8.6    11.95 )-----------( 221      ( 01 Sep 2024 04:43 )             26.5     01 Sep 2024 04:43    136    |  102    |  31     ----------------------------<  134    3.5     |  22     |  1.2      Ca    8.2        01 Sep 2024 04:43  Mg     2.0       01 Sep 2024 04:43            CAPILLARY BLOOD GLUCOSE          Urinalysis Basic - ( 01 Sep 2024 04:43 )    Color: x / Appearance: x / SG: x / pH: x  Gluc: 134 mg/dL / Ketone: x  / Bili: x / Urobili: x   Blood: x / Protein: x / Nitrite: x   Leuk Esterase: x / RBC: x / WBC x   Sq Epi: x / Non Sq Epi: x / Bacteria: x      Culture        MEDICATIONS  (STANDING):  albuterol    90 MICROgram(s) HFA Inhaler 2 Puff(s) Inhalation four times a day  albuterol/ipratropium for Nebulization 3 milliLiter(s) Nebulizer every 6 hours  artificial tears (preservative free) Ophthalmic Solution 1 Drop(s) Both EYES two times a day  aspirin  chewable 81 milliGRAM(s) Oral daily  carbidopa/levodopa  10/100 1 Tablet(s) Oral three times a day  chlorhexidine 2% Cloths 1 Application(s) Topical daily  clotrimazole 1% Cream 1 Application(s) Topical two times a day  famotidine    Tablet 20 milliGRAM(s) Oral daily  hydrocortisone 1% Cream 1 Application(s) Topical two times a day  latanoprost 0.005% Ophthalmic Solution 1 Drop(s) Both EYES at bedtime  levothyroxine 50 MICROGram(s) Oral daily  methylPREDNISolone sodium succinate Injectable 40 milliGRAM(s) IV Push two times a day  metoprolol tartrate 12.5 milliGRAM(s) Oral every 12 hours  polyethylene glycol 3350 17 Gram(s) Oral two times a day  senna 2 Tablet(s) Oral at bedtime  timolol 0.5% Solution 1 Drop(s) Both EYES two times a day    MEDICATIONS  (PRN):                 Patient is a 84y old  Female who presents with a chief complaint of Altered Mental Status and Increased WOB (01 Sep 2024 14:05)      Overnight events: on RA. off pressor. no overnight events. used CPAP overnight          ROS: as in HPI; All other systems reviewed are negative        PHYSICAL EXAM  Vital Signs Last 24 Hrs  T(C): 36.7 (02 Sep 2024 04:00), Max: 37.3 (01 Sep 2024 11:52)  T(F): 98 (02 Sep 2024 04:00), Max: 99.2 (01 Sep 2024 11:52)  HR: 104 (02 Sep 2024 04:00) (94 - 104)  BP: 120/56 (02 Sep 2024 04:00) (94/46 - 144/65)  BP(mean): 94 (01 Sep 2024 20:00) (66 - 94)  RR: 20 (02 Sep 2024 04:00) (20 - 20)  SpO2: 92% (02 Sep 2024 04:00) (92% - 98%)    Parameters below as of 01 Sep 2024 20:00  Patient On (Oxygen Delivery Method): room air          CONSTITUTIONAL:   NAD    ENT:   Airway patent,     EYES:   Clear bilaterally,   pupils equal,   round and reactive to light.    CARDIAC:   Normal rate,   regular rhythm.    no edema    RESPIRATORY:   Bilateral decreased breath sounds at bases  No wheezing   Not tachypneic,    GASTROINTESTINAL:  Abdomen soft, non-tender,   No guarding,   Positive BS    MUSCULOSKELETAL:   Range of motion is not limited,    NEUROLOGICAL:   does not follow commands  awake    SKIN:   Skin normal color for race,   No evidence of rash.            ABG - ( 31 Aug 2024 09:40 )  pH, Arterial: 7.42  pH, Blood: x     /  pCO2: 28    /  pO2: 77    / HCO3: 18    / Base Excess: -4.9  /  SaO2: 96.6                I&O's Detail    01 Sep 2024 07:01  -  02 Sep 2024 07:00  --------------------------------------------------------  IN:    Free Water: 800 mL    Jevity 1.2: 840 mL  Total IN: 1640 mL    OUT:    Voided (mL): 1050 mL  Total OUT: 1050 mL    Total NET: 590 mL            LABS:                        8.4    11.95 )-----------( 221      ( 01 Sep 2024 04:43 )             26.5     01 Sep 2024 04:43    136    |  102    |  31     ----------------------------<  134    3.5     |  22     |  1.2      Ca    8.2        01 Sep 2024 04:43  Mg     2.0       01 Sep 2024 04:43            CAPILLARY BLOOD GLUCOSE          Urinalysis Basic - ( 01 Sep 2024 04:43 )    Color: x / Appearance: x / SG: x / pH: x  Gluc: 134 mg/dL / Ketone: x  / Bili: x / Urobili: x   Blood: x / Protein: x / Nitrite: x   Leuk Esterase: x / RBC: x / WBC x   Sq Epi: x / Non Sq Epi: x / Bacteria: x      Culture        MEDICATIONS  (STANDING):  albuterol    90 MICROgram(s) HFA Inhaler 2 Puff(s) Inhalation four times a day  albuterol/ipratropium for Nebulization 3 milliLiter(s) Nebulizer every 6 hours  artificial tears (preservative free) Ophthalmic Solution 1 Drop(s) Both EYES two times a day  aspirin  chewable 81 milliGRAM(s) Oral daily  carbidopa/levodopa  10/100 1 Tablet(s) Oral three times a day  chlorhexidine 2% Cloths 1 Application(s) Topical daily  clotrimazole 1% Cream 1 Application(s) Topical two times a day  famotidine    Tablet 20 milliGRAM(s) Oral daily  hydrocortisone 1% Cream 1 Application(s) Topical two times a day  latanoprost 0.005% Ophthalmic Solution 1 Drop(s) Both EYES at bedtime  levothyroxine 50 MICROGram(s) Oral daily  methylPREDNISolone sodium succinate Injectable 40 milliGRAM(s) IV Push two times a day  metoprolol tartrate 12.5 milliGRAM(s) Oral every 12 hours  polyethylene glycol 3350 17 Gram(s) Oral two times a day  senna 2 Tablet(s) Oral at bedtime  timolol 0.5% Solution 1 Drop(s) Both EYES two times a day    MEDICATIONS  (PRN):

## 2024-09-02 NOTE — PROGRESS NOTE ADULT - ASSESSMENT
Impression:     SP Cardiopulmonary arrest approximately 10 minutes  Acute hypoxemic respiratory failure 2/2 intubation now ext  Likely aspiration event   Shock now off Levophed    Left chest wall hematomas   Symptomatic anemia   Possible UTI    G+ cocci bacteremia.  Possibly contaminant   JENN   Moderate to large stool burden on admission   HO Lewy body dementia   HO COPD   HO abdominal surgery    PLAN:    CNS:    CW carbidopa/levodopa.  Avoid depressants     HEENT: Oral care.      PULMONARY:  HOB @ 45 degrees. O2 if needed to keep O2 sat 92--96%.  Aspiration precautions.  Trial of NIV during sleep and during the day as needed,  Wean solumedrol.    CARDIOVASCULAR:  c/w Low dose Beta blockers. MAP adequate    GI: GI prophylaxis.  NG feeding.  Continue bowel regimen    RENAL:  Follow up lytes.  Correct as needed.  Voiding trial.  Bladder scans qshift .  Monitory UO     INFECTIOUS DISEASE:  Abx completed, for leukocytosis, Restart if fever spikes     HEMATOLOGICAL:  DVT prophylaxis Seq. LE duplex neg.       ENDOCRINE:  Follow up FS. avoid hypoglycemia  cW synthroid    MUSCULOSKELETAL: Bed rest.  Off loading.      Palliative care eval.     Poor prognosis overall     SDU Impression:   SP Cardiopulmonary arrest approximately 10 minutes  Acute hypoxemic respiratory failure 2/2 intubation now ext  Likely aspiration event   Shock now off Levophed    Left chest wall hematomas   Symptomatic anemia   Possible UTI    G+ cocci bacteremia.  Possibly contaminant   JENN   Moderate to large stool burden on admission   HO Lewy body dementia   HO COPD   HO abdominal surgery    PLAN:    CNS:    CW carbidopa/levodopa.  Avoid depressants     HEENT: Oral care.      PULMONARY:  HOB @ 45 degrees. O2 if needed to keep O2 sat 92--96%.  Aspiration precautions. NIV at night and as needed during sleep and during the day as needed,  Wean solumedrol.    CARDIOVASCULAR:  c/w Low dose Beta blockers. MAP adequate    GI: GI prophylaxis.  NG feeding.  Continue bowel regimen    RENAL:  Follow up lytes.  Correct as needed.  Bladder scans qshift .  Monitory UO     INFECTIOUS DISEASE:  Abx completed, for leukocytosis, Restart if fever spikes     HEMATOLOGICAL:  DVT prophylaxis Seq. LE duplex neg.       ENDOCRINE:  Follow up FS. avoid hypoglycemia  cW synthroid    MUSCULOSKELETAL: Bed rest.  Off loading.      Palliative care eval.     Poor prognosis overall     DGTF Impression:   SP Cardiopulmonary arrest approximately 10 minutes  Acute hypoxemic respiratory failure 2/2 intubation now ext  Likely aspiration event   Shock now off Levophed    Left chest wall hematomas   Symptomatic anemia   Possible UTI    G+ cocci bacteremia.  Possibly contaminant   JENN   Moderate to large stool burden on admission   HO Lewy body dementia   HO COPD   HO abdominal surgery    PLAN:    CNS:    CW carbidopa/levodopa.  Avoid depressants     HEENT: Oral care.      PULMONARY:  HOB @ 45 degrees. O2 if needed to keep O2 sat 92--96%.  Aspiration precautions. NIV at night and as needed during sleep and during the day as needed,  Wean solumedrol off.    CARDIOVASCULAR:  c/w Low dose Beta blockers. MAP adequate    GI: GI prophylaxis.  NG feeding.  Continue bowel regimen    RENAL:  Follow up lytes.  Correct as needed.  Bladder scans qshift .  Monitory UO     INFECTIOUS DISEASE:  Abx completed, for leukocytosis, Restart if fever spikes     HEMATOLOGICAL:  DVT prophylaxis Seq. LE duplex neg.       ENDOCRINE:  Follow up FS. avoid hypoglycemia  cW synthroid    MUSCULOSKELETAL: Bed rest.  Off loading.      Palliative care eval.     Poor prognosis overall     DGTF

## 2024-09-02 NOTE — PROGRESS NOTE ADULT - TIME BILLING
time spent on review of labs, imaging studies, old records, obtaining history, personally examining patient, counselling and communicating with patient, entering orders for medications/tests/etc, discussions with other health care providers, documentation in electronic health records, independent interpretation of labs, imaging/procedure results and care coordination.
Time above includes time spent preparing to see the patient, obtaining and/or reviewing separately obtained history, performing a medically appropriate examination and/or evaluation, counseling and educating the patient/family/caregiver, referring and communicating with other health care professionals (when not separately reported), documenting clinical information in the electronic or other health record, independently interpreting results (not separately reported) and communicating results to the patient/family/caregiver, and/or care coordination (not separately reported).

## 2024-09-02 NOTE — CHART NOTE - NSCHARTNOTEFT_GEN_A_CORE
Transfer Note    Transfer from: CEU  Transfer to: Floor      HPI:  Patient 84-year-old woman with a history of Lewy body dementia (AAO x 2 at baseline), Parkinson's, COPD not on home oxygen, presented from [facility] with presenting complaints of AMS and Tachypnea, Patient was A&Ox0 on arrival, unable to provide history. Patient was found to be with decreased breath sounds diffusely, quick bedside ultrasound by ED showed lung sliding, no pneumothorax, was placed on BiPAP for potential hypercapnia, magnesium and nebs given, blood gas resulted with normal CO2. was weaned off to NC.  ED team held off on 30 cc/kg bolus, as patient has a history of heart failure: was given 1 L bolus. She was evaluated by cardiology for two runs of NSVTs approx 10 sec, recommended telemetry admission. Patient was started on Ertapenem, Doxycycline and Ceftriaxone in NH for suspected UTI. At baseline patient is AXO x3, has difficulty finding words, has impaired memory but has meaningful conversation. On my visit patient saturating 95 % on RA lying comfortably in bed, is oriented to place and person and nodding to questions. Patient endorses no abdominal pain, chest pain, SOB or fever.       COURSE:    ED Course  Vitals: 97.8, 133, 141/77 , 24 bpm  Labs: WBC 19.08, Lactate 3.1 -> 4.0, Trops 142 -> 132, Na 147, BUN/Cr 61/1.4, pro-,    VBG: pH 7.32, pCo2 42, HCO3 22, UA WNL   Imaging:   CT Head: No evidence of acute intracranial abnormality.   CT Angio Chest PE protocol and CT A/P with IV contrast:    1. Urinary bladder mild pericystic stranding    2. Increased ovarian 2.5 cm cystic lesion. Unchanged right ovarian 7 cm cystic mass. Outpatient pelvic ultrasound recommended for further evaluation.   3. Moderate-to-large colorectal stool burden.   4. No evidence of acute thoracic pathology or pulmonary embolus.   CXR:  No focal consolidation, pneumothorax or pleural effusion. Stable cardio-mediastinal silhouette.     Day 1: Patient being admitted to medicine for further evaluation and management of severe sepsis 2/2 possible acute complicated UTI. Procal 0.86. ID consulted to change antibiotics to ceftriaxone 2 gms and d/c vanc and Cefepime. Patient saturating well on RA. Patient had runs of NSVTs EKG showed sinus tachycardia.           ASSESSMENT & PLAN:         PENDINGS:          Vital Signs Last 24 Hrs  T(C): 36.8 (02 Sep 2024 08:02), Max: 37.3 (01 Sep 2024 11:52)  T(F): 98.2 (02 Sep 2024 08:02), Max: 99.2 (01 Sep 2024 11:52)  HR: 91 (02 Sep 2024 08:02) (91 - 104)  BP: 102/52 (02 Sep 2024 08:02) (94/46 - 144/65)  BP(mean): 74 (02 Sep 2024 08:02) (66 - 94)  RR: 20 (02 Sep 2024 08:02) (20 - 20)  SpO2: 93% (02 Sep 2024 08:02) (92% - 98%)    Parameters below as of 02 Sep 2024 08:02  Patient On (Oxygen Delivery Method): room air      I&O's Summary    01 Sep 2024 07:01  -  02 Sep 2024 07:00  --------------------------------------------------------  IN: 1640 mL / OUT: 1050 mL / NET: 590 mL    02 Sep 2024 07:01  -  02 Sep 2024 09:27  --------------------------------------------------------  IN: 60 mL / OUT: 0 mL / NET: 60 mL          MEDICATIONS  (STANDING):  albuterol    90 MICROgram(s) HFA Inhaler 2 Puff(s) Inhalation four times a day  albuterol/ipratropium for Nebulization 3 milliLiter(s) Nebulizer every 6 hours  artificial tears (preservative free) Ophthalmic Solution 1 Drop(s) Both EYES two times a day  aspirin  chewable 81 milliGRAM(s) Oral daily  carbidopa/levodopa  10/100 1 Tablet(s) Oral three times a day  chlorhexidine 2% Cloths 1 Application(s) Topical daily  clotrimazole 1% Cream 1 Application(s) Topical two times a day  famotidine    Tablet 20 milliGRAM(s) Oral daily  heparin   Injectable 5000 Unit(s) SubCutaneous every 12 hours  hydrocortisone 1% Cream 1 Application(s) Topical two times a day  latanoprost 0.005% Ophthalmic Solution 1 Drop(s) Both EYES at bedtime  levothyroxine 50 MICROGram(s) Oral daily  methylPREDNISolone sodium succinate Injectable 40 milliGRAM(s) IV Push daily  metoprolol tartrate 12.5 milliGRAM(s) Oral every 12 hours  polyethylene glycol 3350 17 Gram(s) Oral two times a day  senna 2 Tablet(s) Oral at bedtime  timolol 0.5% Solution 1 Drop(s) Both EYES two times a day    MEDICATIONS  (PRN):        LABS                                            8.4                   Neurophils% (auto):   81.6   (09-02 @ 05:30):    10.11)-----------(231          Lymphocytes% (auto):  10.6                                          25.5                   Eosinphils% (auto):   0.3      Manual%: Neutrophils x    ; Lymphocytes x    ; Eosinophils x    ; Bands%: x    ; Blasts x                                    140    |  105    |  27                  Calcium: 8.3   / iCa: x      (09-02 @ 05:30)    ----------------------------<  128       Magnesium: 1.9                              4.2     |  24     |  1.1              Phosphorous: x        TPro  5.1    /  Alb  3.2    /  TBili  0.9    /  DBili  x      /  AST  18     /  ALT  9      /  AlkPhos  50     02 Sep 2024 05:30 Transfer Note    Transfer from: CEU  Transfer to: Floor      HPI:  Patient 84-year-old woman with a history of Lewy body dementia (AAO x 2 at baseline), Parkinson's, COPD not on home oxygen, presented from [facility] with presenting complaints of AMS and Tachypnea, Patient was A&Ox0 on arrival, unable to provide history. Patient was found to be with decreased breath sounds diffusely, quick bedside ultrasound by ED showed lung sliding, no pneumothorax, was placed on BiPAP for potential hypercapnia, magnesium and nebs given, blood gas resulted with normal CO2. was weaned off to NC.  ED team held off on 30 cc/kg bolus, as patient has a history of heart failure: was given 1 L bolus. She was evaluated by cardiology for two runs of NSVTs approx 10 sec, recommended telemetry admission. Patient was started on Ertapenem, Doxycycline and Ceftriaxone in NH for suspected UTI. At baseline patient is AXO x3, has difficulty finding words, has impaired memory but has meaningful conversation. On my visit patient saturating 95 % on RA lying comfortably in bed, is oriented to place and person and nodding to questions. Patient endorses no abdominal pain, chest pain, SOB or fever.       COURSE:    ED Course  Vitals: 97.8, 133, 141/77 , 24 bpm  Labs: WBC 19.08, Lactate 3.1 -> 4.0, Trops 142 -> 132, Na 147, BUN/Cr 61/1.4, pro-,    VBG: pH 7.32, pCo2 42, HCO3 22, UA WNL   Imaging:   CT Head: No evidence of acute intracranial abnormality.   CT Angio Chest PE protocol and CT A/P with IV contrast:    1. Urinary bladder mild pericystic stranding    2. Increased ovarian 2.5 cm cystic lesion. Unchanged right ovarian 7 cm cystic mass. Outpatient pelvic ultrasound recommended for further evaluation.   3. Moderate-to-large colorectal stool burden.   4. No evidence of acute thoracic pathology or pulmonary embolus.   CXR:  No focal consolidation, pneumothorax or pleural effusion. Stable cardio-mediastinal silhouette.     Day 1: Patient being admitted to medicine for further evaluation and management of severe sepsis 2/2 possible acute complicated UTI. Procal 0.86. ID consulted to change antibiotics to ceftriaxone 2 gms and d/c vanc and Cefepime. Patient saturating well on RA. Patient had runs of NSVTs EKG showed sinus tachycardia. Code Blue at 12:45 2 cycles and ROSC was achieved x1 epi and calcium gluconate , PEA and one cycle rosc intubated started on Levophed, precedex and fentanyl. likely from aspiration.  rescinded DNR/DNI. transferred to ICU.     Day 2: MICU, Patient sedated. Drop in Hg 11.7--> 8.2. Patient Lactate was trending down.   Day 3: MICU: Patient Hg 8.2--> 6.6 given 1 unit PRBCs likely due to hematomas in chest s/p CPR. TTE undiagnostic, failed cheetah. CTH unchanged. On Levophed   Day 4: MICU Patient Hg improved to 7.2. levophed discontinued. Overnight: A fib patient started on metoprolol 12.5 q12. Patient with chronic constipation, aggressive bowel regimen. Patient RA, Aox 0, not following commands. DNR DNI with trial NIV. Completed course Cefepime x 5days.   Day 5:  CEU Patient awake, Aox0, not responding. Feeds resumed, and LR fluids stopped. s/p 1 unit overnight. Afib with elevated troponins, holding AC in setting of low Hg. Monitoring off Abx   Day 6 CEU: Patient awake, not following commands, not in any acute distress. Heparin 5000 BID resumed for DVT ppx. Patient used CPAP overnight.       ASSESSMENT & PLAN:         PENDING:    Palliative Evaluation       Vital Signs Last 24 Hrs  T(C): 36.8 (02 Sep 2024 08:02), Max: 37.3 (01 Sep 2024 11:52)  T(F): 98.2 (02 Sep 2024 08:02), Max: 99.2 (01 Sep 2024 11:52)  HR: 91 (02 Sep 2024 08:02) (91 - 104)  BP: 102/52 (02 Sep 2024 08:02) (94/46 - 144/65)  BP(mean): 74 (02 Sep 2024 08:02) (66 - 94)  RR: 20 (02 Sep 2024 08:02) (20 - 20)  SpO2: 93% (02 Sep 2024 08:02) (92% - 98%)    Parameters below as of 02 Sep 2024 08:02  Patient On (Oxygen Delivery Method): room air      I&O's Summary    01 Sep 2024 07:01  -  02 Sep 2024 07:00  --------------------------------------------------------  IN: 1640 mL / OUT: 1050 mL / NET: 590 mL    02 Sep 2024 07:01  -  02 Sep 2024 09:27  --------------------------------------------------------  IN: 60 mL / OUT: 0 mL / NET: 60 mL          MEDICATIONS  (STANDING):  albuterol    90 MICROgram(s) HFA Inhaler 2 Puff(s) Inhalation four times a day  albuterol/ipratropium for Nebulization 3 milliLiter(s) Nebulizer every 6 hours  artificial tears (preservative free) Ophthalmic Solution 1 Drop(s) Both EYES two times a day  aspirin  chewable 81 milliGRAM(s) Oral daily  carbidopa/levodopa  10/100 1 Tablet(s) Oral three times a day  chlorhexidine 2% Cloths 1 Application(s) Topical daily  clotrimazole 1% Cream 1 Application(s) Topical two times a day  famotidine    Tablet 20 milliGRAM(s) Oral daily  heparin   Injectable 5000 Unit(s) SubCutaneous every 12 hours  hydrocortisone 1% Cream 1 Application(s) Topical two times a day  latanoprost 0.005% Ophthalmic Solution 1 Drop(s) Both EYES at bedtime  levothyroxine 50 MICROGram(s) Oral daily  methylPREDNISolone sodium succinate Injectable 40 milliGRAM(s) IV Push daily  metoprolol tartrate 12.5 milliGRAM(s) Oral every 12 hours  polyethylene glycol 3350 17 Gram(s) Oral two times a day  senna 2 Tablet(s) Oral at bedtime  timolol 0.5% Solution 1 Drop(s) Both EYES two times a day    MEDICATIONS  (PRN):        LABS                                            8.4                   Neurophils% (auto):   81.6   (09-02 @ 05:30):    10.11)-----------(231          Lymphocytes% (auto):  10.6                                          25.5                   Eosinphils% (auto):   0.3      Manual%: Neutrophils x    ; Lymphocytes x    ; Eosinophils x    ; Bands%: x    ; Blasts x                                    140    |  105    |  27                  Calcium: 8.3   / iCa: x      (09-02 @ 05:30)    ----------------------------<  128       Magnesium: 1.9                              4.2     |  24     |  1.1              Phosphorous: x        TPro  5.1    /  Alb  3.2    /  TBili  0.9    /  DBili  x      /  AST  18     /  ALT  9      /  AlkPhos  50     02 Sep 2024 05:30

## 2024-09-02 NOTE — PROGRESS NOTE ADULT - ASSESSMENT
84-year-old woman with a history of Lewy body dementia (AAO x 2 at baseline), Parkinson's, COPD not on home oxygen, presented from [facility] with presenting complaints of AMS and Tachypnea, Patient was A&Ox0 on arrival, unable to provide history. Patient was found to be with decreased breath sounds diffusely, quick bedside ultrasound by ED showed lung sliding, no pneumothorax, was placed on BiPAP for potential hypercapnia, magnesium and nebs given, blood gas resulted with normal CO2. was weaned off to NC.  ED team held off on 30 cc/kg bolus, as patient has a history of heart failure: was given 1 L bolus. She was evaluated by cardiology for two runs of NSVTs approx 10 sec, recommended telemetry admission. Patient was started on Ertapenem, Doxycycline and Ceftriaxone in NH for suspected UTI. At baseline patient is AXO x3, has difficulty finding words, has impaired memory but has meaningful conversation. Pt was found in  Cardiopulmonary Arrest Secondary to Aspiration on 8/26,    rescinded during code, pt was resuscitated, developed extensive soft tissue hematoma on chest wall after CPR, received blood transfusion.   Initially she was admitted to ICU, downgraded to SDU on 8/30       A/P  # New-Onset Paroxysmal A-fib  - telemetry monitoring, currently NSR  - no on AC due to low Hgb in the setting of L chest wall hematoma  - c/w metoprolol 12.5 mg BID  - monitor and replete electrolytes     # Severe Sepsis on Admission/ Staphylococcus hominis bacteremia   - resolved now , repeat blood Cx is NTD   -  urine Cx is  negative to date   - 8/24: CT Angio Chest urinary bladder mild pericystic stranding, could be attributed to urinary bladder infection in the appropriate clinical setting  -  completed 5 day of Cefepime   -  ID following, recommendations noted     # Cardiopulmonary Arrest Secondary to Aspiration  - DNR/DNI on admission,  rescinded during code  - 2 cycles, 1x epi, 1x calcium gluconate, ROSC achieved, lost, PEA  - 1 cycle, 1x epi, intubated, ROSC achieved  - Patient now is  DNR/DNI by  , he is refusing PEG tube, considering comfort and end of life care     # Dysphagia  - if mental status is better, call speech and swallow for follow up   - aspiration precautions     # Acute blood loss anemia   - CT Abdomen/Pelvis showing numerous L chest wall hematomas  - Hgb, 6.6 on 8/30  - Total 2x pRBCs given  - monitor H/H, keep Hb above 7.5    # Chronic Constipation   - Secondary to autonomic dysfunction, right hemicolectomy   - Continue aggressive bowel regimen   - enema PRN     # 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       #Progress Note Handoff  Pending (specify):  supportive care, speech and swallow follow up tomorrow, palliative care follow up for GOC ,  refusing PEG tube, leaning towards comfort care   Family discussion: I spoke with pt's  at the bedside, he agreed with a plan of care   Disposition: DGTF

## 2024-09-02 NOTE — PROGRESS NOTE ADULT - SUBJECTIVE AND OBJECTIVE BOX
84-year-old woman with a history of Lewy body dementia (AAO x 2 at baseline), Parkinson's, COPD not on home oxygen, [facility] with presenting complaints of AMS and Tachypnea, Patient was A&Ox0 on arrival, unable to provide history. Patient was found to be with decreased breath sounds diffusely, quick bedside ultrasound by ED showed lung sliding, no pneumothorax, was placed on BiPAP for potential hypercapnia, magnesium and nebs given, blood gas resulted with normal CO2. was weaned off to NC.  ED team held off on 30 cc/kg bolus, as patient has a history of heart failure: was given 1 L bolus. She was evaluated by cardiology for two runs of NSVTs approx 10 sec, recommended telemetry admission. Patient was started on Ertapenem, Doxycycline and Ceftriaxone in NH for suspected UTI. At baseline patient is AXO x3, has difficulty finding words, has impaired memory but has meaningful conversation. Pt was found in  Cardiopulmonary Arrest Secondary to Aspiration on 8/26,  rescinded during code, pt was resuscitated, developed extensive soft tissue hematoma on chest wall after CPR, received blood transfusion.   Initially she was admitted to ICU, downgraded to SDU on 8/30.  Today pt is awake, answering questions,  at the bedside.     PAST MEDICAL & SURGICAL HISTORY:  Dementia      History of breast cancer      Constipation      Lewy body dementia without behavioral disturbance      Colon polyp      History of colon resection      H/O mastectomy, right      Vital Signs Last 24 Hrs  T(C): 36.8 (02 Sep 2024 12:02), Max: 37 (01 Sep 2024 16:00)  T(F): 98.2 (02 Sep 2024 12:02), Max: 98.6 (01 Sep 2024 16:00)  HR: 92 (02 Sep 2024 12:02) (91 - 104)  BP: 130/61 (02 Sep 2024 12:02) (94/46 - 144/65)  BP(mean): 88 (02 Sep 2024 12:02) (66 - 94)  RR: 20 (02 Sep 2024 12:02) (20 - 20)  SpO2: 99% (02 Sep 2024 12:02) (92% - 99%)    Parameters below as of 02 Sep 2024 12:02  Patient On (Oxygen Delivery Method): room air          PHYSICAL EXAM:  GENERAL: NAD, elderly demented female   HEAD:  Atraumatic, Normocephalic, NGT in place   NECK: Supple, No JVD, Normal thyroid  NERVOUS SYSTEM:  demented, nonverbal, opens eye to voice, does not follow commands   CHEST/LUNG: decreased BS at bases, shallow breathing   HEART: S1, S2, tachycardic   ABDOMEN: Soft, Nontender, less distended; Bowel sounds present  EXTREMITIES:  trace  edema      LABS:                                   8.4    10.11 )-----------( 231      ( 02 Sep 2024 05:30 )             25.5   09-02    140  |  105  |  27<H>  ----------------------------<  128<H>  4.2   |  24  |  1.1    Ca    8.3<L>      02 Sep 2024 05:30  Mg     1.9     09-02    TPro  5.1<L>  /  Alb  3.2<L>  /  TBili  0.9  /  DBili  x   /  AST  18  /  ALT  9   /  AlkPhos  50  09-02      Urinalysis Basic - ( 01 Sep 2024 04:43 )    Color: x / Appearance: x / SG: x / pH: x  Gluc: 134 mg/dL / Ketone: x  / Bili: x / Urobili: x   Blood: x / Protein: x / Nitrite: x   Leuk Esterase: x / RBC: x / WBC x   Sq Epi: x / Non Sq Epi: x / Bacteria: x    Culture - Blood (08.28.24 @ 11:13)   Specimen Source: .Blood None  Culture Results:   No growth at 72 Hours    Culture - Blood (08.26.24 @ 14:45)   - Coagulase negative Staphylococcus: Detec  Gram Stain:   Growth in aerobic bottle: Gram Positive Cocci in Clusters  Specimen Source: .Blood Blood  Organism: Blood Culture PCR  Culture Results:   Growth in aerobic bottle: Staphylococcus hominis   Isolation of Coagulase negative Staphylococcus from single blood culture   sets may represent   contamination. Contact the Microbiology Department at [phone] if   susceptibility testing is   clinically indicated.   Direct identification is available within approximately 3-5   hours either by Blood Panel Multiplexed PCR or Direct   MALDI-TOF. Details: [email or web address]  Organism Identification: Blood Culture PCR  Method Type: PCR  RADIOLOGY & ADDITIONAL TESTS:  < from: Xray Chest 1 View-PORTABLE IMMEDIATE (Xray Chest 1 View-PORTABLE IMMEDIATE .) (09.01.24 @ 09:14) >  Impression:    Cardiomegaly with bilateral opacifications, left greater than right.   Follow-up as needed.      < from: Xray Kidney Ureter Bladder (08.29.24 @ 06:59) >  impression:    Fecal impaction in the rectum.    Calcified fibroid.    Degenerative changes of the spine.      < from: [facility] Duplex Lower Ext Vein Scan, Bilat (08.27.24 @ 16:32) >  IMPRESSION:  No evidence of deep venous thrombosis in either lower extremity.    < from: CT Abdomen and Pelvis No Cont (08.27.24 @ 10:51) >  IMPRESSION:    Partially imaged numerous hyperdense left chest wall structures,   measuring up to at least 14.1 cm, new from 8/24/2024, suggestive of   hematomas. Recommend evaluated with dedicated chest CT.    Rectal fecal impaction, increased from prior.    Bilateral cystic adnexal gaseous redemonstrated. Recommend GYN   evaluation/further workup.     from: CT Head No Cont (08.26.24 @ 23:44) >  IMPRESSION:    No evidence of acute intracranial pathology. Stable exam.    Stable severe chronic microvascular ischemic changes.    MEDICATIONS  (STANDING):  albuterol    90 MICROgram(s) HFA Inhaler 2 Puff(s) Inhalation four times a day  albuterol/ipratropium for Nebulization 3 milliLiter(s) Nebulizer every 6 hours  artificial tears (preservative free) Ophthalmic Solution 1 Drop(s) Both EYES two times a day  aspirin  chewable 81 milliGRAM(s) Oral daily  carbidopa/levodopa  10/100 1 Tablet(s) Oral three times a day  chlorhexidine 2% Cloths 1 Application(s) Topical daily  clotrimazole 1% Cream 1 Application(s) Topical two times a day  famotidine    Tablet 20 milliGRAM(s) Oral daily  heparin   Injectable 5000 Unit(s) SubCutaneous every 12 hours  hydrocortisone 1% Cream 1 Application(s) Topical two times a day  latanoprost 0.005% Ophthalmic Solution 1 Drop(s) Both EYES at bedtime  levothyroxine 50 MICROGram(s) Oral daily  methylPREDNISolone sodium succinate Injectable 40 milliGRAM(s) IV Push daily  metoprolol tartrate 12.5 milliGRAM(s) Oral every 12 hours  polyethylene glycol 3350 17 Gram(s) Oral two times a day  senna 2 Tablet(s) Oral at bedtime  timolol 0.5% Solution 1 Drop(s) Both EYES two times a day

## 2024-09-03 LAB
ALBUMIN SERPL ELPH-MCNC: 3.3 G/DL — LOW (ref 3.5–5.2)
ALP SERPL-CCNC: 50 U/L — SIGNIFICANT CHANGE UP (ref 30–115)
ALT FLD-CCNC: 13 U/L — SIGNIFICANT CHANGE UP (ref 0–41)
ANION GAP SERPL CALC-SCNC: 10 MMOL/L — SIGNIFICANT CHANGE UP (ref 7–14)
AST SERPL-CCNC: 18 U/L — SIGNIFICANT CHANGE UP (ref 0–41)
BASOPHILS # BLD AUTO: 0.03 K/UL — SIGNIFICANT CHANGE UP (ref 0–0.2)
BASOPHILS NFR BLD AUTO: 0.2 % — SIGNIFICANT CHANGE UP (ref 0–1)
BILIRUB SERPL-MCNC: 0.8 MG/DL — SIGNIFICANT CHANGE UP (ref 0.2–1.2)
BUN SERPL-MCNC: 30 MG/DL — HIGH (ref 10–20)
CALCIUM SERPL-MCNC: 8.6 MG/DL — SIGNIFICANT CHANGE UP (ref 8.4–10.4)
CHLORIDE SERPL-SCNC: 103 MMOL/L — SIGNIFICANT CHANGE UP (ref 98–110)
CO2 SERPL-SCNC: 24 MMOL/L — SIGNIFICANT CHANGE UP (ref 17–32)
CREAT SERPL-MCNC: 1.2 MG/DL — SIGNIFICANT CHANGE UP (ref 0.7–1.5)
EGFR: 45 ML/MIN/1.73M2 — LOW
EOSINOPHIL # BLD AUTO: 0.24 K/UL — SIGNIFICANT CHANGE UP (ref 0–0.7)
EOSINOPHIL NFR BLD AUTO: 1.8 % — SIGNIFICANT CHANGE UP (ref 0–8)
GLUCOSE SERPL-MCNC: 96 MG/DL — SIGNIFICANT CHANGE UP (ref 70–99)
HCT VFR BLD CALC: 27.5 % — LOW (ref 37–47)
HGB BLD-MCNC: 8.6 G/DL — LOW (ref 12–16)
IMM GRANULOCYTES NFR BLD AUTO: 2 % — HIGH (ref 0.1–0.3)
LYMPHOCYTES # BLD AUTO: 16.6 % — LOW (ref 20.5–51.1)
LYMPHOCYTES # BLD AUTO: 2.28 K/UL — SIGNIFICANT CHANGE UP (ref 1.2–3.4)
MAGNESIUM SERPL-MCNC: 2.1 MG/DL — SIGNIFICANT CHANGE UP (ref 1.8–2.4)
MCHC RBC-ENTMCNC: 29.2 PG — SIGNIFICANT CHANGE UP (ref 27–31)
MCHC RBC-ENTMCNC: 31.3 G/DL — LOW (ref 32–37)
MCV RBC AUTO: 93.2 FL — SIGNIFICANT CHANGE UP (ref 81–99)
MONOCYTES # BLD AUTO: 1.2 K/UL — HIGH (ref 0.1–0.6)
MONOCYTES NFR BLD AUTO: 8.8 % — SIGNIFICANT CHANGE UP (ref 1.7–9.3)
NEUTROPHILS # BLD AUTO: 9.67 K/UL — HIGH (ref 1.4–6.5)
NEUTROPHILS NFR BLD AUTO: 70.6 % — SIGNIFICANT CHANGE UP (ref 42.2–75.2)
NRBC # BLD: 0 /100 WBCS — SIGNIFICANT CHANGE UP (ref 0–0)
PLATELET # BLD AUTO: 266 K/UL — SIGNIFICANT CHANGE UP (ref 130–400)
PMV BLD: 10 FL — SIGNIFICANT CHANGE UP (ref 7.4–10.4)
POTASSIUM SERPL-MCNC: 4.3 MMOL/L — SIGNIFICANT CHANGE UP (ref 3.5–5)
POTASSIUM SERPL-SCNC: 4.3 MMOL/L — SIGNIFICANT CHANGE UP (ref 3.5–5)
PROT SERPL-MCNC: 5.4 G/DL — LOW (ref 6–8)
RBC # BLD: 2.95 M/UL — LOW (ref 4.2–5.4)
RBC # FLD: 23.9 % — HIGH (ref 11.5–14.5)
SODIUM SERPL-SCNC: 137 MMOL/L — SIGNIFICANT CHANGE UP (ref 135–146)
WBC # BLD: 13.7 K/UL — HIGH (ref 4.8–10.8)
WBC # FLD AUTO: 13.7 K/UL — HIGH (ref 4.8–10.8)

## 2024-09-03 PROCEDURE — 71045 X-RAY EXAM CHEST 1 VIEW: CPT | Mod: 26

## 2024-09-03 PROCEDURE — 99233 SBSQ HOSP IP/OBS HIGH 50: CPT

## 2024-09-03 RX ADMIN — TIMOLOL MALEATE 1 DROP(S): 5 SOLUTION/ DROPS OPHTHALMIC at 17:41

## 2024-09-03 RX ADMIN — Medication 5000 UNIT(S): at 17:45

## 2024-09-03 RX ADMIN — Medication 5000 UNIT(S): at 05:15

## 2024-09-03 RX ADMIN — Medication 1 APPLICATION(S): at 17:39

## 2024-09-03 RX ADMIN — CHLORHEXIDINE GLUCONATE 1 APPLICATION(S): 40 SOLUTION TOPICAL at 12:04

## 2024-09-03 RX ADMIN — POVIDONE, PROPYLENE GLYCOL 1 DROP(S): 6.8; 3 LIQUID OPHTHALMIC at 17:40

## 2024-09-03 RX ADMIN — IPRATROPIUM BROMIDE AND ALBUTEROL SULFATE 3 MILLILITER(S): .5; 3 SOLUTION RESPIRATORY (INHALATION) at 19:42

## 2024-09-03 RX ADMIN — Medication 1 TABLET(S): at 21:45

## 2024-09-03 RX ADMIN — POLYETHYLENE GLYCOL 3350 17 GRAM(S): 17 POWDER, FOR SOLUTION ORAL at 17:40

## 2024-09-03 RX ADMIN — Medication 50 MICROGRAM(S): at 05:10

## 2024-09-03 RX ADMIN — LATANOPROST 1 DROP(S): 50 SOLUTION OPHTHALMIC at 21:46

## 2024-09-03 RX ADMIN — METOPROLOL TARTRATE 12.5 MILLIGRAM(S): 100 TABLET ORAL at 17:44

## 2024-09-03 RX ADMIN — Medication 1 TABLET(S): at 13:40

## 2024-09-03 RX ADMIN — TIMOLOL MALEATE 1 DROP(S): 5 SOLUTION/ DROPS OPHTHALMIC at 05:05

## 2024-09-03 RX ADMIN — FAMOTIDINE 20 MILLIGRAM(S): 10 INJECTION INTRAVENOUS at 12:04

## 2024-09-03 RX ADMIN — Medication 81 MILLIGRAM(S): at 12:04

## 2024-09-03 RX ADMIN — IPRATROPIUM BROMIDE AND ALBUTEROL SULFATE 3 MILLILITER(S): .5; 3 SOLUTION RESPIRATORY (INHALATION) at 07:43

## 2024-09-03 RX ADMIN — Medication 1 APPLICATION(S): at 05:06

## 2024-09-03 RX ADMIN — Medication 40 MILLIGRAM(S): at 05:09

## 2024-09-03 RX ADMIN — Medication 1 TABLET(S): at 05:13

## 2024-09-03 RX ADMIN — POVIDONE, PROPYLENE GLYCOL 1 DROP(S): 6.8; 3 LIQUID OPHTHALMIC at 05:07

## 2024-09-03 RX ADMIN — Medication 1 APPLICATION(S): at 05:16

## 2024-09-03 RX ADMIN — Medication 2 TABLET(S): at 21:45

## 2024-09-03 RX ADMIN — POLYETHYLENE GLYCOL 3350 17 GRAM(S): 17 POWDER, FOR SOLUTION ORAL at 05:15

## 2024-09-03 NOTE — PROGRESS NOTE ADULT - SUBJECTIVE AND OBJECTIVE BOX
24H events:    Patient is a 84y old Female who presents with a chief complaint of Altered Mental Status and Increased WOB (02 Sep 2024 15:37)    Primary diagnosis of AMS (altered mental status)          Today is hospital day 10d.   This morning patient was seen and examined at bedside, resting comfortably in bed.    No acute or major events overnight.    Code Status:    Family communication:  Contact date:  Name of person contacted:  Relationship to patient:  Communication details:  What matters most:    PAST MEDICAL & SURGICAL HISTORY  Dementia    History of breast cancer    Constipation    Lewy body dementia without behavioral disturbance    Colon polyp    History of colon resection    H/O mastectomy, right      SOCIAL HISTORY:  Social History:      ALLERGIES:  Originally Entered as [Unknown] reaction to [red pepper] (Unknown)  ciprofloxacin (Unknown)  benzodiazepines (Unknown)  antichlolinergics (Unknown)  penicillin (Anaphylaxis)  Originally Entered as [Unknown] reaction to [pepper] (Unknown)  Originally Entered as [Unknown] reaction to [green pepper] (Unknown)  Originally Entered as [Unknown] reaction to [neuroleptics] (Unknown)    MEDICATIONS:  STANDING MEDICATIONS  albuterol    90 MICROgram(s) HFA Inhaler 2 Puff(s) Inhalation four times a day  albuterol/ipratropium for Nebulization 3 milliLiter(s) Nebulizer every 6 hours  artificial tears (preservative free) Ophthalmic Solution 1 Drop(s) Both EYES two times a day  aspirin  chewable 81 milliGRAM(s) Oral daily  carbidopa/levodopa  10/100 1 Tablet(s) Oral three times a day  chlorhexidine 2% Cloths 1 Application(s) Topical daily  clotrimazole 1% Cream 1 Application(s) Topical two times a day  famotidine    Tablet 20 milliGRAM(s) Oral daily  heparin   Injectable 5000 Unit(s) SubCutaneous every 12 hours  hydrocortisone 1% Cream 1 Application(s) Topical two times a day  latanoprost 0.005% Ophthalmic Solution 1 Drop(s) Both EYES at bedtime  levothyroxine 50 MICROGram(s) Oral daily  methylPREDNISolone sodium succinate Injectable 40 milliGRAM(s) IV Push daily  metoprolol tartrate 12.5 milliGRAM(s) Oral every 12 hours  polyethylene glycol 3350 17 Gram(s) Oral two times a day  senna 2 Tablet(s) Oral at bedtime  timolol 0.5% Solution 1 Drop(s) Both EYES two times a day    PRN MEDICATIONS    VITALS:   T(F): 98.1  HR: 88  BP: 99/53  RR: 18  SpO2: 98%    PHYSICAL EXAM:  GENERAL: NAD, lying in bed comfortably  HEAD:  Atraumatic, Normocephalic  EYES: EOMI, PERRLA, conjunctiva and sclera clear  ENT: Moist mucous membranes  NECK: Supple, No JVD  CHEST/LUNG: Clear to auscultation bilaterally; No rales, rhonchi, wheezing, or rubs. Unlabored respirations  HEART: Regular rate and rhythm; No murmurs, rubs, or gallops  ABDOMEN: BSx4; Soft, nontender, nondistended  EXTREMITIES:  2+ Peripheral Pulses, brisk capillary refill. No clubbing, cyanosis, or edema  NERVOUS SYSTEM:  A&Ox3, no focal deficits   SKIN: No rashes or lesions      LABS:                        8.4    10.11 )-----------( 231      ( 02 Sep 2024 05:30 )             25.5     09-02    140  |  105  |  27<H>  ----------------------------<  128<H>  4.2   |  24  |  1.1    Ca    8.3<L>      02 Sep 2024 05:30  Mg     1.9     09-02    TPro  5.1<L>  /  Alb  3.2<L>  /  TBili  0.9  /  DBili  x   /  AST  18  /  ALT  9   /  AlkPhos  50  09-02      Urinalysis Basic - ( 02 Sep 2024 05:30 )    Color: x / Appearance: x / SG: x / pH: x  Gluc: 128 mg/dL / Ketone: x  / Bili: x / Urobili: x   Blood: x / Protein: x / Nitrite: x   Leuk Esterase: x / RBC: x / WBC x   Sq Epi: x / Non Sq Epi: x / Bacteria: x             24H events:    Patient is a 84y old Female who presents with a chief complaint of Altered Mental Status and Increased WOB (02 Sep 2024 15:37)    Primary diagnosis of AMS (altered mental status)          Today is hospital day 10d.   This morning patient was seen and examined at bedside, resting comfortably in bed.    No acute or major events overnight.    Code Status: DNR/DNI    Family communication:  Contact date:  Name of person contacted:  Relationship to patient:  Communication details:  What matters most:    PAST MEDICAL & SURGICAL HISTORY  Dementia    History of breast cancer    Constipation    Lewy body dementia without behavioral disturbance    Colon polyp    History of colon resection    H/O mastectomy, right    SOCIAL HISTORY:  Social History:      ALLERGIES:  Originally Entered as [Unknown] reaction to [red pepper] (Unknown)  ciprofloxacin (Unknown)  benzodiazepines (Unknown)  antichlolinergics (Unknown)  penicillin (Anaphylaxis)  Originally Entered as [Unknown] reaction to [pepper] (Unknown)  Originally Entered as [Unknown] reaction to [green pepper] (Unknown)  Originally Entered as [Unknown] reaction to [neuroleptics] (Unknown)    MEDICATIONS:  STANDING MEDICATIONS  albuterol    90 MICROgram(s) HFA Inhaler 2 Puff(s) Inhalation four times a day  albuterol/ipratropium for Nebulization 3 milliLiter(s) Nebulizer every 6 hours  artificial tears (preservative free) Ophthalmic Solution 1 Drop(s) Both EYES two times a day  aspirin  chewable 81 milliGRAM(s) Oral daily  carbidopa/levodopa  10/100 1 Tablet(s) Oral three times a day  chlorhexidine 2% Cloths 1 Application(s) Topical daily  clotrimazole 1% Cream 1 Application(s) Topical two times a day  famotidine    Tablet 20 milliGRAM(s) Oral daily  heparin   Injectable 5000 Unit(s) SubCutaneous every 12 hours  hydrocortisone 1% Cream 1 Application(s) Topical two times a day  latanoprost 0.005% Ophthalmic Solution 1 Drop(s) Both EYES at bedtime  levothyroxine 50 MICROGram(s) Oral daily  methylPREDNISolone sodium succinate Injectable 40 milliGRAM(s) IV Push daily  metoprolol tartrate 12.5 milliGRAM(s) Oral every 12 hours  polyethylene glycol 3350 17 Gram(s) Oral two times a day  senna 2 Tablet(s) Oral at bedtime  timolol 0.5% Solution 1 Drop(s) Both EYES two times a day    PRN MEDICATIONS    VITALS:   T(F): 98.1  HR: 88  BP: 99/53  RR: 18  SpO2: 98%    PHYSICAL EXAM:  GENERAL: NAD, lying in bed with BiPAP  HEAD:  Atraumatic, Normocephalic  EYES: EOMI, PERRLA, conjunctiva and sclera clear  ENT: Moist mucous membranes  NECK: Supple, No JVD  CHEST/LUNG: Clear to auscultation bilaterally; No rales, rhonchi, wheezing, or rubs. Unlabored respirations  HEART: Regular rate and rhythm; No murmurs, rubs, or gallops  ABDOMEN: nontender, distended  EXTREMITIES:  2+ Peripheral Pulses, brisk capillary refill. No clubbing, cyanosis, or edema  NERVOUS SYSTEM:  Nonverbal, unable to follow commands  SKIN: No rashes or lesions      LABS:                        8.4    10.11 )-----------( 231      ( 02 Sep 2024 05:30 )             25.5     09-02    140  |  105  |  27<H>  ----------------------------<  128<H>  4.2   |  24  |  1.1    Ca    8.3<L>      02 Sep 2024 05:30  Mg     1.9     09-02    TPro  5.1<L>  /  Alb  3.2<L>  /  TBili  0.9  /  DBili  x   /  AST  18  /  ALT  9   /  AlkPhos  50  09-02      Urinalysis Basic - ( 02 Sep 2024 05:30 )    Color: x / Appearance: x / SG: x / pH: x  Gluc: 128 mg/dL / Ketone: x  / Bili: x / Urobili: x   Blood: x / Protein: x / Nitrite: x   Leuk Esterase: x / RBC: x / WBC x   Sq Epi: x / Non Sq Epi: x / Bacteria: x             24H events:    Patient is a 84y old Female who presents with a chief complaint of Altered Mental Status and Increased WOB (02 Sep 2024 15:37)    Primary diagnosis of AMS (altered mental status)          Today is hospital day 10d.   This morning patient was seen and examined at bedside, still on BIPAP  No acute or major events overnight.    Code Status: DNR/DNI    Family communication:  Contact date:  Name of person contacted:  Relationship to patient:  Communication details:  What matters most:    PAST MEDICAL & SURGICAL HISTORY  Dementia    History of breast cancer    Constipation    Lewy body dementia without behavioral disturbance    Colon polyp    History of colon resection    H/O mastectomy, right    SOCIAL HISTORY:  Social History:      ALLERGIES:  Originally Entered as [Unknown] reaction to [red pepper] (Unknown)  ciprofloxacin (Unknown)  benzodiazepines (Unknown)  antichlolinergics (Unknown)  penicillin (Anaphylaxis)  Originally Entered as [Unknown] reaction to [pepper] (Unknown)  Originally Entered as [Unknown] reaction to [green pepper] (Unknown)  Originally Entered as [Unknown] reaction to [neuroleptics] (Unknown)    MEDICATIONS:  STANDING MEDICATIONS  albuterol    90 MICROgram(s) HFA Inhaler 2 Puff(s) Inhalation four times a day  albuterol/ipratropium for Nebulization 3 milliLiter(s) Nebulizer every 6 hours  artificial tears (preservative free) Ophthalmic Solution 1 Drop(s) Both EYES two times a day  aspirin  chewable 81 milliGRAM(s) Oral daily  carbidopa/levodopa  10/100 1 Tablet(s) Oral three times a day  chlorhexidine 2% Cloths 1 Application(s) Topical daily  clotrimazole 1% Cream 1 Application(s) Topical two times a day  famotidine    Tablet 20 milliGRAM(s) Oral daily  heparin   Injectable 5000 Unit(s) SubCutaneous every 12 hours  hydrocortisone 1% Cream 1 Application(s) Topical two times a day  latanoprost 0.005% Ophthalmic Solution 1 Drop(s) Both EYES at bedtime  levothyroxine 50 MICROGram(s) Oral daily  methylPREDNISolone sodium succinate Injectable 40 milliGRAM(s) IV Push daily  metoprolol tartrate 12.5 milliGRAM(s) Oral every 12 hours  polyethylene glycol 3350 17 Gram(s) Oral two times a day  senna 2 Tablet(s) Oral at bedtime  timolol 0.5% Solution 1 Drop(s) Both EYES two times a day    PRN MEDICATIONS    VITALS:   T(F): 98.1  HR: 88  BP: 99/53  RR: 18  SpO2: 98%    PHYSICAL EXAM:  GENERAL: NAD, lying in bed comfortably  HEAD:  Atraumatic, Normocephalic  CHEST/LUNG: course breath sounds, wheezing   HEART: Regular rate and rhythm; No murmurs, rubs, or gallops  ABDOMEN: Soft, nontender, nondistended  EXTREMITIES:  widespread mildl edema   NERVOUS SYSTEM: confused, not entirely following commands         LABS:                        8.4    10.11 )-----------( 231      ( 02 Sep 2024 05:30 )             25.5     09-02    140  |  105  |  27<H>  ----------------------------<  128<H>  4.2   |  24  |  1.1    Ca    8.3<L>      02 Sep 2024 05:30  Mg     1.9     09-02    TPro  5.1<L>  /  Alb  3.2<L>  /  TBili  0.9  /  DBili  x   /  AST  18  /  ALT  9   /  AlkPhos  50  09-02      Urinalysis Basic - ( 02 Sep 2024 05:30 )    Color: x / Appearance: x / SG: x / pH: x  Gluc: 128 mg/dL / Ketone: x  / Bili: x / Urobili: x   Blood: x / Protein: x / Nitrite: x   Leuk Esterase: x / RBC: x / WBC x   Sq Epi: x / Non Sq Epi: x / Bacteria: x

## 2024-09-03 NOTE — PROGRESS NOTE ADULT - ASSESSMENT
84-year-old woman with a history of Lewy body dementia (AAO x 2 at baseline), Parkinson's, COPD not on home oxygen, presented from [facility] with presenting complaints of AMS and Tachypnea, Patient was A&Ox0 on arrival, unable to provide history. Patient was found to be with decreased breath sounds diffusely, quick bedside ultrasound by ED showed lung sliding, no pneumothorax, was placed on BiPAP for potential hypercapnia, magnesium and nebs given, blood gas resulted with normal CO2. was weaned off to NC.  ED team held off on 30 cc/kg bolus, as patient has a history of heart failure: was given 1 L bolus. She was evaluated by cardiology for two runs of NSVTs approx 10 sec, recommended telemetry admission. Patient was started on Ertapenem, Doxycycline and Ceftriaxone in NH for suspected UTI. At baseline patient is AXO x3, has difficulty finding words, has impaired memory but has meaningful conversation. Pt was found in  Cardiopulmonary Arrest Secondary to Aspiration on 8/26,    rescinded during code, pt was resuscitated, developed extensive soft tissue hematoma on chest wall after CPR, received blood transfusion.   Initially she was admitted to ICU, downgraded to SDU on 8/30       A/P  # New-Onset Paroxysmal A-fib  - telemetry monitoring, currently NSR  - no on AC due to low Hgb in the setting of L chest wall hematoma  - c/w metoprolol 12.5 mg BID  - monitor and replete electrolytes     # Severe Sepsis on Admission/ Staphylococcus hominis bacteremia   - resolved now , repeat blood Cx is NTD   -  urine Cx is  negative to date   - 8/24: CT Angio Chest urinary bladder mild pericystic stranding, could be attributed to urinary bladder infection in the appropriate clinical setting  -  completed 5 day of Cefepime   -  ID following, recommendations noted     # Cardiopulmonary Arrest Secondary to Aspiration  - DNR/DNI on admission,  rescinded during code  - 2 cycles, 1x epi, 1x calcium gluconate, ROSC achieved, lost, PEA  - 1 cycle, 1x epi, intubated, ROSC achieved  - Patient now is  DNR/DNI by  , he is refusing PEG tube, considering comfort and end of life care     # Dysphagia  - if mental status is better, call speech and swallow for follow up   - aspiration precautions     # Acute blood loss anemia   - CT Abdomen/Pelvis showing numerous L chest wall hematomas  - Hgb, 6.6 on 8/30  - Total 2x pRBCs given  - monitor H/H, keep Hb above 7.5    # Chronic Constipation   - Secondary to autonomic dysfunction, right hemicolectomy   - Continue aggressive bowel regimen   - enema PRN     # 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       #Progress Note Handoff  Pending (specify):  supportive care, speech and swallow follow up tomorrow, palliative care follow up for GOC ,  refusing PEG tube, leaning towards comfort care   Family discussion: I spoke with pt's  at the bedside, he agreed with a plan of care   Disposition: DGTF    84-year-old woman with a history of Lewy body dementia (AAO x 2 at baseline), Parkinson's, COPD not on home oxygen, presented from [facility] with presenting complaints of AMS and Tachypnea, Patient was A&Ox0 on arrival, unable to provide history. Patient was found to be with decreased breath sounds diffusely, quick bedside ultrasound by ED showed lung sliding, no pneumothorax, was placed on BiPAP for potential hypercapnia, magnesium and nebs given, blood gas resulted with normal CO2. was weaned off to NC.  ED team held off on 30 cc/kg bolus, as patient has a history of heart failure: was given 1 L bolus. She was evaluated by cardiology for two runs of NSVTs approx 10 sec, recommended telemetry admission. Patient was started on Ertapenem, Doxycycline and Ceftriaxone in NH for suspected UTI. At baseline patient is AXO x3, has difficulty finding words, has impaired memory but has meaningful conversation. Pt was found in  Cardiopulmonary Arrest Secondary to Aspiration on 8/26,  rescinded during code, pt was resuscitated, developed extensive soft tissue hematoma on chest wall after CPR, received blood transfusion.   Initially she was admitted to ICU, downgraded to SDU on 8/30       A/P  # New-Onset Paroxysmal A-fib  - telemetry monitoring, currently NSR  - no on AC due to low Hgb in the setting of L chest wall hematoma  - c/w metoprolol 12.5 mg BID  - monitor and replete electrolytes     # Severe Sepsis on Admission/ Staphylococcus hominis bacteremia   - resolved now , repeat blood Cx is NTD   -  urine Cx is  negative to date   - 8/24: CT Angio Chest urinary bladder mild pericystic stranding, could be attributed to urinary bladder infection in the appropriate clinical setting  -  completed 5 day of Cefepime   -  ID following, recommendations noted     # Cardiopulmonary Arrest Secondary to Aspiration  - DNR/DNI on admission,  rescinded during code  - 2 cycles, 1x epi, 1x calcium gluconate, ROSC achieved, lost, PEA  - 1 cycle, 1x epi, intubated, ROSC achieved  - Patient now is DNR/DNI by  , he is refusing PEG tube, considering comfort and end of life care     # Dysphagia  - if mental status is better, call speech and swallow for follow up   - aspiration precautions     # Acute blood loss anemia   - CT Abdomen/Pelvis showing numerous L chest wall hematomas  - Hgb, 6.6 on 8/30  - Total 2x pRBCs given  - monitor H/H, keep Hb above 7.5    # Chronic Constipation   - Secondary to autonomic dysfunction, right hemicolectomy   - Continue aggressive bowel regimen   - enema PRN     # 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  - CT Head negative x3, EEG negative  - Baseline , wheelchair bound, A&Ox3  - Continue carbidopa/levodopa 10/100 TID  - supportive care     # Hypothyroidism    - 8/30: TSH 1.93  - Continue levothyroxine 50 mcg daily    # GERD   - Continue famotidine 20 mg daily    # Conjunctival Xerosis   # Glaucoma   - Continue with eye drops    # Peripheral Neuropathy  - Gabapentin held in setting of encephalopathy      #DVT PPX: Sequentials  #GI PPX: Famotidine  #Diet: NGT, per speech  #Code Status: DNR/DNI with NIV Trial, palliative f/u pending      #Progress Note Handoff  Pending (specify):  supportive care, wean off BiPAP as tolerated, speech and swallow follow up, palliative care follow up for GOC ,  refusing PEG tube, leaning towards comfort care   Family discussion: I spoke with pt's  at the bedside, he agreed with a plan of care   Disposition: DGTF    84-year-old woman with a history of Lewy body dementia (AAO x 2 at baseline), Parkinson's, COPD not on home oxygen, presented from [facility] with presenting complaints of AMS and Tachypnea, Patient was A&Ox0 on arrival, unable to provide history. Patient was found to be with decreased breath sounds diffusely, quick bedside ultrasound by ED showed lung sliding, no pneumothorax, was placed on BiPAP for potential hypercapnia, magnesium and nebs given, blood gas resulted with normal CO2. was weaned off to NC.  ED team held off on 30 cc/kg bolus, as patient has a history of heart failure: was given 1 L bolus. She was evaluated by cardiology for two runs of NSVTs approx 10 sec, recommended telemetry admission. Patient was started on Ertapenem, Doxycycline and Ceftriaxone in NH for suspected UTI. At baseline patient is AXO x3, has difficulty finding words, has impaired memory but has meaningful conversation. Pt was found in  Cardiopulmonary Arrest Secondary to Aspiration on 8/26,  rescinded during code, pt was resuscitated, developed extensive soft tissue hematoma on chest wall after CPR, received blood transfusion.   Initially she was admitted to ICU, downgraded to SDU on 8/30     # New-Onset Paroxysmal A-fib  - currently NSR  - no on AC due to low Hgb in the setting of L chest wall hematoma  - c/w metoprolol 12.5 mg BID  - monitor and replete electrolytes     # Severe Sepsis on Admission  #Staphylococcus hominis bacteremia-suspect  contaminant   - resolved now , repeat blood Cx is NTD   -  urine Cx is  negative to date   - 8/24: CT Angio Chest urinary bladder mild pericystic stranding, could be attributed to urinary bladder infection in the appropriate clinical setting  -  completed 5 day of Cefepime   -  ID following, recommendations noted     # Cardiopulmonary Arrest Secondary to Aspiration  - DNR/DNI on admission,  rescinded during code  - 2 cycles, 1x epi, 1x calcium gluconate, ROSC achieved, lost, PEA  - 1 cycle, 1x epi, intubated, ROSC achieved  - Patient now is DNR/DNI by  , he is refusing PEG tube, considering comfort and end of life care     # Dysphagia  - S+S f/u now that pt currrently off bipap   - aspiration precautions   -c/w NG feeds     # Acute blood loss anemia   - CT Abdomen/Pelvis showing numerous L chest wall hematomas  - Hgb, 6.6 on 8/30  - Total 2x pRBCs given  - monitor H/H, keep Hb above 7    # Chronic Constipation   - Secondary to autonomic dysfunction, right hemicolectomy   - Continue aggressive bowel regimen   - enema PRN     # 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  - CT Head negative x3, EEG negative  - re[ported Baseline , wheelchair bound, A&Ox3  - Continue carbidopa/levodopa 10/100 TID  - supportive care     # Hypothyroidism    - 8/30: TSH 1.93  - Continue levothyroxine 50 mcg daily    # GERD   - Continue famotidine 20 mg daily    # Conjunctival Xerosis   # Glaucoma   - Continue with eye drops    # Peripheral Neuropathy  - Gabapentin held in setting of encephalopathy      #DVT PPX: Sequentials  #GI PPX: Famotidine  #Diet: NGT, per speech  #Code Status: DNR/DNI with NIV Trial, palliative f/u pending      #Progress Note Handoff  Pending (specify):  supportive care, wean off BiPAP as tolerated, speech and swallow follow up, palliative care follow up for GOC ,  refusing PEG tube, leaning towards comfort care

## 2024-09-03 NOTE — SWALLOW BEDSIDE ASSESSMENT ADULT - SWALLOW EVAL: FUNCTIONAL LEVEL AT TIME OF EVAL
awake and alert. Family at bedside reports she consistently eats a sbs and mod thick liquid diet. Family reports she coughs on mildly thick liquids
+known baseline h/o dysphagia

## 2024-09-03 NOTE — GOALS OF CARE CONVERSATION - ADVANCED CARE PLANNING - CONVERSATION DETAILS
Had long conversation with  regarding patient prognosis. Explained that another speech evaluation will be performed to assess extent of dysphagia. If patient is unable to swallow on her own, topic of PEG tube was brought up. Explained possible complications involved with a feeding tube including aspiration, infection of tube site, and repeated hospitalizations related to feeding tube issues.  verbalized understanding. He wanted more time to think but at this time would still refuse placement of a PEG tube. He understood that if comfort feeds was the only option, that there would always be the risk of aspiration.      reiterated desire for patient to be DNR/DNI with trial of NIV.

## 2024-09-03 NOTE — SWALLOW BEDSIDE ASSESSMENT ADULT - PHARYNGEAL PHASE
Delayed pharyngeal swallow/Throat clear post oral intake/Delayed cough post oral intake/Multiple swallows

## 2024-09-03 NOTE — PROGRESS NOTE ADULT - SUBJECTIVE AND OBJECTIVE BOX
HPI:  Patient 84-year-old woman with a history of Lewy body dementia (AAO x 2 at baseline), Parkinson's, COPD not on home oxygen, presented from [facility] with presenting complaints of AMS and Tachypnea, Patient was A&Ox0 on arrival, unable to provide history. Patient was found to be with decreased breath sounds diffusely, quick bedside ultrasound by ED showed lung sliding, no pneumothorax, was placed on BiPAP for potential hypercapnia, magnesium and nebs given, blood gas resulted with normal CO2. was weaned off to NC.  ED team held off on 30 cc/kg bolus, as patient has a history of heart failure: was given 1 L bolus. She was evaluated by cardiology for two runs of NSVTs approx 10 sec, recommended telemetry admission. Patient was started on Ertapenem, Doxycycline and Ceftriaxone in NH for suspected UTI. At baseline patient is AXO x3, has difficulty finding words, has impaired memory but has meaningful conversation. On my visit patient saturating 95 % on RA lying comfortably in bed, is oriented to place and person and nodding to questions. Patient endorses no abdominal pain, chest pain, SOB or fever.     Interval history  -Patient seen at bedside  -She is awake but was unable to participate in exam     ADVANCE DIRECTIVES:     [ ] Full Code [x ] DNR  MOLST  [ ]  Living Will  [ ]   DECISION MAKER(s):  [x ] Health Care Proxy(s)  [ ] Surrogate(s)  [ ] Guardian           Name(s): Phone Number(s):  Contreras Calvin      BASELINE (I)ADL(s) (prior to admission):    [place]: [ ]Total  [ ] Moderate [ ]Dependent  Palliative Performance Status Version 2:         %    http://npcrc.org/files/news/palliative_performance_scale_[email or web address].pdf    Allergies    Originally Entered as [Unknown] reaction to [red pepper] (Unknown)  ciprofloxacin (Unknown)  benzodiazepines (Unknown)  antichlolinergics (Unknown)  penicillin (Anaphylaxis)  Originally Entered as [Unknown] reaction to [pepper] (Unknown)  Originally Entered as [Unknown] reaction to [green pepper] (Unknown)  Originally Entered as [Unknown] reaction to [neuroleptics] (Unknown)    Intolerances    GENERAL:  [ ] No acute distress [ X]Lethargic  [ ]Unarousable  [ ]Verbal  [x ]Non-Verbal [ ]Cachexia    BEHAVIORAL/PSYCH:  [X ]Alert and Oriented x1  [ ] Anxiety [ ] Delirium [ ] Agitation [ x] Calm   EYES: [x ] No scleral icterus [ ] Scleral icterus [ ] Closed  ENMT:  [ ]Dry mouth  [x ]No external oral lesions [ ] No external ear or nose lesions  CARDIOVASCULAR:  [ ]Regular [ ]Irregular [ ]Tachy [ ]Not Tachy  [ ]Jarocho [ ] Edema [ ] No edema  PULMONARY:  [ ]Tachypnea  [ ]Audible excessive secretions [ x] No labored breathing [ ] labored breathing  GASTROINTESTINAL: [ ]Soft  [ ]Distended  [ x]Not distended [ ]Non tender [ ]Tender  MUSCULOSKELETAL: [ ]No clubbing [ ] clubbing  [ x] No cyanosis [ ] cyanosis  NEUROLOGIC: [ ]No focal deficits  [ ]Follows commands  [ ]Does not follow commands  [X ]Cognitive impairment  [ ]Dysphagia  [ ]Dysarthria  [ ]Paresis   SKIN: [ ] Jaundiced [x ] Non-jaundiced [ ]Rash [ ]No Rash [ ] Warm [ ] Dry  MISC/LINES: [ x] ET tube [ ] Trach [ X]NGT/OGT [ ]PEG [ ]Pinon    LABS: reviewed by me                        8.6    13.70 )-----------( 266      ( 03 Sep 2024 06:56 )             27.5   09-03    137  |  103  |  30<H>  ----------------------------<  96  4.3   |  24  |  1.2    Ca    8.6      03 Sep 2024 06:56  Mg     2.1     09-03    TPro  5.4<L>  /  Alb  3.3<L>  /  TBili  0.8  /  DBili  x   /  AST  18  /  ALT  13  /  AlkPhos  50  09-03      Urinalysis Basic - ( 03 Sep 2024 06:56 )    Color: x / Appearance: x / SG: x / pH: x  Gluc: 96 mg/dL / Ketone: x  / Bili: x / Urobili: x   Blood: x / Protein: x / Nitrite: x   Leuk Esterase: x / RBC: x / WBC x   Sq Epi: x / Non Sq Epi: x / Bacteria: x      RADIOLOGY & ADDITIONAL STUDIES: reviewed by me    EKG: reviewed by me        Palliative Care Spiritual/Emotional Screening Tool Question  Severity (0-4): 0  Score of 2 or greater indicates recommendation of Chaplaincy referral  Chaplaincy Referral: [ ] Yes [ ] Refused [ ] Following    Caregiver [place]:  [ X] Yes [ ] No [ ] Deferred  Social Work Referral [ ]  Patient and Family Centered Care Referral [ ]    Anticipatory Grief Present: [X ] Yes [ ] No [ ] Deferred  Social Work Referral [ ]  Patient and Family Centered Care Referral [ ]    Patient discussed with primary medical team MD  Palliative care education provided to patient and/or family

## 2024-09-03 NOTE — PROGRESS NOTE ADULT - ASSESSMENT
84yFemale with history of lewy body dementia with autonomic dysfunction wiht dysphagia, Parkinson's, COPD on home O2, AAOx0-1 presented with severe sepsis.  Patient with cardiac arrest in last 24 hours with ROSC after 10 minutes. Palliative care consulted for GOC.    Spoke with patient's  over the phone. Patient was medically extubated.  He noted that after she was medically extubated, he made her DNR/DNI again.  HE wishes to maintain DNR/DNI.    We discussed that the patient now has an NGT. DIscussed that we will need to discuss GOC again if the patient is not able to transition off NGT.  All questions answered.    #Cardiac Arrest  #Respiratory Failure  #Shock  #Lewy Body Dementia    Recommendations  -DNR/DNI  -ongoing medical management  -NGT feeds for now  -continued GOC  -will follow      Education about palliative care provided to patient/family.  See Recs below.    Please call [phone] with questions or concerns 24/7.   We will continue to follow.

## 2024-09-03 NOTE — SWALLOW BEDSIDE ASSESSMENT ADULT - ASR SWALLOW RECOMMEND DIAG
Repeat instrumental swallow study if long term non-oral means of nutrition and hydration is being considered.
will need repeat instrumental swallow study, known h/o dysphagia, no medically compromised

## 2024-09-03 NOTE — SWALLOW BEDSIDE ASSESSMENT ADULT - SWALLOW EVAL: PATIENT/FAMILY GOALS STATEMENT
Per spouse last week, he did NOT wish to proceed w/ long term non-oral means of nutrition and hydration if it was determined that the pt was at aspirating all consistencies. Further discussion was warranted. Palliative care now consulted.
To follow the correct diet at NH to ensure safety

## 2024-09-04 ENCOUNTER — TRANSCRIPTION ENCOUNTER (OUTPATIENT)
Age: 84
End: 2024-09-04

## 2024-09-04 VITALS
HEART RATE: 92 BPM | SYSTOLIC BLOOD PRESSURE: 143 MMHG | DIASTOLIC BLOOD PRESSURE: 83 MMHG | RESPIRATION RATE: 18 BRPM | TEMPERATURE: 98 F | OXYGEN SATURATION: 95 %

## 2024-09-04 LAB
ALBUMIN SERPL ELPH-MCNC: 3.4 G/DL — LOW (ref 3.5–5.2)
ALP SERPL-CCNC: 54 U/L — SIGNIFICANT CHANGE UP (ref 30–115)
ALT FLD-CCNC: <5 U/L — SIGNIFICANT CHANGE UP (ref 0–41)
ANION GAP SERPL CALC-SCNC: 11 MMOL/L — SIGNIFICANT CHANGE UP (ref 7–14)
AST SERPL-CCNC: 17 U/L — SIGNIFICANT CHANGE UP (ref 0–41)
BASOPHILS # BLD AUTO: 0.03 K/UL — SIGNIFICANT CHANGE UP (ref 0–0.2)
BASOPHILS NFR BLD AUTO: 0.2 % — SIGNIFICANT CHANGE UP (ref 0–1)
BILIRUB SERPL-MCNC: 1 MG/DL — SIGNIFICANT CHANGE UP (ref 0.2–1.2)
BUN SERPL-MCNC: 27 MG/DL — HIGH (ref 10–20)
CALCIUM SERPL-MCNC: 8.6 MG/DL — SIGNIFICANT CHANGE UP (ref 8.4–10.5)
CHLORIDE SERPL-SCNC: 104 MMOL/L — SIGNIFICANT CHANGE UP (ref 98–110)
CO2 SERPL-SCNC: 25 MMOL/L — SIGNIFICANT CHANGE UP (ref 17–32)
CREAT SERPL-MCNC: 1.1 MG/DL — SIGNIFICANT CHANGE UP (ref 0.7–1.5)
EGFR: 50 ML/MIN/1.73M2 — LOW
EOSINOPHIL # BLD AUTO: 0.39 K/UL — SIGNIFICANT CHANGE UP (ref 0–0.7)
EOSINOPHIL NFR BLD AUTO: 3.1 % — SIGNIFICANT CHANGE UP (ref 0–8)
GLUCOSE SERPL-MCNC: 92 MG/DL — SIGNIFICANT CHANGE UP (ref 70–99)
HCT VFR BLD CALC: 28.1 % — LOW (ref 37–47)
HGB BLD-MCNC: 8.7 G/DL — LOW (ref 12–16)
IMM GRANULOCYTES NFR BLD AUTO: 1.3 % — HIGH (ref 0.1–0.3)
LYMPHOCYTES # BLD AUTO: 0.71 K/UL — LOW (ref 1.2–3.4)
LYMPHOCYTES # BLD AUTO: 5.6 % — LOW (ref 20.5–51.1)
MAGNESIUM SERPL-MCNC: 1.8 MG/DL — SIGNIFICANT CHANGE UP (ref 1.8–2.4)
MCHC RBC-ENTMCNC: 28.9 PG — SIGNIFICANT CHANGE UP (ref 27–31)
MCHC RBC-ENTMCNC: 31 G/DL — LOW (ref 32–37)
MCV RBC AUTO: 93.4 FL — SIGNIFICANT CHANGE UP (ref 81–99)
MONOCYTES # BLD AUTO: 0.67 K/UL — HIGH (ref 0.1–0.6)
MONOCYTES NFR BLD AUTO: 5.3 % — SIGNIFICANT CHANGE UP (ref 1.7–9.3)
NEUTROPHILS # BLD AUTO: 10.72 K/UL — HIGH (ref 1.4–6.5)
NEUTROPHILS NFR BLD AUTO: 84.5 % — HIGH (ref 42.2–75.2)
NRBC # BLD: 0 /100 WBCS — SIGNIFICANT CHANGE UP (ref 0–0)
PLATELET # BLD AUTO: 311 K/UL — SIGNIFICANT CHANGE UP (ref 130–400)
PMV BLD: 9.6 FL — SIGNIFICANT CHANGE UP (ref 7.4–10.4)
POTASSIUM SERPL-MCNC: 4.2 MMOL/L — SIGNIFICANT CHANGE UP (ref 3.5–5)
POTASSIUM SERPL-SCNC: 4.2 MMOL/L — SIGNIFICANT CHANGE UP (ref 3.5–5)
PROT SERPL-MCNC: 5.4 G/DL — LOW (ref 6–8)
RBC # BLD: 3.01 M/UL — LOW (ref 4.2–5.4)
RBC # FLD: 24.1 % — HIGH (ref 11.5–14.5)
SODIUM SERPL-SCNC: 140 MMOL/L — SIGNIFICANT CHANGE UP (ref 135–146)
WBC # BLD: 12.69 K/UL — HIGH (ref 4.8–10.8)
WBC # FLD AUTO: 12.69 K/UL — HIGH (ref 4.8–10.8)

## 2024-09-04 PROCEDURE — 99497 ADVNCD CARE PLAN 30 MIN: CPT

## 2024-09-04 PROCEDURE — 99239 HOSP IP/OBS DSCHRG MGMT >30: CPT

## 2024-09-04 PROCEDURE — 99233 SBSQ HOSP IP/OBS HIGH 50: CPT | Mod: 25

## 2024-09-04 RX ORDER — NIFEDIPINE 60 MG/1
1 TABLET, FILM COATED, EXTENDED RELEASE ORAL
Refills: 0 | DISCHARGE

## 2024-09-04 RX ORDER — GABAPENTIN 100 MG
1 CAPSULE ORAL
Refills: 0 | DISCHARGE

## 2024-09-04 RX ADMIN — Medication 1 APPLICATION(S): at 05:21

## 2024-09-04 RX ADMIN — Medication 5000 UNIT(S): at 05:20

## 2024-09-04 RX ADMIN — IPRATROPIUM BROMIDE AND ALBUTEROL SULFATE 3 MILLILITER(S): .5; 3 SOLUTION RESPIRATORY (INHALATION) at 14:17

## 2024-09-04 RX ADMIN — POVIDONE, PROPYLENE GLYCOL 1 DROP(S): 6.8; 3 LIQUID OPHTHALMIC at 05:20

## 2024-09-04 RX ADMIN — Medication 50 MICROGRAM(S): at 05:20

## 2024-09-04 RX ADMIN — FAMOTIDINE 20 MILLIGRAM(S): 10 INJECTION INTRAVENOUS at 14:18

## 2024-09-04 RX ADMIN — Medication 1 TABLET(S): at 05:19

## 2024-09-04 RX ADMIN — Medication 81 MILLIGRAM(S): at 14:18

## 2024-09-04 RX ADMIN — Medication 1 TABLET(S): at 14:17

## 2024-09-04 RX ADMIN — POLYETHYLENE GLYCOL 3350 17 GRAM(S): 17 POWDER, FOR SOLUTION ORAL at 05:20

## 2024-09-04 RX ADMIN — Medication 40 MILLIGRAM(S): at 05:20

## 2024-09-04 RX ADMIN — TIMOLOL MALEATE 1 DROP(S): 5 SOLUTION/ DROPS OPHTHALMIC at 05:20

## 2024-09-04 RX ADMIN — METOPROLOL TARTRATE 12.5 MILLIGRAM(S): 100 TABLET ORAL at 05:20

## 2024-09-04 RX ADMIN — Medication 1 APPLICATION(S): at 05:22

## 2024-09-04 RX ADMIN — CHLORHEXIDINE GLUCONATE 1 APPLICATION(S): 40 SOLUTION TOPICAL at 11:30

## 2024-09-04 NOTE — PROGRESS NOTE ADULT - SUBJECTIVE AND OBJECTIVE BOX
24H events:    Patient is a 84y old Female who presents with a chief complaint of Altered Mental Status and Increased WOB (03 Sep 2024 16:21)    Primary diagnosis of AMS (altered mental status)        Today is 11d of hospitalization. This morning patient was seen and examined at bedside, resting comfortably in bed.    No acute or major events overnight.    Code Status: DNR/DNI    Family communication:  Contact date:  Name of person contacted:  Relationship to patient:  Communication details:  What matters most:    PAST MEDICAL & SURGICAL HISTORY  Dementia    History of breast cancer    Constipation    Lewy body dementia without behavioral disturbance    Colon polyp    History of colon resection    H/O mastectomy, right      SOCIAL HISTORY:  Social History:      ALLERGIES:  Originally Entered as [Unknown] reaction to [red pepper] (Unknown)  ciprofloxacin (Unknown)  benzodiazepines (Unknown)  antichlolinergics (Unknown)  penicillin (Anaphylaxis)  Originally Entered as [Unknown] reaction to [pepper] (Unknown)  Originally Entered as [Unknown] reaction to [green pepper] (Unknown)  Originally Entered as [Unknown] reaction to [neuroleptics] (Unknown)    MEDICATIONS:  STANDING MEDICATIONS  albuterol    90 MICROgram(s) HFA Inhaler 2 Puff(s) Inhalation four times a day  albuterol/ipratropium for Nebulization 3 milliLiter(s) Nebulizer every 6 hours  artificial tears (preservative free) Ophthalmic Solution 1 Drop(s) Both EYES two times a day  aspirin  chewable 81 milliGRAM(s) Oral daily  carbidopa/levodopa  10/100 1 Tablet(s) Oral three times a day  chlorhexidine 2% Cloths 1 Application(s) Topical daily  clotrimazole 1% Cream 1 Application(s) Topical two times a day  famotidine    Tablet 20 milliGRAM(s) Oral daily  heparin   Injectable 5000 Unit(s) SubCutaneous every 12 hours  hydrocortisone 1% Cream 1 Application(s) Topical two times a day  latanoprost 0.005% Ophthalmic Solution 1 Drop(s) Both EYES at bedtime  levothyroxine 50 MICROGram(s) Oral daily  metoprolol tartrate 12.5 milliGRAM(s) Oral every 12 hours  polyethylene glycol 3350 17 Gram(s) Oral two times a day  senna 2 Tablet(s) Oral at bedtime  timolol 0.5% Solution 1 Drop(s) Both EYES two times a day    PRN MEDICATIONS    VITALS:   T(F): 98.6  HR: 108  BP: 126/72  RR: 18  SpO2: 97%    PHYSICAL EXAM:  GENERAL:   ( x) NAD, elderly demented female lying in bed     (  ) obtunded     (  ) lethargic     (  ) somnolent    HEAD:   ( x) Atraumatic     (  ) hematoma     (  ) laceration (specify location:       )     NECK:  (x) Supple     (  ) neck stiffness     (  ) nuchal rigidity     (  )  no JVD     (  ) JVD present ( -- cm)    HEART:  Rate -->     ( ) normal rate     (  ) bradycardic     ( X ) tachycardic  Rhythm -->     (x) regular     (  ) regularly irregular     (  ) irregularly irregular  Murmurs -->     (x) normal s1s2     (  ) systolic murmur     (  ) diastolic murmur     (  ) continuous murmur      (  ) S3 present     (  ) S4 present    LUNGS:   (  )Unlabored respirations     (  ) tachypnea  ( x ) B/L air entry     ( X ) decreased breath sounds in B/L bases    (  ) no adventitious sound     (  ) crackles     (  ) wheezing      (  ) rhonchi      (specify location:       )  (  ) chest wall tenderness (specify location:       )    ABDOMEN:   ( x) Soft     (  ) tense   |   (  ) nondistended     ( X ) distended   |   (  ) +BS     (  ) hypoactive bowel sounds     (  ) hyperactive bowel sounds  ( x) nontender     (  ) RUQ tenderness     (  ) RLQ tenderness     (  ) LLQ tenderness     (  ) epigastric tenderness     (  ) diffuse tenderness  (  ) Splenomegaly      (  ) Hepatomegaly      (  ) Jaundice     (  ) ecchymosis     EXTREMITIES:  (  ) Normal     (  ) Rash     (  ) ecchymosis     (  ) varicose veins      (  ) pitting edema     ( X ) non-pitting edema    (  ) ulceration     (  ) gangrene:     (location:     )    NERVOUS SYSTEM:    ( x) A&Ox0     ( X ) confused     (  ) lethargic  CN II-XII:     ( x) Intact     (  ) deficits found     (Specify:     )   Upper extremities:     (  ) no sensorimotor deficits     (  ) weakness     (  ) loss of proprioception/vibration     (  ) loss of touch/temperature (specify:    )  Lower extremities:     (  ) no sensorimotor deficits     (  ) weakness     (  ) loss of proprioception/vibration     (  ) loss of touch/temperature (specify:    )    SKIN:   ( X ) No rashes or lesions     (  ) maculopapular rash     (  ) pustules     (  ) vesicles     (  ) ulcer     (  ) ecchymosis     (specify location:     )    AMPAC score :    (  ) Indwelling Pinon Catheter:   Date insterted:    Reason (  ) Critical illness     (  ) urinary retention    (  ) Accurate Ins/Outs Monitoring     (  ) CMO patient    (  ) Central Line:   Date inserted:  Location: (  ) Right IJ     (  ) Left IJ     (  ) Right Fem     (  ) Left Fem    (  ) SPC        (  ) pigtail       (  ) PEG tube       (  ) colostomy       (  ) jejunostomy  (  ) U-Dall    LABS:                        8.6    13.70 )-----------( 266      ( 03 Sep 2024 06:56 )             27.5     09-03    137  |  103  |  30<H>  ----------------------------<  96  4.3   |  24  |  1.2    Ca    8.6      03 Sep 2024 06:56  Mg     2.1     09-03    TPro  5.4<L>  /  Alb  3.3<L>  /  TBili  0.8  /  DBili  x   /  AST  18  /  ALT  13  /  AlkPhos  50  09-03      Urinalysis Basic - ( 03 Sep 2024 06:56 )    Color: x / Appearance: x / SG: x / pH: x  Gluc: 96 mg/dL / Ketone: x  / Bili: x / Urobili: x   Blood: x / Protein: x / Nitrite: x   Leuk Esterase: x / RBC: x / WBC x   Sq Epi: x / Non Sq Epi: x / Bacteria: x                RADIOLOGY:  < from: Xray ChestPortable ROUTINE 1V (09.03.24)    Impression:  Worsening right lower lobe opacification. Worsening left lung   opacification.    < from: Xray Chest 1 View-PORTABLE IMMEDIATE (Xray Chest 1 View-PORTABLE IMMEDIATE .) (09.01.24 @ 09:14) >  Impression:    Cardiomegaly with bilateral opacifications, left greater than right.   Follow-up as needed.      < from: Xray Kidney Ureter Bladder (08.29.24 @ 06:59) >  impression:    Fecal impaction in the rectum.    Calcified fibroid.    Degenerative changes of the spine.      < from: [facility] Duplex Lower Ext Vein Scan, Bilat (08.27.24 @ 16:32) >  IMPRESSION:  No evidence of deep venous thrombosis in either lower extremity.    < from: CT Abdomen and Pelvis No Cont (08.27.24 @ 10:51) >  IMPRESSION:    Partially imaged numerous hyperdense left chest wall structures,   measuring up to at least 14.1 cm, new from 8/24/2024, suggestive of   hematomas. Recommend evaluated with dedicated chest CT.    Rectal fecal impaction, increased from prior.    Bilateral cystic adnexal gaseous redemonstrated. Recommend GYN   evaluation/further workup.     from: CT Head No Cont (08.26.24 @ 23:44) >  IMPRESSION:    No evidence of acute intracranial pathology. Stable exam.    Stable severe chronic microvascular ischemic changes. 24H events:    Patient is a 84y old Female who presents with a chief complaint of Altered Mental Status and Increased WOB (03 Sep 2024 16:21)    Primary diagnosis of AMS (altered mental status)        Today is 11d of hospitalization. This morning patient was seen and examined at bedside, resting comfortably in bed.    No acute or major events overnight.    Code Status: DNR/DNI    Family communication:  Contact date:  Name of person contacted:  Relationship to patient:  Communication details:  What matters most:    PAST MEDICAL & SURGICAL HISTORY  Dementia    History of breast cancer    Constipation    Lewy body dementia without behavioral disturbance    Colon polyp    History of colon resection    H/O mastectomy, right      SOCIAL HISTORY:  Social History:      ALLERGIES:  Originally Entered as [Unknown] reaction to [red pepper] (Unknown)  ciprofloxacin (Unknown)  benzodiazepines (Unknown)  antichlolinergics (Unknown)  penicillin (Anaphylaxis)  Originally Entered as [Unknown] reaction to [pepper] (Unknown)  Originally Entered as [Unknown] reaction to [green pepper] (Unknown)  Originally Entered as [Unknown] reaction to [neuroleptics] (Unknown)    MEDICATIONS:  STANDING MEDICATIONS  albuterol    90 MICROgram(s) HFA Inhaler 2 Puff(s) Inhalation four times a day  albuterol/ipratropium for Nebulization 3 milliLiter(s) Nebulizer every 6 hours  artificial tears (preservative free) Ophthalmic Solution 1 Drop(s) Both EYES two times a day  aspirin  chewable 81 milliGRAM(s) Oral daily  carbidopa/levodopa  10/100 1 Tablet(s) Oral three times a day  chlorhexidine 2% Cloths 1 Application(s) Topical daily  clotrimazole 1% Cream 1 Application(s) Topical two times a day  famotidine    Tablet 20 milliGRAM(s) Oral daily  heparin   Injectable 5000 Unit(s) SubCutaneous every 12 hours  hydrocortisone 1% Cream 1 Application(s) Topical two times a day  latanoprost 0.005% Ophthalmic Solution 1 Drop(s) Both EYES at bedtime  levothyroxine 50 MICROGram(s) Oral daily  metoprolol tartrate 12.5 milliGRAM(s) Oral every 12 hours  polyethylene glycol 3350 17 Gram(s) Oral two times a day  senna 2 Tablet(s) Oral at bedtime  timolol 0.5% Solution 1 Drop(s) Both EYES two times a day    PRN MEDICATIONS    VITALS:   T(F): 98.6  HR: 108  BP: 126/72  RR: 18  SpO2: 97%    PHYSICAL EXAM:  GENERAL:   ( x) NAD, elderly demented female lying in bed     (  ) obtunded     (  ) lethargic     (  ) somnolent    HEAD:   ( x) Atraumatic     (  ) hematoma     (  ) laceration (specify location:       )     NECK:  (x) Supple     (  ) neck stiffness     (  ) nuchal rigidity     (  )  no JVD     (  ) JVD present ( -- cm)    HEART:  Rate -->     ( ) normal rate     (  ) bradycardic     ( X ) tachycardic  Rhythm -->     (x) regular     (  ) regularly irregular     (  ) irregularly irregular  Murmurs -->     (x) normal s1s2     (  ) systolic murmur     (  ) diastolic murmur     (  ) continuous murmur      (  ) S3 present     (  ) S4 present    LUNGS:   (  )Unlabored respirations     (  ) tachypnea  ( x ) B/L air entry     ( X ) decreased breath sounds in B/L bases    (  ) no adventitious sound     (  ) crackles     (  ) wheezing      (  ) rhonchi      (specify location:       )  (  ) chest wall tenderness (specify location:       )    ABDOMEN:   ( x) Soft     (  ) tense   |   (  ) nondistended     ( X ) distended   |   (  ) +BS     (  ) hypoactive bowel sounds     (  ) hyperactive bowel sounds  ( x) nontender     (  ) RUQ tenderness     (  ) RLQ tenderness     (  ) LLQ tenderness     (  ) epigastric tenderness     (  ) diffuse tenderness  (  ) Splenomegaly      (  ) Hepatomegaly      (  ) Jaundice     (  ) ecchymosis     EXTREMITIES:  (  ) Normal     (  ) Rash     (  ) ecchymosis     (  ) varicose veins      (  ) pitting edema     ( X ) non-pitting edema    (  ) ulceration     (  ) gangrene:     (location:     )    NERVOUS SYSTEM:    ( x) A&Ox0     ( X ) confused     (  ) lethargic  CN II-XII:     ( x) Intact     (  ) deficits found     (Specify:     )   Upper extremities:     (  ) no sensorimotor deficits     (  ) weakness     (  ) loss of proprioception/vibration     (  ) loss of touch/temperature (specify:    )  Lower extremities:     (  ) no sensorimotor deficits     (  ) weakness     (  ) loss of proprioception/vibration     (  ) loss of touch/temperature (specify:    )    SKIN:   ( X ) No rashes or lesions     (  ) maculopapular rash     (  ) pustules     (  ) vesicles     (  ) ulcer     (  ) ecchymosis     (specify location:     )    AMPAC score :    (  ) Indwelling Pinon Catheter:   Date insterted:    Reason (  ) Critical illness     (  ) urinary retention    (  ) Accurate Ins/Outs Monitoring     (  ) CMO patient    (  ) Central Line:   Date inserted:  Location: (  ) Right IJ     (  ) Left IJ     (  ) Right Fem     (  ) Left Fem    (  ) SPC        (  ) pigtail       (  ) PEG tube       (  ) colostomy       (  ) jejunostomy  (  ) U-Dall    LABS:                        8.6    13.70 )-----------( 266      ( 03 Sep 2024 06:56 )             27.5     09-03    137  |  103  |  30<H>  ----------------------------<  96  4.3   |  24  |  1.2    Ca    8.6      03 Sep 2024 06:56  Mg     2.1     09-03    TPro  5.4<L>  /  Alb  3.3<L>  /  TBili  0.8  /  DBili  x   /  AST  18  /  ALT  13  /  AlkPhos  50  09-03      Urinalysis Basic - ( 03 Sep 2024 06:56 )    Color: x / Appearance: x / SG: x / pH: x  Gluc: 96 mg/dL / Ketone: x  / Bili: x / Urobili: x   Blood: x / Protein: x / Nitrite: x   Leuk Esterase: x / RBC: x / WBC x   Sq Epi: x / Non Sq Epi: x / Bacteria: x                RADIOLOGY:  < from: Xray ChestPortable ROUTINE 1V (09.03.24) >    Impression:  Worsening right lower lobe opacification. Worsening left lung   opacification.    < from: Xray Chest 1 View-PORTABLE IMMEDIATE (Xray Chest 1 View-PORTABLE IMMEDIATE .) (09.01.24 @ 09:14) >  Impression:    Cardiomegaly with bilateral opacifications, left greater than right.   Follow-up as needed.      < from: Xray Kidney Ureter Bladder (08.29.24 @ 06:59) >  impression:    Fecal impaction in the rectum.    Calcified fibroid.    Degenerative changes of the spine.      < from: [facility] Duplex Lower Ext Vein Scan, Bilat (08.27.24 @ 16:32) >  IMPRESSION:  No evidence of deep venous thrombosis in either lower extremity.    < from: CT Abdomen and Pelvis No Cont (08.27.24 @ 10:51) >  IMPRESSION:    Partially imaged numerous hyperdense left chest wall structures,   measuring up to at least 14.1 cm, new from 8/24/2024, suggestive of   hematomas. Recommend evaluated with dedicated chest CT.    Rectal fecal impaction, increased from prior.    Bilateral cystic adnexal gaseous redemonstrated. Recommend GYN   evaluation/further workup.     from: CT Head No Cont (08.26.24 @ 23:44) >  IMPRESSION:    No evidence of acute intracranial pathology. Stable exam.    Stable severe chronic microvascular ischemic changes.

## 2024-09-04 NOTE — DISCHARGE NOTE PROVIDER - NSDCCPCAREPLAN_GEN_ALL_CORE_FT
PRINCIPAL DISCHARGE DIAGNOSIS  Diagnosis: AMS (altered mental status)  Assessment and Plan of Treatment: You came in for altered mental status and oxygen desaturation. You were treated for a possible infection with antibiotics. During your hospital stay, your heart stopped and you required CPR to rescusitate you. It was thought that you might have choked and compromised your airway and that was what caused your heart to stop. A feeding tube was placed to help facilitiate feeding. It was decided that you did not want a permanent feeding tube through your stomach. Speech and swallow evaluated you and noted that the safest method for feeding was pureed with thick liquids. However, you are at high risk of aspiration.   Your nifedipine medication was discontinued due to your blood pressure being within normal limits. Please follow up with your primary care doctor to evaluate the need to restart this medication.      SECONDARY DISCHARGE DIAGNOSES  Diagnosis: Acute UTI  Assessment and Plan of Treatment:     Diagnosis: Sepsis  Assessment and Plan of Treatment:

## 2024-09-04 NOTE — PROGRESS NOTE ADULT - ASSESSMENT
84yFemale with history of lewy body dementia with autonomic dysfunction wiht dysphagia, Parkinson's, COPD on home O2, AAOx0-1 presented with severe sepsis.  Patient with cardiac arrest in last 24 hours with ROSC after 10 minutes. Palliative care consulted for GOC.        Education about palliative care provided to patient/family.  See Recs below.    Please call [phone] with questions or concerns 24/7.   We will continue to follow.

## 2024-09-04 NOTE — SWALLOW BEDSIDE ASSESSMENT ADULT - SWALLOW EVAL: RECOMMENDED FEEDING/EATING TECHNIQUES
alternate food with liquid/oral hygiene/position upright (90 degrees)/small sips/bites
oral hygiene/position upright (90 degrees)/small sips/bites
oral hygiene/position upright (90 degrees)
oral hygiene/position upright (90 degrees)

## 2024-09-04 NOTE — PROGRESS NOTE ADULT - SUBJECTIVE AND OBJECTIVE BOX
HPI:  Patient 84-year-old woman with a history of Lewy body dementia (AAO x 2 at baseline), Parkinson's, COPD not on home oxygen, presented from [facility] with presenting complaints of AMS and Tachypnea, Patient was A&Ox0 on arrival, unable to provide history. Patient was found to be with decreased breath sounds diffusely, quick bedside ultrasound by ED showed lung sliding, no pneumothorax, was placed on BiPAP for potential hypercapnia, magnesium and nebs given, blood gas resulted with normal CO2. was weaned off to NC.  ED team held off on 30 cc/kg bolus, as patient has a history of heart failure: was given 1 L bolus. She was evaluated by cardiology for two runs of NSVTs approx 10 sec, recommended telemetry admission. Patient was started on Ertapenem, Doxycycline and Ceftriaxone in NH for suspected UTI. At baseline patient is AXO x3, has difficulty finding words, has impaired memory but has meaningful conversation. On my visit patient saturating 95 % on RA lying comfortably in bed, is oriented to place and person and nodding to questions. Patient endorses no abdominal pain, chest pain, SOB or fever.     Interval history  -Patient seen at bedside  - met with spouse      ADVANCE DIRECTIVES:     [ ] Full Code [x ] DNR  MOLST  [ ]  Living Will  [ ]   DECISION MAKER(s):  [x ] Health Care Proxy(s)  [ ] Surrogate(s)  [ ] Guardian           Name(s): Phone Number(s):  Contreras Simpson      BASELINE (I)ADL(s) (prior to admission):    [place]: [ ]Total  [ ] Moderate [ ]Dependent  Palliative Performance Status Version 2:         %    http://npcrc.org/files/news/palliative_performance_scale_[email or web address].pdf    Allergies    Originally Entered as [Unknown] reaction to [red pepper] (Unknown)  ciprofloxacin (Unknown)  benzodiazepines (Unknown)  antichlolinergics (Unknown)  penicillin (Anaphylaxis)  Originally Entered as [Unknown] reaction to [pepper] (Unknown)  Originally Entered as [Unknown] reaction to [green pepper] (Unknown)  Originally Entered as [Unknown] reaction to [neuroleptics] (Unknown)    Intolerances    MEDICATIONS  (STANDING):  albuterol    90 MICROgram(s) HFA Inhaler 2 Puff(s) Inhalation four times a day  albuterol/ipratropium for Nebulization 3 milliLiter(s) Nebulizer every 6 hours  artificial tears (preservative free) Ophthalmic Solution 1 Drop(s) Both EYES two times a day  aspirin  chewable 81 milliGRAM(s) Oral daily  carbidopa/levodopa  10/100 1 Tablet(s) Oral three times a day  chlorhexidine 2% Cloths 1 Application(s) Topical daily  clotrimazole 1% Cream 1 Application(s) Topical two times a day  famotidine    Tablet 20 milliGRAM(s) Oral daily  heparin   Injectable 5000 Unit(s) SubCutaneous every 12 hours  hydrocortisone 1% Cream 1 Application(s) Topical two times a day  latanoprost 0.005% Ophthalmic Solution 1 Drop(s) Both EYES at bedtime  levothyroxine 50 MICROGram(s) Oral daily  metoprolol tartrate 12.5 milliGRAM(s) Oral every 12 hours  polyethylene glycol 3350 17 Gram(s) Oral two times a day  senna 2 Tablet(s) Oral at bedtime  timolol 0.5% Solution 1 Drop(s) Both EYES two times a day    MEDICATIONS  (PRN):    PRESENT SYMPTOMS: [ ]Unable to obtain due to poor mentation   Source if other than patient:  [ ]Family   [ ]Team     Pain: [ ]yes [X ]no  QOL impact -   Location -                    Aggravating factors -  Quality -  Radiation -  Timing-  Severity (0-10 scale):  Minimal acceptable level (0-10 scale):     CPOT:    https://www.sccm.org/getattachment/bre86u18-4l3h-4n2x-8g2d-9665a7837j4u/Critical-Care-Pain-Observation-Tool-(CPOT)    PAIN AD Score:   [email or web address].[place].[email or web address]/cog/painad.pdf (press ctrl +  left click to view)    Dyspnea:                           [X ]None[ ]Mild [ ]Moderate [ ]Severe     Respiratory Distress Observation Scale (RDOS):   A score of 0 to 2 signifies little or no respiratory distress, 3 signifies mild distress, scores 4 to 6 indicate moderate distress, and scores greater than 7 signify severe distress  https://[email or web address].ca/sites/default/files/PDFS/356827-elaoqwrpzaz-cgscshye-pmpainjzhft-cnzky.pdf    Anxiety:                             [ ]None[ ]Mild [ ]Moderate [ ]Severe   Fatigue:                             [ ]None[ ]Mild [ ]Moderate [ ]Severe   Nausea:                             [ ]None[ ]Mild [ ]Moderate [ ]Severe   Loss of appetite:              [ ]None[ ]Mild [ ]Moderate [ ]Severe   Constipation:                    [ ]None[ ]Mild [ ]Moderate [ ]Severe    Other Symptoms:  [X ]All other review of systems negative     Palliative Performance Status Version 2:         %    [email or web address]://[email or web address]/news/palliative_performance_scale_[email or web address].pdf    PHYSICAL EXAM:  Vital Signs Last 24 Hrs  T(C): 37 (04 Sep 2024 05:31), Max: 37.1 (03 Sep 2024 21:02)  T(F): 98.6 (04 Sep 2024 05:31), Max: 98.7 (03 Sep 2024 21:02)  HR: 108 (04 Sep 2024 05:31) (97 - 108)  BP: 126/72 (04 Sep 2024 05:31) (98/62 - 128/73)  BP(mean): 74 (03 Sep 2024 14:18) (74 - 74)  RR: 18 (04 Sep 2024 05:31) (18 - 19)  SpO2: 97% (03 Sep 2024 21:02) (92% - 97%)    Parameters below as of 03 Sep 2024 21:02  Patient On (Oxygen Delivery Method): room air     I&O's Summary    03 Sep 2024 07:01  -  04 Sep 2024 07:00  --------------------------------------------------------  IN: 0 mL / OUT: 450 mL / NET: -450 mL      GENERAL:  [ ] No acute distress [ X]Lethargic  [ ]Unarousable  [ ]Verbal  [x ]Non-Verbal [ ]Cachexia    BEHAVIORAL/PSYCH:  [X ]Alert and Oriented x1  [ ] Anxiety [ ] Delirium [ ] Agitation [ x] Calm   EYES: [x ] No scleral icterus [ ] Scleral icterus [ ] Closed  ENMT:  [ ]Dry mouth  [x ]No external oral lesions [ ] No external ear or nose lesions  CARDIOVASCULAR:  [ ]Regular [ ]Irregular [ ]Tachy [ ]Not Tachy  [ ]Jarocho [ ] Edema [ ] No edema  PULMONARY:  [ ]Tachypnea  [ ]Audible excessive secretions [ x] No labored breathing [ ] labored breathing  GASTROINTESTINAL: [ ]Soft  [ ]Distended  [ x]Not distended [ ]Non tender [ ]Tender  MUSCULOSKELETAL: [ ]No clubbing [ ] clubbing  [ x] No cyanosis [ ] cyanosis  NEUROLOGIC: [ ]No focal deficits  [ ]Follows commands  [ ]Does not follow commands  [X ]Cognitive impairment  [ ]Dysphagia  [ ]Dysarthria  [ ]Paresis   SKIN: [ ] Jaundiced [x ] Non-jaundiced [ ]Rash [ ]No Rash [ ] Warm [ ] Dry  MISC/LINES: [ x] ET tube [ ] Trach [ X]NGT/OGT [ ]PEG [ ]Pinon    LABS: reviewed by me                        8.7    12.69 )-----------( 311      ( 04 Sep 2024 07:15 )             28.1   09-04    140  |  104  |  27<H>  ----------------------------<  92  4.2   |  25  |  1.1    Ca    8.6      04 Sep 2024 07:15  Mg     1.8     09-04    TPro  5.4<L>  /  Alb  3.4<L>  /  TBili  1.0  /  DBili  x   /  AST  17  /  ALT  <5  /  AlkPhos  54  09-04      Urinalysis Basic - ( 04 Sep 2024 07:15 )    Color: x / Appearance: x / SG: x / pH: x  Gluc: 92 mg/dL / Ketone: x  / Bili: x / Urobili: x   Blood: x / Protein: x / Nitrite: x   Leuk Esterase: x / RBC: x / WBC x   Sq Epi: x / Non Sq Epi: x / Bacteria: x      RADIOLOGY & ADDITIONAL STUDIES: reviewed by me    EKG: reviewed by me     [ ]anasarca      [ ]Artificial Nutrition      Palliative Care Spiritual/Emotional Screening Tool Question  Severity (0-4): 0  Score of 2 or greater indicates recommendation of Chaplaincy referral  Chaplaincy Referral: [ ] Yes [ ] Refused [ ] Following    Caregiver [place]:  [ ] Yes [ ] No [X ] Deferred  Social Work Referral [ ]  Patient and Family Centered Care Referral [ ]    Anticipatory Grief Present: [ ] Yes [ ] No [X ] Deferred  Social Work Referral [ ]  Patient and Family Centered Care Referral [ ]    Patient discussed with primary medical team MD  Palliative care education provided to patient and/or family

## 2024-09-04 NOTE — PROGRESS NOTE ADULT - REASON FOR ADMISSION
Altered Mental Status and Increased WOB

## 2024-09-04 NOTE — PROGRESS NOTE ADULT - PROBLEM SELECTOR PLAN 4
DNR/DNI  - will follow tomorrow am for discussion of GOC in person
DNR/DNI- added no feeding tubes and DNH today  - spouse requesting transfer back to SNF for very limited medical care  - he is aware of aspiration risk, and wants comfort feeds no artifical feeding   - [facility] does not have a hospice program but has palliative care and will honor MOL wishes

## 2024-09-04 NOTE — PROGRESS NOTE ADULT - ASSESSMENT
· Assessment    84-year-old woman with a history of Lewy body dementia (AAO x 2 at baseline), Parkinson's, COPD not on home oxygen, presented from [facility] with presenting complaints of AMS and Tachypnea, Patient was A&Ox0 on arrival, unable to provide history. Patient was found to be with decreased breath sounds diffusely, quick bedside ultrasound by ED showed lung sliding, no pneumothorax, was placed on BiPAP for potential hypercapnia, magnesium and nebs given, blood gas resulted with normal CO2. was weaned off to NC.  ED team held off on 30 cc/kg bolus, as patient has a history of heart failure: was given 1 L bolus. She was evaluated by cardiology for two runs of NSVTs approx 10 sec, recommended telemetry admission. Patient was started on Ertapenem, Doxycycline and Ceftriaxone in NH for suspected UTI. At baseline patient is AXO x3, has difficulty finding words, has impaired memory but has meaningful conversation. Pt was found in  Cardiopulmonary Arrest Secondary to Aspiration on 8/26,  rescinded during code, pt was resuscitated, developed extensive soft tissue hematoma on chest wall after CPR, received blood transfusion.   Initially she was admitted to ICU, downgraded to SDU on 8/30     # New-Onset Paroxysmal A-fib  - currently NSR  - not on AC due to low Hgb in the setting of L chest wall hematoma  - c/w metoprolol 12.5 mg BID  - monitor and replete electrolytes     # Severe Sepsis on Admission  #Staphylococcus hominis bacteremia-suspect  contaminant   - resolved now , repeat blood Cx is NTD   -  urine Cx is  negative to date   - 8/24: CT Angio Chest urinary bladder mild pericystic stranding, could be attributed to urinary bladder infection in the appropriate clinical setting  -  completed 5 day of Cefepime   -  ID following, recommendations noted     # Cardiopulmonary Arrest Secondary to Aspiration  - DNR/DNI on admission,  rescinded during code  - 2 cycles, 1x epi, 1x calcium gluconate, ROSC achieved, lost, PEA  - 1 cycle, 1x epi, intubated, ROSC achieved  - Patient now is DNR/DNI by  , he is refusing PEG tube, considering comfort and end of life care     # Dysphagia  - S+S f/u now that pt currrently off bipap   - aspiration precautions   -c/w NG feeds     # Acute blood loss anemia   - CT Abdomen/Pelvis showing numerous L chest wall hematomas  - Hgb, 6.6 on 8/30  - Total 2x pRBCs given  - monitor H/H, keep Hb above 7    # Chronic Constipation   - Secondary to autonomic dysfunction, right hemicolectomy   - Continue aggressive bowel regimen   - enema PRN     # 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  - CT Head negative x3, EEG negative  - re[ported Baseline , wheelchair bound, A&Ox3  - Continue carbidopa/levodopa 10/100 TID  - supportive care     # Hypothyroidism    - 8/30: TSH 1.93  - Continue levothyroxine 50 mcg daily    # GERD   - Continue famotidine 20 mg daily    # Conjunctival Xerosis   # Glaucoma   - Continue with eye drops    # Peripheral Neuropathy  - Gabapentin held in setting of encephalopathy      #DVT PPX: Sequentials  #GI PPX: Famotidine  #Diet: NGT, per speech  #Code Status: DNR/DNI with NIV Trial, palliative f/u pending      #Progress Note Handoff  Pending (specify):  supportive care, wean off BiPAP as tolerated, speech and swallow follow up, palliative care follow up for GOC ,  refusing PEG tube, leaning towards comfort care           Attestation Statements:   Attestation Statements:  I have personally seen and examined the patient.  I fully participated in the care of this patient.  I have made amendments to the documentation where necessary, and agree with the history, physical exam, and plan as documented by the Resident and Student.     c/w current supportive care, pending GOC with  .     · Assessment    84-year-old woman with a history of Lewy body dementia (AAO x 2 at baseline), Parkinson's, COPD not on home oxygen, presented from [facility] with presenting complaints of AMS and Tachypnea, Patient was A&Ox0 on arrival, unable to provide history. Patient was found to be with decreased breath sounds diffusely, quick bedside ultrasound by ED showed lung sliding, no pneumothorax, was placed on BiPAP for potential hypercapnia, magnesium and nebs given, blood gas resulted with normal CO2. was weaned off to NC.  ED team held off on 30 cc/kg bolus, as patient has a history of heart failure: was given 1 L bolus. She was evaluated by cardiology for two runs of NSVTs approx 10 sec, recommended telemetry admission. Patient was started on Ertapenem, Doxycycline and Ceftriaxone in NH for suspected UTI. At baseline patient is AXO x3, has difficulty finding words, has impaired memory but has meaningful conversation. Pt was found in  Cardiopulmonary Arrest Secondary to Aspiration on 8/26,  rescinded during code, pt was resuscitated, developed extensive soft tissue hematoma on chest wall after CPR, received blood transfusion.   Initially she was admitted to ICU, downgraded to SDU on 8/30     # New-Onset Paroxysmal A-fib  - currently NSR  - not on AC due to low Hgb in the setting of L chest wall hematoma  - c/w metoprolol 12.5 mg BID  - monitor and replete electrolytes     # Severe Sepsis on Admission  #Staphylococcus hominis bacteremia-suspect  contaminant   - resolved now , repeat blood Cx is NTD   -  urine Cx is  negative to date   - 8/24: CT Angio Chest urinary bladder mild pericystic stranding, could be attributed to urinary bladder infection in the appropriate clinical setting  -  completed 5 day of Cefepime   -  ID following, recommendations noted     # Cardiopulmonary Arrest Secondary to Aspiration  - DNR/DNI on admission,  rescinded during code  - 2 cycles, 1x epi, 1x calcium gluconate, ROSC achieved, lost, PEA  - 1 cycle, 1x epi, intubated, ROSC achieved  - Patient now is DNR/DNI by  , he is refusing PEG tube, considering comfort and end of life care     # Dysphagia  - S+S f/u now that pt currrently off bipap   - aspiration precautions   - c/w NG comfort feeds. 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  - CT Head negative x3, EEG negative  - re[ported Baseline , wheelchair bound, A&Ox3  - Continue carbidopa/levodopa 10/100 TID  - supportive care     # Hypothyroidism    - 8/30: TSH 1.93  - Continue levothyroxine 50 mcg daily    # GERD   - Continue famotidine 20 mg daily    # Conjunctival Xerosis   # Glaucoma   - Continue with eye drops    # Peripheral Neuropathy  - Gabapentin held in setting of encephalopathy      #DVT PPX: Sequentials  #GI PPX: Famotidine  #Diet: NGT, per speech  #Code Status: DNR/DNI with NIV Trial, palliative f/u pending      #Progress Note Handoff  Pending (specify):  supportive care, wean off BiPAP as tolerated, speech and swallow follow up, palliative care follow up for GOC ,  refusing PEG tube, leaning towards comfort care  84-year-old woman with a history of Lewy body dementia (AAO x 2 at baseline), Parkinson's, COPD not on home oxygen, presented from [facility] with presenting complaints of AMS and Tachypnea, Patient was A&Ox0 on arrival, unable to provide history. Patient was found to be with decreased breath sounds diffusely, quick bedside ultrasound by ED showed lung sliding, no pneumothorax, was placed on BiPAP for potential hypercapnia, magnesium and nebs given, blood gas resulted with normal CO2. was weaned off to NC.  ED team held off on 30 cc/kg bolus, as patient has a history of heart failure: was given 1 L bolus. She was evaluated by cardiology for two runs of NSVTs approx 10 sec, recommended telemetry admission. Patient was started on Ertapenem, Doxycycline and Ceftriaxone in NH for suspected UTI. At baseline patient is AXO x3, has difficulty finding words, has impaired memory but has meaningful conversation. Pt was found in  Cardiopulmonary Arrest Secondary to Aspiration on 8/26,  rescinded during code, pt was resuscitated, developed extensive soft tissue hematoma on chest wall after CPR, received blood transfusion.   Initially she was admitted to ICU, downgraded to SDU on 8/30     # New-Onset Paroxysmal A-fib  - currently NSR  - not on AC due to low Hgb in the setting of L chest wall hematoma  - c/w metoprolol 12.5 mg BID  - monitor and replete electrolytes     # Severe Sepsis on Admission  # Staphylococcus hominis bacteremia-suspect  contaminant   - resolved now , repeat blood Cx is NTD   -  urine Cx is  negative to date   - 8/24: CT Angio Chest urinary bladder mild pericystic stranding, could be attributed to urinary bladder infection in the appropriate clinical setting  -  completed 5 day of Cefepime   -  ID following, recommendations noted     # Cardiopulmonary Arrest Secondary to Aspiration  - DNR/DNI on admission,  rescinded during code  - 2 cycles, 1x epi, 1x calcium gluconate, ROSC achieved, lost, PEA  - 1 cycle, 1x epi, intubated, ROSC achieved  - Patient now is DNR/DNI by     # Dysphagia  - S+S f/u now that pt currrently off bipap   - aspiration precautions   - c/w comfort feeds  - GOC with palliative.  does not want PEG tube. He would prefer for patient to be discharged home and comfortable. 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  - CT Head negative x3, EEG negative  - re[ported Baseline , wheelchair bound, A&Ox3  - Continue carbidopa/levodopa 10/100 TID  - supportive care     # Hypothyroidism    - 8/30: TSH 1.93  - Continue levothyroxine 50 mcg daily    # GERD   - Continue famotidine 20 mg daily    # Conjunctival Xerosis   # Glaucoma   - Continue with eye drops    # Peripheral Neuropathy  - Gabapentin held in setting of encephalopathy      #DVT PPX: Sequentials  #GI PPX: Famotidine  #Diet: NGT, per speech  #Code Status: DNR/DNI with NIV Trial      #Progress Note Handoff  Pending (specify):  supportive care, wean off BiPAP as tolerated

## 2024-09-04 NOTE — SWALLOW BEDSIDE ASSESSMENT ADULT - ORAL PREPARATORY PHASE
spoon strips w/ teeth/Reduced oral grading
Within functional limits
strips spoon w/ teeth/Within functional limits

## 2024-09-04 NOTE — SWALLOW BEDSIDE ASSESSMENT ADULT - ADDITIONAL RECOMMENDATIONS
spouse is aware of the risks for aspiration, wish for not long term non-oral means of nutrition and hydration
ST services no longer needed. Pt tolerating baseline diet. SLP d/c. Reconsult PRN

## 2024-09-04 NOTE — SWALLOW BEDSIDE ASSESSMENT ADULT - SPECIFY REASON(S)
dysphagia eval
Dysphagia Eval
dysphagia f/u
dysphagia re-eval, downgraded to medical floor, on O2 NC

## 2024-09-04 NOTE — SWALLOW BEDSIDE ASSESSMENT ADULT - SWALLOW EVAL: DIAGNOSIS
+ toleration of sbs and mod thick liquids w/o overt s/s of aspiration/penetration
+audible rhonchi, decreased secretion management, +cough is unproductive
+min overt s/s of penetration/aspiration w/ puree and moderately thick liquids
+toleration of puree and moderately thick liquids w/ min overt s/s of pharyngeal dysphagia/aspiration

## 2024-09-04 NOTE — DISCHARGE NOTE PROVIDER - NSDCMRMEDTOKEN_GEN_ALL_CORE_FT
acetaminophen 325 mg oral tablet: 2 tab(s) orally every 12 hours  Artificial Tears ophthalmic solution: 1 drop(s) to each affected eye 2 times a day  aspirin 81 mg oral tablet, chewable: 1 tab(s) orally once a day  carbidopa-levodopa 10 mg-100 mg oral tablet: 1 tab(s) orally 3 times a day  Colace 100 mg oral capsule: 1 cap(s) orally 2 times a day  famotidine 20 mg oral tablet: 1 tab(s) orally once a day  Fleet Mineral Oil rectal enema: 133 milliliter(s) rectally 3 times a week  gabapentin 600 mg oral tablet: 1 tab(s) orally 3 times a day  ipratropium-albuterol 0.5 mg-2.5 mg/3 mL inhalation solution: 3 milliliter(s) by nebulizer every 6 hours as needed for  shortness of breath and/or wheezing  Lasix 20 mg oral tablet: 0.5 tab(s) orally once a day  latanoprost 0.005% ophthalmic solution: 1 drop(s) to each affected eye once a day (at bedtime)  levothyroxine 50 mcg (0.05 mg) oral tablet: 1 tab(s) orally once a day  melatonin 3 mg oral tablet: 1 tab(s) orally once a day (at bedtime), As needed, Insomnia  metoprolol succinate 25 mg oral capsule, extended release: 1 cap(s) orally once a day  NIFEdipine 60 mg oral tablet, extended release: 1 tab(s) orally once a day  polyethylene glycol 3350 oral powder for reconstitution: 17 gram(s) orally once a day  Senna 8.6 mg oral tablet: 2 tab(s) orally once a day (at bedtime)  Symbicort 160 mcg-4.5 mcg/inh inhalation aerosol: 2 puff(s) inhaled 2 times a day  timolol hemihydrate 0.5% ophthalmic solution: 1 drop(s) to each affected eye 2 times a day  Vitamin C 500 mg oral tablet: 1 tab(s) orally once a day  Vitamin D3 1000 intl units oral tablet: 1 tab(s) orally once a day   acetaminophen 325 mg oral tablet: 2 tab(s) orally every 12 hours  Artificial Tears ophthalmic solution: 1 drop(s) to each affected eye 2 times a day  aspirin 81 mg oral tablet, chewable: 1 tab(s) orally once a day  carbidopa-levodopa 10 mg-100 mg oral tablet: 1 tab(s) orally 3 times a day  Colace 100 mg oral capsule: 1 cap(s) orally 2 times a day  famotidine 20 mg oral tablet: 1 tab(s) orally once a day  Fleet Mineral Oil rectal enema: 133 milliliter(s) rectally 3 times a week  gabapentin 600 mg oral tablet: 1 tab(s) orally 3 times a day  ipratropium-albuterol 0.5 mg-2.5 mg/3 mL inhalation solution: 3 milliliter(s) by nebulizer every 6 hours as needed for  shortness of breath and/or wheezing  Lasix 20 mg oral tablet: 0.5 tab(s) orally once a day  latanoprost 0.005% ophthalmic solution: 1 drop(s) to each affected eye once a day (at bedtime)  levothyroxine 50 mcg (0.05 mg) oral tablet: 1 tab(s) orally once a day  melatonin 3 mg oral tablet: 1 tab(s) orally once a day (at bedtime), As needed, Insomnia  metoprolol succinate 25 mg oral capsule, extended release: 1 cap(s) orally once a day  polyethylene glycol 3350 oral powder for reconstitution: 17 gram(s) orally once a day  Senna 8.6 mg oral tablet: 2 tab(s) orally once a day (at bedtime)  Symbicort 160 mcg-4.5 mcg/inh inhalation aerosol: 2 puff(s) inhaled 2 times a day  timolol hemihydrate 0.5% ophthalmic solution: 1 drop(s) to each affected eye 2 times a day  Vitamin C 500 mg oral tablet: 1 tab(s) orally once a day  Vitamin D3 1000 intl units oral tablet: 1 tab(s) orally once a day   acetaminophen 325 mg oral tablet: 2 tab(s) orally every 12 hours as needed for  mild pain  Artificial Tears ophthalmic solution: 1 drop(s) to each affected eye 2 times a day  aspirin 81 mg oral tablet, chewable: 1 tab(s) orally once a day  carbidopa-levodopa 10 mg-100 mg oral tablet: 1 tab(s) orally 3 times a day  Colace 100 mg oral capsule: 1 cap(s) orally 2 times a day  famotidine 20 mg oral tablet: 1 tab(s) orally once a day  Fleet Mineral Oil rectal enema: 133 milliliter(s) rectally 3 times a week  gabapentin 600 mg oral tablet: 1 tab(s) orally 3 times a day  ipratropium-albuterol 0.5 mg-2.5 mg/3 mL inhalation solution: 3 milliliter(s) by nebulizer every 6 hours as needed for  shortness of breath and/or wheezing  Lasix 20 mg oral tablet: 0.5 tab(s) orally once a day  latanoprost 0.005% ophthalmic solution: 1 drop(s) to each affected eye once a day (at bedtime)  levothyroxine 50 mcg (0.05 mg) oral tablet: 1 tab(s) orally once a day  melatonin 3 mg oral tablet: 1 tab(s) orally once a day (at bedtime), As needed, Insomnia  metoprolol succinate 25 mg oral capsule, extended release: 1 cap(s) orally once a day  polyethylene glycol 3350 oral powder for reconstitution: 17 gram(s) orally once a day  Senna 8.6 mg oral tablet: 2 tab(s) orally once a day (at bedtime)  Symbicort 160 mcg-4.5 mcg/inh inhalation aerosol: 2 puff(s) inhaled 2 times a day  timolol hemihydrate 0.5% ophthalmic solution: 1 drop(s) to each affected eye 2 times a day  Vitamin C 500 mg oral tablet: 1 tab(s) orally once a day  Vitamin D3 1000 intl units oral tablet: 1 tab(s) orally once a day   acetaminophen 325 mg oral tablet: 2 tab(s) orally every 12 hours as needed for  mild pain  Artificial Tears ophthalmic solution: 1 drop(s) to each affected eye 2 times a day  aspirin 81 mg oral tablet, chewable: 1 tab(s) orally once a day  carbidopa-levodopa 10 mg-100 mg oral tablet: 1 tab(s) orally 3 times a day  Colace 100 mg oral capsule: 1 cap(s) orally 2 times a day  famotidine 20 mg oral tablet: 1 tab(s) orally once a day  Fleet Mineral Oil rectal enema: 133 milliliter(s) rectally 3 times a week  ipratropium-albuterol 0.5 mg-2.5 mg/3 mL inhalation solution: 3 milliliter(s) by nebulizer every 6 hours as needed for  shortness of breath and/or wheezing  Lasix 20 mg oral tablet: 0.5 tab(s) orally once a day  latanoprost 0.005% ophthalmic solution: 1 drop(s) to each affected eye once a day (at bedtime)  levothyroxine 50 mcg (0.05 mg) oral tablet: 1 tab(s) orally once a day  melatonin 3 mg oral tablet: 1 tab(s) orally once a day (at bedtime), As needed, Insomnia  metoprolol succinate 25 mg oral capsule, extended release: 1 cap(s) orally once a day  polyethylene glycol 3350 oral powder for reconstitution: 17 gram(s) orally once a day  Senna 8.6 mg oral tablet: 2 tab(s) orally once a day (at bedtime)  Symbicort 160 mcg-4.5 mcg/inh inhalation aerosol: 2 puff(s) inhaled 2 times a day  timolol hemihydrate 0.5% ophthalmic solution: 1 drop(s) to each affected eye 2 times a day  Vitamin C 500 mg oral tablet: 1 tab(s) orally once a day  Vitamin D3 1000 intl units oral tablet: 1 tab(s) orally once a day

## 2024-09-04 NOTE — DISCHARGE NOTE PROVIDER - ATTENDING DISCHARGE PHYSICAL EXAMINATION:
Patient seen at bedside.  present as well.  does not want peg tube. he is completely aware of risks associated with feeds including aspiration and death. patient is now DNR/DNI.  is ok discharging patient today. cm conformed regarding discharge from facility who can accept patient today.

## 2024-09-04 NOTE — SWALLOW BEDSIDE ASSESSMENT ADULT - SWALLOW EVAL: RECOMMENDED DIET
NPO, NGT
NPO/ NGT pending further GOC/POC. Safest recommended po diet w/ family knowing and understanding the risks for aspiration is puree w/ moderately thick liquids
SBS and mod thick liquids
puree diet w/ moderately thick liquids for comfort

## 2024-09-04 NOTE — SWALLOW BEDSIDE ASSESSMENT ADULT - SLP GENERAL OBSERVATIONS
PT received in bed awake, wet unproductive cough, +confused garbled speech. +aspiration risk Is high, no po trials provided
Pt received in bed awake, on room air SPO2 94%, NG tin situ
Pt received in bed asleep woke to stim, NGT in situ
Pt found laying in bed awake and alert. Family at bedside reports she consistently eats a sbs and mod thick liquid diet. Family reports she coughs on mildly thick liquids.

## 2024-09-04 NOTE — PROGRESS NOTE ADULT - PROVIDER SPECIALTY LIST ADULT
Critical Care
Hospitalist
Infectious Disease
Internal Medicine
Internal Medicine
MICU
MICU
Critical Care
Infectious Disease
Internal Medicine
MICU
Critical Care
Hospitalist
Hospitalist
Internal Medicine
MICU
Critical Care
Hospitalist
Palliative Care
Critical Care
Infectious Disease
Infectious Disease
Palliative Care
Palliative Care

## 2024-09-04 NOTE — PROGRESS NOTE ADULT - CONVERSATION DETAILS
Spoke with patient's  over the phone. Patient was medically extubated.  He noted that after she was medically extubated, he made her DNR/DNI again.  HE wishes to maintain DNR/DNI.    We discussed that the patient now has an NGT. DIscussed that we will need to discuss GOC again if the patient is not able to transition off NGT.  All questions answered.
Palliative care NP met with spouse    Reviewed role of palliative care, and discussed goals of care. Spouse is well educated about dementia he does not feel his wife would benefit from feeding tubes placement. He wants to continue a limited level of medical care, does not want her re-hospitalized. He is aware of risk of aspiration with her orally feeding, but feels that her quality of life would be severely effected by having her be NPO or have a feeding tube    We did a new MOLST together with his current wishes for his spouse made clear for the SNF for her return. He is aware that his wife has very advanced illness and would like her to have quality and comfort over quantity of life.
Palliative care NP spoke to Contre[place]     Reviewed pt's medical condition and treatment. He said he is aware that she has very advanced illness, he does not want feeding tubes - he has read the literature regarding artifical feeding. He would do comfort feeds if pt is found to be unsafe to swallow.     he is open to meeting with palliative NP tomorrow to discuss comfort care vs ongoing medical management. We discussed the concept of quality of life. He told this writer his wife has been in the SNF for 8 years and that despite that they had a quality of life. But in last few months he feels she is starting to have less quality.     He is open to a face to face discussion to further weigh his decisions

## 2024-09-04 NOTE — SWALLOW BEDSIDE ASSESSMENT ADULT - SLP PERTINENT HISTORY OF CURRENT PROBLEM
83-year-old female with PMHx of Lewy Body dementia with associated autonomic dysfunction including dysphagia, Hypothyroidism, Parkinson's, COPD not on home oxygen , A&Ox0 on arrival being admitted for management of severe sepsis. 2/2 Possible Acute Complicated UTI possible pneumonitis. +AHRF, patient was coded this adm with ROSC, intubated and transferred to ICU, Extubated 8/29, Patient is now DNR/DNI.
83-year-old female with PMHx of Lewy Body dementia with associated autonomic dysfunction including dysphagia, Hypothyroidism, Parkinson's, COPD not on home oxygen , A&Ox0 on arrival being admitted for management of severe sepsis. 2/2 Possible Acute Complicated UTI possible pneumonitis. +AHRF, patient was coded this adm with ROSC, intubated and transferred to ICU, Extubated 8/29, Patient is now DNR/DNI.
Patient 84-year-old woman with a history of Lewy body dementia (AAO x 2 at baseline), Parkinson's, COPD not on home oxygen, presented from [facility] with presenting complaints of AMS and Tachypnea, Patient was A&Ox0 on arrival, unable to provide history. Patient was found to be with decreased breath sounds diffusely, quick bedside ultrasound by ED showed lung sliding, no pneumothorax, was placed on BiPAP for potential hypercapnia, magnesium and nebs given, blood gas resulted with normal CO2. was weaned off to NC.  ED team held off on 30 cc/kg bolus, as patient has a history of heart failure: was given 1 L bolus. She was evaluated by cardiology for two runs of NSVTs approx 10 sec, recommended telemetry admission.
83-year-old female with PMHx of Lewy Body dementia with associated autonomic dysfunction including dysphagia, Hypothyroidism, Parkinson's, COPD not on home oxygen , A&Ox0 on arrival being admitted for management of severe sepsis. 2/2 Possible Acute Complicated UTI possible pneumonitis. +AHRF, patient was coded this adm with ROSC, intubated and transferred to ICU, Extubated 8/29, Patient is now DNR/DNI.

## 2024-09-04 NOTE — SWALLOW BEDSIDE ASSESSMENT ADULT - NS SPL SWALLOW CLINIC TRIAL FT
not appropriate for po trials
+min overt s/s of penetration/aspiration w/ puree and moderately thick liquids
+toleration of puree and moderately thick liquids w/ min overt s/s of pharyngeal dysphagia/aspiration
+overt s/s of aspiration/penetration while consuming mildly thick liquids

## 2024-09-04 NOTE — PROGRESS NOTE ADULT - PROBLEM SELECTOR PLAN 3
very advanced illness  needs total ADL care  now w dysphagia  - hospice appropriate
very advanced illness  needs total ADL care  now w dysphagia  - hospice appropriate.

## 2024-09-04 NOTE — DISCHARGE NOTE NURSING/CASE MANAGEMENT/SOCIAL WORK - PATIENT PORTAL LINK FT
You can access the FollowMyHealth Patient Portal offered by [facility] by registering at the following website: [email or web address]. By joining [employer]’s FollowMyHealth portal, you will also be able to view your health information using other applications (apps) compatible with our system.

## 2024-09-04 NOTE — PROGRESS NOTE ADULT - PROBLEM SELECTOR PLAN 2
s/p intubation and extubation  - now off oxygen   - monitor
s/p intubation and extubation  - now off oxygen   - uses BIPAP at HS   - monitor.

## 2024-09-04 NOTE — DISCHARGE NOTE PROVIDER - HOSPITAL COURSE
84-year-old woman with a history of Lewy body dementia (AAO x 2 at baseline), Parkinson's, COPD not on home oxygen, presented from [facility] with presenting complaints of AMS and Tachypnea, Patient was A&Ox0 on arrival, unable to provide history. Patient was found to be with decreased breath sounds diffusely, quick bedside ultrasound by ED showed lung sliding, no pneumothorax, was placed on BiPAP for potential hypercapnia, magnesium and nebs given, blood gas resulted with normal CO2. was weaned off to NC.  ED team held off on 30 cc/kg bolus, as patient has a history of heart failure: was given 1 L bolus. She was evaluated by cardiology for two runs of NSVTs approx 10 sec, recommended telemetry admission. Patient was started on Ertapenem, Doxycycline and Ceftriaxone in NH for suspected UTI. At baseline patient is AXO x3, has difficulty finding words, has impaired memory but has meaningful conversation.     During hospital course, patient suffered Cardiopulmonary Arrest Secondary to Aspiration on 8/26,  rescinded during code, pt was resuscitated, developed extensive soft tissue hematoma on chest wall after CPR, received blood transfusion. Admitted to ICU but was stabilized and extubated. Downgraded to SDU for monitoring before downgraded to the medical floors.     On the medical floors, patient improved on Bipap and receiving NG tube feeds. Her mental status has improved. Patient still has dysphagia. GOC discussion with , he does not want PEG tube and is agreeable to comfort feeds and understands risk of aspiration. He reinstated DNR/DNI order.  [facility] returns to NH. Patient is currently medically stable for discharge.    Discussion of discharge plan of care, including discharge diagnoses, medication reconciliation, and follow-ups was conducted with Dr. Hutchins on 9/4/24 at 9:30 am, and discharge was approved.     # New-Onset Paroxysmal A-fib  - currently NSR  - not on AC due to low Hgb in the setting of L chest wall hematoma  - c/w metoprolol 12.5 mg BID    # Severe Sepsis on Admission  # Staphylococcus hominis bacteremia-suspect  contaminant   - resolved now , repeat blood Cx is NTD   - urine Cx is  negative to date   - 8/24: CT Angio Chest urinary bladder mild pericystic stranding, could be attributed to urinary bladder infection in the appropriate clinical setting  -  completed 5 day of Cefepime     # Cardiopulmonary Arrest Secondary to Aspiration  - DNR/DNI on admission,  rescinded during code  - 2 cycles, 1x epi, 1x calcium gluconate, ROSC achieved, lost, PEA  - 1 cycle, 1x epi, intubated, ROSC achieved  - Patient now is DNR/DNI by  , he is refusing PEG tube, considering comfort and end of life care     # Dysphagia  - aspiration precautions   - comfort feeds. 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  - CT Head negative x3, EEG negative  - reported Baseline , wheelchair bound, A&Ox3  - Continue carbidopa/levodopa 10/100 TID  - supportive care     # Hypothyroidism    - 8/30: TSH 1.93  - Continue levothyroxine 50 mcg daily    # GERD   - Continue famotidine 20 mg daily    # Conjunctival Xerosis   # Glaucoma   - Continue with eye drops    # Peripheral Neuropathy  - Gabapentin held in setting of encephalopathy   84-year-old woman with a history of Lewy body dementia (AAO x 2 at baseline), Parkinson's, COPD not on home oxygen, presented from [facility] with presenting complaints of AMS and Tachypnea, Patient was A&Ox0 on arrival, unable to provide history. Patient was found to be with decreased breath sounds diffusely, quick bedside ultrasound by ED showed lung sliding, no pneumothorax, was placed on BiPAP for potential hypercapnia, magnesium and nebs given, blood gas resulted with normal CO2. was weaned off to NC.  ED team held off on 30 cc/kg bolus, as patient has a history of heart failure: was given 1 L bolus. She was evaluated by cardiology for two runs of NSVTs approx 10 sec, recommended telemetry admission. Patient was started on Ertapenem, Doxycycline and Ceftriaxone in NH for suspected UTI. At baseline patient is AXO x3, has difficulty finding words, has impaired memory but has meaningful conversation.     During hospital course, patient suffered Cardiopulmonary Arrest Secondary to Aspiration on 8/26,  rescinded during code, pt was resuscitated, developed extensive soft tissue hematoma on chest wall after CPR, received blood transfusion. Admitted to ICU but was stabilized and extubated. Downgraded to SDU for monitoring before downgraded to the medical floors.     On the medical floors, patient improved on Bipap and receiving NG tube feeds. Her mental status has improved. Patient still has dysphagia. GOC discussion with , he does not want PEG tube and is agreeable to comfort feeds and understands risk of aspiration. He reinstated DNR/DNI order.  [facility]. Patient is currently medically stable for discharge back to NH.    Discussion of discharge plan of care, including discharge diagnoses, medication reconciliation, and follow-ups was conducted with Dr. Hutchins on 9/4/24 at 9:30 am, and discharge was approved.     # New-Onset Paroxysmal A-fib  - currently NSR  - not on AC due to low Hgb in the setting of L chest wall hematoma  - c/w metoprolol 12.5 mg BID    # Severe Sepsis on Admission  # Staphylococcus hominis bacteremia-suspect  contaminant   - resolved now , repeat blood Cx is NTD   - urine Cx is  negative to date   - 8/24: CT Angio Chest urinary bladder mild pericystic stranding, could be attributed to urinary bladder infection in the appropriate clinical setting  -  completed 5 day of Cefepime     # Cardiopulmonary Arrest Secondary to Aspiration  - DNR/DNI on admission,  rescinded during code  - 2 cycles, 1x epi, 1x calcium gluconate, ROSC achieved, lost, PEA  - 1 cycle, 1x epi, intubated, ROSC achieved  - Patient now is DNR/DNI by  , he is refusing PEG tube, considering comfort and end of life care     # Dysphagia  - aspiration precautions   - comfort feeds. 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  - CT Head negative x3, EEG negative  - reported Baseline , wheelchair bound, A&Ox3  - Continue carbidopa/levodopa 10/100 TID  - supportive care     # Hypothyroidism    - 8/30: TSH 1.93  - Continue levothyroxine 50 mcg daily    # GERD   - Continue famotidine 20 mg daily    # Conjunctival Xerosis   # Glaucoma   - Continue with eye drops    # Peripheral Neuropathy  - Gabapentin held in setting of encephalopathy   84-year-old woman with a history of Lewy body dementia (AAO x 2 at baseline), Parkinson's, COPD not on home oxygen, presented from [facility] with presenting complaints of AMS and Tachypnea, Patient was A&Ox0 on arrival, unable to provide history. Patient was found to be with decreased breath sounds diffusely, quick bedside ultrasound by ED showed lung sliding, no pneumothorax, was placed on BiPAP for potential hypercapnia, magnesium and nebs given, blood gas resulted with normal CO2. was weaned off to NC.  ED team held off on 30 cc/kg bolus, as patient has a history of heart failure: was given 1 L bolus. She was evaluated by cardiology for two runs of NSVTs approx 10 sec, recommended telemetry admission. Patient was started on Ertapenem, Doxycycline and Ceftriaxone in NH for suspected UTI. At baseline patient is AXO x3, has difficulty finding words, has impaired memory but has meaningful conversation.     During hospital course, patient suffered Cardiopulmonary Arrest Secondary to Aspiration on 8/26,  rescinded during code, pt was resuscitated, developed extensive soft tissue hematoma on chest wall after CPR, received blood transfusion. Admitted to ICU but was stabilized and extubated. Downgraded to SDU for monitoring before downgraded to the medical floors.     On the medical floors, patient improved on Bipap and receiving NG tube feeds. Her mental status has improved. Patient still has dysphagia. GOC discussion with , he does not want PEG tube and is agreeable to comfort feeds and understands risk of aspiration. He reinstated DNR/DNI order.  [facility] to [facility]. Patient is currently medically stable for discharge back to NH.    Discussion of discharge plan of care, including discharge diagnoses, medication reconciliation, and follow-ups was conducted with Dr. Hutchins on 9/4/24 at 9:30 am, and discharge was approved.     # New-Onset Paroxysmal A-fib  - currently NSR  - not on AC due to low Hgb in the setting of L chest wall hematoma  - c/w metoprolol 12.5 mg BID    # Severe Sepsis on Admission  # Staphylococcus hominis bacteremia-suspect  contaminant   - resolved now , repeat blood Cx is NTD   - urine Cx is  negative to date   - 8/24: CT Angio Chest urinary bladder mild pericystic stranding, could be attributed to urinary bladder infection in the appropriate clinical setting  -  completed 5 day of Cefepime     # Cardiopulmonary Arrest Secondary to Aspiration  - DNR/DNI on admission,  rescinded during code  - 2 cycles, 1x epi, 1x calcium gluconate, ROSC achieved, lost, PEA  - 1 cycle, 1x epi, intubated, ROSC achieved  - Patient now is DNR/DNI by  , he is refusing PEG tube, . signed the molst form with palliaitve team     # Dysphagia  - aspiration precautions   - comfort feeds. 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  - CT Head negative x3, EEG negative  - reported Baseline , wheelchair bound, A&Ox3  - Continue carbidopa/levodopa 10/100 TID  - supportive care     # Hypothyroidism    - 8/30: TSH 1.93  - Continue levothyroxine 50 mcg daily    # GERD   - Continue famotidine 20 mg daily    # Conjunctival Xerosis   # Glaucoma   - Continue with eye drops    # Peripheral Neuropathy  - Gabapentin held in setting of encephalopathy   84-year-old woman with a history of Lewy body dementia (AAO x 2 at baseline), Parkinson's, COPD not on home oxygen, presented from [facility] with presenting complaints of AMS and Tachypnea, Patient was A&Ox0 on arrival, unable to provide history. Patient was found to be with decreased breath sounds diffusely, quick bedside ultrasound by ED showed lung sliding, no pneumothorax, was placed on BiPAP for potential hypercapnia, magnesium and nebs given, blood gas resulted with normal CO2. was weaned off to NC.  ED team held off on 30 cc/kg bolus, as patient has a history of heart failure: was given 1 L bolus. She was evaluated by cardiology for two runs of NSVTs approx 10 sec, recommended telemetry admission. Patient was started on Ertapenem, Doxycycline and Ceftriaxone in NH for suspected UTI. At baseline patient is AXO x3, has difficulty finding words, has impaired memory but has meaningful conversation.     During hospital course, patient suffered Cardiopulmonary Arrest Secondary to Aspiration on 8/26,  rescinded during code, pt was resuscitated, developed extensive soft tissue hematoma on chest wall after CPR, received blood transfusion. Admitted to ICU but was stabilized and extubated. Downgraded to SDU for monitoring before downgraded to the medical floors.     On the medical floors, patient improved on Bipap and receiving NG tube feeds. Her mental status has improved. Patient still has dysphagia. GOC discussion with , he does not want PEG tube and is agreeable to comfort feeds and understands risk of aspiration. He reinstated DNR/DNI order.  [facility] to [facility]. Patient is currently medically stable for discharge back to NH.    Discussion of discharge plan of care, including discharge diagnoses, medication reconciliation, and follow-ups was conducted with Dr. Hutchins on 9/4/24 at 9:30 am, and discharge was approved.     # New-Onset Paroxysmal A-fib  - currently NSR  - not on AC due to low Hgb in the setting of L chest wall hematoma  - c/w metoprolol 12.5 mg BID    # Severe Sepsis on Admission  # Staphylococcus hominis bacteremia-suspect  contaminant   - resolved now , repeat blood Cx is NTD   - urine Cx is  negative to date   - 8/24: CT Angio Chest urinary bladder mild pericystic stranding, could be attributed to urinary bladder infection in the appropriate clinical setting  -  completed 5 day of Cefepime     # Cardiopulmonary Arrest Secondary to Aspiration  - DNR/DNI on admission,  rescinded during code  - 2 cycles, 1x epi, 1x calcium gluconate, ROSC achieved, lost, PEA  - 1 cycle, 1x epi, intubated, ROSC achieved  - Patient now is DNR/DNI by  , he is refusing PEG tube, . signed the molst form with palliaitve team     # Dysphagia  - aspiration precautions   - comfort feeds. 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 now patient better    84-year-old woman with a history of Lewy body dementia (AAO x 2 at baseline), Parkinson's, COPD not on home oxygen, presented from [facility] with presenting complaints of AMS and Tachypnea, Patient was A&Ox0 on arrival, unable to provide history. Patient was found to be with decreased breath sounds diffusely, quick bedside ultrasound by ED showed lung sliding, no pneumothorax, was placed on BiPAP for potential hypercapnia, magnesium and nebs given, blood gas resulted with normal CO2. was weaned off to NC.  ED team held off on 30 cc/kg bolus, as patient has a history of heart failure: was given 1 L bolus. She was evaluated by cardiology for two runs of NSVTs approx 10 sec, recommended telemetry admission. Patient was started on Ertapenem, Doxycycline and Ceftriaxone in NH for suspected UTI. At baseline patient is AXO x3, has difficulty finding words, has impaired memory but has meaningful conversation.     During hospital course, patient suffered Cardiopulmonary Arrest Secondary to Aspiration on 8/26,  rescinded during code, pt was resuscitated, developed extensive soft tissue hematoma on chest wall after CPR, received blood transfusion. Admitted to ICU but was stabilized and extubated. Downgraded to SDU for monitoring before downgraded to the medical floors.     On the medical floors, patient improved on Bipap and receiving NG tube feeds. Her mental status has improved. Patient still has dysphagia. GOC discussion with , he does not want PEG tube and is agreeable to comfort feeds and understands risk of aspiration. He reinstated DNR/DNI order.  [facility]. Patient is currently medically stable for discharge back to NH.    Discussion of discharge plan of care, including discharge diagnoses, medication reconciliation, and follow-ups was conducted with Dr. Hutchins on 9/4/24 at 9:30 am, and discharge was approved.     # New-Onset Paroxysmal A-fib  - currently NSR  - not on AC due to low Hgb in the setting of L chest wall hematoma  - c/w metoprolol 12.5 mg BID    # Severe Sepsis on Admission  # Staphylococcus hominis bacteremia-suspect  contaminant   - resolved now , repeat blood Cx is NTD   - urine Cx is  negative to date   - 8/24: CT Angio Chest urinary bladder mild pericystic stranding, could be attributed to urinary bladder infection in the appropriate clinical setting  -  completed 5 day of Cefepime     # Cardiopulmonary Arrest Secondary to Aspiration  - DNR/DNI on admission,  rescinded during code  - 2 cycles, 1x epi, 1x calcium gluconate, ROSC achieved, lost, PEA  - 1 cycle, 1x epi, intubated, ROSC achieved  - Patient now is DNR/DNI by  , he is refusing PEG tube, . signed the molst form with palliaitve team     # Dysphagia  - aspiration precautions   - comfort feeds. 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 now somwhat better

## 2024-09-04 NOTE — SWALLOW BEDSIDE ASSESSMENT ADULT - NS ASR SWALLOW FINDINGS DISCUS
Physician/Nursing/Patient
spouse/Physician/Nursing/Patient/Family
Palliative care to contact pts spouse/Physician/Nursing/Patient
Physician/Nursing/Patient/Family

## 2024-09-04 NOTE — SWALLOW BEDSIDE ASSESSMENT ADULT - COMMENTS
Chart reviewed, Pt pending d/c to [facility] under palliative care services, not tubes, spouse aware of aspiration risks

## 2024-09-11 DIAGNOSIS — Z99.3 DEPENDENCE ON WHEELCHAIR: ICD-10-CM

## 2024-09-11 DIAGNOSIS — G93.41 METABOLIC ENCEPHALOPATHY: ICD-10-CM

## 2024-09-11 DIAGNOSIS — F02.80 DEMENTIA IN OTHER DISEASES CLASSIFIED ELSEWHERE, UNSPECIFIED SEVERITY, WITHOUT BEHAVIORAL DISTURBANCE, PSYCHOTIC DISTURBANCE, MOOD DISTURBANCE, AND ANXIETY: ICD-10-CM

## 2024-09-11 DIAGNOSIS — Z51.5 ENCOUNTER FOR PALLIATIVE CARE: ICD-10-CM

## 2024-09-11 DIAGNOSIS — D62 ACUTE POSTHEMORRHAGIC ANEMIA: ICD-10-CM

## 2024-09-11 DIAGNOSIS — Z90.49 ACQUIRED ABSENCE OF OTHER SPECIFIED PARTS OF DIGESTIVE TRACT: ICD-10-CM

## 2024-09-11 DIAGNOSIS — G90.3 MULTI-SYSTEM DEGENERATION OF THE AUTONOMIC NERVOUS SYSTEM: ICD-10-CM

## 2024-09-11 DIAGNOSIS — E03.9 HYPOTHYROIDISM, UNSPECIFIED: ICD-10-CM

## 2024-09-11 DIAGNOSIS — H11.149: ICD-10-CM

## 2024-09-11 DIAGNOSIS — G20.A1 PARKINSON'S DISEASE WITHOUT DYSKINESIA, WITHOUT MENTION OF FLUCTUATIONS: ICD-10-CM

## 2024-09-11 DIAGNOSIS — K21.9 GASTRO-ESOPHAGEAL REFLUX DISEASE WITHOUT ESOPHAGITIS: ICD-10-CM

## 2024-09-11 DIAGNOSIS — J44.9 CHRONIC OBSTRUCTIVE PULMONARY DISEASE, UNSPECIFIED: ICD-10-CM

## 2024-09-11 DIAGNOSIS — J69.0 PNEUMONITIS DUE TO INHALATION OF FOOD AND VOMIT: ICD-10-CM

## 2024-09-11 DIAGNOSIS — A41.1 SEPSIS DUE TO OTHER SPECIFIED STAPHYLOCOCCUS: ICD-10-CM
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
## 2024-09-11 DIAGNOSIS — Z66 DO NOT RESUSCITATE: ICD-10-CM

## 2024-09-11 DIAGNOSIS — H40.9 UNSPECIFIED GLAUCOMA: ICD-10-CM

## 2024-09-11 DIAGNOSIS — E66.9 OBESITY, UNSPECIFIED: ICD-10-CM

## 2024-09-11 DIAGNOSIS — Z88.8 ALLERGY STATUS TO OTHER DRUGS, MEDICAMENTS AND BIOLOGICAL SUBSTANCES: ICD-10-CM

## 2024-09-11 DIAGNOSIS — N83.201 UNSPECIFIED OVARIAN CYST, RIGHT SIDE: ICD-10-CM

## 2024-09-11 DIAGNOSIS — R57.9 SHOCK, UNSPECIFIED: ICD-10-CM

## 2024-09-11 DIAGNOSIS — I48.0 PAROXYSMAL ATRIAL FIBRILLATION: ICD-10-CM

## 2024-09-11 DIAGNOSIS — I50.32 CHRONIC DIASTOLIC (CONGESTIVE) HEART FAILURE: ICD-10-CM

## 2024-09-11 DIAGNOSIS — G62.9 POLYNEUROPATHY, UNSPECIFIED: ICD-10-CM

## 2024-09-11 DIAGNOSIS — G31.83 NEUROCOGNITIVE DISORDER WITH LEWY BODIES: ICD-10-CM

## 2024-09-11 DIAGNOSIS — J96.01 ACUTE RESPIRATORY FAILURE WITH HYPOXIA: ICD-10-CM

## 2024-09-11 DIAGNOSIS — I47.20 VENTRICULAR TACHYCARDIA, UNSPECIFIED: ICD-10-CM

## 2024-09-11 DIAGNOSIS — I46.8 CARDIAC ARREST DUE TO OTHER UNDERLYING CONDITION: ICD-10-CM

## 2024-09-11 DIAGNOSIS — Z79.82 LONG TERM (CURRENT) USE OF ASPIRIN: ICD-10-CM

## 2024-09-11 DIAGNOSIS — N39.0 URINARY TRACT INFECTION, SITE NOT SPECIFIED: ICD-10-CM

## 2024-09-11 DIAGNOSIS — R13.10 DYSPHAGIA, UNSPECIFIED: ICD-10-CM

## 2024-09-11 DIAGNOSIS — N17.9 ACUTE KIDNEY FAILURE, UNSPECIFIED: ICD-10-CM

## 2024-09-11 DIAGNOSIS — Z88.0 ALLERGY STATUS TO PENICILLIN: ICD-10-CM

## 2024-09-11 DIAGNOSIS — Z88.1 ALLERGY STATUS TO OTHER ANTIBIOTIC AGENTS: ICD-10-CM

## 2024-09-11 DIAGNOSIS — R65.20 SEVERE SEPSIS WITHOUT SEPTIC SHOCK: ICD-10-CM

## 2024-09-11 DIAGNOSIS — L76.32 POSTPROCEDURAL HEMATOMA OF SKIN AND SUBCUTANEOUS TISSUE FOLLOWING OTHER PROCEDURE: ICD-10-CM

## 2024-09-11 DIAGNOSIS — E55.9 VITAMIN D DEFICIENCY, UNSPECIFIED: ICD-10-CM

## 2024-10-24 ENCOUNTER — APPOINTMENT (OUTPATIENT)
Dept: BURN CARE | Facility: CLINIC | Age: 84
End: 2024-10-24
Payer: MEDICARE

## 2024-10-24 ENCOUNTER — OUTPATIENT (OUTPATIENT)
Dept: OUTPATIENT SERVICES | Facility: HOSPITAL | Age: 84
LOS: 1 days | End: 2024-10-24
Payer: MEDICARE

## 2024-10-24 VITALS — SYSTOLIC BLOOD PRESSURE: 92 MMHG | OXYGEN SATURATION: 97 % | HEART RATE: 103 BPM | DIASTOLIC BLOOD PRESSURE: 66 MMHG

## 2024-10-24 DIAGNOSIS — Z00.8 ENCOUNTER FOR OTHER GENERAL EXAMINATION: ICD-10-CM

## 2024-10-24 DIAGNOSIS — S41.102A UNSPECIFIED OPEN WOUND OF LEFT UPPER ARM, INITIAL ENCOUNTER: ICD-10-CM

## 2024-10-24 DIAGNOSIS — Z90.49 ACQUIRED ABSENCE OF OTHER SPECIFIED PARTS OF DIGESTIVE TRACT: Chronic | ICD-10-CM

## 2024-10-24 PROCEDURE — 11046 DBRDMT MUSC&/FSCA EA ADDL: CPT

## 2024-10-24 PROCEDURE — 11043 DBRDMT MUSC&/FSCA 1ST 20/<: CPT

## 2024-10-28 DIAGNOSIS — S51.802A UNSPECIFIED OPEN WOUND OF LEFT FOREARM, INITIAL ENCOUNTER: ICD-10-CM
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
## 2024-10-28 DIAGNOSIS — I96 GANGRENE, NOT ELSEWHERE CLASSIFIED: ICD-10-CM

## 2024-10-30 LAB — CULTURE, WOUND AEROBE ANAEROBE: ABNORMAL

## 2024-11-14 ENCOUNTER — APPOINTMENT (OUTPATIENT)
Dept: BURN CARE | Facility: CLINIC | Age: 84
End: 2024-11-14

## 2024-12-08 ENCOUNTER — INPATIENT (INPATIENT)
Facility: HOSPITAL | Age: 84
LOS: 8 days | Discharge: HOME HEALTH | DRG: 189 | End: 2024-12-17
Attending: INTERNAL MEDICINE | Admitting: STUDENT IN AN ORGANIZED HEALTH CARE EDUCATION/TRAINING PROGRAM
Payer: MEDICARE

## 2024-12-08 VITALS
RESPIRATION RATE: 18 BRPM | OXYGEN SATURATION: 98 % | HEIGHT: 64 IN | WEIGHT: 194.67 LBS | HEART RATE: 111 BPM | TEMPERATURE: 100 F | SYSTOLIC BLOOD PRESSURE: 117 MMHG | DIASTOLIC BLOOD PRESSURE: 57 MMHG

## 2024-12-08 DIAGNOSIS — J96.01 ACUTE RESPIRATORY FAILURE WITH HYPOXIA: ICD-10-CM

## 2024-12-08 DIAGNOSIS — Z90.49 ACQUIRED ABSENCE OF OTHER SPECIFIED PARTS OF DIGESTIVE TRACT: Chronic | ICD-10-CM

## 2024-12-08 DIAGNOSIS — Z90.11 ACQUIRED ABSENCE OF RIGHT BREAST AND NIPPLE: Chronic | ICD-10-CM

## 2024-12-08 LAB
ALBUMIN SERPL ELPH-MCNC: 3.3 G/DL — LOW (ref 3.5–5.2)
ALP SERPL-CCNC: 66 U/L — SIGNIFICANT CHANGE UP (ref 30–115)
ALT FLD-CCNC: 6 U/L — SIGNIFICANT CHANGE UP (ref 0–41)
ANION GAP SERPL CALC-SCNC: 16 MMOL/L — HIGH (ref 7–14)
APPEARANCE UR: ABNORMAL
APTT BLD: 26.7 SEC — LOW (ref 27–39.2)
AST SERPL-CCNC: 16 U/L — SIGNIFICANT CHANGE UP (ref 0–41)
BACTERIA # UR AUTO: ABNORMAL /HPF
BASE EXCESS BLDV CALC-SCNC: -7.4 MMOL/L — LOW (ref -2–3)
BASOPHILS # BLD AUTO: 0.05 K/UL — SIGNIFICANT CHANGE UP (ref 0–0.2)
BASOPHILS NFR BLD AUTO: 0.3 % — SIGNIFICANT CHANGE UP (ref 0–1)
BILIRUB SERPL-MCNC: 0.4 MG/DL — SIGNIFICANT CHANGE UP (ref 0.2–1.2)
BILIRUB UR-MCNC: NEGATIVE — SIGNIFICANT CHANGE UP
BUN SERPL-MCNC: 29 MG/DL — HIGH (ref 10–20)
CA-I SERPL-SCNC: 1.16 MMOL/L — SIGNIFICANT CHANGE UP (ref 1.15–1.33)
CALCIUM SERPL-MCNC: 8.7 MG/DL — SIGNIFICANT CHANGE UP (ref 8.4–10.5)
CAST: 2 /LPF — SIGNIFICANT CHANGE UP (ref 0–4)
CHLORIDE SERPL-SCNC: 104 MMOL/L — SIGNIFICANT CHANGE UP (ref 98–110)
CO2 SERPL-SCNC: 23 MMOL/L — SIGNIFICANT CHANGE UP (ref 17–32)
COLOR SPEC: YELLOW — SIGNIFICANT CHANGE UP
CREAT SERPL-MCNC: 1 MG/DL — SIGNIFICANT CHANGE UP (ref 0.7–1.5)
DIFF PNL FLD: ABNORMAL
EGFR: 56 ML/MIN/1.73M2 — LOW
EOSINOPHIL # BLD AUTO: 0.17 K/UL — SIGNIFICANT CHANGE UP (ref 0–0.7)
EOSINOPHIL NFR BLD AUTO: 1.2 % — SIGNIFICANT CHANGE UP (ref 0–8)
GAS PNL BLDV: 134 MMOL/L — LOW (ref 136–145)
GAS PNL BLDV: SIGNIFICANT CHANGE UP
GLUCOSE BLDC GLUCOMTR-MCNC: 101 MG/DL — HIGH (ref 70–99)
GLUCOSE BLDC GLUCOMTR-MCNC: 199 MG/DL — HIGH (ref 70–99)
GLUCOSE SERPL-MCNC: 96 MG/DL — SIGNIFICANT CHANGE UP (ref 70–99)
GLUCOSE UR QL: NEGATIVE MG/DL — SIGNIFICANT CHANGE UP
HCO3 BLDV-SCNC: 21 MMOL/L — LOW (ref 22–29)
HCT VFR BLD CALC: 37.3 % — SIGNIFICANT CHANGE UP (ref 37–47)
HCT VFR BLDA CALC: 40 % — SIGNIFICANT CHANGE UP (ref 34.5–46.5)
HGB BLD CALC-MCNC: 13.3 G/DL — SIGNIFICANT CHANGE UP (ref 11.7–16.1)
HGB BLD-MCNC: 11.6 G/DL — LOW (ref 12–16)
IMM GRANULOCYTES NFR BLD AUTO: 1.4 % — HIGH (ref 0.1–0.3)
INR BLD: 0.93 RATIO — SIGNIFICANT CHANGE UP (ref 0.65–1.3)
KETONES UR-MCNC: ABNORMAL MG/DL
LACTATE BLDV-MCNC: 5 MMOL/L — CRITICAL HIGH (ref 0.5–2)
LACTATE SERPL-SCNC: 2.5 MMOL/L — HIGH (ref 0.7–2)
LEUKOCYTE ESTERASE UR-ACNC: ABNORMAL
LYMPHOCYTES # BLD AUTO: 1.98 K/UL — SIGNIFICANT CHANGE UP (ref 1.2–3.4)
LYMPHOCYTES # BLD AUTO: 13.5 % — LOW (ref 20.5–51.1)
MCHC RBC-ENTMCNC: 31.1 G/DL — LOW (ref 32–37)
MCHC RBC-ENTMCNC: 32.2 PG — HIGH (ref 27–31)
MCV RBC AUTO: 103.6 FL — HIGH (ref 81–99)
MONOCYTES # BLD AUTO: 0.79 K/UL — HIGH (ref 0.1–0.6)
MONOCYTES NFR BLD AUTO: 5.4 % — SIGNIFICANT CHANGE UP (ref 1.7–9.3)
NEUTROPHILS # BLD AUTO: 11.49 K/UL — HIGH (ref 1.4–6.5)
NEUTROPHILS NFR BLD AUTO: 78.2 % — HIGH (ref 42.2–75.2)
NITRITE UR-MCNC: POSITIVE
NRBC # BLD: 0 /100 WBCS — SIGNIFICANT CHANGE UP (ref 0–0)
NT-PROBNP SERPL-SCNC: 754 PG/ML — HIGH (ref 0–300)
PCO2 BLDV: 52 MMHG — HIGH (ref 39–42)
PH BLDV: 7.21 — LOW (ref 7.32–7.43)
PH UR: 5.5 — SIGNIFICANT CHANGE UP (ref 5–8)
PLATELET # BLD AUTO: 335 K/UL — SIGNIFICANT CHANGE UP (ref 130–400)
PMV BLD: 9.3 FL — SIGNIFICANT CHANGE UP (ref 7.4–10.4)
PO2 BLDV: 51 MMHG — HIGH (ref 25–45)
POTASSIUM BLDV-SCNC: 4.4 MMOL/L — SIGNIFICANT CHANGE UP (ref 3.5–5.1)
POTASSIUM SERPL-MCNC: 4 MMOL/L — SIGNIFICANT CHANGE UP (ref 3.5–5)
POTASSIUM SERPL-SCNC: 4 MMOL/L — SIGNIFICANT CHANGE UP (ref 3.5–5)
PROT SERPL-MCNC: 6 G/DL — SIGNIFICANT CHANGE UP (ref 6–8)
PROT UR-MCNC: SIGNIFICANT CHANGE UP MG/DL
PROTHROM AB SERPL-ACNC: 11 SEC — SIGNIFICANT CHANGE UP (ref 9.95–12.87)
RBC # BLD: 3.6 M/UL — LOW (ref 4.2–5.4)
RBC # FLD: 17.5 % — HIGH (ref 11.5–14.5)
RBC CASTS # UR COMP ASSIST: 49 /HPF — HIGH (ref 0–4)
SAO2 % BLDV: 72.9 % — SIGNIFICANT CHANGE UP (ref 67–88)
SODIUM SERPL-SCNC: 143 MMOL/L — SIGNIFICANT CHANGE UP (ref 135–146)
SP GR SPEC: >1.03 — HIGH (ref 1–1.03)
SQUAMOUS # UR AUTO: 34 /HPF — HIGH (ref 0–5)
TROPONIN T, HIGH SENSITIVITY RESULT: 293 NG/L — CRITICAL HIGH (ref 6–13)
TROPONIN T, HIGH SENSITIVITY RESULT: 312 NG/L — CRITICAL HIGH (ref 6–13)
UROBILINOGEN FLD QL: 0.2 MG/DL — SIGNIFICANT CHANGE UP (ref 0.2–1)
WBC # BLD: 14.68 K/UL — HIGH (ref 4.8–10.8)
WBC # FLD AUTO: 14.68 K/UL — HIGH (ref 4.8–10.8)
WBC UR QL: 96 /HPF — HIGH (ref 0–5)
YEAST-LIKE CELLS: PRESENT

## 2024-12-08 PROCEDURE — 92610 EVALUATE SWALLOWING FUNCTION: CPT | Mod: GN

## 2024-12-08 PROCEDURE — 0042T: CPT | Mod: MC

## 2024-12-08 PROCEDURE — 97163 PT EVAL HIGH COMPLEX 45 MIN: CPT | Mod: GP

## 2024-12-08 PROCEDURE — 93306 TTE W/DOPPLER COMPLETE: CPT

## 2024-12-08 PROCEDURE — 92526 ORAL FUNCTION THERAPY: CPT | Mod: GN

## 2024-12-08 PROCEDURE — 87040 BLOOD CULTURE FOR BACTERIA: CPT

## 2024-12-08 PROCEDURE — 84132 ASSAY OF SERUM POTASSIUM: CPT

## 2024-12-08 PROCEDURE — 36415 COLL VENOUS BLD VENIPUNCTURE: CPT

## 2024-12-08 PROCEDURE — 85025 COMPLETE CBC W/AUTO DIFF WBC: CPT

## 2024-12-08 PROCEDURE — 86901 BLOOD TYPING SEROLOGIC RH(D): CPT

## 2024-12-08 PROCEDURE — 82962 GLUCOSE BLOOD TEST: CPT

## 2024-12-08 PROCEDURE — 84295 ASSAY OF SERUM SODIUM: CPT

## 2024-12-08 PROCEDURE — 85027 COMPLETE CBC AUTOMATED: CPT

## 2024-12-08 PROCEDURE — 93970 EXTREMITY STUDY: CPT

## 2024-12-08 PROCEDURE — 71045 X-RAY EXAM CHEST 1 VIEW: CPT | Mod: 26

## 2024-12-08 PROCEDURE — 84145 PROCALCITONIN (PCT): CPT

## 2024-12-08 PROCEDURE — 83880 ASSAY OF NATRIURETIC PEPTIDE: CPT

## 2024-12-08 PROCEDURE — 86900 BLOOD TYPING SEROLOGIC ABO: CPT

## 2024-12-08 PROCEDURE — 94760 N-INVAS EAR/PLS OXIMETRY 1: CPT

## 2024-12-08 PROCEDURE — 80053 COMPREHEN METABOLIC PANEL: CPT

## 2024-12-08 PROCEDURE — 83605 ASSAY OF LACTIC ACID: CPT

## 2024-12-08 PROCEDURE — 86850 RBC ANTIBODY SCREEN: CPT

## 2024-12-08 PROCEDURE — 85730 THROMBOPLASTIN TIME PARTIAL: CPT

## 2024-12-08 PROCEDURE — 70496 CT ANGIOGRAPHY HEAD: CPT | Mod: 26,MC

## 2024-12-08 PROCEDURE — 85018 HEMOGLOBIN: CPT

## 2024-12-08 PROCEDURE — 70498 CT ANGIOGRAPHY NECK: CPT | Mod: 26,MC

## 2024-12-08 PROCEDURE — 70450 CT HEAD/BRAIN W/O DYE: CPT | Mod: 26,MC,XU

## 2024-12-08 PROCEDURE — 87641 MR-STAPH DNA AMP PROBE: CPT

## 2024-12-08 PROCEDURE — 70450 CT HEAD/BRAIN W/O DYE: CPT | Mod: MC

## 2024-12-08 PROCEDURE — 85014 HEMATOCRIT: CPT

## 2024-12-08 PROCEDURE — 71275 CT ANGIOGRAPHY CHEST: CPT | Mod: 26

## 2024-12-08 PROCEDURE — 99291 CRITICAL CARE FIRST HOUR: CPT

## 2024-12-08 PROCEDURE — 82330 ASSAY OF CALCIUM: CPT

## 2024-12-08 PROCEDURE — 71045 X-RAY EXAM CHEST 1 VIEW: CPT

## 2024-12-08 PROCEDURE — 93010 ELECTROCARDIOGRAM REPORT: CPT

## 2024-12-08 PROCEDURE — 80202 ASSAY OF VANCOMYCIN: CPT

## 2024-12-08 PROCEDURE — 94660 CPAP INITIATION&MGMT: CPT

## 2024-12-08 PROCEDURE — 84100 ASSAY OF PHOSPHORUS: CPT

## 2024-12-08 PROCEDURE — 80048 BASIC METABOLIC PNL TOTAL CA: CPT

## 2024-12-08 PROCEDURE — 74176 CT ABD & PELVIS W/O CONTRAST: CPT | Mod: MC

## 2024-12-08 PROCEDURE — 83735 ASSAY OF MAGNESIUM: CPT

## 2024-12-08 PROCEDURE — 71275 CT ANGIOGRAPHY CHEST: CPT | Mod: MC

## 2024-12-08 PROCEDURE — 87640 STAPH A DNA AMP PROBE: CPT

## 2024-12-08 PROCEDURE — 82803 BLOOD GASES ANY COMBINATION: CPT

## 2024-12-08 RX ORDER — AZTREONAM 2 G
2000 VIAL (EA) INJECTION EVERY 8 HOURS
Refills: 0 | Status: DISCONTINUED | OUTPATIENT
Start: 2024-12-08 | End: 2024-12-09

## 2024-12-08 RX ORDER — EPINEPHRINE 1 MG/ML(1)
0.1 AMPUL (ML) INJECTION
Qty: 4 | Refills: 0 | Status: DISCONTINUED | OUTPATIENT
Start: 2024-12-08 | End: 2024-12-09

## 2024-12-08 RX ORDER — NOREPINEPHRINE BITARTRATE 1 MG/ML
0.05 INJECTION, SOLUTION, CONCENTRATE INTRAVENOUS
Qty: 8 | Refills: 0 | Status: DISCONTINUED | OUTPATIENT
Start: 2024-12-08 | End: 2024-12-11

## 2024-12-08 RX ORDER — 0.9 % SODIUM CHLORIDE 0.9 %
1000 INTRAVENOUS SOLUTION INTRAVENOUS
Refills: 0 | Status: DISCONTINUED | OUTPATIENT
Start: 2024-12-08 | End: 2024-12-11

## 2024-12-08 RX ORDER — AZTREONAM 2 G
2000 VIAL (EA) INJECTION ONCE
Refills: 0 | Status: COMPLETED | OUTPATIENT
Start: 2024-12-08 | End: 2024-12-08

## 2024-12-08 RX ORDER — HEPARIN SODIUM,PORCINE 1000/ML
VIAL (ML) INJECTION
Qty: 25000 | Refills: 0 | Status: DISCONTINUED | OUTPATIENT
Start: 2024-12-08 | End: 2024-12-09

## 2024-12-08 RX ORDER — FERROUS SULFATE 325(65) MG
5 TABLET ORAL
Refills: 0 | DISCHARGE

## 2024-12-08 RX ORDER — CEFTRIAXONE SODIUM 1 G
1000 VIAL (EA) INJECTION ONCE
Refills: 0 | Status: COMPLETED | OUTPATIENT
Start: 2024-12-08 | End: 2024-12-08

## 2024-12-08 RX ORDER — SENNOSIDES 8.6 MG
2 TABLET ORAL AT BEDTIME
Refills: 0 | Status: DISCONTINUED | OUTPATIENT
Start: 2024-12-08 | End: 2024-12-17

## 2024-12-08 RX ORDER — METHYLPREDNISOLONE SOD SUCC 125 MG
125 VIAL (EA) INJECTION ONCE
Refills: 0 | Status: COMPLETED | OUTPATIENT
Start: 2024-12-08 | End: 2024-12-08

## 2024-12-08 RX ORDER — LEVOTHYROXINE SODIUM 150 MCG
50 TABLET ORAL DAILY
Refills: 0 | Status: DISCONTINUED | OUTPATIENT
Start: 2024-12-08 | End: 2024-12-17

## 2024-12-08 RX ORDER — CHLORHEXIDINE GLUCONATE 1.2 MG/ML
1 RINSE ORAL EVERY 12 HOURS
Refills: 0 | Status: COMPLETED | OUTPATIENT
Start: 2024-12-08 | End: 2024-12-09

## 2024-12-08 RX ORDER — CARBIDOPA AND LEVODOPA 10; 100 MG/1; MG/1
1 TABLET ORAL THREE TIMES A DAY
Refills: 0 | Status: DISCONTINUED | OUTPATIENT
Start: 2024-12-08 | End: 2024-12-17

## 2024-12-08 RX ORDER — CHOLECALCIFEROL (VITAMIN D3) 10MCG/0.25
1000 DROPS ORAL DAILY
Refills: 0 | Status: DISCONTINUED | OUTPATIENT
Start: 2024-12-08 | End: 2024-12-17

## 2024-12-08 RX ORDER — LATANOPROST 50 UG/ML
1 SOLUTION/ DROPS OPHTHALMIC AT BEDTIME
Refills: 0 | Status: DISCONTINUED | OUTPATIENT
Start: 2024-12-08 | End: 2024-12-17

## 2024-12-08 RX ORDER — EPINEPHRINE 1 MG/ML(1)
0.3 AMPUL (ML) INJECTION ONCE
Refills: 0 | Status: COMPLETED | OUTPATIENT
Start: 2024-12-08 | End: 2024-12-08

## 2024-12-08 RX ORDER — POLYETHYLENE GLYCOL 3350 17 G/17G
17 POWDER, FOR SOLUTION ORAL DAILY
Refills: 0 | Status: DISCONTINUED | OUTPATIENT
Start: 2024-12-08 | End: 2024-12-17

## 2024-12-08 RX ORDER — AZTREONAM 2 G
VIAL (EA) INJECTION
Refills: 0 | Status: DISCONTINUED | OUTPATIENT
Start: 2024-12-08 | End: 2024-12-09

## 2024-12-08 RX ORDER — TIMOLOL 0.5 %
1 DROPS OPHTHALMIC (EYE)
Refills: 0 | Status: DISCONTINUED | OUTPATIENT
Start: 2024-12-08 | End: 2024-12-17

## 2024-12-08 RX ORDER — HEPARIN SODIUM,PORCINE 1000/ML
7000 VIAL (ML) INJECTION EVERY 6 HOURS
Refills: 0 | Status: DISCONTINUED | OUTPATIENT
Start: 2024-12-08 | End: 2024-12-09

## 2024-12-08 RX ORDER — HEPARIN SODIUM,PORCINE 1000/ML
3500 VIAL (ML) INJECTION EVERY 6 HOURS
Refills: 0 | Status: DISCONTINUED | OUTPATIENT
Start: 2024-12-08 | End: 2024-12-09

## 2024-12-08 RX ORDER — HEPARIN SODIUM,PORCINE 1000/ML
7000 VIAL (ML) INJECTION ONCE
Refills: 0 | Status: COMPLETED | OUTPATIENT
Start: 2024-12-08 | End: 2024-12-08

## 2024-12-08 RX ORDER — POVIDONE, POLYVINYL ALCOHOL 20; 27 G/1000ML; G/1000ML
1 SOLUTION OPHTHALMIC
Refills: 0 | Status: DISCONTINUED | OUTPATIENT
Start: 2024-12-08 | End: 2024-12-17

## 2024-12-08 RX ORDER — VANCOMYCIN HCL 900 MCG/MG
1500 POWDER (GRAM) MISCELLANEOUS ONCE
Refills: 0 | Status: COMPLETED | OUTPATIENT
Start: 2024-12-08 | End: 2024-12-08

## 2024-12-08 RX ADMIN — Medication 7000 UNIT(S): at 19:56

## 2024-12-08 RX ADMIN — Medication 1600 UNIT(S)/HR: at 19:55

## 2024-12-08 RX ADMIN — Medication 0.3 MILLIGRAM(S): at 13:54

## 2024-12-08 RX ADMIN — POVIDONE, POLYVINYL ALCOHOL 1 DROP(S): 20; 27 SOLUTION OPHTHALMIC at 22:20

## 2024-12-08 RX ADMIN — Medication 1 DROP(S): at 19:04

## 2024-12-08 RX ADMIN — Medication 100 MILLILITER(S): at 19:57

## 2024-12-08 RX ADMIN — Medication 100 MILLIGRAM(S): at 19:57

## 2024-12-08 RX ADMIN — Medication 0.3 MILLIGRAM(S): at 14:04

## 2024-12-08 RX ADMIN — Medication 33.1 MICROGRAM(S)/KG/MIN: at 14:07

## 2024-12-08 RX ADMIN — Medication 100 MILLIGRAM(S): at 13:23

## 2024-12-08 RX ADMIN — Medication 125 MILLIGRAM(S): at 14:05

## 2024-12-08 RX ADMIN — Medication 300 MILLIGRAM(S): at 19:04

## 2024-12-08 RX ADMIN — CHLORHEXIDINE GLUCONATE 1 APPLICATION(S): 1.2 RINSE ORAL at 18:00

## 2024-12-08 NOTE — H&P ADULT - HISTORY OF PRESENT ILLNESS
Case of 84-year-old female with PMHx of Paroxysmal A-fib (was diagnosed in last admission and not on AC due to anemia due to L chest wall hematoma), hx of Staphylococcus hominis bacteremia, Hx of Cardiopulmonary Arrest Secondary to Aspiration, history of 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, hypothyroidism , GERD, and Glaucoma presenting for facial droop.    patient resides at [facility] where  reports that he was visiting her this morning when he noted left-sided facial droop which is since resolved.  Last known well prior was last night when nursing home was giving her night meds.  At baseline, she is quadriplegic, does not converse and speaks a few words at times.    No other symptoms were reported    In the ED initially, her vitals were stable  Stroke imaging was done and was unremarkable  Seen by neuro   After that patient was given rocephin for presumed UTI, ED suspect that after that, she became hypotensive and tachycardic with increased work of breathing, was given epi and then started on an epi drip    Labs: Leukocytosis, elevated lactate and trop (312 -> 293)    CT brain perfusion: No evidence of acute infarct or ischemia.    CTA neck: No stenosis. No dissection.    CTA brain: No stenosis or aneurysm.    UA positive   Case of 84-year-old female with PMHx of Paroxysmal A-fib (was diagnosed in last admission and not on AC due to anemia due to L chest wall hematoma), hx of Staphylococcus hominis bacteremia, Hx of Cardiopulmonary Arrest Secondary to Aspiration, history of 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, hypothyroidism , GERD, and Glaucoma presenting for facial droop.    Patient resides at [facility] where  reports that he was visiting her this morning when he noted left-sided facial droop which is since resolved.  Last known well prior was last night when nursing home was giving her night meds.  At baseline, she is quadriplegic, does not converse and speaks a few words at times.    No other symptoms were reported    In the ED initially, her vitals were stable  Stroke imaging was done and was unremarkable  Seen by neuro   After that patient was given rocephin for presumed UTI, ED suspect that after that, she became hypotensive and tachycardic with increased work of breathing, was given epi and then started on an epi drip + solumedrol for a working Dx of septic and anaphylactic shock    Labs: Leukocytosis, elevated lactate and trop (312 -> 293)    CT brain perfusion: No evidence of acute infarct or ischemia.    CTA neck: No stenosis. No dissection.    CTA brain: No stenosis or aneurysm.    UA positive   Case of 84-year-old female with PMHx of Paroxysmal A-fib (was diagnosed in last admission and not on AC due to anemia due to L chest wall hematoma), hx of Staphylococcus hominis bacteremia, Hx of Cardiopulmonary Arrest Secondary to Aspiration, history of 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, hypothyroidism , GERD, and Glaucoma presenting for facial droop.    Patient resides at [facility] where  reports that he was visiting her this morning when he noted left-sided facial droop which is since resolved.  Last known well prior was last night when nursing home was giving her night meds.  At baseline, she is quadriplegic, does not converse and speaks a few words at times.    No other symptoms were reported    In the ED initially, her vitals were stable  Stroke imaging was done and was unremarkable  Seen by neuro   After that patient was given rocephin for presumed UTI, ED suspect that after that, she became hypotensive and tachycardic with increased work of breathing, was given epi and then started on an epi drip + solumedrol for a working Dx of septic and anaphylactic shock    Labs: Leukocytosis, elevated lactate and trop (312 -> 293)    CT brain perfusion: No evidence of acute infarct or ischemia.    CTA neck: No stenosis. No dissection.    CTA brain: No stenosis or aneurysm.    CT angio chest showed right pulmonary embolus with right heart strain.    UA positive   Case of 84-year-old female with PMHx of Paroxysmal A-fib (was diagnosed in last admission and not on AC due to anemia due to L chest wall hematoma), Hx of ICH (in 2023), hx of Staphylococcus hominis bacteremia, Hx of Cardiopulmonary Arrest Secondary to Aspiration, history of 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, hypothyroidism , GERD, and Glaucoma presenting for facial droop.    Patient resides at [facility] where  reports that he was visiting her this morning when he noted left-sided facial droop which is since resolved.  Last known well prior was last night when nursing home was giving her night meds.  At baseline, she is quadriplegic, does not converse and speaks a few words at times.    No other symptoms were reported    In the ED initially, her vitals were stable  Stroke imaging was done and was unremarkable  Seen by neuro   After that patient was given rocephin for presumed UTI, ED suspect that after that, she became hypotensive and tachycardic with increased work of breathing, was given epi and then started on an epi drip + solumedrol for a working Dx of septic and anaphylactic shock    Labs: Leukocytosis, elevated lactate and trop (312 -> 293)    CT brain perfusion: No evidence of acute infarct or ischemia.    CTA neck: No stenosis. No dissection.    CTA brain: No stenosis or aneurysm.    CT angio chest showed right pulmonary embolus with right heart strain.    UA positive   84-year-old female with PMHx of Paroxysmal A-fib (was diagnosed in last admission and not on AC due to anemia due to L chest wall hematoma), Hx of ICH (in 2023), hx of Staphylococcus hominis bacteremia, Hx of Cardiopulmonary Arrest Secondary to Aspiration, history of 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, hypothyroidism , GERD, and Glaucoma presenting for facial droop.    Patient resides at [facility] where  reports that he was visiting her this morning when he noted left-sided facial droop which is since resolved.  Last known well prior was last night when nursing home was giving her night meds.  At baseline, she is quadriplegic, does not converse and speaks a few words at times.    No other symptoms were reported    In the ED initially, her vitals were stable  Stroke imaging was done and was unremarkable  Seen by neuro   After that patient was given rocephin for presumed UTI, ED suspect that after that, she became hypotensive and tachycardic with increased work of breathing, was given epi and then started on an epi drip + solumedrol for a working Dx of septic and anaphylactic shock    Labs: Leukocytosis, elevated lactate and trop (312 -> 293)    CT brain perfusion: No evidence of acute infarct or ischemia.    CTA neck: No stenosis. No dissection.    CTA brain: No stenosis or aneurysm.    CT angio chest showed right pulmonary embolus with right heart strain.    UA positive

## 2024-12-08 NOTE — H&P ADULT - ATTENDING COMMENTS
Events noted, presented for stroke like symptoms, stroke code called, sp rocep for UTI, ?? anaphylactic reaction, CT chest PE, on IV hepa, on NIV 12/6 60%,  CC/H, off pressors, OFF EPI/ off levophed  intermediate High risk PE  ? anaphyactic reaction  UTI on abx  no steroids  Neuro noted  Pul hhfnc as tolerated full AC  speech and swallow eval  echo  LE  doppler  abx   palliative care eval

## 2024-12-08 NOTE — H&P ADULT - NSHPPHYSICALEXAM_GEN_ALL_CORE
PHYSICAL EXAM:  CONSTITUTIONAL: moderate distress,  AAOx0  PULMONARY: decreased air entry bilaterally, no audible wheezes  CARDIOVASCULAR: Regular rate and rhythm; tachy  GASTROINTESTINAL: Soft, non-tender, distended;   MUSCULOSKELETAL: no edema

## 2024-12-08 NOTE — ED CDU PROVIDER DISPOSITION NOTE - CLINICAL COURSE
84-year-old female history of Lewy body dementia previous stroke minimal movement of her bilateral upper and lower extremities nonverbal presents for evaluation of left-sided facial asymmetry.  Patient provides the history has had a reported prior to arrival states that she is mostly at her baseline now/mental status.  Awake able to say name with dysarthria Following simple commands such as closing eyesNo gaze, reacts to visual threat throughoutNo facial asymmetry Minimal movement to bilateral UE to peripheral noxious stimuli (husbands reports at baseline)Withdraws bilateral LE to peripheral noxious stimuli Mental status limits sensation/cerebellar and neglect exam here CT imaging unremarkable placed in observation unit for further ration.  Of note patient's troponin is 312 EKG sinus tachycardia repeat troponin coming down placed in obs. While in the observation unit plan was for repeat CT as patient could obtain MRI as she would not stay still for and repeat troponin.  UA was positive and patient received ceftriaxone.  Patient has a allergy to penicillin but previously upon chart.  Had contained ceftriaxone as well as cefepime.  Shortly after completing course of ceftriaxone patient was found to be tachycardic with agonal breathing.  There is no rash present and the patient was given epi injection and BiPAP.  Patient treated for presumed anaphylaxis and patient was DNR/DNI.  Patient started on Levophed and epinephrine for presumed possible anaphylactic shock and was given initial dose of IM epinephrine.  Patient did eventually have improved resp status, and bp normalized on pressors. unclear etiology of anaphylaxis as no rash, wheezing but pt did decompensate after ceftriaxone. Pt was admitted to step down unit, trop was pending and CT angio was added on. 84-year-old female history of Lewy body dementia previous stroke minimal movement of her bilateral upper and lower extremities nonverbal presents for evaluation of left-sided facial asymmetry.   provides the history he reports prior to arrival states that she is mostly at her baseline now/mental status.  On exam pt is awake able to say name with dysarthria Following simple commands such as closing eyesNo gaze, reacts to visual threat throughoutNo facial asymmetry Minimal movement to bilateral UE to peripheral noxious stimuli (husbands reports at baseline)Withdraws bilateral LE to peripheral noxious stimuli Mental status limits sensation/cerebellar and neglect exam here CT imaging unremarkable placed in observation unit for further evaluation.  Of note patient's troponin is 312 EKG sinus tachycardia repeat troponin coming down placed in obs. While in the observation unit plan was for repeat CT as patient could not obtain MRI as she would not stay still for it and repeat troponin.  UA was positive and patient received ceftriaxone.  Patient has a allergy to penicillin but previously upon chart reviewed has obtained ceftriaxone as well as cefepime.  Shortly after completing course of ceftriaxone patient was found to be tachycardic with agonal breathing.  There was no rash present and the patient was given Im epi injection and BiPAP.  Patient treated for presumed anaphylaxis and patient was DNR/DNI.  Patient started on Levophed and epinephrine for presumed possible anaphylactic shock and was given an additional dose of IM epinephrine.  Patient did eventually have improved resp status, and bp normalized on pressors. unclear etiology of anaphylaxis as no rash, wheezing but pt did decompensate after ceftriaxone. Pt was admitted to step down unit, trop was pending and CT angio r/o was added on to be followed by inpatient.

## 2024-12-08 NOTE — ED PROVIDER NOTE - PHYSICAL EXAMINATION
CONSTITUTIONAL: NAD  SKIN: Warm dry  HEAD: Head tilted to the left side, normocephalic, no facial droop appreciated.  EYES: NL inspection  ENT: MMM  CARD: RRR  RESP: CTAB  ABD: Soft, non tender, no rebound or guarding   NEURO: at baseline per

## 2024-12-08 NOTE — ED ADULT NURSE NOTE - OBJECTIVE STATEMENT
Patient prenotification for r/o stroke- as per EMS patient has left sided facial droop- LKW 12/7 at 8PM. Patient BIBEMS from  [facility]. As per patient's , noticed L sided facial droop this morning while visiting wife. Hx Lewy body dementia, non-verbal at baseline. Non-ambulatory at baseline. Stroke code activated.

## 2024-12-08 NOTE — ED PROVIDER NOTE - OBJECTIVE STATEMENT
84-year-old woman with a history of Lewy body dementia (AAO x 2 at baseline), Parkinson's, COPD not on home oxygen, recent cardiac arrest when admitted secondary to respiratory distress presented from [facility] as a stroke code.   at bedside reports that he was visiting her this morning when he noted left-sided facial droop which is since resolved.  Last known well prior was last night when nursing home was giving her night meds.

## 2024-12-08 NOTE — PATIENT PROFILE ADULT - FALL HARM RISK - HARM RISK INTERVENTIONS

## 2024-12-08 NOTE — ED ADULT NURSE NOTE - NSFALLHARMRISKINTERV_ED_ALL_ED

## 2024-12-08 NOTE — CONSULT NOTE ADULT - ASSESSMENT
Impression:  84 year old woman with a PMH of Lewy Body Dementia, breast cancer, left thalamic ICH in 2023. At baseline patient says her name on some days, patient with minimal movement to bilateral UE and LE at baseline.  Presents to the ED with left facial asymmetry. Patients last known well unclear,  arrived at the NH and reports seeing a left facial asymmetry. Code stroke en route. m-RS 5. On exam patient only able to say her name and with minimal movement to all four extremities as per her baseline. Patient outside the window for IV thrombolytics, patient with prior ICH as well. No LVO on CTA therefore not a candidate for IA intervention. Etiology of symptoms unknown will have a better understanding post MRI and medical workup.     Suggestion:  MRI brain without sandra  Telemetry monitoring   Q4 neuro checks  UA, CXR  Fusiform dilation of L vertebral artery please keep blood pressure less than 140/80  ED observational unit Impression:  84 year old woman with a PMH of Lewy Body Dementia, breast cancer, left thalamic ICH in 2023. At baseline patient says her name on some days, patient with minimal movement to bilateral UE and LE at baseline.  Presents to the ED with left facial asymmetry. Patients last known well unclear,  arrived at the NH and reports seeing a left facial asymmetry. Code stroke en route. m-RS 5. On exam patient only able to say her name and with minimal movement to all four extremities as per her baseline. Patient outside the window for IV thrombolytics, patient with prior ICH as well. No LVO on CTA therefore not a candidate for IA intervention. Etiology of symptoms unknown will have a better understanding post MRI and medical workup.     Suggestion:  MRI brain without sandra (ER reporting patient cannot get MRI or be sedated, D/W Dr. Dias, can get CTH in 12 hours)  Telemetry monitoring   Q4 neuro checks  UA, CXR  Fusiform dilation of L vertebral artery please keep blood pressure less than 140/80  ED observational unit

## 2024-12-08 NOTE — H&P ADULT - NSHPLABSRESULTS_GEN_ALL_CORE
LABS:                        11.6   14.68 )-----------( 335      ( 08 Dec 2024 11:12 )             37.3     12-08    143  |  104  |  29[H]  ----------------------------<  96  4.0   |  23  |  1.0    Ca    8.7      08 Dec 2024 11:12    TPro  6.0  /  Alb  3.3[L]  /  TBili  0.4  /  DBili  x   /  AST  16  /  ALT  6   /  AlkPhos  66  12-08    PT/INR - ( 08 Dec 2024 11:12 )   PT: 11.00 sec;   INR: 0.93 ratio         PTT - ( 08 Dec 2024 11:12 )  PTT:26.7 sec  Urinalysis Basic - ( 08 Dec 2024 12:36 )    Color: Yellow / Appearance: Cloudy / SG: >1.030 / pH: x  Gluc: x / Ketone: Trace mg/dL  / Bili: Negative / Urobili: 0.2 mg/dL   Blood: x / Protein: Trace mg/dL / Nitrite: Positive   Leuk Esterase: Moderate / RBC: 49 /HPF / WBC 96 /HPF   Sq Epi: x / Non Sq Epi: 34 /HPF / Bacteria: Many /HPF            Urinalysis with Rflx Culture (collected 08 Dec 2024 12:36)          RADIOLOGY:

## 2024-12-08 NOTE — ED CDU PROVIDER INITIAL DAY NOTE - OBJECTIVE STATEMENT
83 y/o F, PMHx Lewy Body Dementia, Parkinson's Disease, Functional Quadriplegia, Breast Cancer - s/p right mastectomy/RT/chemo (in remission), left thalamic ICH in 2023, & Hypothyroidism, presented to the ED from [facility] after  and staff noted a left sided facial droop this AM. At baseline patient's  states that she is mostly non-verbal and will nod 'yes' or 'no' on occasion. She has minimal movement to bilateral UE and LE at baseline. Patients last known well unclear. Stroke code called upon ED arrival ; patient outside the window for IV thrombolytics, patient with prior ICH as well. Neurology recommended repeat CT-Head in 12 hours as she cannot tolerate MRI or receive sedation for.

## 2024-12-08 NOTE — CONSULT NOTE ADULT - NS ATTEND AMEND GEN_ALL_CORE FT
Pt is a 85 yo F with PMhx of LBD (baseline mRS 5, has been residing in NH for 8 years), left thalamic IPH 2/2 hypertensive angiopathy 11/2023, breast cancer, cardiopulmonary arrest during hospitalization in 09/2024, who presented with left facial droop. Admitted to MICU for respiratory failure, acute PE. CT head without acute process. Currently on hep gtt. Pt unable to tolerate MRI, recommend repeat CT head. ST eval. AC as per primary team. No indication for an additional antiplatelet such as aspirin from stroke standpoint while pt is on therapeutic AC.

## 2024-12-08 NOTE — ED ADULT NURSE REASSESSMENT NOTE - NS ED NURSE REASSESS COMMENT FT1
Patient remains on cardiac monitoring .3mg epinephrine IM administered as per orders. Patient started on epinephrine continuous infusion started as per orders. Patient remains on continuous bipap. MD Coleman at patient bedside. Plan of care discussed with family at bedside. Safety & comfort measures maintained. Plan of care on going.
PT finished IV abx. Pt was having Labs drawn when noticing patient was tachypneic and tachycardiac. MD called and came to beside. PT upgraded to Trauma 2 and placed on BIPAP. Meds given. Pt on Cardiac Monitor

## 2024-12-08 NOTE — H&P ADULT - ASSESSMENT
84-year-old female with PMHx of Paroxysmal A-fib (was diagnosed in last admission and not on AC due to anemia due to L chest wall hematoma), hx of Staphylococcus hominis bacteremia, Hx of Cardiopulmonary Arrest Secondary to Aspiration, history of 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, hypothyroidism , GERD, and Glaucoma presenting for facial droop.    IMPRESSION:    #Acute hypoxic/hypercapnic respiratory failure  #Sepsis likely due to UTI with possible superimposed anaphylactic shock  #Leukocytosis  #Lactic acidosis  #Elevated troponin  #Paroxysmal A-fib (was diagnosed in last admission and not on AC due to anemia due to L chest wall hematoma)  #Hx of Staphylococcus hominis bacteremia  #Hx of Cardiopulmonary Arrest Secondary to Aspiration  #History of 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  #Hypothyroidism   #GERD  #Glaucoma      PLAN:    CNS: Avoid CNS Depressants     HEENT: Oral care. Aspiration precautions     PULMONARY: HOB @ 45. Monitor Pulse Ox. Keep > 93%. Supplement as needed.    CARDIOVASCULAR:   - Daily Weight. Strict I&Os. Keep I < O    GI: GI prophylaxis. NPO for now    RENAL: Avoid nephrotoxic agents     INFECTIOUS DISEASE: Cover with     HEMATOLOGICAL: DVT prophylaxis (Lovenox/heparin).     ENDOCRINE: Monitor FS    MUSCULOSKELETAL: IAT    CODE STATUS: DNR/DNI    DISPOSITION: MICU                   84-year-old female with PMHx of Paroxysmal A-fib (was diagnosed in last admission and not on AC due to anemia due to L chest wall hematoma), hx of Staphylococcus hominis bacteremia, Hx of Cardiopulmonary Arrest Secondary to Aspiration, history of 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, hypothyroidism , GERD, and Glaucoma presenting for facial droop.    IMPRESSION:    #Acute hypoxic/hypercapnic respiratory failure- R PE with right heart strain  #Sepsis likely due to UTI with possible superimposed anaphylactic shock  #Leukocytosis  #Lactic acidosis  #Elevated troponin  #Paroxysmal A-fib (was diagnosed in last admission and not on AC due to anemia due to L chest wall hematoma)  #Hx of Staphylococcus hominis bacteremia  #Hx of Cardiopulmonary Arrest Secondary to Aspiration  #History of 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  #Hypothyroidism   #GERD  #Glaucoma      PLAN:    CNS: Avoid CNS Depressants. AAO times 0. Continue Parkinson meds    HEENT: Oral care. Aspiration precautions. NPO for now except meds. Speech and swallow eval. Meds through OG tube    PULMONARY: HOB @ 45. Monitor Pulse Ox. Keep > 93%. Supplement as needed. Continue with BiPaP. Duoneb PRN. Will start heparin drip (Risk/benefits discussed with  given previous episode of ICB,  in agreement with AC). F/I IR for thrombectomy    CARDIOVASCULAR:   Keep MAP >65. Started on LR 100cc/hr, wean off epinephrine. f/u TTE..  F/U repeat lactate    GI: GI prophylaxis. NPO for now.     RENAL: Replete electrolytes as needed.     INFECTIOUS DISEASE: Will start with Aztreonam and Vancomycin one dose. ID consult in. f/u MRSA nares, procalcitonin urine and blood cultures.     HEMATOLOGICAL: Will start Heparin drip for AC. Dupplex LE .    ENDOCRINE: Monitor FS. Continue home Levothyroxine        CODE STATUS: DNR/DNI    DISPOSITION: MICU                   84-year-old female with PMHx of Paroxysmal A-fib (was diagnosed in last admission and not on AC due to anemia due to L chest wall hematoma), hx of Staphylococcus hominis bacteremia, Hx of Cardiopulmonary Arrest Secondary to Aspiration, history of 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, hypothyroidism , GERD, and Glaucoma presenting for facial droop.    IMPRESSION:    #Acute hypoxic/hypercapnic respiratory failure- R PE with right heart strain  #Sepsis likely due to UTI with possible superimposed anaphylactic shock  #Leukocytosis  #Lactic acidosis  #Elevated troponin  #Paroxysmal A-fib (was diagnosed in last admission and not on AC due to anemia due to L chest wall hematoma)  #Hx of Staphylococcus hominis bacteremia  #Hx of Cardiopulmonary Arrest Secondary to Aspiration  #History of 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  #TIA?   #Hypothyroidism   #GERD  #Glaucoma      PLAN:    CNS: Avoid CNS Depressants. AAO times 0. Q4 neuro checks. MRI brain without sandra (ER reporting patient cannot get MRI or be sedated, D/W Dr. Dias, can get CTH in 12 hours). Continue Parkinson meds    HEENT: Oral care. Aspiration precautions. NPO for now except meds. Speech and swallow eval. Meds through OG tube    PULMONARY: HOB @ 45. Monitor Pulse Ox. Keep > 93%. Supplement as needed. Continue with BiPaP. Duoneb PRN. Will start heparin drip (Risk/benefits discussed with  given previous episode of ICB,  in agreement with AC). F/I IR for thrombectomy    CARDIOVASCULAR:   Keep MAP >65. Started on LR 100cc/hr, wean off epinephrine. f/u TTE.. Cardiac monitoring . F/U repeat lactate    GI: GI prophylaxis. NPO for now.     RENAL: Replete electrolytes as needed.     INFECTIOUS DISEASE: Will start with Aztreonam and Vancomycin one dose. ID consult in. f/u MRSA nares, procalcitonin urine and blood cultures.     HEMATOLOGICAL: Will start Heparin drip for AC. Dupplex LE .    ENDOCRINE: Monitor FS. Continue home Levothyroxine        CODE STATUS: DNR/DNI    DISPOSITION: MICU                   84-year-old female with PMHx of Paroxysmal A-fib (was diagnosed in last admission and not on AC due to anemia due to L chest wall hematoma), hx of Staphylococcus hominis bacteremia, Hx of Cardiopulmonary Arrest Secondary to Aspiration, history of 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, hypothyroidism , GERD, and Glaucoma presenting for facial droop.    IMPRESSION:    #Acute hypoxic/hypercapnic respiratory failure- R PE with right heart strain  #Sepsis likely due to UTI with possible superimposed anaphylactic shock  #Leukocytosis  #Lactic acidosis  #Elevated troponin  #Paroxysmal A-fib (was diagnosed in last admission and not on AC due to anemia due to L chest wall hematoma)  #Hx of Staphylococcus hominis bacteremia  #Hx of Cardiopulmonary Arrest Secondary to Aspiration  #History of 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  #TIA?   #Hypothyroidism   #GERD  #Glaucoma      PLAN:    CNS: Avoid CNS Depressants. AAO times 0. Q4 neuro checks. MRI brain without sandra (ER reporting patient cannot get MRI or be sedated, D/W Dr. Dias, can get CTH in 12 hours). Continue Parkinson meds    HEENT: Oral care. Aspiration precautions. NPO for now except meds. Speech and swallow eval. Meds through OG tube    PULMONARY: HOB @ 45. Monitor Pulse Ox. Keep > 93%. Supplement as needed. Continue with BiPaP. Duoneb PRN. Will start heparin drip (Risk/benefits discussed with  given previous episode of ICB,  in agreement with AC). F/I IR for thrombectomy    CARDIOVASCULAR:   Keep  keep blood pressure less than 140/80 given patient Fusiform dilation of L vertebral artery  . Started on LR 100cc/hr, wean off epinephrine. f/u TTE.. Cardiac monitoring . F/U repeat lactate    GI: GI prophylaxis. NPO for now.     RENAL: Replete electrolytes as needed.     INFECTIOUS DISEASE: Will start with Aztreonam and Vancomycin one dose. ID consult in. f/u MRSA nares, procalcitonin urine and blood cultures.     HEMATOLOGICAL: Will start Heparin drip for AC. Dupplex LE .    ENDOCRINE: Monitor FS. Continue home Levothyroxine        CODE STATUS: DNR/DNI    DISPOSITION: MICU                   84-year-old female with PMHx of Paroxysmal A-fib (was diagnosed in last admission and not on AC due to anemia due to L chest wall hematoma), Hx of ICH (in 2023), hx of Staphylococcus hominis bacteremia, Hx of Cardiopulmonary Arrest Secondary to Aspiration, history of 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, hypothyroidism , GERD, and Glaucoma presenting for facial droop.    IMPRESSION:    #Acute hypoxic/hypercapnic respiratory failure- R PE with right heart strain  #Possible UTI (was admitted from ed with diagnosis of superimposed anaphylactic shock)  #Leukocytosis  #Lactic acidosis  #Elevated troponin  #Paroxysmal A-fib (was diagnosed in last admission and not on AC due to anemia due to L chest wall hematoma)  #Hx of Staphylococcus hominis bacteremia  #Hx of Cardiopulmonary Arrest Secondary to Aspiration  #History of 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  #TIA?  #Hx of intracerebral hemorrhage  #Hypothyroidism   #GERD  #Glaucoma      PLAN:    CNS: Avoid CNS Depressants. AAO times 0. Q4 neuro checks. MRI brain without sandra (ER reporting patient cannot get MRI or be sedated, D/W Dr. Dias, can get CTH in 12 hours). Continue Parkinson meds    HEENT: Oral care. Aspiration precautions. NPO for now except meds. Speech and swallow eval. Meds through OG tube    PULMONARY: HOB @ 45. Monitor Pulse Ox. Keep > 93%. Supplement as needed. Continue with BiPaP. Duoneb PRN. Will start heparin drip (Risk/benefits discussed with  given previous episode of ICB,  in agreement with AC). F/I IR for thrombectomy    CARDIOVASCULAR:   Keep  keep blood pressure less than 140/80 given patient Fusiform dilation of L vertebral artery  . Started on LR 100cc/hr, wean off epinephrine. f/u TTE.. Cardiac monitoring . F/U repeat lactate    GI: GI prophylaxis. NPO for now.     RENAL: Replete electrolytes as needed.     INFECTIOUS DISEASE: Will start with Aztreonam and Vancomycin one dose. ID consult in. f/u MRSA nares, procalcitonin urine and blood cultures.     HEMATOLOGICAL: Will start Heparin drip for AC. Dupplex LE . (Risks and benefits of AC discussed with  in setting of intra-cerebral hemorrhage one year ago)    ENDOCRINE: Monitor FS. Continue home Levothyroxine      CODE STATUS: DNR/DNI    DISPOSITION: MICU

## 2024-12-08 NOTE — ED PROVIDER NOTE - CLINICAL SUMMARY MEDICAL DECISION MAKING FREE TEXT BOX
84-year-old female history of Lewy body dementia previous stroke minimal movement of her bilateral upper and lower extremities nonverbal presents for evaluation of left-sided facial asymmetry.  Patient provides the history has had a reported prior to arrival states that she is mostly at her baseline now/mental status.  Awake able to say name with dysarthria Following simple commands such as closing eyesNo gaze, reacts to visual threat throughoutNo facial asymmetry Minimal movement to bilateral UE to peripheral noxious stimuli (husbands reports at baseline)Withdraws bilateral LE to peripheral noxious stimuli Mental status limits sensation/cerebellar and neglect exam here CT imaging unremarkable placed in observation unit for further ration.  Of note patient's troponin is 2012 EKG sinus tachycardia repeat troponin coming down in ops unit. 84-year-old female history of Lewy body dementia previous stroke minimal movement of her bilateral upper and lower extremities nonverbal presents for evaluation of left-sided facial asymmetry.  Patient provides the history has had a reported prior to arrival states that she is mostly at her baseline now/mental status.  Awake able to say name with dysarthria Following simple commands such as closing eyesNo gaze, reacts to visual threat throughoutNo facial asymmetry Minimal movement to bilateral UE to peripheral noxious stimuli (husbands reports at baseline)Withdraws bilateral LE to peripheral noxious stimuli Mental status limits sensation/cerebellar and neglect exam here CT imaging unremarkable placed in observation unit for further ration.  Of note patient's troponin is 312 EKG sinus tachycardia repeat troponin coming down placed in obs. Felt appropriate for obs as pt with poor overall progrnosis no intervention likely for elevated trop and pt with no reported sx's.

## 2024-12-08 NOTE — CONSULT NOTE ADULT - SUBJECTIVE AND OBJECTIVE BOX
Neurology Consult    Patient is a 84y old  Female who presents with a chief complaint of left facial asymmetry    HPI:  84 year old woman with a PMH of Lewy Body Dementia, breast cancer, left thalamic ICH in 2023. At baseline patient says her name on some days, patient with minimal movement to bilateral UE and LE at baseline.  Presents to the ED with left facial asymmetry. Patients last known well unclear,  arrived at the NH and reports seeing a left facial asymmetry. Code stroke en route. m-RS 5.    PAST MEDICAL & SURGICAL HISTORY:  Dementia      History of breast cancer      Constipation      Lewy body dementia without behavioral disturbance      Colon polyp      History of colon resection      H/O mastectomy, right          FAMILY HISTORY:  No pertinent family history in first degree relatives        Social History: (-) x 3    Allergies    penicillin (Anaphylaxis)  Originally Entered as [Unknown] reaction to [green pepper] (Unknown)  Originally Entered as [Unknown] reaction to [red pepper] (Unknown)  ciprofloxacin (Unknown)  benzodiazepines (Unknown)  Originally Entered as [Unknown] reaction to [neuroleptics] (Unknown)  antichlolinergics (Unknown)  Originally Entered as [Unknown] reaction to [pepper] (Unknown)    Intolerances        MEDICATIONS  (STANDING):    MEDICATIONS  (PRN):    Vital Signs Last 24 Hrs  T(C): 37.6 (08 Dec 2024 10:30), Max: 37.6 (08 Dec 2024 10:25)  T(F): 99.6 (08 Dec 2024 10:30), Max: 99.6 (08 Dec 2024 10:25)  HR: 111 (08 Dec 2024 10:30) (111 - 111)  BP: 117/53 (08 Dec 2024 10:30) (117/53 - 117/57)  BP(mean): 82 (08 Dec 2024 10:30) (82 - 82)  RR: 18 (08 Dec 2024 10:30) (18 - 18)  SpO2: 98% (08 Dec 2024 10:30) (98% - 98%)    Parameters below as of 08 Dec 2024 10:30  Patient On (Oxygen Delivery Method): nasal cannula  O2 Flow (L/min): 3      Examination:  Awake able to say name with dysarthria  Following simple commands such as closing eyes  No gaze, reacts to visual threat throughout  No facial asymmetry  Minimal movement to bilateral UE to peripheral noxious stimuli (husbands reports at baseline)  Withdraws bilateral LE to peripheral noxious stimuli  Mental status limits sensation/cerebellar and neglect exam    NIHSS 22  MRS 5     Labs:   CBC Full  -  ( 08 Dec 2024 11:12 )  WBC Count : 14.68 K/uL  RBC Count : 3.60 M/uL  Hemoglobin : 11.6 g/dL  Hematocrit : 37.3 %  Platelet Count - Automated : 335 K/uL  Mean Cell Volume : 103.6 fL  Mean Cell Hemoglobin : 32.2 pg  Mean Cell Hemoglobin Concentration : 31.1 g/dL  Auto Neutrophil # : 11.49 K/uL  Auto Lymphocyte # : 1.98 K/uL  Auto Monocyte # : 0.79 K/uL  Auto Eosinophil # : 0.17 K/uL  Auto Basophil # : 0.05 K/uL  Auto Neutrophil % : 78.2 %  Auto Lymphocyte % : 13.5 %  Auto Monocyte % : 5.4 %  Auto Eosinophil % : 1.2 %  Auto Basophil % : 0.3 %                    Neuroimaging:  < from: CT Angio Neck Stroke Protocol w/ IV Cont (12.08.24 @ 11:01) >  IMPRESSION:    CT PERFUSION:  No perfusion deficits to suggest areas of completed infarction or at risk   territory.    CTA HEAD/NECK:  No large vessel occlusion, saccular aneurysm, or vascular malformation.    6 mm fusiform dilation of the left vertebral artery.    < end of copied text >

## 2024-12-08 NOTE — H&P ADULT - BIRTH SEX
Female Complex Repair Preamble Text (Leave Blank If You Do Not Want): Extensive wide undermining was performed. no radiation

## 2024-12-08 NOTE — ED PROVIDER NOTE - EKG/XRAY ADDITIONAL INFORMATION
ekg - sinus tach  Independent interpretation of the chest  film performed by: Dr. Ady Coleman: TANISHA

## 2024-12-08 NOTE — ED CDU PROVIDER INITIAL DAY NOTE - PROGRESS NOTE DETAILS
RN alerted attending and myself that patient began to have agonal respirations and patient began to desaturate and became hypotensive. Patient afebrile. Epinephrine IM administered followed by Levophed drip followed by Epinephrine drip, an additional dose of Epinephrine IM and Solu-Medrol 125mg IV. No rash noted ; no wheezing upon auscultation. Patient had received Ceftriaxone which she previously tolerated --most recently during prior admission in September 2024 where she received Ceftriaxone as well as Cefepime x several doses. BiPAP placed and 02 saturation improved. BP improved following these interventions. Patient's  (POA) at bedside -aware of grave prognosis ; confirms DNR/DNI status.

## 2024-12-08 NOTE — ED ADULT TRIAGE NOTE - CHIEF COMPLAINT QUOTE
Patient prenotification for r/o stroke- as per EMS patient has left sided facial droop- LKW 12/7 at 8PM

## 2024-12-09 ENCOUNTER — RESULT REVIEW (OUTPATIENT)
Age: 84
End: 2024-12-09

## 2024-12-09 DIAGNOSIS — J96.01 ACUTE RESPIRATORY FAILURE WITH HYPOXIA: ICD-10-CM

## 2024-12-09 DIAGNOSIS — Z71.89 OTHER SPECIFIED COUNSELING: ICD-10-CM

## 2024-12-09 DIAGNOSIS — R29.810 FACIAL WEAKNESS: ICD-10-CM

## 2024-12-09 DIAGNOSIS — T78.2XXA ANAPHYLACTIC SHOCK, UNSPECIFIED, INITIAL ENCOUNTER: ICD-10-CM

## 2024-12-09 DIAGNOSIS — Z51.5 ENCOUNTER FOR PALLIATIVE CARE: ICD-10-CM

## 2024-12-09 LAB
ALBUMIN SERPL ELPH-MCNC: 3.1 G/DL — LOW (ref 3.5–5.2)
ALP SERPL-CCNC: 57 U/L — SIGNIFICANT CHANGE UP (ref 30–115)
ALT FLD-CCNC: 6 U/L — SIGNIFICANT CHANGE UP (ref 0–41)
ANION GAP SERPL CALC-SCNC: 11 MMOL/L — SIGNIFICANT CHANGE UP (ref 7–14)
APTT BLD: 161.8 SEC — CRITICAL HIGH (ref 27–39.2)
APTT BLD: 189.6 SEC — CRITICAL HIGH (ref 27–39.2)
APTT BLD: 99.6 SEC — CRITICAL HIGH (ref 27–39.2)
AST SERPL-CCNC: 14 U/L — SIGNIFICANT CHANGE UP (ref 0–41)
BASOPHILS # BLD AUTO: 0.05 K/UL — SIGNIFICANT CHANGE UP (ref 0–0.2)
BASOPHILS NFR BLD AUTO: 0.2 % — SIGNIFICANT CHANGE UP (ref 0–1)
BILIRUB SERPL-MCNC: 0.2 MG/DL — SIGNIFICANT CHANGE UP (ref 0.2–1.2)
BUN SERPL-MCNC: 32 MG/DL — HIGH (ref 10–20)
CALCIUM SERPL-MCNC: 8.1 MG/DL — LOW (ref 8.4–10.4)
CHLORIDE SERPL-SCNC: 103 MMOL/L — SIGNIFICANT CHANGE UP (ref 98–110)
CO2 SERPL-SCNC: 23 MMOL/L — SIGNIFICANT CHANGE UP (ref 17–32)
CREAT SERPL-MCNC: 1.1 MG/DL — SIGNIFICANT CHANGE UP (ref 0.7–1.5)
EGFR: 50 ML/MIN/1.73M2 — LOW
EOSINOPHIL # BLD AUTO: 0.01 K/UL — SIGNIFICANT CHANGE UP (ref 0–0.7)
EOSINOPHIL NFR BLD AUTO: 0 % — SIGNIFICANT CHANGE UP (ref 0–8)
GLUCOSE SERPL-MCNC: 152 MG/DL — HIGH (ref 70–99)
HCT VFR BLD CALC: 30 % — LOW (ref 37–47)
HCT VFR BLD CALC: 31.8 % — LOW (ref 37–47)
HGB BLD-MCNC: 10 G/DL — LOW (ref 12–16)
HGB BLD-MCNC: 9.3 G/DL — LOW (ref 12–16)
IMM GRANULOCYTES NFR BLD AUTO: 1.2 % — HIGH (ref 0.1–0.3)
LYMPHOCYTES # BLD AUTO: 1.4 K/UL — SIGNIFICANT CHANGE UP (ref 1.2–3.4)
LYMPHOCYTES # BLD AUTO: 6.4 % — LOW (ref 20.5–51.1)
MAGNESIUM SERPL-MCNC: 2.1 MG/DL — SIGNIFICANT CHANGE UP (ref 1.8–2.4)
MCHC RBC-ENTMCNC: 31 G/DL — LOW (ref 32–37)
MCHC RBC-ENTMCNC: 31.4 G/DL — LOW (ref 32–37)
MCHC RBC-ENTMCNC: 32 PG — HIGH (ref 27–31)
MCHC RBC-ENTMCNC: 32.5 PG — HIGH (ref 27–31)
MCV RBC AUTO: 103.1 FL — HIGH (ref 81–99)
MCV RBC AUTO: 103.2 FL — HIGH (ref 81–99)
MONOCYTES # BLD AUTO: 0.42 K/UL — SIGNIFICANT CHANGE UP (ref 0.1–0.6)
MONOCYTES NFR BLD AUTO: 1.9 % — SIGNIFICANT CHANGE UP (ref 1.7–9.3)
NEUTROPHILS # BLD AUTO: 19.87 K/UL — HIGH (ref 1.4–6.5)
NEUTROPHILS NFR BLD AUTO: 90.3 % — HIGH (ref 42.2–75.2)
NRBC # BLD: 0 /100 WBCS — SIGNIFICANT CHANGE UP (ref 0–0)
NRBC # BLD: 0 /100 WBCS — SIGNIFICANT CHANGE UP (ref 0–0)
PHOSPHATE SERPL-MCNC: 4.3 MG/DL — SIGNIFICANT CHANGE UP (ref 2.1–4.9)
PLATELET # BLD AUTO: 259 K/UL — SIGNIFICANT CHANGE UP (ref 130–400)
PLATELET # BLD AUTO: 272 K/UL — SIGNIFICANT CHANGE UP (ref 130–400)
PMV BLD: 10.1 FL — SIGNIFICANT CHANGE UP (ref 7.4–10.4)
PMV BLD: 9.3 FL — SIGNIFICANT CHANGE UP (ref 7.4–10.4)
POTASSIUM SERPL-MCNC: 4.6 MMOL/L — SIGNIFICANT CHANGE UP (ref 3.5–5)
POTASSIUM SERPL-SCNC: 4.6 MMOL/L — SIGNIFICANT CHANGE UP (ref 3.5–5)
PROT SERPL-MCNC: 5.9 G/DL — LOW (ref 6–8)
RBC # BLD: 2.91 M/UL — LOW (ref 4.2–5.4)
RBC # BLD: 3.08 M/UL — LOW (ref 4.2–5.4)
RBC # FLD: 16.7 % — HIGH (ref 11.5–14.5)
RBC # FLD: 17 % — HIGH (ref 11.5–14.5)
SODIUM SERPL-SCNC: 137 MMOL/L — SIGNIFICANT CHANGE UP (ref 135–146)
WBC # BLD: 20.77 K/UL — HIGH (ref 4.8–10.8)
WBC # BLD: 22.01 K/UL — HIGH (ref 4.8–10.8)
WBC # FLD AUTO: 20.77 K/UL — HIGH (ref 4.8–10.8)
WBC # FLD AUTO: 22.01 K/UL — HIGH (ref 4.8–10.8)

## 2024-12-09 PROCEDURE — 99291 CRITICAL CARE FIRST HOUR: CPT

## 2024-12-09 PROCEDURE — 93970 EXTREMITY STUDY: CPT | Mod: 26

## 2024-12-09 PROCEDURE — 93306 TTE W/DOPPLER COMPLETE: CPT | Mod: 26

## 2024-12-09 PROCEDURE — 99497 ADVNCD CARE PLAN 30 MIN: CPT | Mod: 25

## 2024-12-09 PROCEDURE — 70450 CT HEAD/BRAIN W/O DYE: CPT | Mod: 26

## 2024-12-09 PROCEDURE — 99223 1ST HOSP IP/OBS HIGH 75: CPT

## 2024-12-09 PROCEDURE — 99221 1ST HOSP IP/OBS SF/LOW 40: CPT

## 2024-12-09 RX ORDER — HEPARIN SODIUM,PORCINE 1000/ML
3500 VIAL (ML) INJECTION EVERY 6 HOURS
Refills: 0 | Status: DISCONTINUED | OUTPATIENT
Start: 2024-12-09 | End: 2024-12-10

## 2024-12-09 RX ORDER — HEPARIN SODIUM,PORCINE 1000/ML
7500 VIAL (ML) INJECTION ONCE
Refills: 0 | Status: COMPLETED | OUTPATIENT
Start: 2024-12-09 | End: 2024-12-09

## 2024-12-09 RX ORDER — VANCOMYCIN HCL 900 MCG/MG
1000 POWDER (GRAM) MISCELLANEOUS ONCE
Refills: 0 | Status: COMPLETED | OUTPATIENT
Start: 2024-12-09 | End: 2024-12-09

## 2024-12-09 RX ORDER — HEPARIN SODIUM,PORCINE 1000/ML
7500 VIAL (ML) INJECTION EVERY 6 HOURS
Refills: 0 | Status: DISCONTINUED | OUTPATIENT
Start: 2024-12-09 | End: 2024-12-10

## 2024-12-09 RX ORDER — HEPARIN SODIUM,PORCINE 1000/ML
VIAL (ML) INJECTION
Qty: 25000 | Refills: 0 | Status: DISCONTINUED | OUTPATIENT
Start: 2024-12-09 | End: 2024-12-10

## 2024-12-09 RX ORDER — 0.9 % SODIUM CHLORIDE 0.9 %
500 INTRAVENOUS SOLUTION INTRAVENOUS ONCE
Refills: 0 | Status: DISCONTINUED | OUTPATIENT
Start: 2024-12-09 | End: 2024-12-12

## 2024-12-09 RX ORDER — AZTREONAM 2 G
1000 VIAL (EA) INJECTION EVERY 8 HOURS
Refills: 0 | Status: DISCONTINUED | OUTPATIENT
Start: 2024-12-09 | End: 2024-12-11

## 2024-12-09 RX ADMIN — Medication 2 TABLET(S): at 22:16

## 2024-12-09 RX ADMIN — Medication 100 MILLIGRAM(S): at 05:25

## 2024-12-09 RX ADMIN — POVIDONE, POLYVINYL ALCOHOL 1 DROP(S): 20; 27 SOLUTION OPHTHALMIC at 05:26

## 2024-12-09 RX ADMIN — CHLORHEXIDINE GLUCONATE 1 APPLICATION(S): 1.2 RINSE ORAL at 05:27

## 2024-12-09 RX ADMIN — Medication 1700 UNIT(S)/HR: at 05:00

## 2024-12-09 RX ADMIN — Medication 100 MILLILITER(S): at 18:42

## 2024-12-09 RX ADMIN — Medication 1 DROP(S): at 18:00

## 2024-12-09 RX ADMIN — Medication 250 MILLIGRAM(S): at 21:08

## 2024-12-09 RX ADMIN — Medication 0 UNIT(S)/HR: at 12:30

## 2024-12-09 RX ADMIN — Medication 50 MILLIGRAM(S): at 22:57

## 2024-12-09 RX ADMIN — Medication 1 DROP(S): at 05:26

## 2024-12-09 RX ADMIN — Medication 100 MILLILITER(S): at 20:36

## 2024-12-09 RX ADMIN — LATANOPROST 1 DROP(S): 50 SOLUTION/ DROPS OPHTHALMIC at 22:15

## 2024-12-09 RX ADMIN — Medication 1100 UNIT(S)/HR: at 21:44

## 2024-12-09 RX ADMIN — POVIDONE, POLYVINYL ALCOHOL 1 DROP(S): 20; 27 SOLUTION OPHTHALMIC at 18:00

## 2024-12-09 RX ADMIN — Medication 100 MILLIGRAM(S): at 13:27

## 2024-12-09 RX ADMIN — Medication 1400 UNIT(S)/HR: at 13:27

## 2024-12-09 RX ADMIN — Medication 0 UNIT(S)/HR: at 20:37

## 2024-12-09 RX ADMIN — CARBIDOPA AND LEVODOPA 1 TABLET(S): 10; 100 TABLET ORAL at 22:16

## 2024-12-09 NOTE — PROGRESS NOTE ADULT - SUBJECTIVE AND OBJECTIVE BOX
SUBJECTIVE/OVERNIGHT EVENTS  Today is hospital day 1d. This morning patient was seen and examined at bedside, resting comfortably in bed.    MEDICATIONS  STANDING MEDICATIONS  artificial  tears Solution 1 Drop(s) Both EYES two times a day  aztreonam  IVPB      aztreonam  IVPB 2000 milliGRAM(s) IV Intermittent every 8 hours  carbidopa/levodopa  10/100 1 Tablet(s) Oral three times a day  cholecalciferol 1000 Unit(s) Oral daily  EPINEPHrine    Infusion 0.1 MICROgram(s)/kG/Min IV Continuous <Continuous>  heparin  Infusion.  Unit(s)/Hr IV Continuous <Continuous>  lactated ringers. 1000 milliLiter(s) IV Continuous <Continuous>  latanoprost 0.005% Ophthalmic Solution 1 Drop(s) Both EYES at bedtime  levothyroxine 50 MICROGram(s) Oral daily  norepinephrine Infusion 0.05 MICROgram(s)/kG/Min IV Continuous <Continuous>  polyethylene glycol 3350 17 Gram(s) Oral daily  senna 2 Tablet(s) Oral at bedtime  timolol 0.5% Solution 1 Drop(s) Both EYES two times a day    PRN MEDICATIONS  heparin   Injectable 7500 Unit(s) IV Push every 6 hours PRN  heparin   Injectable 3500 Unit(s) IV Push every 6 hours PRN    VITALS  T(F): 97.4 (12-09-24 @ 08:00), Max: 100.2 (12-08-24 @ 16:00)  HR: 84 (12-09-24 @ 11:00) (77 - 153)  BP: 122/58 (12-09-24 @ 11:00) (83/53 - 138/52)  RR: 18 (12-09-24 @ 11:00) (18 - 26)  SpO2: 100% (12-09-24 @ 11:00) (94% - 100%)  POCT Blood Glucose.: 199 mg/dL (12-08-24 @ 21:28)  POCT Blood Glucose.: 101 mg/dL (12-08-24 @ 13:40)    PHYSICAL EXAM  Conscious however not verbal, not oriented?  GBAE, clear lungs  Regular S1S2, no murmurs heard  Soft abdomen, non distended  No LLE  Quadriplegic      LABS             10.0   22.01 )-----------( 259      ( 12-09-24 @ 05:23 )             31.8     137  |  103  |  32  -------------------------<  152   12-09-24 @ 05:23  4.6  |  23  |  1.1    Ca      8.1     12-09-24 @ 05:23  Phos   4.3     12-09-24 @ 05:23  Mg     2.1     12-09-24 @ 05:23    TPro  5.9  /  Alb  3.1  /  TBili  0.2  /  DBili  x   /  AST  14  /  ALT  6   /  AlkPhos  57  /  GGT  x     12-09-24 @ 05:23    PT/INR - ( 12-08-24 @ 11:12 )   PT: 11.00 sec;   INR: 0.93 ratio  PTT - ( 12-09-24 @ 02:01 )  PTT:99.6 sec    Pro-Brain Natriuretic Peptide: 754 pg/mL (12-08-24 @ 21:13)  Troponin T, High Sensitivity Result: 293 ng/L (12-08-24 @ 14:03)  Troponin T, High Sensitivity Result: 312 ng/L (12-08-24 @ 11:12)    Urinalysis Basic - ( 09 Dec 2024 05:23 )    Color: x / Appearance: x / SG: x / pH: x  Gluc: 152 mg/dL / Ketone: x  / Bili: x / Urobili: x   Blood: x / Protein: x / Nitrite: x   Leuk Esterase: x / RBC: x / WBC x   Sq Epi: x / Non Sq Epi: x / Bacteria: x          Urinalysis with Rflx Culture (collected 08 Dec 2024 12:36)      IMAGING

## 2024-12-09 NOTE — SWALLOW BEDSIDE ASSESSMENT ADULT - COMMENTS
PT known to ST services from previous admission w/ recs for puree/mod thick liquids for comfort w/ spouse knowing and understanding risks for aspiration, (included in GOC: no tubes)

## 2024-12-09 NOTE — CONSULT NOTE ADULT - SUBJECTIVE AND OBJECTIVE BOX
CC: facial droop    HPI:  84-year-old female with PMHx of Paroxysmal A-fib (was diagnosed in last admission and not on AC due to anemia due to L chest wall hematoma), Hx of ICH (in 2023), hx of Staphylococcus hominis bacteremia, Hx of Cardiopulmonary Arrest Secondary to Aspiration, history of 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, hypothyroidism , GERD, and Glaucoma presenting for facial droop.    Patient resides at [facility] where  reports that he was visiting her this morning when he noted left-sided facial droop which is since resolved.  Last known well prior was last night when nursing home was giving her night meds.  At baseline, she is quadriplegic, does not converse and speaks a few words at times.    No other symptoms were reported    In the ED initially, her vitals were stable  Stroke imaging was done and was unremarkable  Seen by neuro   After that patient was given rocephin for presumed UTI, ED suspect that after that, she became hypotensive and tachycardic with increased work of breathing, was given epi and then started on an epi drip + solumedrol for a working Dx of septic and anaphylactic shock    Labs: Leukocytosis, elevated lactate and trop (312 -> 293)    CT brain perfusion: No evidence of acute infarct or ischemia.    CTA neck: No stenosis. No dissection.    CTA brain: No stenosis or aneurysm.    CT angio chest showed right pulmonary embolus with right heart strain.    UA positive   (08 Dec 2024 15:23)    PERTINENT PM/SXH:   Dementia    History of breast cancer    Constipation    Lewy body dementia without behavioral disturbance    Colon polyp      No significant past surgical history    History of colon resection    H/O mastectomy, right      FAMILY HISTORY:  No pertinent family history in first degree relatives      None pertinent    ITEMS NOT CHECKED ARE NOT PRESENT    SOCIAL HISTORY:   Significant other/partner[ ]  Children[ ]  Roman Catholic/Spirituality:  Substance hx:  [ ]   Tobacco hx:  [ ]   Alcohol hx: [ ]   Living Situation: [ x]Home  [ ]Long term care  [ ]Rehab [ ]Other  Home Services: [ ] HHA [ ] Vitaliy RN [ ] Hospice  Occupation:  Home Opioid hx:  [ ] Y [ ] N [ x] I-Stop Reference No:    Reference #: [id number] - no meds     ADVANCE DIRECTIVES:     [ ] Full Code [x ] DNR  MOLST  [ ]  Living Will  [ ]   DECISION MAKER(s):  [ ] Health Care Proxy(s)  [x ] Surrogate(s)  [ ] Guardian           Name(s): Phone Number(s):  Gretel Chairez      BASELINE (I)ADL(s) (prior to admission):    [place]: [ ]Total  [ ] Moderate [ ]Dependent  Palliative Performance Status Version 2:         %    [email or web address]    Allergies    penicillin (Anaphylaxis)  Originally Entered as [Unknown] reaction to [green pepper] (Unknown)  Originally Entered as [Unknown] reaction to [red pepper] (Unknown)  ciprofloxacin (Unknown)  benzodiazepines (Unknown)  Originally Entered as [Unknown] reaction to [neuroleptics] (Unknown)  antichlolinergics (Unknown)  Originally Entered as [Unknown] reaction to [pepper] (Unknown)    Intolerances    MEDICATIONS  (STANDING):  artificial  tears Solution 1 Drop(s) Both EYES two times a day  aztreonam  IVPB      aztreonam  IVPB 2000 milliGRAM(s) IV Intermittent every 8 hours  carbidopa/levodopa  10/100 1 Tablet(s) Oral three times a day  cholecalciferol 1000 Unit(s) Oral daily  EPINEPHrine    Infusion 0.1 MICROgram(s)/kG/Min (33.1 mL/Hr) IV Continuous <Continuous>  heparin  Infusion.  Unit(s)/Hr (17 mL/Hr) IV Continuous <Continuous>  lactated ringers. 1000 milliLiter(s) (100 mL/Hr) IV Continuous <Continuous>  latanoprost 0.005% Ophthalmic Solution 1 Drop(s) Both EYES at bedtime  levothyroxine 50 MICROGram(s) Oral daily  norepinephrine Infusion 0.05 MICROgram(s)/kG/Min (8.28 mL/Hr) IV Continuous <Continuous>  polyethylene glycol 3350 17 Gram(s) Oral daily  senna 2 Tablet(s) Oral at bedtime  timolol 0.5% Solution 1 Drop(s) Both EYES two times a day    MEDICATIONS  (PRN):  heparin   Injectable 7500 Unit(s) IV Push every 6 hours PRN For aPTT less than 40  heparin   Injectable 3500 Unit(s) IV Push every 6 hours PRN For aPTT between 40 - 57    PRESENT SYMPTOMS: [x ]Unable to obtain due to poor mentation   Source if other than patient:  [ ]Family   [ ]Team     Pain: [ ]yes [ ]no  ALL PAIN ASSESSMENT COMPONENTS WERE ASKED ABOUT UNLESS OTHERWISE NOTED  QOL impact -   Location -                    Aggravating factors -  Quality -  Radiation -  Timing-  Severity (0-10 scale):  Minimal acceptable level (0-10 scale):     CPOT:    https://[email or web address]/[email or web address]/Critical-Care-Pain-Observation-Tool-(CPOT)    PAIN AD Score: 0  [email or web address].[email or web address]/cog/painad.pdf (press ctrl +  left click to view)    Dyspnea:                           [ ]None[ ]Mild [ ]Moderate [ ]Severe     Respiratory Distress Observation Scale (RDOS): 2  A score of 0 to 2 signifies little or no respiratory distress, 3 signifies mild distress, scores 4 to 6 indicate moderate distress, and scores greater than 7 signify severe distress  [email or web address]/sites/[email or web address]-qrabbgjdoep-hnducwno-[email or web address]-[email or web address]    Anxiety:                             [ ]None[ ]Mild [ ]Moderate [ ]Severe   Fatigue:                             [ ]None[ ]Mild [ ]Moderate [ ]Severe   Nausea:                             [ ]None[ ]Mild [ ]Moderate [ ]Severe   Loss of appetite:              [ ]None[ ]Mild [ ]Moderate [ ]Severe   Constipation:                    [ ]None[ ]Mild [ ]Moderate [ ]Severe    Other Symptoms:  [ ]All other review of systems negative     Palliative Performance Status Version 2:         20%    [email or web address]    PHYSICAL EXAM:  Vital Signs Last 24 Hrs  T(C): 36.3 (09 Dec 2024 12:00), Max: 37.9 (08 Dec 2024 16:00)  T(F): 97.3 (09 Dec 2024 12:00), Max: 100.2 (08 Dec 2024 16:00)  HR: 82 (09 Dec 2024 15:00) (77 - 127)  BP: 103/54 (09 Dec 2024 15:00) (86/47 - 138/52)  BP(mean): 75 (09 Dec 2024 15:00) (64 - 93)  RR: 17 (09 Dec 2024 15:00) (15 - 25)  SpO2: 100% (09 Dec 2024 15:00) (100% - 100%)    Parameters below as of 09 Dec 2024 12:00  Patient On (Oxygen Delivery Method): nasal cannula, high flow  O2 Flow (L/min): 40  O2 Concentration (%): 40 I&O's Summary    08 Dec 2024 07:01  -  09 Dec 2024 07:00  --------------------------------------------------------  IN: 2211 mL / OUT: 325 mL / NET: 1886 mL    09 Dec 2024 07:01  -  09 Dec 2024 15:57  --------------------------------------------------------  IN: 1013 mL / OUT: 300 mL / NET: 713 mL        GENERAL:  [ ] No acute distress x[ ]Lethargic  [ ]Unarousable  [ ]Verbal  [ ]Non-Verbal [ ]Cachexia    BEHAVIORAL/PSYCH:  [ ]Alert and Oriented x  [ ] Anxiety [ ] Delirium [ ] Agitation [x] Calm   EYES: [ ] No scleral icterus [ ] Scleral icterus [ ] Closed  ENMT:  [ ]Dry mouth  [ ]No external oral lesions [ ] No external ear or nose lesions  CARDIOVASCULAR:  [ ]Regular [ ]Irregular [ ]Tachy [ ]Not Tachy  [ ]Jarocho [ ] Edema [ ] No edema  PULMONARY:  [x ]Tachypnea  [ ]Audible excessive secretions [ ] No labored breathing [ ] labored breathing  GASTROINTESTINAL: [ ]Soft  [ ]Distended  [ ]Not distended [ ]Non tender [ ]Tender  MUSCULOSKELETAL: [ ]No clubbing [ ] clubbing  [ ] No cyanosis [ ] cyanosis  NEUROLOGIC: [ ]No focal deficits  [ ]Follows commands  [ ]Does not follow commands  [ x]Cognitive impairment  [ ]Dysphagia  [ ]Dysarthria  [ ]Paresis   SKIN: [ ] Jaundiced [x ] Non-jaundiced [ ]Rash [ ]No Rash [ ] Warm [ ] Dry  MISC/LINES: [ ] ET tube [ ] Trach [ ]NGT/OGT [ ]PEG [ ]Pinon    LABS: reviewed by me                        9.3    20.77 )-----------( 272      ( 09 Dec 2024 12:47 )             30.0   12-09    137  |  103  |  32[H]  ----------------------------<  152[H]  4.6   |  23  |  1.1    Ca    8.1[L]      09 Dec 2024 05:23  Phos  4.3     12-09  Mg     2.1     12-09    TPro  5.9[L]  /  Alb  3.1[L]  /  TBili  0.2  /  DBili  x   /  AST  14  /  ALT  6   /  AlkPhos  57  12-09  PT/INR - ( 08 Dec 2024 11:12 )   PT: 11.00 sec;   INR: 0.93 ratio         PTT - ( 09 Dec 2024 10:55 )  PTT:189.6 sec    Urinalysis Basic - ( 09 Dec 2024 05:23 )    Color: x / Appearance: x / SG: x / pH: x  Gluc: 152 mg/dL / Ketone: x  / Bili: x / Urobili: x   Blood: x / Protein: x / Nitrite: x   Leuk Esterase: x / RBC: x / WBC x   Sq Epi: x / Non Sq Epi: x / Bacteria: x      RADIOLOGY & ADDITIONAL STUDIES: reviewed by me    < from: CT Angio Chest PE Protocol w/ IV Cont (12.08.24 @ 15:54) >  IMPRESSION:  Acute pulmonary embolus within the distal right main pulmonary artery   with extension into a right lower lobe segmental branch.    Right heart strain is present. RV to LV ratio is 1.3.      Communication: The summary of above findings were discussed with readback   confirmation with Dr Mcclelland by Brian Knowles MD on 12/8/2024 at 4:37 PM.  I have reviewed the above preliminary reportthe following   comment----there is an 8 mm nodule in the apex of the right upper lobe   new since the previous exam of 8/24/2024. Follow-up study suggested.   [facility] #[phone] called but is no longer health provider and so a call back   request was submitted    < end of copied text >      EKG: reviewed by me    < from: 12 Lead ECG (12.08.24 @ 11:42) >  Ventricular Rate 113 BPM    Atrial Rate 113 BPM    P-R Interval 146 ms    QRS Duration 76 ms    Q-T Interval 342 ms    QTC Calculation(Bazett) 469 ms    P Axis 24 degrees    R Axis -33 degrees    T Axis 19 degrees    Diagnosis Line Sinus tachycardia  Left axis deviation  Inferior infarct , age undetermined  Abnormal ECG    < end of copied text >      PROTEIN CALORIE MALNUTRITION PRESENT: [ ]mild [ ]moderate [ ]severe [ ]underweight [ ]morbid obesity  https://www.andeal.org/vault/2440/web/files/ONC/Table_Clinical%20Characteristics%20to%20Document%20Malnutrition-White%20JV%20et%20al%202012.pdf    Height (cm): 172.7 (12-08-24 @ 17:30), 165.1 (08-25-24 @ 22:13)  Weight (kg): 91.3 (12-08-24 @ 17:30), 95.6 (08-25-24 @ 22:13)  BMI (kg/m2): 30.6 (12-08-24 @ 17:30), 35.1 (08-25-24 @ 22:13)  [ ]PPSV2 < or = to 30% [ ]significant weight loss  [ ]poor nutritional intake  [ ]anasarca      [ ]Artificial Nutrition      Palliative Care Spiritual/Emotional Screening Tool Question  Severity (0-4):                    OR                    [ x] Unable to determine. Will assess at later time if appropriate.  Score of 2 or greater indicates recommendation of Chaplaincy and/or SW referral  Chaplaincy Referral: [ ] Yes [ ] Refused [ ] Following     Caregiver [place]:  [ ] Yes [ ] No    OR    [x ] Unable to determine. Will assess at later time if appropriate.  Social Work Referral [ ]  Patient and Family Centered Care Referral [ ]    Anticipatory Grief Present: [ ] Yes [ ] No    OR     [ x] Unable to determine. Will assess at later time if appropriate.  Social Work Referral [ ]  Patient and Family Centered Care Referral [ ]    Patient discussed with primary medical team MD  Palliative care education provided to patient and/or family

## 2024-12-09 NOTE — CONSULT NOTE ADULT - ASSESSMENT
84-year-old female with PMHx of Paroxysmal A-fib (was diagnosed in last admission and not on AC due to anemia due to L chest wall hematoma), Hx of ICH (in 2023), hx of Staphylococcus hominis bacteremia, Hx of Cardiopulmonary Arrest Secondary to Aspiration, history of 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, hypothyroidism , GERD, COPD and Glaucoma presenting for facial droop. Patient with anaphylaxis on arrival and respiratory failure in setting of pulmonary embolism. Facial droop resolved.  Palliative care consulted for GOC.    Spoke with patient's  over the phone. Palliative care introduced.  He was able to provide a medical history and hospital course. He noted that the patient went to the NH on palliative care, but that it was more of a hospice type of care she was receiving. We discussed that last hospitalization he had discussed GOC with the team and he decided on comfort-based care with no hospitalization, similar to hospice. He states that he wants hospitalization for little things like infections or other things that can be fixed and doesn't want hospice.  We discussed that her underlying dementia and other underlying comorbidities will be the cause of her hospitalizations, and that comfort based care would prevent those hospitalizations. He wishes for ongoing limited medical management.    We also discussed that he rescinded DNr/DNI this hospitalization. Discussed the very poor overall prognosis should the patient go into cardiac arrest again. He wishes to reinstate DNR/DNI. All questions answered.    Recommendations  -now DNR/DNI, trial of NIV  -MOLST filled out and placed in chart  -ongoing medical management  -pressors and antibiotics per primary team  -neuro workup as appropriate per neuro  -O2 management per ICU team  -will follow    MEDD (morphine equivalent daily dose):    Education about palliative care provided to patient/family.  See Recs below.    Please call [phone] with questions or concerns 24/7.   We will continue to follow.

## 2024-12-09 NOTE — CONSULT NOTE ADULT - ASSESSMENT
ASSESSMENT  83 y/o Female with a PMH of Paroxysmal A-fib (was diagnosed in last admission and not on AC due to anemia due to L chest wall hematoma), Hx of ICH (in 2023), hx of Staphylococcus hominis bacteremia, Hx of Cardiopulmonary Arrest Secondary to Aspiration, 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, hypothyroidism , GERD, and Glaucoma presenting for facial droop.     Pt was initially admitted for stroke work up. Stroke imaging was done and was unremarkable.  Patient was given rocephin for presumed UTI, ED suspect that after that, she became hypotensive and tachycardic with increased work of breathing, was given epi and then started on an epi drip + solumedrol for a working Dx of septic and anaphylactic shock    Pt was found to have right main pulmonary artery embolus.     IMPRESSION  #Possible UTI (was admitted from ed with diagnosis of superimposed anaphylactic shock)  #Acute hypoxic/hypercapnic respiratory failure- R PE with right heart strain  #Leukocytosis  #Lactic acidosis  #Elevated troponin  #Paroxysmal A-fib (was diagnosed in last admission and not on AC due to anemia due to L chest wall hematoma)  #Hx of Staphylococcus hominis bacteremia  #Hx of Cardiopulmonary Arrest Secondary to Aspiration  #History of 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  #TIA?  #Hx of intracerebral hemorrhage  #Hypothyroidism   #GERD  #Glaucoma  - Afebrile, Tachycardic (on admission)  - WBC count: 14k (on Admission) > 22k  - Lactate 5.0 > 2.6  - UA positive  - s/p Rocephin 1g IV, Vancomycin 1500mg IV and Aztreonam 2g IV q8       RECOMMENDATIONS  - f/u pending procal, MRSA  PCR, urine cx and blood cx  - Trend WBC    This is a pended note. All final recommendations to follow pending discussion with ID Attending  ASSESSMENT  83 y/o Female with a PMH of Paroxysmal A-fib (was diagnosed in last admission and not on AC due to anemia due to L chest wall hematoma), Hx of ICH (in 2023), hx of Staphylococcus hominis bacteremia, Hx of Cardiopulmonary Arrest Secondary to Aspiration, 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, hypothyroidism , GERD, and Glaucoma presenting for facial droop.     Pt was initially admitted for stroke work up. Stroke imaging was done and was unremarkable.  Patient was given Rocephin for presumed UTI, ED suspect that after that, she became hypotensive and tachycardic with increased work of breathing, was given epi and then started on an epi drip + solumedrol for a working Dx of septic and anaphylactic shock    Pt was found to have right main pulmonary artery embolus.     IMPRESSION  #Possible UTI   #Suspected anaphylactic shock  #Acute hypoxic/hypercapnic respiratory failure- R PE with right heart strain  #Leukocytosis  #Lactic acidosis  #Elevated troponin  #Paroxysmal A-fib (was diagnosed in last admission and not on AC due to anemia due to L chest wall hematoma)  #Hx of Staphylococcus hominis bacteremia  #Hx of Cardiopulmonary Arrest Secondary to Aspiration  #History of 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  #TIA?  #Hx of intracerebral hemorrhage  #Hypothyroidism   #GERD  #Glaucoma  - Afebrile, Tachycardic (on admission)  - WBC count: 14k (on Admission) > 22k (likely d/t steroid)  - Lactate 5.0 > 2.6  - UA positive, Epithelial cells 34 (likely contaminant)  - s/p Rocephin 1g IV, Vancomycin 1500mg IV and Aztreonam 2g IV q8       RECOMMENDATIONS  - d/c Aztreonam 2g IV q8, start Aztreonam 1g IV q8  - start Vancomycin dosing per ID pharm  - f/u pending procal, MRSA  PCR, urine cx and blood cx  - Trend WBC ASSESSMENT  85 y/o Female with a PMH of Paroxysmal A-fib (was diagnosed in last admission and not on AC due to anemia due to L chest wall hematoma), Hx of ICH (in 2023), hx of Staphylococcus hominis bacteremia, Hx of Cardiopulmonary Arrest Secondary to Aspiration, 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, hypothyroidism , GERD, and Glaucoma presenting for facial droop.     Pt was initially admitted for stroke work up. Stroke imaging was done and was unremarkable.  Patient was given Rocephin for presumed UTI, ED suspect that after that, she became hypotensive and tachycardic with increased work of breathing, was given epi and then started on an epi drip + solumedrol for a working Dx of septic and anaphylactic shock    Pt was found to have right main pulmonary artery embolus.     IMPRESSION  #Suspected anaphylactic shock  #Acute hypoxic/hypercapnic respiratory failure- R PE with right heart strain  #Leukocytosis    Possible UTI , poor sample + epith   #Lactic acidosis  #Elevated troponin  #Paroxysmal A-fib (was diagnosed in last admission and not on AC due to anemia due to L chest wall hematoma)  #Hx of Staphylococcus hominis bacteremia  #Hx of Cardiopulmonary Arrest Secondary to Aspiration  #History of 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  #TIA?  #Hx of intracerebral hemorrhage  #Hypothyroidism   #GERD  #Glaucoma  - Afebrile, Tachycardic (on admission)  - WBC count: 14k (on Admission) > 22k (likely d/t steroid)  - Lactate 5.0 > 2.6  - UA positive, Epithelial cells 34 (likely contaminant)  - s/p Rocephin 1g IV, Vancomycin 1500mg IV and Aztreonam 2g IV q8       RECOMMENDATIONS  - d/c Aztreonam 2g IV q8, start Aztreonam 1g IV q8  - start Vancomycin dosing per ID pharm  - f/u pending procal, MRSA  PCR, urine cx and blood cx  - Trend WBC

## 2024-12-09 NOTE — SWALLOW BEDSIDE ASSESSMENT ADULT - DIET PRIOR TO ADMI
puree diet w/ moderately thick liquids ? NH documentation does not include diet however reference to "mod thick" made in a medication order

## 2024-12-09 NOTE — CONSULT NOTE ADULT - CONVERSATION DETAILS
Spoke with patient's  over the phone. Palliative care introduced.  He was able to provide a medical history and hospital course. He noted that the patient went to the NH on palliative care, but that it was more of a hospice type of care she was receiving. We discussed that last hospitalization he had discussed GOC with the team and he decided on comfort-based care with no hospitalization, similar to hospice. He states that he wants hospitalization for little things like infections or other things that can be fixed and doesn't want hospice.  We discussed that her underlying dementia and other underlying comorbidities will be the cause of her hospitalizations, and that comfort based care would prevent those hospitalizations. He wishes for ongoing limited medical management.    We also discussed that he rescinded DNr/DNI this hospitalization. Discussed the very poor overall prognosis should the patient go into cardiac arrest again. He wishes to reinstate DNR/DNI. All questions answered.

## 2024-12-09 NOTE — CONSULT NOTE ADULT - SUBJECTIVE AND OBJECTIVE BOX
IMELDA ROLLE  84y, Female  Allergy: penicillin (Anaphylaxis)  ciprofloxacin (Unknown)  benzodiazepines (Unknown)  antichlolinergics (Unknown)  Originally Entered as [Unknown] reaction to [pepper] (Unknown)  Originally Entered as [Unknown] reaction to [green pepper] (Unknown)  Originally Entered as [Unknown] reaction to [neuroleptics] (Unknown)      CHIEF COMPLAINT: left facial asymmetry (08 Dec 2024 11:23)    HPI:  HPI:  84-year-old female with PMHx of Paroxysmal A-fib (was diagnosed in last admission and not on AC due to anemia due to L chest wall hematoma), Hx of ICH (in 2023), hx of Staphylococcus hominis bacteremia, Hx of Cardiopulmonary Arrest Secondary to Aspiration, history of 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, hypothyroidism , GERD, and Glaucoma presenting for facial droop.    Patient resides at [facility] where  reports that he was visiting her this morning when he noted left-sided facial droop which is since resolved.  Last known well prior was last night when nursing home was giving her night meds.  At baseline, she is quadriplegic, does not converse and speaks a few words at times.    No other symptoms were reported    In the ED initially, her vitals were stable  Stroke imaging was done and was unremarkable  Seen by neuro   After that patient was given rocephin for presumed UTI, ED suspect that after that, she became hypotensive and tachycardic with increased work of breathing, was given epi and then started on an epi drip + solumedrol for a working Dx of septic and anaphylactic shock    Labs: Leukocytosis, elevated lactate and trop (312 -> 293)    CT brain perfusion: No evidence of acute infarct or ischemia.    CTA neck: No stenosis. No dissection.    CTA brain: No stenosis or aneurysm.    CT angio chest showed right pulmonary embolus with right heart strain.    UA positive   (08 Dec 2024 15:23)      Infectious Diseases History:  Old Micro Data/Cultures:     FAMILY HISTORY:  No pertinent family history in first degree relatives      PAST MEDICAL & SURGICAL HISTORY:  Dementia      History of breast cancer      Constipation      Lewy body dementia without behavioral disturbance      Colon polyp      History of colon resection      H/O mastectomy, right          SOCIAL HISTORY  Social History:  Former smoker (10 Nov 2023 23:44)      Recent Travel:  Other Exposures:     ROS  General: Denies rigors, nightsweats  HEENT: Denies headache, rhinorrhea, sore throat, eye pain  CV: Denies CP, palpitations  PULM: Denies wheezing, hemoptysis  GI: Denies hematemesis, hematochezia, melena  : Denies discharge, hematuria  MSK: Denies arthralgias, myalgias  SKIN: Denies rash, lesions  NEURO: Denies paresthesias, weakness  PSYCH: Denies depression, anxiety    VITALS:  T(F): 97.4, Max: 100.2 (12-08-24 @ 16:00)  HR: 91  BP: 125/71  RR: 23Vital Signs Last 24 Hrs  T(C): 36.3 (09 Dec 2024 08:00), Max: 37.9 (08 Dec 2024 16:00)  T(F): 97.4 (09 Dec 2024 08:00), Max: 100.2 (08 Dec 2024 16:00)  HR: 91 (09 Dec 2024 08:00) (77 - 153)  BP: 125/71 (09 Dec 2024 08:00) (83/53 - 138/52)  BP(mean): 93 (09 Dec 2024 08:00) (62 - 93)  RR: 23 (09 Dec 2024 08:00) (18 - 26)  SpO2: 100% (09 Dec 2024 08:00) (94% - 100%)    Parameters below as of 09 Dec 2024 08:00  Patient On (Oxygen Delivery Method): BiPAP/CPAP    O2 Concentration (%): 100    PHYSICAL EXAM:  Gen: NAD  HEENT: Normocephalic, atraumatic  Neck: supple, no lymphadenopathy  CV: Regular rate & regular rhythm  Lungs: decreased BS at bases  Abdomen: Soft, BS present  Ext: Warm, well perfused, quadriplegic  Skin: no rash, no erythema  Lines: no phlebitis    TESTS & MEASUREMENTS:                        10.0   22.01 )-----------( 259      ( 09 Dec 2024 05:23 )             31.8     12-09    137  |  103  |  32[H]  ----------------------------<  152[H]  4.6   |  23  |  1.1    Ca    8.1[L]      09 Dec 2024 05:23  Phos  4.3     12-09  Mg     2.1     12-09    TPro  5.9[L]  /  Alb  3.1[L]  /  TBili  0.2  /  DBili  x   /  AST  14  /  ALT  6   /  AlkPhos  57  12-09      LIVER FUNCTIONS - ( 09 Dec 2024 05:23 )  Alb: 3.1 g/dL / Pro: 5.9 g/dL / ALK PHOS: 57 U/L / ALT: 6 U/L / AST: 14 U/L / GGT: x           Urinalysis Basic - ( 09 Dec 2024 05:23 )    Color: x / Appearance: x / SG: x / pH: x  Gluc: 152 mg/dL / Ketone: x  / Bili: x / Urobili: x   Blood: x / Protein: x / Nitrite: x   Leuk Esterase: x / RBC: x / WBC x   Sq Epi: x / Non Sq Epi: x / Bacteria: x        Urinalysis with Rflx Culture (collected 12-08-24 @ 12:36)        Lactate, Blood: 2.5 mmol/L (12-08-24 @ 21:13)  Blood Gas Venous - Lactate: 2.6 mmol/L (12-08-24 @ 19:22)  Blood Gas Venous - Lactate: 5.0 mmol/L (12-08-24 @ 15:18)      INFECTIOUS DISEASES TESTING  MRSA PCR Result.: Negative (08-24-24 @ 15:15)      RADIOLOGY & ADDITIONAL TESTS:  I have personally reviewed the last available Chest xray  Xray Chest 1 View- PORTABLE-Urgent:   ACC: [id number] EXAM:  XR CHEST PORTABLE URGENT 1V   ORDERED BY: CARLA CULP     PROCEDURE DATE:  12/08/2024      INTERPRETATION:  CLINICAL HISTORY: Stroke code.    COMPARISON: 9/3/2024.    TECHNIQUE: Portable frontal chest radiograph.    FINDINGS:    Support devices: None.    Cardiac/mediastinum/hilum: Magnified cardiac silhouette.    Lung parenchyma/Pleura: Low lung volumes. No definite infiltrate.   Questionable left basilar atelectasis.    Skeleton/soft tissues: Degenerative changes.      IMPRESSION:    Low lung volumes. No definite infiltrate. See same day chest CT report.    --- End of Report ---      CT Angio Chest PE Protocol w/ IV Cont:   ACC: [id number] EXAM:  CT ANGIO CHEST PULM ART WAWIC   ORDERED BY: ALEJANDRO JEFFERS     PROCEDURE DATE:  12/08/2024      INTERPRETATION:  CLINICAL INFORMATION: Pulmonary embolus    COMPARISON: CTA chest 8/24/2024.    CONTRAST/COMPLICATIONS:  IVContrast: Omnipaque 350  70 cc administered   30 cc discarded  Oral Contrast: NONE  .    PROCEDURE:  CT Angiography of the Chest.  Sagittal and coronal reformats were performed as well as 3D (MIP)   reconstructions.    FINDINGS:    LUNGS AND LARGE AIRWAYS: Patent central airways. Bilateral subsegmental   atelectasis in mosaic attenuation.  PLEURA: No pleural effusion.  VESSELS: Acute pulmonary embolus within the distal right main pulmonary   artery with extension into a right lower lobe segmentalbranch (4-42).  HEART: Heart size is normal. No pericardial effusion. Right heart strain   is present. RV to LV ratio is 1.3.  MEDIASTINUM AND GLENN: No lymphadenopathy.  CHEST WALL AND LOWER NECK: Stable right chest wall dystrophic   calcification.  VISUALIZED UPPER ABDOMEN: Hepatic steatosis. Colonic diverticulosis.  BONES: Degenerative changes.    IMPRESSION:  Acute pulmonary embolus within the distal right main pulmonary artery   with extension into a right lower lobe segmental branch.    Right heart strain is present. RV to LV ratio is 1.3.    --- End of Report ---      < from: CT Angio Brain Stroke Protocol  w/ IV Cont (12.08.24 @ 11:01) >  IMPRESSION:  CT brain perfusion: No evidence of acute infarct or ischemia.    CTA neck: No stenosis. No dissection.    CTA brain: No stenosis or aneurysm.    --- End of Report ---          CARDIOLOGY TESTING  12 Lead ECG:   Ventricular Rate 113 BPM    Atrial Rate 113 BPM    P-R Interval 146 ms    QRS Duration 76 ms    Q-T Interval 342 ms    QTC Calculation(Bazett) 469 ms    P Axis 24 degrees    R Axis -33 degrees    T Axis 19 degrees    Diagnosis Line Sinus tachycardia  Left axis deviation  Inferior infarct , age undetermined  Abnormal ECG    Confirmed by ELISSA CARRANZA MD ([id number]) on 12/8/2024 5:40:51 PM (12-08-24 @ 11:42)      All available historical records have been reviewed    MEDICATIONS  artificial  tears Solution 1  aztreonam  IVPB   aztreonam  IVPB 2000  carbidopa/levodopa  10/100 1  cholecalciferol 1000  EPINEPHrine    Infusion 0.1  heparin  Infusion.   lactated ringers. 1000  latanoprost 0.005% Ophthalmic Solution 1  levothyroxine 50  norepinephrine Infusion 0.05  polyethylene glycol 3350 17  senna 2  timolol 0.5% Solution 1      ANTIBIOTICS:  aztreonam  IVPB      aztreonam  IVPB 2000 milliGRAM(s) IV Intermittent every 8 hours      All available historical data has been reviewed IMELDA ROLEL  84y, Female  Allergy: penicillin (Anaphylaxis)  ciprofloxacin (Unknown)  benzodiazepines (Unknown)  antichlolinergics (Unknown)  Originally Entered as [Unknown] reaction to [pepper] (Unknown)  Originally Entered as [Unknown] reaction to [green pepper] (Unknown)  Originally Entered as [Unknown] reaction to [neuroleptics] (Unknown)      CHIEF COMPLAINT: left facial asymmetry (08 Dec 2024 11:23)    HPI:  HPI:  84-year-old female with PMHx of Paroxysmal A-fib (was diagnosed in last admission and not on AC due to anemia due to L chest wall hematoma), Hx of ICH (in 2023), hx of Staphylococcus hominis bacteremia, Hx of Cardiopulmonary Arrest Secondary to Aspiration, history of 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, hypothyroidism , GERD, and Glaucoma presenting for facial droop.    Patient resides at [facility] where  reports that he was visiting her this morning when he noted left-sided facial droop which is since resolved.  Last known well prior was last night when nursing home was giving her night meds.  At baseline, she is quadriplegic, does not converse and speaks a few words at times.    No other symptoms were reported    In the ED initially, her vitals were stable  Stroke imaging was done and was unremarkable  Seen by neuro   After that patient was given rocephin for presumed UTI, ED suspect that after that, she became hypotensive and tachycardic with increased work of breathing, was given epi and then started on an epi drip + solumedrol for a working Dx of septic and anaphylactic shock    Labs: Leukocytosis, elevated lactate and trop (312 -> 293)    CT brain perfusion: No evidence of acute infarct or ischemia.    CTA neck: No stenosis. No dissection.    CTA brain: No stenosis or aneurysm.    CT angio chest showed right pulmonary embolus with right heart strain.    UA positive   (08 Dec 2024 15:23)      Infectious Diseases History:  Old Micro Data/Cultures:     FAMILY HISTORY:  No pertinent family history in first degree relatives      PAST MEDICAL & SURGICAL HISTORY:  Dementia      History of breast cancer      Constipation      Lewy body dementia without behavioral disturbance      Colon polyp      History of colon resection      H/O mastectomy, right          SOCIAL HISTORY  Social History:  Former smoker (10 Nov 2023 23:44)      Recent Travel:  Other Exposures:     ROS  General: Denies rigors, nightsweats  HEENT: Denies headache, rhinorrhea, sore throat, eye pain  CV: Denies CP, palpitations  PULM: Denies wheezing, hemoptysis  GI: Denies hematemesis, hematochezia, melena  : Denies discharge, hematuria  MSK: Denies arthralgias, myalgias  SKIN: Denies rash, lesions  NEURO: Denies paresthesias, weakness  PSYCH: Denies depression, anxiety    VITALS:  T(F): 97.4, Max: 100.2 (12-08-24 @ 16:00)  HR: 91  BP: 125/71  RR: 23Vital Signs Last 24 Hrs  T(C): 36.3 (09 Dec 2024 08:00), Max: 37.9 (08 Dec 2024 16:00)  T(F): 97.4 (09 Dec 2024 08:00), Max: 100.2 (08 Dec 2024 16:00)  HR: 91 (09 Dec 2024 08:00) (77 - 153)  BP: 125/71 (09 Dec 2024 08:00) (83/53 - 138/52)  BP(mean): 93 (09 Dec 2024 08:00) (62 - 93)  RR: 23 (09 Dec 2024 08:00) (18 - 26)  SpO2: 100% (09 Dec 2024 08:00) (94% - 100%)    Parameters below as of 09 Dec 2024 08:00  Patient On (Oxygen Delivery Method): BiPAP/CPAP    O2 Concentration (%): 100    PHYSICAL EXAM:  Gen: NAD  HEENT: Normocephalic, atraumatic  Neck: supple, no lymphadenopathy  CV: Regular rate & regular rhythm  Lungs: decreased BS at bases  Abdomen: Soft, distended, nontender, BS present  Ext: Warm, well perfused, quadriplegic  Neuro: Alert, follows simple commands  Skin: R. arm wound, no rash, no erythema  Lines: no phlebitis    TESTS & MEASUREMENTS:                        10.0   22.01 )-----------( 259      ( 09 Dec 2024 05:23 )             31.8     12-09    137  |  103  |  32[H]  ----------------------------<  152[H]  4.6   |  23  |  1.1    Ca    8.1[L]      09 Dec 2024 05:23  Phos  4.3     12-09  Mg     2.1     12-09    TPro  5.9[L]  /  Alb  3.1[L]  /  TBili  0.2  /  DBili  x   /  AST  14  /  ALT  6   /  AlkPhos  57  12-09      LIVER FUNCTIONS - ( 09 Dec 2024 05:23 )  Alb: 3.1 g/dL / Pro: 5.9 g/dL / ALK PHOS: 57 U/L / ALT: 6 U/L / AST: 14 U/L / GGT: x           Urinalysis Basic - ( 09 Dec 2024 05:23 )    Color: x / Appearance: x / SG: x / pH: x  Gluc: 152 mg/dL / Ketone: x  / Bili: x / Urobili: x   Blood: x / Protein: x / Nitrite: x   Leuk Esterase: x / RBC: x / WBC x   Sq Epi: x / Non Sq Epi: x / Bacteria: x        Urinalysis with Rflx Culture (collected 12-08-24 @ 12:36)        Lactate, Blood: 2.5 mmol/L (12-08-24 @ 21:13)  Blood Gas Venous - Lactate: 2.6 mmol/L (12-08-24 @ 19:22)  Blood Gas Venous - Lactate: 5.0 mmol/L (12-08-24 @ 15:18)      INFECTIOUS DISEASES TESTING  MRSA PCR Result.: Negative (08-24-24 @ 15:15)      RADIOLOGY & ADDITIONAL TESTS:  I have personally reviewed the last available Chest xray  Xray Chest 1 View- PORTABLE-Urgent:   ACC: [id number] EXAM:  XR CHEST PORTABLE URGENT 1V   ORDERED BY: CARLA CULP     PROCEDURE DATE:  12/08/2024      INTERPRETATION:  CLINICAL HISTORY: Stroke code.    COMPARISON: 9/3/2024.    TECHNIQUE: Portable frontal chest radiograph.    FINDINGS:    Support devices: None.    Cardiac/mediastinum/hilum: Magnified cardiac silhouette.    Lung parenchyma/Pleura: Low lung volumes. No definite infiltrate.   Questionable left basilar atelectasis.    Skeleton/soft tissues: Degenerative changes.      IMPRESSION:    Low lung volumes. No definite infiltrate. See same day chest CT report.    --- End of Report ---      CT Angio Chest PE Protocol w/ IV Cont:   ACC: [id number] EXAM:  CT ANGIO CHEST PULM ART WAWIC   ORDERED BY: ALEJANDRO JEFFERS     PROCEDURE DATE:  12/08/2024      INTERPRETATION:  CLINICAL INFORMATION: Pulmonary embolus    COMPARISON: CTA chest 8/24/2024.    CONTRAST/COMPLICATIONS:  IVContrast: Omnipaque 350  70 cc administered   30 cc discarded  Oral Contrast: NONE  .    PROCEDURE:  CT Angiography of the Chest.  Sagittal and coronal reformats were performed as well as 3D (MIP)   reconstructions.    FINDINGS:    LUNGS AND LARGE AIRWAYS: Patent central airways. Bilateral subsegmental   atelectasis in mosaic attenuation.  PLEURA: No pleural effusion.  VESSELS: Acute pulmonary embolus within the distal right main pulmonary   artery with extension into a right lower lobe segmentalbranch (4-42).  HEART: Heart size is normal. No pericardial effusion. Right heart strain   is present. RV to LV ratio is 1.3.  MEDIASTINUM AND GLENN: No lymphadenopathy.  CHEST WALL AND LOWER NECK: Stable right chest wall dystrophic   calcification.  VISUALIZED UPPER ABDOMEN: Hepatic steatosis. Colonic diverticulosis.  BONES: Degenerative changes.    IMPRESSION:  Acute pulmonary embolus within the distal right main pulmonary artery   with extension into a right lower lobe segmental branch.    Right heart strain is present. RV to LV ratio is 1.3.    --- End of Report ---      < from: CT Angio Brain Stroke Protocol  w/ IV Cont (12.08.24 @ 11:01) >  IMPRESSION:  CT brain perfusion: No evidence of acute infarct or ischemia.    CTA neck: No stenosis. No dissection.    CTA brain: No stenosis or aneurysm.    --- End of Report ---          CARDIOLOGY TESTING  12 Lead ECG:   Ventricular Rate 113 BPM    Atrial Rate 113 BPM    P-R Interval 146 ms    QRS Duration 76 ms    Q-T Interval 342 ms    QTC Calculation(Bazett) 469 ms    P Axis 24 degrees    R Axis -33 degrees    T Axis 19 degrees    Diagnosis Line Sinus tachycardia  Left axis deviation  Inferior infarct , age undetermined  Abnormal ECG    Confirmed by ELISSA CARRANZA MD ([id number]) on 12/8/2024 5:40:51 PM (12-08-24 @ 11:42)      All available historical records have been reviewed    MEDICATIONS  artificial  tears Solution 1  aztreonam  IVPB   aztreonam  IVPB 2000  carbidopa/levodopa  10/100 1  cholecalciferol 1000  EPINEPHrine    Infusion 0.1  heparin  Infusion.   lactated ringers. 1000  latanoprost 0.005% Ophthalmic Solution 1  levothyroxine 50  norepinephrine Infusion 0.05  polyethylene glycol 3350 17  senna 2  timolol 0.5% Solution 1      ANTIBIOTICS:  aztreonam  IVPB      aztreonam  IVPB 2000 milliGRAM(s) IV Intermittent every 8 hours      All available historical data has been reviewed IMELDA ROLLE  84y, Female  Allergy: penicillin (Anaphylaxis)  ciprofloxacin (Unknown)  benzodiazepines (Unknown)  antichlolinergics (Unknown)  Originally Entered as [Unknown] reaction to [pepper] (Unknown)  Originally Entered as [Unknown] reaction to [green pepper] (Unknown)  Originally Entered as [Unknown] reaction to [neuroleptics] (Unknown)      CHIEF COMPLAINT: left facial asymmetry (08 Dec 2024 11:23)    HPI:  HPI:  84-year-old female with PMHx of Paroxysmal A-fib (was diagnosed in last admission and not on AC due to anemia due to L chest wall hematoma), Hx of ICH (in 2023), hx of Staphylococcus hominis bacteremia, Hx of Cardiopulmonary Arrest Secondary to Aspiration, history of 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, hypothyroidism , GERD, and Glaucoma presenting for facial droop.    Patient resides at [facility] where  reports that he was visiting her this morning when he noted left-sided facial droop which is since resolved.  Last known well prior was last night when nursing home was giving her night meds.  At baseline, she is quadriplegic, does not converse and speaks a few words at times.    No other symptoms were reported    In the ED initially, her vitals were stable  Stroke imaging was done and was unremarkable  Seen by neuro   After that patient was given rocephin for presumed UTI, ED suspect that after that, she became hypotensive and tachycardic with increased work of breathing, was given epi and then started on an epi drip + solumedrol for a working Dx of septic and anaphylactic shock    Labs: Leukocytosis, elevated lactate and trop (312 -> 293)    CT brain perfusion: No evidence of acute infarct or ischemia.    CTA neck: No stenosis. No dissection.    CTA brain: No stenosis or aneurysm.    CT angio chest showed right pulmonary embolus with right heart strain.    UA positive   (08 Dec 2024 15:23)      Infectious Diseases History:  Old Micro Data/Cultures:     FAMILY HISTORY:  No pertinent family history in first degree relatives      PAST MEDICAL & SURGICAL HISTORY:  Dementia      History of breast cancer      Constipation      Lewy body dementia without behavioral disturbance      Colon polyp      History of colon resection      H/O mastectomy, right          SOCIAL HISTORY  Social History:  Former smoker (10 Nov 2023 23:44)      Recent Travel:  Other Exposures:     ROS  General: Denies rigors, nightsweats  HEENT: Denies headache, rhinorrhea, sore throat, eye pain  CV: Denies CP, palpitations  PULM: Denies wheezing, hemoptysis  GI: Denies hematemesis, hematochezia, melena  : Denies discharge, hematuria  MSK: Denies arthralgias, myalgias  SKIN: Denies rash, lesions  NEURO: Denies paresthesias, weakness  PSYCH: Denies depression, anxiety    VITALS:  T(F): 97.4, Max: 100.2 (12-08-24 @ 16:00)  HR: 91  BP: 125/71  RR: 23Vital Signs Last 24 Hrs  T(C): 36.3 (09 Dec 2024 08:00), Max: 37.9 (08 Dec 2024 16:00)  T(F): 97.4 (09 Dec 2024 08:00), Max: 100.2 (08 Dec 2024 16:00)  HR: 91 (09 Dec 2024 08:00) (77 - 153)  BP: 125/71 (09 Dec 2024 08:00) (83/53 - 138/52)  BP(mean): 93 (09 Dec 2024 08:00) (62 - 93)  RR: 23 (09 Dec 2024 08:00) (18 - 26)  SpO2: 100% (09 Dec 2024 08:00) (94% - 100%)    Parameters below as of 09 Dec 2024 08:00  Patient On (Oxygen Delivery Method): BiPAP/CPAP    O2 Concentration (%): 100    PHYSICAL EXAM:  Gen: NAD  HEENT: Normocephalic, atraumatic  Neck: supple, no lymphadenopathy  CV: Regular rate & regular rhythm  Lungs: decreased BS at bases  Abdomen: Soft, distended, nontender, BS present  Ext: Warm, well perfused, quadriplegic  Neuro: Alert, follows simple commands  Skin: L. arm wound, no rash, no erythema  Lines: no phlebitis    TESTS & MEASUREMENTS:                        10.0   22.01 )-----------( 259      ( 09 Dec 2024 05:23 )             31.8     12-09    137  |  103  |  32[H]  ----------------------------<  152[H]  4.6   |  23  |  1.1    Ca    8.1[L]      09 Dec 2024 05:23  Phos  4.3     12-09  Mg     2.1     12-09    TPro  5.9[L]  /  Alb  3.1[L]  /  TBili  0.2  /  DBili  x   /  AST  14  /  ALT  6   /  AlkPhos  57  12-09      LIVER FUNCTIONS - ( 09 Dec 2024 05:23 )  Alb: 3.1 g/dL / Pro: 5.9 g/dL / ALK PHOS: 57 U/L / ALT: 6 U/L / AST: 14 U/L / GGT: x           Urinalysis Basic - ( 09 Dec 2024 05:23 )    Color: x / Appearance: x / SG: x / pH: x  Gluc: 152 mg/dL / Ketone: x  / Bili: x / Urobili: x   Blood: x / Protein: x / Nitrite: x   Leuk Esterase: x / RBC: x / WBC x   Sq Epi: x / Non Sq Epi: x / Bacteria: x        Urinalysis with Rflx Culture (collected 12-08-24 @ 12:36)        Lactate, Blood: 2.5 mmol/L (12-08-24 @ 21:13)  Blood Gas Venous - Lactate: 2.6 mmol/L (12-08-24 @ 19:22)  Blood Gas Venous - Lactate: 5.0 mmol/L (12-08-24 @ 15:18)      INFECTIOUS DISEASES TESTING  MRSA PCR Result.: Negative (08-24-24 @ 15:15)      RADIOLOGY & ADDITIONAL TESTS:  I have personally reviewed the last available Chest xray  Xray Chest 1 View- PORTABLE-Urgent:   ACC: [id number] EXAM:  XR CHEST PORTABLE URGENT 1V   ORDERED BY: CARLA CULP     PROCEDURE DATE:  12/08/2024      INTERPRETATION:  CLINICAL HISTORY: Stroke code.    COMPARISON: 9/3/2024.    TECHNIQUE: Portable frontal chest radiograph.    FINDINGS:    Support devices: None.    Cardiac/mediastinum/hilum: Magnified cardiac silhouette.    Lung parenchyma/Pleura: Low lung volumes. No definite infiltrate.   Questionable left basilar atelectasis.    Skeleton/soft tissues: Degenerative changes.      IMPRESSION:    Low lung volumes. No definite infiltrate. See same day chest CT report.    --- End of Report ---      CT Angio Chest PE Protocol w/ IV Cont:   ACC: [id number] EXAM:  CT ANGIO CHEST PULM ART WAWIC   ORDERED BY: ALEJANDRO JEFFERS     PROCEDURE DATE:  12/08/2024      INTERPRETATION:  CLINICAL INFORMATION: Pulmonary embolus    COMPARISON: CTA chest 8/24/2024.    CONTRAST/COMPLICATIONS:  IVContrast: Omnipaque 350  70 cc administered   30 cc discarded  Oral Contrast: NONE  .    PROCEDURE:  CT Angiography of the Chest.  Sagittal and coronal reformats were performed as well as 3D (MIP)   reconstructions.    FINDINGS:    LUNGS AND LARGE AIRWAYS: Patent central airways. Bilateral subsegmental   atelectasis in mosaic attenuation.  PLEURA: No pleural effusion.  VESSELS: Acute pulmonary embolus within the distal right main pulmonary   artery with extension into a right lower lobe segmentalbranch (4-42).  HEART: Heart size is normal. No pericardial effusion. Right heart strain   is present. RV to LV ratio is 1.3.  MEDIASTINUM AND GLENN: No lymphadenopathy.  CHEST WALL AND LOWER NECK: Stable right chest wall dystrophic   calcification.  VISUALIZED UPPER ABDOMEN: Hepatic steatosis. Colonic diverticulosis.  BONES: Degenerative changes.    IMPRESSION:  Acute pulmonary embolus within the distal right main pulmonary artery   with extension into a right lower lobe segmental branch.    Right heart strain is present. RV to LV ratio is 1.3.    --- End of Report ---      < from: CT Angio Brain Stroke Protocol  w/ IV Cont (12.08.24 @ 11:01) >  IMPRESSION:  CT brain perfusion: No evidence of acute infarct or ischemia.    CTA neck: No stenosis. No dissection.    CTA brain: No stenosis or aneurysm.    --- End of Report ---          CARDIOLOGY TESTING  12 Lead ECG:   Ventricular Rate 113 BPM    Atrial Rate 113 BPM    P-R Interval 146 ms    QRS Duration 76 ms    Q-T Interval 342 ms    QTC Calculation(Bazett) 469 ms    P Axis 24 degrees    R Axis -33 degrees    T Axis 19 degrees    Diagnosis Line Sinus tachycardia  Left axis deviation  Inferior infarct , age undetermined  Abnormal ECG    Confirmed by ELISSA CARRANZA MD ([id number]) on 12/8/2024 5:40:51 PM (12-08-24 @ 11:42)      All available historical records have been reviewed    MEDICATIONS  artificial  tears Solution 1  aztreonam  IVPB   aztreonam  IVPB 2000  carbidopa/levodopa  10/100 1  cholecalciferol 1000  EPINEPHrine    Infusion 0.1  heparin  Infusion.   lactated ringers. 1000  latanoprost 0.005% Ophthalmic Solution 1  levothyroxine 50  norepinephrine Infusion 0.05  polyethylene glycol 3350 17  senna 2  timolol 0.5% Solution 1      ANTIBIOTICS:  aztreonam  IVPB      aztreonam  IVPB 2000 milliGRAM(s) IV Intermittent every 8 hours      All available historical data has been reviewed

## 2024-12-09 NOTE — CONSULT NOTE ADULT - ATTENDING COMMENTS
I have personally seen and examined this patient.  I have fully participated in the care of this patient.  I have reviewed all pertinent clinical information, including history, physical exam, plan and note.  I have reviewed all pertinent clinical information and reviewed all relevant imaging and diagnostic studies personally.  My addendum includes the final recommendations and supersedes the resident's note.       Admitted for possible CVA (Brain imaging wnl), found to have acute PE and R heart strain  Given Ceftriaxone for possible UTI UA contaminant with WBC 96 & Epith 34  Concern for anaphylaxis  WBC 14--> 22  Elevated troponin  CXR no PNA  Allergies- cipro, PCN    - Overall low suspicion for sepsis  - Given critical illness, ok for Aztreonam 1g q8h IV & - Vanc dosing AUC/JANNETH per clinical pharmacist until cultures result    If any questions, please text or call on Microsoft Teams  Please continue to update ID with any pertinent new clinical, laboratory or radiographic findings

## 2024-12-09 NOTE — CONSULT NOTE ADULT - SUBJECTIVE AND OBJECTIVE BOX
INTERVENTIONAL RADIOLOGY CONSULT:     Procedure Requested:     HPI:  84-year-old female with PMHx of Paroxysmal A-fib (was diagnosed in last admission and not on AC due to anemia due to L chest wall hematoma), Hx of ICH (in 2023), hx of Staphylococcus hominis bacteremia, Hx of Cardiopulmonary Arrest Secondary to Aspiration, history of 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, hypothyroidism , GERD, and Glaucoma presenting for facial droop.    Patient resides at [facility] where  reports that he was visiting her this morning when he noted left-sided facial droop which is since resolved.  Last known well prior was last night when nursing home was giving her night meds.  At baseline, she is quadriplegic, does not converse and speaks a few words at times.    No other symptoms were reported    In the ED initially, her vitals were stable  Stroke imaging was done and was unremarkable  Seen by neuro   After that patient was given rocephin for presumed UTI, ED suspect that after that, she became hypotensive and tachycardic with increased work of breathing, was given epi and then started on an epi drip + solumedrol for a working Dx of septic and anaphylactic shock    Labs: Leukocytosis, elevated lactate and trop (312 -> 293)    CT brain perfusion: No evidence of acute infarct or ischemia.    CTA neck: No stenosis. No dissection.    CTA brain: No stenosis or aneurysm.    CT angio chest showed right pulmonary embolus with right heart strain.    UA positive   (08 Dec 2024 15:23)      PAST MEDICAL & SURGICAL HISTORY:  Dementia      History of breast cancer      Constipation      Lewy body dementia without behavioral disturbance      Colon polyp      History of colon resection      H/O mastectomy, right          MEDICATIONS  (STANDING):  artificial  tears Solution 1 Drop(s) Both EYES two times a day  aztreonam  IVPB      aztreonam  IVPB 2000 milliGRAM(s) IV Intermittent every 8 hours  carbidopa/levodopa  10/100 1 Tablet(s) Oral three times a day  cholecalciferol 1000 Unit(s) Oral daily  EPINEPHrine    Infusion 0.1 MICROgram(s)/kG/Min (33.1 mL/Hr) IV Continuous <Continuous>  heparin  Infusion.  Unit(s)/Hr (17 mL/Hr) IV Continuous <Continuous>  lactated ringers. 1000 milliLiter(s) (100 mL/Hr) IV Continuous <Continuous>  latanoprost 0.005% Ophthalmic Solution 1 Drop(s) Both EYES at bedtime  levothyroxine 50 MICROGram(s) Oral daily  norepinephrine Infusion 0.05 MICROgram(s)/kG/Min (8.28 mL/Hr) IV Continuous <Continuous>  polyethylene glycol 3350 17 Gram(s) Oral daily  senna 2 Tablet(s) Oral at bedtime  timolol 0.5% Solution 1 Drop(s) Both EYES two times a day    MEDICATIONS  (PRN):  heparin   Injectable 7500 Unit(s) IV Push every 6 hours PRN For aPTT less than 40  heparin   Injectable 3500 Unit(s) IV Push every 6 hours PRN For aPTT between 40 - 57      Allergies    penicillin (Anaphylaxis)  Originally Entered as [Unknown] reaction to [green pepper] (Unknown)  Originally Entered as [Unknown] reaction to [red pepper] (Unknown)  ciprofloxacin (Unknown)  benzodiazepines (Unknown)  Originally Entered as [Unknown] reaction to [neuroleptics] (Unknown)  antichlolinergics (Unknown)  Originally Entered as [Unknown] reaction to [pepper] (Unknown)    Intolerances        Social History:   Smoking: Yes [ ]  No [ ]   ______pk yrs  ETOH  Yes [ ]  No [ ]  Social [ ]  DRUGS:  Yes [ ]  No [ ]  if so what______________    FAMILY HISTORY:  No pertinent family history in first degree relatives        Physical Exam:   Vital Signs Last 24 Hrs  T(C): 36.3 (09 Dec 2024 08:00), Max: 37.9 (08 Dec 2024 16:00)  T(F): 97.4 (09 Dec 2024 08:00), Max: 100.2 (08 Dec 2024 16:00)  HR: 91 (09 Dec 2024 08:00) (77 - 153)  BP: 125/71 (09 Dec 2024 08:00) (83/53 - 138/52)  BP(mean): 93 (09 Dec 2024 08:00) (62 - 93)  RR: 23 (09 Dec 2024 08:00) (18 - 26)  SpO2: 100% (09 Dec 2024 08:00) (94% - 100%)    Parameters below as of 09 Dec 2024 08:00  Patient On (Oxygen Delivery Method): BiPAP/CPAP    O2 Concentration (%): 100    GENERAL: Resting comfortably in bed. NAD.  NEURO: Alert and oriented x3.  CARDIAC: Normal heart rate.  RESPIRATORY: Breathing comfortably on room air.  ABDOMEN: Soft, nontender.  EXTREMITIES: No peripheral edema.      Labs:                         10.0   22.01 )-----------( 259      ( 09 Dec 2024 05:23 )             31.8     12-09    137  |  103  |  32[H]  ----------------------------<  152[H]  4.6   |  23  |  1.1    Ca    8.1[L]      09 Dec 2024 05:23  Phos  4.3     12-09  Mg     2.1     12-09    TPro  5.9[L]  /  Alb  3.1[L]  /  TBili  0.2  /  DBili  x   /  AST  14  /  ALT  6   /  AlkPhos  57  12-09    PT/INR - ( 08 Dec 2024 11:12 )   PT: 11.00 sec;   INR: 0.93 ratio         PTT - ( 09 Dec 2024 02:01 )  PTT:99.6 sec    Pertinent labs:                      10.0   22.01 )-----------( 259      ( 09 Dec 2024 05:23 )             31.8       12-09    137  |  103  |  32[H]  ----------------------------<  152[H]  4.6   |  23  |  1.1    Ca    8.1[L]      09 Dec 2024 05:23  Phos  4.3     12-09  Mg     2.1     12-09    TPro  5.9[L]  /  Alb  3.1[L]  /  TBili  0.2  /  DBili  x   /  AST  14  /  ALT  6   /  AlkPhos  57  12-09      PT/INR - ( 08 Dec 2024 11:12 )   PT: 11.00 sec;   INR: 0.93 ratio         PTT - ( 09 Dec 2024 02:01 )  PTT:99.6 sec    Radiology & Additional Studies:     Radiology imaging reviewed.       ASSESSMENT AND PLAN:    84-year-old female with PMHx of Paroxysmal A-fib (not on AC), Hx of ICH (in 2023), Parkinson's Disease with Lewy Body Dementia, initially admitted for stroke work up. Course complicated by hypotension, which was initially thought to be anaphylactic reaction to antibiotics for UTI treatment. Found to have right main pulmonary artery embolus.     - Imaging and clinical picture reviewed. Hypotension appears to be multifactorial.   - Follow up TTE, LE duplex   - Continue anticoagulation     Thank you for the courtesy of this consult, please call [phone] with any further questions.

## 2024-12-09 NOTE — PROGRESS NOTE ADULT - ASSESSMENT
84-year-old female with PMHx of Paroxysmal A-fib (was diagnosed in last admission and not on AC due to anemia due to L chest wall hematoma), Hx of ICH (in 2023), hx of Staphylococcus hominis bacteremia, Hx of Cardiopulmonary Arrest Secondary to Aspiration, history of 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, hypothyroidism , GERD, COPD and Glaucoma presenting for facial droop. Became hypotensive, found to have PE.    #Hypotension  #2/2 Anaphylaxis to Rocephin vs Sepsis due to UTI vs PE  - Patient had hypotension after receiving Rocephin dose in the ED  - Was given epi then started on epi drip, later switched to levophed  - Now off pressors, BP stable  - Will continue IVF for now  - For the possible UTI (UA++) she was started on Aztreonam  - F/u ID recs  - F/u urine and blood cx    #Acute mixed respiratory failure requiring BIPAP  #Right main pulmonary artery PE with RV strain on CTA - Intermediate/High risk as hypotension reason unclear  #Hx of COPD  - Duplex LE positive for extensive DVT   - Patient was started on IV heparin drip after discussion of risks vs benefits with her  (since she has a hx of ICH in 2023 and hx of chest hematoma as well)  - IR on board, said no need for embolectomy as patient HD stable now  - On BIPAP, will try to wean her down to HFNC today  - F/u TTE  - Monitor PTT and monitor Hb for any drop    #Left facial droop  #R/o stroke   - Stroke code was called in ED  - CTH, CTA head and neck all negative  - Neuro recommended MR brain however very difficult to be done, so they said okay to just repeat CTH  - Now facial droop resolved  - Patient at baseline quadriplegic so can't assess motor function for any further deficits    #History of 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  #Hx of Cardiopulmonary Arrest Secondary to Aspiration  - Continue home meds  - S/S assessment once off BIPAP to evaluate risk of aspiration, NPO for now  - Might need NGT vs PEG?    #Paroxysmal A-fib (was diagnosed in last admission and not on AC due to anemia due to L chest wall hematoma)  #Hx of intracerebral hemorrhage  - Currently in sinus rythm  - Was not on AC due to bleeding hx  - Now on heparin drip for the PE      #Diet: NPO for now pending S/S eval  #DVT pro: heparin drip  #GI pro: pantoprazole  #Activity: as tolerated  #Dispo: MICU  #Code status: FULL CODE for now as per , palliative consult placed for further discussion               84-year-old female with PMHx of Paroxysmal A-fib (was diagnosed in last admission and not on AC due to anemia due to L chest wall hematoma), Hx of ICH (in 2023), hx of Staphylococcus hominis bacteremia, Hx of Cardiopulmonary Arrest Secondary to Aspiration, history of 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, hypothyroidism , GERD, COPD and Glaucoma presenting for facial droop. Became hypotensive, found to have PE.    #Hypotension  #2/2 Anaphylaxis to Rocephin vs Sepsis due to UTI vs PE  - Patient had hypotension after receiving Rocephin dose in the ED  - Was given epi then started on epi drip, later switched to levophed  - Now off pressors, BP stable  - Will continue IVF for now  - For the possible UTI (UA++) she was started on Aztreonam  - F/u ID recs  - F/u urine and blood cx    #Acute mixed respiratory failure requiring BIPAP  #Right main pulmonary artery PE with RV strain on CTA - Intermediate/High risk as hypotension reason unclear  #Hx of COPD  - Duplex LE positive for extensive DVT   - Patient was started on IV heparin drip after discussion of risks vs benefits with her  (since she has a hx of ICH in 2023 and hx of chest hematoma as well)  - IR on board, said no need for embolectomy as patient HD stable now  - On BIPAP, will try to wean her down to HFNC today  - F/u TTE  - Monitor PTT and monitor Hb for any drop    #Left facial droop  #R/o stroke   - Stroke code was called in ED  - CTH, CTA head and neck all negative  - Neuro recommended MR brain however very difficult to be done, so they said okay to just repeat CTH  - Now facial droop resolved  - Patient at baseline quadriplegic so can't assess motor function for any further deficits    #History of 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  #Hx of Cardiopulmonary Arrest Secondary to Aspiration  - Continue home meds  - S/S assessment once off BIPAP to evaluate risk of aspiration, NPO for now  - Might need NGT vs PEG?    #Paroxysmal A-fib (was diagnosed in last admission and not on AC due to anemia due to L chest wall hematoma)  #Hx of intracerebral hemorrhage  - Currently in sinus rythm  - Was not on AC due to bleeding hx  - Now on heparin drip for the PE      #Diet: NPO for now pending S/S eval  #DVT pro: heparin drip  #GI pro: pantoprazole  #Activity: as tolerated  #Dispo: MICU  #Code status: DNI/DNR

## 2024-12-10 LAB
ALBUMIN SERPL ELPH-MCNC: 2.9 G/DL — LOW (ref 3.5–5.2)
ALP SERPL-CCNC: 51 U/L — SIGNIFICANT CHANGE UP (ref 30–115)
ALT FLD-CCNC: <5 U/L — SIGNIFICANT CHANGE UP (ref 0–41)
ANION GAP SERPL CALC-SCNC: 8 MMOL/L — SIGNIFICANT CHANGE UP (ref 7–14)
APTT BLD: 73.3 SEC — CRITICAL HIGH (ref 27–39.2)
AST SERPL-CCNC: 23 U/L — SIGNIFICANT CHANGE UP (ref 0–41)
BASOPHILS # BLD AUTO: 0.05 K/UL — SIGNIFICANT CHANGE UP (ref 0–0.2)
BASOPHILS NFR BLD AUTO: 0.3 % — SIGNIFICANT CHANGE UP (ref 0–1)
BILIRUB SERPL-MCNC: <0.2 MG/DL — SIGNIFICANT CHANGE UP (ref 0.2–1.2)
BLD GP AB SCN SERPL QL: SIGNIFICANT CHANGE UP
BUN SERPL-MCNC: 27 MG/DL — HIGH (ref 10–20)
CALCIUM SERPL-MCNC: 8.2 MG/DL — LOW (ref 8.4–10.5)
CHLORIDE SERPL-SCNC: 105 MMOL/L — SIGNIFICANT CHANGE UP (ref 98–110)
CO2 SERPL-SCNC: 25 MMOL/L — SIGNIFICANT CHANGE UP (ref 17–32)
CREAT SERPL-MCNC: 0.8 MG/DL — SIGNIFICANT CHANGE UP (ref 0.7–1.5)
EGFR: 73 ML/MIN/1.73M2 — SIGNIFICANT CHANGE UP
EOSINOPHIL # BLD AUTO: 0.26 K/UL — SIGNIFICANT CHANGE UP (ref 0–0.7)
EOSINOPHIL NFR BLD AUTO: 1.6 % — SIGNIFICANT CHANGE UP (ref 0–8)
GLUCOSE SERPL-MCNC: 81 MG/DL — SIGNIFICANT CHANGE UP (ref 70–99)
HCT VFR BLD CALC: 27.6 % — LOW (ref 37–47)
HCT VFR BLD CALC: 27.8 % — LOW (ref 37–47)
HCT VFR BLD CALC: 28.7 % — LOW (ref 37–47)
HGB BLD-MCNC: 8.4 G/DL — LOW (ref 12–16)
HGB BLD-MCNC: 8.4 G/DL — LOW (ref 12–16)
HGB BLD-MCNC: 8.6 G/DL — LOW (ref 12–16)
IMM GRANULOCYTES NFR BLD AUTO: 0.8 % — HIGH (ref 0.1–0.3)
LYMPHOCYTES # BLD AUTO: 16.1 % — LOW (ref 20.5–51.1)
LYMPHOCYTES # BLD AUTO: 2.55 K/UL — SIGNIFICANT CHANGE UP (ref 1.2–3.4)
MAGNESIUM SERPL-MCNC: 1.8 MG/DL — SIGNIFICANT CHANGE UP (ref 1.8–2.4)
MCHC RBC-ENTMCNC: 30 G/DL — LOW (ref 32–37)
MCHC RBC-ENTMCNC: 30.2 G/DL — LOW (ref 32–37)
MCHC RBC-ENTMCNC: 30.4 G/DL — LOW (ref 32–37)
MCHC RBC-ENTMCNC: 31 PG — SIGNIFICANT CHANGE UP (ref 27–31)
MCHC RBC-ENTMCNC: 31.7 PG — HIGH (ref 27–31)
MCHC RBC-ENTMCNC: 31.8 PG — HIGH (ref 27–31)
MCV RBC AUTO: 103.6 FL — HIGH (ref 81–99)
MCV RBC AUTO: 104.5 FL — HIGH (ref 81–99)
MCV RBC AUTO: 104.9 FL — HIGH (ref 81–99)
MONOCYTES # BLD AUTO: 1 K/UL — HIGH (ref 0.1–0.6)
MONOCYTES NFR BLD AUTO: 6.3 % — SIGNIFICANT CHANGE UP (ref 1.7–9.3)
MRSA PCR RESULT.: NEGATIVE — SIGNIFICANT CHANGE UP
NEUTROPHILS # BLD AUTO: 11.83 K/UL — HIGH (ref 1.4–6.5)
NEUTROPHILS NFR BLD AUTO: 74.9 % — SIGNIFICANT CHANGE UP (ref 42.2–75.2)
NRBC # BLD: 0 /100 WBCS — SIGNIFICANT CHANGE UP (ref 0–0)
PLATELET # BLD AUTO: 247 K/UL — SIGNIFICANT CHANGE UP (ref 130–400)
PLATELET # BLD AUTO: 250 K/UL — SIGNIFICANT CHANGE UP (ref 130–400)
PLATELET # BLD AUTO: 264 K/UL — SIGNIFICANT CHANGE UP (ref 130–400)
PMV BLD: 10 FL — SIGNIFICANT CHANGE UP (ref 7.4–10.4)
PMV BLD: 10.3 FL — SIGNIFICANT CHANGE UP (ref 7.4–10.4)
PMV BLD: 9.7 FL — SIGNIFICANT CHANGE UP (ref 7.4–10.4)
POTASSIUM SERPL-MCNC: 3.9 MMOL/L — SIGNIFICANT CHANGE UP (ref 3.5–5)
POTASSIUM SERPL-SCNC: 3.9 MMOL/L — SIGNIFICANT CHANGE UP (ref 3.5–5)
PROCALCITONIN SERPL-MCNC: 3.4 NG/ML — HIGH (ref 0.02–0.1)
PROT SERPL-MCNC: 4.8 G/DL — LOW (ref 6–8)
RBC # BLD: 2.64 M/UL — LOW (ref 4.2–5.4)
RBC # BLD: 2.65 M/UL — LOW (ref 4.2–5.4)
RBC # BLD: 2.77 M/UL — LOW (ref 4.2–5.4)
RBC # FLD: 16.2 % — HIGH (ref 11.5–14.5)
RBC # FLD: 16.5 % — HIGH (ref 11.5–14.5)
RBC # FLD: 16.6 % — HIGH (ref 11.5–14.5)
SODIUM SERPL-SCNC: 138 MMOL/L — SIGNIFICANT CHANGE UP (ref 135–146)
VANCOMYCIN FLD-MCNC: 15 UG/ML — HIGH (ref 5–10)
WBC # BLD: 15.82 K/UL — HIGH (ref 4.8–10.8)
WBC # BLD: 16.17 K/UL — HIGH (ref 4.8–10.8)
WBC # BLD: 17.19 K/UL — HIGH (ref 4.8–10.8)
WBC # FLD AUTO: 15.82 K/UL — HIGH (ref 4.8–10.8)
WBC # FLD AUTO: 16.17 K/UL — HIGH (ref 4.8–10.8)
WBC # FLD AUTO: 17.19 K/UL — HIGH (ref 4.8–10.8)

## 2024-12-10 PROCEDURE — 99291 CRITICAL CARE FIRST HOUR: CPT

## 2024-12-10 PROCEDURE — 74176 CT ABD & PELVIS W/O CONTRAST: CPT | Mod: 26

## 2024-12-10 PROCEDURE — 99232 SBSQ HOSP IP/OBS MODERATE 35: CPT

## 2024-12-10 RX ORDER — ENOXAPARIN SODIUM 30 MG/.3ML
90 INJECTION SUBCUTANEOUS EVERY 12 HOURS
Refills: 0 | Status: DISCONTINUED | OUTPATIENT
Start: 2024-12-10 | End: 2024-12-14

## 2024-12-10 RX ORDER — HEPARIN SODIUM,PORCINE 1000/ML
VIAL (ML) INJECTION
Qty: 25000 | Refills: 0 | Status: DISCONTINUED | OUTPATIENT
Start: 2024-12-10 | End: 2024-12-10

## 2024-12-10 RX ORDER — HEPARIN SODIUM,PORCINE 1000/ML
7500 VIAL (ML) INJECTION EVERY 6 HOURS
Refills: 0 | Status: DISCONTINUED | OUTPATIENT
Start: 2024-12-10 | End: 2024-12-10

## 2024-12-10 RX ORDER — HEPARIN SODIUM,PORCINE 1000/ML
7500 VIAL (ML) INJECTION ONCE
Refills: 0 | Status: DISCONTINUED | OUTPATIENT
Start: 2024-12-10 | End: 2024-12-10

## 2024-12-10 RX ORDER — ENOXAPARIN SODIUM 30 MG/.3ML
90 INJECTION SUBCUTANEOUS EVERY 12 HOURS
Refills: 0 | Status: DISCONTINUED | OUTPATIENT
Start: 2024-12-10 | End: 2024-12-10

## 2024-12-10 RX ORDER — HEPARIN SODIUM,PORCINE 1000/ML
3500 VIAL (ML) INJECTION EVERY 6 HOURS
Refills: 0 | Status: DISCONTINUED | OUTPATIENT
Start: 2024-12-10 | End: 2024-12-10

## 2024-12-10 RX ORDER — AMIODARONE HYDROCHLORIDE 200 MG/1
200 TABLET ORAL
Refills: 0 | Status: DISCONTINUED | OUTPATIENT
Start: 2024-12-10 | End: 2024-12-10

## 2024-12-10 RX ORDER — VANCOMYCIN HCL 900 MCG/MG
750 POWDER (GRAM) MISCELLANEOUS EVERY 12 HOURS
Refills: 0 | Status: DISCONTINUED | OUTPATIENT
Start: 2024-12-10 | End: 2024-12-11

## 2024-12-10 RX ORDER — NYSTATIN 100000 [USP'U]/G
1 POWDER TOPICAL
Refills: 0 | Status: DISCONTINUED | OUTPATIENT
Start: 2024-12-10 | End: 2024-12-17

## 2024-12-10 RX ORDER — HYDROCORTISONE ACETATE 25 MG/ML
50 VIAL (ML) INJECTION EVERY 6 HOURS
Refills: 0 | Status: DISCONTINUED | OUTPATIENT
Start: 2024-12-10 | End: 2024-12-11

## 2024-12-10 RX ORDER — HEPARIN SODIUM,PORCINE 1000/ML
11 VIAL (ML) INJECTION
Qty: 25000 | Refills: 0 | Status: DISCONTINUED | OUTPATIENT
Start: 2024-12-10 | End: 2024-12-10

## 2024-12-10 RX ADMIN — ENOXAPARIN SODIUM 90 MILLIGRAM(S): 30 INJECTION SUBCUTANEOUS at 09:55

## 2024-12-10 RX ADMIN — Medication 50 MICROGRAM(S): at 05:39

## 2024-12-10 RX ADMIN — Medication 25 GRAM(S): at 08:53

## 2024-12-10 RX ADMIN — LATANOPROST 1 DROP(S): 50 SOLUTION/ DROPS OPHTHALMIC at 21:53

## 2024-12-10 RX ADMIN — Medication 50 MILLIGRAM(S): at 09:03

## 2024-12-10 RX ADMIN — Medication 1000 UNIT(S): at 12:33

## 2024-12-10 RX ADMIN — Medication 50 MILLIGRAM(S): at 21:52

## 2024-12-10 RX ADMIN — POLYETHYLENE GLYCOL 3350 17 GRAM(S): 17 POWDER, FOR SOLUTION ORAL at 12:33

## 2024-12-10 RX ADMIN — CARBIDOPA AND LEVODOPA 1 TABLET(S): 10; 100 TABLET ORAL at 05:38

## 2024-12-10 RX ADMIN — CARBIDOPA AND LEVODOPA 1 TABLET(S): 10; 100 TABLET ORAL at 14:09

## 2024-12-10 RX ADMIN — Medication 250 MILLIGRAM(S): at 17:24

## 2024-12-10 RX ADMIN — POVIDONE, POLYVINYL ALCOHOL 1 DROP(S): 20; 27 SOLUTION OPHTHALMIC at 05:39

## 2024-12-10 RX ADMIN — CARBIDOPA AND LEVODOPA 1 TABLET(S): 10; 100 TABLET ORAL at 22:34

## 2024-12-10 RX ADMIN — Medication 1 DROP(S): at 05:39

## 2024-12-10 RX ADMIN — POVIDONE, POLYVINYL ALCOHOL 1 DROP(S): 20; 27 SOLUTION OPHTHALMIC at 17:24

## 2024-12-10 RX ADMIN — Medication 1 DROP(S): at 17:25

## 2024-12-10 RX ADMIN — Medication 50 MILLIGRAM(S): at 05:38

## 2024-12-10 RX ADMIN — Medication 250 MILLIGRAM(S): at 23:52

## 2024-12-10 RX ADMIN — Medication 50 MILLIGRAM(S): at 14:09

## 2024-12-10 RX ADMIN — Medication 50 MILLIGRAM(S): at 16:06

## 2024-12-10 RX ADMIN — Medication 50 MILLIGRAM(S): at 21:53

## 2024-12-10 RX ADMIN — ENOXAPARIN SODIUM 90 MILLIGRAM(S): 30 INJECTION SUBCUTANEOUS at 21:53

## 2024-12-10 NOTE — DIETITIAN INITIAL EVALUATION ADULT - OTHER CALCULATIONS
Using ABW: ENERGY: 9967-4617 kcal/day (MSJ 1422.10*1-1.2 SF); PROTEIN:  g/day (1.5-2 g/kg IBW 63.6 KG); FLUID: 2283 mL/day (25 mL/kg) -- with consideration for age, BMI, acuity of illness

## 2024-12-10 NOTE — PROGRESS NOTE ADULT - SUBJECTIVE AND OBJECTIVE BOX
SUBJECTIVE/OVERNIGHT EVENTS  Today is hospital day 2d. This morning patient was seen and examined at bedside, resting comfortably in bed. No acute or major events overnight.    MEDICATIONS  STANDING MEDICATIONS  artificial  tears Solution 1 Drop(s) Both EYES two times a day  aztreonam  IVPB 1000 milliGRAM(s) IV Intermittent every 8 hours  carbidopa/levodopa  10/100 1 Tablet(s) Oral three times a day  cholecalciferol 1000 Unit(s) Oral daily  enoxaparin Injectable 90 milliGRAM(s) SubCutaneous every 12 hours  hydrocortisone sodium succinate Injectable 50 milliGRAM(s) IV Push every 6 hours  lactated ringers Bolus 500 milliLiter(s) IV Bolus once  lactated ringers. 1000 milliLiter(s) IV Continuous <Continuous>  latanoprost 0.005% Ophthalmic Solution 1 Drop(s) Both EYES at bedtime  levothyroxine 50 MICROGram(s) Oral daily  norepinephrine Infusion 0.05 MICROgram(s)/kG/Min IV Continuous <Continuous>  polyethylene glycol 3350 17 Gram(s) Oral daily  senna 2 Tablet(s) Oral at bedtime  timolol 0.5% Solution 1 Drop(s) Both EYES two times a day    PRN MEDICATIONS    VITALS  T(F): 97 (12-10-24 @ 08:00), Max: 97.8 (12-10-24 @ 00:00)  HR: 74 (12-10-24 @ 08:00) (74 - 95)  BP: 91/49 (12-10-24 @ 08:00) (84/42 - 134/61)  RR: 15 (12-10-24 @ 08:00) (14 - 27)  SpO2: 100% (12-10-24 @ 08:00) (91% - 100%)    PHYSICAL EXAM  Conscious, minimally verbal, not oriented  On HFNC, not in distress  GBAE, clear lungs  Regular S1S2, no murmurs  Soft abdomen, non tender  No LLE  Quadriplegic      LABS             8.6    15.82 )-----------( 264      ( 12-10-24 @ 05:30 )             28.7     138  |  105  |  27  -------------------------<  81   12-10-24 @ 05:30  3.9  |  25  |  0.8    Ca      8.2     12-10-24 @ 05:30  Phos   4.3     12-09-24 @ 05:23  Mg     1.8     12-10-24 @ 05:30    TPro  4.8  /  Alb  2.9  /  TBili  <0.2  /  DBili  x   /  AST  23  /  ALT  <5  /  AlkPhos  51  /  GGT  x     12-10-24 @ 05:30    PT/INR - ( 12-08-24 @ 11:12 )   PT: 11.00 sec;   INR: 0.93 ratio  PTT - ( 12-10-24 @ 03:35 )  PTT:73.3 sec    Pro-Brain Natriuretic Peptide: 754 pg/mL (12-08-24 @ 21:13)  Troponin T, High Sensitivity Result: 293 ng/L (12-08-24 @ 14:03)  Troponin T, High Sensitivity Result: 312 ng/L (12-08-24 @ 11:12)    Urinalysis Basic - ( 10 Dec 2024 05:30 )    Color: x / Appearance: x / SG: x / pH: x  Gluc: 81 mg/dL / Ketone: x  / Bili: x / Urobili: x   Blood: x / Protein: x / Nitrite: x   Leuk Esterase: x / RBC: x / WBC x   Sq Epi: x / Non Sq Epi: x / Bacteria: x          Culture - Blood (collected 08 Dec 2024 21:13)  Source: .Blood BLOOD  Preliminary Report (10 Dec 2024 04:01):    No growth at 24 hours    Urinalysis with Rflx Culture (collected 08 Dec 2024 12:36)    Culture - Urine (collected 08 Dec 2024 12:36)  Source: Clean Catch None  Preliminary Report (10 Dec 2024 04:10):    >100,000 CFU/ml Gram Negative Rods      IMAGING

## 2024-12-10 NOTE — PROGRESS NOTE ADULT - ASSESSMENT
84-year-old female with PMHx of Paroxysmal A-fib (was diagnosed in last admission and not on AC due to anemia due to L chest wall hematoma), Hx of ICH (in 2023), hx of Staphylococcus hominis bacteremia, Hx of Cardiopulmonary Arrest Secondary to Aspiration, history of 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, hypothyroidism , GERD, COPD and Glaucoma presenting for facial droop. Became hypotensive, found to have PE.    #Hypotension  #2/2 Anaphylaxis to Rocephin vs Sepsis due to UTI vs PE  - Patient had hypotension after receiving Rocephin dose in the ED  - Was given epi then started on epi drip, later switched to levophed  - Now off pressors, BP is soft  - Will continue IVF for now  - HC stress dose given today  - For the possible UTI (UA++) she was started on Aztreonam  - ID consulted: [email or web address]+Vanco, so will f/u vanc level and dose it accordingly  - UCx showing GNR  - F/u final urine and blood cx    #Acute mixed respiratory failure requiring BIPAP  #Right main pulmonary artery PE with RV strain on CTA - Intermediate/High risk as hypotension reason unclear  #Hx of COPD  - Duplex LE positive for extensive DVT on the left  - Patient was started on IV heparin drip after discussion of risks vs benefits with her  (since she has a hx of ICH in 2023 and hx of chest hematoma as well)  - IR on board, said no need for embolectomy as patient HD stable now  - On BIPAP overnight, HFNC during the day saturating well  - TTE was technically difficult, showed normal EF, no RV strain  - Will switch heparin drip to lovenox today    #Drop in Hb  - Likely just back to her baseline as initial Hb of 11.6 too high compared to baseline  - 12/9 CT A/P done to r/o hematoma, was negative  - No melena or hematochezia, normal BM  - Will just monitor Hb for any further drop    #Left facial droop  #R/o stroke   - Stroke code was called in ED  - CTH, CTA head and neck all negative  - Neuro recommended MR brain however very difficult to be done, so they said okay to just repeat CTH  - Now facial droop resolved  - Patient at baseline quadriplegic so can't assess motor function for any further deficits    #Urinary retention on bladder scan  Pinon inserted 12/10    #History of 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  #Hx of Cardiopulmonary Arrest Secondary to Aspiration  - Continue home meds  - S/S recs: pureed diet with moderately thick fluids    #Paroxysmal A-fib (was diagnosed in last admission and not on AC due to anemia due to L chest wall hematoma)  #Hx of intracerebral hemorrhage  - Currently in sinus rythm  - Was not on AC due to bleeding hx  - Now on heparin drip for the PE, will switch to lovenox today      #Diet: Pureed with moderately thick fluids  #DVT pro: lovenox  #GI pro: pantoprazole  #Activity: as tolerated  #Dispo: MICU  #Code status: DNI/DNR

## 2024-12-10 NOTE — SWALLOW BEDSIDE ASSESSMENT ADULT - SLP GENERAL OBSERVATIONS
PT received in bed on HFNC 40/40, woke to stim, +dysphonic, intermittently verbally responsive.
Pt received in bed awake and alert, spouse at b/s. Pts spouse reported pt doing well, is at baseline communication and mentation status. Is aware of risks for aspiration on current po diet

## 2024-12-10 NOTE — DIETITIAN INITIAL EVALUATION ADULT - ADD RECOMMEND
1. Continue with current diet order; if appetite declines, supplement with Prosource Gelatein Plus (suitable for moderately thick liquids) -- provides 160 kcal, 20g pro per cup  2. Encourage PO intake and assist during meals prn    High risk f/u

## 2024-12-10 NOTE — DIETITIAN INITIAL EVALUATION ADULT - OTHER INFO
Pertinent Medical Information: Per H&P, pt is a 83 y/o female with PMHx of Paroxysmal A-fib (was diagnosed in last admission and not on AC due to anemia due to L chest wall hematoma), Hx of ICH (in 2023), hx of Staphylococcus hominis bacteremia, Hx of Cardiopulmonary Arrest Secondary to Aspiration, history of Parkinson's Disease with Lewy Body Dementia/functional quadriplegic, hypothyroidism, GERD, and Glaucoma presenting for facial droop.  # Hypotension  # 2/2 Anaphylaxis to Rocephin vs Sepsis due to UTI vs PE  #Acute mixed respiratory failure requiring BIPAP  #Right main pulmonary artery PE with RV strain on CTA - Intermediate/High risk as hypotension reason unclear  #R/o stroke   - Stroke code was called in ED  - Now facial droop resolved    Per SLP note on 09-Dec-2024, recommend puree diet w/ moderately thick liquids.     Pertinent Subjective Information: PO intake not documented per flow sheet. Unable to observe breakfast tray at bedside this morning.     Weight hx: UBW unknown. Current dosing weight is 91.3 KG. Previous admission weight was 95.6 KG on 8/25/24 per HIE. 4.5% unintentional weight loss in over 3 months compared to current dosing weight. Pt does not meet weight criteria for malnutrition at this time.

## 2024-12-10 NOTE — PROGRESS NOTE ADULT - ASSESSMENT
84-year-old female with PMHx of Paroxysmal A-fib (was diagnosed in last admission and not on AC due to anemia due to L chest wall hematoma), Hx of ICH (in 2023), hx of Staphylococcus hominis bacteremia, Hx of Cardiopulmonary Arrest Secondary to Aspiration, history of 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, hypothyroidism , GERD, and Glaucoma presenting for facial droop.    IMPRESSION:    #Acute hypoxic/hypercapnic respiratory failure- R PE with right heart strain  # UTI (was admitted from ed with diagnosis of superimposed anaphylactic shock)  #Leukocytosis  #Paroxysmal A-fib (was diagnosed in last admission and not on AC due to anemia due to L chest wall hematoma)  #Hx of Staphylococcus hominis bacteremia  #Hx of Cardiopulmonary Arrest Secondary to Aspiration  #History of 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  #TIA?  #Hx of intracerebral hemorrhage  #Hypothyroidism   #GERD  #Glaucoma      PLAN:    CNS: Avoid CNS Depressants. AAO times 0. Q4 neuro checks. Continue Parkinson meds    HEENT: Oral care. Aspiration precautions. NPO for now except meds. Speech and swallow eval. Meds through OG tube    PULMONARY: HOB @ 45. Monitor Pulse Ox. Keep > 93%. Supplement as needed. Continue with BiPaP. Duoneb PRN heparin drip     CARDIOVASCULAR:   IVF,  . Started on LR 100cc/hr, wean off epinephrine. f/u TTE.. Cardiac monitoring . F/U repeat lactate    GI: GI prophylaxis. per speech    RENAL: Replete electrolytes as needed.     INFECTIOUS DISEASE: abx per ID    HEMATOLOGICAL: Will start Heparin drip for AC. Dupplex LE . DVT noted    ENDOCRINE: Monitor FS. Continue home Levothyroxine      CODE STATUS: DNR/DNI    DISPOSITION: MICU

## 2024-12-10 NOTE — SWALLOW BEDSIDE ASSESSMENT ADULT - SLP PERTINENT HISTORY OF CURRENT PROBLEM
84-year-old female with PMHx of Paroxysmal A-fib,  Hx of ICH (in 2023), hx of Staphylococcus hominis bacteremia, Hx of Cardiopulmonary Arrest Secondary to Aspiration, history of 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, hypothyroidism , GERD, COPD and Glaucoma presenting for facial droop. Became hypotensive, found to have PE. Right main pulmonary artery PE with RV strain on CTA, weaned from bipap to HFNC 12/9
84-year-old female with PMHx of Paroxysmal A-fib,  Hx of ICH (in 2023), hx of Staphylococcus hominis bacteremia, Hx of Cardiopulmonary Arrest Secondary to Aspiration, history of 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, hypothyroidism , GERD, COPD and Glaucoma presenting for facial droop. Became hypotensive, found to have PE. Right main pulmonary artery PE with RV strain on CTA, weaned from bipap to HFNC 12/9

## 2024-12-10 NOTE — DIETITIAN INITIAL EVALUATION ADULT - ORAL INTAKE PTA/DIET HISTORY
Pt unable to participate in nutrition assessment interview at time of visit this morning; illogical per flow sheet. Hx limited to medical chart.

## 2024-12-10 NOTE — DIETITIAN INITIAL EVALUATION ADULT - PERTINENT MEDS FT
MEDICATIONS  (STANDING):  artificial  tears Solution 1 Drop(s) Both EYES two times a day  aztreonam  IVPB 1000 milliGRAM(s) IV Intermittent every 8 hours  carbidopa/levodopa  10/100 1 Tablet(s) Oral three times a day  cholecalciferol 1000 Unit(s) Oral daily  enoxaparin Injectable 90 milliGRAM(s) SubCutaneous every 12 hours  hydrocortisone sodium succinate Injectable 50 milliGRAM(s) IV Push every 6 hours  lactated ringers Bolus 500 milliLiter(s) IV Bolus once  lactated ringers. 1000 milliLiter(s) (100 mL/Hr) IV Continuous <Continuous>  latanoprost 0.005% Ophthalmic Solution 1 Drop(s) Both EYES at bedtime  levothyroxine 50 MICROGram(s) Oral daily  norepinephrine Infusion 0.05 MICROgram(s)/kG/Min (8.28 mL/Hr) IV Continuous <Continuous>  polyethylene glycol 3350 17 Gram(s) Oral daily  senna 2 Tablet(s) Oral at bedtime  timolol 0.5% Solution 1 Drop(s) Both EYES two times a day  vancomycin  IVPB 750 milliGRAM(s) IV Intermittent every 12 hours    MEDICATIONS  (PRN):

## 2024-12-10 NOTE — DIETITIAN INITIAL EVALUATION ADULT - PERTINENT LABORATORY DATA
12-10    138  |  105  |  27[H]  ----------------------------<  81  3.9   |  25  |  0.8    Ca    8.2[L]      10 Dec 2024 05:30  Phos  4.3     12-09  Mg     1.8     12-10    TPro  4.8[L]  /  Alb  2.9[L]  /  TBili  <0.2  /  DBili  x   /  AST  23  /  ALT  <5  /  AlkPhos  51  12-10

## 2024-12-10 NOTE — PROGRESS NOTE ADULT - SUBJECTIVE AND OBJECTIVE BOX
HPI:  84-year-old female with PMHx of Paroxysmal A-fib (was diagnosed in last admission and not on AC due to anemia due to L chest wall hematoma), Hx of ICH (in 2023), hx of Staphylococcus hominis bacteremia, Hx of Cardiopulmonary Arrest Secondary to Aspiration, history of 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, hypothyroidism , GERD, and Glaucoma presenting for facial droop.    Interval history  -Patient seen at bedside with   -She didnt awaken to voice     ADVANCE DIRECTIVES:     [ ] Full Code [x ] DNR  MOLST  [ ]  Living Will  [ ]   DECISION MAKER(s):  [ ] Health Care Proxy(s)  [x ] Surrogate(s)  [ ] Guardian           Name(s): Phone Number(s):   [place]      BASELINE (I)ADL(s) (prior to admission):    [place]: [ ]Total  [ ] Moderate [ ]Dependent  Palliative Performance Status Version 2:         %    [email or web address]/[email or web address]_performance_[email or web address]_[email or web address]    Allergies    penicillin (Anaphylaxis)  Originally Entered as [Unknown] reaction to [green pepper] (Unknown)  Originally Entered as [Unknown] reaction to [red pepper] (Unknown)  ciprofloxacin (Unknown)  benzodiazepines (Unknown)  Originally Entered as [Unknown] reaction to [neuroleptics] (Unknown)  antichlolinergics (Unknown)  Originally Entered as [Unknown] reaction to [pepper] (Unknown)    Intolerances    MEDICATIONS  (STANDING):  artificial  tears Solution 1 Drop(s) Both EYES two times a day  aztreonam  IVPB 1000 milliGRAM(s) IV Intermittent every 8 hours  carbidopa/levodopa  10/100 1 Tablet(s) Oral three times a day  cholecalciferol 1000 Unit(s) Oral daily  enoxaparin Injectable 90 milliGRAM(s) SubCutaneous every 12 hours  hydrocortisone sodium succinate Injectable 50 milliGRAM(s) IV Push every 6 hours  lactated ringers Bolus 500 milliLiter(s) IV Bolus once  lactated ringers. 1000 milliLiter(s) (100 mL/Hr) IV Continuous <Continuous>  latanoprost 0.005% Ophthalmic Solution 1 Drop(s) Both EYES at bedtime  levothyroxine 50 MICROGram(s) Oral daily  norepinephrine Infusion 0.05 MICROgram(s)/kG/Min (8.28 mL/Hr) IV Continuous <Continuous>  polyethylene glycol 3350 17 Gram(s) Oral daily  senna 2 Tablet(s) Oral at bedtime  timolol 0.5% Solution 1 Drop(s) Both EYES two times a day  vancomycin  IVPB 750 milliGRAM(s) IV Intermittent every 12 hours    MEDICATIONS  (PRN):      PRESENT SYMPTOMS: [x ]Unable to obtain due to poor mentation   Source if other than patient:  [ ]Family   [ ]Team     Pain: [ ]yes [ ]no  ALL PAIN ASSESSMENT COMPONENTS WERE ASKED ABOUT UNLESS OTHERWISE NOTED  QOL impact -   Location -                    Aggravating factors -  Quality -  Radiation -  Timing-  Severity (0-10 scale):  Minimal acceptable level (0-10 scale):     CPOT:    https://www.[email or web address]/getattachment/[email or web address]/Critical-Care-Pain-Observation-Tool-(CPOT)    PAIN AD Score: 0  http://geriatrictoolkit.[email or web address]/cog/painad.pdf (press ctrl +  left click to view)    Dyspnea:                           [ ]None[ ]Mild [ ]Moderate [ ]Severe     Respiratory Distress Observation Scale (RDOS): 1  A score of 0 to 2 signifies little or no respiratory distress, 3 signifies mild distress, scores 4 to 6 indicate moderate distress, and scores greater than 7 signify severe distress  [email or web address]://[email or web address]/sites/default/files/PDFS/532474-gzfhsmsxxyl-vjopqbku-fmigobvxfru-dsvfk.pdf    Anxiety:                             [ ]None[ ]Mild [ ]Moderate [ ]Severe   Fatigue:                             [ ]None[ ]Mild [ ]Moderate [ ]Severe   Nausea:                             [ ]None[ ]Mild [ ]Moderate [ ]Severe   Loss of appetite:              [ ]None[ ]Mild [ ]Moderate [ ]Severe   Constipation:                    [ ]None[ ]Mild [ ]Moderate [ ]Severe    Other Symptoms:  [ ]All other review of systems negative     Palliative Performance Status Version 2:         20%    http://[facility].org/files/news/palliative_performance_scale_[email or web address].pdf    PHYSICAL EXAM:  Vital Signs Last 24 Hrs  T(C): 36.1 (10 Dec 2024 16:00), Max: 36.6 (10 Dec 2024 00:00)  T(F): 97 (10 Dec 2024 16:00), Max: 97.8 (10 Dec 2024 00:00)  HR: 87 (10 Dec 2024 17:30) (74 - 95)  BP: 98/52 (10 Dec 2024 17:30) (84/42 - 115/83)  BP(mean): 73 (10 Dec 2024 17:30) (60 - 96)  RR: 18 (10 Dec 2024 17:30) (14 - 27)  SpO2: 100% (10 Dec 2024 17:30) (91% - 100%)    Parameters below as of 10 Dec 2024 17:30  Patient On (Oxygen Delivery Method): nasal cannula, high flow  O2 Flow (L/min): 40  O2 Concentration (%): 40        GENERAL:  [ ] No acute distress [x ]Lethargic  [ ]Unarousable  [ ]Verbal  [ ]Non-Verbal [ ]Cachexia    BEHAVIORAL/PSYCH:  [ ]Alert and Oriented x  [ ] Anxiety [ ] Delirium [ ] Agitation [x] Calm   EYES: [ ] No scleral icterus [ ] Scleral icterus [ ] Closed  ENMT:  [ ]Dry mouth  [ ]No external oral lesions [ ] No external ear or nose lesions  CARDIOVASCULAR:  [ ]Regular [ ]Irregular [ ]Tachy [ ]Not Tachy  [ ]Jarocho [ ] Edema [ ] No edema  PULMONARY:  [x ]Tachypnea  [ ]Audible excessive secretions [ ] No labored breathing [ ] labored breathing  GASTROINTESTINAL: [ ]Soft  [ ]Distended  [ ]Not distended [ ]Non tender [ ]Tender  MUSCULOSKELETAL: [ ]No clubbing [ ] clubbing  [ ] No cyanosis [ ] cyanosis  NEUROLOGIC: [ ]No focal deficits  [ ]Follows commands  [ ]Does not follow commands  [ x]Cognitive impairment  [ ]Dysphagia  [ ]Dysarthria  [ ]Paresis   SKIN: [ ] Jaundiced [x ] Non-jaundiced [ ]Rash [ ]No Rash [ ] Warm [ ] Dry  MISC/LINES: [ ] ET tube [ ] Trach [ ]NGT/OGT [ ]PEG [ ]Pinon    LABS: reviewed by me                                   8.6    15.82 )-----------( 264      ( 10 Dec 2024 05:30 )             28.7       12-10    138  |  105  |  27[H]  ----------------------------<  81  3.9   |  25  |  0.8    Ca    8.2[L]      10 Dec 2024 05:30  Phos  4.3     12-09  Mg     1.8     12-10    TPro  4.8[L]  /  Alb  2.9[L]  /  TBili  <0.2  /  DBili  x   /  AST  23  /  ALT  <5  /  AlkPhos  51  12-10              Urinalysis Basic - ( 10 Dec 2024 05:30 )    Color: x / Appearance: x / SG: x / pH: x  Gluc: 81 mg/dL / Ketone: x  / Bili: x / Urobili: x   Blood: x / Protein: x / Nitrite: x   Leuk Esterase: x / RBC: x / WBC x   Sq Epi: x / Non Sq Epi: x / Bacteria: x        PTT - ( 10 Dec 2024 03:35 )  PTT:73.3 sec          CAPILLARY BLOOD GLUCOSE      POCT Blood Glucose.: 199 mg/dL (08 Dec 2024 21:28)        CT Abdomen and Pelvis No Cont:   ACC: [id number] EXAM:  CT ABDOMEN AND PELVIS   ORDERED BY: TOSHA CHANDLER     PROCEDURE DATE:  12/10/2024          INTERPRETATION:  CLINICAL INFORMATION: Anemia, downtrending hemoglobin.    COMPARISON: CT abdomen and pelvis 8/27/2024, 8/24/2024.    CONTRAST/COMPLICATIONS:  IV Contrast: NONE  Oral Contrast: NONE    PROCEDURE:  CT of the Abdomen and Pelvis was performed.  Sagittal and coronal reformats were performed.    FINDINGS:  LOWER CHEST: Bibasilar subsegmental atelectasis.    HEPATOBILIARY: Hepatic steatosis. Gallbladder, biliary system   unremarkable.    SPLEEN: Unremarkable.    PANCREAS: Unremarkable.    ADRENAL GLANDS: Unremarkable.    KIDNEYS: Persistent renal enhancement since 12/8/2024 CTA chest, may be   seen in renal insufficiency.No hydronephrosis bilaterally.    ABDOMINOPELVIC NODES: No lymphadenopathy.    PELVIC ORGANS: Calcified fibroid uterus. Urinary bladder distended with   excreted contrast, 1.3 cm left diverticulum. No significant change in   right ovarian 6.5 cm and left ovarian 2.9 cm cystic lesions.    PERITONEUM/MESENTERY/BOWEL: No bowel obstruction. No ascites or   pneumoperitoneum. Colonic diverticulosis. Decreased moderate colorectal   stool burden. No evidence of retroperitoneal hematoma or hemoperitoneum.    BONES/SOFT TISSUES: Right chest wall dystrophic calcification.   Degenerative change of spine. Scoliosis.    OTHER: Vascular calcifications. Right femoral venous catheter.      IMPRESSION:    No evidence of retroperitoneal hematoma or hemoperitoneum.    No significant change in indeterminate right ovarian 6.5 cm and left   ovarian 2.9 cm cystic lesions. Outpatient pelvic ultrasound recommended   for further evaluation.    --- End of Report ---          CHALO PRAKASH MD; Resident Radiologist  This document has been electronically signed.  MARU GREY MD; Attending Radiologist  This document has been electronically signed. Dec 10 2024  6:35AM (12-10-24 @ 05:19)          RADIOLOGY & ADDITIONAL STUDIES: reviewed by me    < from: CT Abdomen and Pelvis No Cont (12.10.24 @ 05:19) >    IMPRESSION:    No evidence of retroperitoneal hematoma or hemoperitoneum.    No significant change in indeterminate right ovarian 6.5 cm and left   ovarian 2.9 cm cystic lesions. Outpatient pelvic ultrasound recommended   for further evaluation.    < end of copied text >        EKG: reviewed by me    < from: 12 Lead ECG (12.08.24 @ 11:42) >  Ventricular Rate 113 BPM    Atrial Rate 113 BPM    P-R Interval 146 ms    QRS Duration 76 ms    Q-T Interval 342 ms    QTC Calculation(Bazett) 469 ms    P Axis 24 degrees    R Axis -33 degrees    T Axis 19 degrees    Diagnosis Line Sinus tachycardia  Left axis deviation  Inferior infarct , age undetermined  Abnormal ECG    < end of copied text >      PROTEIN CALORIE MALNUTRITION PRESENT: [ ]mild [ ]moderate [ ]severe [ ]underweight [ ]morbid obesity  https://www.[facility].org/vault/2440/web/files/ONC/Table_Clinical%20Characteristics%20to%20Document%20Malnutrition-White%20JV%20et%20al%202012.pdf    Height (cm): 172.7 (12-08-24 @ 17:30), 165.1 (08-25-24 @ 22:13)  Weight (kg): 91.3 (12-08-24 @ 17:30), 95.6 (08-25-24 @ 22:13)  BMI (kg/m2): 30.6 (12-08-24 @ 17:30), 35.1 (08-25-24 @ 22:13)  [ ]PPSV2 < or = to 30% [ ]significant weight loss  [ ]poor nutritional intake  [ ]anasarca      [ ]Artificial Nutrition      Palliative Care Spiritual/Emotional Screening Tool Question  Severity (0-4):                    OR                    [ x] Unable to determine. Will assess at later time if appropriate.  Score of 2 or greater indicates recommendation of Chaplaincy and/or SW referral  Chaplaincy Referral: [ ] Yes [ ] Refused [ ] Following     Caregiver [place]:  [ ] Yes [ ] No    OR    [x ] Unable to determine. Will assess at later time if appropriate.  Social Work Referral [ ]  Patient and Family Centered Care Referral [ ]    Anticipatory Grief Present: [ ] Yes [ ] No    OR     [ x] Unable to determine. Will assess at later time if appropriate.  Social Work Referral [ ]  Patient and Family Centered Care Referral [ ]    Patient discussed with primary medical team MD  Palliative care education provided to patient and/or family

## 2024-12-10 NOTE — PROGRESS NOTE ADULT - ASSESSMENT
ASSESSMENT  85 y/o Female with a PMH of Paroxysmal A-fib (was diagnosed in last admission and not on AC due to anemia due to L chest wall hematoma), Hx of ICH (in 2023), Hx of Cardiopulmonary Arrest Secondary to Aspiration, 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, hypothyroidism , GERD, and Glaucoma presenting for facial droop.     Pt was initially admitted for stroke work up. Stroke imaging was done and was unremarkable.  Patient was given Rocephin for presumed UTI, ED suspect that after that, she became hypotensive and tachycardic with increased work of breathing, was given epi and then started on an epi drip + solumedrol for a working Dx of septic and anaphylactic shock    Pt was found to have right main pulmonary artery embolus.     IMPRESSION  Admitted for possible CVA (Brain imaging wnl), found to have acute PE and R heart strain  #Suspected anaphylactic shock to Ceftriaxone  #Acute hypoxic/hypercapnic respiratory failure- R PE with right heart strain  #Leukocytosis, possible acute cystitis     12/8 BCX NG    12/8 UCX > 100k GNR; UA WBC 96 + epith, poor sample  #Lactic acidosis  #Elevated troponin  #Paroxysmal A-fib (was diagnosed in last admission and not on AC due to anemia due to L chest wall hematoma)  #Hx of Cardiopulmonary Arrest Secondary to Aspiration  #History of 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  #Allergies- cipro, PCN    RECOMMENDATIONS  - Please add ceftriaxone to allergies  - f/u UCX GNR  - Aztreonam 1g q8h IV   - No further Vanco  - Would benefit from allergy testing as an outpatient     If any questions, please text or call on [employer] Teams  Please continue to update ID with any pertinent new clinical, laboratory or radiographic findings

## 2024-12-10 NOTE — DIETITIAN INITIAL EVALUATION ADULT - NAME AND PHONE
Vernell [phone] or TEAMS    Nutrition Monitoring: Diet order, PO intake, weights, labs, NFPF, body composition, BM and tolerance to medical food supplements.

## 2024-12-10 NOTE — PROGRESS NOTE ADULT - ASSESSMENT
84-year-old female with PMHx of Paroxysmal A-fib (was diagnosed in last admission and not on AC due to anemia due to L chest wall hematoma), Hx of ICH (in 2023), hx of Staphylococcus hominis bacteremia, Hx of Cardiopulmonary Arrest Secondary to Aspiration, history of 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, hypothyroidism , GERD, COPD and Glaucoma presenting for facial droop. Patient with anaphylaxis on arrival and respiratory failure in setting of pulmonary embolism. Facial droop resolved.  Palliative care consulted for GOC.    Spoke with patient's  at bedside. All questions answered. Will follow for GOC.    Recommendations  -DNR/DNI, trial of NIV  -ongoing medical management  -pressors and antibiotics per primary team  -neuro workup as appropriate per neuro  -HFNC management per ICU team  -lovenox per ICU team  -will follow    MEDD (morphine equivalent daily dose):    Education about palliative care provided to patient/family.  See Recs below.    Please call [phone] with questions or concerns 24/7.   We will continue to follow.

## 2024-12-10 NOTE — SWALLOW BEDSIDE ASSESSMENT ADULT - SWALLOW EVAL: DIAGNOSIS
+toleration of puree and mod thick liquids w/ min overt s/s of penetration/aspiration
ate 90% of breakfast w/o any overt s/s of penetration/aspiration

## 2024-12-10 NOTE — PROGRESS NOTE ADULT - SUBJECTIVE AND OBJECTIVE BOX
Over Night Events: Events noted, dec hb s CT AP,  cc, on HHFNC 40/40  PHYSICAL EXAM    ICU Vital Signs Last 24 Hrs  T(C): 36.2 (10 Dec 2024 04:00), Max: 36.6 (10 Dec 2024 00:00)  T(F): 97.2 (10 Dec 2024 04:00), Max: 97.8 (10 Dec 2024 00:00)  HR: 76 (10 Dec 2024 07:00) (76 - 95)  BP: 92/46 (10 Dec 2024 07:00) (84/42 - 134/61)  BP(mean): 65 (10 Dec 2024 07:00) (60 - 88)  RR: 15 (10 Dec 2024 07:00) (14 - 27)  SpO2: 100% (10 Dec 2024 07:00) (91% - 100%)    O2 Parameters below as of 10 Dec 2024 07:00  Patient On (Oxygen Delivery Method): nasal cannula w/ humidification  O2 Flow (L/min): 40  O2 Concentration (%): 40        General: ill looking  Lungs: Bilateral rhonchi  Cardiovascular: KRISH 2.6  Abdomen: Soft, Positive BS  Extremities: No clubbing   Neurological: Non focal       12-09-24 @ 07:01  -  12-10-24 @ 07:00  --------------------------------------------------------  IN:    Heparin Infusion: 238 mL    IV PiggyBack: 400 mL    Lactated Ringers: 2400 mL  Total IN: 3038 mL    OUT:    Intermittent Catheterization - Urethral (mL): 350 mL    Norepinephrine: 0 mL    Voided (mL): 300 mL  Total OUT: 650 mL    Total NET: 2388 mL          LABS:                          8.6    15.82 )-----------( 264      ( 10 Dec 2024 05:30 )             28.7                                               12-10    138  |  105  |  27[H]  ----------------------------<  81  3.9   |  25  |  0.8    Ca    8.2[L]      10 Dec 2024 05:30  Phos  4.3     12-09  Mg     1.8     12-10    TPro  4.8[L]  /  Alb  2.9[L]  /  TBili  <0.2  /  DBili  x   /  AST  23  /  ALT  <5  /  AlkPhos  51  12-10      PT/INR - ( 08 Dec 2024 11:12 )   PT: 11.00 sec;   INR: 0.93 ratio         PTT - ( 10 Dec 2024 03:35 )  PTT:73.3 sec                                       Urinalysis Basic - ( 10 Dec 2024 05:30 )    Color: x / Appearance: x / SG: x / pH: x  Gluc: 81 mg/dL / Ketone: x  / Bili: x / Urobili: x   Blood: x / Protein: x / Nitrite: x   Leuk Esterase: x / RBC: x / WBC x   Sq Epi: x / Non Sq Epi: x / Bacteria: x                                                  LIVER FUNCTIONS - ( 10 Dec 2024 05:30 )  Alb: 2.9 g/dL / Pro: 4.8 g/dL / ALK PHOS: 51 U/L / ALT: <5 U/L / AST: 23 U/L / GGT: x                                                  Culture - Blood (collected 08 Dec 2024 21:13)  Source: .Blood BLOOD  Preliminary Report (10 Dec 2024 04:01):    No growth at 24 hours    Urinalysis with Rflx Culture (collected 08 Dec 2024 12:36)    Culture - Urine (collected 08 Dec 2024 12:36)  Source: Clean Catch None  Preliminary Report (10 Dec 2024 04:10):    >100,000 CFU/ml Gram Negative Rods                                                                                           MEDICATIONS  (STANDING):  artificial  tears Solution 1 Drop(s) Both EYES two times a day  aztreonam  IVPB 1000 milliGRAM(s) IV Intermittent every 8 hours  carbidopa/levodopa  10/100 1 Tablet(s) Oral three times a day  cholecalciferol 1000 Unit(s) Oral daily  heparin  Infusion. 11 Unit(s)/Hr (0.11 mL/Hr) IV Continuous <Continuous>  lactated ringers Bolus 500 milliLiter(s) IV Bolus once  lactated ringers. 1000 milliLiter(s) (100 mL/Hr) IV Continuous <Continuous>  latanoprost 0.005% Ophthalmic Solution 1 Drop(s) Both EYES at bedtime  levothyroxine 50 MICROGram(s) Oral daily  norepinephrine Infusion 0.05 MICROgram(s)/kG/Min (8.28 mL/Hr) IV Continuous <Continuous>  polyethylene glycol 3350 17 Gram(s) Oral daily  senna 2 Tablet(s) Oral at bedtime  timolol 0.5% Solution 1 Drop(s) Both EYES two times a day    MEDICATIONS  (PRN):  heparin   Injectable 7500 Unit(s) IV Push every 6 hours PRN For aPTT less than 40  heparin   Injectable 3500 Unit(s) IV Push every 6 hours PRN For aPTT between 40 - 57      CXR noted

## 2024-12-10 NOTE — PROGRESS NOTE ADULT - SUBJECTIVE AND OBJECTIVE BOX
IMELDA ROLLE  84y, Female  Allergy: penicillin (Anaphylaxis)  Originally Entered as [Unknown] reaction to [green pepper] (Unknown)  Originally Entered as [Unknown] reaction to [red pepper] (Unknown)  ciprofloxacin (Unknown)  benzodiazepines (Unknown)  Originally Entered as [Unknown] reaction to [neuroleptics] (Unknown)  antichlolinergics (Unknown)  Originally Entered as [Unknown] reaction to [pepper] (Unknown)      LOS  2d    CHIEF COMPLAINT: SOB (10 Dec 2024 08:29)      INTERVAL EVENTS/HPI  - No acute events overnight, UCX GNR   - T(F): , Max: 97.8 (12-10-24 @ 00:00)  - Denies any worsening symptoms  - Tolerating medication  - WBC Count: 15.82 (12-10-24 @ 05:30)  WBC Count: 16.17 (12-10-24 @ 01:15)     - Creatinine: 0.8 (12-10-24 @ 05:30)  Creatinine: 1.1 (12-09-24 @ 05:23)       ROS  responds to some questions, negative except as per above    VITALS:  T(F): 97, Max: 97.8 (12-10-24 @ 00:00)  HR: 87  BP: 93/49  RR: 15Vital Signs Last 24 Hrs  T(C): 36.1 (10 Dec 2024 08:00), Max: 36.6 (10 Dec 2024 00:00)  T(F): 97 (10 Dec 2024 08:00), Max: 97.8 (10 Dec 2024 00:00)  HR: 87 (10 Dec 2024 09:00) (74 - 95)  BP: 93/49 (10 Dec 2024 09:00) (84/42 - 112/57)  BP(mean): 68 (10 Dec 2024 09:00) (60 - 78)  RR: 15 (10 Dec 2024 09:00) (14 - 27)  SpO2: 100% (10 Dec 2024 09:00) (91% - 100%)    Parameters below as of 10 Dec 2024 09:00  Patient On (Oxygen Delivery Method): nasal cannula, high flow  O2 Flow (L/min): 40  O2 Concentration (%): 40    PHYSICAL EXAM:  Gen: NAD, resting in bed obese  HEENT: Normocephalic, atraumatic  Neck: supple, no lymphadenopathy  CV: Regular rate & regular rhythm  Lungs: decreased BS at bases, no fremitus  Abdomen: Soft, BS present, suprapubic tenderness to palpation   Ext: Warm, well perfused  Neuro: non focal, awake  Skin: no rash, no erythema  Lines: no phlebitis    FH: Non-contributory  Social Hx: Non-contributory    TESTS & MEASUREMENTS:                        8.6    15.82 )-----------( 264      ( 10 Dec 2024 05:30 )             28.7     12-10    138  |  105  |  27[H]  ----------------------------<  81  3.9   |  25  |  0.8    Ca    8.2[L]      10 Dec 2024 05:30  Phos  4.3     12-09  Mg     1.8     12-10    TPro  4.8[L]  /  Alb  2.9[L]  /  TBili  <0.2  /  DBili  x   /  AST  23  /  ALT  <5  /  AlkPhos  51  12-10      LIVER FUNCTIONS - ( 10 Dec 2024 05:30 )  Alb: 2.9 g/dL / Pro: 4.8 g/dL / ALK PHOS: 51 U/L / ALT: <5 U/L / AST: 23 U/L / GGT: x           Urinalysis Basic - ( 10 Dec 2024 05:30 )    Color: x / Appearance: x / SG: x / pH: x  Gluc: 81 mg/dL / Ketone: x  / Bili: x / Urobili: x   Blood: x / Protein: x / Nitrite: x   Leuk Esterase: x / RBC: x / WBC x   Sq Epi: x / Non Sq Epi: x / Bacteria: x        Culture - Blood (collected 12-08-24 @ 21:13)  Source: .Blood BLOOD  Preliminary Report (12-10-24 @ 04:01):    No growth at 24 hours    Urinalysis with Rflx Culture (collected 12-08-24 @ 12:36)    Culture - Urine (collected 12-08-24 @ 12:36)  Source: Clean Catch None  Preliminary Report (12-10-24 @ 04:10):    >100,000 CFU/ml Gram Negative Rods        Lactate, Blood: 2.5 mmol/L (12-08-24 @ 21:13)  Blood Gas Venous - Lactate: 2.6 mmol/L (12-08-24 @ 19:22)  Blood Gas Venous - Lactate: 5.0 mmol/L (12-08-24 @ 15:18)      INFECTIOUS DISEASES TESTING  Vancomycin Level, Random: 15.0 (12-10-24 @ 09:00)  Procalcitonin: 3.40 (12-08-24 @ 21:13)  Vancomycin Level, Trough: 13.3 (08-29-24 @ 05:10)  Vancomycin Level, Trough: 19.0 (08-28-24 @ 12:25)  MRSA PCR Result.: Negative (08-24-24 @ 15:15)  Procalcitonin: 0.86 (08-24-24 @ 11:43)      INFLAMMATORY MARKERS      RADIOLOGY & ADDITIONAL TESTS:  I have personally reviewed the last available Chest xray  CXR  Xray Chest 1 View- PORTABLE-Urgent:   ACC: [id number] EXAM:  XR CHEST PORTABLE URGENT 1V   ORDERED BY: CARLA CULP     PROCEDURE DATE:  12/08/2024          INTERPRETATION:  CLINICAL HISTORY: Stroke code.    COMPARISON: 9/3/2024.    TECHNIQUE: Portable frontal chest radiograph.    FINDINGS:    Support devices: None.    Cardiac/mediastinum/hilum: Magnified cardiac silhouette.    Lung parenchyma/Pleura: Low lung volumes. No definite infiltrate.   Questionable left basilar atelectasis.    Skeleton/soft tissues: Degenerative changes.      IMPRESSION:    Low lung volumes. No definite infiltrate. See same day chest CT report.    --- End of Report ---            JOSE RAGLAND MD; Attending Radiologist  This document has been electronically signed. Dec  9 2024 12:04AM (12-08-24 @ 12:38)      CT  CT Abdomen and Pelvis No Cont:   ACC: [id number] EXAM:  CT ABDOMEN AND PELVIS   ORDERED BY: TOSHA CHANDLER     PROCEDURE DATE:  12/10/2024          INTERPRETATION:  CLINICAL INFORMATION: Anemia, downtrending hemoglobin.    COMPARISON: CT abdomen and pelvis 8/27/2024, 8/24/2024.    CONTRAST/COMPLICATIONS:  IV Contrast: NONE  Oral Contrast: NONE    PROCEDURE:  CT of the Abdomen and Pelvis was performed.  Sagittal and coronal reformats were performed.    FINDINGS:  LOWER CHEST: Bibasilar subsegmental atelectasis.    HEPATOBILIARY: Hepatic steatosis. Gallbladder, biliary system   unremarkable.    SPLEEN: Unremarkable.    PANCREAS: Unremarkable.    ADRENAL GLANDS: Unremarkable.    KIDNEYS: Persistent renal enhancement since 12/8/2024 CTA chest, may be   seen in renal insufficiency.No hydronephrosis bilaterally.    ABDOMINOPELVIC NODES: No lymphadenopathy.    PELVIC ORGANS: Calcified fibroid uterus. Urinary bladder distended with   excreted contrast, 1.3 cm left diverticulum. No significant change in   right ovarian 6.5 cm and left ovarian 2.9 cm cystic lesions.    PERITONEUM/MESENTERY/BOWEL: No bowel obstruction. No ascites or   pneumoperitoneum. Colonic diverticulosis. Decreased moderate colorectal   stool burden. No evidence of retroperitoneal hematoma or hemoperitoneum.    BONES/SOFT TISSUES: Right chest wall dystrophic calcification.   Degenerative change of spine. Scoliosis.    OTHER: Vascular calcifications. Right femoral venous catheter.      IMPRESSION:    No evidence of retroperitoneal hematoma or hemoperitoneum.    No significant change in indeterminate right ovarian 6.5 cm and left   ovarian 2.9 cm cystic lesions. Outpatient pelvic ultrasound recommended   for further evaluation.    --- End of Report ---          CHALO PRAKASH MD; Resident Radiologist  This document has been electronically signed.  MARU GREY MD; Attending Radiologist  This document has been electronically signed. Dec 10 2024  6:35AM (12-10-24 @ 05:19)      CARDIOLOGY TESTING  12 Lead ECG:   Ventricular Rate 113 BPM    Atrial Rate 113 BPM    P-R Interval 146 ms    QRS Duration 76 ms    Q-T Interval 342 ms    QTC Calculation(Bazett) 469 ms    P Axis 24 degrees    R Axis -33 degrees    T Axis 19 degrees    Diagnosis Line Sinus tachycardia  Left axis deviation  Inferior infarct , age undetermined  Abnormal ECG    Confirmed by DILLON VALDOVINOS, ELISSA ([id number]) on 12/8/2024 5:40:51 PM (12-08-24 @ 11:42)      MEDICATIONS  artificial  tears Solution 1 Both EYES two times a day  aztreonam  IVPB 1000 IV Intermittent every 8 hours  carbidopa/levodopa  10/100 1 Oral three times a day  cholecalciferol 1000 Oral daily  enoxaparin Injectable 90 SubCutaneous every 12 hours  hydrocortisone sodium succinate Injectable 50 IV Push every 6 hours  lactated ringers Bolus 500 IV Bolus once  lactated ringers. 1000 IV Continuous <Continuous>  latanoprost 0.005% Ophthalmic Solution 1 Both EYES at bedtime  levothyroxine 50 Oral daily  norepinephrine Infusion 0.05 IV Continuous <Continuous>  polyethylene glycol 3350 17 Oral daily  senna 2 Oral at bedtime  timolol 0.5% Solution 1 Both EYES two times a day      WEIGHT  Weight (kg): 91.3 (12-08-24 @ 17:30)  Creatinine: 0.8 mg/dL (12-10-24 @ 05:30)      ANTIBIOTICS:  aztreonam  IVPB 1000 milliGRAM(s) IV Intermittent every 8 hours      All available historical records have been reviewed

## 2024-12-11 LAB
-  AMPICILLIN/SULBACTAM: SIGNIFICANT CHANGE UP
-  AMPICILLIN: SIGNIFICANT CHANGE UP
-  AZTREONAM: SIGNIFICANT CHANGE UP
-  CEFAZOLIN: SIGNIFICANT CHANGE UP
-  CEFEPIME: SIGNIFICANT CHANGE UP
-  CEFTRIAXONE: SIGNIFICANT CHANGE UP
-  CEFUROXIME: SIGNIFICANT CHANGE UP
-  CIPROFLOXACIN: SIGNIFICANT CHANGE UP
-  ERTAPENEM: SIGNIFICANT CHANGE UP
-  GENTAMICIN: SIGNIFICANT CHANGE UP
-  IMIPENEM: SIGNIFICANT CHANGE UP
-  LEVOFLOXACIN: SIGNIFICANT CHANGE UP
-  MEROPENEM: SIGNIFICANT CHANGE UP
-  NITROFURANTOIN: SIGNIFICANT CHANGE UP
-  PIPERACILLIN/TAZOBACTAM: SIGNIFICANT CHANGE UP
-  TOBRAMYCIN: SIGNIFICANT CHANGE UP
-  TRIMETHOPRIM/SULFAMETHOXAZOLE: SIGNIFICANT CHANGE UP
ALBUMIN SERPL ELPH-MCNC: 2.8 G/DL — LOW (ref 3.5–5.2)
ALP SERPL-CCNC: 47 U/L — SIGNIFICANT CHANGE UP (ref 30–115)
ALT FLD-CCNC: <5 U/L — SIGNIFICANT CHANGE UP (ref 0–41)
ANION GAP SERPL CALC-SCNC: 11 MMOL/L — SIGNIFICANT CHANGE UP (ref 7–14)
AST SERPL-CCNC: 16 U/L — SIGNIFICANT CHANGE UP (ref 0–41)
BILIRUB SERPL-MCNC: <0.2 MG/DL — SIGNIFICANT CHANGE UP (ref 0.2–1.2)
BUN SERPL-MCNC: 18 MG/DL — SIGNIFICANT CHANGE UP (ref 10–20)
CALCIUM SERPL-MCNC: 7.8 MG/DL — LOW (ref 8.4–10.5)
CHLORIDE SERPL-SCNC: 103 MMOL/L — SIGNIFICANT CHANGE UP (ref 98–110)
CO2 SERPL-SCNC: 21 MMOL/L — SIGNIFICANT CHANGE UP (ref 17–32)
CREAT SERPL-MCNC: 0.7 MG/DL — SIGNIFICANT CHANGE UP (ref 0.7–1.5)
CULTURE RESULTS: ABNORMAL
EGFR: 86 ML/MIN/1.73M2 — SIGNIFICANT CHANGE UP
GLUCOSE SERPL-MCNC: 164 MG/DL — HIGH (ref 70–99)
HCT VFR BLD CALC: 27.7 % — LOW (ref 37–47)
HGB BLD-MCNC: 8.7 G/DL — LOW (ref 12–16)
MAGNESIUM SERPL-MCNC: 2 MG/DL — SIGNIFICANT CHANGE UP (ref 1.8–2.4)
MCHC RBC-ENTMCNC: 31.4 G/DL — LOW (ref 32–37)
MCHC RBC-ENTMCNC: 32 PG — HIGH (ref 27–31)
MCV RBC AUTO: 101.8 FL — HIGH (ref 81–99)
METHOD TYPE: SIGNIFICANT CHANGE UP
NRBC # BLD: 0 /100 WBCS — SIGNIFICANT CHANGE UP (ref 0–0)
ORGANISM # SPEC MICROSCOPIC CNT: ABNORMAL
ORGANISM # SPEC MICROSCOPIC CNT: SIGNIFICANT CHANGE UP
PLATELET # BLD AUTO: 246 K/UL — SIGNIFICANT CHANGE UP (ref 130–400)
PMV BLD: 9.5 FL — SIGNIFICANT CHANGE UP (ref 7.4–10.4)
POTASSIUM SERPL-MCNC: 3.5 MMOL/L — SIGNIFICANT CHANGE UP (ref 3.5–5)
POTASSIUM SERPL-SCNC: 3.5 MMOL/L — SIGNIFICANT CHANGE UP (ref 3.5–5)
PROT SERPL-MCNC: 4.7 G/DL — LOW (ref 6–8)
RBC # BLD: 2.72 M/UL — LOW (ref 4.2–5.4)
RBC # FLD: 16 % — HIGH (ref 11.5–14.5)
SODIUM SERPL-SCNC: 135 MMOL/L — SIGNIFICANT CHANGE UP (ref 135–146)
SPECIMEN SOURCE: SIGNIFICANT CHANGE UP
WBC # BLD: 9.29 K/UL — SIGNIFICANT CHANGE UP (ref 4.8–10.8)
WBC # FLD AUTO: 9.29 K/UL — SIGNIFICANT CHANGE UP (ref 4.8–10.8)

## 2024-12-11 PROCEDURE — 99291 CRITICAL CARE FIRST HOUR: CPT

## 2024-12-11 RX ORDER — HYDROCORTISONE ACETATE 25 MG/ML
50 VIAL (ML) INJECTION EVERY 8 HOURS
Refills: 0 | Status: DISCONTINUED | OUTPATIENT
Start: 2024-12-11 | End: 2024-12-14

## 2024-12-11 RX ORDER — MEROPENEM 500 MG/1
1000 INJECTION, POWDER, FOR SOLUTION INTRAVENOUS EVERY 8 HOURS
Refills: 0 | Status: DISCONTINUED | OUTPATIENT
Start: 2024-12-11 | End: 2024-12-17

## 2024-12-11 RX ORDER — MIDODRINE HYDROCHLORIDE 5 MG/1
5 TABLET ORAL EVERY 8 HOURS
Refills: 0 | Status: DISCONTINUED | OUTPATIENT
Start: 2024-12-11 | End: 2024-12-16

## 2024-12-11 RX ADMIN — ENOXAPARIN SODIUM 90 MILLIGRAM(S): 30 INJECTION SUBCUTANEOUS at 21:06

## 2024-12-11 RX ADMIN — Medication 50 MICROGRAM(S): at 05:48

## 2024-12-11 RX ADMIN — Medication 50 MILLIGRAM(S): at 05:47

## 2024-12-11 RX ADMIN — NYSTATIN 1 APPLICATION(S): 100000 POWDER TOPICAL at 17:46

## 2024-12-11 RX ADMIN — Medication 50 MILLIGRAM(S): at 04:28

## 2024-12-11 RX ADMIN — CARBIDOPA AND LEVODOPA 1 TABLET(S): 10; 100 TABLET ORAL at 13:54

## 2024-12-11 RX ADMIN — Medication 1000 UNIT(S): at 13:53

## 2024-12-11 RX ADMIN — MEROPENEM 100 MILLIGRAM(S): 500 INJECTION, POWDER, FOR SOLUTION INTRAVENOUS at 10:00

## 2024-12-11 RX ADMIN — Medication 50 MILLIGRAM(S): at 13:54

## 2024-12-11 RX ADMIN — Medication 1 DROP(S): at 17:46

## 2024-12-11 RX ADMIN — MIDODRINE HYDROCHLORIDE 5 MILLIGRAM(S): 5 TABLET ORAL at 22:47

## 2024-12-11 RX ADMIN — MIDODRINE HYDROCHLORIDE 5 MILLIGRAM(S): 5 TABLET ORAL at 13:56

## 2024-12-11 RX ADMIN — POVIDONE, POLYVINYL ALCOHOL 1 DROP(S): 20; 27 SOLUTION OPHTHALMIC at 05:48

## 2024-12-11 RX ADMIN — CARBIDOPA AND LEVODOPA 1 TABLET(S): 10; 100 TABLET ORAL at 05:48

## 2024-12-11 RX ADMIN — MEROPENEM 100 MILLIGRAM(S): 500 INJECTION, POWDER, FOR SOLUTION INTRAVENOUS at 22:47

## 2024-12-11 RX ADMIN — CARBIDOPA AND LEVODOPA 1 TABLET(S): 10; 100 TABLET ORAL at 22:47

## 2024-12-11 RX ADMIN — Medication 50 MILLIGRAM(S): at 21:06

## 2024-12-11 RX ADMIN — Medication 1 DROP(S): at 05:49

## 2024-12-11 RX ADMIN — LATANOPROST 1 DROP(S): 50 SOLUTION/ DROPS OPHTHALMIC at 23:00

## 2024-12-11 RX ADMIN — ENOXAPARIN SODIUM 90 MILLIGRAM(S): 30 INJECTION SUBCUTANEOUS at 10:00

## 2024-12-11 RX ADMIN — Medication 2 TABLET(S): at 22:47

## 2024-12-11 RX ADMIN — NYSTATIN 1 APPLICATION(S): 100000 POWDER TOPICAL at 05:48

## 2024-12-11 RX ADMIN — Medication 100 MILLILITER(S): at 02:40

## 2024-12-11 RX ADMIN — MEROPENEM 100 MILLIGRAM(S): 500 INJECTION, POWDER, FOR SOLUTION INTRAVENOUS at 13:55

## 2024-12-11 RX ADMIN — POVIDONE, POLYVINYL ALCOHOL 1 DROP(S): 20; 27 SOLUTION OPHTHALMIC at 17:46

## 2024-12-11 NOTE — PROGRESS NOTE ADULT - ASSESSMENT
ASSESSMENT  83 y/o Female with a PMH of Paroxysmal A-fib (was diagnosed in last admission and not on AC due to anemia due to L chest wall hematoma), Hx of ICH (in 2023), Hx of Cardiopulmonary Arrest Secondary to Aspiration, 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, hypothyroidism , GERD, and Glaucoma presenting for facial droop.     Pt was initially admitted for stroke work up. Stroke imaging was done and was unremarkable.  Patient was given Rocephin for presumed UTI, ED suspect that after that, she became hypotensive and tachycardic with increased work of breathing, was given epi and then started on an epi drip + solumedrol for a working Dx of septic and anaphylactic shock    Pt was found to have right main pulmonary artery embolus.     IMPRESSION  Admitted for possible CVA (Brain imaging wnl), found to have acute PE and R heart strain  #Suspected anaphylactic shock to Ceftriaxone  #Acute hypoxic/hypercapnic respiratory failure- R PE with right heart strain  #Leukocytosis, possible acute cystitis     12/8 BCX NG    12/8 UCX   >100,000 CFU/ml Klebsiella pneumoniae ESBL; UA WBC 96 + epith, poor sample Trimethoprim/Sulfamethoxazole: S <=0.5/9.5  #Lactic acidosis  #Elevated troponin  #Paroxysmal A-fib (was diagnosed in last admission and not on AC due to anemia due to L chest wall hematoma)  #Hx of Cardiopulmonary Arrest Secondary to Aspiration  #History of 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  #Allergies- cipro, PCN  Creatinine: 0.7 mg/dL (12.11.24 @ 10:50)      RECOMMENDATIONS  - Meropenem 1g q8h IV x 7 days, if D/C prior PO bactrim   - Would benefit from allergy testing as an outpatient     If any questions, please text or call on [employer] Teams  Please continue to update ID with any pertinent new clinical, laboratory or radiographic findings

## 2024-12-11 NOTE — PROGRESS NOTE ADULT - SUBJECTIVE AND OBJECTIVE BOX
Over Night Events: Events noted, on [facility] 4040, No presors    PHYSICAL EXAM    ICU Vital Signs Last 24 Hrs  T(C): 36.2 (11 Dec 2024 04:00), Max: 37 (10 Dec 2024 20:00)  T(F): 97.1 (11 Dec 2024 04:00), Max: 98.6 (10 Dec 2024 20:00)  HR: 76 (11 Dec 2024 07:00) (68 - 94)  BP: 110/54 (11 Dec 2024 07:00) (89/48 - 148/67)  BP(mean): 78 (11 Dec 2024 07:00) (65 - 96)  RR: 17 (11 Dec 2024 07:00) (15 - 23)  SpO2: 100% (11 Dec 2024 07:00) (98% - 100%)    O2 Parameters below as of 11 Dec 2024 07:00  Patient On (Oxygen Delivery Method): nasal cannula, high flow  O2 Flow (L/min): 40  O2 Concentration (%): 40        General: ill looking  Lungs: Bilateral rhonchi  Cardiovascular: Regular   Abdomen: Soft, Positive BS  Extremities: No clubbing   Skin: Warm  Neurological: Non focal       12-10-24 @ 07:01  -  12-11-24 @ 07:00  --------------------------------------------------------  IN:    IV PiggyBack: 650 mL    Lactated Ringers: 2400 mL    Oral Fluid: 120 mL  Total IN: 3170 mL    OUT:    Indwelling Catheter - Urethral (mL): 740 mL    Norepinephrine: 0 mL    Voided (mL): 700 mL  Total OUT: 1440 mL    Total NET: 1730 mL          LABS:                          8.6    15.82 )-----------( 264      ( 10 Dec 2024 05:30 )             28.7                                               12-10    138  |  105  |  27[H]  ----------------------------<  81  3.9   |  25  |  0.8    Ca    8.2[L]      10 Dec 2024 05:30  Mg     1.8     12-10    TPro  4.8[L]  /  Alb  2.9[L]  /  TBili  <0.2  /  DBili  x   /  AST  23  /  ALT  <5  /  AlkPhos  51  12-10      PTT - ( 10 Dec 2024 03:35 )  PTT:73.3 sec                                       Urinalysis Basic - ( 10 Dec 2024 05:30 )    Color: x / Appearance: x / SG: x / pH: x  Gluc: 81 mg/dL / Ketone: x  / Bili: x / Urobili: x   Blood: x / Protein: x / Nitrite: x   Leuk Esterase: x / RBC: x / WBC x   Sq Epi: x / Non Sq Epi: x / Bacteria: x                                                  LIVER FUNCTIONS - ( 10 Dec 2024 05:30 )  Alb: 2.9 g/dL / Pro: 4.8 g/dL / ALK PHOS: 51 U/L / ALT: <5 U/L / AST: 23 U/L / GGT: x                                                  Culture - Blood (collected 08 Dec 2024 21:13)  Source: .Blood BLOOD  Preliminary Report (11 Dec 2024 04:01):    No growth at 48 Hours    Urinalysis with Rflx Culture (collected 08 Dec 2024 12:36)    Culture - Urine (collected 08 Dec 2024 12:36)  Source: Clean Catch None  Preliminary Report (10 Dec 2024 14:17):    >100,000 CFU/ml Klebsiella pneumoniae                                                                                           MEDICATIONS  (STANDING):  artificial  tears Solution 1 Drop(s) Both EYES two times a day  aztreonam  IVPB 1000 milliGRAM(s) IV Intermittent every 8 hours  carbidopa/levodopa  10/100 1 Tablet(s) Oral three times a day  cholecalciferol 1000 Unit(s) Oral daily  enoxaparin Injectable 90 milliGRAM(s) SubCutaneous every 12 hours  hydrocortisone sodium succinate Injectable 50 milliGRAM(s) IV Push every 6 hours  lactated ringers Bolus 500 milliLiter(s) IV Bolus once  lactated ringers. 1000 milliLiter(s) (100 mL/Hr) IV Continuous <Continuous>  latanoprost 0.005% Ophthalmic Solution 1 Drop(s) Both EYES at bedtime  levothyroxine 50 MICROGram(s) Oral daily  norepinephrine Infusion 0.05 MICROgram(s)/kG/Min (8.28 mL/Hr) IV Continuous <Continuous>  nystatin Powder 1 Application(s) Topical two times a day  polyethylene glycol 3350 17 Gram(s) Oral daily  senna 2 Tablet(s) Oral at bedtime  timolol 0.5% Solution 1 Drop(s) Both EYES two times a day

## 2024-12-11 NOTE — PROGRESS NOTE ADULT - SUBJECTIVE AND OBJECTIVE BOX
IMELDA ROLLE  84y, Female  Allergy: penicillin (Anaphylaxis)  ceftriaxone (Rash)  Originally Entered as [Unknown] reaction to [green pepper] (Unknown)  Originally Entered as [Unknown] reaction to [red pepper] (Unknown)  ciprofloxacin (Unknown)  benzodiazepines (Unknown)  Originally Entered as [Unknown] reaction to [neuroleptics] (Unknown)  antichlolinergics (Unknown)  Originally Entered as [Unknown] reaction to [pepper] (Unknown)      LOS  3d    CHIEF COMPLAINT: PE (11 Dec 2024 08:38)      INTERVAL EVENTS/HPI  - T(F): , Max: 98.6 (12-10-24 @ 20:00)  - WBC Count: 9.29 (12-11-24 @ 10:50)  WBC Count: 15.82 (12-10-24 @ 05:30)     - Creatinine: 0.7 (12-11-24 @ 10:50)  Creatinine: 0.8 (12-10-24 @ 05:30)     -   -   -     ROS  cannot obtain secondary to patient's sedation and/or mental status    VITALS:  T(F): 97.9, Max: 98.6 (12-10-24 @ 20:00)  HR: 87  BP: 122/58  RR: 20Vital Signs Last 24 Hrs  T(C): 36.6 (11 Dec 2024 12:00), Max: 37 (10 Dec 2024 20:00)  T(F): 97.9 (11 Dec 2024 12:00), Max: 98.6 (10 Dec 2024 20:00)  HR: 87 (11 Dec 2024 14:00) (68 - 94)  BP: 122/58 (11 Dec 2024 14:00) (89/48 - 148/67)  BP(mean): 83 (11 Dec 2024 14:00) (65 - 96)  RR: 20 (11 Dec 2024 14:26) (17 - 23)  SpO2: 99% (11 Dec 2024 14:26) (98% - 100%)    Parameters below as of 11 Dec 2024 14:26  Patient On (Oxygen Delivery Method): nasal cannula, high flow  O2 Flow (L/min): 40  O2 Concentration (%): 40    PHYSICAL EXAM:  ***    FH: Non-contributory  Social Hx: Non-contributory    TESTS & MEASUREMENTS:                        8.7    9.29  )-----------( 246      ( 11 Dec 2024 10:50 )             27.7     12-11    135  |  103  |  18  ----------------------------<  164[H]  3.5   |  21  |  0.7    Ca    7.8[L]      11 Dec 2024 10:50  Mg     2.0     12-11    TPro  4.7[L]  /  Alb  2.8[L]  /  TBili  <0.2  /  DBili  x   /  AST  16  /  ALT  <5  /  AlkPhos  47  12-11      LIVER FUNCTIONS - ( 11 Dec 2024 10:50 )  Alb: 2.8 g/dL / Pro: 4.7 g/dL / ALK PHOS: 47 U/L / ALT: <5 U/L / AST: 16 U/L / GGT: x           Urinalysis Basic - ( 11 Dec 2024 10:50 )    Color: x / Appearance: x / SG: x / pH: x  Gluc: 164 mg/dL / Ketone: x  / Bili: x / Urobili: x   Blood: x / Protein: x / Nitrite: x   Leuk Esterase: x / RBC: x / WBC x   Sq Epi: x / Non Sq Epi: x / Bacteria: x        Culture - Blood (collected 12-08-24 @ 21:13)  Source: .Blood BLOOD  Preliminary Report (12-11-24 @ 04:01):    No growth at 48 Hours    Urinalysis with Rflx Culture (collected 12-08-24 @ 12:36)    Culture - Urine (collected 12-08-24 @ 12:36)  Source: Clean Catch None  Final Report (12-11-24 @ 09:26):    >100,000 CFU/ml Klebsiella pneumoniae ESBL  Organism: Klebsiella pneumoniae ESBL (12-11-24 @ 09:26)  Organism: Klebsiella pneumoniae ESBL (12-11-24 @ 09:26)      Method Type: JANNETH      -  Ampicillin: R >16 These ampicillin results predict results for amoxicillin      -  Ampicillin/Sulbactam: I 16/8      -  Aztreonam: R >16      -  Cefazolin: R >16 For uncomplicated UTI with K. pneumoniae, E. coli, or P. mirablis: JANNETH <=16 is sensitive and JANNETH >=32 is resistant. This also predicts results for oral agents cefaclor, cefdinir, cefpodoxime, cefprozil, cefuroxime axetil, cephalexin and locarbef for uncomplicated UTI. Note that some isolates may be susceptible to these agents while testing resistant to cefazolin.      -  Cefepime: R >16      -  Ceftriaxone: R >32      -  Cefuroxime: R >16      -  Ciprofloxacin: S <=0.25      -  Ertapenem: S <=0.5      -  Gentamicin: S <=2      -  Imipenem: S <=1      -  Levofloxacin: S <=0.5      -  Meropenem: S <=1      -  Nitrofurantoin: R >64 Should not be used to treat pyelonephritis      -  Piperacillin/Tazobactam: S <=8      -  Tobramycin: S <=2      -  Trimethoprim/Sulfamethoxazole: S <=0.5/9.5        Lactate, Blood: 2.5 mmol/L (12-08-24 @ 21:13)  Blood Gas Venous - Lactate: 2.6 mmol/L (12-08-24 @ 19:22)  Blood Gas Venous - Lactate: 5.0 mmol/L (12-08-24 @ 15:18)      INFECTIOUS DISEASES TESTING  MRSA PCR Result.: Negative (12-10-24 @ 22:34)  Procalcitonin: 3.40 (12-08-24 @ 21:13)  MRSA PCR Result.: Negative (08-24-24 @ 15:15)  Procalcitonin: 0.86 (08-24-24 @ 11:43)      INFLAMMATORY MARKERS      RADIOLOGY & ADDITIONAL TESTS:  I have personally reviewed the last available Chest xray  CXR      CT  CT Abdomen and Pelvis No Cont:   ACC: [id number] EXAM:  CT ABDOMEN AND PELVIS   ORDERED BY: TOSHA CHANDLER     PROCEDURE DATE:  12/10/2024          INTERPRETATION:  CLINICAL INFORMATION: Anemia, downtrending hemoglobin.    COMPARISON: CT abdomen and pelvis 8/27/2024, 8/24/2024.    CONTRAST/COMPLICATIONS:  IV Contrast: NONE  Oral Contrast: NONE    PROCEDURE:  CT of the Abdomen and Pelvis was performed.  Sagittal and coronal reformats were performed.    FINDINGS:  LOWER CHEST: Bibasilar subsegmental atelectasis.    HEPATOBILIARY: Hepatic steatosis. Gallbladder, biliary system   unremarkable.    SPLEEN: Unremarkable.    PANCREAS: Unremarkable.    ADRENAL GLANDS: Unremarkable.    KIDNEYS: Persistent renal enhancement since 12/8/2024 CTA chest, may be   seen in renal insufficiency.No hydronephrosis bilaterally.    ABDOMINOPELVIC NODES: No lymphadenopathy.    PELVIC ORGANS: Calcified fibroid uterus. Urinary bladder distended with   excreted contrast, 1.3 cm left diverticulum. No significant change in   right ovarian 6.5 cm and left ovarian 2.9 cm cystic lesions.    PERITONEUM/MESENTERY/BOWEL: No bowel obstruction. No ascites or   pneumoperitoneum. Colonic diverticulosis. Decreased moderate colorectal   stool burden. No evidence of retroperitoneal hematoma or hemoperitoneum.    BONES/SOFT TISSUES: Right chest wall dystrophic calcification.   Degenerative change of spine. Scoliosis.    OTHER: Vascular calcifications. Right femoral venous catheter.      IMPRESSION:    No evidence of retroperitoneal hematoma or hemoperitoneum.    No significant change in indeterminate right ovarian 6.5 cm and left   ovarian 2.9 cm cystic lesions. Outpatient pelvic ultrasound recommended   for further evaluation.    --- End of Report ---          CHALO PRAKASH MD; Resident Radiologist  This document has been electronically signed.  MARU GREY MD; Attending Radiologist  This document has been electronically signed. Dec 10 2024  6:35AM (12-10-24 @ 05:19)      CARDIOLOGY TESTING  12 Lead ECG:   Ventricular Rate 113 BPM    Atrial Rate 113 BPM    P-R Interval 146 ms    QRS Duration 76 ms    Q-T Interval 342 ms    QTC Calculation(Bazett) 469 ms    P Axis 24 degrees    R Axis -33 degrees    T Axis 19 degrees    Diagnosis Line Sinus tachycardia  Left axis deviation  Inferior infarct , age undetermined  Abnormal ECG    Confirmed by DILLON VALDOVINOS, ELISSA ([id number]) on 12/8/2024 5:40:51 PM (12-08-24 @ 11:42)      MEDICATIONS  artificial  tears Solution 1  carbidopa/levodopa  10/100 1  cholecalciferol 1000  enoxaparin Injectable 90  hydrocortisone sodium succinate Injectable 50  lactated ringers Bolus 500  latanoprost 0.005% Ophthalmic Solution 1  levothyroxine 50  meropenem  IVPB 1000  midodrine 5  nystatin Powder 1  polyethylene glycol 3350 17  senna 2  timolol 0.5% Solution 1      WEIGHT  Weight (kg): 91.3 (12-08-24 @ 17:30)  Creatinine: 0.7 mg/dL (12-11-24 @ 10:50)      ANTIBIOTICS:  meropenem  IVPB 1000 milliGRAM(s) IV Intermittent every 8 hours      All available historical records have been reviewed

## 2024-12-11 NOTE — PHARMACOTHERAPY INTERVENTION NOTE - COMMENTS
vancomycin level 15-precise PK calc, recommended vancomycin 750mg IV q12h, predicted AUC ~430
Recommended to discontinue vancomycin since urine culture is positive with K. pneumoniae covered with aztreonam as per ID.      Kely Hand, PharmD   Clinical Pharmacy Specialist, Infectious Diseases  Tele-Antimicrobial Stewardship Program (Tele-ASP)  Tele-ASP Phone: [phone]   
recommended vancomycin level
transition heparin infusion to enoxaparin 90mg sc q12h-recommended start time 12/10 @10:00

## 2024-12-11 NOTE — PROGRESS NOTE ADULT - ASSESSMENT
84-year-old female with PMHx of Paroxysmal A-fib (was diagnosed in last admission and not on AC due to anemia due to L chest wall hematoma), Hx of ICH (in 2023), hx of Staphylococcus hominis bacteremia, Hx of Cardiopulmonary Arrest Secondary to Aspiration, history of 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, hypothyroidism , GERD, and Glaucoma presenting for facial droop.    IMPRESSION:    #Acute hypoxic/hypercapnic respiratory failure- R PE with right heart strain on HHFNC  # UTI (was admitted from ed with diagnosis of superimposed anaphylactic shock)  # Anemia  #Paroxysmal A-fib (was diagnosed in last admission and not on AC due to anemia due to L chest wall hematoma)  #Hx of Cardiopulmonary Arrest Secondary to Aspiration  #History of 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  #TIA?  #Hx of intracerebral hemorrhage  #Hypothyroidism   #GERD  #Glaucoma      PLAN:    CNS: Avoid CNS Depressants.  Continue Parkinson meds    HEENT: Oral care. Aspiration precautions. NPO for now except meds    PULMONARY: HOB @ 45. Monitor Pulse Ox. Keep > 93%. Supplement as needed. HHFNC keep SAo2 92 to 96%,  Duoneb PRN    CARDIOVASCULAR:  . f/u TTE.. Cardiac monitoring . dc LR    GI: GI prophylaxis. per speech    RENAL: Replete electrolytes as needed.     INFECTIOUS DISEASE: abx per ID    HEMATOLOGICAL: Will start Heparin drip for AC. Dupplex LE . DVT noted    ENDOCRINE: Monitor FS. Continue home Levothyroxine, DEC       CODE STATUS: DNR/DNI    DISPOSITION: SDU

## 2024-12-12 LAB
ALBUMIN SERPL ELPH-MCNC: 3 G/DL — LOW (ref 3.5–5.2)
ALP SERPL-CCNC: 45 U/L — SIGNIFICANT CHANGE UP (ref 30–115)
ALT FLD-CCNC: <5 U/L — SIGNIFICANT CHANGE UP (ref 0–41)
ANION GAP SERPL CALC-SCNC: 8 MMOL/L — SIGNIFICANT CHANGE UP (ref 7–14)
AST SERPL-CCNC: 15 U/L — SIGNIFICANT CHANGE UP (ref 0–41)
BASOPHILS # BLD AUTO: 0.01 K/UL — SIGNIFICANT CHANGE UP (ref 0–0.2)
BASOPHILS NFR BLD AUTO: 0.1 % — SIGNIFICANT CHANGE UP (ref 0–1)
BILIRUB SERPL-MCNC: <0.2 MG/DL — SIGNIFICANT CHANGE UP (ref 0.2–1.2)
BUN SERPL-MCNC: 17 MG/DL — SIGNIFICANT CHANGE UP (ref 10–20)
CALCIUM SERPL-MCNC: 8 MG/DL — LOW (ref 8.4–10.4)
CHLORIDE SERPL-SCNC: 104 MMOL/L — SIGNIFICANT CHANGE UP (ref 98–110)
CO2 SERPL-SCNC: 24 MMOL/L — SIGNIFICANT CHANGE UP (ref 17–32)
CREAT SERPL-MCNC: 0.7 MG/DL — SIGNIFICANT CHANGE UP (ref 0.7–1.5)
EGFR: 86 ML/MIN/1.73M2 — SIGNIFICANT CHANGE UP
EOSINOPHIL # BLD AUTO: 0.01 K/UL — SIGNIFICANT CHANGE UP (ref 0–0.7)
EOSINOPHIL NFR BLD AUTO: 0.1 % — SIGNIFICANT CHANGE UP (ref 0–8)
GLUCOSE SERPL-MCNC: 100 MG/DL — HIGH (ref 70–99)
HCT VFR BLD CALC: 27.6 % — LOW (ref 37–47)
HGB BLD-MCNC: 8.6 G/DL — LOW (ref 12–16)
IMM GRANULOCYTES NFR BLD AUTO: 1 % — HIGH (ref 0.1–0.3)
LYMPHOCYTES # BLD AUTO: 1.92 K/UL — SIGNIFICANT CHANGE UP (ref 1.2–3.4)
LYMPHOCYTES # BLD AUTO: 19 % — LOW (ref 20.5–51.1)
MAGNESIUM SERPL-MCNC: 2.2 MG/DL — SIGNIFICANT CHANGE UP (ref 1.8–2.4)
MCHC RBC-ENTMCNC: 31.2 G/DL — LOW (ref 32–37)
MCHC RBC-ENTMCNC: 31.7 PG — HIGH (ref 27–31)
MCV RBC AUTO: 101.8 FL — HIGH (ref 81–99)
MONOCYTES # BLD AUTO: 0.84 K/UL — HIGH (ref 0.1–0.6)
MONOCYTES NFR BLD AUTO: 8.3 % — SIGNIFICANT CHANGE UP (ref 1.7–9.3)
NEUTROPHILS # BLD AUTO: 7.25 K/UL — HIGH (ref 1.4–6.5)
NEUTROPHILS NFR BLD AUTO: 71.5 % — SIGNIFICANT CHANGE UP (ref 42.2–75.2)
NRBC # BLD: 0 /100 WBCS — SIGNIFICANT CHANGE UP (ref 0–0)
PLATELET # BLD AUTO: 244 K/UL — SIGNIFICANT CHANGE UP (ref 130–400)
PMV BLD: 9.5 FL — SIGNIFICANT CHANGE UP (ref 7.4–10.4)
POTASSIUM SERPL-MCNC: 3.9 MMOL/L — SIGNIFICANT CHANGE UP (ref 3.5–5)
POTASSIUM SERPL-SCNC: 3.9 MMOL/L — SIGNIFICANT CHANGE UP (ref 3.5–5)
PROT SERPL-MCNC: 4.9 G/DL — LOW (ref 6–8)
RBC # BLD: 2.71 M/UL — LOW (ref 4.2–5.4)
RBC # FLD: 15.9 % — HIGH (ref 11.5–14.5)
SODIUM SERPL-SCNC: 136 MMOL/L — SIGNIFICANT CHANGE UP (ref 135–146)
WBC # BLD: 10.13 K/UL — SIGNIFICANT CHANGE UP (ref 4.8–10.8)
WBC # FLD AUTO: 10.13 K/UL — SIGNIFICANT CHANGE UP (ref 4.8–10.8)

## 2024-12-12 PROCEDURE — 99291 CRITICAL CARE FIRST HOUR: CPT

## 2024-12-12 PROCEDURE — 71045 X-RAY EXAM CHEST 1 VIEW: CPT | Mod: 26

## 2024-12-12 PROCEDURE — 99233 SBSQ HOSP IP/OBS HIGH 50: CPT

## 2024-12-12 RX ORDER — CHLORHEXIDINE GLUCONATE 1.2 MG/ML
1 RINSE ORAL DAILY
Refills: 0 | Status: DISCONTINUED | OUTPATIENT
Start: 2024-12-12 | End: 2024-12-17

## 2024-12-12 RX ORDER — SODIUM CHLORIDE 9 MG/ML
500 INJECTION, SOLUTION INTRAMUSCULAR; INTRAVENOUS; SUBCUTANEOUS ONCE
Refills: 0 | Status: COMPLETED | OUTPATIENT
Start: 2024-12-12 | End: 2024-12-12

## 2024-12-12 RX ADMIN — CARBIDOPA AND LEVODOPA 1 TABLET(S): 10; 100 TABLET ORAL at 21:11

## 2024-12-12 RX ADMIN — Medication 50 MILLIGRAM(S): at 21:12

## 2024-12-12 RX ADMIN — MIDODRINE HYDROCHLORIDE 5 MILLIGRAM(S): 5 TABLET ORAL at 21:11

## 2024-12-12 RX ADMIN — MEROPENEM 100 MILLIGRAM(S): 500 INJECTION, POWDER, FOR SOLUTION INTRAVENOUS at 05:30

## 2024-12-12 RX ADMIN — NYSTATIN 1 APPLICATION(S): 100000 POWDER TOPICAL at 17:55

## 2024-12-12 RX ADMIN — Medication 50 MICROGRAM(S): at 05:31

## 2024-12-12 RX ADMIN — CARBIDOPA AND LEVODOPA 1 TABLET(S): 10; 100 TABLET ORAL at 05:31

## 2024-12-12 RX ADMIN — Medication 1000 UNIT(S): at 14:21

## 2024-12-12 RX ADMIN — POVIDONE, POLYVINYL ALCOHOL 1 DROP(S): 20; 27 SOLUTION OPHTHALMIC at 17:55

## 2024-12-12 RX ADMIN — Medication 1 DROP(S): at 17:54

## 2024-12-12 RX ADMIN — CHLORHEXIDINE GLUCONATE 1 APPLICATION(S): 1.2 RINSE ORAL at 12:22

## 2024-12-12 RX ADMIN — ENOXAPARIN SODIUM 90 MILLIGRAM(S): 30 INJECTION SUBCUTANEOUS at 10:20

## 2024-12-12 RX ADMIN — Medication 2 TABLET(S): at 21:11

## 2024-12-12 RX ADMIN — ENOXAPARIN SODIUM 90 MILLIGRAM(S): 30 INJECTION SUBCUTANEOUS at 21:10

## 2024-12-12 RX ADMIN — MEROPENEM 100 MILLIGRAM(S): 500 INJECTION, POWDER, FOR SOLUTION INTRAVENOUS at 21:11

## 2024-12-12 RX ADMIN — MIDODRINE HYDROCHLORIDE 5 MILLIGRAM(S): 5 TABLET ORAL at 05:31

## 2024-12-12 RX ADMIN — MIDODRINE HYDROCHLORIDE 5 MILLIGRAM(S): 5 TABLET ORAL at 14:07

## 2024-12-12 RX ADMIN — MEROPENEM 100 MILLIGRAM(S): 500 INJECTION, POWDER, FOR SOLUTION INTRAVENOUS at 14:07

## 2024-12-12 RX ADMIN — NYSTATIN 1 APPLICATION(S): 100000 POWDER TOPICAL at 05:30

## 2024-12-12 RX ADMIN — POVIDONE, POLYVINYL ALCOHOL 1 DROP(S): 20; 27 SOLUTION OPHTHALMIC at 05:30

## 2024-12-12 RX ADMIN — LATANOPROST 1 DROP(S): 50 SOLUTION/ DROPS OPHTHALMIC at 21:12

## 2024-12-12 RX ADMIN — POLYETHYLENE GLYCOL 3350 17 GRAM(S): 17 POWDER, FOR SOLUTION ORAL at 12:22

## 2024-12-12 RX ADMIN — CARBIDOPA AND LEVODOPA 1 TABLET(S): 10; 100 TABLET ORAL at 14:26

## 2024-12-12 RX ADMIN — Medication 50 MILLIGRAM(S): at 06:30

## 2024-12-12 RX ADMIN — Medication 50 MILLIGRAM(S): at 14:21

## 2024-12-12 RX ADMIN — Medication 1 DROP(S): at 05:29

## 2024-12-12 NOTE — PROGRESS NOTE ADULT - ASSESSMENT
IMPRESSION:    Acute hypoxemic /hypercapnic respiratory failure- R PE with right heart strain on HHFNC  # UTI (was admitted from ed with diagnosis of superimposed anaphylactic shock)  # Anemia  #Paroxysmal A-fib (was diagnosed in last admission and not on AC due to anemia due to L chest wall hematoma)  #Hx of Cardiopulmonary Arrest Secondary to Aspiration  #History of 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  #TIA?  #Hx of intracerebral hemorrhage  #Hypothyroidism   #GERD  #Glaucoma      PLAN:    CNS: Avoid CNS Depressants.  Continue Parkinson meds    HEENT: Oral care. Aspiration precautions. NPO for now except meds    PULMONARY: HOB @ 45. Monitor Pulse Ox. Keep > 93%. Supplement as needed. HHFNC keep SAo2 92 to 96%,  Duoneb PRN    CARDIOVASCULAR:  . f/u TTE.. Cardiac monitoring . dc LR    GI: GI prophylaxis. per speech    RENAL: Replete electrolytes as needed.     INFECTIOUS DISEASE: abx per ID    HEMATOLOGICAL: Will start Heparin drip for AC. Dupplex LE . DVT noted    ENDOCRINE: Monitor FS. Continue home Levothyroxine, DEC hc      CODE STATUS: DNR/DNI    DISPOSITION: SDU                   IMPRESSION:    Acute hypoxemic respiratory failure   Acute hypercapnic respiratory failure-   PE with right heart strain  DVT   UTI  Anemia CT abdomen no retroperitoneal bleed   Paroxysmal A-fib   Hx of Cardiopulmonary Arrest Secondary to Aspiration  History of 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  Hx of intracerebral hemorrhage  Hypothyroidism   GERD  Glaucoma      PLAN:    CNS: Avoid CNS Depressants.  Continue Parkinson meds    HEENT: Oral care. Aspiration precautions.  Speech and swallow     PULMONARY: HOB @ 45. Monitor Pulse Ox. Keep > 92-96%.  Wean O2 As tolerated.  CT noted     CARDIOVASCULAR:  FU TTE.  Avoid overload     GI: GI prophylaxis. Feeding per speech    RENAL: Replete electrolytes as needed.     INFECTIOUS DISEASE:  Monitor VS.  On Meropenem. ABX per ID       HEMATOLOGICAL: Duplex noted. Therapeutic LMWH.  Monitor CBC     ENDOCRINE: Monitor FS. Continue home Levothyroxine,    CODE STATUS: DNR/DNI    DISPOSITION: SDU

## 2024-12-12 NOTE — PROGRESS NOTE ADULT - SUBJECTIVE AND OBJECTIVE BOX
Patient is a 84y old  Female who presents with a chief complaint of PE (11 Dec 2024 08:38)        Over Night Events:  remains on HFNCO2.  Off pressors.          ROS:     All ROS are negative except HPI         PHYSICAL EXAM    ICU Vital Signs Last 24 Hrs  T(C): 35.8 (12 Dec 2024 08:00), Max: 36.6 (11 Dec 2024 12:00)  T(F): 96.4 (12 Dec 2024 08:00), Max: 97.9 (11 Dec 2024 12:00)  HR: 63 (12 Dec 2024 08:00) (59 - 87)  BP: 102/55 (12 Dec 2024 08:00) (96/51 - 128/60)  BP(mean): 77 (12 Dec 2024 08:00) (71 - 84)  ABP: --  ABP(mean): --  RR: 20 (12 Dec 2024 04:00) (19 - 23)  SpO2: 100% (12 Dec 2024 08:00) (99% - 100%)    O2 Parameters below as of 12 Dec 2024 04:00  Patient On (Oxygen Delivery Method): BiPAP/CPAP            CONSTITUTIONAL:  Ill appearing     ENT:   Airway patent,   Mouth with normal mucosa.     EYES:   Pupils equal,   Round and reactive to light.    CARDIAC:   Normal rate,   Regular rhythm.      RESPIRATORY:   No wheezing  Bilateral BS  Normal chest expansion  Not tachypneic,  No use of accessory muscles    GASTROINTESTINAL:  Abdomen soft,   Non-tender,   No guarding,   + BS    MUSCULOSKELETAL:   Range of motion is limited,  No clubbing, cyanosis    NEUROLOGICAL:   Alert  Follows simple commands     SKIN:   Skin normal color for race,   Warm and dry  No evidence of rash.      12-11-24 @ 07:01  -  12-12-24 @ 07:00  --------------------------------------------------------  IN:    IV PiggyBack: 200 mL    Lactated Ringers: 200 mL  Total IN: 400 mL    OUT:    Indwelling Catheter - Urethral (mL): 1345 mL  Total OUT: 1345 mL    Total NET: -945 mL          LABS:                            8.6    10.13 )-----------( 244      ( 12 Dec 2024 05:52 )             27.6                                               12-12    136  |  104  |  17  ----------------------------<  100[H]  3.9   |  24  |  0.7    Ca    8.0[L]      12 Dec 2024 05:52  Mg     2.2     12-12    TPro  4.9[L]  /  Alb  3.0[L]  /  TBili  <0.2  /  DBili  x   /  AST  15  /  ALT  <5  /  AlkPhos  45  12-12                                             Urinalysis Basic - ( 12 Dec 2024 05:52 )    Color: x / Appearance: x / SG: x / pH: x  Gluc: 100 mg/dL / Ketone: x  / Bili: x / Urobili: x   Blood: x / Protein: x / Nitrite: x   Leuk Esterase: x / RBC: x / WBC x   Sq Epi: x / Non Sq Epi: x / Bacteria: x                                                  LIVER FUNCTIONS - ( 12 Dec 2024 05:52 )  Alb: 3.0 g/dL / Pro: 4.9 g/dL / ALK PHOS: 45 U/L / ALT: <5 U/L / AST: 15 U/L / GGT: x                                                                                                                                       MEDICATIONS  (STANDING):  artificial  tears Solution 1 Drop(s) Both EYES two times a day  carbidopa/levodopa  10/100 1 Tablet(s) Oral three times a day  chlorhexidine 2% Cloths 1 Application(s) Topical daily  cholecalciferol 1000 Unit(s) Oral daily  enoxaparin Injectable 90 milliGRAM(s) SubCutaneous every 12 hours  hydrocortisone sodium succinate Injectable 50 milliGRAM(s) IV Push every 8 hours  lactated ringers Bolus 500 milliLiter(s) IV Bolus once  latanoprost 0.005% Ophthalmic Solution 1 Drop(s) Both EYES at bedtime  levothyroxine 50 MICROGram(s) Oral daily  meropenem  IVPB 1000 milliGRAM(s) IV Intermittent every 8 hours  midodrine 5 milliGRAM(s) Oral every 8 hours  nystatin Powder 1 Application(s) Topical two times a day  polyethylene glycol 3350 17 Gram(s) Oral daily  senna 2 Tablet(s) Oral at bedtime  timolol 0.5% Solution 1 Drop(s) Both EYES two times a day    MEDICATIONS  (PRN):      New X-rays reviewed:                                                                                  ECHO       Patient is a 84y old  Female who presents with a chief complaint of PE (11 Dec 2024 08:38)        Over Night Events:  Remains on HFNCO2.  Off pressors.          ROS:     All ROS are negative except HPI         PHYSICAL EXAM    ICU Vital Signs Last 24 Hrs  T(C): 35.8 (12 Dec 2024 08:00), Max: 36.6 (11 Dec 2024 12:00)  T(F): 96.4 (12 Dec 2024 08:00), Max: 97.9 (11 Dec 2024 12:00)  HR: 63 (12 Dec 2024 08:00) (59 - 87)  BP: 102/55 (12 Dec 2024 08:00) (96/51 - 128/60)  BP(mean): 77 (12 Dec 2024 08:00) (71 - 84)  ABP: --  ABP(mean): --  RR: 20 (12 Dec 2024 04:00) (19 - 23)  SpO2: 100% (12 Dec 2024 08:00) (99% - 100%)    O2 Parameters below as of 12 Dec 2024 04:00  Patient On (Oxygen Delivery Method): BiPAP/CPAP            CONSTITUTIONAL:  Ill appearing     ENT:   Airway patent,   Mouth with normal mucosa.     EYES:   Pupils equal,   Round and reactive to light.    CARDIAC:   Normal rate,   Regular rhythm.      RESPIRATORY:   No wheezing  Bilateral BS  Normal chest expansion  Not tachypneic,  No use of accessory muscles    GASTROINTESTINAL:  Abdomen soft,   Non-tender,   No guarding,   + BS    MUSCULOSKELETAL:   Range of motion is limited,  No clubbing, cyanosis    NEUROLOGICAL:   Alert  Follows simple commands     SKIN:   Skin normal color for race,   Warm and dry  No evidence of rash.      12-11-24 @ 07:01  -  12-12-24 @ 07:00  --------------------------------------------------------  IN:    IV PiggyBack: 200 mL    Lactated Ringers: 200 mL  Total IN: 400 mL    OUT:    Indwelling Catheter - Urethral (mL): 1345 mL  Total OUT: 1345 mL    Total NET: -945 mL          LABS:                            8.6    10.13 )-----------( 244      ( 12 Dec 2024 05:52 )             27.6                                               12-12    136  |  104  |  17  ----------------------------<  100[H]  3.9   |  24  |  0.7    Ca    8.0[L]      12 Dec 2024 05:52  Mg     2.2     12-12    TPro  4.9[L]  /  Alb  3.0[L]  /  TBili  <0.2  /  DBili  x   /  AST  15  /  ALT  <5  /  AlkPhos  45  12-12                                             Urinalysis Basic - ( 12 Dec 2024 05:52 )    Color: x / Appearance: x / SG: x / pH: x  Gluc: 100 mg/dL / Ketone: x  / Bili: x / Urobili: x   Blood: x / Protein: x / Nitrite: x   Leuk Esterase: x / RBC: x / WBC x   Sq Epi: x / Non Sq Epi: x / Bacteria: x                                                  LIVER FUNCTIONS - ( 12 Dec 2024 05:52 )  Alb: 3.0 g/dL / Pro: 4.9 g/dL / ALK PHOS: 45 U/L / ALT: <5 U/L / AST: 15 U/L / GGT: x                                                                                                                                       MEDICATIONS  (STANDING):  artificial  tears Solution 1 Drop(s) Both EYES two times a day  carbidopa/levodopa  10/100 1 Tablet(s) Oral three times a day  chlorhexidine 2% Cloths 1 Application(s) Topical daily  cholecalciferol 1000 Unit(s) Oral daily  enoxaparin Injectable 90 milliGRAM(s) SubCutaneous every 12 hours  hydrocortisone sodium succinate Injectable 50 milliGRAM(s) IV Push every 8 hours  lactated ringers Bolus 500 milliLiter(s) IV Bolus once  latanoprost 0.005% Ophthalmic Solution 1 Drop(s) Both EYES at bedtime  levothyroxine 50 MICROGram(s) Oral daily  meropenem  IVPB 1000 milliGRAM(s) IV Intermittent every 8 hours  midodrine 5 milliGRAM(s) Oral every 8 hours  nystatin Powder 1 Application(s) Topical two times a day  polyethylene glycol 3350 17 Gram(s) Oral daily  senna 2 Tablet(s) Oral at bedtime  timolol 0.5% Solution 1 Drop(s) Both EYES two times a day    MEDICATIONS  (PRN):      New X-rays reviewed:                                                                                  ECHO

## 2024-12-12 NOTE — PROGRESS NOTE ADULT - SUBJECTIVE AND OBJECTIVE BOX
24H events:    Patient is a 84y old Female who presents with a chief complaint of PE (11 Dec 2024 08:38)  Primary diagnosis of Acute respiratory failure with hypoxia      Today is 4d of hospitalization. This morning patient was seen and examined at bedside, resting comfortably in bed.    No acute or major events overnight.    Code Status:    Family communication:  Contact date:  Name of person contacted:  Relationship to patient:  Communication details:  What matters most:    PAST MEDICAL & SURGICAL HISTORY  Dementia    History of breast cancer    Constipation    Lewy body dementia without behavioral disturbance    Colon polyp    History of colon resection    H/O mastectomy, right      SOCIAL HISTORY:  Social History:      ALLERGIES:  penicillin (Anaphylaxis)  ceftriaxone (Rash)  Originally Entered as [Unknown] reaction to [green pepper] (Unknown)  Originally Entered as [Unknown] reaction to [red pepper] (Unknown)  ciprofloxacin (Unknown)  benzodiazepines (Unknown)  Originally Entered as [Unknown] reaction to [neuroleptics] (Unknown)  antichlolinergics (Unknown)  Originally Entered as [Unknown] reaction to [pepper] (Unknown)    MEDICATIONS:  STANDING MEDICATIONS  artificial  tears Solution 1 Drop(s) Both EYES two times a day  carbidopa/levodopa  10/100 1 Tablet(s) Oral three times a day  chlorhexidine 2% Cloths 1 Application(s) Topical daily  cholecalciferol 1000 Unit(s) Oral daily  enoxaparin Injectable 90 milliGRAM(s) SubCutaneous every 12 hours  hydrocortisone sodium succinate Injectable 50 milliGRAM(s) IV Push every 8 hours  lactated ringers Bolus 500 milliLiter(s) IV Bolus once  latanoprost 0.005% Ophthalmic Solution 1 Drop(s) Both EYES at bedtime  levothyroxine 50 MICROGram(s) Oral daily  meropenem  IVPB 1000 milliGRAM(s) IV Intermittent every 8 hours  midodrine 5 milliGRAM(s) Oral every 8 hours  nystatin Powder 1 Application(s) Topical two times a day  polyethylene glycol 3350 17 Gram(s) Oral daily  senna 2 Tablet(s) Oral at bedtime  timolol 0.5% Solution 1 Drop(s) Both EYES two times a day    PRN MEDICATIONS    VITALS:   T(F): 97.6  HR: 59  BP: 101/50  RR: 20  SpO2: 100%    PHYSICAL EXAM:  GENERAL:   ( x) NAD, lying in bed comfortably     (  ) obtunded     (  ) lethargic     (  ) somnolent    HEAD:   ( x) Atraumatic     (  ) hematoma     (  ) laceration (specify location:       )     NECK:  (x) Supple     (  ) neck stiffness     (  ) nuchal rigidity     (  )  no JVD     (  ) JVD present ( -- cm)    HEART:  Rate -->     (x) normal rate     (  ) bradycardic     (  ) tachycardic  Rhythm -->     (x) regular     (  ) regularly irregular     (  ) irregularly irregular  Murmurs -->     (x) normal s1s2     (  ) systolic murmur     (  ) diastolic murmur     (  ) continuous murmur      (  ) S3 present     (  ) S4 present    LUNGS:   ( x)Unlabored respirations     (  ) tachypnea  ( x) B/L air entry     (  ) decreased breath sounds in:  (location     )    ( x) no adventitious sound     (  ) crackles     (  ) wheezing      (  ) rhonchi      (specify location:       )  (  ) chest wall tenderness (specify location:       )    ABDOMEN:   ( x) Soft     (  ) tense   |   (  ) nondistended     (x ) distended   |   (  ) +BS     (  ) hypoactive bowel sounds     (  ) hyperactive bowel sounds  ( x) nontender     (  ) RUQ tenderness     (  ) RLQ tenderness     (  ) LLQ tenderness     (  ) epigastric tenderness     (  ) diffuse tenderness  (  ) Splenomegaly      (  ) Hepatomegaly      (  ) Jaundice     (  ) ecchymosis     EXTREMITIES:  ( x) Normal     (  ) Rash     (  ) ecchymosis     (  ) varicose veins      (  ) pitting edema     (  ) non-pitting edema   (  ) ulceration     (  ) gangrene:     (location:     )    NERVOUS SYSTEM:    Awake, non verbal, slightly moves all 4 extremities         LABS:                        8.6    10.13 )-----------( 244      ( 12 Dec 2024 05:52 )             27.6     12-12    136  |  104  |  17  ----------------------------<  100[H]  3.9   |  24  |  0.7    Ca    8.0[L]      12 Dec 2024 05:52  Mg     2.2     12-12    TPro  4.9[L]  /  Alb  3.0[L]  /  TBili  <0.2  /  DBili  x   /  AST  15  /  ALT  <5  /  AlkPhos  45  12-12      Urinalysis Basic - ( 12 Dec 2024 05:52 )    Color: x / Appearance: x / SG: x / pH: x  Gluc: 100 mg/dL / Ketone: x  / Bili: x / Urobili: x   Blood: x / Protein: x / Nitrite: x   Leuk Esterase: x / RBC: x / WBC x   Sq Epi: x / Non Sq Epi: x / Bacteria: x                RADIOLOGY:    ASSESSMENT AND PLAN  84-year-old female with PMHx of Paroxysmal A-fib (was diagnosed in last admission and not on AC due to anemia due to L chest wall hematoma), Hx of ICH (in 2023), hx of Staphylococcus hominis bacteremia, Hx of Cardiopulmonary Arrest Secondary to Aspiration, history of 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, hypothyroidism , GERD, and Glaucoma presenting for facial droop.    #Hypotension  #2/2 Anaphylaxis to Rocephin vs Sepsis due to UTI vs PE  - Patient had hypotension after receiving Rocephin dose in the ED  - Was given epi then started on epi drip, later switched to levophed  - 12/11 off pressors, BP is soft  - s/p LR maintenance  - Hydrocortisone stress dose decreased to 50mg q8h on 12/11  - Midodrine 5mg q8 started 12/11  - For the possible UTI (UA++) she was started on Aztreonam  - UCx showing Klebsiella, resistant to aztreonam, so as per ID we switched her to meropenem on 12/11    #Right main pulmonary artery PE with RV strain on CTA - Intermediate/High risk as hypotension reason unclear  #Acute mixed respiratory failure requiring BIPAP  #Hx of COPD  - Duplex LE positive for extensive DVT on the left  - Patient was started on IV heparin drip after discussion of risks vs benefits with her  (since she has a hx of ICH in 2023 and hx of chest hematoma as well)  - IR on board, said no need for embolectomy as patient HD stable   - Was on BIPAP overnight, HFNC during the day, today will try on regular NC  - TTE was technically difficult, showed normal EF, no RV strain  - Continue therapeutic lovenox    #Drop in Hb  - Likely just back to her baseline as initial Hb of 11.6 too high compared to baseline  - 12/9 CT A/P done to r/o hematoma, was negative  - No melena or hematochezia, normal BM  - Will just monitor Hb for any further drop, for now stable    #Left facial droop  #R/o stroke   - Stroke code was called in ED  - CTH, CTA head and neck all negative  - Neuro recommended MR brain however very difficult to be done, so they said okay to just repeat CTH  - Now facial droop resolved  - Patient at baseline quadriplegic so can't assess motor function for any further deficits    #History of 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  #Hx of Cardiopulmonary Arrest Secondary to Aspiration  - Continue home meds  - S/S recs: pureed diet with moderately thick fluids    #Paroxysmal A-fib (was diagnosed in last admission and not on AC due to anemia due to L chest wall hematoma)  #Hx of intracerebral hemorrhage  - Currently in sinus rythm  - Was not on AC due to bleeding hx  - Now on lovenox        #DVT pro: lovenox  #GI pro: pantoprazole  #Diet: Pureed with moderately thick fluids  #Activity: as tolerated  #Dispo: SDU  #Code status: DNI/DNR.  #DVT PPX:         24H events:    Patient is a 84y old Female who presents with a chief complaint of PE (11 Dec 2024 08:38)  Primary diagnosis of Acute respiratory failure with hypoxia      Today is 4d of hospitalization. This morning patient was seen and examined at bedside, resting comfortably in bed.    No acute or major events overnight.    Code Status:    Family communication:  Contact date:  Name of person contacted:  Relationship to patient:  Communication details:  What matters most:    PAST MEDICAL & SURGICAL HISTORY  Dementia    History of breast cancer    Constipation    Lewy body dementia without behavioral disturbance    Colon polyp    History of colon resection    H/O mastectomy, right      SOCIAL HISTORY:  Social History:      ALLERGIES:  penicillin (Anaphylaxis)  ceftriaxone (Rash)  Originally Entered as [Unknown] reaction to [green pepper] (Unknown)  Originally Entered as [Unknown] reaction to [red pepper] (Unknown)  ciprofloxacin (Unknown)  benzodiazepines (Unknown)  Originally Entered as [Unknown] reaction to [neuroleptics] (Unknown)  antichlolinergics (Unknown)  Originally Entered as [Unknown] reaction to [pepper] (Unknown)    MEDICATIONS:  STANDING MEDICATIONS  artificial  tears Solution 1 Drop(s) Both EYES two times a day  carbidopa/levodopa  10/100 1 Tablet(s) Oral three times a day  chlorhexidine 2% Cloths 1 Application(s) Topical daily  cholecalciferol 1000 Unit(s) Oral daily  enoxaparin Injectable 90 milliGRAM(s) SubCutaneous every 12 hours  hydrocortisone sodium succinate Injectable 50 milliGRAM(s) IV Push every 8 hours  lactated ringers Bolus 500 milliLiter(s) IV Bolus once  latanoprost 0.005% Ophthalmic Solution 1 Drop(s) Both EYES at bedtime  levothyroxine 50 MICROGram(s) Oral daily  meropenem  IVPB 1000 milliGRAM(s) IV Intermittent every 8 hours  midodrine 5 milliGRAM(s) Oral every 8 hours  nystatin Powder 1 Application(s) Topical two times a day  polyethylene glycol 3350 17 Gram(s) Oral daily  senna 2 Tablet(s) Oral at bedtime  timolol 0.5% Solution 1 Drop(s) Both EYES two times a day    PRN MEDICATIONS    VITALS:   T(F): 97.6  HR: 59  BP: 101/50  RR: 20  SpO2: 100%    PHYSICAL EXAM:  GENERAL:   ( x) NAD, lying in bed comfortably     (  ) obtunded     (  ) lethargic     (  ) somnolent    HEAD:   ( x) Atraumatic     (  ) hematoma     (  ) laceration (specify location:       )     NECK:  (x) Supple     (  ) neck stiffness     (  ) nuchal rigidity     (  )  no JVD     (  ) JVD present ( -- cm)    HEART:  Rate -->     (x) normal rate     (  ) bradycardic     (  ) tachycardic  Rhythm -->     (x) regular     (  ) regularly irregular     (  ) irregularly irregular  Murmurs -->     (x) normal s1s2     (  ) systolic murmur     (  ) diastolic murmur     (  ) continuous murmur      (  ) S3 present     (  ) S4 present    LUNGS:   ( x)Unlabored respirations     (  ) tachypnea  ( x) B/L air entry     (  ) decreased breath sounds in:  (location     )    ( x) no adventitious sound     (  ) crackles     (  ) wheezing      (  ) rhonchi      (specify location:       )  (  ) chest wall tenderness (specify location:       )    ABDOMEN:   ( x) Soft     (  ) tense   |   (  ) nondistended     (x ) distended   |   (  ) +BS     (  ) hypoactive bowel sounds     (  ) hyperactive bowel sounds  ( x) nontender     (  ) RUQ tenderness     (  ) RLQ tenderness     (  ) LLQ tenderness     (  ) epigastric tenderness     (  ) diffuse tenderness  (  ) Splenomegaly      (  ) Hepatomegaly      (  ) Jaundice     (  ) ecchymosis     EXTREMITIES:  ( x) Normal     (  ) Rash     (  ) ecchymosis     (  ) varicose veins      (  ) pitting edema     (  ) non-pitting edema   (  ) ulceration     (  ) gangrene:     (location:     )    NERVOUS SYSTEM:    Awake, non verbal, slightly moves all 4 extremities         LABS:                        8.6    10.13 )-----------( 244      ( 12 Dec 2024 05:52 )             27.6     12-12    136  |  104  |  17  ----------------------------<  100[H]  3.9   |  24  |  0.7    Ca    8.0[L]      12 Dec 2024 05:52  Mg     2.2     12-12    TPro  4.9[L]  /  Alb  3.0[L]  /  TBili  <0.2  /  DBili  x   /  AST  15  /  ALT  <5  /  AlkPhos  45  12-12      Urinalysis Basic - ( 12 Dec 2024 05:52 )    Color: x / Appearance: x / SG: x / pH: x  Gluc: 100 mg/dL / Ketone: x  / Bili: x / Urobili: x   Blood: x / Protein: x / Nitrite: x   Leuk Esterase: x / RBC: x / WBC x   Sq Epi: x / Non Sq Epi: x / Bacteria: x                RADIOLOGY:    ASSESSMENT AND PLAN  84-year-old female with PMHx of Paroxysmal A-fib (was diagnosed in last admission and not on AC due to anemia due to L chest wall hematoma), Hx of ICH (in 2023), hx of Staphylococcus hominis bacteremia, Hx of Cardiopulmonary Arrest Secondary to Aspiration, history of 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, hypothyroidism , GERD, and Glaucoma presenting for facial droop.    #Hypotension  #2/2 Anaphylaxis to Rocephin vs Sepsis due to UTI vs PE  - Patient had hypotension after receiving Rocephin dose in the ED  - Was given epi then started on epi drip, later switched to levophed  - 12/11 off pressors, BP is soft  - s/p LR maintenance  - Hydrocortisone stress dose decreased to 50mg q8h on 12/11  - Midodrine 5mg q8 started 12/11  - For the possible UTI (UA++) she was started on Aztreonam  - UCx showing Klebsiella, resistant to aztreonam, so as per ID we switched her to meropenem on 12/11    #Right main pulmonary artery PE with RV strain on CTA - Intermediate/High risk as hypotension reason unclear  #Acute mixed respiratory failure requiring BIPAP  #Hx of COPD  - Duplex LE positive for extensive DVT on the left  - Patient was started on IV heparin drip after discussion of risks vs benefits with her  (since she has a hx of ICH in 2023 and hx of chest hematoma as well)  - IR on board, said no need for embolectomy as patient HD stable   - Was on BIPAP overnight, HFNC during the day, today will try on regular NC  - TTE was technically difficult, showed normal EF, no RV strain  - Continue therapeutic lovenox    #Drop in Hb  - Likely just back to her baseline as initial Hb of 11.6 too high compared to baseline  - 12/9 CT A/P done to r/o hematoma, was negative  - No melena or hematochezia, normal BM  - Will just monitor Hb for any further drop, for now stable    #Left facial droop  #R/o stroke   #functional Quadriplegia   - Stroke code was called in ED  - CTH, CTA head and neck all negative  - Neuro recommended MR brain however very difficult to be done, so they said okay to just repeat CTH  - Now facial droop resolved  - Patient at baseline quadriplegic so can't assess motor function for any further deficits    #History of 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  #Hx of Cardiopulmonary Arrest Secondary to Aspiration  - Continue home meds  - S/S recs: pureed diet with moderately thick fluids    #Paroxysmal A-fib (was diagnosed in last admission and not on AC due to anemia due to L chest wall hematoma)  #Hx of intracerebral hemorrhage  - Currently in sinus rythm  - Was not on AC due to bleeding hx  - Now on lovenox        #DVT pro: lovenox  #GI pro: pantoprazole  #Diet: Pureed with moderately thick fluids  #Activity: as tolerated  #Dispo: SDU  #Code status: DNI/DNR.  #DVT PPX:

## 2024-12-13 LAB
ALBUMIN SERPL ELPH-MCNC: 3 G/DL — LOW (ref 3.5–5.2)
ALP SERPL-CCNC: 42 U/L — SIGNIFICANT CHANGE UP (ref 30–115)
ALT FLD-CCNC: 5 U/L — SIGNIFICANT CHANGE UP (ref 0–41)
ANION GAP SERPL CALC-SCNC: 9 MMOL/L — SIGNIFICANT CHANGE UP (ref 7–14)
AST SERPL-CCNC: 25 U/L — SIGNIFICANT CHANGE UP (ref 0–41)
BASOPHILS # BLD AUTO: 0.03 K/UL — SIGNIFICANT CHANGE UP (ref 0–0.2)
BASOPHILS NFR BLD AUTO: 0.3 % — SIGNIFICANT CHANGE UP (ref 0–1)
BILIRUB SERPL-MCNC: <0.2 MG/DL — SIGNIFICANT CHANGE UP (ref 0.2–1.2)
BUN SERPL-MCNC: 17 MG/DL — SIGNIFICANT CHANGE UP (ref 10–20)
CALCIUM SERPL-MCNC: 8 MG/DL — LOW (ref 8.4–10.4)
CHLORIDE SERPL-SCNC: 109 MMOL/L — SIGNIFICANT CHANGE UP (ref 98–110)
CO2 SERPL-SCNC: 24 MMOL/L — SIGNIFICANT CHANGE UP (ref 17–32)
CREAT SERPL-MCNC: 0.6 MG/DL — LOW (ref 0.7–1.5)
EGFR: 89 ML/MIN/1.73M2 — SIGNIFICANT CHANGE UP
EOSINOPHIL # BLD AUTO: 0.17 K/UL — SIGNIFICANT CHANGE UP (ref 0–0.7)
EOSINOPHIL NFR BLD AUTO: 1.7 % — SIGNIFICANT CHANGE UP (ref 0–8)
GLUCOSE SERPL-MCNC: 85 MG/DL — SIGNIFICANT CHANGE UP (ref 70–99)
HCT VFR BLD CALC: 29.2 % — LOW (ref 37–47)
HGB BLD-MCNC: 8.9 G/DL — LOW (ref 12–16)
IMM GRANULOCYTES NFR BLD AUTO: 1.4 % — HIGH (ref 0.1–0.3)
LYMPHOCYTES # BLD AUTO: 1.43 K/UL — SIGNIFICANT CHANGE UP (ref 1.2–3.4)
LYMPHOCYTES # BLD AUTO: 14.3 % — LOW (ref 20.5–51.1)
MAGNESIUM SERPL-MCNC: 2.2 MG/DL — SIGNIFICANT CHANGE UP (ref 1.8–2.4)
MCHC RBC-ENTMCNC: 30.5 G/DL — LOW (ref 32–37)
MCHC RBC-ENTMCNC: 31.4 PG — HIGH (ref 27–31)
MCV RBC AUTO: 103.2 FL — HIGH (ref 81–99)
MONOCYTES # BLD AUTO: 0.71 K/UL — HIGH (ref 0.1–0.6)
MONOCYTES NFR BLD AUTO: 7.1 % — SIGNIFICANT CHANGE UP (ref 1.7–9.3)
NEUTROPHILS # BLD AUTO: 7.5 K/UL — HIGH (ref 1.4–6.5)
NEUTROPHILS NFR BLD AUTO: 75.2 % — SIGNIFICANT CHANGE UP (ref 42.2–75.2)
NRBC # BLD: 0 /100 WBCS — SIGNIFICANT CHANGE UP (ref 0–0)
PLATELET # BLD AUTO: 263 K/UL — SIGNIFICANT CHANGE UP (ref 130–400)
PMV BLD: 9.4 FL — SIGNIFICANT CHANGE UP (ref 7.4–10.4)
POTASSIUM SERPL-MCNC: 4.1 MMOL/L — SIGNIFICANT CHANGE UP (ref 3.5–5)
POTASSIUM SERPL-SCNC: 4.1 MMOL/L — SIGNIFICANT CHANGE UP (ref 3.5–5)
PROT SERPL-MCNC: 4.7 G/DL — LOW (ref 6–8)
RBC # BLD: 2.83 M/UL — LOW (ref 4.2–5.4)
RBC # FLD: 16 % — HIGH (ref 11.5–14.5)
SODIUM SERPL-SCNC: 142 MMOL/L — SIGNIFICANT CHANGE UP (ref 135–146)
WBC # BLD: 9.98 K/UL — SIGNIFICANT CHANGE UP (ref 4.8–10.8)
WBC # FLD AUTO: 9.98 K/UL — SIGNIFICANT CHANGE UP (ref 4.8–10.8)

## 2024-12-13 PROCEDURE — 99223 1ST HOSP IP/OBS HIGH 75: CPT

## 2024-12-13 PROCEDURE — 99233 SBSQ HOSP IP/OBS HIGH 50: CPT

## 2024-12-13 PROCEDURE — 99291 CRITICAL CARE FIRST HOUR: CPT

## 2024-12-13 RX ORDER — BACITRACIN ZINC 500 UNIT/G
1 OINTMENT (GRAM) TOPICAL DAILY
Refills: 0 | Status: DISCONTINUED | OUTPATIENT
Start: 2024-12-13 | End: 2024-12-17

## 2024-12-13 RX ADMIN — SODIUM CHLORIDE 1000 MILLILITER(S): 9 INJECTION, SOLUTION INTRAMUSCULAR; INTRAVENOUS; SUBCUTANEOUS at 01:58

## 2024-12-13 RX ADMIN — POVIDONE, POLYVINYL ALCOHOL 1 DROP(S): 20; 27 SOLUTION OPHTHALMIC at 17:21

## 2024-12-13 RX ADMIN — MEROPENEM 100 MILLIGRAM(S): 500 INJECTION, POWDER, FOR SOLUTION INTRAVENOUS at 05:43

## 2024-12-13 RX ADMIN — Medication 2 TABLET(S): at 21:50

## 2024-12-13 RX ADMIN — NYSTATIN 1 APPLICATION(S): 100000 POWDER TOPICAL at 17:21

## 2024-12-13 RX ADMIN — POLYETHYLENE GLYCOL 3350 17 GRAM(S): 17 POWDER, FOR SOLUTION ORAL at 11:19

## 2024-12-13 RX ADMIN — MIDODRINE HYDROCHLORIDE 5 MILLIGRAM(S): 5 TABLET ORAL at 21:50

## 2024-12-13 RX ADMIN — Medication 1 DROP(S): at 05:43

## 2024-12-13 RX ADMIN — CHLORHEXIDINE GLUCONATE 1 APPLICATION(S): 1.2 RINSE ORAL at 11:20

## 2024-12-13 RX ADMIN — NYSTATIN 1 APPLICATION(S): 100000 POWDER TOPICAL at 05:44

## 2024-12-13 RX ADMIN — Medication 50 MILLIGRAM(S): at 05:43

## 2024-12-13 RX ADMIN — MIDODRINE HYDROCHLORIDE 5 MILLIGRAM(S): 5 TABLET ORAL at 05:44

## 2024-12-13 RX ADMIN — MEROPENEM 100 MILLIGRAM(S): 500 INJECTION, POWDER, FOR SOLUTION INTRAVENOUS at 21:49

## 2024-12-13 RX ADMIN — Medication 1000 UNIT(S): at 11:20

## 2024-12-13 RX ADMIN — MEROPENEM 100 MILLIGRAM(S): 500 INJECTION, POWDER, FOR SOLUTION INTRAVENOUS at 12:50

## 2024-12-13 RX ADMIN — CARBIDOPA AND LEVODOPA 1 TABLET(S): 10; 100 TABLET ORAL at 21:50

## 2024-12-13 RX ADMIN — CARBIDOPA AND LEVODOPA 1 TABLET(S): 10; 100 TABLET ORAL at 12:50

## 2024-12-13 RX ADMIN — ENOXAPARIN SODIUM 90 MILLIGRAM(S): 30 INJECTION SUBCUTANEOUS at 11:18

## 2024-12-13 RX ADMIN — CARBIDOPA AND LEVODOPA 1 TABLET(S): 10; 100 TABLET ORAL at 05:44

## 2024-12-13 RX ADMIN — LATANOPROST 1 DROP(S): 50 SOLUTION/ DROPS OPHTHALMIC at 21:51

## 2024-12-13 RX ADMIN — MIDODRINE HYDROCHLORIDE 5 MILLIGRAM(S): 5 TABLET ORAL at 12:50

## 2024-12-13 RX ADMIN — Medication 1 DROP(S): at 17:22

## 2024-12-13 RX ADMIN — ENOXAPARIN SODIUM 90 MILLIGRAM(S): 30 INJECTION SUBCUTANEOUS at 21:49

## 2024-12-13 RX ADMIN — Medication 50 MILLIGRAM(S): at 12:49

## 2024-12-13 RX ADMIN — POVIDONE, POLYVINYL ALCOHOL 1 DROP(S): 20; 27 SOLUTION OPHTHALMIC at 05:43

## 2024-12-13 RX ADMIN — Medication 50 MILLIGRAM(S): at 21:49

## 2024-12-13 RX ADMIN — Medication 1 APPLICATION(S): at 21:56

## 2024-12-13 RX ADMIN — Medication 50 MICROGRAM(S): at 05:44

## 2024-12-13 NOTE — PHYSICAL THERAPY INITIAL EVALUATION ADULT - GENERAL OBSERVATIONS, REHAB EVAL
1009-1502 am Chart reviewed. Pt. seen semirecline in bed , in No apparent distress , Pt. alert and oriented X 1, + O2, IV lock , Prima fit device , + generalized weakness, dysarthric.  Pt was seen in SICU today.

## 2024-12-13 NOTE — PHYSICAL THERAPY INITIAL EVALUATION ADULT - PHYSICAL ASSIST/NONPHYSICAL ASSIST, REHAB EVAL
Constitutional: See HPI.  Eyes: No visual changes, eye pain or discharge  ENMT: No hearing changes, pain, discharge or infections.   Cardiac: No SOB or edema. No chest pain with exertion.  Respiratory: No cough or respiratory distress. No hemoptysis  GI: No nausea, vomiting, diarrhea or abdominal pain.  : No dysuria, frequency or burning. No Discharge  MS: + low back pain, + arm pain. No myalgia, muscle weakness, joint pain.  Neuro: No headache or weakness.   Skin: No skin rash.  Except as documented in the HPI, all other systems are negative.
2 person assist

## 2024-12-13 NOTE — CONSULT NOTE ADULT - ASSESSMENT
High risk for pressure injury development or progression   Skin assessed- sacrum , B/L buttock , and  heel intact                         L arm full thickens wound - etiology unknown - wound base pale pink                         No drainage, odor, erythema, increased warmth, tenderness, induration, fluctuance, nor crepitus        Wound and skin care recs.   Clean wound with saline, pat dry apply bacitracin with non adhering and Kerlix  dressing   Pressure  injury  preventive  measures  Skin  and incontinence care   Assess wound and inform primary provider of any changes   Case discussed with primary Rn  Wound/ ostomy specialist  to f/u as needed     Offloading: [x ] Frequent position changes [ ] Devices/Equipment  Cleansing: [ x] Saline [ ] Soap/Water [ ] Other: ______  Topicals: [ ] Barrier Cream [ ] Antimicrobial [ ] Enzymatic Wound Debridement [x] Moist  wound Healing   Dressings: [ ] Dry, sterile [ ] Allevyn  Foam [ ] Absorbant Pads [ ] Collagenase    Other Recs.   Per Primary team   Total time for bedside assessment  , review of medical records  and  discussion of plan of care with primary team greater than 35 min

## 2024-12-13 NOTE — PHYSICAL THERAPY INITIAL EVALUATION ADULT - PERTINENT HX OF CURRENT PROBLEM, REHAB EVAL
History of Present Illness:   84-year-old female with PMHx of Paroxysmal A-fib (was diagnosed in last admission and not on AC due to anemia due to L chest wall hematoma), Hx of ICH (in 2023), hx of Staphylococcus hominis bacteremia, Hx of Cardiopulmonary Arrest Secondary to Aspiration, history of 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, hypothyroidism , GERD, and Glaucoma presenting for facial droop.    Patient resides at [facility] where  reports that he was visiting her this morning when he noted left-sided facial droop which is since resolved.  Last known well prior was last night when nursing home was giving her night meds.  At baseline, she is quadriplegic, does not converse and speaks a few words at times.    No other symptoms were reported   Pt. referred to PT for eval and tx.

## 2024-12-13 NOTE — PHYSICAL THERAPY INITIAL EVALUATION ADULT - SPECIFY REASON(S)
Upon assessment pt requires extensive/ total assist ( bedbound at baseline), will not require skilled PT but will benefit from ROM ex from Nursing staff. Will d/c from acute care PT service.

## 2024-12-13 NOTE — CONSULT NOTE ADULT - CONSULT REASON
upper extremity wound assessment and treatment recommendation
PE thrombectomy
Septic shock
left facial asymmetry
GOC

## 2024-12-13 NOTE — CONSULT NOTE ADULT - SUBJECTIVE AND OBJECTIVE BOX
Patient is 84-year-old female with PMHx of Paroxysmal A-fib (was diagnosed in last admission and not on AC due to anemia due to L chest wall hematoma), Hx of ICH (in 2023), hx of Staphylococcus hominis bacteremia, Hx of Cardiopulmonary Arrest Secondary to Aspiration, history of 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, hypothyroidism , GERD, and Glaucoma presenting for facial droop.  Patient resides at [facility] where  reports that he was visiting her this morning when he noted left-sided facial droop which is since resolved.  Last known well prior was last night when nursing home was giving her night meds.  At baseline, she is quadriplegic, does not converse and speaks a few words at times.  No other symptoms were reported    In the ED initially, her vitals were stable  Stroke imaging was done and was unremarkable  Seen by neuro   After that patient was given rocephin for presumed UTI, ED suspect that after that, she became hypotensive and tachycardic with increased work of breathing, was given epi and then started on an epi drip + solumedrol for a working Dx of septic and anaphylactic shock    Labs: Leukocytosis, elevated lactate and trop (312 -> 293)    CT brain perfusion: No evidence of acute infarct or ischemia.    CTA neck: No stenosis. No dissection.    CTA brain: No stenosis or aneurysm.    CT angio chest showed right pulmonary embolus with right heart strain.    UA positive   (08 Dec 2024 15:23)      PAST MEDICAL & SURGICAL HISTORY:  Dementia  History of breast cancer  Constipation  Lewy body dementia without behavioral disturbance  Colon polyp  History of colon resection  H/O mastectomy, right      REVIEW OF SYSTEMS: Pt unable to offer  MEDICATIONS  (STANDING):  artificial  tears Solution 1 Drop(s) Both EYES two times a day  carbidopa/levodopa  10/100 1 Tablet(s) Oral three times a day  chlorhexidine 2% Cloths 1 Application(s) Topical daily  cholecalciferol 1000 Unit(s) Oral daily  enoxaparin Injectable 90 milliGRAM(s) SubCutaneous every 12 hours  hydrocortisone sodium succinate Injectable 50 milliGRAM(s) IV Push every 8 hours  latanoprost 0.005% Ophthalmic Solution 1 Drop(s) Both EYES at bedtime  levothyroxine 50 MICROGram(s) Oral daily  meropenem  IVPB 1000 milliGRAM(s) IV Intermittent every 8 hours  midodrine 5 milliGRAM(s) Oral every 8 hours  nystatin Powder 1 Application(s) Topical two times a day  polyethylene glycol 3350 17 Gram(s) Oral daily  senna 2 Tablet(s) Oral at bedtime  timolol 0.5% Solution 1 Drop(s) Both EYES two times a day    MEDICATIONS  (PRN):      Allergies    penicillin (Anaphylaxis)  ceftriaxone (Rash)  Originally Entered as [Unknown] reaction to [green pepper] (Unknown)  Originally Entered as [Unknown] reaction to [red pepper] (Unknown)  ciprofloxacin (Unknown)  benzodiazepines (Unknown)  Originally Entered as [Unknown] reaction to [neuroleptics] (Unknown)  antichlolinergics (Unknown)  Originally Entered as [Unknown] reaction to [pepper] (Unknown)    Intolerances    SOCIAL HISTORY: From  [facility]    FAMILY HISTORY:  No pertinent family history in first degree relatives    PHYSICAL EXAM:  Vital Signs Last 24 Hrs  T(C): 35.8 (13 Dec 2024 08:00), Max: 36.8 (13 Dec 2024 04:00)  T(F): 96.5 (13 Dec 2024 08:00), Max: 98.2 (13 Dec 2024 04:00)  HR: 60 (13 Dec 2024 10:00) (56 - 70)  BP: 103/49 (13 Dec 2024 10:00) (103/49 - 132/60)  BP(mean): 71 (13 Dec 2024 10:00) (71 - 87)  RR: 20 (13 Dec 2024 08:00) (20 - 20)  SpO2: 100% (13 Dec 2024 10:00) (99% - 100%)    Parameters below as of 13 Dec 2024 10:00  Patient On (Oxygen Delivery Method): nasal cannula  O2 Flow (L/min): 6    Gen: NAD  HEENT: Normocephalic, atraumatic  Neck: supple, no lymphadenopathy  CV: Regular rate & regular rhythm  Lungs: decreased BS at bases  Abdomen: Soft, distended, nontender, BS present  Ext: Warm, well perfused, quadriplegic  Neuro: Alert, follows simple commands  Skin: L. arm wound, no rash, no erythema  Lines: no phlebitis      LABS/ CULTURES/ RADIOLOGY:                        8.9    9.98  )-----------( 263      ( 13 Dec 2024 04:43 )             29.2       142  |  109  |  17  ----------------------------<  85      [12-13-24 @ 04:43]  4.1   |  24  |  0.6        Ca     8.0     [12-13-24 @ 04:43]      Mg     2.2     [12-13-24 @ 04:43]    TPro  4.7  /  Alb  3.0  /  TBili  <0.2  /  DBili  x   /  AST  25  /  ALT  5   /  AlkPhos  42  [12-13-24 @ 04:43]              Culture - Blood (collected 12-08-24 @ 21:13)  Source: .Blood BLOOD  Preliminary Report (12-13-24 @ 04:00):    No growth at 4 days

## 2024-12-13 NOTE — PROGRESS NOTE ADULT - ASSESSMENT
IMPRESSION:    Acute hypoxemic respiratory failure   Acute hypercapnic respiratory failure  PE with right heart strain  DVT   Valvular HD   UTI  Anemia CT abdomen with no retroperitoneal bleed.  Hg stable    Paroxysmal A-fib   Hx of Cardiopulmonary Arrest Secondary to Aspiration  History of 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  Hx of intracerebral hemorrhage  Hypothyroidism   GERD  Glaucoma      PLAN:    CNS: Avoid CNS Depressants.  Continue Parkinson meds    HEENT: Oral care. Aspiration precautions.  Speech and swallow     PULMONARY: HOB @ 45. Monitor Pulse Ox. Keep > 92-96%.  Wean O2 As tolerated.  CT noted.  Can use NIV during sleep      CARDIOVASCULAR:   TTE noted.  Avoid overload.  Cards follow up      GI: GI prophylaxis. Feeding per speech.  Bowel regimen     RENAL: Replete electrolytes as needed.     INFECTIOUS DISEASE:  Monitor VS.  Finish Meropenem course per ID.        HEMATOLOGICAL: Duplex noted. Continue Therapeutic LMWH.  Monitor CBC     ENDOCRINE: Monitor FS. Continue home Levothyroxine,    CODE STATUS: DNR/DNI    DISPOSITION: SDU

## 2024-12-13 NOTE — PROGRESS NOTE ADULT - SUBJECTIVE AND OBJECTIVE BOX
Patient is a 84y old  Female who presents with a chief complaint of PE (11 Dec 2024 08:38)        Over Night Events:  On BiPAP this am.  Off pressors.          ROS:     All ROS are negative except HPI         PHYSICAL EXAM    ICU Vital Signs Last 24 Hrs  T(C): 36.8 (13 Dec 2024 04:00), Max: 36.8 (13 Dec 2024 04:00)  T(F): 98.2 (13 Dec 2024 04:00), Max: 98.2 (13 Dec 2024 04:00)  HR: 65 (13 Dec 2024 06:00) (56 - 70)  BP: 121/54 (13 Dec 2024 06:00) (102/55 - 132/60)  BP(mean): 78 (13 Dec 2024 06:00) (77 - 87)  ABP: --  ABP(mean): --  RR: --  SpO2: 100% (13 Dec 2024 06:00) (99% - 100%)    O2 Parameters below as of 13 Dec 2024 06:00  Patient On (Oxygen Delivery Method): nasal cannula, high flow            CONSTITUTIONAL:  NAD    ENT:   Airway patent,   Mouth with normal mucosa.       EYES:   Pupils equal,   Round and reactive to light.    CARDIAC:   Normal rate,   Regular rhythm.      RESPIRATORY:   No wheezing  Bilateral BS  Normal chest expansion  Not tachypneic,  No use of accessory muscles    GASTROINTESTINAL:  Abdomen soft,   Non-tender,   No guarding,   + BS    MUSCULOSKELETAL:   Range of motion is not limited,  No clubbing, cyanosis    NEUROLOGICAL:   Alert   No motor  deficits.    SKIN:   Skin normal color for race,   Warm and dry   No evidence of rash.      12-11-24 @ 07:01  -  12-12-24 @ 07:00  --------------------------------------------------------  IN:    IV PiggyBack: 200 mL    Lactated Ringers: 200 mL  Total IN: 400 mL    OUT:    Indwelling Catheter - Urethral (mL): 1345 mL  Total OUT: 1345 mL    Total NET: -945 mL      12-12-24 @ 07:01  -  12-13-24 @ 06:43  --------------------------------------------------------  IN:  Total IN: 0 mL    OUT:    Indwelling Catheter - Urethral (mL): 60 mL  Total OUT: 60 mL    Total NET: -60 mL          LABS:                            8.9    9.98  )-----------( 263      ( 13 Dec 2024 04:43 )             29.2                      8.9    9.98  )-----------( 263      ( 12-13 @ 04:43 )             29.2                8.6    10.13 )-----------( 244      ( 12-12 @ 05:52 )             27.6                8.7    9.29  )-----------( 246      ( 12-11 @ 10:50 )             27.7                                             12-13    142  |  109  |  17  ----------------------------<  85  4.1   |  24  |  0.6[L]    Ca    8.0[L]      13 Dec 2024 04:43  Mg     2.2     12-13    TPro  4.7[L]  /  Alb  3.0[L]  /  TBili  <0.2  /  DBili  x   /  AST  25  /  ALT  5   /  AlkPhos  42  12-13                                             Urinalysis Basic - ( 13 Dec 2024 04:43 )    Color: x / Appearance: x / SG: x / pH: x  Gluc: 85 mg/dL / Ketone: x  / Bili: x / Urobili: x   Blood: x / Protein: x / Nitrite: x   Leuk Esterase: x / RBC: x / WBC x   Sq Epi: x / Non Sq Epi: x / Bacteria: x                                                  LIVER FUNCTIONS - ( 13 Dec 2024 04:43 )  Alb: 3.0 g/dL / Pro: 4.7 g/dL / ALK PHOS: 42 U/L / ALT: 5 U/L / AST: 25 U/L / GGT: x                                                                                                                                       MEDICATIONS  (STANDING):  artificial  tears Solution 1 Drop(s) Both EYES two times a day  carbidopa/levodopa  10/100 1 Tablet(s) Oral three times a day  chlorhexidine 2% Cloths 1 Application(s) Topical daily  cholecalciferol 1000 Unit(s) Oral daily  enoxaparin Injectable 90 milliGRAM(s) SubCutaneous every 12 hours  hydrocortisone sodium succinate Injectable 50 milliGRAM(s) IV Push every 8 hours  latanoprost 0.005% Ophthalmic Solution 1 Drop(s) Both EYES at bedtime  levothyroxine 50 MICROGram(s) Oral daily  meropenem  IVPB 1000 milliGRAM(s) IV Intermittent every 8 hours  midodrine 5 milliGRAM(s) Oral every 8 hours  nystatin Powder 1 Application(s) Topical two times a day  polyethylene glycol 3350 17 Gram(s) Oral daily  senna 2 Tablet(s) Oral at bedtime  timolol 0.5% Solution 1 Drop(s) Both EYES two times a day    MEDICATIONS  (PRN):      New X-rays reviewed:                                                                                  ECHO

## 2024-12-13 NOTE — PROGRESS NOTE ADULT - SUBJECTIVE AND OBJECTIVE BOX
24H events:    Patient is a 84y old Female who presents with a chief complaint of PE (11 Dec 2024 08:38)  Primary diagnosis of Acute respiratory failure with hypoxia        Today is 5d of hospitalization. This morning patient was seen and examined at bedside, resting comfortably in bed.    No acute or major events overnight.    Code Status:  DNR DNI    Family communication:  Long discussion with  at bedside 12/12 afternoon about medical updates and plans    PAST MEDICAL & SURGICAL HISTORY  Dementia    History of breast cancer    Constipation    Lewy body dementia without behavioral disturbance    Colon polyp    History of colon resection    H/O mastectomy, right      SOCIAL HISTORY:  Social History:      ALLERGIES:  penicillin (Anaphylaxis)  ceftriaxone (Rash)  Originally Entered as [Unknown] reaction to [green pepper] (Unknown)  Originally Entered as [Unknown] reaction to [red pepper] (Unknown)  ciprofloxacin (Unknown)  benzodiazepines (Unknown)  Originally Entered as [Unknown] reaction to [neuroleptics] (Unknown)  antichlolinergics (Unknown)  Originally Entered as [Unknown] reaction to [pepper] (Unknown)    MEDICATIONS:  STANDING MEDICATIONS  artificial  tears Solution 1 Drop(s) Both EYES two times a day  carbidopa/levodopa  10/100 1 Tablet(s) Oral three times a day  chlorhexidine 2% Cloths 1 Application(s) Topical daily  cholecalciferol 1000 Unit(s) Oral daily  enoxaparin Injectable 90 milliGRAM(s) SubCutaneous every 12 hours  hydrocortisone sodium succinate Injectable 50 milliGRAM(s) IV Push every 8 hours  latanoprost 0.005% Ophthalmic Solution 1 Drop(s) Both EYES at bedtime  levothyroxine 50 MICROGram(s) Oral daily  meropenem  IVPB 1000 milliGRAM(s) IV Intermittent every 8 hours  midodrine 5 milliGRAM(s) Oral every 8 hours  nystatin Powder 1 Application(s) Topical two times a day  polyethylene glycol 3350 17 Gram(s) Oral daily  senna 2 Tablet(s) Oral at bedtime  timolol 0.5% Solution 1 Drop(s) Both EYES two times a day    PRN MEDICATIONS    VITALS:   T(F): 96.5  HR: 63  BP: 113/53  RR: 20  SpO2: 100%    PHYSICAL EXAM:  GENERAL:   ( x) NAD, lying in bed comfortably     (  ) obtunded     (  ) lethargic     (  ) somnolent    HEAD:   ( x) Atraumatic     (  ) hematoma     (  ) laceration (specify location:       )     NECK:  (x) Supple     (  ) neck stiffness     (  ) nuchal rigidity     (  )  no JVD     (  ) JVD present ( -- cm)    HEART:  Rate -->     (x) normal rate     (  ) bradycardic     (  ) tachycardic  Rhythm -->     (x) regular     (  ) regularly irregular     (  ) irregularly irregular  Murmurs -->     (x) normal s1s2     (  ) systolic murmur     (  ) diastolic murmur     (  ) continuous murmur      (  ) S3 present     (  ) S4 present    LUNGS:   ( x)Unlabored respirations     (  ) tachypnea  ( x) B/L air entry     (  ) decreased breath sounds in:  (location     )    ( x) no adventitious sound     (  ) crackles     (  ) wheezing      (  ) rhonchi      (specify location:       )  (  ) chest wall tenderness (specify location:       )    ABDOMEN:   ( x) Soft     (  ) tense   |   (  ) nondistended     (  ) distended   |   (  ) +BS     (  ) hypoactive bowel sounds     (  ) hyperactive bowel sounds  ( x) nontender     (  ) RUQ tenderness     (  ) RLQ tenderness     (  ) LLQ tenderness     (  ) epigastric tenderness     (  ) diffuse tenderness  (  ) Splenomegaly      (  ) Hepatomegaly      (  ) Jaundice     (  ) ecchymosis     EXTREMITIES:  Left upper extremity ulcer, clean    NERVOUS SYSTEM:    Awake, non verbal, slightly moves all 4 extremities         LABS:                        8.9    9.98  )-----------( 263      ( 13 Dec 2024 04:43 )             29.2     12-13    142  |  109  |  17  ----------------------------<  85  4.1   |  24  |  0.6[L]    Ca    8.0[L]      13 Dec 2024 04:43  Mg     2.2     12-13    TPro  4.7[L]  /  Alb  3.0[L]  /  TBili  <0.2  /  DBili  x   /  AST  25  /  ALT  5   /  AlkPhos  42  12-13      Urinalysis Basic - ( 13 Dec 2024 04:43 )    Color: x / Appearance: x / SG: x / pH: x  Gluc: 85 mg/dL / Ketone: x  / Bili: x / Urobili: x   Blood: x / Protein: x / Nitrite: x   Leuk Esterase: x / RBC: x / WBC x   Sq Epi: x / Non Sq Epi: x / Bacteria: x                RADIOLOGY:    ASSESSMENT AND PLAN  84-year-old female with PMHx of Paroxysmal A-fib (was diagnosed in last admission and not on AC due to anemia due to L chest wall hematoma), Hx of ICH (in 2023), hx of Staphylococcus hominis bacteremia, Hx of Cardiopulmonary Arrest Secondary to Aspiration, history of 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, hypothyroidism , GERD, and Glaucoma presenting for facial droop.    #Hypotension  #2/2 Anaphylaxis to Rocephin vs Sepsis due to UTI vs PE  - Patient had hypotension after receiving Rocephin dose in the ED  - Was given epi then started on epi drip, later switched to levophed  - 12/11 off pressors, BP is soft  - s/p LR maintenance  - Hydrocortisone stress dose decreased to 50mg q8h on 12/11  - Midodrine 5mg q8 started 12/11  - For the possible UTI (UA++) she was started on Aztreonam  - UCx showing Klebsiella, resistant to aztreonam, so as per ID we switched her to meropenem on 12/11    #Right main pulmonary artery PE with RV strain on CTA - Intermediate/High risk as hypotension reason unclear  #Acute mixed respiratory failure requiring BIPAP  #Hx of COPD  - Duplex LE positive for extensive DVT on the left  - Patient was started on IV heparin drip after discussion of risks vs benefits with her  (since she has a hx of ICH in 2023 and hx of chest hematoma as well)  - IR on board, said no need for embolectomy as patient HD stable   - Was on BIPAP overnight, HFNC during the day, today will try on regular NC  - TTE was technically difficult, showed normal EF, no RV strain  - Continue therapeutic lovenox    #Drop in Hb  - Likely just back to her baseline as initial Hb of 11.6 too high compared to baseline  - 12/9 CT A/P done to r/o hematoma, was negative  - No melena or hematochezia, normal BM  - Will just monitor Hb for any further drop, for now stable    #Left facial droop  #R/o stroke   #functional Quadriplegia   - Stroke code was called in ED  - CTH, CTA head and neck all negative  - Neuro recommended MR brain however very difficult to be done, so they said okay to just repeat CTH  - Now facial droop resolved  - Patient at baseline quadriplegic so can't assess motor function for any further deficits    #History of 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  #Hx of Cardiopulmonary Arrest Secondary to Aspiration  - Continue home meds  - S/S recs: pureed diet with moderately thick fluids    #Paroxysmal A-fib (was diagnosed in last admission and not on AC due to anemia due to L chest wall hematoma)  #Hx of intracerebral hemorrhage  - Currently in sinus rythm  - Was not on AC due to bleeding hx  - Now on lovenox    #ESDRAS clean ulcer  - Wound care consult         #DVT pro: lovenox  #GI pro: pantoprazole  #Diet: Pureed with moderately thick fluids  #Activity: as tolerated  #Code status: DNI/DNR.  #Dispo: SDU

## 2024-12-14 LAB
ALBUMIN SERPL ELPH-MCNC: 3.1 G/DL — LOW (ref 3.5–5.2)
ALP SERPL-CCNC: 44 U/L — SIGNIFICANT CHANGE UP (ref 30–115)
ALT FLD-CCNC: <5 U/L — SIGNIFICANT CHANGE UP (ref 0–41)
ANION GAP SERPL CALC-SCNC: 9 MMOL/L — SIGNIFICANT CHANGE UP (ref 7–14)
AST SERPL-CCNC: 18 U/L — SIGNIFICANT CHANGE UP (ref 0–41)
BASOPHILS # BLD AUTO: 0.02 K/UL — SIGNIFICANT CHANGE UP (ref 0–0.2)
BASOPHILS NFR BLD AUTO: 0.2 % — SIGNIFICANT CHANGE UP (ref 0–1)
BILIRUB SERPL-MCNC: <0.2 MG/DL — SIGNIFICANT CHANGE UP (ref 0.2–1.2)
BUN SERPL-MCNC: 17 MG/DL — SIGNIFICANT CHANGE UP (ref 10–20)
CALCIUM SERPL-MCNC: 8.3 MG/DL — LOW (ref 8.4–10.4)
CHLORIDE SERPL-SCNC: 107 MMOL/L — SIGNIFICANT CHANGE UP (ref 98–110)
CO2 SERPL-SCNC: 25 MMOL/L — SIGNIFICANT CHANGE UP (ref 17–32)
CREAT SERPL-MCNC: 0.6 MG/DL — LOW (ref 0.7–1.5)
CULTURE RESULTS: SIGNIFICANT CHANGE UP
EGFR: 89 ML/MIN/1.73M2 — SIGNIFICANT CHANGE UP
EOSINOPHIL # BLD AUTO: 0.05 K/UL — SIGNIFICANT CHANGE UP (ref 0–0.7)
EOSINOPHIL NFR BLD AUTO: 0.5 % — SIGNIFICANT CHANGE UP (ref 0–8)
GLUCOSE SERPL-MCNC: 139 MG/DL — HIGH (ref 70–99)
HCT VFR BLD CALC: 30.7 % — LOW (ref 37–47)
HGB BLD-MCNC: 9.3 G/DL — LOW (ref 12–16)
IMM GRANULOCYTES NFR BLD AUTO: 1.7 % — HIGH (ref 0.1–0.3)
LYMPHOCYTES # BLD AUTO: 1.77 K/UL — SIGNIFICANT CHANGE UP (ref 1.2–3.4)
LYMPHOCYTES # BLD AUTO: 17.1 % — LOW (ref 20.5–51.1)
MAGNESIUM SERPL-MCNC: 2.2 MG/DL — SIGNIFICANT CHANGE UP (ref 1.8–2.4)
MCHC RBC-ENTMCNC: 30.3 G/DL — LOW (ref 32–37)
MCHC RBC-ENTMCNC: 31.3 PG — HIGH (ref 27–31)
MCV RBC AUTO: 103.4 FL — HIGH (ref 81–99)
MONOCYTES # BLD AUTO: 0.56 K/UL — SIGNIFICANT CHANGE UP (ref 0.1–0.6)
MONOCYTES NFR BLD AUTO: 5.4 % — SIGNIFICANT CHANGE UP (ref 1.7–9.3)
NEUTROPHILS # BLD AUTO: 7.76 K/UL — HIGH (ref 1.4–6.5)
NEUTROPHILS NFR BLD AUTO: 75.1 % — SIGNIFICANT CHANGE UP (ref 42.2–75.2)
NRBC # BLD: 0 /100 WBCS — SIGNIFICANT CHANGE UP (ref 0–0)
PLATELET # BLD AUTO: 269 K/UL — SIGNIFICANT CHANGE UP (ref 130–400)
PMV BLD: 9.5 FL — SIGNIFICANT CHANGE UP (ref 7.4–10.4)
POTASSIUM SERPL-MCNC: 3.8 MMOL/L — SIGNIFICANT CHANGE UP (ref 3.5–5)
POTASSIUM SERPL-SCNC: 3.8 MMOL/L — SIGNIFICANT CHANGE UP (ref 3.5–5)
PROT SERPL-MCNC: 5.1 G/DL — LOW (ref 6–8)
RBC # BLD: 2.97 M/UL — LOW (ref 4.2–5.4)
RBC # FLD: 16.4 % — HIGH (ref 11.5–14.5)
SODIUM SERPL-SCNC: 141 MMOL/L — SIGNIFICANT CHANGE UP (ref 135–146)
SPECIMEN SOURCE: SIGNIFICANT CHANGE UP
WBC # BLD: 10.34 K/UL — SIGNIFICANT CHANGE UP (ref 4.8–10.8)
WBC # FLD AUTO: 10.34 K/UL — SIGNIFICANT CHANGE UP (ref 4.8–10.8)

## 2024-12-14 PROCEDURE — 99232 SBSQ HOSP IP/OBS MODERATE 35: CPT

## 2024-12-14 RX ORDER — APIXABAN 2.5 MG/1
5 TABLET, FILM COATED ORAL EVERY 12 HOURS
Refills: 0 | Status: CANCELLED | OUTPATIENT
Start: 2024-12-21 | End: 2024-12-17

## 2024-12-14 RX ORDER — APIXABAN 2.5 MG/1
10 TABLET, FILM COATED ORAL EVERY 12 HOURS
Refills: 0 | Status: DISCONTINUED | OUTPATIENT
Start: 2024-12-14 | End: 2024-12-17

## 2024-12-14 RX ORDER — APIXABAN 2.5 MG/1
5 TABLET, FILM COATED ORAL EVERY 12 HOURS
Refills: 0 | Status: DISCONTINUED | OUTPATIENT
Start: 2024-12-14 | End: 2024-12-14

## 2024-12-14 RX ADMIN — MIDODRINE HYDROCHLORIDE 5 MILLIGRAM(S): 5 TABLET ORAL at 21:46

## 2024-12-14 RX ADMIN — MIDODRINE HYDROCHLORIDE 5 MILLIGRAM(S): 5 TABLET ORAL at 13:16

## 2024-12-14 RX ADMIN — POLYETHYLENE GLYCOL 3350 17 GRAM(S): 17 POWDER, FOR SOLUTION ORAL at 11:01

## 2024-12-14 RX ADMIN — CARBIDOPA AND LEVODOPA 1 TABLET(S): 10; 100 TABLET ORAL at 05:07

## 2024-12-14 RX ADMIN — MIDODRINE HYDROCHLORIDE 5 MILLIGRAM(S): 5 TABLET ORAL at 05:07

## 2024-12-14 RX ADMIN — Medication 1 DROP(S): at 05:08

## 2024-12-14 RX ADMIN — POVIDONE, POLYVINYL ALCOHOL 1 DROP(S): 20; 27 SOLUTION OPHTHALMIC at 17:11

## 2024-12-14 RX ADMIN — CARBIDOPA AND LEVODOPA 1 TABLET(S): 10; 100 TABLET ORAL at 21:46

## 2024-12-14 RX ADMIN — NYSTATIN 1 APPLICATION(S): 100000 POWDER TOPICAL at 05:09

## 2024-12-14 RX ADMIN — CHLORHEXIDINE GLUCONATE 1 APPLICATION(S): 1.2 RINSE ORAL at 11:00

## 2024-12-14 RX ADMIN — MEROPENEM 100 MILLIGRAM(S): 500 INJECTION, POWDER, FOR SOLUTION INTRAVENOUS at 05:05

## 2024-12-14 RX ADMIN — POVIDONE, POLYVINYL ALCOHOL 1 DROP(S): 20; 27 SOLUTION OPHTHALMIC at 05:09

## 2024-12-14 RX ADMIN — Medication 1000 UNIT(S): at 11:01

## 2024-12-14 RX ADMIN — CARBIDOPA AND LEVODOPA 1 TABLET(S): 10; 100 TABLET ORAL at 13:17

## 2024-12-14 RX ADMIN — MEROPENEM 100 MILLIGRAM(S): 500 INJECTION, POWDER, FOR SOLUTION INTRAVENOUS at 21:46

## 2024-12-14 RX ADMIN — Medication 1 APPLICATION(S): at 11:01

## 2024-12-14 RX ADMIN — LATANOPROST 1 DROP(S): 50 SOLUTION/ DROPS OPHTHALMIC at 21:46

## 2024-12-14 RX ADMIN — NYSTATIN 1 APPLICATION(S): 100000 POWDER TOPICAL at 17:11

## 2024-12-14 RX ADMIN — Medication 50 MICROGRAM(S): at 05:07

## 2024-12-14 RX ADMIN — APIXABAN 10 MILLIGRAM(S): 2.5 TABLET, FILM COATED ORAL at 21:46

## 2024-12-14 RX ADMIN — MEROPENEM 100 MILLIGRAM(S): 500 INJECTION, POWDER, FOR SOLUTION INTRAVENOUS at 13:17

## 2024-12-14 RX ADMIN — Medication 1 DROP(S): at 17:11

## 2024-12-14 RX ADMIN — Medication 2 TABLET(S): at 21:46

## 2024-12-14 RX ADMIN — ENOXAPARIN SODIUM 90 MILLIGRAM(S): 30 INJECTION SUBCUTANEOUS at 11:01

## 2024-12-14 RX ADMIN — Medication 50 MILLIGRAM(S): at 05:06

## 2024-12-14 NOTE — PROGRESS NOTE ADULT - ASSESSMENT
IMPRESSION:    Acute hypoxemic respiratory failure   Acute hypercapnic respiratory failure  PE with right heart strain  DVT   Valvular HD   UTI  Anemia CT abdomen with no retroperitoneal bleed.  Hg stable    Paroxysmal A-fib   Hx of Cardiopulmonary Arrest Secondary to Aspiration  History of 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  Hx of intracerebral hemorrhage  Hypothyroidism   GERD  Glaucoma      PLAN:    CNS: Avoid CNS Depressants.  Continue Parkinson meds    HEENT: Oral care. Aspiration precautions.  Speech and swallow     PULMONARY: HOB @ 45. Monitor Pulse Ox. Keep > 92-96%.  Wean O2 As tolerated.  CT noted.  Can use NIV during sleep      CARDIOVASCULAR:   TTE noted.  Avoid overload.  Cards follow up      GI: GI prophylaxis. Feeding per speech.  Bowel regimen     RENAL: Replete electrolytes as needed.     INFECTIOUS DISEASE:  Monitor VS.  Finish Meropenem course per ID.        HEMATOLOGICAL: Duplex noted. Continue Therapeutic LMWH.  Monitor CBC     ENDOCRINE: Monitor FS. Continue home Levothyroxine,    CODE STATUS: DNR/DNI    DISPOSITION: SDU     IMPRESSION:    Acute Hypoxemic and Hypercapnic Respiratory Failure   PE with right heart strain  DVT   Valvular HD   ESBL Klebsiella UTI  Anemia CT abdomen with no retroperitoneal bleed.  Hg stable    Paroxysmal A-fib   Hx of Cardiopulmonary Arrest Secondary to Aspiration  History of 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  Hx of intracerebral hemorrhage  Hypothyroidism   GERD  Glaucoma      PLAN:    CNS: Continue Parkinson meds    HEENT: Oral care. Aspiration precautions.      PULMONARY: HOB @ 45. Monitor Pulse Ox. Keep > 92-96%.  Wean O2 As tolerated.  CT noted.  encourage NIV during sleep    CARDIOVASCULAR:   TTE noted.  Avoid overload.  Cards follow up      GI: GI prophylaxis. Feeding per speech and swallow.  Bowel regimen     RENAL: Replete electrolytes as needed.     INFECTIOUS DISEASE:  Monitor VS.  Finish Meropenem course per ID.        HEMATOLOGICAL: Duplex noted. Continue Therapeutic LMWH.  Monitor CBC     ENDOCRINE: Monitor FS. Continue home Levothyroxine    CODE STATUS: DNR/DNI    Downgrade to Floor

## 2024-12-14 NOTE — PROGRESS NOTE ADULT - SUBJECTIVE AND OBJECTIVE BOX
Patient is a 84y old  Female who presents with a chief complaint of PE (11 Dec 2024 08:38)        Over Night Events:      Vital Signs Last 24 Hrs  T(C): 36.3 (14 Dec 2024 00:00), Max: 36.6 (13 Dec 2024 19:00)  T(F): 97.4 (14 Dec 2024 00:00), Max: 97.9 (13 Dec 2024 19:00)  HR: 54 (14 Dec 2024 04:00) (54 - 79)  BP: 92/50 (14 Dec 2024 03:00) (92/50 - 121/54)  BP(mean): 63 (14 Dec 2024 03:00) (63 - 80)  RR: 18 (13 Dec 2024 12:17) (18 - 20)  SpO2: 100% (14 Dec 2024 04:00) (100% - 100%)    O2 Parameters below as of 14 Dec 2024 04:00  Patient On (Oxygen Delivery Method): nasal cannula  O2 Flow (L/min): 6        CONSTITUTIONAL:  NAD    ENT:   Airway patent,   Mouth with normal mucosa.       EYES:   Pupils equal,   Round and reactive to light.    CARDIAC:   Normal rate,   Regular rhythm.      RESPIRATORY:   No wheezing  Bilateral BS  Normal chest expansion  Not tachypneic,  No use of accessory muscles    GASTROINTESTINAL:  Abdomen soft,   Non-tender,   No guarding,   + BS    MUSCULOSKELETAL:   Range of motion is not limited,  No clubbing, cyanosis    NEUROLOGICAL:   Alert   No motor  deficits.    SKIN:   Skin normal color for race,   Warm and dry   No evidence of rash.        12-12-24 @ 07:01  -  12-13-24 @ 07:00  --------------------------------------------------------  IN:  Total IN: 0 mL    OUT:    Indwelling Catheter - Urethral (mL): 60 mL  Total OUT: 60 mL    Total NET: -60 mL          LABS:                            8.9    9.98  )-----------( 263      ( 13 Dec 2024 04:43 )             29.2                                               12-13    142  |  109  |  17  ----------------------------<  85  4.1   |  24  |  0.6[L]    Ca    8.0[L]      13 Dec 2024 04:43  Mg     2.2     12-13    TPro  4.7[L]  /  Alb  3.0[L]  /  TBili  <0.2  /  DBili  x   /  AST  25  /  ALT  5   /  AlkPhos  42  12-13                                             Urinalysis Basic - ( 13 Dec 2024 04:43 )    Color: x / Appearance: x / SG: x / pH: x  Gluc: 85 mg/dL / Ketone: x  / Bili: x / Urobili: x   Blood: x / Protein: x / Nitrite: x   Leuk Esterase: x / RBC: x / WBC x   Sq Epi: x / Non Sq Epi: x / Bacteria: x                                                  LIVER FUNCTIONS - ( 13 Dec 2024 04:43 )  Alb: 3.0 g/dL / Pro: 4.7 g/dL / ALK PHOS: 42 U/L / ALT: 5 U/L / AST: 25 U/L / GGT: x                                                                                                                                       MEDICATIONS  (STANDING):  artificial  tears Solution 1 Drop(s) Both EYES two times a day  bacitracin   Ointment 1 Application(s) Topical daily  carbidopa/levodopa  10/100 1 Tablet(s) Oral three times a day  chlorhexidine 2% Cloths 1 Application(s) Topical daily  cholecalciferol 1000 Unit(s) Oral daily  enoxaparin Injectable 90 milliGRAM(s) SubCutaneous every 12 hours  hydrocortisone sodium succinate Injectable 50 milliGRAM(s) IV Push every 8 hours  latanoprost 0.005% Ophthalmic Solution 1 Drop(s) Both EYES at bedtime  levothyroxine 50 MICROGram(s) Oral daily  meropenem  IVPB 1000 milliGRAM(s) IV Intermittent every 8 hours  midodrine 5 milliGRAM(s) Oral every 8 hours  nystatin Powder 1 Application(s) Topical two times a day  polyethylene glycol 3350 17 Gram(s) Oral daily  senna 2 Tablet(s) Oral at bedtime  timolol 0.5% Solution 1 Drop(s) Both EYES two times a day    MEDICATIONS  (PRN):       Patient is a 84y old  Female who presents with a chief complaint of PE (11 Dec 2024 08:38)        Over Night Events: no acute events overnight, comfortable on 3L NC      Vital Signs Last 24 Hrs  T(C): 36.3 (14 Dec 2024 00:00), Max: 36.6 (13 Dec 2024 19:00)  T(F): 97.4 (14 Dec 2024 00:00), Max: 97.9 (13 Dec 2024 19:00)  HR: 54 (14 Dec 2024 04:00) (54 - 79)  BP: 92/50 (14 Dec 2024 03:00) (92/50 - 121/54)  BP(mean): 63 (14 Dec 2024 03:00) (63 - 80)  RR: 18 (13 Dec 2024 12:17) (18 - 20)  SpO2: 100% (14 Dec 2024 04:00) (100% - 100%)    O2 Parameters below as of 14 Dec 2024 04:00  Patient On (Oxygen Delivery Method): nasal cannula  O2 Flow (L/min): 6        CONSTITUTIONAL:  obese  NAD    ENT:   Airway patent,   Mouth with normal mucosa.       EYES:   Pupils equal,   Round and reactive to light.    CARDIAC:   Normal rate,   Regular rhythm.      RESPIRATORY:   No wheezing  Bilateral BS  Normal chest expansion  Not tachypneic,  No use of accessory muscles    GASTROINTESTINAL:  Abdomen soft,   Non-tender,   No guarding,   + BS    MUSCULOSKELETAL:   Range of motion is not limited    NEUROLOGICAL:   Alert     SKIN:   Skin normal color for race,   Warm and dry           12-12-24 @ 07:01  -  12-13-24 @ 07:00  --------------------------------------------------------  IN:  Total IN: 0 mL    OUT:    Indwelling Catheter - Urethral (mL): 60 mL  Total OUT: 60 mL    Total NET: -60 mL          LABS:                            8.9    9.98  )-----------( 263      ( 13 Dec 2024 04:43 )             29.2                                               12-13    142  |  109  |  17  ----------------------------<  85  4.1   |  24  |  0.6[L]    Ca    8.0[L]      13 Dec 2024 04:43  Mg     2.2     12-13    TPro  4.7[L]  /  Alb  3.0[L]  /  TBili  <0.2  /  DBili  x   /  AST  25  /  ALT  5   /  AlkPhos  42  12-13                                             Urinalysis Basic - ( 13 Dec 2024 04:43 )    Color: x / Appearance: x / SG: x / pH: x  Gluc: 85 mg/dL / Ketone: x  / Bili: x / Urobili: x   Blood: x / Protein: x / Nitrite: x   Leuk Esterase: x / RBC: x / WBC x   Sq Epi: x / Non Sq Epi: x / Bacteria: x                                                  LIVER FUNCTIONS - ( 13 Dec 2024 04:43 )  Alb: 3.0 g/dL / Pro: 4.7 g/dL / ALK PHOS: 42 U/L / ALT: 5 U/L / AST: 25 U/L / GGT: x                                                                                                                                       MEDICATIONS  (STANDING):  artificial  tears Solution 1 Drop(s) Both EYES two times a day  bacitracin   Ointment 1 Application(s) Topical daily  carbidopa/levodopa  10/100 1 Tablet(s) Oral three times a day  chlorhexidine 2% Cloths 1 Application(s) Topical daily  cholecalciferol 1000 Unit(s) Oral daily  enoxaparin Injectable 90 milliGRAM(s) SubCutaneous every 12 hours  hydrocortisone sodium succinate Injectable 50 milliGRAM(s) IV Push every 8 hours  latanoprost 0.005% Ophthalmic Solution 1 Drop(s) Both EYES at bedtime  levothyroxine 50 MICROGram(s) Oral daily  meropenem  IVPB 1000 milliGRAM(s) IV Intermittent every 8 hours  midodrine 5 milliGRAM(s) Oral every 8 hours  nystatin Powder 1 Application(s) Topical two times a day  polyethylene glycol 3350 17 Gram(s) Oral daily  senna 2 Tablet(s) Oral at bedtime  timolol 0.5% Solution 1 Drop(s) Both EYES two times a day    MEDICATIONS  (PRN):

## 2024-12-14 NOTE — PROGRESS NOTE ADULT - SUBJECTIVE AND OBJECTIVE BOX
Patient is a 84y old  Female who presents with a chief complaint of PE (12-11-24)      Pt seen and examined at bedside. No CP or SOB.          PAST MEDICAL & SURGICAL HISTORY:  Dementia    History of breast cancer    Constipation    Lewy body dementia without behavioral disturbance    Colon polyp    History of colon resection    H/O mastectomy, right        VITAL SIGNS (Last 24 hrs):  T(C): 36.3 (12-14-24 @ 00:00), Max: 36.6 (12-13-24 @ 19:00)  HR: 64 (12-14-24 @ 12:00) (54 - 75)  BP: 106/54 (12-14-24 @ 12:00) (92/50 - 113/56)  RR: --  SpO2: 100% (12-14-24 @ 12:00) (100% - 100%)  Wt(kg): --  Daily     Daily     I&O's Summary      PHYSICAL EXAM:  GENERAL: NAD, well-developed  HEAD:  Atraumatic, Normocephalic  EYES: EOMI, PERRLA, conjunctiva and sclera clear  NECK: Supple, No JVD  CHEST/LUNG: Clear to auscultation bilaterally; No wheeze  HEART: Regular rate and rhythm; No murmurs, rubs, or gallops  ABDOMEN: Soft, Nontender, Nondistended; Bowel sounds present  EXTREMITIES:  2+ Peripheral Pulses, No clubbing, cyanosis, or edema, right ext swelling. left ext wound  PSYCH: alert and awake  NEUROLOGY: non-focal  SKIN: No rashes or lesions    Labs Reviewed  Spoke to patient in regards to abnormal labs.    CBC Full  -  ( 14 Dec 2024 05:44 )  WBC Count : 10.34 K/uL  Hemoglobin : 9.3 g/dL  Hematocrit : 30.7 %  Platelet Count - Automated : 269 K/uL  Mean Cell Volume : 103.4 fL  Mean Cell Hemoglobin : 31.3 pg  Mean Cell Hemoglobin Concentration : 30.3 g/dL  Auto Neutrophil # : 7.76 K/uL  Auto Lymphocyte # : 1.77 K/uL  Auto Monocyte # : 0.56 K/uL  Auto Eosinophil # : 0.05 K/uL  Auto Basophil # : 0.02 K/uL  Auto Neutrophil % : 75.1 %  Auto Lymphocyte % : 17.1 %  Auto Monocyte % : 5.4 %  Auto Eosinophil % : 0.5 %  Auto Basophil % : 0.2 %    BMP:    12-14 @ 05:44    Blood Urea Nitrogen - 17  Calcium - 8.3  Carbond Dioxide - 25  Chloride - 107  Creatinine - 0.6  Glucose - 139  Potassium - 3.8  Sodium - 141      Hemoglobin A1c -     Urine Culture:        COVID Labs  CRP:    Procalcitonin: 3.40 ng/mL (12-08-24 @ 21:13)    D-Dimer:      Imaging reviewed independently and reviewed read  < from: Xray Chest 1 View- PORTABLE-Urgent (Xray Chest 1 View- PORTABLE-Urgent .) (12.12.24 @ 23:23) >    IMPRESSION:    Unchanged left basilar opacity.    < end of copied text >        MEDICATIONS  (STANDING):  apixaban 5 milliGRAM(s) Oral every 12 hours  artificial  tears Solution 1 Drop(s) Both EYES two times a day  bacitracin   Ointment 1 Application(s) Topical daily  carbidopa/levodopa  10/100 1 Tablet(s) Oral three times a day  chlorhexidine 2% Cloths 1 Application(s) Topical daily  cholecalciferol 1000 Unit(s) Oral daily  latanoprost 0.005% Ophthalmic Solution 1 Drop(s) Both EYES at bedtime  levothyroxine 50 MICROGram(s) Oral daily  meropenem  IVPB 1000 milliGRAM(s) IV Intermittent every 8 hours  midodrine 5 milliGRAM(s) Oral every 8 hours  nystatin Powder 1 Application(s) Topical two times a day  polyethylene glycol 3350 17 Gram(s) Oral daily  senna 2 Tablet(s) Oral at bedtime  timolol 0.5% Solution 1 Drop(s) Both EYES two times a day    MEDICATIONS  (PRN):

## 2024-12-14 NOTE — PROGRESS NOTE ADULT - ASSESSMENT
84-year-old female with PMHx of Paroxysmal A-fib (was diagnosed in last admission and not on AC due to anemia due to L chest wall hematoma), Hx of ICH (in 2023), hx of Staphylococcus hominis bacteremia, Hx of Cardiopulmonary Arrest Secondary to Aspiration, history of 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, hypothyroidism , GERD, and Glaucoma presenting for facial droop.      #Hypotension -> resolved  #2/2 Anaphylaxis to Rocephin vs Sepsis due to UTI vs PE  - Patient had hypotension after receiving Rocephin dose in the ED  - Was given epi then started on epi drip, later switched to levophed  - 12/11 off pressors, BP is soft  - s/p LR maintenance  - Hydrocortisone stress dose decreased to 50mg q8h on 12/11 -> discontinued  - Midodrine 5mg q8 started 12/11  - For the possible UTI (UA++) she was started on Aztreonam  - UCx showing Klebsiella ESBL, resistant to aztreonam, so as per ID we switched her to meropenem on 12/11 -> Meropenem 1g q8h IV x 7 days, if D/C prior PO bactrim    #Right main pulmonary artery PE with RV strain on CTA - Intermediate/High risk as hypotension reason unclear  #Acute mixed respiratory failure requiring BIPAP  #Hx of COPD  - Duplex LE positive for extensive DVT on the left  - Patient was started on IV heparin drip after discussion of risks vs benefits with her  (since she has a hx of ICH in 2023 and hx of chest hematoma as well)  - IR on board, said no need for embolectomy as patient HD stable   - Was on BIPAP -> HFNC -> NC (still on it)  - TTE was technically difficult, showed normal EF, no RV strain  - on therapeutic lovenox -> switched to Eliquis 10 mg BID starting 12/14    #Drop in Hb -> stable   - Likely just back to her baseline as initial Hb of 11.6 too high compared to baseline  - 12/9 CT A/P done to r/o hematoma, was negative  - No melena or hematochezia, normal BM  - Will just monitor Hb for any further drop, for now stable    #Left facial droop  #R/o stroke   #functional Quadriplegia   - Stroke code was called in ED  - CTH, CTA head and neck all negative  - Neuro recommended MR brain however very difficult to be done, so they said okay to just repeat CTH  - Now facial droop resolved  - Patient at baseline quadriplegic so can't assess motor function for any further deficits    #History of 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  #Hx of Cardiopulmonary Arrest Secondary to Aspiration  - Continue home meds  - S/S recs: pureed diet with moderately thick fluids    #Paroxysmal A-fib (was diagnosed in last admission and not on AC due to anemia due to L chest wall hematoma)  #Hx of intracerebral hemorrhage  - Currently in sinus rythm  - Was not on AC due to bleeding hx  - switched to eliquis    #LUE clean ulcer  - Wound care consult     #Progress Note Handoff  Pending (specify):  follow up hgb  Family discussion: dw  at bedside  Disposition: [facility] cc downgrade to floor   Decision to admit the pt is based on acuity as above    84-year-old female with PMHx of Paroxysmal A-fib (was diagnosed in last admission and not on AC due to anemia due to L chest wall hematoma), Hx of ICH (in 2023), hx of Staphylococcus hominis bacteremia, Hx of Cardiopulmonary Arrest Secondary to Aspiration, history of 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, hypothyroidism , GERD, and Glaucoma presenting for facial droop.      #Hypotension -> resolved  #2/2 Anaphylaxis to Rocephin vs Sepsis due to UTI vs PE  - Patient had hypotension after receiving Rocephin dose in the ED  - Was given epi then started on epi drip, later switched to levophed  - 12/11 off pressors, BP is soft  - s/p LR maintenance  - Hydrocortisone stress dose decreased to 50mg q8h on 12/11 -> discontinued  - Midodrine 5mg q8 started 12/11  - For the possible UTI (UA++) she was started on Aztreonam  - UCx showing Klebsiella ESBL, resistant to aztreonam, so as per ID we switched her to meropenem on 12/11 -> Meropenem 1g q8h IV x 7 days, if D/C prior PO bactrim    #Right main pulmonary artery PE with RV strain on CTA - Intermediate/High risk as hypotension reason unclear  #Acute mixed respiratory failure requiring BIPAP  #Hx of COPD  - Duplex LE positive for extensive DVT on the left  - Patient was started on IV heparin drip after discussion of risks vs benefits with her  (since she has a hx of ICH in 2023 and hx of chest hematoma as well)  - IR on board, said no need for embolectomy as patient HD stable   - Was on BIPAP -> HFNC -> NC (still on it)  - TTE was technically difficult, showed normal EF, no RV strain  - on therapeutic lovenox -> switched to Eliquis 10 mg BID starting 12/14    #Drop in Hb -> stable   - Likely just back to her baseline as initial Hb of 11.6 too high compared to baseline  - 12/9 CT A/P done to r/o hematoma, was negative  - No melena or hematochezia, normal BM  - Will just monitor Hb for any further drop, for now stable    #Left facial droop  #R/o stroke   #functional Quadriplegia   - Stroke code was called in ED  - CTH, CTA head and neck all negative  - Neuro recommended MR brain however very difficult to be done, so they said okay to just repeat CTH  - Now facial droop resolved  - Patient at baseline quadriplegic so can't assess motor function for any further deficits    #History of 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  #Hx of Cardiopulmonary Arrest Secondary to Aspiration  - Continue home meds  - S/S recs: pureed diet with moderately thick fluids    #Paroxysmal A-fib (was diagnosed in last admission and not on AC due to anemia due to L chest wall hematoma)  #Hx of intracerebral hemorrhage  - Currently in sinus rythm  - Was not on AC due to bleeding hx  - switched to eliquis    #LUE clean ulcer  - Wound care consult     #Progress Note Handoff  Pending (specify):  follow up hgb  Family discussion: dw  at bedside  Disposition: [facility] cc downgrade to floor , [facility] CM , pt is from [place], PT   Decision to admit the pt is based on acuity as above

## 2024-12-15 LAB
ALBUMIN SERPL ELPH-MCNC: 2.7 G/DL — LOW (ref 3.5–5.2)
ALP SERPL-CCNC: 40 U/L — SIGNIFICANT CHANGE UP (ref 30–115)
ALT FLD-CCNC: <5 U/L — SIGNIFICANT CHANGE UP (ref 0–41)
ANION GAP SERPL CALC-SCNC: 13 MMOL/L — SIGNIFICANT CHANGE UP (ref 7–14)
AST SERPL-CCNC: 34 U/L — SIGNIFICANT CHANGE UP (ref 0–41)
BASOPHILS # BLD AUTO: 0.02 K/UL — SIGNIFICANT CHANGE UP (ref 0–0.2)
BASOPHILS # BLD AUTO: 0.03 K/UL — SIGNIFICANT CHANGE UP (ref 0–0.2)
BASOPHILS NFR BLD AUTO: 0.2 % — SIGNIFICANT CHANGE UP (ref 0–1)
BASOPHILS NFR BLD AUTO: 0.3 % — SIGNIFICANT CHANGE UP (ref 0–1)
BILIRUB SERPL-MCNC: <0.2 MG/DL — SIGNIFICANT CHANGE UP (ref 0.2–1.2)
BUN SERPL-MCNC: 14 MG/DL — SIGNIFICANT CHANGE UP (ref 10–20)
CALCIUM SERPL-MCNC: 8.1 MG/DL — LOW (ref 8.4–10.5)
CHLORIDE SERPL-SCNC: 105 MMOL/L — SIGNIFICANT CHANGE UP (ref 98–110)
CO2 SERPL-SCNC: 21 MMOL/L — SIGNIFICANT CHANGE UP (ref 17–32)
CREAT SERPL-MCNC: 0.6 MG/DL — LOW (ref 0.7–1.5)
EGFR: 89 ML/MIN/1.73M2 — SIGNIFICANT CHANGE UP
EOSINOPHIL # BLD AUTO: 0.14 K/UL — SIGNIFICANT CHANGE UP (ref 0–0.7)
EOSINOPHIL # BLD AUTO: 0.15 K/UL — SIGNIFICANT CHANGE UP (ref 0–0.7)
EOSINOPHIL NFR BLD AUTO: 1.3 % — SIGNIFICANT CHANGE UP (ref 0–8)
EOSINOPHIL NFR BLD AUTO: 1.4 % — SIGNIFICANT CHANGE UP (ref 0–8)
GLUCOSE BLDC GLUCOMTR-MCNC: 89 MG/DL — SIGNIFICANT CHANGE UP (ref 70–99)
GLUCOSE SERPL-MCNC: 72 MG/DL — SIGNIFICANT CHANGE UP (ref 70–99)
HCT VFR BLD CALC: 28.4 % — LOW (ref 37–47)
HCT VFR BLD CALC: 30.1 % — LOW (ref 37–47)
HGB BLD-MCNC: 8.7 G/DL — LOW (ref 12–16)
HGB BLD-MCNC: 9.3 G/DL — LOW (ref 12–16)
IMM GRANULOCYTES NFR BLD AUTO: 1.6 % — HIGH (ref 0.1–0.3)
IMM GRANULOCYTES NFR BLD AUTO: 1.6 % — HIGH (ref 0.1–0.3)
LYMPHOCYTES # BLD AUTO: 2.21 K/UL — SIGNIFICANT CHANGE UP (ref 1.2–3.4)
LYMPHOCYTES # BLD AUTO: 22.9 % — SIGNIFICANT CHANGE UP (ref 20.5–51.1)
LYMPHOCYTES # BLD AUTO: 28.4 % — SIGNIFICANT CHANGE UP (ref 20.5–51.1)
LYMPHOCYTES # BLD AUTO: 3.23 K/UL — SIGNIFICANT CHANGE UP (ref 1.2–3.4)
MAGNESIUM SERPL-MCNC: 2.4 MG/DL — SIGNIFICANT CHANGE UP (ref 1.8–2.4)
MCHC RBC-ENTMCNC: 30.6 G/DL — LOW (ref 32–37)
MCHC RBC-ENTMCNC: 30.9 G/DL — LOW (ref 32–37)
MCHC RBC-ENTMCNC: 31.6 PG — HIGH (ref 27–31)
MCHC RBC-ENTMCNC: 31.6 PG — HIGH (ref 27–31)
MCV RBC AUTO: 102.4 FL — HIGH (ref 81–99)
MCV RBC AUTO: 103.3 FL — HIGH (ref 81–99)
MONOCYTES # BLD AUTO: 0.62 K/UL — HIGH (ref 0.1–0.6)
MONOCYTES # BLD AUTO: 0.84 K/UL — HIGH (ref 0.1–0.6)
MONOCYTES NFR BLD AUTO: 6.4 % — SIGNIFICANT CHANGE UP (ref 1.7–9.3)
MONOCYTES NFR BLD AUTO: 7.4 % — SIGNIFICANT CHANGE UP (ref 1.7–9.3)
NEUTROPHILS # BLD AUTO: 6.51 K/UL — HIGH (ref 1.4–6.5)
NEUTROPHILS # BLD AUTO: 6.94 K/UL — HIGH (ref 1.4–6.5)
NEUTROPHILS NFR BLD AUTO: 61.1 % — SIGNIFICANT CHANGE UP (ref 42.2–75.2)
NEUTROPHILS NFR BLD AUTO: 67.4 % — SIGNIFICANT CHANGE UP (ref 42.2–75.2)
NRBC # BLD: 0 /100 WBCS — SIGNIFICANT CHANGE UP (ref 0–0)
NRBC # BLD: 0 /100 WBCS — SIGNIFICANT CHANGE UP (ref 0–0)
PLATELET # BLD AUTO: 267 K/UL — SIGNIFICANT CHANGE UP (ref 130–400)
PLATELET # BLD AUTO: 304 K/UL — SIGNIFICANT CHANGE UP (ref 130–400)
PMV BLD: 9.3 FL — SIGNIFICANT CHANGE UP (ref 7.4–10.4)
PMV BLD: 9.8 FL — SIGNIFICANT CHANGE UP (ref 7.4–10.4)
POTASSIUM SERPL-MCNC: 4.4 MMOL/L — SIGNIFICANT CHANGE UP (ref 3.5–5)
POTASSIUM SERPL-SCNC: 4.4 MMOL/L — SIGNIFICANT CHANGE UP (ref 3.5–5)
PROT SERPL-MCNC: 4.6 G/DL — LOW (ref 6–8)
RBC # BLD: 2.75 M/UL — LOW (ref 4.2–5.4)
RBC # BLD: 2.94 M/UL — LOW (ref 4.2–5.4)
RBC # FLD: 16.7 % — HIGH (ref 11.5–14.5)
RBC # FLD: 16.9 % — HIGH (ref 11.5–14.5)
SODIUM SERPL-SCNC: 139 MMOL/L — SIGNIFICANT CHANGE UP (ref 135–146)
WBC # BLD: 11.36 K/UL — HIGH (ref 4.8–10.8)
WBC # BLD: 9.66 K/UL — SIGNIFICANT CHANGE UP (ref 4.8–10.8)
WBC # FLD AUTO: 11.36 K/UL — HIGH (ref 4.8–10.8)
WBC # FLD AUTO: 9.66 K/UL — SIGNIFICANT CHANGE UP (ref 4.8–10.8)

## 2024-12-15 PROCEDURE — 99232 SBSQ HOSP IP/OBS MODERATE 35: CPT

## 2024-12-15 RX ADMIN — APIXABAN 10 MILLIGRAM(S): 2.5 TABLET, FILM COATED ORAL at 21:15

## 2024-12-15 RX ADMIN — NYSTATIN 1 APPLICATION(S): 100000 POWDER TOPICAL at 18:00

## 2024-12-15 RX ADMIN — MIDODRINE HYDROCHLORIDE 5 MILLIGRAM(S): 5 TABLET ORAL at 05:35

## 2024-12-15 RX ADMIN — POVIDONE, POLYVINYL ALCOHOL 1 DROP(S): 20; 27 SOLUTION OPHTHALMIC at 05:38

## 2024-12-15 RX ADMIN — LATANOPROST 1 DROP(S): 50 SOLUTION/ DROPS OPHTHALMIC at 21:16

## 2024-12-15 RX ADMIN — MEROPENEM 100 MILLIGRAM(S): 500 INJECTION, POWDER, FOR SOLUTION INTRAVENOUS at 15:09

## 2024-12-15 RX ADMIN — MIDODRINE HYDROCHLORIDE 5 MILLIGRAM(S): 5 TABLET ORAL at 21:15

## 2024-12-15 RX ADMIN — CARBIDOPA AND LEVODOPA 1 TABLET(S): 10; 100 TABLET ORAL at 05:39

## 2024-12-15 RX ADMIN — CARBIDOPA AND LEVODOPA 1 TABLET(S): 10; 100 TABLET ORAL at 15:17

## 2024-12-15 RX ADMIN — Medication 2 TABLET(S): at 21:15

## 2024-12-15 RX ADMIN — NYSTATIN 1 APPLICATION(S): 100000 POWDER TOPICAL at 05:37

## 2024-12-15 RX ADMIN — POVIDONE, POLYVINYL ALCOHOL 1 DROP(S): 20; 27 SOLUTION OPHTHALMIC at 18:00

## 2024-12-15 RX ADMIN — Medication 50 MICROGRAM(S): at 05:35

## 2024-12-15 RX ADMIN — APIXABAN 10 MILLIGRAM(S): 2.5 TABLET, FILM COATED ORAL at 10:29

## 2024-12-15 RX ADMIN — Medication 1 DROP(S): at 05:38

## 2024-12-15 RX ADMIN — Medication 1 DROP(S): at 18:01

## 2024-12-15 RX ADMIN — CHLORHEXIDINE GLUCONATE 1 APPLICATION(S): 1.2 RINSE ORAL at 12:59

## 2024-12-15 RX ADMIN — MEROPENEM 100 MILLIGRAM(S): 500 INJECTION, POWDER, FOR SOLUTION INTRAVENOUS at 05:35

## 2024-12-15 RX ADMIN — MIDODRINE HYDROCHLORIDE 5 MILLIGRAM(S): 5 TABLET ORAL at 15:09

## 2024-12-15 RX ADMIN — Medication 1 APPLICATION(S): at 12:59

## 2024-12-15 RX ADMIN — CARBIDOPA AND LEVODOPA 1 TABLET(S): 10; 100 TABLET ORAL at 21:15

## 2024-12-15 RX ADMIN — MEROPENEM 100 MILLIGRAM(S): 500 INJECTION, POWDER, FOR SOLUTION INTRAVENOUS at 21:16

## 2024-12-15 NOTE — PROGRESS NOTE ADULT - SUBJECTIVE AND OBJECTIVE BOX
Patient is a 84y old  Female who presents with a chief complaint of PE (12-11-24)      Pt seen and examined at bedside. No CP or SOB.          PAST MEDICAL & SURGICAL HISTORY:  Dementia    History of breast cancer    Constipation    Lewy body dementia without behavioral disturbance    Colon polyp    History of colon resection    H/O mastectomy, right        VITAL SIGNS (Last 24 hrs):  T(C): 36.6 (12-15-24 @ 04:28), Max: 36.6 (12-15-24 @ 04:28)  HR: 78 (12-15-24 @ 04:28) (64 - 82)  BP: 112/69 (12-15-24 @ 04:28) (110/69 - 124/56)  RR: 19 (12-15-24 @ 04:28) (18 - 19)  SpO2: 100% (12-15-24 @ 04:28) (100% - 100%)  Wt(kg): --  Daily     Daily     I&O's Summary    14 Dec 2024 07:01  -  15 Dec 2024 07:00  --------------------------------------------------------  IN: 0 mL / OUT: 900 mL / NET: -900 mL        PHYSICAL EXAM:  GENERAL: NAD, elderly   HEAD:  Atraumatic, Normocephalic  EYES: EOMI, PERRLA, conjunctiva and sclera clear  NECK: Supple, No JVD  CHEST/LUNG: Clear to auscultation bilaterally; No wheeze  HEART: Regular rate and rhythm; No murmurs, rubs, or gallops  ABDOMEN: Soft, Nontender, Nondistended; Bowel sounds present  EXTREMITIES:  2+ Peripheral Pulses, No clubbing, cyanosis, or edema, left upp ext wound   PSYCH: Alert and awake   NEUROLOGY: non-focal  SKIN: No rashes or lesions    Labs Reviewed  Spoke to patient in regards to abnormal labs.    CBC Full  -  ( 15 Dec 2024 09:00 )  WBC Count : 9.66 K/uL  Hemoglobin : 8.7 g/dL  Hematocrit : 28.4 %  Platelet Count - Automated : 267 K/uL  Mean Cell Volume : 103.3 fL  Mean Cell Hemoglobin : 31.6 pg  Mean Cell Hemoglobin Concentration : 30.6 g/dL  Auto Neutrophil # : 6.51 K/uL  Auto Lymphocyte # : 2.21 K/uL  Auto Monocyte # : 0.62 K/uL  Auto Eosinophil # : 0.14 K/uL  Auto Basophil # : 0.03 K/uL  Auto Neutrophil % : 67.4 %  Auto Lymphocyte % : 22.9 %  Auto Monocyte % : 6.4 %  Auto Eosinophil % : 1.4 %  Auto Basophil % : 0.3 %    BMP:    12-15 @ 09:00    Blood Urea Nitrogen - 14  Calcium - 8.1  Carbond Dioxide - 21  Chloride - 105  Creatinine - 0.6  Glucose - 72  Potassium - 4.4  Sodium - 139      Hemoglobin A1c -     Urine Culture:        COVID Labs  CRP:    Procalcitonin: 3.40 ng/mL (12-08-24 @ 21:13)    D-Dimer:        MEDICATIONS  (STANDING):  apixaban 10 milliGRAM(s) Oral every 12 hours  artificial  tears Solution 1 Drop(s) Both EYES two times a day  bacitracin   Ointment 1 Application(s) Topical daily  carbidopa/levodopa  10/100 1 Tablet(s) Oral three times a day  chlorhexidine 2% Cloths 1 Application(s) Topical daily  cholecalciferol 1000 Unit(s) Oral daily  latanoprost 0.005% Ophthalmic Solution 1 Drop(s) Both EYES at bedtime  levothyroxine 50 MICROGram(s) Oral daily  meropenem  IVPB 1000 milliGRAM(s) IV Intermittent every 8 hours  midodrine 5 milliGRAM(s) Oral every 8 hours  nystatin Powder 1 Application(s) Topical two times a day  polyethylene glycol 3350 17 Gram(s) Oral daily  senna 2 Tablet(s) Oral at bedtime  timolol 0.5% Solution 1 Drop(s) Both EYES two times a day    MEDICATIONS  (PRN):

## 2024-12-15 NOTE — PROGRESS NOTE ADULT - TIME BILLING
- I independently interpreted the most recent microbiological data; I analyzed the culture report & interpreted the MICs; I used my expertise in Infectious Diseases to tailor the antimicrobials      - I discussed my recommendations with the primary team housestaff
I have personally seen and examined this patient.  I have reviewed all pertinent clinical information and reviewed all relevant imaging and diagnostic studies personally.   I counseled the patient about diagnostic testing and treatment plan.   I discussed my recommendations with the primary team and family member at bedside.
time spent on review of labs, imaging studies, old records, obtaining history, personally examining patient, counselling and communicating with patient, entering orders for medications/tests/etc, discussions with other health care providers, documentation in electronic health records, independent interpretation of labs, imaging/procedure results and care coordination.
time spent on review of labs, imaging studies, old records, obtaining history, personally examining patient, counselling and communicating with patient, entering orders for medications/tests/etc, discussions with other health care providers, documentation in electronic health records, independent interpretation of labs, imaging/procedure results and care coordination.

## 2024-12-15 NOTE — PROGRESS NOTE ADULT - ASSESSMENT
ASSESSMENT/PLAN:                                                    1. Abdominal pain, generalized  2. Rib pain on left side    Baudilio presents today for rib pain and upper abdominal pain. He had similar complaints 6 months ago and had rib xrays done which where normal.     Plan:   Labs as ordered.       - XR Abdomen 2 Views  - CBC with platelets differential  - Comprehensive metabolic panel  - CRP inflammation  - Erythrocyte sedimentation rate auto  - Lipase  - Amylase  - UA reflex to Microscopic and Culture  - Urine Microscopic        3. Gastroesophageal reflux disease, unspecified whether esophagitis present    - famotidine (PEPCID) 20 MG tablet; Take 1 tablet (20 mg) by mouth 2 times daily  Dispense: 180 tablet; Refill: 1    Depression:   I suspect this is contributing to his pain. He states that his symptoms are doing okay today. We talked briefly about how our thoughts drive our emotions and we have the ability to choose more positive ways of thinking. He will continue to follow up with his PCP.       Claritza Myers, Pediatric Nurse Practitioner   [facility]        SUBJECTIVE:                                                    Baudilio Cameron is a 17 year old male who presents to clinic today with father because of:    Chief Complaint   Patient presents with     Pain     Ribcage        HPI:    2 weeks ago, constant, worse with deep breath, sharp pain under/behind the rib area.  Started with a pinching feeling at work, bent over to pick something up on the floor.     No pain in the epigastric area. Denies gerd symptoms, he is taking his gerd medication as prescribed.      Pain has been present for the last two weeks. The pain is located under the left ribcage. The patient rates his pain at a 10/10. He doesn't recall a time where there was anything that caused the pain. When he bends over he feels a pinch under his left ribs. He rates the bending at times a 10/10 at other times it is a 7/10. He  84-year-old female with PMHx of Paroxysmal A-fib (was diagnosed in last admission and not on AC due to anemia due to L chest wall hematoma), Hx of ICH (in 2023), hx of Staphylococcus hominis bacteremia, Hx of Cardiopulmonary Arrest Secondary to Aspiration, history of 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, hypothyroidism , GERD, and Glaucoma presenting for facial droop.      #Hypotension -> resolved  #2/2 Anaphylaxis to Rocephin vs Sepsis due to UTI vs PE  - Patient had hypotension after receiving Rocephin dose in the ED  - Was given epi then started on epi drip, later switched to levophed  - 12/11 off pressors, BP is soft  - s/p LR maintenance  - Hydrocortisone stress dose decreased to 50mg q8h on 12/11 -> discontinued  - Midodrine 5mg q8 started 12/11  - For the possible UTI (UA++) she was started on Aztreonam  - UCx showing Klebsiella ESBL, resistant to aztreonam, so as per ID we switched her to meropenem on 12/11 -> Meropenem 1g q8h IV x 7 days, if D/C prior PO bactrim    #Right main pulmonary artery PE with RV strain on CTA - Intermediate/High risk as hypotension reason unclear  #Acute mixed respiratory failure requiring BIPAP  #Hx of COPD  - Duplex LE positive for extensive DVT on the left  - Patient was started on IV heparin drip after discussion of risks vs benefits with her  (since she has a hx of ICH in 2023 and hx of chest hematoma as well)  - IR on board, said no need for embolectomy as patient HD stable   - Was on BIPAP -> HFNC -> NC (still on it)  - TTE was technically difficult, showed normal EF, no RV strain  - on therapeutic lovenox -> switched to Eliquis 10 mg BID starting 12/14  - hgb stable, follow up cbc     #Drop in Hb -> stable   - Likely just back to her baseline as initial Hb of 11.6 too high compared to baseline  - 12/9 CT A/P done to r/o hematoma, was negative  - No melena or hematochezia, normal BM  - Will just monitor Hb for any further drop, for now stable    #Left facial droop  #R/o stroke   #functional Quadriplegia   - Stroke code was called in ED  - CTH, CTA head and neck all negative  - Neuro recommended MR brain however very difficult to be done, so they said okay to just repeat CTH  - Now facial droop resolved  - Patient at baseline quadriplegic so can't assess motor function for any further deficits    #History of 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  #Hx of Cardiopulmonary Arrest Secondary to Aspiration  - Continue home meds  - S/S recs: pureed diet with moderately thick fluids    #Paroxysmal A-fib (was diagnosed in last admission and not on AC due to anemia due to L chest wall hematoma)  #Hx of intracerebral hemorrhage  - Currently in sinus rythm  - Was not on AC due to bleeding hx  - switched to eliquis    #STACYE clean ulcer  - Wound care consult     #Progress Note Handoff  Pending (specify):  follow up hgb  Family discussion: dw  at bedside  Disposition: [facility], pending returnb to [place], anticipated 12/16  Decision to admit the pt is based on acuity as above  "states that it is difficult to breath, but is not having difficult speaking to me. He states it is painful to take a deep breath in. Per school nurse the patient sounds \"stuffy.\" Patient denies any other symptoms.    ROS:  Constitutional, eye, ENT, skin, respiratory, cardiac, and GI are normal except as otherwise noted.    PROBLEM LIST:  Patient Active Problem List    Diagnosis Date Noted     Chronic recurrent pilonidal cyst without abscess 10/07/2020     Priority: Medium     Added automatically from request for surgery [id number]       Pilonidal cyst without infection 05/11/2020     Priority: Medium     Added automatically from request for surgery [id number]       Anal fissure 04/29/2019     Priority: Medium     Constipation, unspecified constipation type 04/29/2019     Priority: Medium     Obesity with body mass index (BMI) in 95th to 98th percentile for age in pediatric patient, unspecified obesity type, unspecified whether serious comorbidity present 10/26/2018     Priority: Medium     Mild intermittent asthma without complication 12/11/2017     Priority: Medium      MEDICATIONS:  Current Outpatient Medications   Medication Sig Dispense Refill     Ascorbic Acid (VITAMIN C) 100 MG CHEW        escitalopram (LEXAPRO) 20 MG tablet Take 1 tablet (20 mg) by mouth daily 30 tablet 1     famotidine (PEPCID) 20 MG tablet Take 1 tablet (20 mg) by mouth 2 times daily 180 tablet 1     hydrOXYzine (ATARAX) 25 MG tablet Take 1-2 tablets (25-50 mg) by mouth At Bedtime 60 tablet 1     ibuprofen (ADVIL/MOTRIN) 200 MG tablet Take 600 mg by mouth every 6 hours as needed for mild pain        lactobacillus rhamnosus, GG, (CULTURELL) capsule Take 1 capsule by mouth 2 times daily       Methylcobalamin (B12-ACTIVE PO)        Pediatric Multiple Vitamins (MULTIVITAMIN CHILDRENS PO)        Vitamin D, Cholecalciferol, 25 MCG (1000 UT) CAPS         ALLERGIES:  Allergies   Allergen Reactions     No Known Allergies      Seasonal Allergies  " "      Problem list and histories reviewed & adjusted, as indicated.    OBJECTIVE:                                                      /64   Pulse 60   Temp 98.4  F (36.9  C) (Temporal)   Resp 10   Ht 1.734 m (5' 8.27\")   Wt 101.8 kg (224 lb 8 oz)   SpO2 97%   BMI 33.87 kg/m     Blood pressure reading is in the normal blood pressure range based on the 2017 AAP Clinical Practice Guideline.    GENERAL: Active, alert, in no acute distress.  SKIN: Clear. No significant rash, abnormal pigmentation or lesions  HEAD: Normocephalic.  EYES:  No discharge or erythema. Normal pupils and EOM.  EARS: Normal canals. Tympanic membranes are normal; gray and translucent.  NOSE: Normal without discharge.  MOUTH/THROAT: Clear. No oral lesions. Teeth intact without obvious abnormalities.  NECK: Supple, no masses.  LYMPH NODES: No adenopathy  LUNGS: Clear. No rales, rhonchi, wheezing or retractions  HEART: Regular rhythm. Normal S1/S2. No murmurs.  ABDOMEN: generalized tenderness, more so over the left upper abdomen.     DIAGNOSTICS:   Results for orders placed or performed in visit on 03/11/21   XR Abdomen 2 Views     Status: None    Narrative    ABDOMEN TWO VIEWS 3/11/2021 6:03 PM     HISTORY: Generalized abdominal pain.    COMPARISON: None.      Impression    IMPRESSION: Moderate amount of stool in the ascending colon and  rectum. Bowel gas pattern is otherwise within normal limits. No  convincing radiographic evidence for bowel obstruction. No free  intraperitoneal air.    AGA ISSA MD   CBC with platelets differential     Status: Abnormal   Result Value Ref Range    WBC 9.1 4.0 - 11.0 10e9/L    RBC Count 5.45 (H) 3.7 - 5.3 10e12/L    Hemoglobin 16.6 (H) 11.7 - 15.7 g/dL    Hematocrit 50.3 (H) 35.0 - 47.0 %    MCV 92 77 - 100 fl    MCH 30.5 26.5 - 33.0 pg    MCHC 33.0 31.5 - 36.5 g/dL    RDW 13.5 10.0 - 15.0 %    Platelet Count 289 150 - 450 10e9/L    % Neutrophils 56.0 %    % Lymphocytes 27.3 %    % Monocytes " 13.5 %    % Eosinophils 3.0 %    % Basophils 0.2 %    Absolute Neutrophil 5.1 1.3 - 7.0 10e9/L    Absolute Lymphocytes 2.5 1.0 - 5.8 10e9/L    Absolute Monocytes 1.2 0.0 - 1.3 10e9/L    Absolute Eosinophils 0.3 0.0 - 0.7 10e9/L    Absolute Basophils 0.0 0.0 - 0.2 10e9/L    Diff Method Automated Method    Comprehensive metabolic panel     Status: Abnormal   Result Value Ref Range    Sodium 141 133 - 144 mmol/L    Potassium 4.6 3.4 - 5.3 mmol/L    Chloride 108 98 - 110 mmol/L    Carbon Dioxide 32 20 - 32 mmol/L    Anion Gap 1 (L) 3 - 14 mmol/L    Glucose 74 70 - 99 mg/dL    Urea Nitrogen 13 7 - 21 mg/dL    Creatinine 0.96 0.50 - 1.00 mg/dL    GFR Estimate GFR not calculated, patient <18 years old. >60 mL/min/[1.73_m2]    GFR Estimate If Black GFR not calculated, patient <18 years old. >60 mL/min/[1.73_m2]    Calcium 9.4 8.5 - 10.1 mg/dL    Bilirubin Total 0.3 0.2 - 1.3 mg/dL    Albumin 4.5 3.4 - 5.0 g/dL    Protein Total 7.4 6.8 - 8.8 g/dL    Alkaline Phosphatase 111 65 - 260 U/L    ALT 34 0 - 50 U/L    AST 18 0 - 35 U/L   CRP inflammation     Status: None   Result Value Ref Range    CRP Inflammation <2.9 0.0 - 8.0 mg/L   Erythrocyte sedimentation rate auto     Status: None   Result Value Ref Range    Sed Rate 1 0 - 15 mm/h   Lipase     Status: None   Result Value Ref Range    Lipase 89 0 - 194 U/L   Amylase     Status: None   Result Value Ref Range    Amylase 37 30 - 110 U/L   UA reflex to Microscopic and Culture     Status: Abnormal    Specimen: Midstream Urine   Result Value Ref Range    Color Urine Yellow     Appearance Urine Clear     Glucose Urine Negative NEG^Negative mg/dL    Bilirubin Urine Negative NEG^Negative    Ketones Urine Negative NEG^Negative mg/dL    Specific Gravity Urine >1.030 1.003 - 1.035    Blood Urine Negative NEG^Negative    pH Urine 6.5 5.0 - 7.0 pH    Protein Albumin Urine Trace (A) NEG^Negative mg/dL    Urobilinogen Urine 0.2 0.2 - 1.0 EU/dL    Nitrite Urine Negative NEG^Negative     Leukocyte Esterase Urine Negative NEG^Negative    Source Midstream Urine    Urine Microscopic     Status: None   Result Value Ref Range    WBC Urine 0 - 5 OTO5^0 - 5 /HPF    RBC Urine O - 2 OTO2^O - 2 /HPF

## 2024-12-16 ENCOUNTER — TRANSCRIPTION ENCOUNTER (OUTPATIENT)
Age: 84
End: 2024-12-16

## 2024-12-16 LAB
ALBUMIN SERPL ELPH-MCNC: 2.8 G/DL — LOW (ref 3.5–5.2)
ALP SERPL-CCNC: 42 U/L — SIGNIFICANT CHANGE UP (ref 30–115)
ALT FLD-CCNC: <5 U/L — SIGNIFICANT CHANGE UP (ref 0–41)
ANION GAP SERPL CALC-SCNC: 9 MMOL/L — SIGNIFICANT CHANGE UP (ref 7–14)
AST SERPL-CCNC: 24 U/L — SIGNIFICANT CHANGE UP (ref 0–41)
BASOPHILS # BLD AUTO: 0.03 K/UL — SIGNIFICANT CHANGE UP (ref 0–0.2)
BASOPHILS NFR BLD AUTO: 0.3 % — SIGNIFICANT CHANGE UP (ref 0–1)
BILIRUB SERPL-MCNC: 0.2 MG/DL — SIGNIFICANT CHANGE UP (ref 0.2–1.2)
BUN SERPL-MCNC: 12 MG/DL — SIGNIFICANT CHANGE UP (ref 10–20)
CALCIUM SERPL-MCNC: 7.9 MG/DL — LOW (ref 8.4–10.5)
CHLORIDE SERPL-SCNC: 105 MMOL/L — SIGNIFICANT CHANGE UP (ref 98–110)
CO2 SERPL-SCNC: 27 MMOL/L — SIGNIFICANT CHANGE UP (ref 17–32)
CREAT SERPL-MCNC: 0.5 MG/DL — LOW (ref 0.7–1.5)
EGFR: 93 ML/MIN/1.73M2 — SIGNIFICANT CHANGE UP
EOSINOPHIL # BLD AUTO: 0.15 K/UL — SIGNIFICANT CHANGE UP (ref 0–0.7)
EOSINOPHIL NFR BLD AUTO: 1.7 % — SIGNIFICANT CHANGE UP (ref 0–8)
GLUCOSE SERPL-MCNC: 77 MG/DL — SIGNIFICANT CHANGE UP (ref 70–99)
HCT VFR BLD CALC: 28.4 % — LOW (ref 37–47)
HGB BLD-MCNC: 8.7 G/DL — LOW (ref 12–16)
IMM GRANULOCYTES NFR BLD AUTO: 1.2 % — HIGH (ref 0.1–0.3)
LYMPHOCYTES # BLD AUTO: 2.36 K/UL — SIGNIFICANT CHANGE UP (ref 1.2–3.4)
LYMPHOCYTES # BLD AUTO: 26 % — SIGNIFICANT CHANGE UP (ref 20.5–51.1)
MAGNESIUM SERPL-MCNC: 1.9 MG/DL — SIGNIFICANT CHANGE UP (ref 1.8–2.4)
MCHC RBC-ENTMCNC: 30.6 G/DL — LOW (ref 32–37)
MCHC RBC-ENTMCNC: 32.3 PG — HIGH (ref 27–31)
MCV RBC AUTO: 105.6 FL — HIGH (ref 81–99)
MONOCYTES # BLD AUTO: 0.67 K/UL — HIGH (ref 0.1–0.6)
MONOCYTES NFR BLD AUTO: 7.4 % — SIGNIFICANT CHANGE UP (ref 1.7–9.3)
NEUTROPHILS # BLD AUTO: 5.76 K/UL — SIGNIFICANT CHANGE UP (ref 1.4–6.5)
NEUTROPHILS NFR BLD AUTO: 63.4 % — SIGNIFICANT CHANGE UP (ref 42.2–75.2)
NRBC # BLD: 0 /100 WBCS — SIGNIFICANT CHANGE UP (ref 0–0)
PLATELET # BLD AUTO: 270 K/UL — SIGNIFICANT CHANGE UP (ref 130–400)
PMV BLD: 9.4 FL — SIGNIFICANT CHANGE UP (ref 7.4–10.4)
POTASSIUM SERPL-MCNC: 3.5 MMOL/L — SIGNIFICANT CHANGE UP (ref 3.5–5)
POTASSIUM SERPL-SCNC: 3.5 MMOL/L — SIGNIFICANT CHANGE UP (ref 3.5–5)
PROT SERPL-MCNC: 4.4 G/DL — LOW (ref 6–8)
RBC # BLD: 2.69 M/UL — LOW (ref 4.2–5.4)
RBC # FLD: 17.1 % — HIGH (ref 11.5–14.5)
SODIUM SERPL-SCNC: 141 MMOL/L — SIGNIFICANT CHANGE UP (ref 135–146)
WBC # BLD: 9.08 K/UL — SIGNIFICANT CHANGE UP (ref 4.8–10.8)
WBC # FLD AUTO: 9.08 K/UL — SIGNIFICANT CHANGE UP (ref 4.8–10.8)

## 2024-12-16 PROCEDURE — 99232 SBSQ HOSP IP/OBS MODERATE 35: CPT

## 2024-12-16 RX ORDER — POTASSIUM CHLORIDE 600 MG/1
20 TABLET, EXTENDED RELEASE ORAL ONCE
Refills: 0 | Status: COMPLETED | OUTPATIENT
Start: 2024-12-16 | End: 2024-12-16

## 2024-12-16 RX ORDER — APIXABAN 2.5 MG/1
1 TABLET, FILM COATED ORAL
Qty: 60 | Refills: 0
Start: 2024-12-16 | End: 2025-01-14

## 2024-12-16 RX ORDER — POTASSIUM CHLORIDE 600 MG/1
20 TABLET, EXTENDED RELEASE ORAL
Refills: 0 | Status: DISCONTINUED | OUTPATIENT
Start: 2024-12-16 | End: 2024-12-16

## 2024-12-16 RX ORDER — POTASSIUM CHLORIDE 600 MG/1
10 TABLET, EXTENDED RELEASE ORAL
Refills: 0 | Status: DISCONTINUED | OUTPATIENT
Start: 2024-12-16 | End: 2024-12-16

## 2024-12-16 RX ORDER — ROFLUMILAST 500 UG/1
1 TABLET ORAL
Refills: 0 | DISCHARGE

## 2024-12-16 RX ADMIN — Medication 1000 UNIT(S): at 11:44

## 2024-12-16 RX ADMIN — NYSTATIN 1 APPLICATION(S): 100000 POWDER TOPICAL at 05:50

## 2024-12-16 RX ADMIN — POVIDONE, POLYVINYL ALCOHOL 1 DROP(S): 20; 27 SOLUTION OPHTHALMIC at 05:51

## 2024-12-16 RX ADMIN — MEROPENEM 100 MILLIGRAM(S): 500 INJECTION, POWDER, FOR SOLUTION INTRAVENOUS at 05:51

## 2024-12-16 RX ADMIN — CARBIDOPA AND LEVODOPA 1 TABLET(S): 10; 100 TABLET ORAL at 05:50

## 2024-12-16 RX ADMIN — Medication 2 TABLET(S): at 21:41

## 2024-12-16 RX ADMIN — Medication 1 APPLICATION(S): at 11:43

## 2024-12-16 RX ADMIN — Medication 50 MICROGRAM(S): at 05:50

## 2024-12-16 RX ADMIN — APIXABAN 10 MILLIGRAM(S): 2.5 TABLET, FILM COATED ORAL at 08:47

## 2024-12-16 RX ADMIN — Medication 1 DROP(S): at 18:02

## 2024-12-16 RX ADMIN — LATANOPROST 1 DROP(S): 50 SOLUTION/ DROPS OPHTHALMIC at 21:41

## 2024-12-16 RX ADMIN — CARBIDOPA AND LEVODOPA 1 TABLET(S): 10; 100 TABLET ORAL at 21:40

## 2024-12-16 RX ADMIN — POTASSIUM CHLORIDE 50 MILLIEQUIVALENT(S): 600 TABLET, EXTENDED RELEASE ORAL at 23:15

## 2024-12-16 RX ADMIN — POVIDONE, POLYVINYL ALCOHOL 1 DROP(S): 20; 27 SOLUTION OPHTHALMIC at 18:02

## 2024-12-16 RX ADMIN — MEROPENEM 100 MILLIGRAM(S): 500 INJECTION, POWDER, FOR SOLUTION INTRAVENOUS at 21:40

## 2024-12-16 RX ADMIN — APIXABAN 10 MILLIGRAM(S): 2.5 TABLET, FILM COATED ORAL at 21:40

## 2024-12-16 RX ADMIN — MEROPENEM 100 MILLIGRAM(S): 500 INJECTION, POWDER, FOR SOLUTION INTRAVENOUS at 14:05

## 2024-12-16 RX ADMIN — NYSTATIN 1 APPLICATION(S): 100000 POWDER TOPICAL at 18:02

## 2024-12-16 RX ADMIN — CHLORHEXIDINE GLUCONATE 1 APPLICATION(S): 1.2 RINSE ORAL at 11:52

## 2024-12-16 RX ADMIN — POLYETHYLENE GLYCOL 3350 17 GRAM(S): 17 POWDER, FOR SOLUTION ORAL at 11:44

## 2024-12-16 RX ADMIN — Medication 1 DROP(S): at 05:51

## 2024-12-16 NOTE — PROGRESS NOTE ADULT - ASSESSMENT
84-year-old female with PMHx of Paroxysmal A-fib (was diagnosed in last admission and not on AC due to anemia due to L chest wall hematoma), Hx of ICH (in 2023), hx of Staphylococcus hominis bacteremia, Hx of Cardiopulmonary Arrest Secondary to Aspiration, history of 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, hypothyroidism , GERD, and Glaucoma presenting for facial droop.    #Hypotension -> resolved  #2/2 Anaphylaxis to Rocephin vs Sepsis due to UTI vs PE  - Patient had hypotension after receiving Rocephin dose in the ED  - Was given epi then started on epi drip, later switched to levophed  - 12/11 off pressors, BP is soft  - s/p LR maintenance  - Hydrocortisone stress dose decreased to 50mg q8h on 12/11 -> discontinued  - For the possible UTI (UA++) she was started on Aztreonam  - UCx showing Klebsiella ESBL, resistant to aztreonam, so as per ID we switched her to meropenem on 12/11 -> Meropenem 1g q8h IV x 7 days, if D/C prior PO bactrim  - Midodrine 5mg q8 started 12/11, dc 12/16 monitor BP    #Right main pulmonary artery PE with RV strain on CTA - Intermediate/High risk as hypotension reason unclear  #Acute mixed respiratory failure requiring BIPAP  #Hx of COPD  - Duplex LE positive for extensive DVT on the left  - Patient was started on IV heparin drip after discussion of risks vs benefits with her  (since she has a hx of ICH in 2023 and hx of chest hematoma as well)  - IR on board, said no need for embolectomy as patient HD stable   - Was on BIPAP -> HFNC -> NC (still on it)  - TTE was technically difficult, showed normal EF, no RV strain  - on therapeutic lovenox -> switched to Eliquis 10 mg BID starting 12/14  - hgb stable, follow up cbc     #Drop in Hb -> stable   - Likely just back to her baseline as initial Hb of 11.6 too high compared to baseline  - 12/9 CT A/P done to r/o hematoma, was negative  - No melena or hematochezia, normal BM  - Will just monitor Hb for any further drop, for now stable    #Left facial droop  #R/o stroke   #functional Quadriplegia   - Stroke code was called in ED  - CTH, CTA head and neck all negative  - Neuro recommended MR brain however very difficult to be done, so they said okay to just repeat CTH  - Now facial droop resolved  - Patient at baseline quadriplegic so can't assess motor function for any further deficits    #History of 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  #Hx of Cardiopulmonary Arrest Secondary to Aspiration  - Continue home meds  - S/S recs: pureed diet with moderately thick fluids    #Paroxysmal A-fib (was diagnosed in last admission and not on AC due to anemia due to L chest wall hematoma)  #Hx of intracerebral hemorrhage  - Currently in sinus rythm  - Was not on AC due to bleeding hx  - switched to eliquis, to continue 5mg BID upon discharge    #LUE clean ulcer  - Wound care consult     #Progress Note Handoff  Pending (specify):  follow up hgb  Family discussion: dw  at bedside  Disposition: pending [place] bed

## 2024-12-16 NOTE — DISCHARGE NOTE PROVIDER - ATTENDING DISCHARGE PHYSICAL EXAMINATION:
Present with the resident during the interview and examination of the patient by me. I personally interviewed the patient and repeated the exam. I reviewed/revised the exam and discussed with the resident in regards to assessment and plan as documented. See resident's section for details and agree with the above documented note.    S-Patient states that he feels well currently. denies any complaints.   O-PEx: Cardiac exam is regular rate and rhythm, lungs are clear to auscultation.      I have utilized all available resources to obtain, update, or review the patients current medications.     Results of imaging, labs and EKG reviewed independently    Medical Problems:     #Hypotension--RESOLVED   #2/2 Anaphylaxis to Rocephin   - Patient had hypotension after receiving Rocephin dose in the ED  - Was given epi then started on epi drip, later switched to levophed  - Now off pressors, BP is soft  - Today stopped LR maintenance  - HC stress dose decreased to 50mg q12h on 12/11  - Midodrine 10mg q8 taper down   - For the possible UTI (UA++) she was started on Aztreonam  - UCx showing Klebsiella, resistant to aztreonam, so as per ID we switched her to meropenem on 12/11    #Acute mixed respiratory failure requiring BIPAP--RESOLVED   #Right main pulmonary artery PE with RV strain on CTA - Intermediate/High risk as hypotension reason unclear--STABLE   #Hx of COPD  - Duplex LE positive for extensive DVT on the left  - Patient was started on IV heparin drip after discussion of risks vs benefits with her  (since she has a hx of ICH in 2023 and hx of chest hematoma as well), now switched to lovenox  - IR on board, said no need for embolectomy as patient HD stable now  - Was on BIPAP overnight, HFNC during the day, today will try on regular NC  - TTE was technically difficult, showed normal EF, no RV strain    #anemia of chronic disease, no Acute   - Likely just back to her baseline as initial Hb of 11.6 too high compared to baseline  - 12/9 CT A/P done to r/o hematoma, was negative  - No melena or hematochezia, normal BM  - Will just monitor Hb for any further drop, for now stable    #Left facial droop  #No stroke, Stroke is been ruled out    - Stroke code was called in ED  - CTH, CTA head and neck all negative  - Neuro recommended MR brain however very difficult to be done, so they said okay to just repeat CTH  - Now facial droop resolved  - Patient at baseline quadriplegic so can't assess motor function for any further deficits    #History of 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  #Hx of Cardiopulmonary Arrest Secondary to Aspiration  - Continue home meds  - S/S recs: pureed diet with moderately thick fluids    #Paroxysmal A-fib (was diagnosed in last admission and not on AC due to anemia due to L chest wall hematoma)  #Hx of intracerebral hemorrhage  - Currently in sinus rythm  - Was not on AC due to bleeding hx  - Now on lovenox    Disposition and Medical Necessity :   -Medically optimized to go back [facility]  -CM contacted with plan of care, and family member and NOK at bedside happy with the plan.     I have seen and examined the patient today and I spend time with the patient half of the time face-to-face encounter, I examine the patient, reviewed medications, I have reviewed laboratories, and imaging, and discussed plan of care with nurses staff. Please excuse any dictation or typographical errors that have not been edited out

## 2024-12-16 NOTE — DISCHARGE NOTE PROVIDER - PROVIDER TOKENS
PROVIDER:[TOKEN:[[id number]],FOLLOWUP:[1-3 days]] PROVIDER:[TOKEN:[[id number]],FOLLOWUP:[1 week]]

## 2024-12-16 NOTE — DISCHARGE NOTE PROVIDER - NSDCFUADDAPPT_GEN_ALL_CORE_FT
APPTS ARE READY TO BE MADE: [ ] YES    Best Family or Patient Contact (if needed):    Additional Information about above appointments (if needed):    1: Please follow up with your PCP within the next week  2:   3:     Other comments or requests:

## 2024-12-16 NOTE — DISCHARGE NOTE PROVIDER - HOSPITAL COURSE
84-year-old female with PMHx of Paroxysmal A-fib (was diagnosed in last admission and not on AC due to anemia due to L chest wall hematoma), Hx of ICH (in 2023), hx of Staphylococcus hominis bacteremia, Hx of Cardiopulmonary Arrest Secondary to Aspiration, history of 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, hypothyroidism , GERD, and Glaucoma presenting for facial droop.    Patient resides at [facility] where  reports that he was visiting her this morning when he noted left-sided facial droop which is since resolved.  Last known well prior was last night when nursing home was giving her night meds.  At baseline, she is quadriplegic, does not converse and speaks a few words at times.    No other symptoms were reported    In the ED initially, her vitals were stable  Stroke imaging was done and was unremarkable  Seen by neuro   After that patient was given rocephin for presumed UTI, ED suspect that after that, she became hypotensive and tachycardic with increased work of breathing, was given epi and then started on an epi drip + solumedrol for a working Dx of septic and anaphylactic shock    Labs: Leukocytosis, elevated lactate and trop (312 -> 293)    CT brain perfusion: No evidence of acute infarct or ischemia.  CTA neck: No stenosis. No dissection.  CTA brain: No stenosis or aneurysm.  CT angio chest showed right pulmonary embolus with right heart strain.    UA positive    #Hypotension  #2/2 Anaphylaxis to Rocephin vs Sepsis due to UTI vs PE  - Patient had hypotension after receiving Rocephin dose in the ED  - Was given epi then started on epi drip, later switched to levophed  - Now off pressors, BP is soft  - Today stopped LR maintenance  - HC stress dose decreased to 50mg q12h on 12/11  - Midodrine 10mg q8 started today  - For the possible UTI (UA++) she was started on Aztreonam  - UCx showing Klebsiella, resistant to aztreonam, so as per ID we switched her to meropenem on 12/11    #Acute mixed respiratory failure requiring BIPAP  #Right main pulmonary artery PE with RV strain on CTA - Intermediate/High risk as hypotension reason unclear  #Hx of COPD  - Duplex LE positive for extensive DVT on the left  - Patient was started on IV heparin drip after discussion of risks vs benefits with her  (since she has a hx of ICH in 2023 and hx of chest hematoma as well), now switched to lovenox  - IR on board, said no need for embolectomy as patient HD stable now  - Was on BIPAP overnight, HFNC during the day, today will try on regular NC  - TTE was technically difficult, showed normal EF, no RV strain    #Drop in Hb  - Likely just back to her baseline as initial Hb of 11.6 too high compared to baseline  - 12/9 CT A/P done to r/o hematoma, was negative  - No melena or hematochezia, normal BM  - Will just monitor Hb for any further drop, for now stable    #Left facial droop  #R/o stroke   - Stroke code was called in ED  - CTH, CTA head and neck all negative  - Neuro recommended MR brain however very difficult to be done, so they said okay to just repeat CTH  - Now facial droop resolved  - Patient at baseline quadriplegic so can't assess motor function for any further deficits    #History of 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  #Hx of Cardiopulmonary Arrest Secondary to Aspiration  - Continue home meds  - S/S recs: pureed diet with moderately thick fluids    #Paroxysmal A-fib (was diagnosed in last admission and not on AC due to anemia due to L chest wall hematoma)  #Hx of intracerebral hemorrhage  - Currently in sinus rythm  - Was not on AC due to bleeding hx  - Now on lovenox    Hospital Course:   84-year-old female with PMHx of Paroxysmal A-fib (was diagnosed in last admission and not on AC due to anemia due to L chest wall hematoma), Hx of ICH (in 2023), hx of Staphylococcus hominis bacteremia, Hx of Cardiopulmonary Arrest Secondary to Aspiration, history of 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, hypothyroidism , GERD, and Glaucoma presenting for facial droop.    Patient resides at [facility] where  reports that he was visiting her this morning when he noted left-sided facial droop which is since resolved.  Last known well prior was last night when nursing home was giving her night meds.  At baseline, she is quadriplegic, does not converse and speaks a few words at times.    No other symptoms were reported    In the ED initially, her vitals were stable  Stroke imaging was done and was unremarkable  Seen by neuro   After that patient was given rocephin for presumed UTI, ED suspect that after that, she became hypotensive and tachycardic with increased work of breathing, was given epi and then started on an epi drip + solumedrol for a working Dx of septic and anaphylactic shock    Labs: Leukocytosis, elevated lactate and trop (312 -> 293)    CT brain perfusion: No evidence of acute infarct or ischemia.  CTA neck: No stenosis. No dissection.  CTA brain: No stenosis or aneurysm.  CT angio chest showed right pulmonary embolus with right heart strain.    UA positive    #Hypotension  #2/2 Anaphylaxis to Rocephin vs Sepsis due to UTI vs PE  - Patient had hypotension after receiving Rocephin dose in the ED  - Was given epi then started on epi drip, later switched to levophed  - Now off pressors, BP is soft  - Today stopped LR maintenance  - HC stress dose decreased to 50mg q12h on 12/11  - Midodrine 10mg q8 started today  - For the possible UTI (UA++) she was started on Aztreonam  - UCx showing Klebsiella, resistant to aztreonam, so as per ID we switched her to meropenem on 12/11    #Acute mixed respiratory failure requiring BIPAP  #Right main pulmonary artery PE with RV strain on CTA - Intermediate/High risk as hypotension reason unclear  #Hx of COPD  - Duplex LE positive for extensive DVT on the left  - Patient was started on IV heparin drip after discussion of risks vs benefits with her  (since she has a hx of ICH in 2023 and hx of chest hematoma as well), now switched to lovenox  - IR on board, said no need for embolectomy as patient HD stable now  - Was on BIPAP overnight, HFNC during the day, today will try on regular NC  - TTE was technically difficult, showed normal EF, no RV strain    #Drop in Hb  - Likely just back to her baseline as initial Hb of 11.6 too high compared to baseline  - 12/9 CT A/P done to r/o hematoma, was negative  - No melena or hematochezia, normal BM  - Will just monitor Hb for any further drop, for now stable    #Left facial droop  #R/o stroke   - Stroke code was called in ED  - CTH, CTA head and neck all negative  - Neuro recommended MR brain however very difficult to be done, so they said okay to just repeat CTH  - Now facial droop resolved  - Patient at baseline quadriplegic so can't assess motor function for any further deficits    #History of 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  #Hx of Cardiopulmonary Arrest Secondary to Aspiration  - Continue home meds  - S/S recs: pureed diet with moderately thick fluids    #Paroxysmal A-fib (was diagnosed in last admission and not on AC due to anemia due to L chest wall hematoma)  #Hx of intracerebral hemorrhage  - Currently in sinus rythm  - Was not on AC due to bleeding hx  - Now on lovenox    Patient afebrile with decreasing WBC count. Patient to continue with PO bactrim 7 more days per ID.    Discussion of discharge plan of care, including discharge diagnoses, medication reconciliation, and follow-ups, was conducted with Dr. Brizuela on 12/16/24,   and discharge was approved. 84-year-old female with PMHx of Paroxysmal A-fib (was diagnosed in last admission and not on AC due to anemia due to L chest wall hematoma), Hx of ICH (in 2023), hx of Staphylococcus hominis bacteremia, Hx of Cardiopulmonary Arrest Secondary to Aspiration, history of 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, hypothyroidism , GERD, and Glaucoma presenting for facial droop.  Patient resides at [facility] where  reports that he was visiting her this morning when he noted left-sided facial droop which is since resolved.  Last known well prior was last night when nursing home was giving her night meds.  At baseline, she is quadriplegic, does not converse and speaks a few words at times.  No other symptoms were reported. In the ED initially, her vitals were stable. Stroke imaging was done and was unremarkable  After that patient was given rocephin for presumed UTI, ED suspect that after that, she became hypotensive and tachycardic with increased work of breathing, was given epi and then started on an epi drip + solumedrol for a working Dx of septic and anaphylactic shock.    Labs: Leukocytosis, elevated lactate and trop (312 -> 293)  CT brain perfusion: No evidence of acute infarct or ischemia.  CTA neck: No stenosis. No dissection.  CTA brain: No stenosis or aneurysm.  CT angio chest showed right pulmonary embolus with right heart strain.    UA positive  #Hypotension  #2/2 Anaphylaxis to Rocephin vs Sepsis due to UTI vs PE  - Patient had hypotension after receiving Rocephin dose in the ED  - Was given epi then started on epi drip, later switched to levophed  - Now off pressors, BP is soft  - Today stopped LR maintenance  - HC stress dose decreased to 50mg q12h on 12/11  - Midodrine 10mg q8 started today  - For the possible UTI (UA++) she was started on Aztreonam  - UCx showing Klebsiella, resistant to aztreonam, so as per ID we switched her to meropenem on 12/11    #Acute mixed respiratory failure requiring BIPAP  #Right main pulmonary artery PE with RV strain on CTA - Intermediate/High risk as hypotension reason unclear  #Hx of COPD  - Duplex LE positive for extensive DVT on the left  - Patient was started on IV heparin drip after discussion of risks vs benefits with her  (since she has a hx of ICH in 2023 and hx of chest hematoma as well), now switched to lovenox  - IR on board, said no need for embolectomy as patient HD stable now  - Was on BIPAP overnight, HFNC during the day, today will try on regular NC  - TTE was technically difficult, showed normal EF, no RV strain    #Drop in Hb  - Likely just back to her baseline as initial Hb of 11.6 too high compared to baseline  - 12/9 CT A/P done to r/o hematoma, was negative  - No melena or hematochezia, normal BM  - Will just monitor Hb for any further drop, for now stable    #Left facial droop  #R/o stroke   - Stroke code was called in ED  - CTH, CTA head and neck all negative  - Neuro recommended MR brain however very difficult to be done, so they said okay to just repeat CTH  - Now facial droop resolved  - Patient at baseline quadriplegic so can't assess motor function for any further deficits    #History of 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  #Hx of Cardiopulmonary Arrest Secondary to Aspiration  - Continue home meds  - S/S recs: pureed diet with moderately thick fluids    #Paroxysmal A-fib (was diagnosed in last admission and not on AC due to anemia due to L chest wall hematoma)  #Hx of intracerebral hemorrhage  - Currently in sinus rythm  - Was not on AC due to bleeding hx  - Now on lovenox    Patient afebrile with decreasing WBC count. Patient received 7 days of meropenem for urine culture positive for Klebsiella. Per ID, no more  antibiotics needed at this time. Left sided drooping spontaneously resolved and did not recur over the last several days. Neuro following,  no further stroke workup at this time. Patient stable, afebrile.    Discussion of discharge plan of care, including discharge diagnoses, medication reconciliation, and follow-ups, was conducted with Dr. Brizuela on 12/16/24,   and discharge was approved.

## 2024-12-16 NOTE — DISCHARGE NOTE PROVIDER - CARE PROVIDERS DIRECT ADDRESSES
[email or web address]

## 2024-12-16 NOTE — DISCHARGE NOTE PROVIDER - CARE PROVIDER_API CALL
[facility]  Internal Medicine  [address] [place] [address]  Phone: [phone]  Fax: [phone]  Follow Up Time: 1-3 days   Da Fortune  Internal Medicine  [address] [place] [address], Admin - Room [address] [place] [address]  Phone: [phone]  Fax: [phone]  Follow Up Time: 1 week

## 2024-12-16 NOTE — PROGRESS NOTE ADULT - SUBJECTIVE AND OBJECTIVE BOX
24H events:  Patient is a 84y old Female who presents with a chief complaint of Left facial droop (16 Dec 2024 11:10)  Primary diagnosis of Facial droop    Today is 8d of hospitalization. This morning patient was seen and examined at bedside, resting comfortably in bed.   No acute or major events overnight.      PAST MEDICAL & SURGICAL HISTORY  Dementia    History of breast cancer    Constipation    Lewy body dementia without behavioral disturbance    Colon polyp    History of colon resection    H/O mastectomy, right      SOCIAL HISTORY:  Social History:      ALLERGIES:  penicillin (Anaphylaxis)  ceftriaxone (Rash)  Originally Entered as [Unknown] reaction to [green pepper] (Unknown)  Originally Entered as [Unknown] reaction to [red pepper] (Unknown)  ciprofloxacin (Unknown)  benzodiazepines (Unknown)  Originally Entered as [Unknown] reaction to [neuroleptics] (Unknown)  antichlolinergics (Unknown)  Originally Entered as [Unknown] reaction to [pepper] (Unknown)    MEDICATIONS:  STANDING MEDICATIONS  apixaban 10 milliGRAM(s) Oral every 12 hours  artificial  tears Solution 1 Drop(s) Both EYES two times a day  bacitracin   Ointment 1 Application(s) Topical daily  carbidopa/levodopa  10/100 1 Tablet(s) Oral three times a day  chlorhexidine 2% Cloths 1 Application(s) Topical daily  cholecalciferol 1000 Unit(s) Oral daily  latanoprost 0.005% Ophthalmic Solution 1 Drop(s) Both EYES at bedtime  levothyroxine 50 MICROGram(s) Oral daily  meropenem  IVPB 1000 milliGRAM(s) IV Intermittent every 8 hours  nystatin Powder 1 Application(s) Topical two times a day  polyethylene glycol 3350 17 Gram(s) Oral daily  senna 2 Tablet(s) Oral at bedtime  timolol 0.5% Solution 1 Drop(s) Both EYES two times a day    PRN MEDICATIONS    VITALS:   T(F): 97  HR: 73  BP: 105/65  RR: 18  SpO2: 99%    PHYSICAL EXAM:  GENERAL:   ( x) NAD, lying in bed comfortably     (  ) obtunded     (  ) lethargic     (  ) somnolent      NECK:  (x) Supple     (  ) neck stiffness     (  ) nuchal rigidity     (  )  no JVD     (  ) JVD present ( -- cm)    HEART:  Rate -->     (x) normal rate     (  ) bradycardic     (  ) tachycardic  Rhythm -->     (x) regular     (  ) regularly irregular     (  ) irregularly irregular  Murmurs -->     (x) normal s1s2     (  ) systolic murmur     (  ) diastolic murmur     (  ) continuous murmur      (  ) S3 present     (  ) S4 present    LUNGS:   ( x)Unlabored respirations     (  ) tachypnea  ( x) B/L air entry     (  ) decreased breath sounds in:  (location     )    ( x) no adventitious sound     (  ) crackles     (  ) wheezing      (  ) rhonchi      (specify location:       )  (  ) chest wall tenderness (specify location:       )    ABDOMEN:   ( x) Soft     (  ) tense   |   (  ) nondistended     (  ) distended   |   (  ) +BS     (  ) hypoactive bowel sounds     (  ) hyperactive bowel sounds  ( x) nontender     (  ) RUQ tenderness     (  ) RLQ tenderness     (  ) LLQ tenderness     (  ) epigastric tenderness     (  ) diffuse tenderness  (  ) Splenomegaly      (  ) Hepatomegaly      (  ) Jaundice     (  ) ecchymosis     EXTREMITIES:  ( x) Normal     (  ) Rash     (  ) ecchymosis     (  ) varicose veins      (  ) pitting edema     (  ) non-pitting edema   (  ) ulceration     (  ) gangrene:     (location:     )    NERVOUS SYSTEM:    ( x) A&Ox3     (  ) confused     (  ) lethargic  CN II-XII:     ( x) Intact     (  ) deficits found     (Specify:     )   Upper extremities:     (  ) no sensorimotor deficits     (  ) weakness     (  ) loss of proprioception/vibration     (  ) loss of touch/temperature (specify:    )  Lower extremities:     (  ) no sensorimotor deficits     (  ) weakness     (  ) loss of proprioception/vibration     (  ) loss of touch/temperature (specify:    )    SKIN:   (  ) No rashes or lesions     (  ) maculopapular rash     (  ) pustules     (  ) vesicles     (  ) ulcer     (  ) ecchymosis     (specify location:     )      LABS:                        8.7    9.08  )-----------( 270      ( 16 Dec 2024 06:28 )             28.4     12-16    141  |  105  |  12  ----------------------------<  77  3.5   |  27  |  0.5[L]    Ca    7.9[L]      16 Dec 2024 06:28  Mg     1.9     12-16    TPro  4.4[L]  /  Alb  2.8[L]  /  TBili  0.2  /  DBili  x   /  AST  24  /  ALT  <5  /  AlkPhos  42  12-16      Urinalysis Basic - ( 16 Dec 2024 06:28 )    Color: x / Appearance: x / SG: x / pH: x  Gluc: 77 mg/dL / Ketone: x  / Bili: x / Urobili: x   Blood: x / Protein: x / Nitrite: x   Leuk Esterase: x / RBC: x / WBC x   Sq Epi: x / Non Sq Epi: x / Bacteria: x

## 2024-12-16 NOTE — DISCHARGE NOTE PROVIDER - NSDCMRMEDTOKEN_GEN_ALL_CORE_FT
Artificial Tears ophthalmic solution: 1 drop(s) to each affected eye 2 times a day  carbidopa-levodopa 10 mg-100 mg oral tablet: 1 tab(s) orally 3 times a day  Colace 100 mg oral capsule: 1 cap(s) orally 2 times a day  ferrous sulfate (as elemental iron) 15 mg/mL oral liquid: 5 milliliter(s) orally once a day  ipratropium-albuterol 0.5 mg-2.5 mg/3 mL inhalation solution: 3 milliliter(s) by nebulizer every 6 hours as needed for  shortness of breath and/or wheezing  latanoprost 0.005% ophthalmic solution: 1 drop(s) to each affected eye once a day (at bedtime)  levothyroxine 50 mcg (0.05 mg) oral tablet: 1 tab(s) orally once a day  melatonin 3 mg oral tablet: 1 tab(s) orally once a day (at bedtime), As needed, Insomnia  metoprolol succinate 25 mg oral capsule, extended release: 1 cap(s) orally once a day  polyethylene glycol 3350 oral powder for reconstitution: 17 gram(s) orally once a day  roflumilast 500 mcg oral tablet: 1 tab(s) orally once a day  Senna 8.6 mg oral tablet: 2 tab(s) orally once a day (at bedtime)  timolol hemihydrate 0.5% ophthalmic solution: 1 drop(s) to each affected eye 2 times a day  Vitamin D3 1000 intl units oral tablet: 1 tab(s) orally once a day   Artificial Tears ophthalmic solution: 1 drop(s) to each affected eye 2 times a day  carbidopa-levodopa 10 mg-100 mg oral tablet: 1 tab(s) orally 3 times a day  Colace 100 mg oral capsule: 1 cap(s) orally 2 times a day  Eliquis 2.5 mg oral tablet: 1 tab(s) orally 2 times a day  ferrous sulfate (as elemental iron) 15 mg/mL oral liquid: 5 milliliter(s) orally once a day  ipratropium-albuterol 0.5 mg-2.5 mg/3 mL inhalation solution: 3 milliliter(s) by nebulizer every 6 hours as needed for  shortness of breath and/or wheezing  latanoprost 0.005% ophthalmic solution: 1 drop(s) to each affected eye once a day (at bedtime)  levothyroxine 50 mcg (0.05 mg) oral tablet: 1 tab(s) orally once a day  melatonin 3 mg oral tablet: 1 tab(s) orally once a day (at bedtime), As needed, Insomnia  metoprolol succinate 25 mg oral capsule, extended release: 1 cap(s) orally once a day  polyethylene glycol 3350 oral powder for reconstitution: 17 gram(s) orally once a day  Senna 8.6 mg oral tablet: 2 tab(s) orally once a day (at bedtime)  sulfamethoxazole-trimethoprim 800 mg-160 mg oral tablet: 1 tab(s) orally 2 times a day  timolol hemihydrate 0.5% ophthalmic solution: 1 drop(s) to each affected eye 2 times a day  Vitamin D3 1000 intl units oral tablet: 1 tab(s) orally once a day   Artificial Tears ophthalmic solution: 1 drop(s) to each affected eye 2 times a day  carbidopa-levodopa 10 mg-100 mg oral tablet: 1 tab(s) orally 3 times a day  Colace 100 mg oral capsule: 1 cap(s) orally 2 times a day  Eliquis 5 mg oral tablet: 1 tab(s) orally 2 times a day  ferrous sulfate (as elemental iron) 15 mg/mL oral liquid: 5 milliliter(s) orally once a day  ipratropium-albuterol 0.5 mg-2.5 mg/3 mL inhalation solution: 3 milliliter(s) by nebulizer every 6 hours as needed for  shortness of breath and/or wheezing  latanoprost 0.005% ophthalmic solution: 1 drop(s) to each affected eye once a day (at bedtime)  levothyroxine 50 mcg (0.05 mg) oral tablet: 1 tab(s) orally once a day  melatonin 3 mg oral tablet: 1 tab(s) orally once a day (at bedtime), As needed, Insomnia  metoprolol succinate 25 mg oral capsule, extended release: 1 cap(s) orally once a day  polyethylene glycol 3350 oral powder for reconstitution: 17 gram(s) orally once a day  Senna 8.6 mg oral tablet: 2 tab(s) orally once a day (at bedtime)  sulfamethoxazole-trimethoprim 800 mg-160 mg oral tablet: 1 tab(s) orally 2 times a day  timolol hemihydrate 0.5% ophthalmic solution: 1 drop(s) to each affected eye 2 times a day  Vitamin D3 1000 intl units oral tablet: 1 tab(s) orally once a day   Artificial Tears ophthalmic solution: 1 drop(s) to each affected eye 2 times a day  carbidopa-levodopa 10 mg-100 mg oral tablet: 1 tab(s) orally 3 times a day  Colace 100 mg oral capsule: 1 cap(s) orally 2 times a day  Eliquis 5 mg oral tablet: 1 tab(s) orally 2 times a day  ferrous sulfate (as elemental iron) 15 mg/mL oral liquid: 5 milliliter(s) orally once a day  ipratropium-albuterol 0.5 mg-2.5 mg/3 mL inhalation solution: 3 milliliter(s) by nebulizer every 6 hours as needed for  shortness of breath and/or wheezing  latanoprost 0.005% ophthalmic solution: 1 drop(s) to each affected eye once a day (at bedtime)  levothyroxine 50 mcg (0.05 mg) oral tablet: 1 tab(s) orally once a day  melatonin 3 mg oral tablet: 1 tab(s) orally once a day (at bedtime), As needed, Insomnia  metoprolol succinate 25 mg oral capsule, extended release: 1 cap(s) orally once a day  polyethylene glycol 3350 oral powder for reconstitution: 17 gram(s) orally once a day  Senna 8.6 mg oral tablet: 2 tab(s) orally once a day (at bedtime)  timolol hemihydrate 0.5% ophthalmic solution: 1 drop(s) to each affected eye 2 times a day  Vitamin D3 1000 intl units oral tablet: 1 tab(s) orally once a day

## 2024-12-16 NOTE — DISCHARGE NOTE PROVIDER - NSDCCPCAREPLAN_GEN_ALL_CORE_FT
PRINCIPAL DISCHARGE DIAGNOSIS  Diagnosis: Acute respiratory failure with hypoxia  Assessment and Plan of Treatment:       SECONDARY DISCHARGE DIAGNOSES  Diagnosis: UTI (urinary tract infection)  Assessment and Plan of Treatment:     Diagnosis: TIA (transient ischemic attack)  Assessment and Plan of Treatment:      PRINCIPAL DISCHARGE DIAGNOSIS  Diagnosis: Facial droop  Assessment and Plan of Treatment: You came in with drooping of the left side of your face. CT imaging showed no evidence of a stroke and the drooping on the left side of your face spontaneously resolved.  -Take all the medications you were discharged with, unless otherwise instructed by your healthcare provider(s).  - It is recommended you follow up with your PCP. Please call to make an appointment. Bring your paperwork from this hospital stay to that visit. You can access your visit information by signing up for an account for the patient portal at [email or web address]  -Seek immediate medical attention if you develop fevers, chills, chest pain, shortness of breath, dizziness, nausea and vomiting, abdominal pain, passing out, weakness or numbness or tingling, or any other concerning signs or symptoms.      SECONDARY DISCHARGE DIAGNOSES  Diagnosis: UTI (urinary tract infection)  Assessment and Plan of Treatment:     Diagnosis: TIA (transient ischemic attack)  Assessment and Plan of Treatment:     Diagnosis: Acute respiratory failure with hypoxia  Assessment and Plan of Treatment:     Diagnosis: Facial droop  Assessment and Plan of Treatment:      PRINCIPAL DISCHARGE DIAGNOSIS  Diagnosis: Facial droop  Assessment and Plan of Treatment: You came in with drooping of the left side of your face. CT imaging showed no evidence of a stroke and the drooping on the left side of your face spontaneously resolved. Neurology recommended no further stroke workup at this time.  -Take all the medications you were discharged with, unless otherwise instructed by your healthcare provider(s).  - It is recommended you follow up with your PCP. Please call to make an appointment. Bring your paperwork from this hospital stay to that visit. You can access your visit information by signing up for an account for the patient portal at [email or web address]  -Seek immediate medical attention if you develop fevers, chills, chest pain, shortness of breath, dizziness, nausea and vomiting, abdominal pain, passing out, weakness or numbness or tingling, or any other concerning signs or symptoms.      SECONDARY DISCHARGE DIAGNOSES  Diagnosis: UTI (urinary tract infection)  Assessment and Plan of Treatment: Urine culture showed you were positive for a bacteria called Klebsiella. We treated you with an antiobiotic, meropenem for 7 days. You do not need to take any antibiotics upon discharge.    Diagnosis: TIA (transient ischemic attack)  Assessment and Plan of Treatment:     Diagnosis: Acute respiratory failure with hypoxia  Assessment and Plan of Treatment:     Diagnosis: Facial droop  Assessment and Plan of Treatment:

## 2024-12-17 ENCOUNTER — TRANSCRIPTION ENCOUNTER (OUTPATIENT)
Age: 84
End: 2024-12-17

## 2024-12-17 VITALS
HEART RATE: 114 BPM | SYSTOLIC BLOOD PRESSURE: 115 MMHG | TEMPERATURE: 98 F | DIASTOLIC BLOOD PRESSURE: 72 MMHG | RESPIRATION RATE: 99 BRPM

## 2024-12-17 LAB
ANION GAP SERPL CALC-SCNC: 12 MMOL/L — SIGNIFICANT CHANGE UP (ref 7–14)
BASOPHILS # BLD AUTO: 0.04 K/UL — SIGNIFICANT CHANGE UP (ref 0–0.2)
BASOPHILS NFR BLD AUTO: 0.4 % — SIGNIFICANT CHANGE UP (ref 0–1)
BUN SERPL-MCNC: 10 MG/DL — SIGNIFICANT CHANGE UP (ref 10–20)
CALCIUM SERPL-MCNC: 8 MG/DL — LOW (ref 8.4–10.4)
CHLORIDE SERPL-SCNC: 102 MMOL/L — SIGNIFICANT CHANGE UP (ref 98–110)
CO2 SERPL-SCNC: 26 MMOL/L — SIGNIFICANT CHANGE UP (ref 17–32)
CREAT SERPL-MCNC: 0.5 MG/DL — LOW (ref 0.7–1.5)
EGFR: 93 ML/MIN/1.73M2 — SIGNIFICANT CHANGE UP
EOSINOPHIL # BLD AUTO: 0.16 K/UL — SIGNIFICANT CHANGE UP (ref 0–0.7)
EOSINOPHIL NFR BLD AUTO: 1.5 % — SIGNIFICANT CHANGE UP (ref 0–8)
GLUCOSE SERPL-MCNC: 67 MG/DL — LOW (ref 70–99)
HCT VFR BLD CALC: 30.7 % — LOW (ref 37–47)
HGB BLD-MCNC: 9.4 G/DL — LOW (ref 12–16)
IMM GRANULOCYTES NFR BLD AUTO: 1.2 % — HIGH (ref 0.1–0.3)
LYMPHOCYTES # BLD AUTO: 2.56 K/UL — SIGNIFICANT CHANGE UP (ref 1.2–3.4)
LYMPHOCYTES # BLD AUTO: 24.1 % — SIGNIFICANT CHANGE UP (ref 20.5–51.1)
MAGNESIUM SERPL-MCNC: 1.9 MG/DL — SIGNIFICANT CHANGE UP (ref 1.8–2.4)
MCHC RBC-ENTMCNC: 30.6 G/DL — LOW (ref 32–37)
MCHC RBC-ENTMCNC: 31.6 PG — HIGH (ref 27–31)
MCV RBC AUTO: 103.4 FL — HIGH (ref 81–99)
MONOCYTES # BLD AUTO: 0.75 K/UL — HIGH (ref 0.1–0.6)
MONOCYTES NFR BLD AUTO: 7.1 % — SIGNIFICANT CHANGE UP (ref 1.7–9.3)
NEUTROPHILS # BLD AUTO: 6.99 K/UL — HIGH (ref 1.4–6.5)
NEUTROPHILS NFR BLD AUTO: 65.7 % — SIGNIFICANT CHANGE UP (ref 42.2–75.2)
NRBC # BLD: 0 /100 WBCS — SIGNIFICANT CHANGE UP (ref 0–0)
PLATELET # BLD AUTO: 265 K/UL — SIGNIFICANT CHANGE UP (ref 130–400)
PMV BLD: 9.5 FL — SIGNIFICANT CHANGE UP (ref 7.4–10.4)
POTASSIUM SERPL-MCNC: 4 MMOL/L — SIGNIFICANT CHANGE UP (ref 3.5–5)
POTASSIUM SERPL-SCNC: 4 MMOL/L — SIGNIFICANT CHANGE UP (ref 3.5–5)
RBC # BLD: 2.97 M/UL — LOW (ref 4.2–5.4)
RBC # FLD: 16.8 % — HIGH (ref 11.5–14.5)
SODIUM SERPL-SCNC: 140 MMOL/L — SIGNIFICANT CHANGE UP (ref 135–146)
WBC # BLD: 10.63 K/UL — SIGNIFICANT CHANGE UP (ref 4.8–10.8)
WBC # FLD AUTO: 10.63 K/UL — SIGNIFICANT CHANGE UP (ref 4.8–10.8)

## 2024-12-17 PROCEDURE — 99239 HOSP IP/OBS DSCHRG MGMT >30: CPT

## 2024-12-17 RX ADMIN — MEROPENEM 100 MILLIGRAM(S): 500 INJECTION, POWDER, FOR SOLUTION INTRAVENOUS at 05:17

## 2024-12-17 RX ADMIN — Medication 1 DROP(S): at 18:28

## 2024-12-17 RX ADMIN — CHLORHEXIDINE GLUCONATE 1 APPLICATION(S): 1.2 RINSE ORAL at 11:20

## 2024-12-17 RX ADMIN — Medication 1000 UNIT(S): at 11:16

## 2024-12-17 RX ADMIN — CARBIDOPA AND LEVODOPA 1 TABLET(S): 10; 100 TABLET ORAL at 14:22

## 2024-12-17 RX ADMIN — POVIDONE, POLYVINYL ALCOHOL 1 DROP(S): 20; 27 SOLUTION OPHTHALMIC at 05:18

## 2024-12-17 RX ADMIN — POVIDONE, POLYVINYL ALCOHOL 1 DROP(S): 20; 27 SOLUTION OPHTHALMIC at 18:28

## 2024-12-17 RX ADMIN — CARBIDOPA AND LEVODOPA 1 TABLET(S): 10; 100 TABLET ORAL at 05:17

## 2024-12-17 RX ADMIN — POLYETHYLENE GLYCOL 3350 17 GRAM(S): 17 POWDER, FOR SOLUTION ORAL at 11:17

## 2024-12-17 RX ADMIN — Medication 50 MICROGRAM(S): at 05:17

## 2024-12-17 RX ADMIN — NYSTATIN 1 APPLICATION(S): 100000 POWDER TOPICAL at 05:18

## 2024-12-17 RX ADMIN — MEROPENEM 100 MILLIGRAM(S): 500 INJECTION, POWDER, FOR SOLUTION INTRAVENOUS at 14:57

## 2024-12-17 RX ADMIN — Medication 1 DROP(S): at 05:18

## 2024-12-17 RX ADMIN — NYSTATIN 1 APPLICATION(S): 100000 POWDER TOPICAL at 18:27

## 2024-12-17 RX ADMIN — Medication 1 APPLICATION(S): at 11:20

## 2024-12-17 RX ADMIN — APIXABAN 10 MILLIGRAM(S): 2.5 TABLET, FILM COATED ORAL at 08:36

## 2024-12-17 NOTE — DISCHARGE NOTE NURSING/CASE MANAGEMENT/SOCIAL WORK - PATIENT PORTAL LINK FT
You can access the FollowMyHealth Patient Portal offered by [facility] by registering at the following website: [email or web address]. By joining [facility]’s FollowMyHealth portal, you will also be able to view your health information using other applications (apps) compatible with our system.

## 2024-12-17 NOTE — PROGRESS NOTE ADULT - PROVIDER SPECIALTY LIST ADULT
Critical Care
Internal Medicine
Internal Medicine
MICU
Critical Care
Infectious Disease
Internal Medicine
Critical Care
Internal Medicine
MICU
Critical Care
Hospitalist
Infectious Disease
Critical Care
Hospitalist
Palliative Care

## 2024-12-17 NOTE — DISCHARGE NOTE NURSING/CASE MANAGEMENT/SOCIAL WORK - FINANCIAL ASSISTANCE
[facility] provides services at a reduced cost to those who are determined to be eligible through [facility]’s financial assistance program. Information regarding [facility]’s financial assistance program can be found by going to [email or web address] or by calling [phone].

## 2024-12-17 NOTE — PROGRESS NOTE ADULT - ATTENDING COMMENTS
I have personally seen and examined the patient. I have collaborated with and supervised the  Resident  on the discharge service for the patient. I have reviewed and made amendments to the documentation where necessary.  Present with the resident during the interview and examination of the patient by me. I personally interviewed the patient and repeated the exam. I reviewed/revised the exam and discussed with the resident in regards to assessment and plan as documented. See resident's section for details and agree with the above documented note.    S-Patient states that he feels well currently. denies any complaints.   O-PEx: Cardiac exam is regular rate and rhythm, lungs are clear to auscultation.      I have utilized all available resources to obtain, update, or review the patients current medications.     Results of imaging, labs and EKG reviewed independently    Medical Problems:     #Hypotension--RESOLVED   #2/2 Anaphylaxis to Rocephin   - Patient had hypotension after receiving Rocephin dose in the ED  - Was given epi then started on epi drip, later switched to levophed  - Now off pressors, BP is soft  - Today stopped LR maintenance  - HC stress dose decreased to 50mg q12h on 12/11  - Midodrine 10mg q8 taper down   - For the possible UTI (UA++) she was started on Aztreonam  - UCx showing Klebsiella, resistant to aztreonam, so as per ID we switched her to meropenem on 12/11    #Acute mixed respiratory failure requiring BIPAP--RESOLVED   #Right main pulmonary artery PE with RV strain on CTA - Intermediate/High risk as hypotension reason unclear--STABLE   #Hx of COPD  - Duplex LE positive for extensive DVT on the left  - Patient was started on IV heparin drip after discussion of risks vs benefits with her  (since she has a hx of ICH in 2023 and hx of chest hematoma as well), now switched to lovenox  - IR on board, said no need for embolectomy as patient HD stable now  - Was on BIPAP overnight, HFNC during the day, today will try on regular NC  - TTE was technically difficult, showed normal EF, no RV strain    #anemia of chronic disease, no Acute   - Likely just back to her baseline as initial Hb of 11.6 too high compared to baseline  - 12/9 CT A/P done to r/o hematoma, was negative  - No melena or hematochezia, normal BM  - Will just monitor Hb for any further drop, for now stable    #Left facial droop  #No stroke, Stroke is been ruled out    - Stroke code was called in ED  - CTH, CTA head and neck all negative  - Neuro recommended MR brain however very difficult to be done, so they said okay to just repeat CTH  - Now facial droop resolved  - Patient at baseline quadriplegic so can't assess motor function for any further deficits    #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     I have seen and examined the patient today and I spend time with the patient half of the time face-to-face encounter, I examine the patient, reviewed medications, I have reviewed laboratories, and imaging, and discussed plan of care with nurses staff. Please excuse any dictation or typographical errors that have not been edited out
Pt seen and examined at bedside. No cp or sob.  HFNC this am, will attempt to switch NC     Vital Signs (24 Hrs):  T(C): 36.3 (12-13-24 @ 12:17), Max: 36.8 (12-13-24 @ 04:00)  HR: 79 (12-13-24 @ 12:17) (56 - 79)  BP: 102/69 (12-13-24 @ 12:17) (102/69 - 132/60)  RR: 18 (12-13-24 @ 12:17) (18 - 20)  SpO2: 100% (12-13-24 @ 12:00) (99% - 100%)    12 Dec 2024 07:01  -  13 Dec 2024 07:00  --------------------------------------------------------  IN: 0 mL / OUT: 60 mL / NET: -60 mL      PHYSICAL EXAM:  GENERAL: NAD, well-developed  HEAD:  Atraumatic, Normocephalic  EYES: EOMI, PERRLA, conjunctiva and sclera clear  NECK: Supple, No JVD  CHEST/LUNG: Clear to auscultation bilaterally; No wheeze  HEART: Regular rate and rhythm; No murmurs, rubs, or gallops  ABDOMEN: Soft, Nontender, Nondistended; Bowel sounds present  EXTREMITIES:  2+ Peripheral Pulses, No clubbing, cyanosis, or edema, left arm wound   PSYCH: Alert and awake  NEUROLOGY: non-focal  SKIN: No rashes or lesions  Labs reviewed    Plan    #Progress Note Handoff  Pending (specify):    Family discussion: house staff updated pt family  Disposition:   Decision to admit the pt is based on acuity as above
a/p  84-year-old female with PMHx of Paroxysmal A-fib (was diagnosed in last admission and not on AC due to anemia due to L chest wall hematoma), Hx of ICH (in 2023), hx of Staphylococcus hominis bacteremia, Hx of Cardiopulmonary Arrest Secondary to Aspiration, history of 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, hypothyroidism , GERD, and Glaucoma presenting for facial droop.    # PE,   cont ac     # left facial droop , possibl tia, MRI not feasable , ct repeat neg   functional quadriplegia, left arm swelling,   cta, neg     # hx of afib, cont ac     # sepsis poa due to klebsiella UTI , finished course of abx jenna per ID    # anemia   no active bleeding , hgb stable   Hemoglobin: 9.4 g/dL (12-17-24 @ 05:49)  Hemoglobin: 8.7 g/dL (12-16-24 @ 06:28)  Hemoglobin: 9.3 g/dL (12-15-24 @ 21:32)  Hemoglobin: 8.7 g/dL (12-15-24 @ 09:00)  Hemoglobin: 9.3 g/dL (12-14-24 @ 05:44)    # LUE wound, clean base, no discharge, healing   local care   Clean wound with saline, pat dry apply bacitracin with non adhering and Kerlix  dressing     #Progress Note Handoff    Pending :  discharge today   Family discussion: dw  at the bedside   Disposition: snf   code status: DNR, DNI
Pt seen and examined at bedside. No cp or sob.     Vital Signs (24 Hrs):  T(C): 35.8 (12-12-24 @ 08:00), Max: 36.5 (12-11-24 @ 16:00)  HR: 63 (12-12-24 @ 08:00) (59 - 87)  BP: 102/55 (12-12-24 @ 08:00) (96/51 - 122/58)  RR: 20 (12-12-24 @ 04:00) (20 - 23)  SpO2: 100% (12-12-24 @ 08:00) (99% - 100%)  11 Dec 2024 07:01  -  12 Dec 2024 07:00  --------------------------------------------------------  IN: 400 mL / OUT: 1345 mL / NET: -945 mL      PHYSICAL EXAM:  GENERAL: NAD, well-developed  HEAD:  Atraumatic, Normocephalic  EYES: EOMI, PERRLA, conjunctiva and sclera clear  NECK: Supple, No JVD  CHEST/LUNG: Clear to auscultation bilaterally; No wheeze  HEART: Regular rate and rhythm; No murmurs, rubs, or gallops  ABDOMEN: Soft, Nontender, Nondistended; Bowel sounds present  EXTREMITIES:  2+ Peripheral Pulses, No clubbing, cyanosis, or edema  PSYCH: AAOx3  NEUROLOGY: non-focal  SKIN: No rashes or lesions  Labs reviewed  Imaging reviewed independently and reviewed read  < from: CT Abdomen and Pelvis No Cont (12.10.24 @ 05:19) >    IMPRESSION:    No evidence of retroperitoneal hematoma or hemoperitoneum.    No significant change in indeterminate right ovarian 6.5 cm and left   ovarian 2.9 cm cystic lesions. Outpatient pelvic ultrasound recommended   for further evaluation.    Plan as above. KAYE resident. Agree to plan. Made edits
negative

## 2024-12-17 NOTE — PROGRESS NOTE ADULT - ASSESSMENT
84-year-old female with PMHx of Paroxysmal A-fib (was diagnosed in last admission and not on AC due to anemia due to L chest wall hematoma), Hx of ICH (in 2023), hx of Staphylococcus hominis bacteremia, Hx of Cardiopulmonary Arrest Secondary to Aspiration, history of 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, hypothyroidism , GERD, and Glaucoma presenting for facial droop.    #Hypotension -> resolved  #2/2 Anaphylaxis to Rocephin vs Sepsis due to UTI vs PE  - Patient had hypotension after receiving Rocephin dose in the ED  - Was given epi then started on epi drip, later switched to levophed  - 12/11 off pressors, BP is soft  - s/p LR maintenance  - Hydrocortisone stress dose decreased to 50mg q8h on 12/11 -> discontinued  - For the possible UTI (UA++) she was started on Aztreonam  - UCx showing Klebsiella ESBL, resistant to aztreonam, so as per ID we switched her to meropenem on 12/11 -> Meropenem 1g q8h IV x 7 days finished 12/17 no need for PO bactrim  - Midodrine 5mg q8 started 12/11, dc 12/16 monitor BP ---> BP stable off midodrine    #Right main pulmonary artery PE with RV strain on CTA - Intermediate/High risk as hypotension reason unclear  #Acute mixed respiratory failure requiring BIPAP  #Hx of COPD  - Duplex LE positive for extensive DVT on the left  - Patient was started on IV heparin drip after discussion of risks vs benefits with her  (since she has a hx of ICH in 2023 and hx of chest hematoma as well)  - IR on board, said no need for embolectomy as patient HD stable   - Was on BIPAP -> HFNC -> NC (still on it)  - TTE was technically difficult, showed normal EF, no RV strain  - on therapeutic lovenox -> switched to Eliquis 10 mg BID starting 12/14  - hgb stable, follow up cbc     #Drop in Hb -> stable   - Likely just back to her baseline as initial Hb of 11.6 too high compared to baseline  - 12/9 CT A/P done to r/o hematoma, was negative  - No melena or hematochezia, normal BM  - Will just monitor Hb for any further drop, for now stable    #Left facial droop  #R/o stroke   #functional Quadriplegia   - Stroke code was called in ED  - CTH, CTA head and neck all negative  - Neuro recommended MR brain however very difficult to be done, so they said okay to just repeat CTH  - Now facial droop resolved  - Patient at baseline quadriplegic so can't assess motor function for any further deficits    #History of 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  #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

## 2024-12-17 NOTE — PROGRESS NOTE ADULT - SUBJECTIVE AND OBJECTIVE BOX
24H events:  Patient is a 84y old Female who presents with a chief complaint of Left facial droop (16 Dec 2024 11:10)  Primary diagnosis of Facial droop    Today is 9d of hospitalization. This morning patient was seen and examined at bedside, resting comfortably in bed.  bedside.  No acute or major events overnight.      PAST MEDICAL & SURGICAL HISTORY  Dementia    History of breast cancer    Constipation    Lewy body dementia without behavioral disturbance    Colon polyp    History of colon resection    H/O mastectomy, right      SOCIAL HISTORY:  Social History:      ALLERGIES:  penicillin (Anaphylaxis)  ceftriaxone (Rash)  Originally Entered as [Unknown] reaction to [green pepper] (Unknown)  Originally Entered as [Unknown] reaction to [red pepper] (Unknown)  ciprofloxacin (Unknown)  benzodiazepines (Unknown)  Originally Entered as [Unknown] reaction to [neuroleptics] (Unknown)  antichlolinergics (Unknown)  Originally Entered as [Unknown] reaction to [pepper] (Unknown)    MEDICATIONS:  STANDING MEDICATIONS  apixaban 10 milliGRAM(s) Oral every 12 hours  artificial  tears Solution 1 Drop(s) Both EYES two times a day  bacitracin   Ointment 1 Application(s) Topical daily  carbidopa/levodopa  10/100 1 Tablet(s) Oral three times a day  chlorhexidine 2% Cloths 1 Application(s) Topical daily  cholecalciferol 1000 Unit(s) Oral daily  latanoprost 0.005% Ophthalmic Solution 1 Drop(s) Both EYES at bedtime  levothyroxine 50 MICROGram(s) Oral daily  meropenem  IVPB 1000 milliGRAM(s) IV Intermittent every 8 hours  nystatin Powder 1 Application(s) Topical two times a day  polyethylene glycol 3350 17 Gram(s) Oral daily  senna 2 Tablet(s) Oral at bedtime  timolol 0.5% Solution 1 Drop(s) Both EYES two times a day    PRN MEDICATIONS    VITALS:   T(F): 98  HR: 83  BP: 115/73  RR: 19  SpO2: 100%    PHYSICAL EXAM:  GENERAL:   ( x) NAD, lying in bed comfortably     (  ) obtunded     (  ) lethargic     (  ) somnolent      NECK:  (x) Supple     (  ) neck stiffness     (  ) nuchal rigidity     (  )  no JVD     (  ) JVD present ( -- cm)    HEART:  Rate -->     (x) normal rate     (  ) bradycardic     (  ) tachycardic  Rhythm -->     (x) regular     (  ) regularly irregular     (  ) irregularly irregular  Murmurs -->     (x) normal s1s2     (  ) systolic murmur     (  ) diastolic murmur     (  ) continuous murmur      (  ) S3 present     (  ) S4 present    LUNGS:   ( x)Unlabored respirations     (  ) tachypnea  ( x) B/L air entry     (  ) decreased breath sounds in:  (location     )    ( x) no adventitious sound     (  ) crackles     (  ) wheezing      (  ) rhonchi      (specify location:       )  (  ) chest wall tenderness (specify location:       )    ABDOMEN:   ( x) Soft     (  ) tense   |   (  ) nondistended     (  ) distended   |   (  ) +BS     (  ) hypoactive bowel sounds     (  ) hyperactive bowel sounds  ( x) nontender     (  ) RUQ tenderness     (  ) RLQ tenderness     (  ) LLQ tenderness     (  ) epigastric tenderness     (  ) diffuse tenderness  (  ) Splenomegaly      (  ) Hepatomegaly      (  ) Jaundice     (  ) ecchymosis     EXTREMITIES:  ( x) Normal     (  ) Rash     (  ) ecchymosis     (  ) varicose veins      (  ) pitting edema     (  ) non-pitting edema   (  ) ulceration     (  ) gangrene:     (location:     )    NERVOUS SYSTEM:    ( x) A&Ox3     (  ) confused     (  ) lethargic  CN II-XII:     ( x) Intact     (  ) deficits found     (Specify:     )   Upper extremities:     (  ) no sensorimotor deficits     (  ) weakness     (  ) loss of proprioception/vibration     (  ) loss of touch/temperature (specify:    )  Lower extremities:     (  ) no sensorimotor deficits     (  ) weakness     (  ) loss of proprioception/vibration     (  ) loss of touch/temperature (specify:    )    SKIN:   (  ) No rashes or lesions     (  ) maculopapular rash     (  ) pustules     (  ) vesicles     (  ) ulcer     (  ) ecchymosis     (specify location:     )      LABS:                        9.4    10.63 )-----------( 265      ( 17 Dec 2024 05:49 )             30.7     12-17    140  |  102  |  10  ----------------------------<  67[L]  4.0   |  26  |  0.5[L]    Ca    8.0[L]      17 Dec 2024 05:49  Mg     1.9     12-17    TPro  4.4[L]  /  Alb  2.8[L]  /  TBili  0.2  /  DBili  x   /  AST  24  /  ALT  <5  /  AlkPhos  42  12-16      Urinalysis Basic - ( 17 Dec 2024 05:49 )    Color: x / Appearance: x / SG: x / pH: x  Gluc: 67 mg/dL / Ketone: x  / Bili: x / Urobili: x   Blood: x / Protein: x / Nitrite: x   Leuk Esterase: x / RBC: x / WBC x   Sq Epi: x / Non Sq Epi: x / Bacteria: x

## 2024-12-18 NOTE — CHART NOTE - NSCHARTNOTEFT_GEN_A_CORE
84-year-old female with PMHx of Paroxysmal A-fib (was diagnosed in last admission and not on AC due to anemia due to L chest wall hematoma), Hx of ICH (in 2023), hx of Staphylococcus hominis bacteremia, Hx of Cardiopulmonary Arrest Secondary to Aspiration, history of 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, hypothyroidism , GERD, and Glaucoma presenting for facial droop.    Patient resides at [facility] where  reports that he was visiting her this morning when he noted left-sided facial droop which is since resolved.  Last known well prior was last night when nursing home was giving her night meds.  At baseline, she is quadriplegic, does not converse and speaks a few words at times.    No other symptoms were reported    In the ED initially, her vitals were stable  Stroke imaging was done and was unremarkable  Seen by neuro   After that patient was given rocephin for presumed UTI, ED suspect that after that, she became hypotensive and tachycardic with increased work of breathing, was given epi and then started on an epi drip + solumedrol for a working Dx of septic and anaphylactic shock    Labs: Leukocytosis, elevated lactate and trop (312 -> 293)    CT brain perfusion: No evidence of acute infarct or ischemia.  CTA neck: No stenosis. No dissection.  CTA brain: No stenosis or aneurysm.  CT angio chest showed right pulmonary embolus with right heart strain.    UA positive    #Hypotension  #2/2 Anaphylaxis to Rocephin vs Sepsis due to UTI vs PE  - Patient had hypotension after receiving Rocephin dose in the ED  - Was given epi then started on epi drip, later switched to levophed  - Now off pressors, BP is soft  - Today stopped LR maintenance  - HC stress dose decreased to 50mg q12h on 12/11  - Midodrine 10mg q8 started today  - For the possible UTI (UA++) she was started on Aztreonam  - UCx showing Klebsiella, resistant to aztreonam, so as per ID we switched her to meropenem on 12/11    #Acute mixed respiratory failure requiring BIPAP  #Right main pulmonary artery PE with RV strain on CTA - Intermediate/High risk as hypotension reason unclear  #Hx of COPD  - Duplex LE positive for extensive DVT on the left  - Patient was started on IV heparin drip after discussion of risks vs benefits with her  (since she has a hx of ICH in 2023 and hx of chest hematoma as well), now switched to lovenox  - IR on board, said no need for embolectomy as patient HD stable now  - Was on BIPAP overnight, HFNC during the day, today will try on regular NC  - TTE was technically difficult, showed normal EF, no RV strain    #Drop in Hb  - Likely just back to her baseline as initial Hb of 11.6 too high compared to baseline  - 12/9 CT A/P done to r/o hematoma, was negative  - No melena or hematochezia, normal BM  - Will just monitor Hb for any further drop, for now stable    #Left facial droop  #R/o stroke   - Stroke code was called in ED  - CTH, CTA head and neck all negative  - Neuro recommended MR brain however very difficult to be done, so they said okay to just repeat CTH  - Now facial droop resolved  - Patient at baseline quadriplegic so can't assess motor function for any further deficits    #History of 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  #Hx of Cardiopulmonary Arrest Secondary to Aspiration  - Continue home meds  - S/S recs: pureed diet with moderately thick fluids    #Paroxysmal A-fib (was diagnosed in last admission and not on AC due to anemia due to L chest wall hematoma)  #Hx of intracerebral hemorrhage  - Currently in sinus rythm  - Was not on AC due to bleeding hx  - Now on lovenox      #Diet: Pureed with moderately thick fluids  #DVT pro: lovenox  #GI pro: pantoprazole  #Activity: as tolerated  #Dispo: MICU  #Code status: DNI/DNR
84-year-old female with PMHx of Paroxysmal A-fib (was diagnosed in last admission and not on AC due to anemia due to L chest wall hematoma), Hx of ICH (in 2023), hx of Staphylococcus hominis bacteremia, Hx of Cardiopulmonary Arrest Secondary to Aspiration, history of 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, hypothyroidism , GERD, and Glaucoma presenting for facial droop.    Patient resides at [facility] where  reports that he was visiting her this morning when he noted left-sided facial droop which is since resolved.  Last known well prior was last night when nursing home was giving her night meds.  At baseline, she is quadriplegic, does not converse and speaks a few words at times.    No other symptoms were reported    In the ED initially, her vitals were stable  Stroke imaging was done and was unremarkable  Seen by neuro   After that patient was given rocephin for presumed UTI, ED suspect that after that, she became hypotensive and tachycardic with increased work of breathing, was given epi and then started on an epi drip + solumedrol for a working Dx of septic and anaphylactic shock    Labs: Leukocytosis, elevated lactate and trop (312 -> 293)    CT brain perfusion: No evidence of acute infarct or ischemia.  CTA neck: No stenosis. No dissection.  CTA brain: No stenosis or aneurysm.  CT angio chest showed right pulmonary embolus with right heart strain.    UA positive      #Hypotension -> resolved  #2/2 Anaphylaxis to Rocephin vs Sepsis due to UTI vs PE  - Patient had hypotension after receiving Rocephin dose in the ED  - Was given epi then started on epi drip, later switched to levophed  - 12/11 off pressors, BP is soft  - s/p LR maintenance  - Hydrocortisone stress dose decreased to 50mg q8h on 12/11 -> discontinued  - Midodrine 5mg q8 started 12/11  - For the possible UTI (UA++) she was started on Aztreonam  - UCx showing Klebsiella ESBL, resistant to aztreonam, so as per ID we switched her to meropenem on 12/11 -> Meropenem 1g q8h IV x 7 days, if D/C prior PO bactrim    #Right main pulmonary artery PE with RV strain on CTA - Intermediate/High risk as hypotension reason unclear  #Acute mixed respiratory failure requiring BIPAP  #Hx of COPD  - Duplex LE positive for extensive DVT on the left  - Patient was started on IV heparin drip after discussion of risks vs benefits with her  (since she has a hx of ICH in 2023 and hx of chest hematoma as well)  - IR on board, said no need for embolectomy as patient HD stable   - Was on BIPAP -> HFNC -> NC (still on it)  - TTE was technically difficult, showed normal EF, no RV strain  - on therapeutic lovenox -> switched to Eliquis 5 mg BID starting 12/14    #Drop in Hb -> stable   - Likely just back to her baseline as initial Hb of 11.6 too high compared to baseline  - 12/9 CT A/P done to r/o hematoma, was negative  - No melena or hematochezia, normal BM  - Will just monitor Hb for any further drop, for now stable    #Left facial droop  #R/o stroke   #functional Quadriplegia   - Stroke code was called in ED  - CTH, CTA head and neck all negative  - Neuro recommended MR brain however very difficult to be done, so they said okay to just repeat CTH  - Now facial droop resolved  - Patient at baseline quadriplegic so can't assess motor function for any further deficits    #History of 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  #Hx of Cardiopulmonary Arrest Secondary to Aspiration  - Continue home meds  - S/S recs: pureed diet with moderately thick fluids    #Paroxysmal A-fib (was diagnosed in last admission and not on AC due to anemia due to L chest wall hematoma)  #Hx of intracerebral hemorrhage  - Currently in sinus rythm  - Was not on AC due to bleeding hx  - Now on lovenox    #LUE clean ulcer  - Wound care consult     #DVT pro: eliquis  #GI pro: pantoprazole  #Diet: Pureed with moderately thick fluids  #Activity: as tolerated  #Code status: DNI/DNR.  #Dispo: Med floor
As goals are clear and patient nearing disposition, palliative care will sign off.    Please call back [phone] PRN
Repeat CT head form 12/9 without interval acute process. Pt unable to tolerate MRI brain. Cannot r/o small acute ischemic stroke.   ST eval/recs. AC as per primary team. No indication for an additional antiplatelet such as aspirin from stroke standpoint while pt is on therapeutic AC. Maintain -160.  Please call with any further questions/concerns.
pt discharged to rehab facility, they see the pcp at facility 12/18 - BRIELLE (PCP Referral)

## 2024-12-27 DIAGNOSIS — J96.01 ACUTE RESPIRATORY FAILURE WITH HYPOXIA: ICD-10-CM

## 2024-12-27 DIAGNOSIS — K21.9 GASTRO-ESOPHAGEAL REFLUX DISEASE WITHOUT ESOPHAGITIS: ICD-10-CM

## 2024-12-27 DIAGNOSIS — G31.83 NEUROCOGNITIVE DISORDER WITH LEWY BODIES: ICD-10-CM

## 2024-12-27 DIAGNOSIS — R53.2 FUNCTIONAL QUADRIPLEGIA: ICD-10-CM

## 2024-12-27 DIAGNOSIS — I26.99 OTHER PULMONARY EMBOLISM WITHOUT ACUTE COR PULMONALE: ICD-10-CM

## 2024-12-27 DIAGNOSIS — T36.1X5A ADVERSE EFFECT OF CEPHALOSPORINS AND OTHER BETA-LACTAM ANTIBIOTICS, INITIAL ENCOUNTER: ICD-10-CM

## 2024-12-27 DIAGNOSIS — T88.6XXA ANAPHYLACTIC REACTION DUE TO ADVERSE EFFECT OF CORRECT DRUG OR MEDICAMENT PROPERLY ADMINISTERED, INITIAL ENCOUNTER: ICD-10-CM

## 2024-12-27 DIAGNOSIS — N39.0 URINARY TRACT INFECTION, SITE NOT SPECIFIED: ICD-10-CM

## 2024-12-27 DIAGNOSIS — E87.21 ACUTE METABOLIC ACIDOSIS: ICD-10-CM

## 2024-12-27 DIAGNOSIS — J96.02 ACUTE RESPIRATORY FAILURE WITH HYPERCAPNIA: ICD-10-CM

## 2024-12-27 DIAGNOSIS — E87.20 ACIDOSIS, UNSPECIFIED: ICD-10-CM

## 2024-12-27 DIAGNOSIS — Z51.5 ENCOUNTER FOR PALLIATIVE CARE: ICD-10-CM

## 2024-12-27 DIAGNOSIS — E03.9 HYPOTHYROIDISM, UNSPECIFIED: ICD-10-CM

## 2024-12-27 DIAGNOSIS — Z74.01 BED CONFINEMENT STATUS: ICD-10-CM

## 2024-12-27 DIAGNOSIS — Z90.11 ACQUIRED ABSENCE OF RIGHT BREAST AND NIPPLE: ICD-10-CM

## 2024-12-27 DIAGNOSIS — A41.9 SEPSIS, UNSPECIFIED ORGANISM: ICD-10-CM

## 2024-12-27 DIAGNOSIS — G20.A1 PARKINSON'S DISEASE WITHOUT DYSKINESIA, WITHOUT MENTION OF FLUCTUATIONS: ICD-10-CM

## 2024-12-27 DIAGNOSIS — I48.0 PAROXYSMAL ATRIAL FIBRILLATION: ICD-10-CM

## 2024-12-27 DIAGNOSIS — F02.80 DEMENTIA IN OTHER DISEASES CLASSIFIED ELSEWHERE, UNSPECIFIED SEVERITY, WITHOUT BEHAVIORAL DISTURBANCE, PSYCHOTIC DISTURBANCE, MOOD DISTURBANCE, AND ANXIETY: ICD-10-CM

## 2024-12-27 DIAGNOSIS — Z66 DO NOT RESUSCITATE: ICD-10-CM

## 2025-03-18 NOTE — H&P ADULT - NSTOBACCOSCREENHP_GEN_A_NCS
- Has had no improvement with rest and use of Ibuprofen as needed.  - Declines physical therapy referral.  - Referred to Orthopedic Surgery.   Orders:  •  Ambulatory Referral to Orthopedic Surgery; Future   No

## 2025-05-03 NOTE — ED ADULT NURSE NOTE - CCCP TRG CHIEF CMPLNT
abdominal pain
Reviewing chart notes and data, face to face time counseling the patient, communicating with ER